# Patient Record
Sex: FEMALE | Race: WHITE | NOT HISPANIC OR LATINO | Employment: FULL TIME | URBAN - METROPOLITAN AREA
[De-identification: names, ages, dates, MRNs, and addresses within clinical notes are randomized per-mention and may not be internally consistent; named-entity substitution may affect disease eponyms.]

---

## 2017-01-20 ENCOUNTER — PATIENT MESSAGE (OUTPATIENT)
Dept: GYNECOLOGIC ONCOLOGY | Facility: CLINIC | Age: 62
End: 2017-01-20

## 2017-01-20 ENCOUNTER — LAB VISIT (OUTPATIENT)
Dept: LAB | Facility: HOSPITAL | Age: 62
End: 2017-01-20
Attending: OBSTETRICS & GYNECOLOGY
Payer: COMMERCIAL

## 2017-01-20 ENCOUNTER — HOSPITAL ENCOUNTER (OUTPATIENT)
Dept: RADIOLOGY | Facility: HOSPITAL | Age: 62
Discharge: HOME | End: 2017-01-20
Attending: OBSTETRICS & GYNECOLOGY
Payer: COMMERCIAL

## 2017-01-20 DIAGNOSIS — C56.9 OVARIAN CANCER, UNSPECIFIED LATERALITY: ICD-10-CM

## 2017-01-20 DIAGNOSIS — Z29.89 ENCOUNTER FOR HYDRATION PRIOR TO CT SCAN: ICD-10-CM

## 2017-01-20 LAB
ANION GAP SERPL CALC-SCNC: 7 MMOL/L
BUN SERPL-MCNC: 11 MG/DL
CALCIUM SERPL-MCNC: 8.7 MG/DL
CANCER AG125 SERPL-ACNC: 55 U/ML
CHLORIDE SERPL-SCNC: 107 MMOL/L
CO2 SERPL-SCNC: 27 MMOL/L
CREAT SERPL-MCNC: 0.7 MG/DL
ERYTHROCYTE [DISTWIDTH] IN BLOOD BY AUTOMATED COUNT: 19 %
EST. GFR  (AFRICAN AMERICAN): >60 ML/MIN/1.73 M^2
EST. GFR  (NON AFRICAN AMERICAN): >60 ML/MIN/1.73 M^2
GLUCOSE SERPL-MCNC: 93 MG/DL
HCT VFR BLD AUTO: 37.6 %
HGB BLD-MCNC: 12.5 G/DL
MCH RBC QN AUTO: 31.2 PG
MCHC RBC AUTO-ENTMCNC: 33.2 %
MCV RBC AUTO: 94 FL
NEUTROPHILS # BLD AUTO: 3.4 K/UL
PLATELET # BLD AUTO: 124 K/UL
PMV BLD AUTO: 9.5 FL
POTASSIUM SERPL-SCNC: 5.3 MMOL/L
RBC # BLD AUTO: 4.01 M/UL
SODIUM SERPL-SCNC: 141 MMOL/L
WBC # BLD AUTO: 4.76 K/UL

## 2017-01-20 PROCEDURE — 85027 COMPLETE CBC AUTOMATED: CPT

## 2017-01-20 PROCEDURE — 25500020 PHARM REV CODE 255: Performed by: OBSTETRICS & GYNECOLOGY

## 2017-01-20 PROCEDURE — 86304 IMMUNOASSAY TUMOR CA 125: CPT

## 2017-01-20 PROCEDURE — 74177 CT ABD & PELVIS W/CONTRAST: CPT | Mod: TC

## 2017-01-20 PROCEDURE — 36415 COLL VENOUS BLD VENIPUNCTURE: CPT

## 2017-01-20 PROCEDURE — 80048 BASIC METABOLIC PNL TOTAL CA: CPT

## 2017-01-20 PROCEDURE — 74177 CT ABD & PELVIS W/CONTRAST: CPT | Mod: 26,,, | Performed by: RADIOLOGY

## 2017-01-20 RX ADMIN — IOHEXOL 15 ML: 350 INJECTION, SOLUTION INTRAVENOUS at 11:01

## 2017-01-20 RX ADMIN — IOHEXOL 100 ML: 350 INJECTION, SOLUTION INTRAVENOUS at 12:01

## 2017-01-22 ENCOUNTER — PATIENT MESSAGE (OUTPATIENT)
Dept: GYNECOLOGIC ONCOLOGY | Facility: CLINIC | Age: 62
End: 2017-01-22

## 2017-01-23 ENCOUNTER — OFFICE VISIT (OUTPATIENT)
Dept: GYNECOLOGIC ONCOLOGY | Facility: CLINIC | Age: 62
End: 2017-01-23
Payer: COMMERCIAL

## 2017-01-23 ENCOUNTER — INFUSION (OUTPATIENT)
Dept: INFUSION THERAPY | Facility: HOSPITAL | Age: 62
End: 2017-01-23
Attending: OBSTETRICS & GYNECOLOGY
Payer: COMMERCIAL

## 2017-01-23 VITALS
HEART RATE: 82 BPM | SYSTOLIC BLOOD PRESSURE: 119 MMHG | WEIGHT: 187 LBS | BODY MASS INDEX: 29.29 KG/M2 | DIASTOLIC BLOOD PRESSURE: 58 MMHG

## 2017-01-23 VITALS
TEMPERATURE: 99 F | HEART RATE: 73 BPM | RESPIRATION RATE: 20 BRPM | DIASTOLIC BLOOD PRESSURE: 69 MMHG | SYSTOLIC BLOOD PRESSURE: 115 MMHG

## 2017-01-23 DIAGNOSIS — Z98.890 S/P EXPLORATORY LAPAROTOMY: ICD-10-CM

## 2017-01-23 DIAGNOSIS — C56.9 OVARIAN CANCER, UNSPECIFIED LATERALITY: Primary | ICD-10-CM

## 2017-01-23 DIAGNOSIS — Z51.11 ENCOUNTER FOR ANTINEOPLASTIC CHEMOTHERAPY: ICD-10-CM

## 2017-01-23 PROCEDURE — 25000003 PHARM REV CODE 250: Performed by: OBSTETRICS & GYNECOLOGY

## 2017-01-23 PROCEDURE — 96367 TX/PROPH/DG ADDL SEQ IV INF: CPT

## 2017-01-23 PROCEDURE — 96417 CHEMO IV INFUS EACH ADDL SEQ: CPT

## 2017-01-23 PROCEDURE — 99999 PR PBB SHADOW E&M-EST. PATIENT-LVL II: CPT | Mod: PBBFAC,,, | Performed by: OBSTETRICS & GYNECOLOGY

## 2017-01-23 PROCEDURE — 63600175 PHARM REV CODE 636 W HCPCS: Performed by: OBSTETRICS & GYNECOLOGY

## 2017-01-23 PROCEDURE — 99214 OFFICE O/P EST MOD 30 MIN: CPT | Mod: 24,S$GLB,, | Performed by: OBSTETRICS & GYNECOLOGY

## 2017-01-23 PROCEDURE — 96375 TX/PRO/DX INJ NEW DRUG ADDON: CPT

## 2017-01-23 PROCEDURE — 96377 APPLICATON ON-BODY INJECTOR: CPT

## 2017-01-23 PROCEDURE — 96413 CHEMO IV INFUSION 1 HR: CPT

## 2017-01-23 RX ORDER — SODIUM CHLORIDE 0.9 % (FLUSH) 0.9 %
10 SYRINGE (ML) INJECTION
Status: CANCELLED | OUTPATIENT
Start: 2017-01-23

## 2017-01-23 RX ORDER — HEPARIN 100 UNIT/ML
500 SYRINGE INTRAVENOUS
Status: CANCELLED | OUTPATIENT
Start: 2017-01-23

## 2017-01-23 RX ORDER — FAMOTIDINE 10 MG/ML
20 INJECTION INTRAVENOUS
Status: CANCELLED | OUTPATIENT
Start: 2017-01-23

## 2017-01-23 RX ORDER — SODIUM CHLORIDE 0.9 % (FLUSH) 0.9 %
10 SYRINGE (ML) INJECTION
Status: DISCONTINUED | OUTPATIENT
Start: 2017-01-23 | End: 2017-01-23 | Stop reason: HOSPADM

## 2017-01-23 RX ORDER — HEPARIN 100 UNIT/ML
500 SYRINGE INTRAVENOUS
Status: DISCONTINUED | OUTPATIENT
Start: 2017-01-23 | End: 2017-01-23 | Stop reason: HOSPADM

## 2017-01-23 RX ORDER — FAMOTIDINE 10 MG/ML
20 INJECTION INTRAVENOUS
Status: COMPLETED | OUTPATIENT
Start: 2017-01-23 | End: 2017-01-23

## 2017-01-23 RX ADMIN — SODIUM CHLORIDE: 0.9 INJECTION, SOLUTION INTRAVENOUS at 03:01

## 2017-01-23 RX ADMIN — CARBOPLATIN 750 MG: 10 INJECTION, SOLUTION INTRAVENOUS at 04:01

## 2017-01-23 RX ADMIN — FAMOTIDINE 20 MG: 10 INJECTION INTRAVENOUS at 03:01

## 2017-01-23 RX ADMIN — DIPHENHYDRAMINE HYDROCHLORIDE 50 MG: 50 INJECTION, SOLUTION INTRAMUSCULAR; INTRAVENOUS at 03:01

## 2017-01-23 RX ADMIN — PALONOSETRON HYDROCHLORIDE 0.25 MG: 0.25 INJECTION INTRAVENOUS at 03:01

## 2017-01-23 RX ADMIN — DOCETAXEL ANHYDROUS 147.5 MG: 10 INJECTION, SOLUTION INTRAVENOUS at 03:01

## 2017-01-23 RX ADMIN — PEGFILGRASTIM 6 MG: KIT SUBCUTANEOUS at 06:01

## 2017-01-23 NOTE — PROGRESS NOTES
Subjective:      Patient ID: Mickie Don is a 61 y.o. female.    Chief Complaint: Ovarian Cancer (review CT scan)  Treatment History  Xlap/Omentectomy on 11/1/16 - mass was unresectable  Stage IIIC ovarian cancer  Initiated Taxotere/Carbo on 11/18/16    HPI  Here today for consideration of C3 of treatment.  Labs reviewed and ok for treatment.  CA-125 down to 55.  Labs reviewed and ok for treatment.  Anxiety seems to be better.  No new symptoms.  CT A/P reveals no further peritoneal lesions.  Mass in pelvis slightly bigger but has undergone cystic degeneration.  Review of Systems   Constitutional: Negative for activity change, appetite change, chills, fatigue and fever.   HENT: Negative for hearing loss, mouth sores, nosebleeds, sore throat and tinnitus.    Eyes: Negative for visual disturbance.   Respiratory: Negative for cough, chest tightness, shortness of breath and wheezing.    Cardiovascular: Negative for chest pain and leg swelling.   Gastrointestinal: Negative for abdominal distention, abdominal pain, blood in stool, constipation, diarrhea, nausea and vomiting.   Genitourinary: Negative for dysuria, flank pain, frequency, hematuria, pelvic pain, vaginal bleeding, vaginal discharge and vaginal pain.   Musculoskeletal: Negative for arthralgias and back pain.   Skin: Negative for rash.   Neurological: Negative for dizziness, seizures, syncope, weakness and numbness.   Hematological: Does not bruise/bleed easily.   Psychiatric/Behavioral: Negative for confusion and sleep disturbance. The patient is not nervous/anxious.         Objective:   Physical Exam:   Constitutional: She is oriented to person, place, and time. She appears well-developed and well-nourished. No distress.    HENT:   Head: Normocephalic and atraumatic.    Eyes: No scleral icterus.    Neck: Normal range of motion. Neck supple.    Cardiovascular: Normal rate and intact distal pulses.  Exam reveals no cyanosis and no edema.     Pulmonary/Chest:  Effort normal. No respiratory distress. She exhibits no tenderness.        Abdominal: Soft. Normal appearance. She exhibits mass (palpable to above the umbilicus). She exhibits no fluid wave and no ascites. There is no tenderness. There is no rigidity, no rebound and no guarding. No hernia.     Genitourinary: Rectum normal. Pelvic exam was performed with patient supine. There is no rash, tenderness, lesion or injury on the right labia. There is no rash, tenderness, lesion or injury on the left labia. Uterus is deviated. Uterus is not enlarged, not fixed, not hosting fibroids and not experiencing uterine prolapse. Cervix is normal. Right adnexum displays no mass, no tenderness and no fullness. Left adnexum displays mass (mas feels slightly more mobile than previously), tenderness and fullness. No erythema or bleeding in the vagina. Cervix exhibits no motion tenderness, no discharge and no friability.           Musculoskeletal: Normal range of motion and moves all extremeties. She exhibits no edema.      Lymphadenopathy:     She has no cervical adenopathy.        Right: No inguinal adenopathy present.        Left: No inguinal adenopathy present.    Neurological: She is alert and oriented to person, place, and time.    Skin: Skin is warm. No rash noted. No cyanosis or erythema.    Psychiatric: She has a normal mood and affect. Thought content normal.       Assessment:     1. Ovarian cancer, unspecified laterality    2. S/P exploratory laparotomy/omentectomy    3. Encounter for antineoplastic chemotherapy        Plan:       CA-125 decreasing.  Minimal symptoms.  Mass more mobile on exam today, but still somewhat fixed.  Recommended we proceed with an additional 3 cycles given her response.

## 2017-01-23 NOTE — MR AVS SNAPSHOT
Patient Information     Patient Name Sex Mickie Martinez Female 1955      Visit Information        Provider Department Dept [facility]    2017 2:30 PM [facility], [facility] [phone] [address]      Patient Instructions     None      Your Current Medications Are     alprazolam (XANAX) 0.5 MG tablet    dexamethasone (DECADRON) 4 MG Tab    oxycodone-acetaminophen (PERCOCET) 5-325 mg per tablet      Facility-Administered Medications     alteplase injection 2 mg    carboplatin (PARAPLATIN) 750 mg in sodium chloride 0.9% 250 mL chemo infusion    diphenhydramine IVPB 50 mg    docetaxel (TAXOTERE) 75 mg/m2 = 147.5 mg in sodium chloride 0.9% 250 mL chemo infusion    famotidine (PF) 20 mg/2 mL injection 20 mg    heparin, porcine (PF) 100 unit/mL injection flush 500 Units    palonosetron 0.25mg/dexamethasone 20mg IVPB    pegfilgrastim (NEULASTA (ON BODY INJECTOR)) injection 6 mg    sodium chloride 0.9% 500 mL infusion    sodium chloride 0.9% flush 10 mL      Appointments for Next Year     None         Default Flowsheet Data (last 24 hours)      Amb Complex Vitals Wilfrido        17 1652 17 1607 17 1502 17 1341       Measurements    Weight    84.8 kg (187 lb)     /69   Taxotere infusion completed (!)  117/59   Taxotere infusing for 15 mins 116/60 (!)  119/58     Temp   99.1 °F (37.3 °C)      Pulse 73 60 72 82     Resp   20      Pain Assessment    Pain Score   Zero Zero             Allergies     No Known Allergies      Medications You Received from 2017 1826 to 2017 1826        Date/Time Order Dose Route Action     2017 1657 carboplatin (PARAPLATIN) 750 mg in sodium chloride 0.9% 250 mL chemo infusion 750 mg Intravenous New Bag     2017 1510 diphenhydramine IVPB 50 mg 50 mg Intravenous New Bag     2017 1552 docetaxel (TAXOTERE) 75 mg/m2 = 147.5 mg in sodium chloride 0.9% 250 mL chemo infusion 147.5 mg Intravenous New Bag      01/23/2017 1510 famotidine (PF) 20 mg/2 mL injection 20 mg 20 mg Intravenous Given     01/23/2017 1528 palonosetron 0.25mg/dexamethasone 20mg IVPB 0.25 mg Intravenous New Bag     01/23/2017 1825 pegfilgrastim (NEULASTA (ON BODY INJECTOR)) injection 6 mg 6 mg Subcutaneous Given     01/23/2017 1510 sodium chloride 0.9% 500 mL infusion   Intravenous New Bag      Current Discharge Medication List     Cannot display discharge medications since this is not an admission.

## 2017-01-24 NOTE — PLAN OF CARE
Problem: Patient Care Overview (Adult)  Goal: Plan of Care Review  Outcome: Ongoing (interventions implemented as appropriate)  Patient tolerated Taxotere and Carbo treatment with vital signs stable. OBI applied to left arm. Patient instructed to call MD with any problems . AVS given and Patient discharged home.

## 2017-02-03 ENCOUNTER — TELEPHONE (OUTPATIENT)
Dept: GYNECOLOGIC ONCOLOGY | Facility: CLINIC | Age: 62
End: 2017-02-03

## 2017-02-03 NOTE — TELEPHONE ENCOUNTER
----- Message from Roxana Taarngo sent at 2/3/2017  1:23 PM CST -----  Mickie Don needs to speak with medical assistant/pt can be reached at [phone]

## 2017-02-03 NOTE — TELEPHONE ENCOUNTER
02/03/17 rec'd pc from pt with c/o soreness and swelling around IV site. Advised pt apply cool compress and tylenol for pain. If sx worsen or change over the weekend go to the Er. Pt verbalized understanding. NICK/MA

## 2017-02-05 ENCOUNTER — HOSPITAL ENCOUNTER (EMERGENCY)
Facility: HOSPITAL | Age: 62
Discharge: HOME | End: 2017-02-05
Attending: EMERGENCY MEDICINE
Payer: COMMERCIAL

## 2017-02-05 VITALS
TEMPERATURE: 99 F | DIASTOLIC BLOOD PRESSURE: 56 MMHG | BODY MASS INDEX: 28.25 KG/M2 | OXYGEN SATURATION: 97 % | HEART RATE: 76 BPM | RESPIRATION RATE: 16 BRPM | HEIGHT: 67 IN | SYSTOLIC BLOOD PRESSURE: 115 MMHG | WEIGHT: 180 LBS

## 2017-02-05 DIAGNOSIS — M79.601 RIGHT ARM PAIN: Primary | ICD-10-CM

## 2017-02-05 DIAGNOSIS — M79.89 SWELLING OF ARM: ICD-10-CM

## 2017-02-05 DIAGNOSIS — I82.611 THROMBOSIS OF RIGHT CEPHALIC VEIN: ICD-10-CM

## 2017-02-05 LAB
ALBUMIN SERPL BCP-MCNC: 3.4 G/DL
ALP SERPL-CCNC: 87 U/L
ALT SERPL W/O P-5'-P-CCNC: 28 U/L
ANION GAP SERPL CALC-SCNC: 7 MMOL/L
AST SERPL-CCNC: 25 U/L
BASOPHILS # BLD AUTO: 0.02 K/UL
BASOPHILS NFR BLD: 0.2 %
BILIRUB SERPL-MCNC: 0.5 MG/DL
BUN SERPL-MCNC: 10 MG/DL
CALCIUM SERPL-MCNC: 8.6 MG/DL
CHLORIDE SERPL-SCNC: 106 MMOL/L
CO2 SERPL-SCNC: 25 MMOL/L
CREAT SERPL-MCNC: 0.7 MG/DL
DIFFERENTIAL METHOD: ABNORMAL
EOSINOPHIL # BLD AUTO: 0 K/UL
EOSINOPHIL NFR BLD: 0.1 %
ERYTHROCYTE [DISTWIDTH] IN BLOOD BY AUTOMATED COUNT: 19.2 %
EST. GFR  (AFRICAN AMERICAN): >60 ML/MIN/1.73 M^2
EST. GFR  (NON AFRICAN AMERICAN): >60 ML/MIN/1.73 M^2
GLUCOSE SERPL-MCNC: 93 MG/DL
HCT VFR BLD AUTO: 39.4 %
HGB BLD-MCNC: 13.1 G/DL
LYMPHOCYTES # BLD AUTO: 1.1 K/UL
LYMPHOCYTES NFR BLD: 11.9 %
MCH RBC QN AUTO: 31.8 PG
MCHC RBC AUTO-ENTMCNC: 33.2 %
MCV RBC AUTO: 96 FL
MONOCYTES # BLD AUTO: 0.4 K/UL
MONOCYTES NFR BLD: 5 %
NEUTROPHILS # BLD AUTO: 7.2 K/UL
NEUTROPHILS NFR BLD: 82.2 %
PLATELET # BLD AUTO: 112 K/UL
PMV BLD AUTO: 9.8 FL
POTASSIUM SERPL-SCNC: 4.4 MMOL/L
PROT SERPL-MCNC: 6.4 G/DL
RBC # BLD AUTO: 4.12 M/UL
SODIUM SERPL-SCNC: 138 MMOL/L
WBC # BLD AUTO: 8.79 K/UL

## 2017-02-05 PROCEDURE — 99284 EMERGENCY DEPT VISIT MOD MDM: CPT | Mod: ,,, | Performed by: NURSE PRACTITIONER

## 2017-02-05 PROCEDURE — 87040 BLOOD CULTURE FOR BACTERIA: CPT

## 2017-02-05 PROCEDURE — 99284 EMERGENCY DEPT VISIT MOD MDM: CPT

## 2017-02-05 PROCEDURE — 80053 COMPREHEN METABOLIC PANEL: CPT

## 2017-02-05 PROCEDURE — 85025 COMPLETE CBC W/AUTO DIFF WBC: CPT

## 2017-02-05 RX ORDER — CEPHALEXIN 500 MG/1
500 CAPSULE ORAL EVERY 6 HOURS
Qty: 40 CAPSULE | Refills: 0 | Status: SHIPPED | OUTPATIENT
Start: 2017-02-05 | End: 2017-02-15

## 2017-02-05 NOTE — DISCHARGE INSTRUCTIONS
Cellulitis  Cellulitis is an infection of the deep layers of skin. A break in the skin, such as a cut or scratch, can let bacteria under the skin. If the bacteria get to deep layers of the skin, it can be serious. If not treated, cellulitis can get into the bloodstream and lymph nodes. The infection can then spread throughout the body. This causes serious illness.  Cellulitis causes the affected skin to become red, swollen, warm, and sore. The reddened areas have a visible border. An open sore may leak fluid (pus). You may have a fever, chills, and pain.  Cellulitis is treated with antibiotics taken for 7 to 10 days. An open sore may be cleaned and covered with cool wet gauze. Symptoms should get better 1 to 2 days after treatment is started. Make sure to take all the antibiotics for the full number of days until they are gone. Keep taking the medicine even if your symptoms go away.  Home care  Follow these tips:  · Limit the use of the part of your body with cellulitis.   · If the infection is on your leg, keep your leg raised while sitting. This will help to reduce swelling.  · Take all of the antibiotic medicine exactly as directed until it is gone. Do not miss any doses, especially during the first 7 days. Dont stop taking the medicine when your symptoms get better.  · Keep the affected area clean and dry.  · Wash your hands with soap and warm water before and after touching your skin. Anyone else who touches your skin should also wash his or her hands. Don't share towels.  Follow-up care  Follow up with your healthcare provider, or as advised. If your infection does not go away on the first antibiotic, your healthcare provider will prescribe a different one.  When to seek medical advice  Call your healthcare provider right away if any of these occur:  · Red areas that spread  · Swelling or pain that gets worse  · Fluid leaking from the skin (pus)  · Fever higher of 100.4º F (38.0º C) or higher after 2 days  on antibiotics  Date Last Reviewed: 9/1/2016  © 2727-2399 [employer]. [address] [place] [address], [place] [address]. All rights reserved. This information is not intended as a substitute for professional medical care. Always follow your healthcare professional's instructions.        Superficial Thrombophlebitis    The superficial veins are the veins near the surface of the skin. Superficial thrombophlebitis is a problem that occurs when one or more of these veins become inflamed (red, irritated, and swollen). This is most often due to a blood clot.  Causes  The problem may occur after injury to a vein. It may also occur after having an intravenous (IV) line placed. Other factors that can make the problem more likely include:  · Varicose veins  · Venous insufficiency  · Bleeding disorders  · Prolonged periods of rest and not moving around  · IV drug abuse  Symptoms  Symptoms may appear in the affected area. They can include:  · Pain  · Tenderness  · Redness  · Warmth  · Swelling  · Hardening of the vein  In most cases, superficial thrombophlebitis resolves on its own with no problems. Treatment is focused on relieving symptoms.  Sometimes, there is a risk that the deep veins in the body may also be involved. This can lead to more serious problems. In such cases, further testing and treatments may be needed. Your healthcare provider can tell you more about this.  Home Care  To help relieve pain and swelling, you may be advised to:  · Apply heat or cold to the affected area. Do this for up to 10 minutes as often as directed.  ¨ Heat: Use a warm compress, such as a heating pad.  ¨ Cold: Use a cold compress, such as a cold pack or bag of ice wrapped in a thin towel.  · Take nonsteroidal anti-inflammatory drugs (NSAIDS), such as ibuprofen. In some cases, other pain medicines may be prescribed.  · Keep the affected limb (arm or leg) raised above heart level as directed.  · Wear elastic compression stockings or  bandages as directed.  · Avoid prolonged sitting or standing. Get up and walk often.  To help treat a blood clot, a blood thinner (anticoagulant) may be prescribed. If this is needed, be sure to take the medicine exactly as directed.  Follow-up care  Follow up with your healthcare provider as advised. If imaging tests are done, they will be reviewed by a doctor. Youll be told the results and any new findings that may affect your treatment.  When to seek medical advice  Call your healthcare provider right away if any of these occur:  · Fever of 100.4°F (38ºC) or higher, or as directed by your provider  · Increasing pain, swelling, or tenderness in the affected area  · Spreading warmth or redness in the affected area  Call 911  Call 911 right away if any of these occur:  · Trouble breathing  · Chest pain or discomfort that worsens with deep breathing or coughing  · Coughing (may cough up blood)  · Fast or irregular heartbeat  · Sweating  · Anxiety  · Lightheadedness, dizziness, or fainting  · Extreme confusion  · Extreme drowsiness or trouble waking up  · New pain in the chest, arm, shoulder, neck, or upper back  Date Last Reviewed: 9/21/2015  © 7186-0909 [employer]. [address] [place] [address], [place] [address]. All rights reserved. This information is not intended as a substitute for professional medical care. Always follow your healthcare professional's instructions.

## 2017-02-05 NOTE — ED NOTES
Patient identifiers verified and correct for Mickie Don.   LOC: The patient is awake, alert and aware of environment with an appropriate affect, the patient is oriented x 3 and speaking appropriately.   APPEARANCE: Patient appears comfortable and in no acute distress, patient is clean and well groomed.  SKIN: The skin is warm and dry, color consistent with ethnicity, patient has normal skin turgor and moist mucus membranes, skin intact, swelling and redness to right AC.   MUSCULOSKELETAL: Patient moving all extremities spontaneously, no swelling noted.  RESPIRATORY: Airway is open and patent, respirations are spontaneous, patient has a normal effort and rate, no accessory muscle use noted, pt placed on continuous pulse ox with O2 sats noted at 97% on room air.  CARDIAC: Pt placed on cardiac monitor. Patient has a normal rate and regular rhythm, no edema noted, capillary refill < 3 seconds.   GASTRO: Soft and non tender to palpation, no distention noted, normoactive bowel sounds present in all four quadrants. Pt states bowel movements have been regular.  : Pt denies any pain or frequency with urination.  NEURO: Pt opens eyes spontaneously, behavior appropriate to situation, follows commands, facial expression symmetrical, bilateral hand grasp equal and even, purposeful motor response noted, normal sensation in all extremities when touched with a finger.

## 2017-02-05 NOTE — ED PROVIDER NOTES
Encounter Date: 2/5/2017    SCRIBE #1 NOTE: I, Teodoro Faith, am scribing for, and in the presence of,  Dr. Mckeon. I have scribed the following portions of the note - the APC attestation.       History     Chief Complaint   Patient presents with    Arm Pain     c/o, pain in rt arm x 4 days . Hx of IV dye and chemo Has red area in rt ac.Afebrile at home     Review of patient's allergies indicates:  No Known Allergies  HPI Comments: This is a 61 y.o. Female with a past medical history significant for ovarian cancer who is currently on chemotherapy who presents to emergency department today complaining of redness, swelling and pain to her right arm.  She reports that on 1/20/17 she had IV contrast placed in the right antecubital space and then on 1/23/17 she had chemotherapy also placed in the right antecubital space.  She states that a couple days after her chemotherapy she began to develop the redness, swelling and pain.  She notes that the area of swelling and redness has gradually gotten larger over the past few days.  She denies fever or chills.  She denies limited range of motion although she states it is painful to move the elbow.  She denies weakness or numbness.  Denies any fever or chills.  She denies wound, drainage or odor.  Denies chest pain or shortness of breath.  No other problems or concerns voiced at this time.      The history is provided by the patient.     Past Medical History   Diagnosis Date    Open angle with borderline findings and low glaucoma risk in both eyes 6/20/2013     Past Medical History Pertinent Negatives   Diagnosis Date Noted    Amblyopia 6/20/2013    Cataract 6/20/2013    Coronary artery disease 6/29/2014    Diabetes mellitus 6/20/2013    Diabetes mellitus 6/29/2014    Diabetic retinopathy 6/20/2013    Hypertension 6/29/2014    Macular degeneration 6/20/2013    Retinal detachment 6/20/2013    Strabismus 6/20/2013    Uveitis 6/20/2013     Past Surgical History    Procedure Laterality Date    Cholecystectomy  1992    Left leg surgery  2005     achilles tendon    Right leg surgery  2006     broken, including ankle    Bunionectomy Left      Family History   Problem Relation Age of Onset    Cancer Father      ? liver ca    Diabetes Father     Cataracts Mother     Breast cancer Maternal Aunt     Cancer Son      sarcoma    Colon cancer Neg Hx     Ovarian cancer Neg Hx     Amblyopia Neg Hx     Blindness Neg Hx     Glaucoma Neg Hx     Hypertension Neg Hx     Macular degeneration Neg Hx     Retinal detachment Neg Hx     Strabismus Neg Hx     Stroke Neg Hx     Thyroid disease Neg Hx      Social History   Substance Use Topics    Smoking status: Never Smoker    Smokeless tobacco: Never Used    Alcohol use 1.8 oz/week     1 Glasses of wine, 2 Shots of liquor per week      Comment: Socially     Review of Systems   Constitutional: Negative for chills and fever.   HENT: Negative for congestion and sinus pressure.    Eyes: Negative for visual disturbance.   Respiratory: Negative for cough, chest tightness and shortness of breath.    Cardiovascular: Negative for chest pain.   Gastrointestinal: Negative for abdominal pain, diarrhea, nausea and vomiting.   Genitourinary: Negative for flank pain. Negative for dysuria.  Musculoskeletal: Positive for arm pain.  Positive for arm swelling.    Skin: Positive for erythema.  Negative for wound.    Neurological: Negative for dizziness. Negative for weakness and numbness.   Psychiatric/Behavioral: Negative for confusion.       Physical Exam   Initial Vitals   BP Pulse Resp Temp SpO2   02/05/17 0911 02/05/17 0911 02/05/17 0911 02/05/17 0911 02/05/17 0911   131/58 98 18 99.3 °F (37.4 °C) 96 %     Physical Exam   Nursing note and vitals reviewed.  Constitutional: Patient appears well-developed and well-nourished. Not diaphoretic. No distress.   HENT:   Head: Normocephalic and atraumatic.   Eyes: Conjunctivae are normal. No scleral  icterus.   Neck: Normal range of motion. Neck supple.   Cardiovascular: Normal rate, regular rhythm and normal heart sounds.   Pulmonary/Chest: Breath sounds normal. No respiratory distress.  Abdominal: Soft. There is no tenderness.   Musculoskeletal: There is localized swelling, tenderness, erythema and warmth noted to the right antecubital space.  No fluctuance is noted.  There is no wound, drainage or odor.  Patient has normal but guarded range of motion of the right elbow secondary to pain.  Normal radial pulse.  Normal capillary refill time.    Neurological: Alert and oriented to person, place, and time. Normal strength. No sensory deficit.   Skin: See musculoskeletal exam.    Psychiatric: Normal mood and affect. Thought content normal.     ED Course   Procedures  Labs Reviewed   CBC W/ AUTO DIFFERENTIAL - Abnormal; Notable for the following:        Result Value    MCH 31.8 (*)     RDW 19.2 (*)     Platelets 112 (*)     Gran% 82.2 (*)     Lymph% 11.9 (*)     All other components within normal limits   COMPREHENSIVE METABOLIC PANEL - Abnormal; Notable for the following:     Calcium 8.6 (*)     Albumin 3.4 (*)     Anion Gap 7 (*)     All other components within normal limits   CULTURE, BLOOD   CULTURE, BLOOD             Medical Decision Making:   History:   Old Medical Records: I decided to obtain old medical records.  Initial Assessment:   61 y.o. Female presenting with swelling, redness and pain to the right antecubital space for about one week following CT contrast infusion and chemotherapy infusion to the site. She has a low grade fever of 99. She is not toxic and in no distress. Denies CP or SOB. She has normal ROM of the right elbow and is neurovascularly intact. Labs are unremarkable. Venous US indicates a superficial thrombus of the basilic vein, no treatment is indicated. Will empirically treat with Keflex in the event that this is of infectious etiology given her immunocompromised status. She is to f/u  with her oncologist this week. Return precautions discussed. I discussed this case with Dr. Mckeon, my collaborating physician.  Clinical Tests:   Lab Tests: Ordered and Reviewed  Radiological Study: Ordered and Reviewed            Scribe Attestation:   Scribe #1: I performed the above scribed service and the documentation accurately describes the services I performed. I attest to the accuracy of the note.    Attending Attestation:     Physician Attestation Statement for NP/PA:   I have conducted a face to face encounter with this patient in addition to the NP/PA, due to Medical Complexity    Other NP/PA Attestation Additions:    History of Present Illness: 60 yo woman on chemotherapy for ovarian CA c/o RUE pain, redness, swelling in her R antecubital fossa. She had an IV in that area for a CT scan on Jan 20, 2017 and for chemotherapy 3 days later. A few days after that she developed a painful knot in that area. It has continued to enlarge and now has more induration, redness and pain. She is concerned about a blood clot.    Physical Exam: There is erythema and induration surrounding a tender nodule at the R ac extending approximately 6-7 cm superiorly and laterally. No pain with passive or active ROM. No fluctuance.   Medical Decision Making: My initial differential diagnoses include but are not limited to:  DVT, cellulitis, or thrombophlebitis. Checking labs, RUE US. Pt given a warm compress and refused pain meds.        Physician Attestation for Scribe:  Physician Attestation Statement for Scribe #1: I, Dr. Mckeon, reviewed documentation, as scribed by Teodoro Faith in my presence, and it is both accurate and complete.         Attending ED Notes:   12:15 PM    US is consistent with thrombosis of superficial vein. No evidence of DVT. Pt's wbc is 8 with L shift. She has a low grade fever of 99. Will place pt on keflex and recommend that she contact oncology tomorrow to arrange a follow up in the next few days.  Will give return instructions.           ED Course     Clinical Impression:   The primary encounter diagnosis was Right arm pain. Diagnoses of Swelling of arm and Thrombosis of right cephalic vein were also pertinent to this visit.          Bernie Hodges NP  02/06/17 [id number]

## 2017-02-05 NOTE — ED TRIAGE NOTES
Pt is on chemotherapy with most recent treatment on Feb 23rd. Pt has swelling and redness to right AC which happens to be the same area of last chemo treatment and CT contrast.

## 2017-02-05 NOTE — ED AVS SNAPSHOT
[facility]  [address] [facility] [address]               Mickie Don   2017  9:39 AM   ED    Description:  Female : 1955   Department:  [facility]           Your Care was Coordinated By:     Provider Role From To    Cecily Castle MD Attending Provider 17 --    Bernie Hodges NP Nurse Practitioner 17 --      Reason for Visit     Arm Pain           Diagnoses this Visit        Comments    Right arm pain    -  Primary     Swelling of arm         Thrombosis of right cephalic vein           ED Disposition     None           To Do List           Follow-up Information     Follow up with Benny Tran MD. Schedule an appointment as soon as possible for a visit in 1 day.    Specialties:  Obstetrics, Gynecology, Gynecologic Oncology    Why:  For re-evaluation and further treatment.    Contact information:    [address] [facility] [address]  [phone]         These Medications        Disp Refills Start End    cephALEXin (KEFLEX) 500 MG capsule 40 capsule 0 2017 2/15/2017    Take 1 capsule (500 mg total) by mouth every 6 (six) hours. - Oral    Pharmacy: [facility] [address] [place] [address] #: [phone]         [facility] On Call     [facility] Nurse Care Line -  Assistance  Registered nurses in the [facility] provide clinical advisement, health education, appointment booking, and other advisory services.  Call for this free service at [phone].             Medications           Message regarding Medications     Verify the changes and/or additions to your medication regime listed below are the same as discussed with your clinician today.  If any of these changes or additions are incorrect, please notify your healthcare provider.        START taking these NEW medications        Refills    cephALEXin (KEFLEX) 500 MG capsule 0    Sig: Take 1  "capsule (500 mg total) by mouth every 6 (six) hours.    Class: Print    Route: Oral           Verify that the below list of medications is an accurate representation of the medications you are currently taking.  If none reported, the list may be blank. If incorrect, please contact your healthcare provider. Carry this list with you in case of emergency.           Current Medications     alprazolam (XANAX) 0.5 MG tablet Take 1 tablet (0.5 mg total) by mouth 3 (three) times daily as needed for Anxiety.    cephALEXin (KEFLEX) 500 MG capsule Take 1 capsule (500 mg total) by mouth every 6 (six) hours.    dexamethasone (DECADRON) 4 MG Tab Take 2 tablets BID day before and after chemotherapy.    oxycodone-acetaminophen (PERCOCET) 5-325 mg per tablet Take 1 tablet by mouth every 4 (four) hours as needed (pain 1-5).           Clinical Reference Information           Your Vitals Were     BP Pulse Temp Resp Height Weight    115/56 (BP Location: Left arm, Patient Position: Lying, BP Method: Automatic) 76 99.3 °F (37.4 °C) (Oral) 16 5' 7" (1.702 m) 81.6 kg (180 lb)    SpO2 BMI             97% 28.19 kg/m2         Allergies as of 2/5/2017     No Known Allergies      Immunizations Administered on Date of Encounter - 2/5/2017     None      ED Micro, Lab, POCT     Start Ordered       Status Ordering Provider    02/05/17 0959 02/05/17 1000  CBC auto differential  STAT      Final result     02/05/17 0959 02/05/17 1000  Comprehensive metabolic panel  STAT      Final result     02/05/17 0959 02/05/17 1000  Blood Culture #2 **CANNOT BE ORDERED STAT**  Once      In process     02/05/17 0959 02/05/17 1000  Blood Culture #1 **CANNOT BE ORDERED STAT**  Once      In process       ED Imaging Orders     Start Ordered       Status Ordering Provider    02/05/17 0959 02/05/17 1000  US Upper Extremity Veins Right  1 time imaging     Comments:  Please look for both DVT and  Abscess. Thanks.    Final result         Discharge Instructions     "     Cellulitis  Cellulitis is an infection of the deep layers of skin. A break in the skin, such as a cut or scratch, can let bacteria under the skin. If the bacteria get to deep layers of the skin, it can be serious. If not treated, cellulitis can get into the bloodstream and lymph nodes. The infection can then spread throughout the body. This causes serious illness.  Cellulitis causes the affected skin to become red, swollen, warm, and sore. The reddened areas have a visible border. An open sore may leak fluid (pus). You may have a fever, chills, and pain.  Cellulitis is treated with antibiotics taken for 7 to 10 days. An open sore may be cleaned and covered with cool wet gauze. Symptoms should get better 1 to 2 days after treatment is started. Make sure to take all the antibiotics for the full number of days until they are gone. Keep taking the medicine even if your symptoms go away.  Home care  Follow these tips:  · Limit the use of the part of your body with cellulitis.   · If the infection is on your leg, keep your leg raised while sitting. This will help to reduce swelling.  · Take all of the antibiotic medicine exactly as directed until it is gone. Do not miss any doses, especially during the first 7 days. Dont stop taking the medicine when your symptoms get better.  · Keep the affected area clean and dry.  · Wash your hands with soap and warm water before and after touching your skin. Anyone else who touches your skin should also wash his or her hands. Don't share towels.  Follow-up care  Follow up with your healthcare provider, or as advised. If your infection does not go away on the first antibiotic, your healthcare provider will prescribe a different one.  When to seek medical advice  Call your healthcare provider right away if any of these occur:  · Red areas that spread  · Swelling or pain that gets worse  · Fluid leaking from the skin (pus)  · Fever higher of 100.4º F (38.0º C) or higher after 2 days  on antibiotics  Date Last Reviewed: 9/1/2016  © 7766-6608 [employer]. [address] [place] [address], [place] [address]. All rights reserved. This information is not intended as a substitute for professional medical care. Always follow your healthcare professional's instructions.        Superficial Thrombophlebitis    The superficial veins are the veins near the surface of the skin. Superficial thrombophlebitis is a problem that occurs when one or more of these veins become inflamed (red, irritated, and swollen). This is most often due to a blood clot.  Causes  The problem may occur after injury to a vein. It may also occur after having an intravenous (IV) line placed. Other factors that can make the problem more likely include:  · Varicose veins  · Venous insufficiency  · Bleeding disorders  · Prolonged periods of rest and not moving around  · IV drug abuse  Symptoms  Symptoms may appear in the affected area. They can include:  · Pain  · Tenderness  · Redness  · Warmth  · Swelling  · Hardening of the vein  In most cases, superficial thrombophlebitis resolves on its own with no problems. Treatment is focused on relieving symptoms.  Sometimes, there is a risk that the deep veins in the body may also be involved. This can lead to more serious problems. In such cases, further testing and treatments may be needed. Your healthcare provider can tell you more about this.  Home Care  To help relieve pain and swelling, you may be advised to:  · Apply heat or cold to the affected area. Do this for up to 10 minutes as often as directed.  ¨ Heat: Use a warm compress, such as a heating pad.  ¨ Cold: Use a cold compress, such as a cold pack or bag of ice wrapped in a thin towel.  · Take nonsteroidal anti-inflammatory drugs (NSAIDS), such as ibuprofen. In some cases, other pain medicines may be prescribed.  · Keep the affected limb (arm or leg) raised above heart level as directed.  · Wear elastic compression stockings or  bandages as directed.  · Avoid prolonged sitting or standing. Get up and walk often.  To help treat a blood clot, a blood thinner (anticoagulant) may be prescribed. If this is needed, be sure to take the medicine exactly as directed.  Follow-up care  Follow up with your healthcare provider as advised. If imaging tests are done, they will be reviewed by a doctor. Youll be told the results and any new findings that may affect your treatment.  When to seek medical advice  Call your healthcare provider right away if any of these occur:  · Fever of 100.4°F (38ºC) or higher, or as directed by your provider  · Increasing pain, swelling, or tenderness in the affected area  · Spreading warmth or redness in the affected area  Call 911  Call 911 right away if any of these occur:  · Trouble breathing  · Chest pain or discomfort that worsens with deep breathing or coughing  · Coughing (may cough up blood)  · Fast or irregular heartbeat  · Sweating  · Anxiety  · Lightheadedness, dizziness, or fainting  · Extreme confusion  · Extreme drowsiness or trouble waking up  · New pain in the chest, arm, shoulder, neck, or upper back  Date Last Reviewed: 9/21/2015 © 2000-2016 [employer]. [address] [place] [address]. All rights reserved. This information is not intended as a substitute for professional medical care. Always follow your healthcare professional's instructions.          Your Scheduled Appointments     Feb 14, 2017 10:10 AM CST   Non-Fasting Lab with LAB, HEMONC CANCER BLDG   [facility] ([facility])    [address]  [place] [address]   [phone]            Feb 15, 2017  8:30 AM CST   Chemotherapy with MD Hammad Barney [facility] - GYN Oncology ([facility] )    [address]  [place] [address]   [phone]            Feb 15, 2017  9:00 AM CST   Infusion 180 MIn with [facility], CHEMO   [facility] ([facility])     [address]  [facility] [address]   [phone]               [facility] complies with applicable Federal civil rights laws and does not discriminate on the basis of race, color, national origin, age, disability, or sex.        Language Assistance Services     ATTENTION: Language assistance services are available, free of charge. Please call [phone].      ATENCIÓN: Si habla español, tiene a gilbert disposición servicios gratuitos de asistencia lingüística. Llame al [phone].     CHÚ Ý: N?u b?n nói Ti?ng Vi?t, có các d?ch v? h? tr? ngôn ng? mi?n phí dành cho b?n. G?i s? [phone].

## 2017-02-08 ENCOUNTER — TELEPHONE (OUTPATIENT)
Dept: GYNECOLOGIC ONCOLOGY | Facility: CLINIC | Age: 62
End: 2017-02-08

## 2017-02-08 NOTE — TELEPHONE ENCOUNTER
02/08/17 rec'd pc from pt with c/o arm is still hurting with a large knot from IV. Pt was seen in the ER on Sunday and started on keflex and pain meds. Ultrasound showed no evidence of a DVT. Pt advised to continue with the warm compresses and pain meds prn. Knot will take some time to resolve. Pt verbalized understanding. NICK/MA

## 2017-02-10 LAB
BACTERIA BLD CULT: NORMAL
BACTERIA BLD CULT: NORMAL

## 2017-02-14 ENCOUNTER — LAB VISIT (OUTPATIENT)
Dept: LAB | Facility: HOSPITAL | Age: 62
End: 2017-02-14
Attending: INTERNAL MEDICINE
Payer: COMMERCIAL

## 2017-02-14 DIAGNOSIS — C56.9 OVARIAN CANCER, UNSPECIFIED LATERALITY: ICD-10-CM

## 2017-02-14 LAB
ANION GAP SERPL CALC-SCNC: 8 MMOL/L
BUN SERPL-MCNC: 10 MG/DL
CALCIUM SERPL-MCNC: 8.7 MG/DL
CANCER AG125 SERPL-ACNC: 46 U/ML
CHLORIDE SERPL-SCNC: 108 MMOL/L
CO2 SERPL-SCNC: 25 MMOL/L
CREAT SERPL-MCNC: 0.7 MG/DL
ERYTHROCYTE [DISTWIDTH] IN BLOOD BY AUTOMATED COUNT: 18.9 %
EST. GFR  (AFRICAN AMERICAN): >60 ML/MIN/1.73 M^2
EST. GFR  (NON AFRICAN AMERICAN): >60 ML/MIN/1.73 M^2
GLUCOSE SERPL-MCNC: 87 MG/DL
HCT VFR BLD AUTO: 34.9 %
HGB BLD-MCNC: 11.7 G/DL
MCH RBC QN AUTO: 32.6 PG
MCHC RBC AUTO-ENTMCNC: 33.5 %
MCV RBC AUTO: 97 FL
NEUTROPHILS # BLD AUTO: 2.3 K/UL
PLATELET # BLD AUTO: 83 K/UL
PMV BLD AUTO: 10.2 FL
POTASSIUM SERPL-SCNC: 4.4 MMOL/L
RBC # BLD AUTO: 3.59 M/UL
SODIUM SERPL-SCNC: 141 MMOL/L
WBC # BLD AUTO: 3.44 K/UL

## 2017-02-14 PROCEDURE — 86304 IMMUNOASSAY TUMOR CA 125: CPT

## 2017-02-14 PROCEDURE — 80048 BASIC METABOLIC PNL TOTAL CA: CPT

## 2017-02-14 PROCEDURE — 85027 COMPLETE CBC AUTOMATED: CPT

## 2017-02-14 PROCEDURE — 36415 COLL VENOUS BLD VENIPUNCTURE: CPT

## 2017-02-14 RX ORDER — FAMOTIDINE 10 MG/ML
20 INJECTION INTRAVENOUS
Status: CANCELLED | OUTPATIENT
Start: 2017-02-14

## 2017-02-14 RX ORDER — SODIUM CHLORIDE 0.9 % (FLUSH) 0.9 %
10 SYRINGE (ML) INJECTION
Status: CANCELLED | OUTPATIENT
Start: 2017-02-14

## 2017-02-14 RX ORDER — HEPARIN 100 UNIT/ML
500 SYRINGE INTRAVENOUS
Status: CANCELLED | OUTPATIENT
Start: 2017-02-14

## 2017-02-15 ENCOUNTER — OFFICE VISIT (OUTPATIENT)
Dept: GYNECOLOGIC ONCOLOGY | Facility: CLINIC | Age: 62
End: 2017-02-15
Payer: COMMERCIAL

## 2017-02-15 ENCOUNTER — INFUSION (OUTPATIENT)
Dept: INFUSION THERAPY | Facility: HOSPITAL | Age: 62
End: 2017-02-15
Attending: OBSTETRICS & GYNECOLOGY
Payer: COMMERCIAL

## 2017-02-15 VITALS
WEIGHT: 196 LBS | DIASTOLIC BLOOD PRESSURE: 67 MMHG | SYSTOLIC BLOOD PRESSURE: 118 MMHG | HEART RATE: 86 BPM | BODY MASS INDEX: 30.7 KG/M2

## 2017-02-15 VITALS
DIASTOLIC BLOOD PRESSURE: 66 MMHG | SYSTOLIC BLOOD PRESSURE: 120 MMHG | TEMPERATURE: 98 F | HEART RATE: 57 BPM | RESPIRATION RATE: 18 BRPM

## 2017-02-15 DIAGNOSIS — C56.9 OVARIAN CANCER, UNSPECIFIED LATERALITY: Primary | ICD-10-CM

## 2017-02-15 DIAGNOSIS — Z51.11 ENCOUNTER FOR ANTINEOPLASTIC CHEMOTHERAPY: ICD-10-CM

## 2017-02-15 PROCEDURE — 63600175 PHARM REV CODE 636 W HCPCS: Performed by: OBSTETRICS & GYNECOLOGY

## 2017-02-15 PROCEDURE — 96413 CHEMO IV INFUSION 1 HR: CPT

## 2017-02-15 PROCEDURE — 96367 TX/PROPH/DG ADDL SEQ IV INF: CPT

## 2017-02-15 PROCEDURE — 96361 HYDRATE IV INFUSION ADD-ON: CPT

## 2017-02-15 PROCEDURE — 99999 PR PBB SHADOW E&M-EST. PATIENT-LVL III: CPT | Mod: PBBFAC,,, | Performed by: OBSTETRICS & GYNECOLOGY

## 2017-02-15 PROCEDURE — 99214 OFFICE O/P EST MOD 30 MIN: CPT | Mod: S$GLB,,, | Performed by: OBSTETRICS & GYNECOLOGY

## 2017-02-15 PROCEDURE — 96417 CHEMO IV INFUS EACH ADDL SEQ: CPT

## 2017-02-15 PROCEDURE — 96377 APPLICATON ON-BODY INJECTOR: CPT

## 2017-02-15 PROCEDURE — 96375 TX/PRO/DX INJ NEW DRUG ADDON: CPT

## 2017-02-15 PROCEDURE — 25000003 PHARM REV CODE 250: Performed by: OBSTETRICS & GYNECOLOGY

## 2017-02-15 RX ORDER — HEPARIN 100 UNIT/ML
500 SYRINGE INTRAVENOUS
Status: DISCONTINUED | OUTPATIENT
Start: 2017-02-15 | End: 2017-02-15 | Stop reason: HOSPADM

## 2017-02-15 RX ORDER — SULFAMETHOXAZOLE AND TRIMETHOPRIM 800; 160 MG/1; MG/1
1 TABLET ORAL 2 TIMES DAILY
Qty: 14 TABLET | Refills: 0 | Status: SHIPPED | OUTPATIENT
Start: 2017-02-15 | End: 2017-02-22

## 2017-02-15 RX ORDER — SODIUM CHLORIDE 0.9 % (FLUSH) 0.9 %
10 SYRINGE (ML) INJECTION
Status: DISCONTINUED | OUTPATIENT
Start: 2017-02-15 | End: 2017-02-15 | Stop reason: HOSPADM

## 2017-02-15 RX ORDER — FAMOTIDINE 10 MG/ML
20 INJECTION INTRAVENOUS
Status: COMPLETED | OUTPATIENT
Start: 2017-02-15 | End: 2017-02-15

## 2017-02-15 RX ADMIN — Medication 50 MG: at 09:02

## 2017-02-15 RX ADMIN — DOCETAXEL ANHYDROUS 147.5 MG: 10 INJECTION, SOLUTION INTRAVENOUS at 10:02

## 2017-02-15 RX ADMIN — SODIUM CHLORIDE: 0.9 INJECTION, SOLUTION INTRAVENOUS at 09:02

## 2017-02-15 RX ADMIN — FAMOTIDINE 20 MG: 10 INJECTION INTRAVENOUS at 10:02

## 2017-02-15 RX ADMIN — CARBOPLATIN 750 MG: 10 INJECTION, SOLUTION INTRAVENOUS at 12:02

## 2017-02-15 RX ADMIN — DEXAMETHASONE SODIUM PHOSPHATE 0.25 MG: 4 INJECTION, SOLUTION INTRAMUSCULAR; INTRAVENOUS at 10:02

## 2017-02-15 RX ADMIN — PEGFILGRASTIM 6 MG: KIT SUBCUTANEOUS at 01:02

## 2017-02-15 NOTE — PROGRESS NOTES
Subjective:      Patient ID: Mickie Don is a 61 y.o. female.    Chief Complaint: Ovarian Cancer (cycle# 5)  Treatment History  Xlap/Omentectomy on 11/1/16 - mass was unresectable  Stage IIIC ovarian cancer  Initiated Taxotere/Carbo on 11/18/16    HPI  Here today for consideration of C5 of treatment.  Was seen in ED for thrombophlebitis after last treatment.  Denies N/V, F/C, neuropathy.  Labs reviewed and ok for treatment.  CA-125 decreased to 46.  Review of Systems   Constitutional: Negative for activity change, appetite change, chills, fatigue and fever.   HENT: Negative for hearing loss, mouth sores, nosebleeds, sore throat and tinnitus.    Eyes: Negative for visual disturbance.   Respiratory: Negative for cough, chest tightness, shortness of breath and wheezing.    Cardiovascular: Negative for chest pain and leg swelling.   Gastrointestinal: Negative for abdominal distention, abdominal pain, blood in stool, constipation, diarrhea, nausea and vomiting.   Genitourinary: Negative for dysuria, flank pain, frequency, hematuria, pelvic pain, vaginal bleeding, vaginal discharge and vaginal pain.   Musculoskeletal: Negative for arthralgias and back pain.   Skin: Negative for rash.   Neurological: Negative for dizziness, seizures, syncope, weakness and numbness.   Hematological: Does not bruise/bleed easily.   Psychiatric/Behavioral: Negative for confusion and sleep disturbance. The patient is not nervous/anxious.         Objective:   Physical Exam:   Constitutional: She is oriented to person, place, and time. She appears well-developed and well-nourished. No distress.    HENT:   Head: Normocephalic and atraumatic.    Eyes: No scleral icterus.    Neck: Normal range of motion. Neck supple.    Cardiovascular: Normal rate and intact distal pulses.  Exam reveals no cyanosis and no edema.     Pulmonary/Chest: Effort normal. No respiratory distress. She exhibits no tenderness.        Abdominal: Soft. Normal appearance. She  exhibits mass (palpable to above the umbilicus). She exhibits no fluid wave and no ascites. There is no tenderness. There is no rigidity, no rebound and no guarding. No hernia.     Genitourinary: Rectum normal. Pelvic exam was performed with patient supine. There is no rash, tenderness, lesion or injury on the right labia. There is no rash, tenderness, lesion or injury on the left labia. Uterus is deviated. Uterus is not enlarged, not fixed, not hosting fibroids and not experiencing uterine prolapse. Cervix is normal. Right adnexum displays no mass, no tenderness and no fullness. Left adnexum displays mass (mas feels slightly more mobile than previously), tenderness and fullness. No erythema or bleeding in the vagina. Cervix exhibits no motion tenderness, no discharge and no friability.           Musculoskeletal: Normal range of motion and moves all extremeties. She exhibits tenderness (Rue antecubital fossa with induration related to thrombophlebitis). She exhibits no edema.      Lymphadenopathy:     She has no cervical adenopathy.        Right: No inguinal adenopathy present.        Left: No inguinal adenopathy present.    Neurological: She is alert and oriented to person, place, and time.    Skin: Skin is warm. No rash noted. No cyanosis or erythema.    Psychiatric: She has a normal mood and affect. Thought content normal.       Assessment:     1. Ovarian cancer, unspecified laterality    2. Encounter for antineoplastic chemotherapy        Plan:       Ok to treat with C5.  Add Bactrim and instructed to start ASA.  RTC as scheduled

## 2017-02-15 NOTE — PLAN OF CARE
Problem: Patient Care Overview (Adult)  Goal: Plan of Care Review  Outcome: Ongoing (interventions implemented as appropriate)  Patient tolerated Taxotere and Carbo treatment with vital signs stable. Neulasta OBI applied to left upper arm. Patient instructed to call MD with any problems . AVS given and Patient discharged home.

## 2017-02-15 NOTE — MR AVS SNAPSHOT
Patient Information     Patient Name Sex Mickie Martinez Female 1955      Visit Information        [facility]    2/15/2017 9:00 AM [facility], CHEMO [facility] Chemotherapy Infusion [phone] Sy Leonard      Patient Instructions     None      Your Current Medications Are     alprazolam (XANAX) 0.5 MG tablet    cephALEXin (KEFLEX) 500 MG capsule    dexamethasone (DECADRON) 4 MG Tab    oxycodone-acetaminophen (PERCOCET) 5-325 mg per tablet      Facility-Administered Medications     alteplase injection 2 mg    carboplatin (PARAPLATIN) 750 mg in sodium chloride 0.9% 250 mL chemo infusion    diphenhydramine IVPB 50 mg    docetaxel (TAXOTERE) 75 mg/m2 = 147.5 mg in sodium chloride 0.9% 250 mL chemo infusion    famotidine (PF) 20 mg/2 mL injection 20 mg    heparin, porcine (PF) 100 unit/mL injection flush 500 Units    palonosetron 0.25mg/dexamethasone 20mg IVPB    pegfilgrastim (NEULASTA (ON BODY INJECTOR)) injection 6 mg    sodium chloride 0.9% 500 mL infusion    sodium chloride 0.9% flush 10 mL      Appointments for Next Year     3/7/2017  8:50 AM NON FASTING LAB (10 min.) [facility], [facility] CANCER BLDG    Arrive at check-in approximately 15 minutes before your scheduled appointment time. Bring all outside medical records and imaging, along with a list of your current medications and insurance card.    [facility] 3rd Floor    3/8/2017  9:15 AM CHEMOTHERAPHY (15 min.) [facility] - GYN Oncology Jonn Guerra MD    Arrive at check-in approximately 15 minutes before your scheduled appointment time. Bring all outside medical records and imaging, along with a list of your current medications and insurance card.    [facility] 2nd Floor    3/8/2017 10:00 AM INFUSION 180 MIN (180 min.) [facility], CHEMO    Arrive at check-in approximately 15 minutes before your scheduled appointment time. Bring all outside medical records and  imaging, along with a list of your current medications and insurance card.    [facility], 5th Floor         Default Flowsheet Data (last 24 hours)      Amb Complex Vitals Wilfrido        02/15/17 1150 02/15/17 1108 02/15/17 0930 02/15/17 0857       Measurements    Weight    88.9 kg (196 lb)     /66   Taxotere infusion completed 118/63   Taxotere infusing 15 mins 130/68 118/67     Temp   98.3 °F (36.8 °C)      Pulse (!)  57 65 83 86     Resp   18      Pain Assessment    Pain Score   Zero Zero             Allergies     No Known Allergies      Medications You Received from 02/14/2017 1354 to 02/15/2017 1354        Date/Time Order Dose Route Action     02/15/2017 1200 carboplatin (PARAPLATIN) 750 mg in sodium chloride 0.9% 250 mL chemo infusion 750 mg Intravenous New Bag     02/15/2017 0956 diphenhydramine IVPB 50 mg 50 mg Intravenous New Bag     02/15/2017 1049 docetaxel (TAXOTERE) 75 mg/m2 = 147.5 mg in sodium chloride 0.9% 250 mL chemo infusion 147.5 mg Intravenous New Bag     02/15/2017 1018 famotidine (PF) 20 mg/2 mL injection 20 mg 20 mg Intravenous Given     02/15/2017 1018 palonosetron 0.25mg/dexamethasone 20mg IVPB 0.25 mg Intravenous New Bag     02/15/2017 0956 sodium chloride 0.9% 500 mL infusion   Intravenous New Bag      Current Discharge Medication List     Cannot display discharge medications since this is not an admission.

## 2017-03-07 ENCOUNTER — LAB VISIT (OUTPATIENT)
Dept: LAB | Facility: HOSPITAL | Age: 62
End: 2017-03-07
Attending: OBSTETRICS & GYNECOLOGY
Payer: COMMERCIAL

## 2017-03-07 DIAGNOSIS — C56.9 OVARIAN CANCER, UNSPECIFIED LATERALITY: ICD-10-CM

## 2017-03-07 LAB
ANION GAP SERPL CALC-SCNC: 7 MMOL/L
BUN SERPL-MCNC: 18 MG/DL
CALCIUM SERPL-MCNC: 8.4 MG/DL
CANCER AG125 SERPL-ACNC: 23 U/ML
CHLORIDE SERPL-SCNC: 108 MMOL/L
CO2 SERPL-SCNC: 24 MMOL/L
CREAT SERPL-MCNC: 0.7 MG/DL
ERYTHROCYTE [DISTWIDTH] IN BLOOD BY AUTOMATED COUNT: 17 %
EST. GFR  (AFRICAN AMERICAN): >60 ML/MIN/1.73 M^2
EST. GFR  (NON AFRICAN AMERICAN): >60 ML/MIN/1.73 M^2
GLUCOSE SERPL-MCNC: 88 MG/DL
HCT VFR BLD AUTO: 35.5 %
HGB BLD-MCNC: 11.4 G/DL
MCH RBC QN AUTO: 32.9 PG
MCHC RBC AUTO-ENTMCNC: 32.1 %
MCV RBC AUTO: 103 FL
NEUTROPHILS # BLD AUTO: 1.7 K/UL
PLATELET # BLD AUTO: 103 K/UL
PMV BLD AUTO: 10.2 FL
POTASSIUM SERPL-SCNC: 5.1 MMOL/L
RBC # BLD AUTO: 3.46 M/UL
SODIUM SERPL-SCNC: 139 MMOL/L
WBC # BLD AUTO: 3.16 K/UL

## 2017-03-07 PROCEDURE — 85027 COMPLETE CBC AUTOMATED: CPT

## 2017-03-07 PROCEDURE — 86304 IMMUNOASSAY TUMOR CA 125: CPT

## 2017-03-07 PROCEDURE — 36415 COLL VENOUS BLD VENIPUNCTURE: CPT

## 2017-03-07 PROCEDURE — 80048 BASIC METABOLIC PNL TOTAL CA: CPT

## 2017-03-07 RX ORDER — HEPARIN 100 UNIT/ML
500 SYRINGE INTRAVENOUS
Status: CANCELLED | OUTPATIENT
Start: 2017-03-07

## 2017-03-07 RX ORDER — SODIUM CHLORIDE 0.9 % (FLUSH) 0.9 %
10 SYRINGE (ML) INJECTION
Status: CANCELLED | OUTPATIENT
Start: 2017-03-07

## 2017-03-07 RX ORDER — FAMOTIDINE 10 MG/ML
20 INJECTION INTRAVENOUS
Status: CANCELLED | OUTPATIENT
Start: 2017-03-07

## 2017-03-08 ENCOUNTER — INFUSION (OUTPATIENT)
Dept: INFUSION THERAPY | Facility: HOSPITAL | Age: 62
End: 2017-03-08
Attending: OBSTETRICS & GYNECOLOGY
Payer: COMMERCIAL

## 2017-03-08 ENCOUNTER — OFFICE VISIT (OUTPATIENT)
Dept: GYNECOLOGIC ONCOLOGY | Facility: CLINIC | Age: 62
End: 2017-03-08
Payer: COMMERCIAL

## 2017-03-08 VITALS
SYSTOLIC BLOOD PRESSURE: 110 MMHG | TEMPERATURE: 98 F | RESPIRATION RATE: 18 BRPM | DIASTOLIC BLOOD PRESSURE: 59 MMHG | HEART RATE: 71 BPM

## 2017-03-08 VITALS
SYSTOLIC BLOOD PRESSURE: 127 MMHG | DIASTOLIC BLOOD PRESSURE: 60 MMHG | BODY MASS INDEX: 31.01 KG/M2 | HEART RATE: 86 BPM | WEIGHT: 198 LBS

## 2017-03-08 DIAGNOSIS — C56.9 OVARIAN CANCER, UNSPECIFIED LATERALITY: Primary | ICD-10-CM

## 2017-03-08 DIAGNOSIS — Z51.11 ENCOUNTER FOR ANTINEOPLASTIC CHEMOTHERAPY: ICD-10-CM

## 2017-03-08 PROCEDURE — 99999 PR PBB SHADOW E&M-EST. PATIENT-LVL III: CPT | Mod: PBBFAC,,, | Performed by: OBSTETRICS & GYNECOLOGY

## 2017-03-08 PROCEDURE — 25000003 PHARM REV CODE 250: Performed by: OBSTETRICS & GYNECOLOGY

## 2017-03-08 PROCEDURE — 99214 OFFICE O/P EST MOD 30 MIN: CPT | Mod: S$GLB,,, | Performed by: OBSTETRICS & GYNECOLOGY

## 2017-03-08 PROCEDURE — 96367 TX/PROPH/DG ADDL SEQ IV INF: CPT

## 2017-03-08 PROCEDURE — 96361 HYDRATE IV INFUSION ADD-ON: CPT

## 2017-03-08 PROCEDURE — 96375 TX/PRO/DX INJ NEW DRUG ADDON: CPT

## 2017-03-08 PROCEDURE — 96413 CHEMO IV INFUSION 1 HR: CPT

## 2017-03-08 PROCEDURE — 63600175 PHARM REV CODE 636 W HCPCS: Performed by: OBSTETRICS & GYNECOLOGY

## 2017-03-08 PROCEDURE — 96417 CHEMO IV INFUS EACH ADDL SEQ: CPT

## 2017-03-08 PROCEDURE — 96377 APPLICATON ON-BODY INJECTOR: CPT

## 2017-03-08 RX ORDER — HEPARIN 100 UNIT/ML
500 SYRINGE INTRAVENOUS
Status: DISCONTINUED | OUTPATIENT
Start: 2017-03-08 | End: 2017-03-08 | Stop reason: HOSPADM

## 2017-03-08 RX ORDER — ALPRAZOLAM 0.5 MG/1
0.5 TABLET ORAL 3 TIMES DAILY PRN
Qty: 30 TABLET | Refills: 1 | Status: SHIPPED | OUTPATIENT
Start: 2017-03-08 | End: 2017-05-08

## 2017-03-08 RX ORDER — FAMOTIDINE 10 MG/ML
20 INJECTION INTRAVENOUS
Status: COMPLETED | OUTPATIENT
Start: 2017-03-08 | End: 2017-03-08

## 2017-03-08 RX ORDER — OXYCODONE AND ACETAMINOPHEN 5; 325 MG/1; MG/1
1 TABLET ORAL EVERY 4 HOURS PRN
Qty: 30 TABLET | Refills: 0 | Status: ON HOLD | OUTPATIENT
Start: 2017-03-08 | End: 2017-04-13

## 2017-03-08 RX ORDER — SODIUM CHLORIDE 0.9 % (FLUSH) 0.9 %
10 SYRINGE (ML) INJECTION
Status: DISCONTINUED | OUTPATIENT
Start: 2017-03-08 | End: 2017-03-08 | Stop reason: HOSPADM

## 2017-03-08 RX ADMIN — DOCETAXEL ANHYDROUS 147.5 MG: 10 INJECTION, SOLUTION INTRAVENOUS at 11:03

## 2017-03-08 RX ADMIN — SODIUM CHLORIDE: 0.9 INJECTION, SOLUTION INTRAVENOUS at 01:03

## 2017-03-08 RX ADMIN — DEXAMETHASONE SODIUM PHOSPHATE 0.25 MG: 4 INJECTION, SOLUTION INTRAMUSCULAR; INTRAVENOUS at 10:03

## 2017-03-08 RX ADMIN — PEGFILGRASTIM 6 MG: KIT SUBCUTANEOUS at 02:03

## 2017-03-08 RX ADMIN — FAMOTIDINE 20 MG: 10 INJECTION INTRAVENOUS at 10:03

## 2017-03-08 RX ADMIN — CARBOPLATIN 750 MG: 10 INJECTION, SOLUTION INTRAVENOUS at 12:03

## 2017-03-08 RX ADMIN — DIPHENHYDRAMINE HYDROCHLORIDE 50 MG: 50 INJECTION, SOLUTION INTRAMUSCULAR; INTRAVENOUS at 11:03

## 2017-03-08 NOTE — PLAN OF CARE
Problem: Patient Care Overview (Adult)  Goal: Plan of Care Review  Outcome: Ongoing (interventions implemented as appropriate)  Pt arrived, v/s stable, iv started without difficulty, Pt voices, no nausea/ vomiting, neuropathy, diarrhea or shortness of breath. Premeds given as ordered, Taxotere/Carbo infused without any adverse reactions, pt education on looking out for fevers 100.4 or greater or any signs of infection.pt tolerated treatment/procedure well, no concerns or complaints at this time. Instructed to call MD if problems occur.

## 2017-03-08 NOTE — MR AVS SNAPSHOT
[facility] - GYN Oncology  [address]  [place] [address]  Phone: [phone]                  Mickie Don   3/8/2017 9:15 AM   Office Visit    Description:  Female : 1955   Provider:  Jonn Guerra MD   Department:  [facility] - GYN Oncology           Reason for Visit     Ovarian Cancer           Diagnoses this Visit        Comments    Ovarian cancer, unspecified laterality    -  Primary     Encounter for antineoplastic chemotherapy                To Do List           Future Appointments        Provider Department Dept Phone    3/8/2017 10:00 AM [facility], CHEMO [facility] [phone]      Your Future Surgeries/Procedures     2017   Surgery with Benny Tran MD   [facility] ([facility])    [address] [facility] [address] [phone]              Goals (5 Years of Data)     None      Follow-Up and Disposition     Return in about 3 weeks (around 3/29/2017).       These Medications        Disp Refills Start End    alprazolam (XANAX) 0.5 MG tablet 30 tablet 1 3/8/2017 3/8/2018    Take 1 tablet (0.5 mg total) by mouth 3 (three) times daily as needed for Anxiety. - Oral    Pharmacy: [facility] [address] [place] [address] #: [phone]       oxycodone-acetaminophen (PERCOCET) 5-325 mg per tablet 30 tablet 0 3/8/2017     Take 1 tablet by mouth every 4 (four) hours as needed (pain 1-5). - Oral    Pharmacy: [facility] [address] [place] [address] Ph #: [phone]         [facility] On Call     [facility] On Call Nurse Care Line -  Assistance  Registered nurses in the [facility] provide clinical advisement, health education, appointment booking, and other advisory services.  Call for this free service at [phone].             Medications           Message regarding Medications     Verify the changes and/or additions to your medication regime  listed below are the same as discussed with your clinician today.  If any of these changes or additions are incorrect, please notify your healthcare provider.             Verify that the below list of medications is an accurate representation of the medications you are currently taking.  If none reported, the list may be blank. If incorrect, please contact your healthcare provider. Carry this list with you in case of emergency.           Current Medications     alprazolam (XANAX) 0.5 MG tablet Take 1 tablet (0.5 mg total) by mouth 3 (three) times daily as needed for Anxiety.    dexamethasone (DECADRON) 4 MG Tab Take 2 tablets BID day before and after chemotherapy.    oxycodone-acetaminophen (PERCOCET) 5-325 mg per tablet Take 1 tablet by mouth every 4 (four) hours as needed (pain 1-5).           Clinical Reference Information           Your Vitals Were     BP Pulse Weight BMI       127/60 86 89.8 kg (198 lb) 31.01 kg/m2       Blood Pressure          Most Recent Value    BP  127/60      Allergies as of 3/8/2017     No Known Allergies      Immunizations Administered on Date of Encounter - 3/8/2017     None      Orders Placed During Today's Visit     Future Labs/Procedures Expected by Expires    Basic metabolic panel  3/8/2017 3/8/2018      3/8/2017 3/8/2018    CBC auto differential  3/8/2017 3/8/2018    CT Abdomen Pelvis With Contrast  3/8/2017 3/8/2018      Language Assistance Services     ATTENTION: Language assistance services are available, free of charge. Please call [phone].      ATENCIÓN: Si habla español, tiene a gilbert disposición servicios gratuitos de asistencia lingüística. Llame al [phone].     CHÚ Ý: N?u b?n nói Ti?ng Vi?t, có các d?ch v? h? tr? ngôn ng? mi?n phí dành cho b?n. G?i s? [phone].         [facility] - GYN On[facility] complies with applicable Federal civil rights laws and does not discriminate on the basis of race, color, national origin, age, disability, or sex.

## 2017-03-08 NOTE — PROGRESS NOTES
Subjective:       Patient ID: Mickie Dno is a 61 y.o. female.    Chief Complaint: Ovarian Cancer (cycle# 6)    HPI   Treatment History  Xlap/Omentectomy on 11/1/16 - mass was unresectable  Stage IIIC ovarian cancer  Initiated Taxotere/Carbo on 11/18/16  Here today for consideration of C6 of treatment.  Was seen in ED for thrombophlebitis after last treatment.  Denies N/V, F/C, neuropathy.  Labs reviewed and ok for treatment.  CA-125 decreased to 23.  Only complains of area on right UE where she has superficial thrombophlebitis.  Was seen in ED for thrombophlebitis after C4 treatment.          Review of Systems   Constitutional: Positive for fatigue. Negative for chills and fever.   Respiratory: Positive for shortness of breath (with walking stairs). Negative for cough and wheezing.    Cardiovascular: Negative for chest pain, palpitations and leg swelling.   Gastrointestinal: Negative for abdominal pain, constipation, diarrhea, nausea and vomiting.   Genitourinary: Negative for difficulty urinating, dysuria, frequency, genital sores, hematuria, urgency, vaginal bleeding, vaginal discharge and vaginal pain.   Neurological: Negative for weakness and numbness.   Hematological: Negative for adenopathy. Does not bruise/bleed easily.   Psychiatric/Behavioral: The patient is not nervous/anxious.        Objective:     /60  Pulse 86  Wt 89.8 kg (198 lb)  BMI 31.01 kg/m2    Physical Exam   Constitutional: She is oriented to person, place, and time. She appears well-developed and well-nourished.   HENT:   Head: Normocephalic and atraumatic.   Eyes: No scleral icterus.   Neck: Neck supple. No tracheal deviation present. No thyroid mass and no thyromegaly present.   Cardiovascular: Normal rate and regular rhythm.    Pulmonary/Chest: Effort normal and breath sounds normal. She has no wheezes.   Abdominal: Soft. She exhibits mass (20 cm mass mobile. mostly on right side. ). She exhibits no distension. There is no  hepatosplenomegaly. There is no tenderness. There is no rebound and no guarding.   Genitourinary:   Genitourinary Comments: Bimanual exam:  Vulva: no lesions. Normal appearance  Urethra: Normal size and location. No lesions  Bladder: No masses or tenderness.  Vagina: normal mucosa. No lesion  Cervix: deviated to left   Uterus: unable to palpate   Adnexa: right sided mass extending 4 cm above umbilicus.   Rectovaginal: No posterior cul de sac thickening or nodularity.  Rectal: no masses. Nontender. Normal tone.      Musculoskeletal: She exhibits no edema or tenderness.   (Rue antecubital fossa with induration related to thrombophlebitis).    Lymphadenopathy:     She has no cervical adenopathy.     She has no axillary adenopathy.        Right: No inguinal and no supraclavicular adenopathy present.        Left: No inguinal and no supraclavicular adenopathy present.   Neurological: She is alert and oriented to person, place, and time.   Skin: Skin is warm and dry.   Psychiatric: She has a normal mood and affect. Her behavior is normal. Judgment and thought content normal.       Assessment:       1. Ovarian cancer, unspecified laterality    2. Encounter for antineoplastic chemotherapy        Plan:   Ovarian cancer, unspecified laterality    Proceed with cycle 6.   RTC in 3 weeks with labs and CT scan  Tumor debulking on 4/11/2017 with Dr. Tran per patient.     -     Basic metabolic panel; Future; Expected date: 3/8/17  -     ; Future; Expected date: 3/8/17  -     CBC auto differential; Future; Expected date: 3/8/17  -     CT Abdomen Pelvis With Contrast; Future; Expected date: 3/8/17  -     alprazolam (XANAX) 0.5 MG tablet; Take 1 tablet (0.5 mg total) by mouth 3 (three) times daily as needed for Anxiety.  Dispense: 30 tablet; Refill: 1  -     oxycodone-acetaminophen (PERCOCET) 5-325 mg per tablet; Take 1 tablet by mouth every 4 (four) hours as needed (pain 1-5).  Dispense: 30 tablet; Refill: 0    Encounter for  antineoplastic chemotherapy

## 2017-04-03 ENCOUNTER — TELEPHONE (OUTPATIENT)
Dept: GYNECOLOGIC ONCOLOGY | Facility: CLINIC | Age: 62
End: 2017-04-03

## 2017-04-03 NOTE — TELEPHONE ENCOUNTER
----- Message from Roaxna Tarango sent at 4/3/2017  9:44 AM CDT -----  Mickie Don said she needs to speak with medical assistant regarding labs/pt can be reached at  [phone]         [facility]# [id number]

## 2017-04-04 ENCOUNTER — LAB VISIT (OUTPATIENT)
Dept: LAB | Facility: OTHER | Age: 62
End: 2017-04-04
Attending: OBSTETRICS & GYNECOLOGY
Payer: COMMERCIAL

## 2017-04-04 ENCOUNTER — HOSPITAL ENCOUNTER (OUTPATIENT)
Dept: RADIOLOGY | Facility: OTHER | Age: 62
Discharge: HOME | End: 2017-04-04
Attending: OBSTETRICS & GYNECOLOGY
Payer: COMMERCIAL

## 2017-04-04 DIAGNOSIS — C56.9 OVARIAN CANCER, UNSPECIFIED LATERALITY: ICD-10-CM

## 2017-04-04 LAB
ANION GAP SERPL CALC-SCNC: 7 MMOL/L
BUN SERPL-MCNC: 12 MG/DL
CALCIUM SERPL-MCNC: 8.7 MG/DL
CHLORIDE SERPL-SCNC: 106 MMOL/L
CO2 SERPL-SCNC: 31 MMOL/L
CREAT SERPL-MCNC: 0.7 MG/DL
EST. GFR  (AFRICAN AMERICAN): >60 ML/MIN/1.73 M^2
EST. GFR  (NON AFRICAN AMERICAN): >60 ML/MIN/1.73 M^2
GLUCOSE SERPL-MCNC: 92 MG/DL
POTASSIUM SERPL-SCNC: 4.6 MMOL/L
SODIUM SERPL-SCNC: 144 MMOL/L

## 2017-04-04 PROCEDURE — 36415 COLL VENOUS BLD VENIPUNCTURE: CPT

## 2017-04-04 PROCEDURE — 74177 CT ABD & PELVIS W/CONTRAST: CPT | Mod: TC

## 2017-04-04 PROCEDURE — 80048 BASIC METABOLIC PNL TOTAL CA: CPT

## 2017-04-04 PROCEDURE — 25500020 PHARM REV CODE 255: Performed by: OBSTETRICS & GYNECOLOGY

## 2017-04-04 PROCEDURE — 74177 CT ABD & PELVIS W/CONTRAST: CPT | Mod: 26,,, | Performed by: RADIOLOGY

## 2017-04-04 RX ADMIN — IOHEXOL 100 ML: 350 INJECTION, SOLUTION INTRAVENOUS at 02:04

## 2017-04-04 RX ADMIN — IOHEXOL 25 ML: 350 INJECTION, SOLUTION INTRAVENOUS at 02:04

## 2017-04-05 ENCOUNTER — OFFICE VISIT (OUTPATIENT)
Dept: GYNECOLOGIC ONCOLOGY | Facility: CLINIC | Age: 62
End: 2017-04-05
Payer: COMMERCIAL

## 2017-04-05 ENCOUNTER — TELEPHONE (OUTPATIENT)
Dept: GYNECOLOGIC ONCOLOGY | Facility: CLINIC | Age: 62
End: 2017-04-05

## 2017-04-05 ENCOUNTER — ANESTHESIA EVENT (OUTPATIENT)
Dept: SURGERY | Facility: HOSPITAL | Age: 62
DRG: 737 | End: 2017-04-05
Payer: COMMERCIAL

## 2017-04-05 ENCOUNTER — LAB VISIT (OUTPATIENT)
Dept: LAB | Facility: HOSPITAL | Age: 62
End: 2017-04-05
Attending: OBSTETRICS & GYNECOLOGY
Payer: COMMERCIAL

## 2017-04-05 VITALS
DIASTOLIC BLOOD PRESSURE: 64 MMHG | BODY MASS INDEX: 31.32 KG/M2 | HEART RATE: 80 BPM | WEIGHT: 200 LBS | SYSTOLIC BLOOD PRESSURE: 115 MMHG

## 2017-04-05 DIAGNOSIS — C56.9 OVARIAN CANCER, UNSPECIFIED LATERALITY: ICD-10-CM

## 2017-04-05 DIAGNOSIS — C56.9 OVARIAN CANCER, UNSPECIFIED LATERALITY: Primary | ICD-10-CM

## 2017-04-05 DIAGNOSIS — Z51.11 ENCOUNTER FOR ANTINEOPLASTIC CHEMOTHERAPY: ICD-10-CM

## 2017-04-05 LAB
ABO + RH BLD: NORMAL
BASOPHILS # BLD AUTO: 0.02 K/UL
BASOPHILS NFR BLD: 0.5 %
BLD GP AB SCN CELLS X3 SERPL QL: NORMAL
DIFFERENTIAL METHOD: ABNORMAL
EOSINOPHIL # BLD AUTO: 0 K/UL
EOSINOPHIL NFR BLD: 0.8 %
ERYTHROCYTE [DISTWIDTH] IN BLOOD BY AUTOMATED COUNT: 14.7 %
ERYTHROCYTE [DISTWIDTH] IN BLOOD BY AUTOMATED COUNT: 14.7 %
HCT VFR BLD AUTO: 38 %
HCT VFR BLD AUTO: 38.5 %
HGB BLD-MCNC: 12.9 G/DL
HGB BLD-MCNC: 12.9 G/DL
LYMPHOCYTES # BLD AUTO: 0.8 K/UL
LYMPHOCYTES NFR BLD: 22.6 %
MCH RBC QN AUTO: 34 PG
MCH RBC QN AUTO: 34.3 PG
MCHC RBC AUTO-ENTMCNC: 33.5 %
MCHC RBC AUTO-ENTMCNC: 33.9 %
MCV RBC AUTO: 101 FL
MCV RBC AUTO: 102 FL
MONOCYTES # BLD AUTO: 0.4 K/UL
MONOCYTES NFR BLD: 10.4 %
NEUTROPHILS # BLD AUTO: 2.4 K/UL
NEUTROPHILS # BLD AUTO: 2.5 K/UL
NEUTROPHILS NFR BLD: 65.7 %
PLATELET # BLD AUTO: 139 K/UL
PLATELET # BLD AUTO: 148 K/UL
PMV BLD AUTO: 9.6 FL
PMV BLD AUTO: 9.6 FL
RBC # BLD AUTO: 3.76 M/UL
RBC # BLD AUTO: 3.79 M/UL
WBC # BLD AUTO: 3.61 K/UL
WBC # BLD AUTO: 3.67 K/UL

## 2017-04-05 PROCEDURE — 99999 PR PBB SHADOW E&M-EST. PATIENT-LVL II: CPT | Mod: PBBFAC,,, | Performed by: OBSTETRICS & GYNECOLOGY

## 2017-04-05 PROCEDURE — 36415 COLL VENOUS BLD VENIPUNCTURE: CPT

## 2017-04-05 PROCEDURE — 86900 BLOOD TYPING SEROLOGIC ABO: CPT

## 2017-04-05 PROCEDURE — 86850 RBC ANTIBODY SCREEN: CPT

## 2017-04-05 PROCEDURE — 85025 COMPLETE CBC W/AUTO DIFF WBC: CPT

## 2017-04-05 PROCEDURE — 85027 COMPLETE CBC AUTOMATED: CPT

## 2017-04-05 PROCEDURE — 99214 OFFICE O/P EST MOD 30 MIN: CPT | Mod: S$GLB,,, | Performed by: OBSTETRICS & GYNECOLOGY

## 2017-04-05 NOTE — ANESTHESIA PREPROCEDURE EVALUATION
Pre Admission Screening  Kristen Pereira RN      []Hide copied text  Anesthesia Assessment: Preoperative EQUATION     Planned Procedure: Procedure(s) (LRB):  [id number] (BSO) (Bilateral)  EXPLORATORY-LAPAROTOMY (N/A)  Requested Anesthesia Type:General  Surgeon: Benny Tran MD  Service: OB/GYN  Known or anticipated Date of Surgery:4/11/2017     Surgeon notes: reviewed     Electronic QUestionnaire Assessment completed via nurse interview with patient.                    Mickie Don [[id number]] - 61 y.o. Female         Providers Outside of [facility]       No data to display        Surgical Risk Level       Surgical Risk Level:   3              caRDScore (Clinical Anesthesia Rapid Decision Score)         Very Low  Total Score: 6       6 Sum of Clinical Scores        caRDScores (Grouped)       caRDScore - Ane:   1                       caRDScore - CVD:   0                       caRDScore - Pul:   2                       caRDScore - Met:   0                       caRDScore - Phy:   3              caRDScore Items             Pre-admit from 4/11/2017 in [facility]      Anesthesia        Has chronic anxiety   Yes      CVD        Activity similar to best ability for maximal activity or exercise   METS 4      Pulmonary        Some SOB with moderate activity or exertion   Yes      Metabolic        Physiologic        Obesity Status   Mild Obesity (BMI 30-34.9)      Currently or usually anemic   Yes [last H/H 35.5 & 11.4]      Within the last 3 months had Chemotherapy   Yes [3/8/17]        Flags       Red Flag Score:   0                       Yellow Flag Score:   3              Red Flags             Pre-admit from 4/11/2017 in [facility]      Obesity Status   Mild Obesity (BMI 30-34.9)        Yellow Flags             Pre-admit from 4/11/2017 in [facility]      Obesity Status   Mild Obesity (BMI 30-34.9)      Currently or usually anemic   Yes  [last H/H 35.5 & 11.4]      Some SOB with moderate activity or exertion   Yes        PONV Risk Score (assumes periop narcotic use = +1, Max=4)       PONV Risk Score:   3              PONV Risk Factors  Total Score: 2       1 Female      1 Non-Smoker at present        Sleep Apnea  Total Score: 0         OLIVIA STOP-Bang Risk Factors (Max=8)  Total Score: 1       1 Age >50        OLIVIA Risk Level - 1 (Low), 2 (Moderate), 3 (High)       OLIVIA Risk Level:   1              RCRI (Revised Cardiac Risk Indices of ACC/AHA guidelines, Max=6)  Total Score: 0         CAD Risk Factors  Total Score: 1       1 Female. Age >55        CHADS Score if applicable (history of atrial fib/flutter, Max=6)  Total Score: 0         Maximal Exercise Capacity             Pre-admit from 4/11/2017 in [facility]      Maximal Exercise Capacity   METS 4        Summary of Dependence  Total Score: 1       1 Is totally independent of others for activities of daily living        Phone Fraility Score (Max = 17)  Total Score: 1       1 Has had 1 hospitalization in last year        Pain Factors       No data to display        Risk Triggers (Evidence-Based Risk Triggers)         Pulmonary Risk Triggers  Total Score: 3       1 Age > or = 60      1 Obesity Status: Mild Obesity (BMI 30-34.9)      1 Some SOB with moderate activity or exertion        Renal Risk Triggers  Total Score: 0         Delirium Risk Triggers  Total Score: 0         Urologic Risk Triggers  Total Score: 0         Logistics         Pre-op Clinic Logistics  Total Score: 2       1 Has had 1 hospitalization in last year      1 Has had anesthesia, either as adult or as a child        DOSC Logistics  Total Score: 1       1 Within the last 3 months had Chemotherapy        Discharge Logistics  Total Score: 1       1 Some SOB with moderate activity or exertion        Discharge Planning             Pre-admit from 4/11/2017 in [facility]      Discharge Planning         Will assist patient 24/7, if needed   family      Who will transport you to therapy, if need   family        Anes <For office use only>       Total Score: 8          Surgical Risk Level Assessment                       Triage considerations:      The patient has no apparent active cardiac condition (No unstable coronary Syndrome such as severe unstable angina or recent [<1 month] myocardial infarction, decompensated CHF, severe valvular disease or significant arrhythmia)     Previous anesthesia records:GETA, MAC and No problems-denies PONV although some pain meds have made her nauseated-was given Zofran with last surgery.   POSS HARD STICK-recent chemo and thrombophlebitis     11/01/16; Placement Time: 1048; Method of Intubation: Direct laryngoscopy; Inserted by: CRNA; Airway Device: Endotracheal Tube; Mask Ventilation: Easy; Intubated: Postinduction; Blade: Velásquez #2; Airway Device Size: 7.0; Style: Cuffed; Cuff Inflation: Minimal occlusive pressure; Inflation Amount: 6; Placement Verified By: Auscultation, Capnometry; Grade: Grade I; Complicating Factors: Anterior larynx; Intubation Findings: Positive EtCO2, Bilateral breath sounds, Atraumatic/Condition of teeth unchanged;  Depth of Insertion: 21; Securment: Lips; Complications: None; Breath Sounds: Equal Bilateral; Insertion Attempts: 1; Removal Date: 11/01/16;   Airway/Jaw/Neck:  Airway Findings: Mouth Opening: Normal Tongue: Normal General Airway Assessment: Adult Mallampati: II Improves to II with phonation. TM Distance: Normal, at least 6 cm Jaw/Neck Findings: Neck ROM: Normal ROM   Dental:  Dental Findings: In tact      Last PCP note: 3-6 months ago , within [facility]   Subspecialty notes: GYN/ONC     Other important co-morbidities: ovarian cancer, mild anemia, BMI 31      Tests already available:  Available tests, within 1 month . 4/4 BMP. 3/7 CBC. 2/5 CMP.      Instructions given. (See in Nurse's note)     Optimization:  Anesthesia Preop Clinic Assessment  Indicated-not required due to recent anesthesia note          Plan:   Testing: T&S  Patient has previously scheduled Medical Appointment:4/5 Tran appt     Navigation: Tests Scheduled. 4/10  Results will be tracked by Preop Clinic.  4/10 lab results reviewed          Electronically signed by Kristen Pereira RN at 4/5/2017  8:57 AM        Pre-admit on 4/11/2017              Detailed Report                                                                                                                         04/05/2017  Mickie Don is a 61 y.o., female.    [facility] Anesthesia Evaluation    I have reviewed the Patient Summary Reports.     I have reviewed the Medications.     Review of Systems  Anesthesia Hx:  No problems with previous Anesthesia Denies Hx of Anesthetic complications  History of prior surgery of interest to airway management or planning: Denies Family Hx of Anesthesia complications.   Denies Personal Hx of Anesthesia complications.   Social:  Social Alcohol Use, Non-Smoker    Hematology/Oncology:  Hematology Normal      Current/Recent Cancer.   EENT/Dental:   Open angle with borderline findings and low glaucoma risk in both eyes   Cardiovascular:  Cardiovascular Normal  Denies Hypertension.  Denies Dysrhythmias.   Denies Angina.    Pulmonary:  Pulmonary Normal  Denies Asthma.  Denies Shortness of breath.    Renal/:  Renal/ Normal     Hepatic/GI:  Hepatic/GI Normal  Denies GERD.    Musculoskeletal:  Musculoskeletal Normal Denies Arthritis.      Neurological:  Neurology Normal  Denies Headaches.    Endocrine:  Endocrine Normal Denies Diabetes. Denies Hypothyroidism.  Denies Hyperthyroidism.    Dermatological:  Skin Normal    Psych:  Psychiatric Normal           Physical Exam  General:  Well nourished    Airway/Jaw/Neck:  Airway Findings: Mouth Opening: Normal Tongue: Normal  General Airway Assessment: Adult  Mallampati: II  TM Distance: Normal, at least 6 cm  Jaw/Neck Findings:  Neck ROM: Normal ROM      Eyes/Ears/Nose:  EYES/EARS/NOSE FINDINGS: Normal   Dental:  Dental Findings: lower retainer    Chest/Lungs:  Chest/Lungs Findings: Clear to auscultation     Heart/Vascular:  Heart Findings: Rate: Normal  Rhythm: Regular Rhythm     Abdomen:  Abdomen Findings: Normal    Musculoskeletal:  Musculoskeletal Findings: Normal   Skin:  Skin Findings: Normal    Mental Status:  Mental Status Findings:  Cooperative, Alert and Oriented         Anesthesia Plan  Type of Anesthesia, risks & benefits discussed:  Anesthesia Type:  general  Patient's Preference: General  Intra-op Monitoring Plan:   Intra-op Monitoring Plan Comments:   Post Op Pain Control Plan:   Post Op Pain Control Plan Comments:   Induction:   IV  Beta Blocker:  Patient is not currently on a Beta-Blocker (No further documentation required).       Informed Consent: Patient understands risks and agrees with Anesthesia plan.  Questions answered. Anesthesia consent signed with patient.  ASA Score: 2     Day of Surgery Review of History & Physical:    H&P update referred to the surgeon.         Ready For Surgery From Anesthesia Perspective.

## 2017-04-05 NOTE — PROGRESS NOTES
Subjective:      Patient ID: Mickie Don is a 61 y.o. female.    Chief Complaint: Ovarian Cancer (f/u)  Treatment History  Xlap/Omentectomy on 11/1/16 - mass was unresectable  Stage IIIC ovarian cancer  Initiated Taxotere/Carbo on 11/18/16, now s/p 6 cycles completed 3/8/17    HPI  Here today for visit prior to surgery on Tuesday.  CA-125 prior to last cycle was down to 23.  Has recovered from therapy.  CT yesterday showed that her mass has undergone cystic degeneration.  No new disease noted.  Review of Systems   Constitutional: Negative for activity change, appetite change, chills, fatigue and fever.   HENT: Negative for hearing loss, mouth sores, nosebleeds, sore throat and tinnitus.    Eyes: Negative for visual disturbance.   Respiratory: Negative for cough, chest tightness, shortness of breath and wheezing.    Cardiovascular: Negative for chest pain and leg swelling.   Gastrointestinal: Negative for abdominal distention, abdominal pain, blood in stool, constipation, diarrhea, nausea and vomiting.   Genitourinary: Negative for dysuria, flank pain, frequency, hematuria, pelvic pain, vaginal bleeding, vaginal discharge and vaginal pain.   Musculoskeletal: Negative for arthralgias and back pain.   Skin: Negative for rash.   Neurological: Negative for dizziness, seizures, syncope, weakness and numbness.   Hematological: Does not bruise/bleed easily.   Psychiatric/Behavioral: Negative for confusion and sleep disturbance. The patient is not nervous/anxious.         Objective:   Physical Exam:   Constitutional: She is oriented to person, place, and time. She appears well-developed and well-nourished. No distress.    HENT:   Head: Normocephalic and atraumatic.    Eyes: No scleral icterus.    Neck: Normal range of motion. Neck supple.    Cardiovascular: Normal rate and intact distal pulses.  Exam reveals no cyanosis and no edema.     Pulmonary/Chest: Effort normal. No respiratory distress. She exhibits no tenderness.         Abdominal: Soft. Normal appearance. She exhibits mass (palpable to above the umbilicus, but mobile). She exhibits no fluid wave and no ascites. There is no tenderness. There is no rigidity, no rebound and no guarding. No hernia.     Genitourinary: Rectum normal. Pelvic exam was performed with patient supine. There is no rash, tenderness, lesion or injury on the right labia. There is no rash, tenderness, lesion or injury on the left labia. Uterus is deviated. Uterus is not enlarged, not fixed, not hosting fibroids and not experiencing uterine prolapse. Cervix is normal. Right adnexum displays no mass, no tenderness and no fullness. Left adnexum displays mass (mass has retracted from the sidewall), tenderness and fullness. No erythema or bleeding in the vagina. Cervix exhibits no motion tenderness, no discharge and no friability.           Musculoskeletal: Normal range of motion and moves all extremeties. She exhibits no edema or tenderness.      Lymphadenopathy:     She has no cervical adenopathy.        Right: No inguinal adenopathy present.        Left: No inguinal adenopathy present.    Neurological: She is alert and oriented to person, place, and time.    Skin: Skin is warm. No rash noted. No cyanosis or erythema.    Psychiatric: She has a normal mood and affect. Thought content normal.       Assessment:     1. Ovarian cancer, unspecified laterality    2. Encounter for antineoplastic chemotherapy        Plan:       Plan is to proceed with Xlap/DORIAN/BSO/Radical mass resection on Tuesday.  We previously discussed the risks of surgery, but they were reviewed today.  The risks, benefits, and indications of the procedure were discussed with the patient and her family members if present.  These included bleeding, infection, damage to surrounding tissues, urologic or intestinal resection and reconstruction, colostomy, and the possibility of major complications including death.  She voiced understanding, all  questions were answered and consents were signed.

## 2017-04-05 NOTE — PRE ADMISSION SCREENING
Anesthesia Assessment: Preoperative EQUATION    Planned Procedure: Procedure(s) (LRB):  [id number] (BSO) (Bilateral)  EXPLORATORY-LAPAROTOMY (N/A)  Requested Anesthesia Type:General  Surgeon: Benny Tran MD  Service: OB/GYN  Known or anticipated Date of Surgery:4/11/2017    Surgeon notes: reviewed    Electronic QUestionnaire Assessment completed via nurse interview with patient.        DonMickie [[id number]] - 61 y.o. Female        Providers Outside of [facility]      No data to display       Surgical Risk Level      Surgical Risk Level:  3           caRDScore (Clinical Anesthesia Rapid Decision Score)        Very Low  Total Score: 6      6 Sum of Clinical Scores       caRDScores (Grouped)      caRDScore - Ane:  1                caRDScore - CVD:  0                caRDScore - Pul:  2                caRDScore - Met:  0                caRDScore - Phy:  3           caRDScore Items           Pre-admit from 4/11/2017 in [facility]     Anesthesia      Has chronic anxiety  Yes     CVD      Activity similar to best ability for maximal activity or exercise  METS 4     Pulmonary      Some SOB with moderate activity or exertion  Yes     Metabolic      Physiologic      Obesity Status  Mild Obesity (BMI 30-34.9)     Currently or usually anemic  Yes [last H/H 35.5 & 11.4]     Within the last 3 months had Chemotherapy  Yes [3/8/17]       Flags      Red Flag Score:  0                Yellow Flag Score:  3           Red Flags           Pre-admit from 4/11/2017 in [facility]     Obesity Status  Mild Obesity (BMI 30-34.9)       Yellow Flags           Pre-admit from 4/11/2017 in [facility]     Obesity Status  Mild Obesity (BMI 30-34.9)     Currently or usually anemic  Yes [last H/H 35.5 & 11.4]     Some SOB with moderate activity or exertion  Yes       PONV Risk Score (assumes periop narcotic use = +1, Max=4)      PONV Risk Score:  3           PONV Risk  Factors  Total Score: 2      1 Female     1 Non-Smoker at present       Sleep Apnea  Total Score: 0        OLIVIA STOP-Bang Risk Factors (Max=8)  Total Score: 1      1 Age >50       OLIVIA Risk Level - 1 (Low), 2 (Moderate), 3 (High)      OLIVIA Risk Level:  1           RCRI (Revised Cardiac Risk Indices of ACC/AHA guidelines, Max=6)  Total Score: 0        CAD Risk Factors  Total Score: 1      1 Female. Age >55       CHADS Score if applicable (history of atrial fib/flutter, Max=6)  Total Score: 0        Maximal Exercise Capacity           Pre-admit from 4/11/2017 in [facility]     Maximal Exercise Capacity  METS 4       Summary of Dependence  Total Score: 1      1 Is totally independent of others for activities of daily living       Phone Fraility Score (Max = 17)  Total Score: 1      1 Has had 1 hospitalization in last year       Pain Factors      No data to display       Risk Triggers (Evidence-Based Risk Triggers)        Pulmonary Risk Triggers  Total Score: 3      1 Age > or = 60     1 Obesity Status: Mild Obesity (BMI 30-34.9)     1 Some SOB with moderate activity or exertion       Renal Risk Triggers  Total Score: 0        Delirium Risk Triggers  Total Score: 0        Urologic Risk Triggers  Total Score: 0        Logistics        Pre-op Clinic Logistics  Total Score: 2      1 Has had 1 hospitalization in last year     1 Has had anesthesia, either as adult or as a child       DOSC Logistics  Total Score: 1      1 Within the last 3 months had Chemotherapy       Discharge Logistics  Total Score: 1      1 Some SOB with moderate activity or exertion       Discharge Planning           Pre-admit from 4/11/2017 in [facility]     Discharge Planning      Will assist patient 24/7, if needed  family     Who will transport you to therapy, if need  family       Anes <For office use only>      Total Score: 8        Surgical Risk Level Assessment                 Triage considerations:     The  patient has no apparent active cardiac condition (No unstable coronary Syndrome such as severe unstable angina or recent [<1 month] myocardial infarction, decompensated CHF, severe valvular   disease or significant arrhythmia)    Previous anesthesia records:GETA, MAC and No problems-denies PONV although some pain meds have made her nauseated-was given Zofran with last surgery    11/01/16; Placement Time: 1048; Method of Intubation: Direct laryngoscopy; Inserted by: CRNA; Airway Device: Endotracheal Tube; Mask Ventilation: Easy; Intubated: Postinduction; Blade: Velásquez #2; Airway Device Size: 7.0; Style: Cuffed; Cuff Inflation: Minimal occlusive pressure; Inflation Amount: 6; Placement Verified By: Auscultation, Capnometry; Grade: Grade I; Complicating Factors: Anterior larynx; Intubation Findings: Positive EtCO2, Bilateral breath sounds, Atraumatic/Condition of teeth unchanged;  Depth of Insertion: 21; Securment: Lips; Complications: None; Breath Sounds: Equal Bilateral; Insertion Attempts: 1; Removal Date: 11/01/16;   Airway/Jaw/Neck:  Airway Findings: Mouth Opening: Normal Tongue: Normal General Airway Assessment: Adult Mallampati: II Improves to II with phonation. TM Distance: Normal, at least 6 cm Jaw/Neck Findings: Neck ROM: Normal ROM     Dental:  Dental Findings: In tact     Last PCP note: 3-6 months ago , within [facility]   Subspecialty notes: GYN/ONC    Other important co-morbidities: ovarian cancer, mild obesity     Tests already available:  Available tests,  within 1 month . 4/4 BMP. 3/7 CBC. 2/5 CMP.            Instructions given. (See in Nurse's note)    Optimization:  Anesthesia Preop Clinic Assessment  Indicated-not required due to recent anesthesia note          Plan:    Testing:  T&S     Patient  has previously scheduled Medical Appointment:4/5 Tran appt    Navigation: Tests Scheduled. 4/10                        Results will be tracked by Preop Clinic.

## 2017-04-11 ENCOUNTER — SURGERY (OUTPATIENT)
Age: 62
End: 2017-04-11

## 2017-04-11 ENCOUNTER — ANESTHESIA (OUTPATIENT)
Dept: SURGERY | Facility: HOSPITAL | Age: 62
DRG: 737 | End: 2017-04-11
Payer: COMMERCIAL

## 2017-04-11 ENCOUNTER — HOSPITAL ENCOUNTER (INPATIENT)
Facility: HOSPITAL | Age: 62
LOS: 2 days | Discharge: HOME | DRG: 737 | End: 2017-04-13
Attending: OBSTETRICS & GYNECOLOGY | Admitting: OBSTETRICS & GYNECOLOGY
Payer: COMMERCIAL

## 2017-04-11 DIAGNOSIS — C56.9 OVARIAN CANCER, UNSPECIFIED LATERALITY: Primary | ICD-10-CM

## 2017-04-11 DIAGNOSIS — C56.9 OVARIAN CANCER: ICD-10-CM

## 2017-04-11 PROBLEM — Z90.710 S/P TAH-BSO (TOTAL ABDOMINAL HYSTERECTOMY AND BILATERAL SALPINGO-OOPHORECTOMY): Status: ACTIVE | Noted: 2017-04-11

## 2017-04-11 PROBLEM — Z90.79 S/P TAH-BSO (TOTAL ABDOMINAL HYSTERECTOMY AND BILATERAL SALPINGO-OOPHORECTOMY): Status: ACTIVE | Noted: 2017-04-11

## 2017-04-11 PROBLEM — Z90.722 S/P TAH-BSO (TOTAL ABDOMINAL HYSTERECTOMY AND BILATERAL SALPINGO-OOPHORECTOMY): Status: ACTIVE | Noted: 2017-04-11

## 2017-04-11 PROCEDURE — 88307 TISSUE EXAM BY PATHOLOGIST: CPT | Performed by: PATHOLOGY

## 2017-04-11 PROCEDURE — 94799 UNLISTED PULMONARY SVC/PX: CPT

## 2017-04-11 PROCEDURE — 37000008 HC ANESTHESIA 1ST 15 MINUTES: Performed by: OBSTETRICS & GYNECOLOGY

## 2017-04-11 PROCEDURE — 71000039 HC RECOVERY, EACH ADD'L HOUR: Performed by: OBSTETRICS & GYNECOLOGY

## 2017-04-11 PROCEDURE — P9045 ALBUMIN (HUMAN), 5%, 250 ML: HCPCS | Performed by: NURSE ANESTHETIST, CERTIFIED REGISTERED

## 2017-04-11 PROCEDURE — 0UT90ZZ RESECTION OF UTERUS, OPEN APPROACH: ICD-10-PCS | Performed by: OBSTETRICS & GYNECOLOGY

## 2017-04-11 PROCEDURE — 37000009 HC ANESTHESIA EA ADD 15 MINS: Performed by: OBSTETRICS & GYNECOLOGY

## 2017-04-11 PROCEDURE — 0UT20ZZ RESECTION OF BILATERAL OVARIES, OPEN APPROACH: ICD-10-PCS | Performed by: OBSTETRICS & GYNECOLOGY

## 2017-04-11 PROCEDURE — 63600175 PHARM REV CODE 636 W HCPCS

## 2017-04-11 PROCEDURE — 25000003 PHARM REV CODE 250: Performed by: ANESTHESIOLOGY

## 2017-04-11 PROCEDURE — 49205 PR EXCISION/DESTRUCTION OPEN ABDOMINAL TUMORS >10.0 CM: CPT | Mod: ,,, | Performed by: OBSTETRICS & GYNECOLOGY

## 2017-04-11 PROCEDURE — 27201423 OPTIME MED/SURG SUP & DEVICES STERILE SUPPLY: Performed by: OBSTETRICS & GYNECOLOGY

## 2017-04-11 PROCEDURE — 25000003 PHARM REV CODE 250: Performed by: NURSE ANESTHETIST, CERTIFIED REGISTERED

## 2017-04-11 PROCEDURE — D9220A PRA ANESTHESIA: Mod: ANES,,, | Performed by: ANESTHESIOLOGY

## 2017-04-11 PROCEDURE — 88305 TISSUE EXAM BY PATHOLOGIST: CPT | Performed by: PATHOLOGY

## 2017-04-11 PROCEDURE — D9220A PRA ANESTHESIA: Mod: CRNA,,, | Performed by: NURSE ANESTHETIST, CERTIFIED REGISTERED

## 2017-04-11 PROCEDURE — 36000708 HC OR TIME LEV III 1ST 15 MIN: Performed by: OBSTETRICS & GYNECOLOGY

## 2017-04-11 PROCEDURE — 27000221 HC OXYGEN, UP TO 24 HOURS

## 2017-04-11 PROCEDURE — 20600001 HC STEP DOWN PRIVATE ROOM

## 2017-04-11 PROCEDURE — 88305 TISSUE EXAM BY PATHOLOGIST: CPT | Mod: 26,,, | Performed by: PATHOLOGY

## 2017-04-11 PROCEDURE — 94760 N-INVAS EAR/PLS OXIMETRY 1: CPT

## 2017-04-11 PROCEDURE — 94770 HC EXHALED C02 TEST: CPT

## 2017-04-11 PROCEDURE — 25000003 PHARM REV CODE 250: Performed by: OBSTETRICS & GYNECOLOGY

## 2017-04-11 PROCEDURE — 25000003 PHARM REV CODE 250

## 2017-04-11 PROCEDURE — 71000033 HC RECOVERY, INTIAL HOUR: Performed by: OBSTETRICS & GYNECOLOGY

## 2017-04-11 PROCEDURE — 88307 TISSUE EXAM BY PATHOLOGIST: CPT | Mod: 26,,, | Performed by: PATHOLOGY

## 2017-04-11 PROCEDURE — 58180 PARTIAL HYSTERECTOMY: CPT | Mod: 51,,, | Performed by: OBSTETRICS & GYNECOLOGY

## 2017-04-11 PROCEDURE — 63600175 PHARM REV CODE 636 W HCPCS: Performed by: NURSE ANESTHETIST, CERTIFIED REGISTERED

## 2017-04-11 PROCEDURE — 36000709 HC OR TIME LEV III EA ADD 15 MIN: Performed by: OBSTETRICS & GYNECOLOGY

## 2017-04-11 PROCEDURE — 0UT70ZZ RESECTION OF BILATERAL FALLOPIAN TUBES, OPEN APPROACH: ICD-10-PCS | Performed by: OBSTETRICS & GYNECOLOGY

## 2017-04-11 PROCEDURE — 63600175 PHARM REV CODE 636 W HCPCS: Performed by: ANESTHESIOLOGY

## 2017-04-11 PROCEDURE — 99900035 HC TECH TIME PER 15 MIN (STAT)

## 2017-04-11 RX ORDER — FENTANYL CITRATE 50 UG/ML
INJECTION, SOLUTION INTRAMUSCULAR; INTRAVENOUS
Status: DISCONTINUED | OUTPATIENT
Start: 2017-04-11 | End: 2017-04-11

## 2017-04-11 RX ORDER — NEOSTIGMINE METHYLSULFATE 1 MG/ML
INJECTION, SOLUTION INTRAVENOUS
Status: DISCONTINUED | OUTPATIENT
Start: 2017-04-11 | End: 2017-04-11

## 2017-04-11 RX ORDER — DEXAMETHASONE SODIUM PHOSPHATE 4 MG/ML
INJECTION, SOLUTION INTRA-ARTICULAR; INTRALESIONAL; INTRAMUSCULAR; INTRAVENOUS; SOFT TISSUE
Status: DISCONTINUED | OUTPATIENT
Start: 2017-04-11 | End: 2017-04-11

## 2017-04-11 RX ORDER — SIMETHICONE 80 MG
1 TABLET,CHEWABLE ORAL 3 TIMES DAILY PRN
Status: DISCONTINUED | OUTPATIENT
Start: 2017-04-11 | End: 2017-04-13 | Stop reason: HOSPADM

## 2017-04-11 RX ORDER — ALBUMIN HUMAN 50 G/1000ML
SOLUTION INTRAVENOUS CONTINUOUS PRN
Status: DISCONTINUED | OUTPATIENT
Start: 2017-04-11 | End: 2017-04-11

## 2017-04-11 RX ORDER — DEXTROSE MONOHYDRATE, SODIUM CHLORIDE, AND POTASSIUM CHLORIDE 50; 1.49; 4.5 G/1000ML; G/1000ML; G/1000ML
INJECTION, SOLUTION INTRAVENOUS CONTINUOUS
Status: DISCONTINUED | OUTPATIENT
Start: 2017-04-11 | End: 2017-04-12

## 2017-04-11 RX ORDER — HYDROMORPHONE HYDROCHLORIDE 1 MG/ML
0.2 INJECTION, SOLUTION INTRAMUSCULAR; INTRAVENOUS; SUBCUTANEOUS EVERY 5 MIN PRN
Status: DISCONTINUED | OUTPATIENT
Start: 2017-04-11 | End: 2017-04-11 | Stop reason: HOSPADM

## 2017-04-11 RX ORDER — MIDAZOLAM HYDROCHLORIDE 1 MG/ML
INJECTION, SOLUTION INTRAMUSCULAR; INTRAVENOUS
Status: DISCONTINUED | OUTPATIENT
Start: 2017-04-11 | End: 2017-04-11

## 2017-04-11 RX ORDER — PROPOFOL 10 MG/ML
VIAL (ML) INTRAVENOUS
Status: DISCONTINUED | OUTPATIENT
Start: 2017-04-11 | End: 2017-04-11

## 2017-04-11 RX ORDER — HYDROMORPHONE HCL IN 0.9% NACL 6 MG/30 ML
PATIENT CONTROLLED ANALGESIA SYRINGE INTRAVENOUS CONTINUOUS
Status: DISCONTINUED | OUTPATIENT
Start: 2017-04-11 | End: 2017-04-12

## 2017-04-11 RX ORDER — ONDANSETRON 2 MG/ML
4 INJECTION INTRAMUSCULAR; INTRAVENOUS EVERY 8 HOURS PRN
Status: DISCONTINUED | OUTPATIENT
Start: 2017-04-11 | End: 2017-04-13 | Stop reason: HOSPADM

## 2017-04-11 RX ORDER — NALOXONE HCL 0.4 MG/ML
0.02 VIAL (ML) INJECTION
Status: DISCONTINUED | OUTPATIENT
Start: 2017-04-11 | End: 2017-04-12

## 2017-04-11 RX ORDER — ROCURONIUM BROMIDE 10 MG/ML
INJECTION, SOLUTION INTRAVENOUS
Status: DISCONTINUED | OUTPATIENT
Start: 2017-04-11 | End: 2017-04-11

## 2017-04-11 RX ORDER — HYDROMORPHONE HCL IN 0.9% NACL 6 MG/30 ML
PATIENT CONTROLLED ANALGESIA SYRINGE INTRAVENOUS
Status: COMPLETED
Start: 2017-04-11 | End: 2017-04-11

## 2017-04-11 RX ORDER — GLYCOPYRROLATE 0.2 MG/ML
INJECTION INTRAMUSCULAR; INTRAVENOUS
Status: DISCONTINUED | OUTPATIENT
Start: 2017-04-11 | End: 2017-04-11

## 2017-04-11 RX ORDER — ACETAMINOPHEN 10 MG/ML
INJECTION, SOLUTION INTRAVENOUS
Status: DISCONTINUED | OUTPATIENT
Start: 2017-04-11 | End: 2017-04-11

## 2017-04-11 RX ORDER — LIDOCAINE HCL/PF 100 MG/5ML
SYRINGE (ML) INTRAVENOUS
Status: DISCONTINUED | OUTPATIENT
Start: 2017-04-11 | End: 2017-04-11

## 2017-04-11 RX ORDER — SODIUM CHLORIDE 0.9 % (FLUSH) 0.9 %
3 SYRINGE (ML) INJECTION
Status: DISCONTINUED | OUTPATIENT
Start: 2017-04-11 | End: 2017-04-11 | Stop reason: HOSPADM

## 2017-04-11 RX ORDER — ONDANSETRON 2 MG/ML
INJECTION INTRAMUSCULAR; INTRAVENOUS
Status: DISCONTINUED | OUTPATIENT
Start: 2017-04-11 | End: 2017-04-11

## 2017-04-11 RX ORDER — ALPRAZOLAM 0.5 MG/1
0.5 TABLET ORAL 3 TIMES DAILY PRN
Status: DISCONTINUED | OUTPATIENT
Start: 2017-04-11 | End: 2017-04-13 | Stop reason: HOSPADM

## 2017-04-11 RX ORDER — SODIUM CHLORIDE 9 MG/ML
INJECTION, SOLUTION INTRAVENOUS CONTINUOUS
Status: DISCONTINUED | OUTPATIENT
Start: 2017-04-11 | End: 2017-04-11

## 2017-04-11 RX ORDER — LIDOCAINE HYDROCHLORIDE 10 MG/ML
1 INJECTION, SOLUTION EPIDURAL; INFILTRATION; INTRACAUDAL; PERINEURAL ONCE
Status: COMPLETED | OUTPATIENT
Start: 2017-04-11 | End: 2017-04-11

## 2017-04-11 RX ORDER — DIPHENHYDRAMINE HYDROCHLORIDE 50 MG/ML
25 INJECTION INTRAMUSCULAR; INTRAVENOUS EVERY 4 HOURS PRN
Status: DISCONTINUED | OUTPATIENT
Start: 2017-04-11 | End: 2017-04-12

## 2017-04-11 RX ORDER — ENOXAPARIN SODIUM 100 MG/ML
40 INJECTION SUBCUTANEOUS
Status: DISCONTINUED | OUTPATIENT
Start: 2017-04-12 | End: 2017-04-13 | Stop reason: HOSPADM

## 2017-04-11 RX ORDER — CEFAZOLIN SODIUM 2 G/50ML
2 SOLUTION INTRAVENOUS
Status: COMPLETED | OUTPATIENT
Start: 2017-04-11 | End: 2017-04-11

## 2017-04-11 RX ADMIN — DEXTROSE MONOHYDRATE, SODIUM CHLORIDE, AND POTASSIUM CHLORIDE: 50; 4.5; 1.49 INJECTION, SOLUTION INTRAVENOUS at 09:04

## 2017-04-11 RX ADMIN — ALBUMIN (HUMAN): 12.5 SOLUTION INTRAVENOUS at 08:04

## 2017-04-11 RX ADMIN — CEFAZOLIN SODIUM 2 G: 2 SOLUTION INTRAVENOUS at 07:04

## 2017-04-11 RX ADMIN — LIDOCAINE HYDROCHLORIDE 5 MG: 10 INJECTION, SOLUTION EPIDURAL; INFILTRATION; INTRACAUDAL; PERINEURAL at 06:04

## 2017-04-11 RX ADMIN — NEOSTIGMINE METHYLSULFATE 3 MG: 1 INJECTION INTRAVENOUS at 08:04

## 2017-04-11 RX ADMIN — HYDROMORPHONE HYDROCHLORIDE 0.2 MG: 1 INJECTION, SOLUTION INTRAMUSCULAR; INTRAVENOUS; SUBCUTANEOUS at 09:04

## 2017-04-11 RX ADMIN — SODIUM CHLORIDE, SODIUM GLUCONATE, SODIUM ACETATE, POTASSIUM CHLORIDE, MAGNESIUM CHLORIDE, SODIUM PHOSPHATE, DIBASIC, AND POTASSIUM PHOSPHATE: .53; .5; .37; .037; .03; .012; .00082 INJECTION, SOLUTION INTRAVENOUS at 07:04

## 2017-04-11 RX ADMIN — DEXAMETHASONE SODIUM PHOSPHATE 8 MG: 4 INJECTION, SOLUTION INTRAMUSCULAR; INTRAVENOUS at 07:04

## 2017-04-11 RX ADMIN — MIDAZOLAM HYDROCHLORIDE 2 MG: 1 INJECTION, SOLUTION INTRAMUSCULAR; INTRAVENOUS at 06:04

## 2017-04-11 RX ADMIN — ACETAMINOPHEN 1000 MG: 10 INJECTION, SOLUTION INTRAVENOUS at 07:04

## 2017-04-11 RX ADMIN — PROMETHAZINE HYDROCHLORIDE 6.25 MG: 25 INJECTION, SOLUTION INTRAMUSCULAR; INTRAVENOUS at 02:04

## 2017-04-11 RX ADMIN — ROCURONIUM BROMIDE 40 MG: 10 INJECTION, SOLUTION INTRAVENOUS at 07:04

## 2017-04-11 RX ADMIN — Medication: at 09:04

## 2017-04-11 RX ADMIN — ONDANSETRON 4 MG: 2 INJECTION INTRAMUSCULAR; INTRAVENOUS at 07:04

## 2017-04-11 RX ADMIN — PROPOFOL 150 MG: 10 INJECTION, EMULSION INTRAVENOUS at 07:04

## 2017-04-11 RX ADMIN — HYDROMORPHONE HYDROCHLORIDE 0.2 MG: 1 INJECTION, SOLUTION INTRAMUSCULAR; INTRAVENOUS; SUBCUTANEOUS at 10:04

## 2017-04-11 RX ADMIN — SODIUM CHLORIDE 1000 ML: 0.9 INJECTION, SOLUTION INTRAVENOUS at 06:04

## 2017-04-11 RX ADMIN — FENTANYL CITRATE 50 MCG: 50 INJECTION, SOLUTION INTRAMUSCULAR; INTRAVENOUS at 07:04

## 2017-04-11 RX ADMIN — GLYCOPYRROLATE 0.4 MG: 0.2 INJECTION, SOLUTION INTRAMUSCULAR; INTRAVENOUS at 08:04

## 2017-04-11 RX ADMIN — SODIUM CHLORIDE, SODIUM GLUCONATE, SODIUM ACETATE, POTASSIUM CHLORIDE, MAGNESIUM CHLORIDE, SODIUM PHOSPHATE, DIBASIC, AND POTASSIUM PHOSPHATE: .53; .5; .37; .037; .03; .012; .00082 INJECTION, SOLUTION INTRAVENOUS at 08:04

## 2017-04-11 RX ADMIN — FENTANYL CITRATE 50 MCG: 50 INJECTION, SOLUTION INTRAMUSCULAR; INTRAVENOUS at 08:04

## 2017-04-11 RX ADMIN — LIDOCAINE HYDROCHLORIDE 75 MG: 20 INJECTION, SOLUTION INTRAVENOUS at 07:04

## 2017-04-11 NOTE — BRIEF OP NOTE
[facility]  Brief Operative Note    SUMMARY     Surgery Date: 4/11/2017     Surgeon(s) and Role:     * Benny Tran MD - Primary     * Bradford Norris MD - Resident - Assisting     * JACOB Obando-LILO - Assisting     * SHUBHAM Crook MD - Resident - Assisting    Pre-op Diagnosis:  Ovarian cancer, unspecified laterality [C56.9]    Post-op Diagnosis:  Post-Op Diagnosis Codes:     * Ovarian cancer, unspecified laterality [C56.9]    Procedure(s) (LRB):  NGXBIXGH-LHHVWXFOKONL-DUGOPLUKE (BSO) (Bilateral)  EXPLORATORY-LAPAROTOMY (N/A)  HYSTERECTOMY-ABDOMINAL-TOTAL (DORIAN) (N/A)  RESECTION-MASS (N/A)    Anesthesia: General    Description of Procedure:   - exploratory laparotomy  - total abdominal supracervical hysterectomy with bilateral salpingo-oophorectomy    Description of the findings of the procedure:   - enlarged bilateral ovarian masses with extension of largest to above the umbilicus  - smooth peritoneal surfaces, no disease of the mesentery or bowel or other intraabdominal organs appreciated  - no gross residual disease upon completion of case    Estimated Blood Loss: 500 mL         Specimens:   Specimen (12h ago through future)    Start     Ordered    04/11/17 0817  Specimen to Pathology - Surgery  Once     Comments:  1. Left tube and ovary- sent to patho for research  2. Right tube and Ovary- sent to patho for research  3. Uterus and Cervix- sent to patho for research    04/11/17 0817        Bradford Norris MD  PGY 4 - Ob/Gyn

## 2017-04-11 NOTE — NURSING TRANSFER
Nursing Transfer Note      4/11/2017     Transfer To: 649A    Transfer via stretcher    Transfer with None    Transported by PCT    Medicines sent: IVF infusing with Dilaudid PCA pump in place    Chart send with patient: Yes    Notified: daughter    Patient reassessed at: 4/11/17 9930    Upon arrival to floor: patient oriented to room, call bell in reach and bed in lowest position

## 2017-04-11 NOTE — TRANSFER OF CARE
"Anesthesia Transfer of Care Note    Patient: Mickie Don    Procedure(s) Performed: Procedure(s) (LRB):  FHEHSRBZ-ZBWNWSDVKBFO-QDIDEBMUV (BSO) (Bilateral)  EXPLORATORY-LAPAROTOMY (N/A)  HYSTERECTOMY-ABDOMINAL-TOTAL (DORIAN) (N/A)  RESECTION-MASS (N/A)    Patient location: PACU    Anesthesia Type: general    Transport from OR: Transported from OR on 6-10 L/min O2 by face mask with adequate spontaneous ventilation    Post pain: adequate analgesia    Post assessment: no apparent anesthetic complications    Post vital signs: stable    Level of consciousness: sedated    Nausea/Vomiting: no nausea/vomiting    Complications: none          Last vitals:   Visit Vitals    BP (!) 113/59    Pulse 75    Temp 37.2 °C (99 °F) (Axillary)    Resp 18    Ht 5' 7" (1.702 m)    Wt 90.3 kg (199 lb)    SpO2 100%    Breastfeeding No    BMI 31.17 kg/m2     "

## 2017-04-11 NOTE — ASSESSMENT & PLAN NOTE
- Continue to ADAT  - Continue IVF until tolerating regular diet  - Pain control with Ibuprofen and Percocet PRN, dilaudid for BTP  - Zofran/Phenergan PRN nausea  - Will plan to d/c elias and remove bandage on POD#1  - CBC in AM

## 2017-04-11 NOTE — INTERVAL H&P NOTE
The patient has been examined and the H&P has been reviewed:    I concur with the findings and no changes have occurred since H&P was written.    Anesthesia/Surgery risks, benefits and alternative options discussed and understood by patient/family.          Active Hospital Problems    Diagnosis  POA    Ovarian cancer [C56.9]  Yes      Resolved Hospital Problems    Diagnosis Date Resolved POA   No resolved problems to display.

## 2017-04-11 NOTE — ANESTHESIA POSTPROCEDURE EVALUATION
"Anesthesia Post Evaluation    Patient: Mickie Don    Procedure(s) Performed: Procedure(s) (LRB):  GJVKQDEI-ENZTFHDIAVSU-LCODGVOWQ (BSO) (Bilateral)  EXPLORATORY-LAPAROTOMY (N/A)  HYSTERECTOMY-ABDOMINAL-TOTAL (DORIAN) (N/A)  RESECTION-MASS (N/A)    Final Anesthesia Type: general  Patient location during evaluation: PACU  Patient participation: Yes- Able to Participate  Level of consciousness: awake and alert and oriented  Post-procedure vital signs: reviewed and stable  Pain management: adequate  Airway patency: patent  PONV status at discharge: No PONV  Anesthetic complications: no      Cardiovascular status: blood pressure returned to baseline and hemodynamically stable  Respiratory status: unassisted, room air and spontaneous ventilation  Hydration status: euvolemic  Follow-up not needed.        Visit Vitals    BP (!) 107/56    Pulse (!) 54    Temp 36.2 °C (97.2 °F) (Axillary)    Resp 15    Ht 5' 7" (1.702 m)    Wt 90.3 kg (199 lb)    SpO2 96%    Breastfeeding No    BMI 31.17 kg/m2       Pain/Maddie Score: Pain Assessment Performed: Yes (4/11/2017 11:00 AM)  Presence of Pain: other (see comments) (states pain is tolerable) (4/11/2017 11:00 AM)  Pain Rating Prior to Med Admin: 7 (4/11/2017 10:15 AM)  Maddie Score: 10 (4/11/2017 11:00 AM)      "

## 2017-04-11 NOTE — ANESTHESIA RELEASE NOTE
"Anesthesia Release from PACU Note    Patient: Mickie Don    Procedure(s) Performed: Procedure(s) (LRB):  FJVBBGBP-DIDWUNTJRJLC-INWAJBHEY (BSO) (Bilateral)  EXPLORATORY-LAPAROTOMY (N/A)  HYSTERECTOMY-ABDOMINAL-TOTAL (DORIAN) (N/A)  RESECTION-MASS (N/A)    Anesthesia type: general    Post pain: Adequate analgesia    Post assessment: no apparent anesthetic complications    Last Vitals:   Visit Vitals    BP (!) 107/56    Pulse (!) 54    Temp 36.2 °C (97.2 °F) (Axillary)    Resp 15    Ht 5' 7" (1.702 m)    Wt 90.3 kg (199 lb)    SpO2 96%    Breastfeeding No    BMI 31.17 kg/m2       Post vital signs: stable    Level of consciousness: awake, alert  and oriented    Nausea/Vomiting: no nausea/no vomiting    Complications: none    Airway Patency: patent    Respiratory: unassisted, spontaneous ventilation, room air    Cardiovascular: stable and blood pressure at baseline    Hydration: euvolemic  "

## 2017-04-11 NOTE — IP AVS SNAPSHOT
[facility]  [address]  [facility] [address]  Phone: [phone]           Patient Discharge Instructions   Our goal is to set you up for success. This packet includes information on your condition, medications, and your home care.  It will help you care for yourself to prevent having to return to the hospital.     Please ask your nurse if you have any questions.      There are many details to remember when preparing to leave the hospital. Here is what you will need to do:    1. Take your medicine. If you are prescribed medications, review your Medication List on the following pages. You may have new medications to  at the pharmacy and others that you'll need to stop taking. Review the instructions for how and when to take your medications. Talk with your doctor or nurses if you are unsure of what to do.     2. Go to your follow-up appointments. Specific follow-up information is listed in the following pages. Your may be contacted by a nurse or clinical provider about future appointments. Be sure we have all of the phone numbers to reach you. Please contact your provider's office if you are unable to make an appointment.     3. Watch for warning signs. Your doctor or nurse will give you detailed warning signs to watch for and when to call for assistance. These instructions may also include educational information about your condition. If you experience any of warning signs to your health, call your doctor.           [facility]  Unless otherwise directed by your provider, please   contact [facility], our nurse care line   that is available for 24/7 assistance.     [phone] (toll-free)     Registered nurses in the [facility]   provide: appointment scheduling, clinical advisement, health education, and other advisory services.                  ** Verify the list of medication(s) below is accurate and up to date. Carry this with you in case of  emergency. If your medications have changed, please notify your healthcare provider.             Medication List      START taking these medications        Additional Info                      enoxaparin 40 mg/0.4 mL Syrg   Commonly known as:  LOVENOX   Quantity:  19 Syringe   Refills:  0   Dose:  40 mg    Last time this was given:  40 mg on 4/12/2017  8:54 AM   Instructions:  Inject 0.4 mLs (40 mg total) into the skin every 24 hours as needed.     Begin Date    AM    Noon    PM    Bedtime       ibuprofen 600 MG tablet   Commonly known as:  ADVIL,MOTRIN   Quantity:  45 tablet   Refills:  0   Dose:  600 mg    Last time this was given:  600 mg on 4/13/2017  5:18 AM   Instructions:  Take 1 tablet (600 mg total) by mouth every 8 (eight) hours as needed for Pain.     Begin Date    AM    Noon    PM    Bedtime         CHANGE how you take these medications        Additional Info                      oxycodone-acetaminophen 5-325 mg per tablet   Commonly known as:  PERCOCET   Quantity:  30 tablet   Refills:  0   Dose:  1 tablet   What changed:    - when to take this  - reasons to take this    Instructions:  Take 1 tablet by mouth every 6 (six) hours as needed.     Begin Date    AM    Noon    PM    Bedtime         CONTINUE taking these medications        Additional Info                      alprazolam 0.5 MG tablet   Commonly known as:  XANAX   Quantity:  30 tablet   Refills:  1   Dose:  0.5 mg    Instructions:  Take 1 tablet (0.5 mg total) by mouth 3 (three) times daily as needed for Anxiety.     Begin Date    AM    Noon    PM    Bedtime       dexamethasone 4 MG Tab   Commonly known as:  DECADRON   Quantity:  8 tablet   Refills:  5    Instructions:  Take 2 tablets BID day before and after chemotherapy.     Begin Date    AM    Noon    PM    Bedtime            Where to Get Your Medications      These medications were sent to [facility] - [place], [place] - [address] [place] [address]  [address] [place] [address],  [facility] [address]     Phone:  [phone]     enoxaparin 40 mg/0.4 mL Syrg         You can get these medications from any pharmacy     Bring a paper prescription for each of these medications     ibuprofen 600 MG tablet    oxycodone-acetaminophen 5-325 mg per tablet                  Please bring to all follow up appointments:    1. A copy of your discharge instructions.  2. All medicines you are currently taking in their original bottles.  3. Identification and insurance card.    Please arrive 15 minutes ahead of scheduled appointment time.    Please call 24 hours in advance if you must reschedule your appointment and/or time.        Follow-up Information     Follow up with Benny Tran MD In 8 days.    Specialties:  Obstetrics, Gynecology, Gynecologic Oncology    Why:  staple removal    Contact information:    [address]  [facility] [address]  [phone]          Discharge Instructions     Future Orders    Activity as tolerated     Call MD for:  difficulty breathing or increased cough     Call MD for:  increased confusion or weakness     Call MD for:  persistent dizziness, light-headedness, or visual disturbances     Call MD for:  persistent nausea and vomiting or diarrhea     Call MD for:  redness, tenderness, or signs of infection (pain, swelling, redness, odor or green/yellow discharge around incision site)     Call MD for:  severe persistent headache     Call MD for:  severe uncontrolled pain     Call MD for:  temperature >100.4     Call MD for:  worsening rash     Diet general     Questions:    Total calories:      Fat restriction, if any:      Protein restriction, if any:      Na restriction, if any:      Fluid restriction:      Additional restrictions:      No dressing needed         Primary Diagnosis     Your primary diagnosis was:  Ovarian Cancer      Admission Information     Date & Time Provider Department [facility]    4/11/2017  5:03 AM Benny Tran MD [facility] [phone]  "     Care Providers     Provider Role Specialty Primary office phone    Benny Tran MD Attending Provider Obstetrics [phone]    Benny Tran MD Surgeon  Obstetrics [phone]      Your Vitals Were     BP Pulse Temp Resp Height Weight    114/57 (BP Location: Left arm, Patient Position: Lying, BP Method: Automatic) 76 98.8 °F (37.1 °C) (Oral) 18 5' 7" (1.702 m) 90.3 kg (199 lb)    SpO2 BMI             98% 31.17 kg/m2         Recent Lab Values     No lab values to display.      Pending Labs     Order Current Status    Type & Screen Collected (04/11/17 0523)    Specimen to Pathology - Surgery In process      Allergies as of 4/13/2017     No Known Allergies      Advance Directives     An advance directive is a document which, in the event you are no longer able to make decisions for yourself, tells your healthcare team what kind of treatment you do or do not want to receive, or who you would like to make those decisions for you.  If you do not currently have an advance directive, [facility] encourages you to create one.  For more information call:  [phone]), [phone] ([phone]),  or log on to [email or web address].        Language Assistance Services     ATTENTION: Language assistance services are available, free of charge. Please call [phone].      ATENCIÓN: Si habla español, tiene a gilbert disposición servicios gratuitos de asistencia lingüística. Llame al [phone].     CHÚ Ý: N?u b?n nói Ti?ng Vi?t, có các d?ch v? h? tr? ngôn ng? mi?n phí dành cho b?n. G?i s? [phone].         [facility] complies with applicable Federal civil rights laws and does not discriminate on the basis of race, color, national origin, age, disability, or sex.        "

## 2017-04-11 NOTE — PLAN OF CARE
Problem: Patient Care Overview  Goal: Plan of Care Review  Outcome: Ongoing (interventions implemented as appropriate)  Plan of care reviewed with pt; pt verbalizes understanding. Pt remains free from falls/injuries. TEDs/SCDs in place. Surgical dressing clean, dry and intact. Abdomen soft to palpation. Brown catheter intact draining clear, yellow urine. Pt tolerating sips of water; no nausea noted. Pt states pain is well controlled with PCA. See epic flowsheet for full assessment and vital signs.

## 2017-04-11 NOTE — PROGRESS NOTES
Updated pt's daughter on plan of care via telephone. All questions answered. Daughter verbalizes understanding.

## 2017-04-11 NOTE — SUBJECTIVE & OBJECTIVE
Interval History:  Patient is doing well this afternoon. She denies nausea, vomiting, fever or chills.  Patient reports abdominal pain that is well relieved by oral pain medications. Patient has indwelling catheter with clear urine, has not ambulated. She has not passed flatus, and has not had BM.      Scheduled Meds:   [START ON 4/12/2017] enoxaparin  40 mg Subcutaneous Q24H     Continuous Infusions:   dextrose 5 % and 0.45 % NaCl with KCl 20 mEq 125 mL/hr at 04/11/17 0912    hydromorphone in 0.9 % NaCl 6 mg/30 ml       PRN Meds:alprazolam, diphenhydrAMINE, HYDROmorphone, naloxone, ondansetron, promethazine (PHENERGAN) IVPB, promethazine (PHENERGAN) IVPB, simethicone, sodium chloride 0.9%    Review of patient's allergies indicates:  No Known Allergies    Objective:     Vital Signs (Most Recent):  Temp: 96.8 °F (36 °C) (04/11/17 1130)  Pulse: (!) 55 (04/11/17 1230)  Resp: 20 (04/11/17 1230)  BP: (!) 112/58 (04/11/17 1200)  SpO2: 95 % (04/11/17 1230) Vital Signs (24h Range):  Temp:  [96.8 °F (36 °C)-99 °F (37.2 °C)] 96.8 °F (36 °C)  Pulse:  [53-78] 55  Resp:  [14-25] 20  SpO2:  [93 %-100 %] 95 %  BP: ()/(45-64) 112/58     Weight: 90.3 kg (199 lb)  Body mass index is 31.17 kg/(m^2).    Intake/Output - Last 3 Shifts       04/09 0700 - 04/10 0659 04/10 0700 - 04/11 0659 04/11 0700 - 04/12 0659    I.V. (mL/kg)   2500 (27.7)    Total Intake(mL/kg)   2500 (27.7)    Urine (mL/kg/hr)   160 (0.3)    Blood   500 (0.9)    Total Output     660    Net     +1840                    Physical Exam:   Constitutional: She is oriented to person, place, and time. She appears well-developed.       Cardiovascular: Normal rate, regular rhythm, normal heart sounds and intact distal pulses.  Exam reveals no gallop, no friction rub and no clubbing.    No murmur heard.   Pulmonary/Chest: Effort normal and breath sounds normal. No respiratory distress. She has no wheezes.        Abdominal: Soft. She exhibits abdominal incision. She  exhibits no distension. There is no tenderness.   Bowel sounds hypoactive  Verticle incision with island dressing place, dry.                 Neurological: She is alert and oriented to person, place, and time.    Skin: Nails show no clubbing.    Psychiatric: She has a normal mood and affect.       Lines/Drains/Airways     Drain                 Urethral Catheter 04/11/17 0723 Straight-tip;Non-latex 16 Fr. less than 1 day          Epidural Line                 Perineural Analgesia/Anesthesia Assessment (using dermatomes) Epidural 09/07/16 0800 216 days          Peripheral Intravenous Line                 Peripheral IV - Single Lumen 02/15/17 0945 Left Hand 55 days         Peripheral IV - Single Lumen 04/11/17 0550 Left Hand less than 1 day         Peripheral IV - Single Lumen 04/11/17 0718 Left Forearm less than 1 day

## 2017-04-11 NOTE — PROGRESS NOTES
[facility]  Gynecologic Oncology  Progress Note      Patient Name: Mickie Don  MRN: [id number]  Admission Date: 4/11/2017  Hospital Length of Stay: 0 days  Attending Provider: Benny Tran MD  Primary Care Provider: Elle Ma MD  Principal Problem: Ovarian cancer    Follow-up For: Procedure(s) (LRB):  EWIOCCOK-TVBZTPNRRJWD-FHVRHTCWH (BSO) (Bilateral)  EXPLORATORY-LAPAROTOMY (N/A)  HYSTERECTOMY-ABDOMINAL-TOTAL (DORIAN) (N/A)  RESECTION-MASS (N/A)  Post-Operative Day: Day of Surgery  Subjective:      History of Present Illness:  62 y/o with Stage IIIC ovarian cancer s/p 6 cycles of taxoteral/carboplatin admitted for ex-lap/BSO. Patient is now POD#0 s/p ex-lap/DORIAN/BSO.    Interval History:  Patient is doing well this afternoon. She denies nausea, vomiting, fever or chills.  Patient reports abdominal pain that is well relieved by PCAs. Tolerating small sips of water. Patient has indwelling catheter with clear urine, has not ambulated. She has not passed flatus, and has not had BM.      Scheduled Meds:   [START ON 4/12/2017] enoxaparin  40 mg Subcutaneous Q24H     Continuous Infusions:   dextrose 5 % and 0.45 % NaCl with KCl 20 mEq 125 mL/hr at 04/11/17 0912    hydromorphone in 0.9 % NaCl 6 mg/30 ml       PRN Meds:alprazolam, diphenhydrAMINE, HYDROmorphone, naloxone, ondansetron, promethazine (PHENERGAN) IVPB, promethazine (PHENERGAN) IVPB, simethicone, sodium chloride 0.9%    Review of patient's allergies indicates:  No Known Allergies    Objective:     Vital Signs (Most Recent):  Temp: 96.8 °F (36 °C) (04/11/17 1130)  Pulse: (!) 55 (04/11/17 1230)  Resp: 20 (04/11/17 1230)  BP: (!) 112/58 (04/11/17 1200)  SpO2: 95 % (04/11/17 1230) Vital Signs (24h Range):  Temp:  [96.8 °F (36 °C)-99 °F (37.2 °C)] 96.8 °F (36 °C)  Pulse:  [53-78] 55  Resp:  [14-25] 20  SpO2:  [93 %-100 %] 95 %  BP: ()/(45-64) 112/58     Weight: 90.3 kg (199 lb)  Body mass index is 31.17 kg/(m^2).    Intake/Output -  Last 3 Shifts       04/09 0700 - 04/10 0659 04/10 0700 - 04/11 0659 04/11 0700 - 04/12 0659    I.V. (mL/kg)   2500 (27.7)    Total Intake(mL/kg)   2500 (27.7)    Urine (mL/kg/hr)   160 (0.3)    Blood   500 (0.9)    Total Output     660    Net     +1840                    Physical Exam:   Constitutional: She is oriented to person, place, and time. She appears well-developed.       Cardiovascular: Normal rate, regular rhythm, normal heart sounds and intact distal pulses.  Exam reveals no gallop, no friction rub and no clubbing.    No murmur heard.   Pulmonary/Chest: Effort normal and breath sounds normal. No respiratory distress. She has no wheezes.        Abdominal: Soft. She exhibits abdominal incision. She exhibits no distension. There is no tenderness.   Bowel sounds hypoactive  Verticle incision with island dressing place, dry.                 Neurological: She is alert and oriented to person, place, and time.    Skin: Nails show no clubbing.    Psychiatric: She has a normal mood and affect.       Lines/Drains/Airways     Drain                 Urethral Catheter 04/11/17 0723 Straight-tip;Non-latex 16 Fr. less than 1 day          Epidural Line                 Perineural Analgesia/Anesthesia Assessment (using dermatomes) Epidural 09/07/16 0800 216 days          Peripheral Intravenous Line                 Peripheral IV - Single Lumen 02/15/17 0945 Left Hand 55 days         Peripheral IV - Single Lumen 04/11/17 0550 Left Hand less than 1 day         Peripheral IV - Single Lumen 04/11/17 0718 Left Forearm less than 1 day                  Assessment/Plan:     S/P DORIAN-BSO (total abdominal hysterectomy and bilateral salpingo-oophorectomy)  - Continue to ADAT  - Continue IVF until tolerating regular diet  - Pain control with PCA  - Zofran/Phenergan PRN nausea  - Will plan to d/c elias and remove bandage on POD#1  - CBC in AM    DVT PPX: Lovenox starting tomorrow for 21 days    VTE Risk Mitigation         Ordered      enoxaparin injection 40 mg  Every 24 hours (non-standard times)     Route:  Subcutaneous        04/11/17 0907     Medium Risk of VTE  Once      04/11/17 0907     Place ELEAZAR hose  Until discontinued      04/11/17 0907     Place sequential compression device  Until discontinued      04/11/17 0907            ASHLEY Crook MD  Gynecologic Oncology  [facility]

## 2017-04-12 PROBLEM — Z90.79 S/P TAH-BSO (TOTAL ABDOMINAL HYSTERECTOMY AND BILATERAL SALPINGO-OOPHORECTOMY): Status: RESOLVED | Noted: 2017-04-11 | Resolved: 2017-04-12

## 2017-04-12 PROBLEM — Z90.722 S/P TAH-BSO (TOTAL ABDOMINAL HYSTERECTOMY AND BILATERAL SALPINGO-OOPHORECTOMY): Status: RESOLVED | Noted: 2017-04-11 | Resolved: 2017-04-12

## 2017-04-12 PROBLEM — Z90.710 S/P TAH-BSO (TOTAL ABDOMINAL HYSTERECTOMY AND BILATERAL SALPINGO-OOPHORECTOMY): Status: RESOLVED | Noted: 2017-04-11 | Resolved: 2017-04-12

## 2017-04-12 PROBLEM — F41.9 ANXIETY: Status: ACTIVE | Noted: 2017-04-12

## 2017-04-12 LAB
BASOPHILS # BLD AUTO: 0.01 K/UL
BASOPHILS NFR BLD: 0.1 %
DIFFERENTIAL METHOD: ABNORMAL
EOSINOPHIL # BLD AUTO: 0 K/UL
EOSINOPHIL NFR BLD: 0 %
ERYTHROCYTE [DISTWIDTH] IN BLOOD BY AUTOMATED COUNT: 14.1 %
HCT VFR BLD AUTO: 33.2 %
HGB BLD-MCNC: 10.9 G/DL
LYMPHOCYTES # BLD AUTO: 1.3 K/UL
LYMPHOCYTES NFR BLD: 15.9 %
MCH RBC QN AUTO: 33.9 PG
MCHC RBC AUTO-ENTMCNC: 32.8 %
MCV RBC AUTO: 103 FL
MONOCYTES # BLD AUTO: 0.6 K/UL
MONOCYTES NFR BLD: 7.6 %
NEUTROPHILS # BLD AUTO: 6.1 K/UL
NEUTROPHILS NFR BLD: 76.3 %
PLATELET # BLD AUTO: 154 K/UL
PMV BLD AUTO: 10.2 FL
RBC # BLD AUTO: 3.22 M/UL
WBC # BLD AUTO: 7.94 K/UL

## 2017-04-12 PROCEDURE — 99900035 HC TECH TIME PER 15 MIN (STAT)

## 2017-04-12 PROCEDURE — 63600175 PHARM REV CODE 636 W HCPCS

## 2017-04-12 PROCEDURE — 20600001 HC STEP DOWN PRIVATE ROOM

## 2017-04-12 PROCEDURE — 36415 COLL VENOUS BLD VENIPUNCTURE: CPT

## 2017-04-12 PROCEDURE — 25000003 PHARM REV CODE 250

## 2017-04-12 PROCEDURE — 85025 COMPLETE CBC W/AUTO DIFF WBC: CPT

## 2017-04-12 RX ORDER — HYDROCODONE BITARTRATE AND ACETAMINOPHEN 5; 325 MG/1; MG/1
1 TABLET ORAL EVERY 4 HOURS PRN
Status: DISCONTINUED | OUTPATIENT
Start: 2017-04-12 | End: 2017-04-13 | Stop reason: HOSPADM

## 2017-04-12 RX ORDER — HYDROMORPHONE HYDROCHLORIDE 1 MG/ML
0.5 INJECTION, SOLUTION INTRAMUSCULAR; INTRAVENOUS; SUBCUTANEOUS
Status: DISCONTINUED | OUTPATIENT
Start: 2017-04-12 | End: 2017-04-13 | Stop reason: HOSPADM

## 2017-04-12 RX ORDER — HYDROCODONE BITARTRATE AND ACETAMINOPHEN 10; 325 MG/1; MG/1
1 TABLET ORAL EVERY 4 HOURS PRN
Status: DISCONTINUED | OUTPATIENT
Start: 2017-04-12 | End: 2017-04-13 | Stop reason: HOSPADM

## 2017-04-12 RX ORDER — ACETAMINOPHEN 500 MG
500 TABLET ORAL EVERY 4 HOURS PRN
Status: DISPENSED | OUTPATIENT
Start: 2017-04-12 | End: 2017-04-13

## 2017-04-12 RX ORDER — IBUPROFEN 600 MG/1
600 TABLET ORAL EVERY 6 HOURS
Status: DISCONTINUED | OUTPATIENT
Start: 2017-04-12 | End: 2017-04-13 | Stop reason: HOSPADM

## 2017-04-12 RX ADMIN — ACETAMINOPHEN 500 MG: 500 TABLET ORAL at 10:04

## 2017-04-12 RX ADMIN — DEXTROSE MONOHYDRATE, SODIUM CHLORIDE, AND POTASSIUM CHLORIDE: 50; 4.5; 1.49 INJECTION, SOLUTION INTRAVENOUS at 01:04

## 2017-04-12 RX ADMIN — ENOXAPARIN SODIUM 40 MG: 100 INJECTION SUBCUTANEOUS at 08:04

## 2017-04-12 RX ADMIN — IBUPROFEN 600 MG: 600 TABLET, FILM COATED ORAL at 08:04

## 2017-04-12 RX ADMIN — IBUPROFEN 600 MG: 600 TABLET, FILM COATED ORAL at 12:04

## 2017-04-12 RX ADMIN — IBUPROFEN 600 MG: 600 TABLET, FILM COATED ORAL at 06:04

## 2017-04-12 NOTE — PLAN OF CARE
Problem: Patient Care Overview  Goal: Plan of Care Review  Outcome: Ongoing (interventions implemented as appropriate)  Plan of care reviewed with patient. Verbal understanding received. No questions or concerns. Patient vital signs stable with no signs of distress. Pt had abdominal hysterotomy D/T ovarian cancer. ML with telfa, clean dry and intact. No drains. PCA pain pump discontinued today. Patient's pain well controlled with scheduled ibuprofen and tylenol. Brown discontinued today, patient urinating adequately. Patient has abdominal binder to be used when up from bed. Patient's son at bed. RN will continue to monitor

## 2017-04-12 NOTE — PROGRESS NOTES
[facility]  Gynecologic Oncology  Progress Note      Patient Name: Mickie Don  MRN: [id number]  Admission Date: 4/11/2017  Hospital Length of Stay: 1 days  Attending Provider: Benny Tran MD  Primary Care Provider: Elle Ma MD  Principal Problem: Ovarian cancer    Follow-up For: Procedure(s) (LRB):  [id number] (BSO) (Bilateral)  EXPLORATORY-LAPAROTOMY (N/A)  HYSTERECTOMY-ABDOMINAL-TOTAL (DORIAN) (N/A)  RESECTION-MASS (N/A)  Post-Operative Day: 1 Day Post-Op  Subjective:      History of Present Illness:  62 y/o with Stage IIIC ovarian cancer s/p 6 cycles of taxoteral/carboplatin admitted for ex-lap/hysterectomy/BSO 4/12/17.    Interval History: Pt denies issues overnight. Denies current abdominal pain - using PCA minimally. Tolerating clears and requests regular diet. No N/V. Has ambulated without difficulty. Not yet passing flatus. Brown just removed and has not yet voided since that time.      Scheduled Meds:   enoxaparin  40 mg Subcutaneous Q24H     Continuous Infusions:   dextrose 5 % and 0.45 % NaCl with KCl 20 mEq 125 mL/hr at 04/12/17 0134    hydromorphone in 0.9 % NaCl 6 mg/30 ml       PRN Meds:alprazolam, diphenhydrAMINE, naloxone, ondansetron, promethazine (PHENERGAN) IVPB, simethicone    Review of patient's allergies indicates:  No Known Allergies    Objective:     Vital Signs (Most Recent):  Temp: 98.9 °F (37.2 °C) (04/12/17 0055)  Pulse: 71 (04/12/17 0055)  Resp: 16 (04/12/17 0055)  BP: 118/60 (04/12/17 0055)  SpO2: 98 % (04/12/17 0055) Vital Signs (24h Range):  Temp:  [96.8 °F (36 °C)-99 °F (37.2 °C)] 98.9 °F (37.2 °C)  Pulse:  [53-78] 71  Resp:  [11-25] 16  SpO2:  [93 %-100 %] 98 %  BP: ()/(45-66) 118/60     Weight: 90.3 kg (199 lb)  Body mass index is 31.17 kg/(m^2).    Intake/Output - Last 3 Shifts       04/10 0700 - 04/11 0659 04/11 0700 - 04/12 0659    I.V. (mL/kg)  4464.6 (49.5)    Total Intake(mL/kg)  4464.6 (49.5)    Urine (mL/kg/hr)   4460 (2.1)    Emesis/NG output  0 (0)    Other  0 (0)    Stool  0 (0)    Blood  500 (0.2)    Total Output   4960    Net   -495.4          Stool Occurrence  0 x             Physical Exam:   Constitutional: She is oriented to person, place, and time. She appears well-developed and well-nourished. No distress.    HENT:   Head: Normocephalic and atraumatic.      Cardiovascular: Normal rate, regular rhythm, normal heart sounds and intact distal pulses.  Exam reveals no gallop, no friction rub, no clubbing and no cyanosis.    No murmur heard. Pulse Score: 2+   Pulmonary/Chest: Effort normal and breath sounds normal. No respiratory distress. She has no wheezes.        Abdominal: Soft. Bowel sounds are normal. She exhibits abdominal incision. She exhibits no distension. There is no tenderness. There is no rebound and no guarding.   Vertical incision with island dressing place, dry.             Musculoskeletal: She exhibits no edema.       Neurological: She is alert and oriented to person, place, and time.    Skin: No cyanosis. Nails show no clubbing.    Psychiatric: She has a normal mood and affect. Her behavior is normal.       Lines/Drains/Airways     Epidural Line                 Perineural Analgesia/Anesthesia Assessment (using dermatomes) Epidural 09/07/16 0800 216 days          Peripheral Intravenous Line                 Peripheral IV - Single Lumen 02/15/17 0945 Left Hand 55 days         Peripheral IV - Single Lumen 04/11/17 0550 Left Hand 1 day         Peripheral IV - Single Lumen 04/11/17 0718 Left Forearm less than 1 day                Laboratory:  CBC: No results for input(s): WBC, HGB, HCT, PLT in the last 48 hours.    AM CBC pending      Assessment/Plan:     * Ovarian cancer - IIIC  - s/p 6 cycles neoadjuvant chemo with interval debulking 4/11/17  - transition to oral analgesics today  - advance to regular diet; D/C IVFs  - encourage ambulation, IS use  - not yet ready for discharge at this time    Anxiety  -  continue Xanax PRN    VTE Risk Mitigation         Ordered     enoxaparin injection 40 mg  Every 24 hours (non-standard times)     Route:  Subcutaneous        04/11/17 0907     Medium Risk of VTE  Once      04/11/17 0907     Place ELEAZAR hose  Until discontinued      04/11/17 0907     Place sequential compression device  Until discontinued      04/11/17 0907          Was elias catheter removed? Yes    Bradford Norris MD  Gynecologic Oncology  [facility]

## 2017-04-12 NOTE — ASSESSMENT & PLAN NOTE
- s/p 6 cycles neoadjuvant chemo with interval debulking 4/11/17  - continue oral analgesic with IV prn breakthrough pain  - continue regular diet  - encourage ambulation, IS use  - not yet ready for discharge at this time; anticipate D/C tomorrow  - continue prophylactic Lovenox for 21 days total

## 2017-04-12 NOTE — SUBJECTIVE & OBJECTIVE
Interval History: Pt denies issues overnight. Denies current abdominal pain - using PCA minimally. Tolerating clears and requests regular diet. No N/V. Has ambulated without difficulty. Not yet passing flatus. Brown just removed and has not yet voided since that time.      Scheduled Meds:   enoxaparin  40 mg Subcutaneous Q24H     Continuous Infusions:   dextrose 5 % and 0.45 % NaCl with KCl 20 mEq 125 mL/hr at 04/12/17 0134    hydromorphone in 0.9 % NaCl 6 mg/30 ml       PRN Meds:alprazolam, diphenhydrAMINE, naloxone, ondansetron, promethazine (PHENERGAN) IVPB, simethicone    Review of patient's allergies indicates:  No Known Allergies    Objective:     Vital Signs (Most Recent):  Temp: 98.9 °F (37.2 °C) (04/12/17 0055)  Pulse: 71 (04/12/17 0055)  Resp: 16 (04/12/17 0055)  BP: 118/60 (04/12/17 0055)  SpO2: 98 % (04/12/17 0055) Vital Signs (24h Range):  Temp:  [96.8 °F (36 °C)-99 °F (37.2 °C)] 98.9 °F (37.2 °C)  Pulse:  [53-78] 71  Resp:  [11-25] 16  SpO2:  [93 %-100 %] 98 %  BP: ()/(45-66) 118/60     Weight: 90.3 kg (199 lb)  Body mass index is 31.17 kg/(m^2).    Intake/Output - Last 3 Shifts       04/10 0700 - 04/11 0659 04/11 0700 - 04/12 0659    I.V. (mL/kg)  4464.6 (49.5)    Total Intake(mL/kg)  4464.6 (49.5)    Urine (mL/kg/hr)  4460 (2.1)    Emesis/NG output  0 (0)    Other  0 (0)    Stool  0 (0)    Blood  500 (0.2)    Total Output   4960    Net   -495.4          Stool Occurrence  0 x             Physical Exam:   Constitutional: She is oriented to person, place, and time. She appears well-developed and well-nourished. No distress.    HENT:   Head: Normocephalic and atraumatic.      Cardiovascular: Normal rate, regular rhythm, normal heart sounds and intact distal pulses.  Exam reveals no gallop, no friction rub, no clubbing and no cyanosis.    No murmur heard. Pulse Score: 2+   Pulmonary/Chest: Effort normal and breath sounds normal. No respiratory distress. She has no wheezes.        Abdominal: Soft.  Bowel sounds are normal. She exhibits abdominal incision. She exhibits no distension. There is no tenderness. There is no rebound and no guarding.   Vertical incision with island dressing place, dry.             Musculoskeletal: She exhibits no edema.       Neurological: She is alert and oriented to person, place, and time.    Skin: No cyanosis. Nails show no clubbing.    Psychiatric: She has a normal mood and affect. Her behavior is normal.       Lines/Drains/Airways     Epidural Line                 Perineural Analgesia/Anesthesia Assessment (using dermatomes) Epidural 09/07/16 0800 216 days          Peripheral Intravenous Line                 Peripheral IV - Single Lumen 02/15/17 0945 Left Hand 55 days         Peripheral IV - Single Lumen 04/11/17 0550 Left Hand 1 day         Peripheral IV - Single Lumen 04/11/17 0718 Left Forearm less than 1 day                Laboratory:  CBC: No results for input(s): WBC, HGB, HCT, PLT in the last 48 hours.    AM CBC pending

## 2017-04-12 NOTE — ASSESSMENT & PLAN NOTE
- s/p 6 cycles neoadjuvant chemo with interval debulking 4/11/17  - transition to oral analgesics today  - advance to regular diet; D/C IVFs  - encourage ambulation, IS use  - not yet ready for discharge at this time

## 2017-04-12 NOTE — PROGRESS NOTES
[facility]  Gynecologic Oncology  Progress Note      Patient Name: Mickie Don  MRN: [id number]  Admission Date: 4/11/2017  Hospital Length of Stay: 1 days  Attending Provider: Benny Tran MD  Primary Care Provider: Elle Ma MD  Principal Problem: Ovarian cancer    Follow-up For: Procedure(s) (LRB):  [id number] (BSO) (Bilateral)  EXPLORATORY-LAPAROTOMY (N/A)  HYSTERECTOMY-ABDOMINAL-TOTAL (DORIAN) (N/A)  RESECTION-MASS (N/A)  Post-Operative Day: 1 Day Post-Op  Subjective:      History of Present Illness:  62 y/o with Stage IIIC ovarian cancer s/p 6 cycles of taxoteral/carboplatin admitted for ex-lap/hysterectomy/BSO additional bulking 4/12/17. She is otherwise healthy with PMHx significant only for anxiety controlled with anxiolytic medications.    Interval History: Doing very well this afternoon. Pain well controlled. Tolerating regular diet. Ambulating without difficulty. Has not yet passed flatus. Voiding without difficulty.    Scheduled Meds:   enoxaparin  40 mg Subcutaneous Q24H    ibuprofen  600 mg Oral Q6H     Continuous Infusions:   PRN Meds:acetaminophen, alprazolam, hydrocodone-acetaminophen 10-325mg, hydrocodone-acetaminophen 5-325mg, HYDROmorphone, ondansetron, promethazine (PHENERGAN) IVPB, simethicone    Review of patient's allergies indicates:  No Known Allergies    Objective:     Vital Signs (Most Recent):  Temp: 100 °F (37.8 °C) (04/12/17 1150)  Pulse: 69 (04/12/17 1601)  Resp: 16 (04/12/17 1601)  BP: 121/64 (04/12/17 1601)  SpO2: 98 % (04/12/17 1601) Vital Signs (24h Range):  Temp:  [98.8 °F (37.1 °C)-100 °F (37.8 °C)] 100 °F (37.8 °C)  Pulse:  [58-79] 69  Resp:  [11-18] 16  SpO2:  [96 %-98 %] 98 %  BP: (118-138)/(60-65) 121/64     Weight: 90.3 kg (199 lb)  Body mass index is 31.17 kg/(m^2).    Intake/Output - Last 3 Shifts       04/10 0700 - 04/11 0659 04/11 0700 - 04/12 0659 04/12 0700 - 04/13 0659    P.O.   240    I.V. (mL/kg)  4464.6 (49.5)      Total Intake(mL/kg)  4464.6 (49.5) 240 (2.7)    Urine (mL/kg/hr)  4460 (2.1)     Emesis/NG output  0 (0)     Other  0 (0)     Stool  0 (0)     Blood  500 (0.2)     Total Output   4960      Net   -495.4 +240           Stool Occurrence  0 x 0 x             Physical Exam:   Constitutional: She is oriented to person, place, and time. She appears well-developed and well-nourished. No distress.    HENT:   Head: Normocephalic and atraumatic.      Cardiovascular: Normal rate, regular rhythm, normal heart sounds and intact distal pulses.  Exam reveals no gallop, no friction rub, no clubbing and no cyanosis.    No murmur heard. Pulse Score: 2+   Pulmonary/Chest: Effort normal and breath sounds normal. No respiratory distress. She has no wheezes.        Abdominal: Soft. Bowel sounds are normal. She exhibits abdominal incision. She exhibits no distension. There is no tenderness. There is no rebound and no guarding.   Vertical incision with island dressing place, dry.             Musculoskeletal: She exhibits no edema.       Neurological: She is alert and oriented to person, place, and time.    Skin: No cyanosis. Nails show no clubbing.    Psychiatric: She has a normal mood and affect. Her behavior is normal.       Lines/Drains/Airways     Epidural Line                 Perineural Analgesia/Anesthesia Assessment (using dermatomes) Epidural 09/07/16 0800 217 days          Peripheral Intravenous Line                 Peripheral IV - Single Lumen 02/15/17 0945 Left Hand 56 days         Peripheral IV - Single Lumen 04/11/17 0550 Left Hand 1 day         Peripheral IV - Single Lumen 04/11/17 0718 Left Forearm 1 day                Laboratory:  CBC:   Recent Labs  Lab 04/12/17  0554   WBC 7.94   HGB 10.9*   HCT 33.2*        Assessment/Plan:     * Ovarian cancer - IIIC  - s/p 6 cycles neoadjuvant chemo with interval debulking 4/11/17  - continue oral analgesic with IV prn breakthrough pain  - continue regular diet  - encourage  ambulation, IS use  - not yet ready for discharge at this time; anticipate D/C tomorrow  - continue prophylactic Lovenox for 21 days total    VTE Risk Mitigation         Ordered     enoxaparin injection 40 mg  Every 24 hours (non-standard times)     Route:  Subcutaneous        04/11/17 0907     Medium Risk of VTE  Once      04/11/17 0907     Place ELEAZAR hose  Until discontinued      04/11/17 0907     Place sequential compression device  Until discontinued      04/11/17 0907          Was elias catheter removed? Yes    Bradford Norris MD  Gynecologic Oncology  [facility]

## 2017-04-12 NOTE — OP NOTE
DATE OF PROCEDURE:  04/11/2017.    SURGEON:  Benny Tran M.D.    RESIDENTS:  Inder Pablo and Bradford Norris M.D. (RES).    PREOPERATIVE DIAGNOSES:  1.  History of stage IIIC ovarian carcinoma, status post prior exploratory   laparotomy, omentectomy and suboptimal resection.  2.  Status post six cycles of chemotherapy.  3.  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    POSTOPERATIVE DIAGNOSES:  1.  History of stage IIIC ovarian carcinoma, status post prior exploratory   laparotomy, omentectomy and suboptimal resection.  2.  Status post six cycles of chemotherapy.  3.  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    PROCEDURES PERFORMED:  Exploratory laparotomy, supracervical hysterectomy,   bilateral salpingo-oophorectomy, radical mass resection greater than 10 cm and   debulking.  This was down to optimal residual disease.    COMPLICATIONS:  None.    ESTIMATED BLOOD LOSS:  500 mL.    ANESTHESIA:  General.    INTRAOPERATIVE FINDINGS:  Included 20 cm cystic ovarian mass on the left as well   as a separate 12 cm right ovarian mass densely adherent to the right pelvic   sidewall.  At the conclusion of the procedure, the patient has had miliary   disease of the pelvis.  The patient had no obvious upper abdominal disease and   no residual omentum.    PROCEDURE IN DETAIL:  After informed consent was obtained, the patient was taken   to the Operating Suite.  General anesthesia was administered, and once this was   felt to be adequate, she was placed in dorsal lithotomy position.  The abdomen   and pelvis were prepped and draped in the usual sterile fashion.  A Brown   catheter was placed to gravity drainage.  A vertical midline incision was   performed through the prior scar.  This was carried up and around the umbilicus.    This was taken down the fascia with monopolar cautery and the fascia was    incised and extended superiorly and inferiorly.  The peritoneum was entered   sharply and this defect was extended.  There was a small amount of residual   ascites, which was evacuated.  The large pelvic mass was immediately encountered   and the abdomen was explored.  The above-stated findings were noted.  The   Bookwalter self-retaining retractor was placed and the bowel was packed out of   the operative field.  In order to facilitate visualization, the cystic portion   of the mass was drained of approximately 2 liters of fluid.  This allowed   identification of the uterus and the left uterine cornua was grasped with a   Kocher clamp.  At the areas of previous disease anteriorly, the bladder was   densely adhered to the fundus of the uterus.  The left round ligament was   identified, transected and the anterior and posterior leaflets of the broad   ligament were opened.  The bladder was then carefully mobilized off of the   fundus of the uterus on this side.  The mass was densely adherent to the   posterior uterus as well as the left pelvic sidewall.  The retroperitoneum was   explored and the pararectal space was developed.  The ureter was identified and   the mass was elevated with the 's hand.  The infundibulopelvic ligament   was then isolated, clamped, cut and doubly ligated.  The residual uteroovarian   ligament was then also isolated and sacrificed.  The mass was handed off the   operative field.  Attention was then turned to the right side where the right   pelvic mass was noted to be densely adherent to the right pelvic sidewall and   the ureter.  The right round ligament was identified, transected and the   anterior and posterior leaflets of the broad ligament were opened.  The dense   adhesions of the bladder to the anterior uterus as well as to the mass were   taken down with monopolar cautery and the pararectal space was carefully   developed.  The mass was in close proximity to the iliac  vessels and further   dissection cephalad was performed so as to define the vasculature as well as the   ureter.  Once the ureter was seen crossing the external iliac artery, the   infundibulopelvic ligament was isolated, clamped, cut and doubly ligated.    Adhesions of the peritoneum and the mass were then taken down with monopolar   cautery.  The 's hand was then used to elevate the mass off of the   posterior uterus and out of the pelvis and this was isolated at the uteroovarian   ligament and the mass was removed.  At this point in time, the bladder was   further mobilized inferiorly.  The uterine vessels were skeletonized of their   peritoneal attachments and curved clamps were placed at the level of the   internal cervical os.  These pedicles were cut and ligated with #1 chromic   suture.  Serial straight clamps were then used to come down the cardinal   ligaments bilaterally, with all pedicles secured with #1 chromic suture.  I   elected to perform supracervical hysterectomy given the miliary disease in the   cul-de-sac.  The uterus was amputated and the cervix was closed with interrupted   figure-of-eight sutures.  Hemostasis was ensured with monopolar cautery.  The   pelvis was irrigated.  Fibrillar was applied and hemostasis was noted.  The   bowel was then unpacked from the upper abdomen and the upper abdomen again   inspected.  Good hemostasis was noted.  At this point in time, the Bookwalter   self-retaining retractor was removed.  Once all counts were correct x2, the   fascia was grasped with Kocher clamps.  The fascia was closed in running   nonlocking fashion using #1 PDS suture.  Subcutaneous tissue was made hemostatic   with electrocautery and the skin was closed with staples.  Pressure dressing   was applied and the patient was awoken and taken to Recovery Room in stable   condition.  Of note, I was present for and performed all key aspects of the   procedure.      ROBIN  dd: 04/12/2017  07:50:35 (SRAVAN)  td: 04/12/2017 10:22:26 (SRAVAN)  Doc ID   #[id number]  Job ID #[id number]    CC:

## 2017-04-12 NOTE — PLAN OF CARE
Rounds with gyn onc resident, patient POD 1 and progressing. Plan to d/c home when post op milestones met.

## 2017-04-12 NOTE — SUBJECTIVE & OBJECTIVE
Interval History: Doing very well this afternoon. Pain well controlled. Tolerating regular diet. Ambulating without difficulty. Has not yet passed flatus. Voiding without difficulty.    Scheduled Meds:   enoxaparin  40 mg Subcutaneous Q24H    ibuprofen  600 mg Oral Q6H     Continuous Infusions:   PRN Meds:acetaminophen, alprazolam, hydrocodone-acetaminophen 10-325mg, hydrocodone-acetaminophen 5-325mg, HYDROmorphone, ondansetron, promethazine (PHENERGAN) IVPB, simethicone    Review of patient's allergies indicates:  No Known Allergies    Objective:     Vital Signs (Most Recent):  Temp: 100 °F (37.8 °C) (04/12/17 1150)  Pulse: 69 (04/12/17 1601)  Resp: 16 (04/12/17 1601)  BP: 121/64 (04/12/17 1601)  SpO2: 98 % (04/12/17 1601) Vital Signs (24h Range):  Temp:  [98.8 °F (37.1 °C)-100 °F (37.8 °C)] 100 °F (37.8 °C)  Pulse:  [58-79] 69  Resp:  [11-18] 16  SpO2:  [96 %-98 %] 98 %  BP: (118-138)/(60-65) 121/64     Weight: 90.3 kg (199 lb)  Body mass index is 31.17 kg/(m^2).    Intake/Output - Last 3 Shifts       04/10 0700 - 04/11 0659 04/11 0700 - 04/12 0659 04/12 0700 - 04/13 0659    P.O.   240    I.V. (mL/kg)  4464.6 (49.5)     Total Intake(mL/kg)  4464.6 (49.5) 240 (2.7)    Urine (mL/kg/hr)  4460 (2.1)     Emesis/NG output  0 (0)     Other  0 (0)     Stool  0 (0)     Blood  500 (0.2)     Total Output   4960      Net   -495.4 +240           Stool Occurrence  0 x 0 x             Physical Exam:   Constitutional: She is oriented to person, place, and time. She appears well-developed and well-nourished. No distress.    HENT:   Head: Normocephalic and atraumatic.      Cardiovascular: Normal rate, regular rhythm, normal heart sounds and intact distal pulses.  Exam reveals no gallop, no friction rub, no clubbing and no cyanosis.    No murmur heard. Pulse Score: 2+   Pulmonary/Chest: Effort normal and breath sounds normal. No respiratory distress. She has no wheezes.        Abdominal: Soft. Bowel sounds are normal. She exhibits  abdominal incision. She exhibits no distension. There is no tenderness. There is no rebound and no guarding.   Vertical incision with island dressing place, dry.             Musculoskeletal: She exhibits no edema.       Neurological: She is alert and oriented to person, place, and time.    Skin: No cyanosis. Nails show no clubbing.    Psychiatric: She has a normal mood and affect. Her behavior is normal.       Lines/Drains/Airways     Epidural Line                 Perineural Analgesia/Anesthesia Assessment (using dermatomes) Epidural 09/07/16 0800 217 days          Peripheral Intravenous Line                 Peripheral IV - Single Lumen 02/15/17 0945 Left Hand 56 days         Peripheral IV - Single Lumen 04/11/17 0550 Left Hand 1 day         Peripheral IV - Single Lumen 04/11/17 0718 Left Forearm 1 day                Laboratory:  CBC:   Recent Labs  Lab 04/12/17  0554   WBC 7.94   HGB 10.9*   HCT 33.2*

## 2017-04-13 VITALS
WEIGHT: 199 LBS | SYSTOLIC BLOOD PRESSURE: 119 MMHG | OXYGEN SATURATION: 98 % | RESPIRATION RATE: 17 BRPM | TEMPERATURE: 98 F | HEART RATE: 74 BPM | HEIGHT: 67 IN | BODY MASS INDEX: 31.23 KG/M2 | DIASTOLIC BLOOD PRESSURE: 62 MMHG

## 2017-04-13 PROCEDURE — 25000003 PHARM REV CODE 250

## 2017-04-13 PROCEDURE — 63600175 PHARM REV CODE 636 W HCPCS

## 2017-04-13 RX ORDER — OXYCODONE AND ACETAMINOPHEN 5; 325 MG/1; MG/1
1 TABLET ORAL EVERY 6 HOURS PRN
Qty: 30 TABLET | Refills: 0 | Status: SHIPPED | OUTPATIENT
Start: 2017-04-13 | End: 2017-05-08

## 2017-04-13 RX ORDER — ENOXAPARIN SODIUM 100 MG/ML
40 INJECTION SUBCUTANEOUS
Qty: 19 SYRINGE | Refills: 0 | Status: SHIPPED | OUTPATIENT
Start: 2017-04-13 | End: 2017-05-08

## 2017-04-13 RX ORDER — IBUPROFEN 600 MG/1
600 TABLET ORAL EVERY 8 HOURS PRN
Qty: 45 TABLET | Refills: 0 | Status: SHIPPED | OUTPATIENT
Start: 2017-04-13 | End: 2017-05-08

## 2017-04-13 RX ADMIN — IBUPROFEN 600 MG: 600 TABLET, FILM COATED ORAL at 12:04

## 2017-04-13 RX ADMIN — ENOXAPARIN SODIUM 40 MG: 100 INJECTION SUBCUTANEOUS at 11:04

## 2017-04-13 RX ADMIN — IBUPROFEN 600 MG: 600 TABLET, FILM COATED ORAL at 05:04

## 2017-04-13 RX ADMIN — IBUPROFEN 600 MG: 600 TABLET, FILM COATED ORAL at 11:04

## 2017-04-13 NOTE — PLAN OF CARE
Problem: Patient Care Overview  Goal: Plan of Care Review  Outcome: Ongoing (interventions implemented as appropriate)  POC reviewed with pt. Who verbalized understanding. AAOx 4. Remains free of falls and injuries. VSS. Tolerating regular diet, denies nausea. Denies pain. Independent activity. ML- telfa. Port. No acute events. No distress noted. WCTM.

## 2017-04-13 NOTE — PLAN OF CARE
Problem: Patient Care Overview  Goal: Plan of Care Review  Outcome: Outcome(s) achieved Date Met:  04/13/17  Discharge instructions reviewed with pt and pt's children, all verbalized understanding.  PIVs removed.  Pt given prescriptions.  Pt walking out with children.

## 2017-04-13 NOTE — PLAN OF CARE
Patient has met post op milestones for d/c.  Plan to d/c home today.  [facility] needs.  Message sent to gyn onc  to call patient with an appointment to have staples removed.  No other needs anticipated.        04/13/17 0813   Final Note   Assessment Type Discharge Planning Assessment   Discharge Disposition Home   What phone number can be called within the next 1-3 days to see how you are doing after discharge? ([phone])   Hospital Follow Up  Appt(s) scheduled? (message sent to )   Offered [facility] -- Bedside Delivery? n/a   Discharge/Hospital Encounter Summary to (non-[facility]) PCP n/a   Referral to Outpatient Case Management complete? n/a   Referral to / orders for Home Health Complete? n/a   30 day supply of medicines given at discharge, if documented non-compliance / non-adherence? n/a   Any social issues identified prior to discharge? No

## 2017-04-13 NOTE — PROGRESS NOTES
[facility]  Gynecologic Oncology  Progress Note      Patient Name: Mickie Don  MRN: [id number]  Admission Date: 4/11/2017  Hospital Length of Stay: 2 days  Attending Provider: Benny Tran MD  Primary Care Provider: Elle Ma MD  Principal Problem: Ovarian cancer    Follow-up For: Procedure(s) (LRB):  [id number] (BSO) (Bilateral)  EXPLORATORY-LAPAROTOMY (N/A)  HYSTERECTOMY-ABDOMINAL-TOTAL (DORIAN) (N/A)  RESECTION-MASS (N/A)  Post-Operative Day: 2 Days Post-Op  Subjective:      History of Present Illness:  62 y/o with Stage IIIC ovarian cancer s/p 6 cycles of taxoteral/carboplatin admitted for ex-lap/hysterectomy/BSO additional bulking 4/12/17. She is otherwise healthy with PMHx significant only for anxiety controlled with anxiolytic medications.    Interval History: POD#2. No acute issues reported overnight and notes rested comfortably. Minimal pain, well controlled with PO analgesics. Denies N/V and tolerating regular diet. Ambulating and voiding without difficulty. Started passing flatus last night, no BM yet.      Scheduled Meds:   enoxaparin  40 mg Subcutaneous Q24H    ibuprofen  600 mg Oral Q6H     Continuous Infusions:   PRN Meds:acetaminophen, alprazolam, hydrocodone-acetaminophen 10-325mg, hydrocodone-acetaminophen 5-325mg, HYDROmorphone, ondansetron, promethazine (PHENERGAN) IVPB, simethicone    Review of patient's allergies indicates:  No Known Allergies    Objective:     Vital Signs (Most Recent):  Temp: 98.8 °F (37.1 °C) (04/13/17 0400)  Pulse: 76 (04/13/17 0400)  Resp: 18 (04/13/17 0400)  BP: (!) 114/57 (04/13/17 0400)  SpO2: 98 % (04/13/17 0400) Vital Signs (24h Range):  Temp:  [98.8 °F (37.1 °C)-100.1 °F (37.8 °C)] 98.8 °F (37.1 °C)  Pulse:  [69-81] 76  Resp:  [16-18] 18  SpO2:  [94 %-98 %] 98 %  BP: (114-132)/(56-65) 114/57     Weight: 90.3 kg (199 lb)  Body mass index is 31.17 kg/(m^2).    Intake/Output - Last 3 Shifts       04/11 0700 - 04/12 0659  04/12 0700 - 04/13 0659    P.O.  720    I.V. (mL/kg) 4464.6 (49.5)     Total Intake(mL/kg) 4464.6 (49.5) 720 (8)    Urine (mL/kg/hr) 4460 (2.1) 300 (0.1)    Emesis/NG output 0 (0)     Other 0 (0)     Stool 0 (0) 0 (0)    Blood 500 (0.2)     Total Output 4960 300    Net -495.4 +420          Urine Occurrence  2 x    Stool Occurrence 0 x 0 x    Emesis Occurrence  0 x             Physical Exam:   Constitutional: She is oriented to person, place, and time. She appears well-developed and well-nourished. No distress.    HENT:   Head: Normocephalic and atraumatic.      Cardiovascular: Normal rate, regular rhythm, normal heart sounds and intact distal pulses.  Exam reveals no gallop, no friction rub, no clubbing and no cyanosis.    No murmur heard. Pulse Score: 2+   Pulmonary/Chest: Effort normal and breath sounds normal. No respiratory distress. She has no wheezes.        Abdominal: Soft. Bowel sounds are normal. She exhibits abdominal incision. She exhibits no distension. There is no tenderness. There is no rebound and no guarding.   Island dressing removed. Incision c/d/i with staples present.             Musculoskeletal: She exhibits no edema.       Neurological: She is alert and oriented to person, place, and time.    Skin: No cyanosis. Nails show no clubbing.    Psychiatric: She has a normal mood and affect. Her behavior is normal.       Lines/Drains/Airways     Epidural Line                 Perineural Analgesia/Anesthesia Assessment (using dermatomes) Epidural 09/07/16 0800 217 days          Peripheral Intravenous Line                 Peripheral IV - Single Lumen 02/15/17 0945 Left Hand 56 days         Peripheral IV - Single Lumen 04/11/17 0550 Left Hand 1 day         Peripheral IV - Single Lumen 04/11/17 0718 Left Forearm 1 day                Laboratory:  CBC:   Recent Labs  Lab 04/12/17  0554   WBC 7.94   HGB 10.9*   HCT 33.2*            Assessment/Plan:     * Ovarian cancer - IIIC  - s/p 6 cycles neoadjuvant  chemo with interval debulking 4/11/17  - continue oral analgesic with IV prn breakthrough pain  - continue regular diet  - encourage ambulation, IS use  - d/c home today as pt is meeting discharge criteria  - continue prophylactic Lovenox for 21 days total    Anxiety  - continue Xanax PRN    VTE Risk Mitigation         Ordered     enoxaparin injection 40 mg  Every 24 hours (non-standard times)     Route:  Subcutaneous        04/11/17 0907     Medium Risk of VTE  Once      04/11/17 0907     Place ELEAZAR hose  Until discontinued      04/11/17 0907     Place sequential compression device  Until discontinued      04/11/17 0907        I have reviewed and concur with the resident's history, physical, assessment, and plan.  I have personally interviewed and examined the patient at bedside.  See below addendum for my evaluation and additional findings.    Was elias catheter removed? Yes    Bradford Norris MD - PGY4  Gynecologic Oncology  [facility]

## 2017-04-13 NOTE — DISCHARGE SUMMARY
[facility]  Gynecologic Oncology  Discharge Summary    Patient Name: Mickie Don  MRN: [id number]  Admission Date: 4/11/2017  Hospital Length of Stay: 2 days  Discharge Date and Time:  04/13/2017 6:22 AM  Attending Physician: Benny Tran MD   Discharging Provider: Bradford Norris MD  Primary Care Provider: Elle Ma MD    HPI:   62 y/o with Stage IIIC ovarian cancer s/p 6 cycles of taxoteral/carboplatin admitted for ex-lap/hysterectomy/BSO additional bulking 4/12/17. She is otherwise healthy with PMHx significant only for anxiety controlled with anxiolytic medications.    Hospital Course:  Taken to OR as planned 4/12/17 where she underwent ex-lap, supracervical hysterectomy/BSO. See operative note for additional details. She was moved from the PACU in stable condition on POD#0 to the inpatient room. Brown removed, diet advanced on POD#1. By POD#2 she was tolerating a regular diet, pain was controlled with PO analgesics alone, she was ambulating and voiding without difficulty, and she was passing flatus. Decision made to discharge home.     Procedure(s) (LRB):  [id number] (BSO) (Bilateral)  EXPLORATORY-LAPAROTOMY (N/A)  HYSTERECTOMY-ABDOMINAL-TOTAL (DORIAN) (N/A)  RESECTION-MASS (N/A)         Significant Diagnostic Studies: Labs:   CBC   Recent Labs  Lab 04/12/17  0554   WBC 7.94   HGB 10.9*   HCT 33.2*          Pending Diagnostic Studies:     None        Final Active Diagnoses:    Diagnosis Date Noted POA    PRINCIPAL PROBLEM:  Ovarian cancer - IIIC [C56.9] 10/25/2016 Yes    Anxiety [F41.9] 04/12/2017 Yes      Problems Resolved During this Admission:    Diagnosis Date Noted Date Resolved POA    S/P DORIAN-BSO (total abdominal hysterectomy and bilateral salpingo-oophorectomy) [Z90.710, Z90.722, Z90.79] 04/11/2017 04/12/2017 Not Applicable    S/P exploratory laparotomy/omentectomy [Z98.890] 11/01/2016 04/12/2017 Not Applicable        Does this patient meet criteria  for extended DVT prophylaxis? Yes, patient was prescribed Lovenox (prophylactic dose) for a total of 21 days.    Discharged Condition: good    Disposition: Home or Self Care    Follow Up:  Follow-up Information     Follow up with Benny Tran MD In 8 days.    Specialties:  Obstetrics, Gynecology, Gynecologic Oncology    Why:  staple removal    Contact information:    [address]  [place] [address]  [phone]          Patient Instructions:     Diet general     Activity as tolerated     Call MD for:  increased confusion or weakness     Call MD for:  persistent dizziness, light-headedness, or visual disturbances     Call MD for:  worsening rash     Call MD for:  severe persistent headache     Call MD for:  difficulty breathing or increased cough     Call MD for:  redness, tenderness, or signs of infection (pain, swelling, redness, odor or green/yellow discharge around incision site)     Call MD for:  severe uncontrolled pain     Call MD for:  persistent nausea and vomiting or diarrhea     Call MD for:  temperature >100.4     No dressing needed       Medications:  Reconciled Home Medications:   Current Discharge Medication List      START taking these medications    Details   enoxaparin (LOVENOX) 40 mg/0.4 mL Syrg Inject 0.4 mLs (40 mg total) into the skin every 24 hours as needed.  Qty: 19 Syringe, Refills: 0      ibuprofen (ADVIL,MOTRIN) 600 MG tablet Take 1 tablet (600 mg total) by mouth every 8 (eight) hours as needed for Pain.  Qty: 45 tablet, Refills: 0         CONTINUE these medications which have CHANGED    Details   oxycodone-acetaminophen (PERCOCET) 5-325 mg per tablet Take 1 tablet by mouth every 6 (six) hours as needed.  Qty: 30 tablet, Refills: 0    Associated Diagnoses: Ovarian cancer, unspecified laterality         CONTINUE these medications which have NOT CHANGED    Details   dexamethasone (DECADRON) 4 MG Tab Take 2 tablets BID day before and after chemotherapy.  Qty: 8 tablet, Refills: 5     Associated Diagnoses: Ovarian cancer, unspecified laterality      alprazolam (XANAX) 0.5 MG tablet Take 1 tablet (0.5 mg total) by mouth 3 (three) times daily as needed for Anxiety.  Qty: 30 tablet, Refills: 1    Associated Diagnoses: Ovarian cancer, unspecified laterality             Bradford Norris MD  Gynecologic Oncology  [facility]

## 2017-04-13 NOTE — ASSESSMENT & PLAN NOTE
- s/p 6 cycles neoadjuvant chemo with interval debulking 4/11/17  - continue oral analgesic with IV prn breakthrough pain  - continue regular diet  - encourage ambulation, IS use  - d/c home today as pt is meeting discharge criteria  - continue prophylactic Lovenox for 21 days total

## 2017-04-13 NOTE — SUBJECTIVE & OBJECTIVE
Interval History: POD#2. No acute issues reported overnight and notes rested comfortably. Minimal pain, well controlled with PO analgesics. Denies N/V and tolerating regular diet. Ambulating and voiding without difficulty. Started passing flatus last night, no BM yet.      Scheduled Meds:   enoxaparin  40 mg Subcutaneous Q24H    ibuprofen  600 mg Oral Q6H     Continuous Infusions:   PRN Meds:acetaminophen, alprazolam, hydrocodone-acetaminophen 10-325mg, hydrocodone-acetaminophen 5-325mg, HYDROmorphone, ondansetron, promethazine (PHENERGAN) IVPB, simethicone    Review of patient's allergies indicates:  No Known Allergies    Objective:     Vital Signs (Most Recent):  Temp: 98.8 °F (37.1 °C) (04/13/17 0400)  Pulse: 76 (04/13/17 0400)  Resp: 18 (04/13/17 0400)  BP: (!) 114/57 (04/13/17 0400)  SpO2: 98 % (04/13/17 0400) Vital Signs (24h Range):  Temp:  [98.8 °F (37.1 °C)-100.1 °F (37.8 °C)] 98.8 °F (37.1 °C)  Pulse:  [69-81] 76  Resp:  [16-18] 18  SpO2:  [94 %-98 %] 98 %  BP: (114-132)/(56-65) 114/57     Weight: 90.3 kg (199 lb)  Body mass index is 31.17 kg/(m^2).    Intake/Output - Last 3 Shifts       04/11 0700 - 04/12 0659 04/12 0700 - 04/13 0659    P.O.  720    I.V. (mL/kg) 4464.6 (49.5)     Total Intake(mL/kg) 4464.6 (49.5) 720 (8)    Urine (mL/kg/hr) 4460 (2.1) 300 (0.1)    Emesis/NG output 0 (0)     Other 0 (0)     Stool 0 (0) 0 (0)    Blood 500 (0.2)     Total Output 4960 300    Net -495.4 +420          Urine Occurrence  2 x    Stool Occurrence 0 x 0 x    Emesis Occurrence  0 x             Physical Exam:   Constitutional: She is oriented to person, place, and time. She appears well-developed and well-nourished. No distress.    HENT:   Head: Normocephalic and atraumatic.      Cardiovascular: Normal rate, regular rhythm, normal heart sounds and intact distal pulses.  Exam reveals no gallop, no friction rub, no clubbing and no cyanosis.    No murmur heard. Pulse Score: 2+   Pulmonary/Chest: Effort normal and  breath sounds normal. No respiratory distress. She has no wheezes.        Abdominal: Soft. Bowel sounds are normal. She exhibits abdominal incision. She exhibits no distension. There is no tenderness. There is no rebound and no guarding.   Island dressing removed. Incision c/d/i with staples present.             Musculoskeletal: She exhibits no edema.       Neurological: She is alert and oriented to person, place, and time.    Skin: No cyanosis. Nails show no clubbing.    Psychiatric: She has a normal mood and affect. Her behavior is normal.       Lines/Drains/Airways     Epidural Line                 Perineural Analgesia/Anesthesia Assessment (using dermatomes) Epidural 09/07/16 0800 217 days          Peripheral Intravenous Line                 Peripheral IV - Single Lumen 02/15/17 0945 Left Hand 56 days         Peripheral IV - Single Lumen 04/11/17 0550 Left Hand 1 day         Peripheral IV - Single Lumen 04/11/17 0718 Left Forearm 1 day                Laboratory:  CBC:   Recent Labs  Lab 04/12/17  0554   WBC 7.94   HGB 10.9*   HCT 33.2*

## 2017-04-17 ENCOUNTER — TELEPHONE (OUTPATIENT)
Dept: GYNECOLOGIC ONCOLOGY | Facility: CLINIC | Age: 62
End: 2017-04-17

## 2017-04-17 NOTE — TELEPHONE ENCOUNTER
----- Message from Audrey Jimenez RN sent at 4/13/2017  8:12 AM CDT -----  Contact: Audrey SHEA [phone]  Good Morning,    Ms Don will d/c today.  She will need a f/u in 8 days to have staples removed.  Can you please call her with the appointment?    Thanks

## 2017-04-21 ENCOUNTER — CLINICAL SUPPORT (OUTPATIENT)
Dept: GYNECOLOGIC ONCOLOGY | Facility: CLINIC | Age: 62
End: 2017-04-21
Attending: OBSTETRICS & GYNECOLOGY
Payer: COMMERCIAL

## 2017-04-21 DIAGNOSIS — Z98.890 POST-OPERATIVE STATE: Primary | ICD-10-CM

## 2017-04-21 NOTE — PROGRESS NOTES
04/21/17 pt in today to have staples removed. Pt tolerated procedure well. No redness or drainage noted. Pt advised to call office with any problems. NICK/MA

## 2017-04-26 ENCOUNTER — PATIENT MESSAGE (OUTPATIENT)
Dept: GYNECOLOGIC ONCOLOGY | Facility: CLINIC | Age: 62
End: 2017-04-26

## 2017-05-01 ENCOUNTER — TELEPHONE (OUTPATIENT)
Dept: GYNECOLOGIC ONCOLOGY | Facility: CLINIC | Age: 62
End: 2017-05-01

## 2017-05-02 ENCOUNTER — TELEPHONE (OUTPATIENT)
Dept: GYNECOLOGIC ONCOLOGY | Facility: CLINIC | Age: 62
End: 2017-05-02

## 2017-05-02 NOTE — TELEPHONE ENCOUNTER
05/02/17 message was sent to Dr. Tran on 4/26/17. Dr. Tran called pt back and left her a message regarding path report. I will speak with Dr. Tran to see if more chemo is needed. Pt verbalized understanding.  Pt will check messages. TA/MA

## 2017-05-02 NOTE — TELEPHONE ENCOUNTER
----- Message from Kristi Liu sent at 5/2/2017 10:35 AM CDT -----  Contact: Self   Pt needs results and also when she's supposed to start chemo. Please call pt back [phone]. Has been reaching out for the past week.

## 2017-05-08 ENCOUNTER — LAB VISIT (OUTPATIENT)
Dept: LAB | Facility: OTHER | Age: 62
End: 2017-05-08
Attending: OBSTETRICS & GYNECOLOGY
Payer: COMMERCIAL

## 2017-05-08 ENCOUNTER — TELEPHONE (OUTPATIENT)
Dept: GYNECOLOGIC ONCOLOGY | Facility: CLINIC | Age: 62
End: 2017-05-08

## 2017-05-08 ENCOUNTER — OFFICE VISIT (OUTPATIENT)
Dept: GYNECOLOGIC ONCOLOGY | Facility: CLINIC | Age: 62
End: 2017-05-08
Attending: OBSTETRICS & GYNECOLOGY
Payer: COMMERCIAL

## 2017-05-08 VITALS
DIASTOLIC BLOOD PRESSURE: 73 MMHG | HEART RATE: 80 BPM | SYSTOLIC BLOOD PRESSURE: 118 MMHG | WEIGHT: 194 LBS | BODY MASS INDEX: 30.38 KG/M2

## 2017-05-08 DIAGNOSIS — C56.9 OVARIAN CANCER, UNSPECIFIED LATERALITY: ICD-10-CM

## 2017-05-08 DIAGNOSIS — C56.9 OVARIAN CANCER, UNSPECIFIED LATERALITY: Primary | ICD-10-CM

## 2017-05-08 DIAGNOSIS — Z51.11 ENCOUNTER FOR ANTINEOPLASTIC CHEMOTHERAPY: ICD-10-CM

## 2017-05-08 LAB
ANION GAP SERPL CALC-SCNC: 3 MMOL/L
BUN SERPL-MCNC: 11 MG/DL
CALCIUM SERPL-MCNC: 9.3 MG/DL
CANCER AG125 SERPL-ACNC: 15 U/ML
CHLORIDE SERPL-SCNC: 106 MMOL/L
CO2 SERPL-SCNC: 33 MMOL/L
CREAT SERPL-MCNC: 0.7 MG/DL
ERYTHROCYTE [DISTWIDTH] IN BLOOD BY AUTOMATED COUNT: 12.7 %
EST. GFR  (AFRICAN AMERICAN): >60 ML/MIN/1.73 M^2
EST. GFR  (NON AFRICAN AMERICAN): >60 ML/MIN/1.73 M^2
GLUCOSE SERPL-MCNC: 98 MG/DL
HCT VFR BLD AUTO: 42.1 %
HGB BLD-MCNC: 13.8 G/DL
MCH RBC QN AUTO: 31.9 PG
MCHC RBC AUTO-ENTMCNC: 32.8 %
MCV RBC AUTO: 98 FL
NEUTROPHILS # BLD AUTO: 2.2 K/UL
PLATELET # BLD AUTO: 158 K/UL
PMV BLD AUTO: 9.2 FL
POTASSIUM SERPL-SCNC: 4.6 MMOL/L
RBC # BLD AUTO: 4.32 M/UL
SODIUM SERPL-SCNC: 142 MMOL/L
WBC # BLD AUTO: 3.3 K/UL

## 2017-05-08 PROCEDURE — 80048 BASIC METABOLIC PNL TOTAL CA: CPT

## 2017-05-08 PROCEDURE — 86304 IMMUNOASSAY TUMOR CA 125: CPT

## 2017-05-08 PROCEDURE — 85027 COMPLETE CBC AUTOMATED: CPT

## 2017-05-08 PROCEDURE — 36415 COLL VENOUS BLD VENIPUNCTURE: CPT

## 2017-05-08 PROCEDURE — 99214 OFFICE O/P EST MOD 30 MIN: CPT | Mod: 24,S$GLB,, | Performed by: OBSTETRICS & GYNECOLOGY

## 2017-05-08 PROCEDURE — 99999 PR PBB SHADOW E&M-EST. PATIENT-LVL II: CPT | Mod: PBBFAC,,, | Performed by: OBSTETRICS & GYNECOLOGY

## 2017-05-08 NOTE — TELEPHONE ENCOUNTER
----- Message from Marium Silver sent at 5/8/2017  1:55 PM CDT -----  Ramona with [facility] would like the nurse to give her a call back about the date of patient's surgery. She can be reached [phone] ext: [phone].

## 2017-05-08 NOTE — TELEPHONE ENCOUNTER
----- Message from Benny Tran MD sent at 5/8/2017  2:59 PM CDT -----  Please elt her know her CA-125 decreased with surgery

## 2017-05-08 NOTE — PROGRESS NOTES
Subjective:      Patient ID: Mickie Don is a 61 y.o. female.    Chief Complaint: Post-op Evaluation  Treatment History  Xlap/Omentectomy on 11/1/16 - mass was unresectable  Stage IIIC ovarian cancer  Initiated Taxotere/Carbo on 11/18/16, now s/p 6 cycles completed 3/8/17  Xlap/ISIS/BSO/Radical mass resection > 10 cm, optimal debulking on 4/11/17    HPI  Here today for post-op check and treatment planning.  She is s/p the above stated procedure and overall has done well.  Final path was a high-grade serous cancer.  Reports normal return of bladder and bowel function.  Multiple questions related to surgery were answered today.  Review of Systems   Constitutional: Negative for activity change, appetite change, chills, fatigue and fever.   HENT: Negative for hearing loss, mouth sores, nosebleeds, sore throat and tinnitus.    Eyes: Negative for visual disturbance.   Respiratory: Negative for cough, chest tightness, shortness of breath and wheezing.    Cardiovascular: Negative for chest pain and leg swelling.   Gastrointestinal: Negative for abdominal distention, abdominal pain, blood in stool, constipation, diarrhea, nausea and vomiting.   Genitourinary: Negative for dysuria, flank pain, frequency, hematuria, pelvic pain, vaginal bleeding, vaginal discharge and vaginal pain.   Musculoskeletal: Negative for arthralgias and back pain.   Skin: Negative for rash.   Neurological: Negative for dizziness, seizures, syncope, weakness and numbness.   Hematological: Does not bruise/bleed easily.   Psychiatric/Behavioral: Negative for confusion and sleep disturbance. The patient is not nervous/anxious.         Objective:   Physical Exam:   Constitutional: She is oriented to person, place, and time. She appears well-developed and well-nourished. No distress.    HENT:   Head: Normocephalic and atraumatic.    Eyes: No scleral icterus.    Neck: Normal range of motion. Neck supple.    Cardiovascular: Normal rate and intact distal  pulses.  Exam reveals no cyanosis and no edema.     Pulmonary/Chest: Effort normal. No respiratory distress. She exhibits no tenderness.        Abdominal: Soft. Normal appearance. She exhibits no distension (midline incision well-healed), no fluid wave, no ascites and no mass. There is no tenderness. There is no rigidity, no rebound and no guarding. No hernia.     Genitourinary: Rectum normal and vagina normal. Pelvic exam was performed with patient supine. There is no rash, tenderness, lesion or injury on the right labia. There is no rash, tenderness, lesion or injury on the left labia. Uterus is absent. Cervix is normal. There is an absent adnexa. Right adnexum displays no mass, no tenderness and no fullness. Left adnexum displays no mass, no tenderness and no fullness. No erythema, bleeding or unspecified prolapse of vaginal walls in the vagina. No vaginal discharge found. Vaginal cuff normal.Labial bartholins normal.Cervix exhibits no motion tenderness, no discharge and no friability.           Musculoskeletal: Normal range of motion and moves all extremeties. She exhibits no edema or tenderness.      Lymphadenopathy:     She has no cervical adenopathy.        Right: No inguinal adenopathy present.        Left: No inguinal adenopathy present.    Neurological: She is alert and oriented to person, place, and time.    Skin: Skin is warm. No rash noted. No cyanosis or erythema.    Psychiatric: She has a normal mood and affect. Thought content normal.       Assessment:     1. Ovarian cancer, unspecified laterality    2. Encounter for antineoplastic chemotherapy        Plan:       Doing well post-op.  Counseled her and her children on her need for continued treatment for 3-4 more cycles.  She has healed well enough to proceed with chemo.  Will draw labs today, additional cycles placed in Epic.  RTC as scheduled.

## 2017-05-09 ENCOUNTER — INFUSION (OUTPATIENT)
Dept: INFUSION THERAPY | Facility: HOSPITAL | Age: 62
End: 2017-05-09
Attending: OBSTETRICS & GYNECOLOGY
Payer: COMMERCIAL

## 2017-05-09 VITALS
TEMPERATURE: 98 F | SYSTOLIC BLOOD PRESSURE: 107 MMHG | RESPIRATION RATE: 16 BRPM | DIASTOLIC BLOOD PRESSURE: 55 MMHG | HEART RATE: 60 BPM

## 2017-05-09 DIAGNOSIS — C56.9 OVARIAN CANCER, UNSPECIFIED LATERALITY: Primary | ICD-10-CM

## 2017-05-09 PROCEDURE — 25000003 PHARM REV CODE 250: Performed by: OBSTETRICS & GYNECOLOGY

## 2017-05-09 PROCEDURE — 96367 TX/PROPH/DG ADDL SEQ IV INF: CPT

## 2017-05-09 PROCEDURE — 96413 CHEMO IV INFUSION 1 HR: CPT

## 2017-05-09 PROCEDURE — 96361 HYDRATE IV INFUSION ADD-ON: CPT

## 2017-05-09 PROCEDURE — 96417 CHEMO IV INFUS EACH ADDL SEQ: CPT

## 2017-05-09 PROCEDURE — 63600175 PHARM REV CODE 636 W HCPCS: Performed by: OBSTETRICS & GYNECOLOGY

## 2017-05-09 PROCEDURE — 96375 TX/PRO/DX INJ NEW DRUG ADDON: CPT

## 2017-05-09 PROCEDURE — 96377 APPLICATON ON-BODY INJECTOR: CPT

## 2017-05-09 RX ORDER — FAMOTIDINE 10 MG/ML
20 INJECTION INTRAVENOUS
Status: COMPLETED | OUTPATIENT
Start: 2017-05-09 | End: 2017-05-09

## 2017-05-09 RX ORDER — SODIUM CHLORIDE 0.9 % (FLUSH) 0.9 %
10 SYRINGE (ML) INJECTION
Status: CANCELLED | OUTPATIENT
Start: 2017-05-09

## 2017-05-09 RX ORDER — FAMOTIDINE 10 MG/ML
20 INJECTION INTRAVENOUS
Status: CANCELLED | OUTPATIENT
Start: 2017-05-09

## 2017-05-09 RX ORDER — DEXAMETHASONE 4 MG/1
TABLET ORAL
Qty: 8 TABLET | Refills: 0 | Status: SHIPPED | OUTPATIENT
Start: 2017-05-09 | End: 2017-07-03 | Stop reason: SDUPTHER

## 2017-05-09 RX ORDER — HEPARIN 100 UNIT/ML
500 SYRINGE INTRAVENOUS
Status: CANCELLED | OUTPATIENT
Start: 2017-05-09

## 2017-05-09 RX ADMIN — CARBOPLATIN 750 MG: 10 INJECTION, SOLUTION INTRAVENOUS at 02:05

## 2017-05-09 RX ADMIN — DEXAMETHASONE SODIUM PHOSPHATE 0.25 MG: 4 INJECTION, SOLUTION INTRAMUSCULAR; INTRAVENOUS at 12:05

## 2017-05-09 RX ADMIN — SODIUM CHLORIDE: 0.9 INJECTION, SOLUTION INTRAVENOUS at 03:05

## 2017-05-09 RX ADMIN — DIPHENHYDRAMINE HYDROCHLORIDE 50 MG: 50 INJECTION, SOLUTION INTRAMUSCULAR; INTRAVENOUS at 12:05

## 2017-05-09 RX ADMIN — PEGFILGRASTIM 6 MG: KIT SUBCUTANEOUS at 03:05

## 2017-05-09 RX ADMIN — FAMOTIDINE 20 MG: 10 INJECTION INTRAVENOUS at 12:05

## 2017-05-09 RX ADMIN — DOCETAXEL ANHYDROUS 145 MG: 10 INJECTION, SOLUTION INTRAVENOUS at 12:05

## 2017-05-09 NOTE — PLAN OF CARE
Problem: Patient Care Overview (Adult)  Goal: Plan of Care Review  Outcome: Ongoing (interventions implemented as appropriate)  Patient tolerated infusions well.  No reaction suspected.  VSS. Placed OBI to back of left arm; green light blinking upon discharge.  No questions or concerns.  AVS given to patient.  Patient ambulated off unit accompanied by her son.

## 2017-05-09 NOTE — PLAN OF CARE
Problem: Chemotherapy Effects (Adult)  Goal: Signs and Symptoms of Listed Potential Problems Will be Absent, Minimized or Managed (Chemotherapy Effects)  Signs and symptoms of listed potential problems will be absent, minimized or managed by discharge/transition of care (reference Chemotherapy Effects (Adult) CPG).   Outcome: Ongoing (interventions implemented as appropriate)  Patient here for carbo/taxotere.  Assessment complete and labs reviewed.  VSS.  No needs expressed at this time.  Will continue to monitor.

## 2017-05-09 NOTE — MR AVS SNAPSHOT
Patient Information     Patient Name Sex     Mickie Don Female 1955      Visit Information        [facility]    2017 11:30 AM [facility], [facility] [phone] Sy [facility]      Patient Instructions     None      Facility-Administered Medications     carboplatin (PARAPLATIN) 750 mg in sodium chloride 0.9% 250 mL chemo infusion    diphenhydramine IVPB 50 mg    docetaxel (TAXOTERE) 75 mg/m2 = 145 mg in sodium chloride 0.9% 250 mL chemo infusion    famotidine (PF) 20 mg/2 mL injection 20 mg    palonosetron 0.25mg/dexamethasone 20mg IVPB    pegfilgrastim (NEULASTA (ON BODY INJECTOR)) injection 6 mg    sodium chloride 0.9% 500 mL infusion      Appointments for Next Year     None         Default Flowsheet Data (last 24 hours)      Amb Complex Vitals Wilfrido        17 1430 17 1314 17 1203          Measurements    BP (!)  107/55 (!)  109/55 (!)  112/55      Temp   97.9 °F (36.6 °C)      Pulse 60 62 83      Resp 16 16 16      Pain Assessment    Pain Score   Zero              Allergies     No Known Allergies      Medications You Received from 2017 1548 to 2017 1548        Date/Time Order Dose Route Action     2017 1409 carboplatin (PARAPLATIN) 750 mg in sodium chloride 0.9% 250 mL chemo infusion 750 mg Intravenous New Bag     2017 1225 diphenhydramine IVPB 50 mg 50 mg Intravenous New Bag     2017 1255 docetaxel (TAXOTERE) 75 mg/m2 = 145 mg in sodium chloride 0.9% 250 mL chemo infusion 145 mg Intravenous New Bag     2017 1255 famotidine (PF) 20 mg/2 mL injection 20 mg 20 mg Intravenous Given     2017 1200 palonosetron 0.25mg/dexamethasone 20mg IVPB 0.25 mg Intravenous New Bag     2017 1532 pegfilgrastim (NEULASTA (ON BODY INJECTOR)) injection 6 mg 6 mg Subcutaneous Given     2017 1513 sodium chloride 0.9% 500 mL infusion   Intravenous New Bag      Current Discharge Medication List     Cannot display  discharge medications since this is not an admission.

## 2017-05-09 NOTE — TELEPHONE ENCOUNTER
----- Message from Riddhi Silver MA sent at 5/9/2017 12:22 PM CDT -----  Contact: self      ----- Message -----     From: Kim Whitehead     Sent: 5/9/2017  12:08 PM       To: [facility] Staff    Pt is calling to discuss a return to work letter with nurse.  Contact number [phone]

## 2017-05-10 ENCOUNTER — PATIENT MESSAGE (OUTPATIENT)
Dept: GYNECOLOGIC ONCOLOGY | Facility: CLINIC | Age: 62
End: 2017-05-10

## 2017-05-15 ENCOUNTER — TELEPHONE (OUTPATIENT)
Dept: GYNECOLOGIC ONCOLOGY | Facility: CLINIC | Age: 62
End: 2017-05-15

## 2017-05-15 NOTE — TELEPHONE ENCOUNTER
----- Message from Roxana Tarango sent at 5/15/2017  1:15 PM CDT -----  Mickie Don can be reached at [phone]       1) needs return to work & would like with no restrictions     2) can she have TB test?     Clinic# [id number]

## 2017-05-16 ENCOUNTER — TELEPHONE (OUTPATIENT)
Dept: GYNECOLOGIC ONCOLOGY | Facility: CLINIC | Age: 62
End: 2017-05-16

## 2017-05-16 NOTE — TELEPHONE ENCOUNTER
----- Message from Tod Rangel sent at 5/16/2017  2:39 PM CDT -----  Contact: PT  Patient would like a call regarding her chest palpitations. State she has been experiencing them for a few day.     Also states she is currently not having one at the moment. Mrs. Don will call patient regarding this matter, per Angella.    Call: [phone]     Thanks

## 2017-05-26 ENCOUNTER — LAB VISIT (OUTPATIENT)
Dept: LAB | Facility: HOSPITAL | Age: 62
End: 2017-05-26
Attending: OBSTETRICS & GYNECOLOGY
Payer: COMMERCIAL

## 2017-05-26 DIAGNOSIS — C56.9 OVARIAN CANCER, UNSPECIFIED LATERALITY: ICD-10-CM

## 2017-05-26 LAB
ANION GAP SERPL CALC-SCNC: 8 MMOL/L
BUN SERPL-MCNC: 13 MG/DL
CALCIUM SERPL-MCNC: 9 MG/DL
CANCER AG125 SERPL-ACNC: 12 U/ML
CHLORIDE SERPL-SCNC: 106 MMOL/L
CO2 SERPL-SCNC: 26 MMOL/L
CREAT SERPL-MCNC: 0.7 MG/DL
ERYTHROCYTE [DISTWIDTH] IN BLOOD BY AUTOMATED COUNT: 13.7 %
EST. GFR  (AFRICAN AMERICAN): >60 ML/MIN/1.73 M^2
EST. GFR  (NON AFRICAN AMERICAN): >60 ML/MIN/1.73 M^2
GLUCOSE SERPL-MCNC: 86 MG/DL
HCT VFR BLD AUTO: 36.9 %
HGB BLD-MCNC: 12.1 G/DL
MCH RBC QN AUTO: 31.3 PG
MCHC RBC AUTO-ENTMCNC: 32.8 %
MCV RBC AUTO: 95 FL
NEUTROPHILS # BLD AUTO: 4.3 K/UL
PLATELET # BLD AUTO: 120 K/UL
PMV BLD AUTO: 9.9 FL
POTASSIUM SERPL-SCNC: 3.9 MMOL/L
RBC # BLD AUTO: 3.87 M/UL
SODIUM SERPL-SCNC: 140 MMOL/L
WBC # BLD AUTO: 6.07 K/UL

## 2017-05-26 PROCEDURE — 86304 IMMUNOASSAY TUMOR CA 125: CPT

## 2017-05-26 PROCEDURE — 80048 BASIC METABOLIC PNL TOTAL CA: CPT

## 2017-05-26 PROCEDURE — 85027 COMPLETE CBC AUTOMATED: CPT

## 2017-05-26 PROCEDURE — 36415 COLL VENOUS BLD VENIPUNCTURE: CPT

## 2017-05-26 RX ORDER — FAMOTIDINE 10 MG/ML
20 INJECTION INTRAVENOUS
Status: CANCELLED | OUTPATIENT
Start: 2017-05-26

## 2017-05-26 RX ORDER — SODIUM CHLORIDE 0.9 % (FLUSH) 0.9 %
10 SYRINGE (ML) INJECTION
Status: CANCELLED | OUTPATIENT
Start: 2017-05-26

## 2017-05-26 RX ORDER — HEPARIN 100 UNIT/ML
500 SYRINGE INTRAVENOUS
Status: CANCELLED | OUTPATIENT
Start: 2017-05-26

## 2017-05-29 ENCOUNTER — OFFICE VISIT (OUTPATIENT)
Dept: GYNECOLOGIC ONCOLOGY | Facility: CLINIC | Age: 62
End: 2017-05-29
Payer: COMMERCIAL

## 2017-05-29 ENCOUNTER — TELEPHONE (OUTPATIENT)
Dept: GYNECOLOGIC ONCOLOGY | Facility: CLINIC | Age: 62
End: 2017-05-29

## 2017-05-29 VITALS
WEIGHT: 190 LBS | HEART RATE: 75 BPM | SYSTOLIC BLOOD PRESSURE: 116 MMHG | BODY MASS INDEX: 29.76 KG/M2 | DIASTOLIC BLOOD PRESSURE: 55 MMHG

## 2017-05-29 DIAGNOSIS — Z51.11 ENCOUNTER FOR ANTINEOPLASTIC CHEMOTHERAPY: ICD-10-CM

## 2017-05-29 DIAGNOSIS — C56.9 OVARIAN CANCER, UNSPECIFIED LATERALITY: Primary | ICD-10-CM

## 2017-05-29 PROCEDURE — 99999 PR PBB SHADOW E&M-EST. PATIENT-LVL II: CPT | Mod: PBBFAC,,, | Performed by: OBSTETRICS & GYNECOLOGY

## 2017-05-29 PROCEDURE — 99214 OFFICE O/P EST MOD 30 MIN: CPT | Mod: 24,S$GLB,, | Performed by: OBSTETRICS & GYNECOLOGY

## 2017-05-29 RX ORDER — FAMOTIDINE 10 MG/ML
20 INJECTION INTRAVENOUS
Status: CANCELLED | OUTPATIENT
Start: 2017-05-31

## 2017-05-29 RX ORDER — HEPARIN 100 UNIT/ML
500 SYRINGE INTRAVENOUS
Status: CANCELLED | OUTPATIENT
Start: 2017-05-31

## 2017-05-29 RX ORDER — SODIUM CHLORIDE 0.9 % (FLUSH) 0.9 %
10 SYRINGE (ML) INJECTION
Status: CANCELLED | OUTPATIENT
Start: 2017-05-31

## 2017-05-29 NOTE — PROGRESS NOTES
Subjective:      Patient ID: Mickie Don is a 61 y.o. female.    Chief Complaint: Follow-up (cycle#2)  Treatment History  Xlap/Omentectomy on 11/1/16 - mass was unresectable  Stage IIIC ovarian cancer  Initiated Taxotere/Carbo on 11/18/16, now s/p 6 cycles completed 3/8/17  Xlap/ISIS/BSO/Radical mass resection > 10 cm, optimal debulking on 4/11/17    HPI  Here today for C2 of treatment following surgery.  Labs reviewed and appropriate for treatment.  CA-125 is down to 12.  Overall she is tolerating treatment.  Denies F/C, N/V, VB, neuropathy.  Review of Systems   Constitutional: Negative for activity change, appetite change, chills, fatigue and fever.   HENT: Negative for hearing loss, mouth sores, nosebleeds, sore throat and tinnitus.    Eyes: Negative for visual disturbance.   Respiratory: Negative for cough, chest tightness, shortness of breath and wheezing.    Cardiovascular: Negative for chest pain and leg swelling.   Gastrointestinal: Negative for abdominal distention, abdominal pain, blood in stool, constipation, diarrhea, nausea and vomiting.   Genitourinary: Negative for dysuria, flank pain, frequency, hematuria, pelvic pain, vaginal bleeding, vaginal discharge and vaginal pain.   Musculoskeletal: Negative for arthralgias and back pain.   Skin: Negative for rash.   Neurological: Negative for dizziness, seizures, syncope, weakness and numbness.   Hematological: Does not bruise/bleed easily.   Psychiatric/Behavioral: Negative for confusion and sleep disturbance. The patient is not nervous/anxious.         Objective:   Physical Exam:   Constitutional: She is oriented to person, place, and time. She appears well-developed and well-nourished. No distress.    HENT:   Head: Normocephalic and atraumatic.    Eyes: No scleral icterus.    Neck: Normal range of motion. Neck supple.    Cardiovascular: Normal rate and intact distal pulses.  Exam reveals no cyanosis and no edema.     Pulmonary/Chest: Effort normal. No  respiratory distress. She exhibits no tenderness.        Abdominal: Soft. Normal appearance. She exhibits no distension (midline incision well-healed), no fluid wave, no ascites and no mass. There is no tenderness. There is no rigidity, no rebound and no guarding. No hernia.             Musculoskeletal: Normal range of motion and moves all extremeties. She exhibits no edema or tenderness.      Lymphadenopathy:     She has no cervical adenopathy.    Neurological: She is alert and oriented to person, place, and time.    Skin: Skin is warm. No rash noted. No cyanosis or erythema.    Psychiatric: She has a normal mood and affect. Thought content normal.       Assessment:     1. Ovarian cancer, unspecified laterality    2. Encounter for antineoplastic chemotherapy        Plan:       Labs appropriate for chemo.  Proceed with treatment.  Orders signed.  RTC as scheduled

## 2017-05-29 NOTE — TELEPHONE ENCOUNTER
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

## 2017-05-31 ENCOUNTER — INFUSION (OUTPATIENT)
Dept: INFUSION THERAPY | Facility: HOSPITAL | Age: 62
End: 2017-05-31
Attending: OBSTETRICS & GYNECOLOGY
Payer: COMMERCIAL

## 2017-05-31 VITALS
HEIGHT: 67 IN | RESPIRATION RATE: 16 BRPM | TEMPERATURE: 98 F | HEART RATE: 65 BPM | SYSTOLIC BLOOD PRESSURE: 132 MMHG | WEIGHT: 190 LBS | DIASTOLIC BLOOD PRESSURE: 61 MMHG | BODY MASS INDEX: 29.82 KG/M2

## 2017-05-31 DIAGNOSIS — C56.9 OVARIAN CANCER, UNSPECIFIED LATERALITY: Primary | ICD-10-CM

## 2017-05-31 PROCEDURE — 96375 TX/PRO/DX INJ NEW DRUG ADDON: CPT

## 2017-05-31 PROCEDURE — 96417 CHEMO IV INFUS EACH ADDL SEQ: CPT

## 2017-05-31 PROCEDURE — 25000003 PHARM REV CODE 250: Performed by: OBSTETRICS & GYNECOLOGY

## 2017-05-31 PROCEDURE — 96413 CHEMO IV INFUSION 1 HR: CPT

## 2017-05-31 PROCEDURE — 96367 TX/PROPH/DG ADDL SEQ IV INF: CPT

## 2017-05-31 PROCEDURE — 63600175 PHARM REV CODE 636 W HCPCS: Performed by: OBSTETRICS & GYNECOLOGY

## 2017-05-31 PROCEDURE — 96377 APPLICATON ON-BODY INJECTOR: CPT

## 2017-05-31 PROCEDURE — 96361 HYDRATE IV INFUSION ADD-ON: CPT

## 2017-05-31 RX ORDER — SODIUM CHLORIDE 0.9 % (FLUSH) 0.9 %
10 SYRINGE (ML) INJECTION
Status: DISCONTINUED | OUTPATIENT
Start: 2017-05-31 | End: 2017-05-31 | Stop reason: HOSPADM

## 2017-05-31 RX ORDER — HEPARIN 100 UNIT/ML
500 SYRINGE INTRAVENOUS
Status: DISCONTINUED | OUTPATIENT
Start: 2017-05-31 | End: 2017-05-31 | Stop reason: HOSPADM

## 2017-05-31 RX ORDER — FAMOTIDINE 10 MG/ML
20 INJECTION INTRAVENOUS
Status: COMPLETED | OUTPATIENT
Start: 2017-05-31 | End: 2017-05-31

## 2017-05-31 RX ADMIN — PALONOSETRON HYDROCHLORIDE: 0.25 INJECTION INTRAVENOUS at 10:05

## 2017-05-31 RX ADMIN — SODIUM CHLORIDE: 0.9 INJECTION, SOLUTION INTRAVENOUS at 01:05

## 2017-05-31 RX ADMIN — DOCETAXEL 145 MG: 10 INJECTION, SOLUTION INTRAVENOUS at 11:05

## 2017-05-31 RX ADMIN — CARBOPLATIN 750 MG: 10 INJECTION, SOLUTION INTRAVENOUS at 12:05

## 2017-05-31 RX ADMIN — FAMOTIDINE 20 MG: 10 INJECTION INTRAVENOUS at 10:05

## 2017-05-31 RX ADMIN — DIPHENHYDRAMINE HYDROCHLORIDE 50 MG: 50 INJECTION, SOLUTION INTRAMUSCULAR; INTRAVENOUS at 10:05

## 2017-05-31 RX ADMIN — PEGFILGRASTIM 6 MG: KIT SUBCUTANEOUS at 03:05

## 2017-05-31 NOTE — PLAN OF CARE
Problem: Patient Care Overview (Adult)  Goal: Discharge Needs Assessment  Outcome: Ongoing (interventions implemented as appropriate)  1501-Patient tolerated treatment well. Discharged without complaints or S/S of adverse event. AVS placed in mail.  Instructed to call provider for any questions or concerns.

## 2017-05-31 NOTE — PLAN OF CARE
Problem: Patient Care Overview (Adult)  Goal: Individualization & Mutuality  PIV  Left arm only     Outcome: Ongoing (interventions implemented as appropriate)  Labs , hx, and medications reviewed. Assessment completed. Discussed plan of care with patient. Patient in agreement. Chair reclined and warm blanket and snack offered.

## 2017-06-19 ENCOUNTER — LAB VISIT (OUTPATIENT)
Dept: LAB | Facility: HOSPITAL | Age: 62
End: 2017-06-19
Attending: OBSTETRICS & GYNECOLOGY
Payer: COMMERCIAL

## 2017-06-19 ENCOUNTER — OFFICE VISIT (OUTPATIENT)
Dept: GYNECOLOGIC ONCOLOGY | Facility: CLINIC | Age: 62
End: 2017-06-19
Payer: COMMERCIAL

## 2017-06-19 VITALS
HEIGHT: 67 IN | BODY MASS INDEX: 31.31 KG/M2 | WEIGHT: 199.5 LBS | DIASTOLIC BLOOD PRESSURE: 67 MMHG | SYSTOLIC BLOOD PRESSURE: 146 MMHG | HEART RATE: 77 BPM

## 2017-06-19 DIAGNOSIS — C56.9 OVARIAN CANCER, UNSPECIFIED LATERALITY: ICD-10-CM

## 2017-06-19 DIAGNOSIS — Z51.11 ENCOUNTER FOR ANTINEOPLASTIC CHEMOTHERAPY: ICD-10-CM

## 2017-06-19 DIAGNOSIS — C56.9 OVARIAN CANCER, UNSPECIFIED LATERALITY: Primary | ICD-10-CM

## 2017-06-19 LAB
ANION GAP SERPL CALC-SCNC: 7 MMOL/L
BUN SERPL-MCNC: 12 MG/DL
CALCIUM SERPL-MCNC: 8.8 MG/DL
CANCER AG125 SERPL-ACNC: 11 U/ML
CHLORIDE SERPL-SCNC: 107 MMOL/L
CO2 SERPL-SCNC: 26 MMOL/L
CREAT SERPL-MCNC: 0.7 MG/DL
ERYTHROCYTE [DISTWIDTH] IN BLOOD BY AUTOMATED COUNT: 15.5 %
EST. GFR  (AFRICAN AMERICAN): >60 ML/MIN/1.73 M^2
EST. GFR  (NON AFRICAN AMERICAN): >60 ML/MIN/1.73 M^2
GLUCOSE SERPL-MCNC: 99 MG/DL
HCT VFR BLD AUTO: 40.1 %
HGB BLD-MCNC: 12.9 G/DL
MCH RBC QN AUTO: 30.7 PG
MCHC RBC AUTO-ENTMCNC: 32.2 %
MCV RBC AUTO: 96 FL
NEUTROPHILS # BLD AUTO: 2.2 K/UL
PLATELET # BLD AUTO: 79 K/UL
PMV BLD AUTO: 10 FL
POTASSIUM SERPL-SCNC: 5 MMOL/L
RBC # BLD AUTO: 4.2 M/UL
SODIUM SERPL-SCNC: 140 MMOL/L
WBC # BLD AUTO: 3.68 K/UL

## 2017-06-19 PROCEDURE — 99214 OFFICE O/P EST MOD 30 MIN: CPT | Mod: S$GLB,,, | Performed by: OBSTETRICS & GYNECOLOGY

## 2017-06-19 PROCEDURE — 85027 COMPLETE CBC AUTOMATED: CPT

## 2017-06-19 PROCEDURE — 99999 PR PBB SHADOW E&M-EST. PATIENT-LVL III: CPT | Mod: PBBFAC,,, | Performed by: OBSTETRICS & GYNECOLOGY

## 2017-06-19 PROCEDURE — 36415 COLL VENOUS BLD VENIPUNCTURE: CPT

## 2017-06-19 PROCEDURE — 80048 BASIC METABOLIC PNL TOTAL CA: CPT

## 2017-06-19 PROCEDURE — 86304 IMMUNOASSAY TUMOR CA 125: CPT

## 2017-06-19 NOTE — PROGRESS NOTES
Subjective:      Patient ID: Mickie Don is a 61 y.o. female.    Chief Complaint: Chemotherapy (C3/Carbo/Taxotere)  Treatment History  Xlap/Omentectomy on 11/1/16 - mass was unresectable  Stage IIIC ovarian cancer  Initiated Taxotere/Carbo on 11/18/16, now s/p 6 cycles completed 3/8/17  Xlap/ISIS/BSO/Radical mass resection > 10 cm, optimal debulking on 4/11/17    HPI  Here today for C3 of treatment following surgery.  Labs reviewed and appropriate for treatment, except for platelet count of 79.  CA-125 is down to 11.  Overall she is tolerating treatment.  Denies F/C, N/V, VB, neuropathy.  Review of Systems   Constitutional: Negative for activity change, appetite change, chills, fatigue and fever.   HENT: Negative for hearing loss, mouth sores, nosebleeds, sore throat and tinnitus.    Eyes: Negative for visual disturbance.   Respiratory: Negative for cough, chest tightness, shortness of breath and wheezing.    Cardiovascular: Negative for chest pain and leg swelling.   Gastrointestinal: Negative for abdominal distention, abdominal pain, blood in stool, constipation, diarrhea, nausea and vomiting.   Genitourinary: Negative for dysuria, flank pain, frequency, hematuria, pelvic pain, vaginal bleeding, vaginal discharge and vaginal pain.   Musculoskeletal: Negative for arthralgias and back pain.   Skin: Negative for rash.   Neurological: Negative for dizziness, seizures, syncope, weakness and numbness.   Hematological: Does not bruise/bleed easily.   Psychiatric/Behavioral: Negative for confusion and sleep disturbance. The patient is not nervous/anxious.         Objective:   Physical Exam:   Constitutional: She is oriented to person, place, and time. She appears well-developed and well-nourished. No distress.    HENT:   Head: Normocephalic and atraumatic.    Eyes: No scleral icterus.    Neck: Normal range of motion. Neck supple.    Cardiovascular: Normal rate and intact distal pulses.  Exam reveals no cyanosis and no  edema.     Pulmonary/Chest: Effort normal. No respiratory distress. She exhibits no tenderness.        Abdominal: Soft. Normal appearance. She exhibits no distension (midline incision well-healed), no fluid wave, no ascites and no mass. There is no tenderness. There is no rigidity, no rebound and no guarding. No hernia.             Musculoskeletal: Normal range of motion and moves all extremeties. She exhibits no edema or tenderness.      Lymphadenopathy:     She has no cervical adenopathy.    Neurological: She is alert and oriented to person, place, and time.    Skin: Skin is warm. No rash noted. No cyanosis or erythema.    Psychiatric: She has a normal mood and affect. Thought content normal.       Assessment:     1. Ovarian cancer, unspecified laterality    2. Encounter for antineoplastic chemotherapy        Plan:       Will repeat labs Wednesday morning to ensure plt count stable or increasing.  If not, will delay 1 week.

## 2017-06-21 ENCOUNTER — LAB VISIT (OUTPATIENT)
Dept: LAB | Facility: HOSPITAL | Age: 62
End: 2017-06-21
Attending: OBSTETRICS & GYNECOLOGY
Payer: COMMERCIAL

## 2017-06-21 ENCOUNTER — INFUSION (OUTPATIENT)
Dept: INFUSION THERAPY | Facility: HOSPITAL | Age: 62
End: 2017-06-21
Attending: OBSTETRICS & GYNECOLOGY
Payer: COMMERCIAL

## 2017-06-21 VITALS
TEMPERATURE: 98 F | SYSTOLIC BLOOD PRESSURE: 120 MMHG | RESPIRATION RATE: 18 BRPM | HEART RATE: 60 BPM | DIASTOLIC BLOOD PRESSURE: 66 MMHG

## 2017-06-21 DIAGNOSIS — C56.9 OVARIAN CANCER, UNSPECIFIED LATERALITY: ICD-10-CM

## 2017-06-21 DIAGNOSIS — C56.9 OVARIAN CANCER, UNSPECIFIED LATERALITY: Primary | ICD-10-CM

## 2017-06-21 LAB
ERYTHROCYTE [DISTWIDTH] IN BLOOD BY AUTOMATED COUNT: 15.8 %
HCT VFR BLD AUTO: 42.7 %
HGB BLD-MCNC: 13.7 G/DL
MCH RBC QN AUTO: 30.4 PG
MCHC RBC AUTO-ENTMCNC: 32.1 %
MCV RBC AUTO: 95 FL
NEUTROPHILS # BLD AUTO: 5.9 K/UL
PLATELET # BLD AUTO: 93 K/UL
PMV BLD AUTO: 10.8 FL
RBC # BLD AUTO: 4.51 M/UL
WBC # BLD AUTO: 6.75 K/UL

## 2017-06-21 PROCEDURE — 96367 TX/PROPH/DG ADDL SEQ IV INF: CPT

## 2017-06-21 PROCEDURE — 96415 CHEMO IV INFUSION ADDL HR: CPT

## 2017-06-21 PROCEDURE — 96377 APPLICATON ON-BODY INJECTOR: CPT

## 2017-06-21 PROCEDURE — 63600175 PHARM REV CODE 636 W HCPCS: Performed by: OBSTETRICS & GYNECOLOGY

## 2017-06-21 PROCEDURE — 25000003 PHARM REV CODE 250: Performed by: OBSTETRICS & GYNECOLOGY

## 2017-06-21 PROCEDURE — 96375 TX/PRO/DX INJ NEW DRUG ADDON: CPT

## 2017-06-21 PROCEDURE — 96413 CHEMO IV INFUSION 1 HR: CPT

## 2017-06-21 PROCEDURE — 36415 COLL VENOUS BLD VENIPUNCTURE: CPT

## 2017-06-21 PROCEDURE — 85027 COMPLETE CBC AUTOMATED: CPT

## 2017-06-21 RX ORDER — SODIUM CHLORIDE 0.9 % (FLUSH) 0.9 %
10 SYRINGE (ML) INJECTION
Status: DISCONTINUED | OUTPATIENT
Start: 2017-06-21 | End: 2017-06-21 | Stop reason: HOSPADM

## 2017-06-21 RX ORDER — FAMOTIDINE 10 MG/ML
20 INJECTION INTRAVENOUS
Status: COMPLETED | OUTPATIENT
Start: 2017-06-21 | End: 2017-06-21

## 2017-06-21 RX ORDER — SODIUM CHLORIDE 0.9 % (FLUSH) 0.9 %
10 SYRINGE (ML) INJECTION
Status: CANCELLED | OUTPATIENT
Start: 2017-06-21

## 2017-06-21 RX ORDER — HEPARIN 100 UNIT/ML
500 SYRINGE INTRAVENOUS
Status: CANCELLED | OUTPATIENT
Start: 2017-06-21

## 2017-06-21 RX ORDER — FAMOTIDINE 10 MG/ML
20 INJECTION INTRAVENOUS
Status: CANCELLED | OUTPATIENT
Start: 2017-06-21

## 2017-06-21 RX ADMIN — FAMOTIDINE 20 MG: 10 INJECTION INTRAVENOUS at 11:06

## 2017-06-21 RX ADMIN — DOCETAXEL 145 MG: 10 INJECTION, SOLUTION INTRAVENOUS at 12:06

## 2017-06-21 RX ADMIN — DEXAMETHASONE SODIUM PHOSPHATE: 4 INJECTION, SOLUTION INTRAMUSCULAR; INTRAVENOUS at 11:06

## 2017-06-21 RX ADMIN — CARBOPLATIN 750 MG: 10 INJECTION, SOLUTION INTRAVENOUS at 01:06

## 2017-06-21 RX ADMIN — PEGFILGRASTIM 6 MG: KIT SUBCUTANEOUS at 03:06

## 2017-06-21 RX ADMIN — DIPHENHYDRAMINE HYDROCHLORIDE 50 MG: 50 INJECTION, SOLUTION INTRAMUSCULAR; INTRAVENOUS at 11:06

## 2017-06-21 NOTE — PLAN OF CARE
Problem: Patient Care Overview  Goal: Plan of Care Review  Outcome: Ongoing (interventions implemented as appropriate)  Pt tolerated tx well, avs given pt needs to call for appt. No distress noted upon d/c to home with son

## 2017-06-21 NOTE — PLAN OF CARE
Problem: Chemotherapy Effects (Adult)  Goal: Signs and Symptoms of Listed Potential Problems Will be Absent, Minimized or Managed (Chemotherapy Effects)  Signs and symptoms of listed potential problems will be absent, minimized or managed by discharge/transition of care (reference Chemotherapy Effects (Adult) CPG).   Outcome: Ongoing (interventions implemented as appropriate)  Pt here for taxotere, carbo infusion, pt without complaints or concerns at this time

## 2017-06-30 ENCOUNTER — TELEPHONE (OUTPATIENT)
Dept: DERMATOLOGY | Facility: CLINIC | Age: 62
End: 2017-06-30

## 2017-06-30 ENCOUNTER — TELEPHONE (OUTPATIENT)
Dept: GYNECOLOGIC ONCOLOGY | Facility: CLINIC | Age: 62
End: 2017-06-30

## 2017-06-30 NOTE — TELEPHONE ENCOUNTER
----- Message from Yoko Guthrie sent at 6/30/2017  9:28 AM CDT -----  Contact: pt   Sneha -pt- pt is calling to speak with the nurse pt is a new pt to derm Pt is an oncology pt pt has a spot on the back of her leg that she is concerned about and would like to be seen next week or the following week Can you please call pt at [phone] jc

## 2017-06-30 NOTE — TELEPHONE ENCOUNTER
----- Message from Roxana Tarango sent at 6/30/2017  9:45 AM CDT -----  Mickie Don is calling to find out about next lab & chemo/pt can be reached at [phone]       [facility]# [id number]

## 2017-06-30 NOTE — TELEPHONE ENCOUNTER
[facility] her an appointment with Dr. Leung on Monday, July 10th at 9:30am, she was very pleased with this.

## 2017-07-03 DIAGNOSIS — C56.9 OVARIAN CANCER, UNSPECIFIED LATERALITY: ICD-10-CM

## 2017-07-03 RX ORDER — DEXAMETHASONE 4 MG/1
TABLET ORAL
Qty: 8 TABLET | Refills: 0 | Status: SHIPPED | OUTPATIENT
Start: 2017-07-03 | End: 2017-07-12

## 2017-07-10 ENCOUNTER — TELEPHONE (OUTPATIENT)
Dept: GYNECOLOGIC ONCOLOGY | Facility: CLINIC | Age: 62
End: 2017-07-10

## 2017-07-10 ENCOUNTER — LAB VISIT (OUTPATIENT)
Dept: LAB | Facility: HOSPITAL | Age: 62
End: 2017-07-10
Attending: OBSTETRICS & GYNECOLOGY
Payer: COMMERCIAL

## 2017-07-10 DIAGNOSIS — C56.9 OVARIAN CANCER, UNSPECIFIED LATERALITY: ICD-10-CM

## 2017-07-10 LAB
ANION GAP SERPL CALC-SCNC: 7 MMOL/L
BUN SERPL-MCNC: 9 MG/DL
CALCIUM SERPL-MCNC: 8.8 MG/DL
CANCER AG125 SERPL-ACNC: 9 U/ML
CHLORIDE SERPL-SCNC: 106 MMOL/L
CO2 SERPL-SCNC: 27 MMOL/L
CREAT SERPL-MCNC: 0.7 MG/DL
ERYTHROCYTE [DISTWIDTH] IN BLOOD BY AUTOMATED COUNT: 17.1 %
EST. GFR  (AFRICAN AMERICAN): >60 ML/MIN/1.73 M^2
EST. GFR  (NON AFRICAN AMERICAN): >60 ML/MIN/1.73 M^2
GLUCOSE SERPL-MCNC: 90 MG/DL
HCT VFR BLD AUTO: 36.6 %
HGB BLD-MCNC: 12.3 G/DL
MCH RBC QN AUTO: 31.5 PG
MCHC RBC AUTO-ENTMCNC: 33.6 %
MCV RBC AUTO: 94 FL
NEUTROPHILS # BLD AUTO: 1.5 K/UL
PLATELET # BLD AUTO: 64 K/UL
PMV BLD AUTO: 10.2 FL
POTASSIUM SERPL-SCNC: 4.3 MMOL/L
RBC # BLD AUTO: 3.9 M/UL
SODIUM SERPL-SCNC: 140 MMOL/L
WBC # BLD AUTO: 2.74 K/UL

## 2017-07-10 PROCEDURE — 80048 BASIC METABOLIC PNL TOTAL CA: CPT

## 2017-07-10 PROCEDURE — 85027 COMPLETE CBC AUTOMATED: CPT

## 2017-07-10 PROCEDURE — 36415 COLL VENOUS BLD VENIPUNCTURE: CPT

## 2017-07-10 PROCEDURE — 86304 IMMUNOASSAY TUMOR CA 125: CPT

## 2017-07-10 NOTE — TELEPHONE ENCOUNTER
07/10/17 advised pt per Dr. Tran, counts are too low for chemo. Will delay one week and repeat labs. If labs are ok will proceed with chemo on 7/19/17. Pt verbalized understanding and given precautions. Pt denies any SOB, dizziness or bleeding. TA/MA

## 2017-07-10 NOTE — TELEPHONE ENCOUNTER
----- Message from Benny Tran MD sent at 7/10/2017  3:35 PM CDT -----  We will need to delay her.  Plt are in 60's

## 2017-07-12 ENCOUNTER — OFFICE VISIT (OUTPATIENT)
Dept: DERMATOLOGY | Facility: CLINIC | Age: 62
End: 2017-07-12
Payer: COMMERCIAL

## 2017-07-12 DIAGNOSIS — D48.5 NEOPLASM OF UNCERTAIN BEHAVIOR OF SKIN: Primary | ICD-10-CM

## 2017-07-12 PROCEDURE — 99202 OFFICE O/P NEW SF 15 MIN: CPT | Mod: S$GLB,,, | Performed by: DERMATOLOGY

## 2017-07-12 PROCEDURE — 99999 PR PBB SHADOW E&M-EST. PATIENT-LVL II: CPT | Mod: PBBFAC,,, | Performed by: DERMATOLOGY

## 2017-07-12 NOTE — PROGRESS NOTES
Subjective:       Patient ID:  Mickie Don is a 61 y.o. female who presents for   Chief Complaint   Patient presents with    Spot     L lower posterior leg, x 6 months, change in size, no tx     Spot  - Initial  Affected locations: left lower leg  Duration: 6 months  Signs and Symptoms: change in size.  Relieving factors/Treatments tried: nothing    Of note, pt is currently receiving chemotherapy for ovarian cancer.     Past Medical History:   Diagnosis Date    Open angle with borderline findings and low glaucoma risk in both eyes 6/20/2013     Review of Systems   Constitutional: Positive for malaise. Negative for fever, chills and fatigue.   Skin: Positive for activity-related sunscreen use. Negative for daily sunscreen use and recent sunburn.   Hematologic/Lymphatic: Bruises/bleeds easily.        Objective:    Physical Exam   Constitutional: She appears well-developed and well-nourished. No distress.   Neurological: She is alert and oriented to person, place, and time. She is not disoriented.   Psychiatric: She has a normal mood and affect.   Skin:   Areas Examined (abnormalities noted in diagram):   Head / Face Inspection Performed  Neck Inspection Performed  Chest / Axilla Inspection Performed  Back Inspection Performed  RUE Inspected  LUE Inspection Performed              Diagram Legend     Erythematous scaling macule/papule c/w actinic keratosis       Vascular papule c/w angioma      Pigmented verrucoid papule/plaque c/w seborrheic keratosis      Yellow umbilicated papule c/w sebaceous hyperplasia      Irregularly shaped tan macule c/w lentigo     1-2 mm smooth white papules consistent with Milia      Movable subcutaneous cyst with punctum c/w epidermal inclusion cyst      Subcutaneous movable cyst c/w pilar cyst      Firm pink to brown papule c/w dermatofibroma      Pedunculated fleshy papule(s) c/w skin tag(s)      Evenly pigmented macule c/w junctional nevus     Mildly variegated pigmented, slightly  irregular-bordered macule c/w mildly atypical nevus      Flesh colored to evenly pigmented papule c/w intradermal nevus       Pink pearly papule/plaque c/w basal cell carcinoma      Erythematous hyperkeratotic cursted plaque c/w SCC      Surgical scar with no sign of skin cancer recurrence      Open and closed comedones      Inflammatory papules and pustules      Verrucoid papule consistent consistent with wart     Erythematous eczematous patches and plaques     Dystrophic onycholytic nail with subungual debris c/w onychomycosis     Umbilicated papule    Erythematous-base heme-crusted tan verrucoid plaque consistent with inflamed seborrheic keratosis     Erythematous Silvery Scaling Plaque c/w Psoriasis     See annotation          Assessment / Plan:        Neoplasm of uncertain behavior of skin:     - DDX: dermatofibroma vs thrombosed varicose vein vs cutaneous mets given h/o ovarian cancer.   - On review of labs, plt were found to be low 64 on 7/10.   - Labs are scheduled to be repeated on Monday, will check plt given the possible vascular nature of the lesion, and plan a punch biopsy on 7/17/17 at 350 pm.          Return for 7/17/17 at 350 pm for punch biopsy.

## 2017-07-17 ENCOUNTER — OFFICE VISIT (OUTPATIENT)
Dept: DERMATOLOGY | Facility: CLINIC | Age: 62
End: 2017-07-17
Payer: COMMERCIAL

## 2017-07-17 ENCOUNTER — LAB VISIT (OUTPATIENT)
Dept: LAB | Facility: HOSPITAL | Age: 62
End: 2017-07-17
Attending: OBSTETRICS & GYNECOLOGY
Payer: COMMERCIAL

## 2017-07-17 ENCOUNTER — OFFICE VISIT (OUTPATIENT)
Dept: GYNECOLOGIC ONCOLOGY | Facility: CLINIC | Age: 62
End: 2017-07-17
Payer: COMMERCIAL

## 2017-07-17 VITALS
DIASTOLIC BLOOD PRESSURE: 59 MMHG | WEIGHT: 202 LBS | SYSTOLIC BLOOD PRESSURE: 120 MMHG | HEART RATE: 69 BPM | BODY MASS INDEX: 31.64 KG/M2

## 2017-07-17 DIAGNOSIS — C56.9 OVARIAN CANCER, UNSPECIFIED LATERALITY: ICD-10-CM

## 2017-07-17 DIAGNOSIS — D48.5 NEOPLASM OF UNCERTAIN BEHAVIOR OF SKIN: Primary | ICD-10-CM

## 2017-07-17 DIAGNOSIS — Z51.11 ENCOUNTER FOR ANTINEOPLASTIC CHEMOTHERAPY: ICD-10-CM

## 2017-07-17 DIAGNOSIS — C56.9 OVARIAN CANCER, UNSPECIFIED LATERALITY: Primary | ICD-10-CM

## 2017-07-17 LAB
ERYTHROCYTE [DISTWIDTH] IN BLOOD BY AUTOMATED COUNT: 17.7 %
HCT VFR BLD AUTO: 38.3 %
HGB BLD-MCNC: 12.9 G/DL
MCH RBC QN AUTO: 31.8 PG
MCHC RBC AUTO-ENTMCNC: 33.7 %
MCV RBC AUTO: 94 FL
NEUTROPHILS # BLD AUTO: 1.4 K/UL
PLATELET # BLD AUTO: 116 K/UL
PMV BLD AUTO: 9.5 FL
RBC # BLD AUTO: 4.06 M/UL
WBC # BLD AUTO: 2.63 K/UL

## 2017-07-17 PROCEDURE — 85027 COMPLETE CBC AUTOMATED: CPT

## 2017-07-17 PROCEDURE — 36415 COLL VENOUS BLD VENIPUNCTURE: CPT

## 2017-07-17 PROCEDURE — 88305 TISSUE EXAM BY PATHOLOGIST: CPT | Performed by: PATHOLOGY

## 2017-07-17 PROCEDURE — 99499 UNLISTED E&M SERVICE: CPT | Mod: S$GLB,,, | Performed by: DERMATOLOGY

## 2017-07-17 PROCEDURE — 99999 PR PBB SHADOW E&M-EST. PATIENT-LVL III: CPT | Mod: PBBFAC,,, | Performed by: OBSTETRICS & GYNECOLOGY

## 2017-07-17 PROCEDURE — 99214 OFFICE O/P EST MOD 30 MIN: CPT | Mod: S$GLB,,, | Performed by: OBSTETRICS & GYNECOLOGY

## 2017-07-17 PROCEDURE — 99999 PR PBB SHADOW E&M-EST. PATIENT-LVL I: CPT | Mod: PBBFAC,,, | Performed by: DERMATOLOGY

## 2017-07-17 PROCEDURE — 11100 PR BIOPSY OF SKIN LESION: CPT | Mod: S$GLB,,, | Performed by: DERMATOLOGY

## 2017-07-17 NOTE — PROGRESS NOTES
Subjective:      Patient ID: Mickie Don is a 61 y.o. female.    Chief Complaint: Ovarian Cancer  Treatment History  Xlap/Omentectomy on 11/1/16 - mass was unresectable  Stage IIIC ovarian cancer  Initiated Taxotere/Carbo on 11/18/16, now s/p 6 cycles completed 3/8/17  Xlap/ISIS/BSO/Radical mass resection > 10 cm, optimal debulking on 4/11/17    HPI  Here today for C4 of treatment following surgery.  Labs reviewed and appropriate for treatment, with platelet count up to 116, ANC 1400..  CA-125 is down to 9.  Overall she is tolerating treatment.  Denies F/C, N/V, VB, neuropathy.  Review of Systems   Constitutional: Negative for activity change, appetite change, chills, fatigue and fever.   HENT: Negative for hearing loss, mouth sores, nosebleeds, sore throat and tinnitus.    Eyes: Negative for visual disturbance.   Respiratory: Negative for cough, chest tightness, shortness of breath and wheezing.    Cardiovascular: Negative for chest pain and leg swelling.   Gastrointestinal: Negative for abdominal distention, abdominal pain, blood in stool, constipation, diarrhea, nausea and vomiting.   Genitourinary: Negative for dysuria, flank pain, frequency, hematuria, pelvic pain, vaginal bleeding, vaginal discharge and vaginal pain.   Musculoskeletal: Negative for arthralgias and back pain.   Skin: Negative for rash.   Neurological: Negative for dizziness, seizures, syncope, weakness and numbness.   Hematological: Does not bruise/bleed easily.   Psychiatric/Behavioral: Negative for confusion and sleep disturbance. The patient is not nervous/anxious.         Objective:   Physical Exam:   Constitutional: She is oriented to person, place, and time. She appears well-developed and well-nourished. No distress.    HENT:   Head: Normocephalic and atraumatic.    Eyes: No scleral icterus.    Neck: Normal range of motion. Neck supple.    Cardiovascular: Normal rate and intact distal pulses.  Exam reveals no cyanosis and no edema.      Pulmonary/Chest: Effort normal. No respiratory distress. She exhibits no tenderness.        Abdominal: Soft. Normal appearance. She exhibits no distension (midline incision well-healed), no fluid wave, no ascites and no mass. There is no tenderness. There is no rigidity, no rebound and no guarding. No hernia.             Musculoskeletal: Normal range of motion and moves all extremeties. She exhibits no edema or tenderness.      Lymphadenopathy:     She has no cervical adenopathy.    Neurological: She is alert and oriented to person, place, and time.    Skin: Skin is warm. No rash noted. No cyanosis or erythema.    Psychiatric: She has a normal mood and affect. Thought content normal.       Assessment:     1. Ovarian cancer, unspecified laterality    2. Encounter for antineoplastic chemotherapy        Plan:       Will not treat with 4th cycle given normalization and suzanna of CA-125.  Bone marrow is fatigued regardless.  Will place orders for PET and have her return for results and exam.  Voiced understanding and questions answered.

## 2017-07-17 NOTE — PROGRESS NOTES
Subjective:       Patient ID:  Mickie Don is a 61 y.o. female who presents for   Chief Complaint   Patient presents with    Biopsy     HPI  Pt presents for biopsy of spot on L calf  Plt increased to 64-->116 (checked today)    Past Medical History:   Diagnosis Date    Open angle with borderline findings and low glaucoma risk in both eyes 6/20/2013     Review of Systems   Constitutional: Positive for malaise. Negative for fever, chills and fatigue.   Skin: Positive for activity-related sunscreen use. Negative for daily sunscreen use and recent sunburn.   Hematologic/Lymphatic: Bruises/bleeds easily.        Objective:    Physical Exam   Skin:   Areas Examined (abnormalities noted in diagram):   LLE Inspection Performed                  Diagram Legend     Erythematous scaling macule/papule c/w actinic keratosis       Vascular papule c/w angioma      Pigmented verrucoid papule/plaque c/w seborrheic keratosis      Yellow umbilicated papule c/w sebaceous hyperplasia      Irregularly shaped tan macule c/w lentigo     1-2 mm smooth white papules consistent with Milia      Movable subcutaneous cyst with punctum c/w epidermal inclusion cyst      Subcutaneous movable cyst c/w pilar cyst      Firm pink to brown papule c/w dermatofibroma      Pedunculated fleshy papule(s) c/w skin tag(s)      Evenly pigmented macule c/w junctional nevus     Mildly variegated pigmented, slightly irregular-bordered macule c/w mildly atypical nevus      Flesh colored to evenly pigmented papule c/w intradermal nevus       Pink pearly papule/plaque c/w basal cell carcinoma      Erythematous hyperkeratotic cursted plaque c/w SCC      Surgical scar with no sign of skin cancer recurrence      Open and closed comedones      Inflammatory papules and pustules      Verrucoid papule consistent consistent with wart     Erythematous eczematous patches and plaques     Dystrophic onycholytic nail with subungual debris c/w onychomycosis     Umbilicated  papule    Erythematous-base heme-crusted tan verrucoid plaque consistent with inflamed seborrheic keratosis     Erythematous Silvery Scaling Plaque c/w Psoriasis     See annotation      Assessment / Plan:      Pathology Orders:      Normal Orders This Visit    Tissue Specimen To Pathology, Dermatology     Questions:    Directional Terms:  Other(comment)    Clinical information:  r/o hemosiderotic DF vs metastasis (h/o ovarian cancer) vs melanoma vs other    Specific Site:  L calf        Neoplasm of uncertain behavior of skin  -     Tissue Specimen To Pathology, Dermatology  Punch biopsy procedure note:  Punch biopsy performed after verbal consent obtained. Area marked and prepped with alcohol. Approximately 1cc of 1% lidocaine with epinephrine injected. 6 mm disposable punch used to remove lesion. Hemostasis obtained and biopsy site closed with 2 Prolene sutures. Wound care instructions reviewed with patient and handout given.           Return in about 2 weeks (around 7/31/2017).

## 2017-07-20 ENCOUNTER — HOSPITAL ENCOUNTER (OUTPATIENT)
Dept: RADIOLOGY | Facility: HOSPITAL | Age: 62
Discharge: HOME | End: 2017-07-20
Attending: OBSTETRICS & GYNECOLOGY
Payer: COMMERCIAL

## 2017-07-20 DIAGNOSIS — C56.9 OVARIAN CANCER, UNSPECIFIED LATERALITY: ICD-10-CM

## 2017-07-20 PROCEDURE — 78815 PET IMAGE W/CT SKULL-THIGH: CPT | Mod: 26,PI,, | Performed by: NUCLEAR MEDICINE

## 2017-07-20 PROCEDURE — A9552 F18 FDG: HCPCS

## 2017-07-22 ENCOUNTER — OFFICE VISIT (OUTPATIENT)
Dept: INTERNAL MEDICINE | Facility: CLINIC | Age: 62
End: 2017-07-22
Payer: COMMERCIAL

## 2017-07-22 VITALS
HEIGHT: 67 IN | SYSTOLIC BLOOD PRESSURE: 116 MMHG | HEART RATE: 75 BPM | WEIGHT: 205.25 LBS | BODY MASS INDEX: 32.21 KG/M2 | OXYGEN SATURATION: 98 % | TEMPERATURE: 99 F | DIASTOLIC BLOOD PRESSURE: 72 MMHG

## 2017-07-22 DIAGNOSIS — L03.116 LEFT LEG CELLULITIS: Primary | ICD-10-CM

## 2017-07-22 PROCEDURE — 99999 PR PBB SHADOW E&M-EST. PATIENT-LVL III: CPT | Mod: PBBFAC,,, | Performed by: INTERNAL MEDICINE

## 2017-07-22 PROCEDURE — 99213 OFFICE O/P EST LOW 20 MIN: CPT | Mod: S$GLB,,, | Performed by: INTERNAL MEDICINE

## 2017-07-22 RX ORDER — DOXYCYCLINE HYCLATE 100 MG
100 TABLET ORAL EVERY 12 HOURS
Qty: 20 TABLET | Refills: 0 | Status: SHIPPED | OUTPATIENT
Start: 2017-07-22 | End: 2017-08-01

## 2017-07-22 NOTE — PROGRESS NOTES
Subjective:       Patient ID: Mickie Don is a 61 y.o. female.    Chief Complaint: Leg Problem (left leg. Biposy done Monday 7/17/17)    HPI     Patient is a 61 year old female here today for left leg wound.  She had a skin biopsy performed by dermatology on 07/17/2017 and since then  She has noticed increase in pain, swelling, redness around the site of biospy. No fever/chills. Also reports more purulent drainage from the area. Has applied triple antibiotic ointment but no improvement, worse at night when laying in bed in terms of discomfort.    Review of Systems   Constitutional: Negative for chills and fever.   Neurological: Negative for weakness.       Objective:      Physical Exam   Constitutional: She is oriented to person, place, and time. She appears well-developed and well-nourished. No distress.   HENT:   Head: Normocephalic and atraumatic.   Neurological: She is alert and oriented to person, place, and time.   Skin: Skin is warm and dry. She is not diaphoretic.   Nursing note and vitals reviewed.      Left leg: about a 1 cm biopsy with sutures in place.  Surrounding erythema to about 4 cm diameter with swelling and TTP , no warmth noted    Assessment:       1. Left leg cellulitis        Plan:       Outlined area of erythema today  Likely acute cellulitis has developed  Recommend to start course of antibiotics  Doxycycline 100 mg BID x 10 days provided, GI side effects reviewed  If any fever/increase pain or wound extends out of border today, please go to the ED immediately  RTC PRN

## 2017-07-24 ENCOUNTER — RESEARCH ENCOUNTER (OUTPATIENT)
Dept: RESEARCH | Facility: HOSPITAL | Age: 62
End: 2017-07-24

## 2017-07-24 ENCOUNTER — OFFICE VISIT (OUTPATIENT)
Dept: GYNECOLOGIC ONCOLOGY | Facility: CLINIC | Age: 62
End: 2017-07-24
Payer: COMMERCIAL

## 2017-07-24 VITALS
SYSTOLIC BLOOD PRESSURE: 131 MMHG | WEIGHT: 205.25 LBS | HEIGHT: 67 IN | DIASTOLIC BLOOD PRESSURE: 60 MMHG | BODY MASS INDEX: 32.21 KG/M2 | HEART RATE: 72 BPM

## 2017-07-24 DIAGNOSIS — C56.9 OVARIAN CANCER, UNSPECIFIED LATERALITY: Primary | ICD-10-CM

## 2017-07-24 DIAGNOSIS — Z00.6 EXAMINATION OF PARTICIPANT IN CLINICAL TRIAL: ICD-10-CM

## 2017-07-24 PROCEDURE — 99999 PR PBB SHADOW E&M-EST. PATIENT-LVL III: CPT | Mod: PBBFAC,,, | Performed by: OBSTETRICS & GYNECOLOGY

## 2017-07-24 PROCEDURE — 99214 OFFICE O/P EST MOD 30 MIN: CPT | Mod: S$GLB,,, | Performed by: OBSTETRICS & GYNECOLOGY

## 2017-07-24 NOTE — PROGRESS NOTES
"Jul. 24, 2017     Protocol: VR--C/"PRIMA"  Investigator: JOSE ALFREDO Weston MD  Pt Initials: ТАТЬЯНАCLARICE GuevaraB.  Subject #: [id number]  IRB#: [id number]     A Phase 3, Randomized, Double-Blind, Placebo-Controlled, Multicenter Study of Niraparib Maintenance Treatment in Patients with Advanced Ovarian Cancer Following  ,Response on Front-Line Platinum-Based Chemotherapy     Informed Consent:  Patient identified as a potential candidate for PRIMA/OF--C trial by CRC and confirmed by Dr. Tran, who initially explained the trial to the patient. Patient stated she would like to participate. Today, met with patient in clinic to go over the study design, schedule, and informed consent document.  Informed patient I will call with upcoming appts. for genetic testing, CT scan, EKG, and lab visit for central lab HRD testing.      Please click on the encounter link below to view attachments under media for consent details and process and BRCA/HRD requisition forms.     "

## 2017-07-24 NOTE — PROGRESS NOTES
Subjective:      Patient ID: Mickie Don is a 61 y.o. female.    Chief Complaint: Discuss PET Scan  Treatment History  Xlap/Omentectomy on 11/1/16 - mass was unresectable  Stage IIIC ovarian cancer  Initiated Taxotere/Carbo on 11/18/16, now s/p 6 cycles completed 3/8/17  Xlap/ISIS/BSO/Radical mass resection > 10 cm, optimal debulking on 4/11/17    HPI  Here today for post treatment scans and planning.  Denies F/C, N/V, VB, neuropathy.  Last CA-125 was 9.  PET was negative for disease.   Review of Systems   Constitutional: Negative for activity change, appetite change, chills, fatigue and fever.   HENT: Negative for hearing loss, mouth sores, nosebleeds, sore throat and tinnitus.    Eyes: Negative for visual disturbance.   Respiratory: Negative for cough, chest tightness, shortness of breath and wheezing.    Cardiovascular: Negative for chest pain and leg swelling.   Gastrointestinal: Negative for abdominal distention, abdominal pain, blood in stool, constipation, diarrhea, nausea and vomiting.   Genitourinary: Negative for dysuria, flank pain, frequency, hematuria, pelvic pain, vaginal bleeding, vaginal discharge and vaginal pain.   Musculoskeletal: Negative for arthralgias and back pain.   Skin: Negative for rash.   Neurological: Negative for dizziness, seizures, syncope, weakness and numbness.   Hematological: Does not bruise/bleed easily.   Psychiatric/Behavioral: Negative for confusion and sleep disturbance. The patient is not nervous/anxious.         Objective:   Physical Exam:   Constitutional: She is oriented to person, place, and time. She appears well-developed and well-nourished. No distress.    HENT:   Head: Normocephalic and atraumatic.    Eyes: No scleral icterus.    Neck: Normal range of motion. Neck supple.    Cardiovascular: Normal rate and intact distal pulses.  Exam reveals no cyanosis and no edema.     Pulmonary/Chest: Effort normal. No respiratory distress. She exhibits no tenderness.         Abdominal: Soft. Normal appearance. She exhibits no distension (midline incision well-healed), no fluid wave, no ascites and no mass. There is no tenderness. There is no rigidity, no rebound and no guarding. No hernia.     Genitourinary: Rectum normal and vagina normal. Pelvic exam was performed with patient supine. There is no rash, tenderness or lesion on the right labia. There is no rash, tenderness or lesion on the left labia. Uterus is absent. There is an absent adnexa. Right adnexum displays no mass, no tenderness and no fullness. Left adnexum displays no mass, no tenderness and no fullness. No bleeding or unspecified prolapse of vaginal walls in the vagina. No vaginal discharge found. Vaginal cuff normal.Labial bartholins normal.Cervix exhibits absence.           Musculoskeletal: Normal range of motion and moves all extremeties. She exhibits no edema or tenderness.      Lymphadenopathy:     She has no cervical adenopathy.        Right: No inguinal adenopathy present.        Left: No inguinal adenopathy present.    Neurological: She is alert and oriented to person, place, and time.    Skin: Skin is warm. No rash noted. No cyanosis or erythema.    Psychiatric: She has a normal mood and affect. Thought content normal.       Assessment:     1. Ovarian cancer, unspecified laterality        Plan:       CAPRICE after treatment.  Will evaluate for PRIMA.  Plan f/u visits per protocol if enrolls. If not, I will see her back in 2 months with CA-125.

## 2017-07-25 ENCOUNTER — LAB VISIT (OUTPATIENT)
Dept: LAB | Facility: HOSPITAL | Age: 62
End: 2017-07-25
Attending: OBSTETRICS & GYNECOLOGY
Payer: COMMERCIAL

## 2017-07-25 ENCOUNTER — RESEARCH ENCOUNTER (OUTPATIENT)
Dept: RESEARCH | Facility: HOSPITAL | Age: 62
End: 2017-07-25

## 2017-07-25 DIAGNOSIS — C56.9 OVARIAN CANCER, UNSPECIFIED LATERALITY: ICD-10-CM

## 2017-07-25 DIAGNOSIS — Z00.6 EXAMINATION OF PARTICIPANT IN CLINICAL TRIAL: ICD-10-CM

## 2017-07-25 LAB
DRUG STUDY SPECIMEN TYPE: NORMAL
DRUG STUDY TEST NAME: NORMAL
DRUG STUDY TEST RESULT: NORMAL

## 2017-07-25 PROCEDURE — 99000 SPECIMEN HANDLING OFFICE-LAB: CPT

## 2017-07-25 PROCEDURE — 36415 COLL VENOUS BLD VENIPUNCTURE: CPT

## 2017-07-31 NOTE — PROGRESS NOTES
"Jul. 25, 2017     Protocol: OX--C/"PRIMA"  Investigator: JOSE ALFREDO Weston MD  Pt Initials: CLARICE BERMUDEZB.  Subject #: [id number]  IRB#: [id number]     A Phase 3, Randomized, Double-Blind, Placebo-Controlled, Multicenter Study of Niraparib Maintenance Treatment in Patients with Advanced Ovarian Cancer Following,Response on Front-Line Platinum-Based Chemotherapy      Central Lab Testing:  Patient came to clinic for central laboratory testing for the above mentioned protocol.  Patient samples collected for FISH/MDS and cytogenetic testing.  Please see Central lab reports attached to this encounter for results.   Surgical pathology sample collected for HRD testing by [employer] per protocol.  Sample sent for testing on 7/31/17.  [facility] requisition attached to this encounter.  "

## 2017-08-01 DIAGNOSIS — Z00.6 EXAMINATION OF PARTICIPANT IN CLINICAL TRIAL: ICD-10-CM

## 2017-08-01 DIAGNOSIS — C56.9 OVARIAN CANCER, UNSPECIFIED LATERALITY: Primary | ICD-10-CM

## 2017-08-15 DIAGNOSIS — C56.9 MALIGNANT NEOPLASM OF OVARY, UNSPECIFIED LATERALITY: Primary | ICD-10-CM

## 2017-08-15 DIAGNOSIS — Z00.6 EXAMINATION OF PARTICIPANT IN CLINICAL TRIAL: ICD-10-CM

## 2017-08-15 DIAGNOSIS — C56.9 OVARIAN CANCER, UNSPECIFIED LATERALITY: ICD-10-CM

## 2017-08-16 ENCOUNTER — RESEARCH ENCOUNTER (OUTPATIENT)
Dept: RESEARCH | Facility: HOSPITAL | Age: 62
End: 2017-08-16

## 2017-08-16 ENCOUNTER — HOSPITAL ENCOUNTER (OUTPATIENT)
Dept: RADIOLOGY | Facility: HOSPITAL | Age: 62
Discharge: HOME | End: 2017-08-16
Attending: OBSTETRICS & GYNECOLOGY
Payer: COMMERCIAL

## 2017-08-16 ENCOUNTER — HOSPITAL ENCOUNTER (OUTPATIENT)
Dept: CARDIOLOGY | Facility: CLINIC | Age: 62
Discharge: HOME | End: 2017-08-16
Payer: COMMERCIAL

## 2017-08-16 VITALS
TEMPERATURE: 98 F | HEART RATE: 70 BPM | DIASTOLIC BLOOD PRESSURE: 59 MMHG | SYSTOLIC BLOOD PRESSURE: 130 MMHG | RESPIRATION RATE: 18 BRPM | WEIGHT: 205 LBS | BODY MASS INDEX: 32.18 KG/M2 | HEIGHT: 67 IN

## 2017-08-16 DIAGNOSIS — C56.9 OVARIAN CANCER, UNSPECIFIED LATERALITY: ICD-10-CM

## 2017-08-16 DIAGNOSIS — Z00.6 EXAMINATION OF PARTICIPANT IN CLINICAL TRIAL: ICD-10-CM

## 2017-08-16 PROCEDURE — 71260 CT THORAX DX C+: CPT | Mod: 26,,, | Performed by: RADIOLOGY

## 2017-08-16 PROCEDURE — 93000 ELECTROCARDIOGRAM COMPLETE: CPT | Mod: S$GLB,,, | Performed by: INTERNAL MEDICINE

## 2017-08-16 PROCEDURE — 74177 CT ABD & PELVIS W/CONTRAST: CPT | Mod: TC

## 2017-08-16 PROCEDURE — 74177 CT ABD & PELVIS W/CONTRAST: CPT | Mod: 26,,, | Performed by: RADIOLOGY

## 2017-08-16 PROCEDURE — 25500020 PHARM REV CODE 255: Performed by: OBSTETRICS & GYNECOLOGY

## 2017-08-16 PROCEDURE — 71260 CT THORAX DX C+: CPT | Mod: TC

## 2017-08-16 RX ADMIN — IOHEXOL 15 ML: 350 INJECTION, SOLUTION INTRAVENOUS at 01:08

## 2017-08-16 RX ADMIN — IOHEXOL 15 ML: 350 INJECTION, SOLUTION INTRAVENOUS at 12:08

## 2017-08-16 RX ADMIN — IOHEXOL 100 ML: 350 INJECTION, SOLUTION INTRAVENOUS at 02:08

## 2017-08-17 NOTE — PROGRESS NOTES
"Aug. 16, 2017     Protocol: XP--C/"PRIMA"  Investigator: JOSE ALFREDO Weston MD  Pt Initials: LILO BERMUDEZPaolaASHLEY.  Subject #: [id number]  IRB#: [id number]     A Phase 3, Randomized, Double-Blind, Placebo-Controlled, Multicenter Study of Niraparib Maintenance Treatment in Patients with Advanced Ovarian Cancer Following  ,Response on Front-Line Platinum-Based Chemotherapy      Patient completed all study-required screening assessment - screening labs, MD visit, EKG, and CT chest/abdomen/pelvis.  Patient presented to the clinic today (8/16/17) to complete her screening visit for the above mentioned protocol. Patient alert and oriented x 3. Mood and affect are appropriate to the situation. Denies any nausea or vomiting, constipation or diarrhea, shortness of breath, or fever.  Patient reports feeling well.  EKG performed on 8/16/2017.  Vitals and CT chest/abdomen/pelvis with contrast performed on 8/16/17.  Reviewed and confirmed which concomitant medications the patient is currently taking. See concomitant therapy worksheet. Medical history also reviewed and confirmed with the patient. See baseline medical history worksheet. Patient examined by Dr. Tran on 7/24/17 (see note on this date).  No clinically significant findings noted on exam.    All of the patient's questions have been addressed by Dr. Tran and myself to her satisfaction. Patient expressed her continued willingness to participate in the above mentioned clinical trial.  Patient is scheduled to start C1 on 8/21/17.  Patient verbalized understanding. Instructed the patient to notify myself and/or Dr. Tran with any questions and/or concerns. Patient verbalized understanding.      "

## 2017-08-18 ENCOUNTER — RESEARCH ENCOUNTER (OUTPATIENT)
Dept: RESEARCH | Facility: HOSPITAL | Age: 62
End: 2017-08-18

## 2017-08-21 ENCOUNTER — RESEARCH ENCOUNTER (OUTPATIENT)
Dept: RESEARCH | Facility: HOSPITAL | Age: 62
End: 2017-08-21

## 2017-08-21 ENCOUNTER — LAB VISIT (OUTPATIENT)
Dept: LAB | Facility: HOSPITAL | Age: 62
End: 2017-08-21
Attending: OBSTETRICS & GYNECOLOGY
Payer: COMMERCIAL

## 2017-08-21 ENCOUNTER — OFFICE VISIT (OUTPATIENT)
Dept: GYNECOLOGIC ONCOLOGY | Facility: CLINIC | Age: 62
End: 2017-08-21
Payer: COMMERCIAL

## 2017-08-21 VITALS
WEIGHT: 205 LBS | RESPIRATION RATE: 16 BRPM | DIASTOLIC BLOOD PRESSURE: 64 MMHG | SYSTOLIC BLOOD PRESSURE: 119 MMHG | BODY MASS INDEX: 32.11 KG/M2 | HEART RATE: 88 BPM

## 2017-08-21 DIAGNOSIS — C56.9 OVARIAN CANCER, UNSPECIFIED LATERALITY: Primary | ICD-10-CM

## 2017-08-21 DIAGNOSIS — Z00.6 EXAMINATION OF PARTICIPANT IN CLINICAL TRIAL: ICD-10-CM

## 2017-08-21 DIAGNOSIS — Z51.11 ENCOUNTER FOR ANTINEOPLASTIC CHEMOTHERAPY: ICD-10-CM

## 2017-08-21 DIAGNOSIS — C56.9 OVARIAN CANCER, UNSPECIFIED LATERALITY: ICD-10-CM

## 2017-08-21 LAB
DRUG STUDY SPECIMEN TYPE: NORMAL
DRUG STUDY TEST NAME: NORMAL
DRUG STUDY TEST RESULT: NORMAL

## 2017-08-21 PROCEDURE — 3008F BODY MASS INDEX DOCD: CPT | Mod: S$GLB,,, | Performed by: OBSTETRICS & GYNECOLOGY

## 2017-08-21 PROCEDURE — 99999 PR PBB SHADOW E&M-EST. PATIENT-LVL III: CPT | Mod: PBBFAC,,, | Performed by: OBSTETRICS & GYNECOLOGY

## 2017-08-21 PROCEDURE — 99214 OFFICE O/P EST MOD 30 MIN: CPT | Mod: S$GLB,,, | Performed by: OBSTETRICS & GYNECOLOGY

## 2017-08-21 PROCEDURE — 36415 COLL VENOUS BLD VENIPUNCTURE: CPT

## 2017-08-21 PROCEDURE — 99000 SPECIMEN HANDLING OFFICE-LAB: CPT

## 2017-08-21 NOTE — PROGRESS NOTES
Subjective:      Patient ID: Mickie Don is a 62 y.o. female.    Chief Complaint: Ovarian Cancer  Treatment History  Xlap/Omentectomy on 11/1/16 - mass was unresectable  Stage IIIC ovarian cancer  Initiated Taxotere/Carbo on 11/18/16, now s/p 6 cycles completed 3/8/17  Xlap/ISIS/BSO/Radical mass resection > 10 cm, optimal debulking on 4/11/17  Initiated PRIMA on 8/21/17    HPI  Here today for evaluation prior to first dose of PRIMA med.  Denies F/C, N/V, VB, neuropathy.  Last CA-125 was 25 on 8/16/17.  PET was negative for disease, CT for protocol revealed surgical changes.  No new findings compared to PET.  Review of Systems   Constitutional: Negative for activity change, appetite change, chills, fatigue and fever.   HENT: Negative for hearing loss, mouth sores, nosebleeds, sore throat and tinnitus.    Eyes: Negative for visual disturbance.   Respiratory: Negative for cough, chest tightness, shortness of breath and wheezing.    Cardiovascular: Negative for chest pain and leg swelling.   Gastrointestinal: Negative for abdominal distention, abdominal pain, blood in stool, constipation, diarrhea, nausea and vomiting.   Genitourinary: Negative for dysuria, flank pain, frequency, hematuria, pelvic pain, vaginal bleeding, vaginal discharge and vaginal pain.   Musculoskeletal: Negative for arthralgias and back pain.   Skin: Negative for rash.   Neurological: Negative for dizziness, seizures, syncope, weakness and numbness.   Hematological: Does not bruise/bleed easily.   Psychiatric/Behavioral: Negative for confusion and sleep disturbance. The patient is not nervous/anxious.         Objective:   Physical Exam:   Constitutional: She is oriented to person, place, and time. She appears well-developed and well-nourished. No distress.    HENT:   Head: Normocephalic and atraumatic.    Eyes: No scleral icterus.    Neck: Normal range of motion. Neck supple.    Cardiovascular: Normal rate and intact distal pulses.  Exam reveals  no cyanosis and no edema.     Pulmonary/Chest: Effort normal. No respiratory distress. She exhibits no tenderness.        Abdominal: Soft. Normal appearance. She exhibits no distension (midline incision well-healed), no fluid wave, no ascites and no mass. There is no tenderness. There is no rigidity, no rebound and no guarding. No hernia.     Genitourinary: Rectum normal and vagina normal. Pelvic exam was performed with patient supine. There is no rash, tenderness or lesion on the right labia. There is no rash, tenderness or lesion on the left labia. Uterus is absent. There is an absent adnexa. Right adnexum displays no mass, no tenderness and no fullness. Left adnexum displays no mass, no tenderness and no fullness. No bleeding or unspecified prolapse of vaginal walls in the vagina. No vaginal discharge found. Vaginal cuff normal.Labial bartholins normal.Cervix exhibits absence.           Musculoskeletal: Normal range of motion and moves all extremeties. She exhibits no edema or tenderness.      Lymphadenopathy:     She has no cervical adenopathy.        Right: No inguinal adenopathy present.        Left: No inguinal adenopathy present.    Neurological: She is alert and oriented to person, place, and time.    Skin: Skin is warm. No rash noted. No cyanosis or erythema.    Psychiatric: She has a normal mood and affect. Thought content normal.       Assessment:     1. Ovarian cancer, unspecified laterality    2. Encounter for antineoplastic chemotherapy        Plan:       Overall doing well.  Exam today unremarkable.  Will start trial.  Labs and visits per protocol.

## 2017-08-21 NOTE — PROGRESS NOTES
"Aug. 18, 2017     Protocol: FD--C/"PRIMA"  Investigator: JOSE ALFREDO Weston MD  Pt Initials: LILO BERMUDEZPaolaASHLEY.  Subject #: [id number]  IRB#: [id number]     A Phase 3, Randomized, Double-Blind, Placebo-Controlled, Multicenter Study of Niraparib Maintenance Treatment in Patients with Advanced Ovarian Cancer Following Response on Front-Line Platinum-Based Chemotherapy      Eligibility Note  Patient deemed eligible for the above mentioned protocol by CRC, CRN, and physician based on the following inclusion criteria:   Patient is of legal age (62-year old female), who has completed the Informed Consent process on 7/24/2017. See consent note of same date.  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  Patient was treated with 6 cycles neoadjuvant taxol/carboplatin chemotherapy, interval debulking (4/11/17), and 3 cycles taxol/carboplatin.  Last chemotherapy completed on 6/21/17.  Per MD, patient had complete response to treatment.  Patient has no tumors ? 2 cm and  is in normal range.  Patient randomized to study treatment ? 12 week completing chemotherapy.  HRD testing completed prior to randomization - see [employer] email dated 8/14/17.  Patient is post-hysterectomy and is not of child-bearing potential.    Patent has ECOG performance status of 0 (see MD email 8/16/17).  Patient has adequate organ function per local labs dated 8/16/17 - ANC 2.5, platelets 165k, Hb 14.3, creatinine 0.7, total bilirubin 0.8, AST/ALT 16/18.  Patient has QTc = 418, within normal range and < 470 (see EKG dated 8/16/17).  Per [facility] process, patient states willingness to complete PRO during study, is able to take oral medications, and to provide EOT blood samples for cytogenetic analysis.    Patient deemed eligible for the above mentioned protocol by CRC, CRN, and physician based on the following exclusion criteria:   Tumor pathology does not " contain excluded histologies (path reports dated 4/18/17).  Patient did not have complete cytoreduction after primary debulking.  Patient had milliary disease (see Op Note 4/11/17).  Patient is not pregnant or breastfeeding (NOCBP), has no know hypersensitivity to niraparib, not enrolled on other clinical trial, has been previously treatment with a PARP inhibitor (see Concomitant Mediation sheet), not received bevacizumab (see Concomitant Mediation sheet), not had prior investigational therapy, does not have condition that would interfere with study participation, has not been diagnosed with any other cancer, nor have uncontrolled brain/leptomeningeal diseases.  Patient does not have any known ?Grade 3 cytopenias or known history of AML or MDS (see lab reports dated 8/16/17 and MDS FISH results dated 7/29/17), has not undergone major surgery or draining of ascities within 4 weeks of enrollment, is not planning to donate blood, and has not had palliative radiation.  Patient is not considered a poor medical risk, is not immunocompromised, dos not have hepatic disease.    All of the patient's questions have been addressed by Dr. Tran and myself to her satisfaction. Patient expressed her continued willingness to participate in the above mentioned clinical trial.  Patient is scheduled to start C1 on 8/21/17.  Patient verbalized understanding. Instructed the patient to notify myself and/or Dr. Tran with any questions and/or concerns. Patient verbalized understanding.

## 2017-08-22 VITALS — TEMPERATURE: 98 F

## 2017-08-22 NOTE — PROGRESS NOTES
"Aug. 21, 2017     Protocol: XZ--C/"PRIMA"  Investigator: JOSE ALFREDO Weston MD  Pt Initials: ARGENTINA BERMUDEZ.  Subject #: [id number]  IRB#: [id number]     A Phase 3, Randomized, Double-Blind, Placebo-Controlled, Multicenter Study of Niraparib Maintenance Treatment in Patients with Advanced Ovarian Cancer Following Response on Front-Line Platinum-Based Chemotherapy      C1D1  Patient presented to clinic with her son to begin treatment on the above mentioned clinical trial.  Patient was randomized on 8/22/17 to either niraparib or placebo, 300 mg, QD.  Study is blinded and treatment assignment is not known.  Patient alert and oriented, mood and affect appropriate to the situation.  Patient reports she feels well and is ready to get started.  Patient denies any nausea, vomiting, dizziness, constipation, diarrhea, shortness of breath, fever, or pain.  Patient is not taking any OTC or prescription medications.      Vitals, directed physical exam, ECOG assessment assessed 8/22/17.  Temperature taken by CRC in clinic -  98.1°F.  All lab and scan results reviewed with patient by Dr. Tran.  All labs collected within 7 d of C1D1 and within range to start treatment.  Physical exam unremarkable per physician.  Patient completed PROs prior to C1D1 dose.    Pre-dose and post-dose C1D1 labs were collected at 14:07 and 16:03, respectively.  Niraparib was taken in clinic at 14:15.   ctDNA and PK samples collected pre-dose and PK samples collected within 2 h ± 15 min post-dose.    Drug dispensing:  Subject was dispensed one bottle of niraparib/placebo, 100 mg capsules, 93 capsules per bottle.  Bottle number assigned - #[id number].  Patient provided dosing instructions and drug side effect information.      All of the patient's questions have been addressed by Dr. Tran and myself to her satisfaction. Patient expressed her continued willingness to participate in the above mentioned clinical trial.  Patient is scheduled to have CBC per protocol on " 8/28/17.  Patient verbalized understanding.  Instructed the patient to notify myself and/or Dr. Tran with any questions and/or concerns. Patient verbalized understanding.    Baseline Medical History  1) Anxiety, Grade 1.  2) Eye disorders, other - Open angle with borderline findings and low glaucoma risk in both eyes, Grade 1.    3) Skin infection-left leg, Grade 2.  Patient took doxycycline.  Resolved by C1D1.  4) Dermatofibroma.  Benign.  Diagnosed by biopsy on 7/17/17.  No further intervention needed.

## 2017-08-28 ENCOUNTER — LAB VISIT (OUTPATIENT)
Dept: LAB | Facility: HOSPITAL | Age: 62
End: 2017-08-28
Attending: OBSTETRICS & GYNECOLOGY
Payer: COMMERCIAL

## 2017-08-28 DIAGNOSIS — C56.9 OVARIAN CANCER, UNSPECIFIED LATERALITY: ICD-10-CM

## 2017-08-28 DIAGNOSIS — Z00.6 EXAMINATION OF PARTICIPANT IN CLINICAL TRIAL: ICD-10-CM

## 2017-08-28 LAB
BASOPHILS # BLD AUTO: 0.04 K/UL
BASOPHILS NFR BLD: 0.9 %
DIFFERENTIAL METHOD: ABNORMAL
EOSINOPHIL # BLD AUTO: 0.1 K/UL
EOSINOPHIL NFR BLD: 1.5 %
ERYTHROCYTE [DISTWIDTH] IN BLOOD BY AUTOMATED COUNT: 13.8 %
HCT VFR BLD AUTO: 43 %
HGB BLD-MCNC: 14.7 G/DL
LYMPHOCYTES # BLD AUTO: 1.2 K/UL
LYMPHOCYTES NFR BLD: 25.3 %
MCH RBC QN AUTO: 32.6 PG
MCHC RBC AUTO-ENTMCNC: 34.2 G/DL
MCV RBC AUTO: 95 FL
MONOCYTES # BLD AUTO: 0.3 K/UL
MONOCYTES NFR BLD: 6.4 %
NEUTROPHILS # BLD AUTO: 3 K/UL
NEUTROPHILS NFR BLD: 65.7 %
PLATELET # BLD AUTO: 168 K/UL
PMV BLD AUTO: 9.3 FL
RBC # BLD AUTO: 4.51 M/UL
WBC # BLD AUTO: 4.54 K/UL

## 2017-08-28 PROCEDURE — 36415 COLL VENOUS BLD VENIPUNCTURE: CPT

## 2017-08-28 PROCEDURE — 85025 COMPLETE CBC W/AUTO DIFF WBC: CPT

## 2017-09-01 ENCOUNTER — RESEARCH ENCOUNTER (OUTPATIENT)
Dept: RESEARCH | Facility: HOSPITAL | Age: 62
End: 2017-09-01

## 2017-09-01 ENCOUNTER — OFFICE VISIT (OUTPATIENT)
Dept: GYNECOLOGIC ONCOLOGY | Facility: CLINIC | Age: 62
End: 2017-09-01
Payer: COMMERCIAL

## 2017-09-01 ENCOUNTER — LAB VISIT (OUTPATIENT)
Dept: LAB | Facility: HOSPITAL | Age: 62
End: 2017-09-01
Attending: OBSTETRICS & GYNECOLOGY
Payer: COMMERCIAL

## 2017-09-01 VITALS
TEMPERATURE: 99 F | HEIGHT: 67 IN | HEART RATE: 85 BPM | BODY MASS INDEX: 31.94 KG/M2 | DIASTOLIC BLOOD PRESSURE: 80 MMHG | RESPIRATION RATE: 16 BRPM | WEIGHT: 203.5 LBS | SYSTOLIC BLOOD PRESSURE: 140 MMHG

## 2017-09-01 DIAGNOSIS — C56.9 OVARIAN CANCER, UNSPECIFIED LATERALITY: Primary | ICD-10-CM

## 2017-09-01 DIAGNOSIS — C56.9 OVARIAN CANCER, UNSPECIFIED LATERALITY: ICD-10-CM

## 2017-09-01 DIAGNOSIS — Z00.6 EXAMINATION OF PARTICIPANT IN CLINICAL TRIAL: ICD-10-CM

## 2017-09-01 DIAGNOSIS — Z51.11 ENCOUNTER FOR ANTINEOPLASTIC CHEMOTHERAPY: ICD-10-CM

## 2017-09-01 LAB
ALBUMIN SERPL BCP-MCNC: 3.9 G/DL
ALP SERPL-CCNC: 68 U/L
ALT SERPL W/O P-5'-P-CCNC: 20 U/L
ANION GAP SERPL CALC-SCNC: 8 MMOL/L
AST SERPL-CCNC: 19 U/L
BASOPHILS # BLD AUTO: 0.02 K/UL
BASOPHILS NFR BLD: 0.4 %
BILIRUB SERPL-MCNC: 0.4 MG/DL
BUN SERPL-MCNC: 17 MG/DL
CALCIUM SERPL-MCNC: 9.7 MG/DL
CHLORIDE SERPL-SCNC: 103 MMOL/L
CO2 SERPL-SCNC: 27 MMOL/L
CREAT SERPL-MCNC: 0.9 MG/DL
DIFFERENTIAL METHOD: ABNORMAL
EOSINOPHIL # BLD AUTO: 0.1 K/UL
EOSINOPHIL NFR BLD: 1 %
ERYTHROCYTE [DISTWIDTH] IN BLOOD BY AUTOMATED COUNT: 13.5 %
EST. GFR  (AFRICAN AMERICAN): >60 ML/MIN/1.73 M^2
EST. GFR  (NON AFRICAN AMERICAN): >60 ML/MIN/1.73 M^2
GLUCOSE SERPL-MCNC: 105 MG/DL
HCT VFR BLD AUTO: 41.5 %
HGB BLD-MCNC: 14.4 G/DL
LYMPHOCYTES # BLD AUTO: 1.4 K/UL
LYMPHOCYTES NFR BLD: 28.2 %
MAGNESIUM SERPL-MCNC: 2 MG/DL
MCH RBC QN AUTO: 33.3 PG
MCHC RBC AUTO-ENTMCNC: 34.7 G/DL
MCV RBC AUTO: 96 FL
MONOCYTES # BLD AUTO: 0.3 K/UL
MONOCYTES NFR BLD: 7.1 %
NEUTROPHILS # BLD AUTO: 3.1 K/UL
NEUTROPHILS NFR BLD: 63.3 %
PLATELET # BLD AUTO: 151 K/UL
PMV BLD AUTO: 9.8 FL
POTASSIUM SERPL-SCNC: 4.1 MMOL/L
PROT SERPL-MCNC: 7.4 G/DL
RBC # BLD AUTO: 4.33 M/UL
SODIUM SERPL-SCNC: 138 MMOL/L
WBC # BLD AUTO: 4.82 K/UL

## 2017-09-01 PROCEDURE — 99999 PR PBB SHADOW E&M-EST. PATIENT-LVL III: CPT | Mod: PBBFAC,,, | Performed by: OBSTETRICS & GYNECOLOGY

## 2017-09-01 PROCEDURE — 99214 OFFICE O/P EST MOD 30 MIN: CPT | Mod: S$PBB,,, | Performed by: OBSTETRICS & GYNECOLOGY

## 2017-09-01 PROCEDURE — 3008F BODY MASS INDEX DOCD: CPT | Mod: ,,, | Performed by: OBSTETRICS & GYNECOLOGY

## 2017-09-01 PROCEDURE — 83735 ASSAY OF MAGNESIUM: CPT

## 2017-09-01 PROCEDURE — 36415 COLL VENOUS BLD VENIPUNCTURE: CPT

## 2017-09-01 PROCEDURE — 80053 COMPREHEN METABOLIC PANEL: CPT

## 2017-09-01 PROCEDURE — 85025 COMPLETE CBC W/AUTO DIFF WBC: CPT

## 2017-09-01 NOTE — PROGRESS NOTES
Subjective:      Patient ID: Mickie Don is a 62 y.o. female.    Chief Complaint: Follow-up  Treatment History  Xlap/Omentectomy on 11/1/16 - mass was unresectable  Stage IIIC ovarian cancer  Initiated Taxotere/Carbo on 11/18/16, now s/p 6 cycles completed 3/8/17  Xlap/ISIS/BSO/Radical mass resection > 10 cm, optimal debulking on 4/11/17  Initiated PRIMA on 8/21/17    HPI  Here today for evaluation for protocol.  Denies F/C, N/V, VB, neuropathy.  Labs reviewed and acceptable for continued treatment.  Overall without complaints.  PS 0  Review of Systems   Constitutional: Negative for activity change, appetite change, chills, fatigue and fever.   HENT: Negative for hearing loss, mouth sores, nosebleeds, sore throat and tinnitus.    Eyes: Negative for visual disturbance.   Respiratory: Negative for cough, chest tightness, shortness of breath and wheezing.    Cardiovascular: Negative for chest pain and leg swelling.   Gastrointestinal: Negative for abdominal distention, abdominal pain, blood in stool, constipation, diarrhea, nausea and vomiting.   Genitourinary: Negative for dysuria, flank pain, frequency, hematuria, pelvic pain, vaginal bleeding, vaginal discharge and vaginal pain.   Musculoskeletal: Negative for arthralgias and back pain.   Skin: Negative for rash.   Neurological: Negative for dizziness, seizures, syncope, weakness and numbness.   Hematological: Does not bruise/bleed easily.   Psychiatric/Behavioral: Negative for confusion and sleep disturbance. The patient is not nervous/anxious.         Objective:   Physical Exam:   Constitutional: She is oriented to person, place, and time. She appears well-developed and well-nourished. No distress.    HENT:   Head: Normocephalic and atraumatic.    Eyes: No scleral icterus.    Neck: Normal range of motion. Neck supple.    Cardiovascular: Normal rate and intact distal pulses.  Exam reveals no cyanosis and no edema.     Pulmonary/Chest: Effort normal. No  respiratory distress. She exhibits no tenderness.        Abdominal: Soft. Normal appearance. She exhibits no distension (midline incision well-healed), no fluid wave, no ascites and no mass. There is no tenderness. There is no rigidity, no rebound and no guarding. No hernia.             Musculoskeletal: Normal range of motion and moves all extremeties. She exhibits no edema or tenderness.      Lymphadenopathy:     She has no cervical adenopathy.    Neurological: She is alert and oriented to person, place, and time.    Skin: Skin is warm. No rash noted. No cyanosis or erythema.    Psychiatric: She has a normal mood and affect. Thought content normal.       Assessment:     1. Ovarian cancer, unspecified laterality    2. Encounter for antineoplastic chemotherapy        Plan:       Overall doing well.  Exam today unremarkable.  Continue treatment.

## 2017-09-01 NOTE — PROGRESS NOTES
"Sep. 1, 2017     Protocol: ZG--C/"PRIMA"  Investigator: JOSE ALFREDO Weston MD  Pt Initials: ARGENTINA BERMUDEZ.  Subject #: [id number]  IRB#: [id number]     A Phase 3, Randomized, Double-Blind, Placebo-Controlled, Multicenter Study of Niraparib Maintenance Treatment in Patients with Advanced Ovarian Cancer Following Response on Front-Line Platinum-Based Chemotherapy      C1D15  Patient presented to clinic for C1D15 visit on the above mentioned clinical trial.  Patient was randomized on 8/22/17 to either niraparib or placebo, 300 mg, QD.  Study is blinded and treatment assignment is not known.    Patient alert and oriented, mood and affect appropriate to the situation.  Patient reports she feels well.  Patient denies any nausea, vomiting, dizziness, constipation, diarrhea, shortness of breath, fever, or pain.  Patient reports productive cough, new since starting trial.  Patient reports bladder pain primarily after urination.  Patient states pain is not similar to UTI.  Patient stated she will call if feels UA is needed.  Patient is not taking any OTC or prescription medications.      Vitals, directed physical exam, ECOG assessment assessed 9/1/17.  All labs/vitals reviewed with patient by Dr. Tran and within range to continue treatment.  Physical exam unremarkable per physician.      All of the patient's questions have been addressed by Dr. Tran and myself to her satisfaction. Patient expressed her continued willingness to participate in the above mentioned clinical trial.  Patient is scheduled to have CBC per protocol on 9/11/17 and C2D1 visit on 9/17/17.  Patient verbalized understanding.  Instructed the patient to notify myself and/or Dr. Tran with any questions and/or concerns. Patient verbalized understanding.      Baseline Medical History  1. Anxiety, Grade 1.  2. 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    3. Skin infection-left leg, Grade 2.  Patient took " doxycycline.  Resolved by C1D1.  4. Dermatofibroma.  Benign.  Diagnosed by biopsy on 7/17/17.  No further intervention needed.      Adverse Events  1) Cough-productive, Grade 1.  Will continue to monitor.  2) Bladder pain, Grade 1.  Will continue to monitor.

## 2017-09-11 ENCOUNTER — LAB VISIT (OUTPATIENT)
Dept: LAB | Facility: HOSPITAL | Age: 62
End: 2017-09-11
Attending: OBSTETRICS & GYNECOLOGY
Payer: COMMERCIAL

## 2017-09-11 DIAGNOSIS — C56.9 OVARIAN CANCER, UNSPECIFIED LATERALITY: ICD-10-CM

## 2017-09-11 DIAGNOSIS — Z00.6 EXAMINATION OF PARTICIPANT IN CLINICAL TRIAL: ICD-10-CM

## 2017-09-11 LAB
BASOPHILS # BLD AUTO: 0.02 K/UL
BASOPHILS NFR BLD: 0.5 %
DIFFERENTIAL METHOD: ABNORMAL
EOSINOPHIL # BLD AUTO: 0 K/UL
EOSINOPHIL NFR BLD: 1.1 %
ERYTHROCYTE [DISTWIDTH] IN BLOOD BY AUTOMATED COUNT: 13.2 %
HCT VFR BLD AUTO: 42 %
HGB BLD-MCNC: 14.5 G/DL
LYMPHOCYTES # BLD AUTO: 1 K/UL
LYMPHOCYTES NFR BLD: 25.9 %
MCH RBC QN AUTO: 33.1 PG
MCHC RBC AUTO-ENTMCNC: 34.5 G/DL
MCV RBC AUTO: 96 FL
MONOCYTES # BLD AUTO: 0.2 K/UL
MONOCYTES NFR BLD: 5.9 %
NEUTROPHILS # BLD AUTO: 2.5 K/UL
NEUTROPHILS NFR BLD: 66.6 %
PLATELET # BLD AUTO: 103 K/UL
PMV BLD AUTO: 10 FL
RBC # BLD AUTO: 4.38 M/UL
WBC # BLD AUTO: 3.75 K/UL

## 2017-09-11 PROCEDURE — 85025 COMPLETE CBC W/AUTO DIFF WBC: CPT

## 2017-09-11 PROCEDURE — 36415 COLL VENOUS BLD VENIPUNCTURE: CPT

## 2017-09-18 ENCOUNTER — OFFICE VISIT (OUTPATIENT)
Dept: GYNECOLOGIC ONCOLOGY | Facility: CLINIC | Age: 62
End: 2017-09-18
Payer: COMMERCIAL

## 2017-09-18 ENCOUNTER — RESEARCH ENCOUNTER (OUTPATIENT)
Dept: RESEARCH | Facility: HOSPITAL | Age: 62
End: 2017-09-18

## 2017-09-18 ENCOUNTER — LAB VISIT (OUTPATIENT)
Dept: LAB | Facility: HOSPITAL | Age: 62
End: 2017-09-18
Attending: OBSTETRICS & GYNECOLOGY
Payer: COMMERCIAL

## 2017-09-18 VITALS
WEIGHT: 200.38 LBS | RESPIRATION RATE: 17 BRPM | HEART RATE: 88 BPM | HEIGHT: 67 IN | BODY MASS INDEX: 31.45 KG/M2 | TEMPERATURE: 97 F | DIASTOLIC BLOOD PRESSURE: 67 MMHG | SYSTOLIC BLOOD PRESSURE: 140 MMHG

## 2017-09-18 DIAGNOSIS — C56.9 OVARIAN CANCER, UNSPECIFIED LATERALITY: ICD-10-CM

## 2017-09-18 DIAGNOSIS — Z00.6 EXAMINATION OF PARTICIPANT IN CLINICAL TRIAL: ICD-10-CM

## 2017-09-18 DIAGNOSIS — Z51.11 ENCOUNTER FOR ANTINEOPLASTIC CHEMOTHERAPY: ICD-10-CM

## 2017-09-18 DIAGNOSIS — C56.9 OVARIAN CANCER, UNSPECIFIED LATERALITY: Primary | ICD-10-CM

## 2017-09-18 LAB
ALBUMIN SERPL BCP-MCNC: 3.7 G/DL
ALP SERPL-CCNC: 72 U/L
ALT SERPL W/O P-5'-P-CCNC: 24 U/L
ANION GAP SERPL CALC-SCNC: 8 MMOL/L
AST SERPL-CCNC: 23 U/L
BASOPHILS # BLD AUTO: 0.02 K/UL
BASOPHILS NFR BLD: 0.6 %
BILIRUB SERPL-MCNC: 0.5 MG/DL
BUN SERPL-MCNC: 14 MG/DL
CALCIUM SERPL-MCNC: 9.3 MG/DL
CANCER AG125 SERPL-ACNC: 46 U/ML
CHLORIDE SERPL-SCNC: 104 MMOL/L
CO2 SERPL-SCNC: 28 MMOL/L
CREAT SERPL-MCNC: 0.8 MG/DL
DIFFERENTIAL METHOD: ABNORMAL
EOSINOPHIL # BLD AUTO: 0.1 K/UL
EOSINOPHIL NFR BLD: 1.6 %
ERYTHROCYTE [DISTWIDTH] IN BLOOD BY AUTOMATED COUNT: 13.4 %
EST. GFR  (AFRICAN AMERICAN): >60 ML/MIN/1.73 M^2
EST. GFR  (NON AFRICAN AMERICAN): >60 ML/MIN/1.73 M^2
GLUCOSE SERPL-MCNC: 97 MG/DL
HCT VFR BLD AUTO: 39.5 %
HGB BLD-MCNC: 13.5 G/DL
LYMPHOCYTES # BLD AUTO: 1.1 K/UL
LYMPHOCYTES NFR BLD: 33.2 %
MAGNESIUM SERPL-MCNC: 2.1 MG/DL
MCH RBC QN AUTO: 32.8 PG
MCHC RBC AUTO-ENTMCNC: 34.2 G/DL
MCV RBC AUTO: 96 FL
MONOCYTES # BLD AUTO: 0.3 K/UL
MONOCYTES NFR BLD: 7.8 %
NEUTROPHILS # BLD AUTO: 1.8 K/UL
NEUTROPHILS NFR BLD: 56.5 %
PLATELET # BLD AUTO: 103 K/UL
PMV BLD AUTO: 9.7 FL
POTASSIUM SERPL-SCNC: 4.5 MMOL/L
PROT SERPL-MCNC: 6.8 G/DL
RBC # BLD AUTO: 4.11 M/UL
SODIUM SERPL-SCNC: 140 MMOL/L
WBC # BLD AUTO: 3.19 K/UL

## 2017-09-18 PROCEDURE — 83735 ASSAY OF MAGNESIUM: CPT

## 2017-09-18 PROCEDURE — 85025 COMPLETE CBC W/AUTO DIFF WBC: CPT

## 2017-09-18 PROCEDURE — 99214 OFFICE O/P EST MOD 30 MIN: CPT | Mod: S$GLB,,, | Performed by: OBSTETRICS & GYNECOLOGY

## 2017-09-18 PROCEDURE — 99999 PR PBB SHADOW E&M-EST. PATIENT-LVL III: CPT | Mod: PBBFAC,,, | Performed by: OBSTETRICS & GYNECOLOGY

## 2017-09-18 PROCEDURE — 80053 COMPREHEN METABOLIC PANEL: CPT

## 2017-09-18 PROCEDURE — 86304 IMMUNOASSAY TUMOR CA 125: CPT

## 2017-09-18 PROCEDURE — 3008F BODY MASS INDEX DOCD: CPT | Mod: S$GLB,,, | Performed by: OBSTETRICS & GYNECOLOGY

## 2017-09-18 PROCEDURE — 36415 COLL VENOUS BLD VENIPUNCTURE: CPT

## 2017-09-18 NOTE — PROGRESS NOTES
Subjective:      Patient ID: Mickie Don is a 62 y.o. female.    Chief Complaint: Chemotherapy (C2/D1/Prima Trial ) and Ovarian Cancer  Treatment History  Xlap/Omentectomy on 11/1/16 - mass was unresectable  Stage IIIC ovarian cancer  Initiated Taxotere/Carbo on 11/18/16, now s/p 6 cycles completed 3/8/17  Xlap/ISIS/BSO/Radical mass resection > 10 cm, optimal debulking on 4/11/17  Initiated PRIMA on 8/21/17    HPI  Here today for evaluation for protocol, C2D1.  Denies F/C, N/V, VB, neuropathy.  Labs reviewed and acceptable for continued treatment, CA-125 has increased to 46 from 25, plt count borderline at 103 but ok for treatment.  Overall without complaints.  PS 0  Review of Systems   Constitutional: Negative for activity change, appetite change, chills, fatigue and fever.   HENT: Negative for hearing loss, mouth sores, nosebleeds, sore throat and tinnitus.    Eyes: Negative for visual disturbance.   Respiratory: Negative for cough, chest tightness, shortness of breath and wheezing.    Cardiovascular: Negative for chest pain and leg swelling.   Gastrointestinal: Negative for abdominal distention, abdominal pain, blood in stool, constipation, diarrhea, nausea and vomiting.   Genitourinary: Negative for dysuria, flank pain, frequency, hematuria, pelvic pain, vaginal bleeding, vaginal discharge and vaginal pain.   Musculoskeletal: Negative for arthralgias and back pain.   Skin: Negative for rash.   Neurological: Negative for dizziness, seizures, syncope, weakness and numbness.   Hematological: Does not bruise/bleed easily.   Psychiatric/Behavioral: Negative for confusion and sleep disturbance. The patient is not nervous/anxious.         Objective:   Physical Exam:   Constitutional: She is oriented to person, place, and time. She appears well-developed and well-nourished. No distress.    HENT:   Head: Normocephalic and atraumatic.    Eyes: No scleral icterus.    Neck: Normal range of motion. Neck supple.     Cardiovascular: Normal rate and intact distal pulses.  Exam reveals no cyanosis and no edema.     Pulmonary/Chest: Effort normal. No respiratory distress. She exhibits no tenderness.        Abdominal: Soft. Normal appearance. She exhibits no distension (midline incision well-healed), no fluid wave, no ascites and no mass. There is no tenderness. There is no rigidity, no rebound and no guarding. No hernia.             Musculoskeletal: Normal range of motion and moves all extremeties. She exhibits no edema or tenderness.      Lymphadenopathy:     She has no cervical adenopathy.    Neurological: She is alert and oriented to person, place, and time.    Skin: Skin is warm. No rash noted. No cyanosis or erythema.    Psychiatric: She has a normal mood and affect. Thought content normal.       Assessment:     1. Ovarian cancer, unspecified laterality    2. Encounter for antineoplastic chemotherapy        Plan:       Overall doing well.  Exam today unremarkable.  Continue treatment.  Will monitor CA-125.  Scan if exceeds 2x upper limit of normal.

## 2017-09-18 NOTE — PROGRESS NOTES
Sep. 18, 2017     Protocol: EV--C  /PRIMA  Investigator: JOSE ALFREDO Weston MD  Pt Initials: AIDAN LILOPaolaASHLEYPaola  Subject #: [id number]  IRB#: [id number].C     A Phase 3, Randomized, Double-Blind, Placebo-Controlled, Multicenter Study of Niraparib Maintenance Treatment in Patients with Advanced Ovarian Cancer Following Response on Front-Line Platinum-Based Chemotherapy      C2D1  Patient presented to clinic for C2D1 visit on the above mentioned clinical trial.  Patient was randomized on 8/22/17 to niraparib/placebo, 300 mg, QD.  Study is blinded and treatment assignment is not known.    Patient alert and oriented, mood and affect appropriate to the situation.  Patient reports she feels well.  Patient denies any nausea, vomiting, dizziness, constipation, diarrhea, shortness of breath, fever, or pain.  Productive cough resolved.  Bladder pain stable Patient stated she will call if feels UA is needed.  Patient is not taking any OTC or prescription medications.  Patients platelet count = 103 today.  Will repeat CBC in 2 weeks.    Vitals, directed physical exam, ECOG assessment assessed 9/18/17.  All labs/vitals reviewed with patient by Dr. Tran and within range to continue treatment.  ECOG = 0 per physician.  Physical exam unremarkable per physician.      Patient mis-understood PK schedule and refused pre-dose PK blood draw and C2D1 PKs were missed.    Patient issued new study drug - bottle assignment is [id number].  Confirmed bottle number matched assignment before giving to patient.  Patient returned C1 bottle with 6 pills returned.  C1D1 - C1D28 = 28days x 3 = 84 capsules taken + 3 taken for C2D1 (9/18/17) = 87 capsules taken.  93 capsules dispensed on 8/21/17 - 87 = 6 capsules expected returned.  Pill count is correct.      Patients  = 49 today (25 at Screening).  Per protocol and physician, will get CT if  ? 2X ULN.  All of the patient's questions have been addressed by Dr. Tran and me to her satisfaction. Patient  expressed her continued willingness to participate in the above mentioned clinical trial.  Patient is scheduled to have CBC per protocol on 9/11/17 and C2D1 visit on 9/17/17.  Patient verbalized understanding.  Instructed the patient to notify myself and/or Dr. Tran with any questions and/or concerns. Patient verbalized understanding.  Baseline Medical History  1. Anxiety, Grade 1.  2. 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    3. Skin infection-left leg, Grade 2.  Patient took doxycycline.  Resolved by C1D1.  4. Dermatofibroma.  Benign.  Diagnosed by biopsy on 7/17/17.  No further intervention needed.    Adverse Events  1) Cough-productive, resolved from Grade 1.  Will continue to monitor.  2) Bladder pain, remains Grade 1.  Will continue to monitor.  3) Thrombocytopenia/decreased platelet count, Grade 1.  Dose delay/adjustment when below 100k.  Will recheck in 2 weeks.  4) Leukopenia/decreased WBC. Grade 1.  Will recheck in 2 weeks.

## 2017-10-02 ENCOUNTER — LAB VISIT (OUTPATIENT)
Dept: LAB | Facility: HOSPITAL | Age: 62
End: 2017-10-02
Attending: OBSTETRICS & GYNECOLOGY
Payer: COMMERCIAL

## 2017-10-02 DIAGNOSIS — Z00.6 EXAMINATION OF PARTICIPANT IN CLINICAL TRIAL: ICD-10-CM

## 2017-10-02 DIAGNOSIS — C56.9 OVARIAN CANCER, UNSPECIFIED LATERALITY: ICD-10-CM

## 2017-10-02 LAB
BASOPHILS # BLD AUTO: 0.03 K/UL
BASOPHILS NFR BLD: 0.6 %
DIFFERENTIAL METHOD: ABNORMAL
EOSINOPHIL # BLD AUTO: 0 K/UL
EOSINOPHIL NFR BLD: 0.6 %
ERYTHROCYTE [DISTWIDTH] IN BLOOD BY AUTOMATED COUNT: 13.8 %
HCT VFR BLD AUTO: 42.7 %
HGB BLD-MCNC: 14.6 G/DL
LYMPHOCYTES # BLD AUTO: 1.3 K/UL
LYMPHOCYTES NFR BLD: 27.4 %
MCH RBC QN AUTO: 33.2 PG
MCHC RBC AUTO-ENTMCNC: 34.2 G/DL
MCV RBC AUTO: 97 FL
MONOCYTES # BLD AUTO: 0.4 K/UL
MONOCYTES NFR BLD: 8.3 %
NEUTROPHILS # BLD AUTO: 3 K/UL
NEUTROPHILS NFR BLD: 63.1 %
PLATELET # BLD AUTO: 121 K/UL
PMV BLD AUTO: 9.1 FL
RBC # BLD AUTO: 4.4 M/UL
WBC # BLD AUTO: 4.71 K/UL

## 2017-10-02 PROCEDURE — 85025 COMPLETE CBC W/AUTO DIFF WBC: CPT

## 2017-10-02 PROCEDURE — 36415 COLL VENOUS BLD VENIPUNCTURE: CPT

## 2017-10-16 ENCOUNTER — LAB VISIT (OUTPATIENT)
Dept: LAB | Facility: HOSPITAL | Age: 62
End: 2017-10-16
Attending: OBSTETRICS & GYNECOLOGY
Payer: COMMERCIAL

## 2017-10-16 ENCOUNTER — OFFICE VISIT (OUTPATIENT)
Dept: GYNECOLOGIC ONCOLOGY | Facility: CLINIC | Age: 62
End: 2017-10-16
Payer: COMMERCIAL

## 2017-10-16 ENCOUNTER — RESEARCH ENCOUNTER (OUTPATIENT)
Dept: RESEARCH | Facility: HOSPITAL | Age: 62
End: 2017-10-16

## 2017-10-16 VITALS
SYSTOLIC BLOOD PRESSURE: 137 MMHG | HEIGHT: 67 IN | HEART RATE: 87 BPM | TEMPERATURE: 97 F | RESPIRATION RATE: 17 BRPM | WEIGHT: 199.31 LBS | BODY MASS INDEX: 31.28 KG/M2 | DIASTOLIC BLOOD PRESSURE: 61 MMHG

## 2017-10-16 DIAGNOSIS — C56.9 OVARIAN CANCER, UNSPECIFIED LATERALITY: ICD-10-CM

## 2017-10-16 DIAGNOSIS — Z00.6 EXAMINATION OF PARTICIPANT IN CLINICAL TRIAL: ICD-10-CM

## 2017-10-16 DIAGNOSIS — C56.9 MALIGNANT NEOPLASM OF OVARY, UNSPECIFIED LATERALITY: ICD-10-CM

## 2017-10-16 DIAGNOSIS — Z51.11 ENCOUNTER FOR ANTINEOPLASTIC CHEMOTHERAPY: Primary | ICD-10-CM

## 2017-10-16 LAB
ALBUMIN SERPL BCP-MCNC: 4.1 G/DL
ALP SERPL-CCNC: 77 U/L
ALT SERPL W/O P-5'-P-CCNC: 23 U/L
ANION GAP SERPL CALC-SCNC: 8 MMOL/L
AST SERPL-CCNC: 23 U/L
BILIRUB SERPL-MCNC: 0.5 MG/DL
BILIRUB UR QL STRIP: NEGATIVE
BUN SERPL-MCNC: 16 MG/DL
CALCIUM SERPL-MCNC: 9.5 MG/DL
CANCER AG125 SERPL-ACNC: 56 U/ML
CHLORIDE SERPL-SCNC: 103 MMOL/L
CLARITY UR REFRACT.AUTO: CLEAR
CO2 SERPL-SCNC: 28 MMOL/L
COLOR UR AUTO: YELLOW
CREAT SERPL-MCNC: 0.8 MG/DL
ERYTHROCYTE [DISTWIDTH] IN BLOOD BY AUTOMATED COUNT: 14.2 %
EST. GFR  (AFRICAN AMERICAN): >60 ML/MIN/1.73 M^2
EST. GFR  (NON AFRICAN AMERICAN): >60 ML/MIN/1.73 M^2
GLUCOSE SERPL-MCNC: 103 MG/DL
GLUCOSE UR QL STRIP: NEGATIVE
HCT VFR BLD AUTO: 42.1 %
HGB BLD-MCNC: 14.6 G/DL
HGB UR QL STRIP: ABNORMAL
KETONES UR QL STRIP: NEGATIVE
LEUKOCYTE ESTERASE UR QL STRIP: NEGATIVE
MAGNESIUM SERPL-MCNC: 2.3 MG/DL
MCH RBC QN AUTO: 33.5 PG
MCHC RBC AUTO-ENTMCNC: 34.7 G/DL
MCV RBC AUTO: 97 FL
MICROSCOPIC COMMENT: NORMAL
NEUTROPHILS # BLD AUTO: 2.1 K/UL
NITRITE UR QL STRIP: NEGATIVE
PH UR STRIP: 5 [PH] (ref 5–8)
PLATELET # BLD AUTO: 105 K/UL
PMV BLD AUTO: 9.3 FL
POTASSIUM SERPL-SCNC: 3.9 MMOL/L
PROT SERPL-MCNC: 7.3 G/DL
PROT UR QL STRIP: NEGATIVE
RBC # BLD AUTO: 4.36 M/UL
RBC #/AREA URNS AUTO: 3 /HPF (ref 0–4)
SODIUM SERPL-SCNC: 139 MMOL/L
SP GR UR STRIP: 1.02 (ref 1–1.03)
SQUAMOUS #/AREA URNS AUTO: 0 /HPF
URN SPEC COLLECT METH UR: ABNORMAL
UROBILINOGEN UR STRIP-ACNC: NEGATIVE EU/DL
WBC # BLD AUTO: 3.62 K/UL

## 2017-10-16 PROCEDURE — 99999 PR PBB SHADOW E&M-EST. PATIENT-LVL III: CPT | Mod: PBBFAC,,, | Performed by: OBSTETRICS & GYNECOLOGY

## 2017-10-16 PROCEDURE — 81001 URINALYSIS AUTO W/SCOPE: CPT

## 2017-10-16 PROCEDURE — 83735 ASSAY OF MAGNESIUM: CPT

## 2017-10-16 PROCEDURE — 99214 OFFICE O/P EST MOD 30 MIN: CPT | Mod: S$GLB,,, | Performed by: OBSTETRICS & GYNECOLOGY

## 2017-10-16 PROCEDURE — 86304 IMMUNOASSAY TUMOR CA 125: CPT

## 2017-10-16 PROCEDURE — 85027 COMPLETE CBC AUTOMATED: CPT

## 2017-10-16 PROCEDURE — 80053 COMPREHEN METABOLIC PANEL: CPT

## 2017-10-16 PROCEDURE — 36415 COLL VENOUS BLD VENIPUNCTURE: CPT

## 2017-10-16 NOTE — PROGRESS NOTES
Subjective:      Patient ID: Mickie Don is a 62 y.o. female.    Chief Complaint: No chief complaint on file.  Treatment History  Xlap/Omentectomy on 11/1/16 - mass was unresectable  Stage IIIC ovarian cancer  Initiated Taxotere/Carbo on 11/18/16, now s/p 6 cycles completed 3/8/17  Xlap/ISIS/BSO/Radical mass resection > 10 cm, optimal debulking on 4/11/17  Initiated PRIMA on 8/21/17    HPI  Here today for evaluation for protocol, C3D1.  Denies F/C, N/V, VB, neuropathy.  Labs reviewed and acceptable for continued treatment, CA-125 has increased again from 46 to 56, plt count borderline at 105 but ok for treatment.  Remainder of labs are appropriate for treatment.  Overall without complaints.  PS 0  Review of Systems   Constitutional: Negative for activity change, appetite change, chills, fatigue and fever.   HENT: Negative for hearing loss, mouth sores, nosebleeds, sore throat and tinnitus.    Eyes: Negative for visual disturbance.   Respiratory: Negative for cough, chest tightness, shortness of breath and wheezing.    Cardiovascular: Negative for chest pain and leg swelling.   Gastrointestinal: Negative for abdominal distention, abdominal pain, blood in stool, constipation, diarrhea, nausea and vomiting.   Genitourinary: Negative for dysuria, flank pain, frequency, hematuria, pelvic pain, vaginal bleeding, vaginal discharge and vaginal pain.   Musculoskeletal: Negative for arthralgias and back pain.   Skin: Negative for rash.   Neurological: Negative for dizziness, seizures, syncope, weakness and numbness.   Hematological: Does not bruise/bleed easily.   Psychiatric/Behavioral: Negative for confusion and sleep disturbance. The patient is not nervous/anxious.         Objective:   Physical Exam:   Constitutional: She is oriented to person, place, and time. She appears well-developed and well-nourished. No distress.    HENT:   Head: Normocephalic and atraumatic.    Eyes: No scleral icterus.    Neck: Normal range of  motion. Neck supple.    Cardiovascular: Normal rate and intact distal pulses.  Exam reveals no cyanosis and no edema.     Pulmonary/Chest: Effort normal. No respiratory distress. She exhibits no tenderness.        Abdominal: Soft. Normal appearance. She exhibits no distension (midline incision well-healed), no fluid wave, no ascites and no mass. There is no tenderness. There is no rigidity, no rebound and no guarding. No hernia.             Musculoskeletal: Normal range of motion and moves all extremeties. She exhibits no edema or tenderness.      Lymphadenopathy:     She has no cervical adenopathy.    Neurological: She is alert and oriented to person, place, and time.    Skin: Skin is warm. No rash noted. No cyanosis or erythema.    Psychiatric: She has a normal mood and affect. Thought content normal.       Assessment:     1. Encounter for antineoplastic chemotherapy    2. Examination of participant in clinical trial    3. Malignant neoplasm of ovary, unspecified laterality    4. Ovarian cancer, unspecified laterality        Plan:       Overall doing well. Rising CA-125 now for 3 draws.  Will therefore move scans up from scheduled 12 week surveillance.  Continue meds pending scans.

## 2017-10-19 ENCOUNTER — HOSPITAL ENCOUNTER (OUTPATIENT)
Dept: RADIOLOGY | Facility: OTHER | Age: 62
Discharge: HOME | End: 2017-10-19
Attending: OBSTETRICS & GYNECOLOGY
Payer: COMMERCIAL

## 2017-10-19 DIAGNOSIS — Z00.6 EXAMINATION OF PARTICIPANT IN CLINICAL TRIAL: ICD-10-CM

## 2017-10-19 DIAGNOSIS — C56.9 OVARIAN CANCER, UNSPECIFIED LATERALITY: ICD-10-CM

## 2017-10-19 PROCEDURE — 25500020 PHARM REV CODE 255: Performed by: OBSTETRICS & GYNECOLOGY

## 2017-10-19 PROCEDURE — 74177 CT ABD & PELVIS W/CONTRAST: CPT | Mod: TC

## 2017-10-19 PROCEDURE — 71260 CT THORAX DX C+: CPT | Mod: 26,,, | Performed by: RADIOLOGY

## 2017-10-19 PROCEDURE — 74177 CT ABD & PELVIS W/CONTRAST: CPT | Mod: 26,,, | Performed by: RADIOLOGY

## 2017-10-19 PROCEDURE — 71260 CT THORAX DX C+: CPT | Mod: TC

## 2017-10-19 RX ADMIN — IOHEXOL 100 ML: 350 INJECTION, SOLUTION INTRAVENOUS at 01:10

## 2017-10-19 RX ADMIN — IOHEXOL 25 ML: 350 INJECTION, SOLUTION INTRAVENOUS at 01:10

## 2017-10-19 NOTE — PROGRESS NOTES
Oct. 16, 2017     Protocol: WF--C  /PRIMA  Investigator: JOSE ALFREDO Weston MD  Pt Initials: AIDANNAV  Subject #: [id number]  IRB#: [id number].C     A Phase 3, Randomized, Double-Blind, Placebo-Controlled, Multicenter Study of Niraparib Maintenance Treatment in Patients with Advanced Ovarian Cancer Following Response on Front-Line Platinum-Based Chemotherapy      C3D1  Patient presented to clinic for C3D1 visit on the above mentioned clinical trial.  Patient was randomized on 8/22/17 to niraparib/placebo, 300 mg, QD.  Study is blinded and treatment assignment is not known.    Patient alert and oriented, mood and affect appropriate to the situation.  Patient reports she feels well.  Patient denies any nausea, vomiting, dizziness, diarrhea, shortness of breath, fever.  Productive cough resolved.  Patient reports intermittent constipation.  Not taking any OTC med for condition.  Patient also experiencing pain in left calf, no swelling, discoloration, erythema - states feels hot and comes and goes.   No action taken at this time.  Bladder pain resolved.  Patients platelet count = 105 today.  Will repeat CBC in 2 weeks.    Vitals, directed physical exam, ECOG assessment assessed 10/16/17.  QoLs completed by patient prior to the visit.  All labs/vitals reviewed with patient by Dr. Tran and within range to continue treatment.  ECOG = 0 per physician.  Physical exam unremarkable per physician.      Patient issued new study drug - bottle assignment is [id number].  Confirmed bottle number matched assignment before giving to patient.  Patient returned C2 bottle with 18 pills returned.  9/19/17 - 10/15/17 = 27d x 3 = 81 capsules taken.  93 capsules dispensed on 9/18/17 - 81 = 12 capsules expected returned.  Patient does not recall missing any doses.  Reminded patient dose is 3 pills per day.      Patients  = 56 today (25 at Screening).  Per protocol and physician, will get CT if  ? 2X ULN however Dr Tran feels CT is  warrented at this time as  has risen over last 3 assessments.  All of the patient's questions have been addressed by Dr. Tran and me to her satisfaction. Patient expressed her continued willingness to participate in the above mentioned clinical trial.  Patient is scheduled to have CT on 10/19/17.  Patient verbalized understanding.  Instructed the patient to notify myself and/or Dr. Tran with any questions and/or concerns. Patient verbalized understanding.    Baseline Medical History  1. Anxiety, Grade 1.  2. 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    3. Skin infection-left leg, Grade 2.  Patient took doxycycline.  Resolved by C1D1.  4. Dermatofibroma.  Benign.  Diagnosed by biopsy on 7/17/17.  No further intervention needed.      Adverse Events  1) Cough-productive, resolved from Grade 1.  Will continue to monitor.  2) Bladder pain, remains Grade 1.  Will continue to monitor.  3) Pain - left calf, Grade 1.  Will continue to monitor.  4) Constipation - intermittent, Grade 1.  Will continue to monitor.

## 2017-10-25 ENCOUNTER — TELEPHONE (OUTPATIENT)
Dept: GYNECOLOGIC ONCOLOGY | Facility: CLINIC | Age: 62
End: 2017-10-25

## 2017-10-25 NOTE — TELEPHONE ENCOUNTER
Spoke with Ms. Arriazanes regarding her recent CT scan.  We ordered this sooner than protocol would have her scheduled due to a rising CA-125.  CT revealed 4 mm progression of a LUQ peritoneal implant.  In the face of her CA-125 and CT, will have her come off of protocol.  Spoke with her about Doxil/Avastin doublet versus evaluation for FORWARD1 folate receptor trial.  She would like to pursue eligibility.  All questions answered.

## 2017-10-27 ENCOUNTER — TELEPHONE (OUTPATIENT)
Dept: OBSTETRICS AND GYNECOLOGY | Facility: CLINIC | Age: 62
End: 2017-10-27

## 2017-10-27 DIAGNOSIS — C56.9 MALIGNANT NEOPLASM OF OVARY, UNSPECIFIED LATERALITY: Primary | ICD-10-CM

## 2017-10-27 DIAGNOSIS — Z00.6 EXAMINATION OF PARTICIPANT IN CLINICAL TRIAL: ICD-10-CM

## 2017-10-27 NOTE — TELEPHONE ENCOUNTER
Notified pt that paperwork has been received and will be reviewed by Dr. Maldonado next week. Pt voiced understanding.

## 2017-10-27 NOTE — TELEPHONE ENCOUNTER
----- Message from Zoe Betts LPN sent at 10/25/2017  4:53 PM CDT -----  Jes DAWSON Staff  Caller: Britany Parrish with [facility] (Today, 10:50 AM)         X  1st Request   _  2nd Request   _  3rd Request         Who: Britany Parrish with [facility]     Why: Requesting a call back in regards to medical records request on behalf of pt that was mailed to Christy Buckley on 09/25/17 & 10/17/17.  Britany says that she have not gotten a response to back and would like for someone to contact her office.  Britany says that she will also fax over the request to fax number [phone] which was provided to her.  Please contact Britany to further discuss and advise.     What Number to Call Back: [phone]     When to Expect a call back: (Within 24 hours)     Please return the call at earliest convenience. Thanks!

## 2017-10-30 ENCOUNTER — RESEARCH ENCOUNTER (OUTPATIENT)
Dept: RESEARCH | Facility: HOSPITAL | Age: 62
End: 2017-10-30

## 2017-10-30 ENCOUNTER — LAB VISIT (OUTPATIENT)
Dept: LAB | Facility: OTHER | Age: 62
End: 2017-10-30
Attending: OBSTETRICS & GYNECOLOGY
Payer: COMMERCIAL

## 2017-10-30 ENCOUNTER — OFFICE VISIT (OUTPATIENT)
Dept: GYNECOLOGIC ONCOLOGY | Facility: CLINIC | Age: 62
End: 2017-10-30
Payer: COMMERCIAL

## 2017-10-30 VITALS
BODY MASS INDEX: 30.56 KG/M2 | HEART RATE: 94 BPM | WEIGHT: 194.69 LBS | HEIGHT: 67 IN | SYSTOLIC BLOOD PRESSURE: 134 MMHG | DIASTOLIC BLOOD PRESSURE: 81 MMHG

## 2017-10-30 DIAGNOSIS — Z51.11 ENCOUNTER FOR ANTINEOPLASTIC CHEMOTHERAPY: ICD-10-CM

## 2017-10-30 DIAGNOSIS — C56.9 OVARIAN CANCER, UNSPECIFIED LATERALITY: ICD-10-CM

## 2017-10-30 DIAGNOSIS — Z00.6 EXAMINATION OF PARTICIPANT IN CLINICAL TRIAL: ICD-10-CM

## 2017-10-30 DIAGNOSIS — C56.9 MALIGNANT NEOPLASM OF OVARY, UNSPECIFIED LATERALITY: Primary | ICD-10-CM

## 2017-10-30 DIAGNOSIS — C56.9 MALIGNANT NEOPLASM OF OVARY, UNSPECIFIED LATERALITY: ICD-10-CM

## 2017-10-30 LAB
ALBUMIN SERPL BCP-MCNC: 3.8 G/DL
ALP SERPL-CCNC: 78 U/L
ALT SERPL W/O P-5'-P-CCNC: 23 U/L
ANION GAP SERPL CALC-SCNC: 9 MMOL/L
AST SERPL-CCNC: 25 U/L
BILIRUB SERPL-MCNC: 0.8 MG/DL
BUN SERPL-MCNC: 7 MG/DL
CALCIUM SERPL-MCNC: 9.3 MG/DL
CANCER AG125 SERPL-ACNC: 64 U/ML
CHLORIDE SERPL-SCNC: 106 MMOL/L
CO2 SERPL-SCNC: 23 MMOL/L
CREAT SERPL-MCNC: 0.8 MG/DL
ERYTHROCYTE [DISTWIDTH] IN BLOOD BY AUTOMATED COUNT: 15 %
EST. GFR  (AFRICAN AMERICAN): >60 ML/MIN/1.73 M^2
EST. GFR  (NON AFRICAN AMERICAN): >60 ML/MIN/1.73 M^2
GLUCOSE SERPL-MCNC: 110 MG/DL
HCT VFR BLD AUTO: 42.5 %
HGB BLD-MCNC: 14.6 G/DL
MAGNESIUM SERPL-MCNC: 2 MG/DL
MCH RBC QN AUTO: 33.4 PG
MCHC RBC AUTO-ENTMCNC: 34.4 G/DL
MCV RBC AUTO: 97 FL
NEUTROPHILS # BLD AUTO: 1.7 K/UL
PLATELET # BLD AUTO: 99 K/UL
PMV BLD AUTO: 9.9 FL
POTASSIUM SERPL-SCNC: 4.4 MMOL/L
PROT SERPL-MCNC: 7.1 G/DL
RBC # BLD AUTO: 4.37 M/UL
SODIUM SERPL-SCNC: 138 MMOL/L
WBC # BLD AUTO: 2.79 K/UL

## 2017-10-30 PROCEDURE — 36415 COLL VENOUS BLD VENIPUNCTURE: CPT

## 2017-10-30 PROCEDURE — 80053 COMPREHEN METABOLIC PANEL: CPT

## 2017-10-30 PROCEDURE — 86304 IMMUNOASSAY TUMOR CA 125: CPT

## 2017-10-30 PROCEDURE — 85027 COMPLETE CBC AUTOMATED: CPT

## 2017-10-30 PROCEDURE — 83735 ASSAY OF MAGNESIUM: CPT

## 2017-10-30 PROCEDURE — 99999 PR PBB SHADOW E&M-EST. PATIENT-LVL II: CPT | Mod: PBBFAC,,, | Performed by: OBSTETRICS & GYNECOLOGY

## 2017-10-30 PROCEDURE — 99214 OFFICE O/P EST MOD 30 MIN: CPT | Mod: S$GLB,,, | Performed by: OBSTETRICS & GYNECOLOGY

## 2017-10-30 NOTE — PROGRESS NOTES
Subjective:      Patient ID: Mickie Don is a 62 y.o. female.    Chief Complaint: Follow-up  Treatment History  Xlap/Omentectomy on 11/1/16 - mass was unresectable  Stage IIIC ovarian cancer  Initiated Taxotere/Carbo on 11/18/16, now s/p 6 cycles completed 3/8/17  Xlap/ISIS/BSO/Radical mass resection > 10 cm, optimal debulking on 4/11/17  Initiated PRIMA on 8/21/17    HPI  Here today for evaluation for protocol exit exam.  Denies F/C, N/V, VB, neuropathy.  I spoke with her by phone last week to let her know she had progression on her scan, albeit minimal.  We discussed multiple options including evaluation for FORWARD1 and alternative traditional treatment with Doxil/Avastin. Overall without complaints.  PS 0  Review of Systems   Constitutional: Negative for activity change, appetite change, chills, fatigue and fever.   HENT: Negative for hearing loss, mouth sores, nosebleeds, sore throat and tinnitus.    Eyes: Negative for visual disturbance.   Respiratory: Negative for cough, chest tightness, shortness of breath and wheezing.    Cardiovascular: Negative for chest pain and leg swelling.   Gastrointestinal: Negative for abdominal distention, abdominal pain, blood in stool, constipation, diarrhea, nausea and vomiting.   Genitourinary: Negative for dysuria, flank pain, frequency, hematuria, pelvic pain, vaginal bleeding, vaginal discharge and vaginal pain.   Musculoskeletal: Negative for arthralgias and back pain.   Skin: Negative for rash.   Neurological: Negative for dizziness, seizures, syncope, weakness and numbness.   Hematological: Does not bruise/bleed easily.   Psychiatric/Behavioral: Negative for confusion and sleep disturbance. The patient is not nervous/anxious.         Objective:   Physical Exam:   Constitutional: She is oriented to person, place, and time. She appears well-developed and well-nourished. No distress.    HENT:   Head: Normocephalic and atraumatic.    Eyes: No scleral icterus.    Neck:  Normal range of motion. Neck supple.    Cardiovascular: Normal rate and intact distal pulses.  Exam reveals no cyanosis and no edema.     Pulmonary/Chest: Effort normal. No respiratory distress. She exhibits no tenderness.        Abdominal: Soft. Normal appearance. She exhibits no distension (midline incision well-healed), no fluid wave, no ascites and no mass. There is no tenderness. There is no rigidity, no rebound and no guarding. No hernia.             Musculoskeletal: Normal range of motion and moves all extremeties. She exhibits no edema or tenderness.      Lymphadenopathy:     She has no cervical adenopathy.    Neurological: She is alert and oriented to person, place, and time.    Skin: Skin is warm. No rash noted. No cyanosis or erythema.    Psychiatric: She has a normal mood and affect. Thought content normal.       Assessment:     1. Malignant neoplasm of ovary, unspecified laterality    2. Encounter for antineoplastic chemotherapy        Plan:       Overall doing well. Recurrence based on increasing CA-125 and increase in size of LUQ nodularity.  Answered questions related to timing of enrollment and how long receptor testing would take.  We again discussed Doxil monotherapy versus doublet with Avastin.  All questions were answered to her apparent satisfaction.  Will call with tissue testing results.

## 2017-10-31 VITALS
DIASTOLIC BLOOD PRESSURE: 80 MMHG | RESPIRATION RATE: 16 BRPM | SYSTOLIC BLOOD PRESSURE: 129 MMHG | HEART RATE: 90 BPM | TEMPERATURE: 98 F

## 2017-10-31 NOTE — PROGRESS NOTES
Oct. 30, 2017     Protocol: PM--C  /PRIMA  Investigator: JOSE ALFREDO Weston MD  Pt Initials: ТАТЬЯНАNAV Guevara  Subject #: [id number]  IRB#: 2017.011.C     A Phase 3, Randomized, Double-Blind, Placebo-Controlled, Multicenter Study of Niraparib Maintenance Treatment in Patients with Advanced Ovarian Cancer Following Response on Front-Line Platinum-Based Chemotherapy      EOT  Patient had  had risen from 9, 25, 46, 56, to 64 (7/10/17 - 10/30/17).    now > 2X ULN.  Dr. Tran had CT scan done prior protocol-specified date due to rising .  Local CT report states Peritoneal Space: Multiple irregular soft tissue density nodules inferior to the spleen are minimally increased in size, with the dominant lesion near the anterior inferior margin of the spleen measuring 1.3 cm in maximum transverse diameter, previously 0.9 cm.  Remainder of the nodules are subcentimeter in size.  Patient now has measurable disease per RECIST1.1 per local report.  Scans submitted to central imaging vendor, which issued a report for No Progression.  Per Dr Tran, results of CT scan and  indicate progression and maintenance therapy should be discontinued.    Patient presented to clinic for EOT visit on the above mentioned clinical trial.  Patient was randomized on 8/22/17 to niraparib/placebo, 300 mg, QD.  Study is blinded and treatment assignment is not known.    Patient alert and oriented, mood and affect appropriate to the situation.  Patient reports she feels well.  Patient denies any nausea, vomiting, dizziness, diarrhea, shortness of breath, fever.  Productive cough resolved.  Patient reports intermittent constipation.  Not taking any OTC med for condition.  Patient also experiencing pain in left calf, no swelling, discoloration, erythema - states feels hot and comes and goes.   No action taken at this time.  Bladder pain resolved.  Patients platelet count = 98 today.    Vitals, directed physical exam, ECOG assessment  assessed 10/30/17.  All labs/vitals reviewed with patient by Dr. Tran.  ECOG = 0 per physician.  Physical exam unremarkable per physician.  EOT labs will be drawn later in week.      Patient returned bottle # [id number].  54 pills remaining in bottle.  Patient states her last dose was 10/29/17.    All of the patient's questions have been addressed by Dr. Tran and me to her satisfaction. Patient expressed her continued willingness to participate in the above mentioned clinical trial.  Patient is scheduled to have CT on 10/19/17.  Patient verbalized understanding.  Instructed the patient to notify myself and/or Dr. Tran with any questions and/or concerns. Patient verbalized understanding.    Baseline Medical History  1. Anxiety, Grade 1.  2. 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    3. Skin infection-left leg, Grade 2.  Patient took doxycycline.  Resolved by C1D1.  4. Dermatofibroma.  Benign.  Diagnosed by biopsy on 7/17/17.  No further intervention needed.    Adverse Events  1) Cough-productive, resolved from Grade 1.  Will continue to monitor.  2) Bladder pain, resolved from Grade 1.  Will continue to monitor.  3) Pain - left calf, resolved from Grade 1.  Will continue to monitor.  4) Constipation - intermittent, remains Grade 1.  Will continue to monitor.  5) Thrombocytopenia, Grade 1.  Will continue to monitor.

## 2017-11-03 ENCOUNTER — LAB VISIT (OUTPATIENT)
Dept: LAB | Facility: HOSPITAL | Age: 62
End: 2017-11-03
Attending: OBSTETRICS & GYNECOLOGY
Payer: COMMERCIAL

## 2017-11-03 DIAGNOSIS — C56.9 OVARIAN CANCER, UNSPECIFIED LATERALITY: ICD-10-CM

## 2017-11-03 DIAGNOSIS — Z00.6 EXAMINATION OF PARTICIPANT IN CLINICAL TRIAL: ICD-10-CM

## 2017-11-03 LAB
DRUG STUDY SPECIMEN TYPE: NORMAL
DRUG STUDY TEST NAME: NORMAL
DRUG STUDY TEST RESULT: NORMAL

## 2017-11-03 PROCEDURE — 99000 SPECIMEN HANDLING OFFICE-LAB: CPT

## 2017-11-03 PROCEDURE — 36415 COLL VENOUS BLD VENIPUNCTURE: CPT

## 2017-11-08 ENCOUNTER — LAB VISIT (OUTPATIENT)
Dept: LAB | Facility: HOSPITAL | Age: 62
End: 2017-11-08
Attending: OBSTETRICS & GYNECOLOGY
Payer: COMMERCIAL

## 2017-11-08 DIAGNOSIS — C56.9 OVARIAN CANCER, UNSPECIFIED LATERALITY: ICD-10-CM

## 2017-11-08 LAB — CANCER AG125 SERPL-ACNC: 60 U/ML

## 2017-11-08 PROCEDURE — 36415 COLL VENOUS BLD VENIPUNCTURE: CPT

## 2017-11-08 PROCEDURE — 86304 IMMUNOASSAY TUMOR CA 125: CPT

## 2017-11-09 DIAGNOSIS — Z00.6 EXAMINATION OF PARTICIPANT IN CLINICAL TRIAL: ICD-10-CM

## 2017-11-09 DIAGNOSIS — C56.9 MALIGNANT NEOPLASM OF OVARY, UNSPECIFIED LATERALITY: Primary | ICD-10-CM

## 2017-11-10 DIAGNOSIS — C56.9 MALIGNANT NEOPLASM OF OVARY, UNSPECIFIED LATERALITY: Primary | ICD-10-CM

## 2017-11-10 DIAGNOSIS — Z00.6 EXAMINATION OF PARTICIPANT IN CLINICAL TRIAL: ICD-10-CM

## 2017-11-13 ENCOUNTER — RESEARCH ENCOUNTER (OUTPATIENT)
Dept: RESEARCH | Facility: HOSPITAL | Age: 62
End: 2017-11-13

## 2017-11-13 ENCOUNTER — PATIENT MESSAGE (OUTPATIENT)
Dept: GYNECOLOGIC ONCOLOGY | Facility: CLINIC | Age: 62
End: 2017-11-13

## 2017-11-13 ENCOUNTER — TELEPHONE (OUTPATIENT)
Dept: GYNECOLOGIC ONCOLOGY | Facility: CLINIC | Age: 62
End: 2017-11-13

## 2017-11-13 ENCOUNTER — HOSPITAL ENCOUNTER (OUTPATIENT)
Dept: PULMONOLOGY | Facility: CLINIC | Age: 62
Discharge: HOME | End: 2017-11-13
Payer: COMMERCIAL

## 2017-11-13 ENCOUNTER — HOSPITAL ENCOUNTER (OUTPATIENT)
Dept: CARDIOLOGY | Facility: CLINIC | Age: 62
Discharge: HOME | End: 2017-11-13
Attending: OBSTETRICS & GYNECOLOGY
Payer: COMMERCIAL

## 2017-11-13 DIAGNOSIS — C56.9 MALIGNANT NEOPLASM OF OVARY, UNSPECIFIED LATERALITY: ICD-10-CM

## 2017-11-13 DIAGNOSIS — Z00.6 EXAMINATION OF PARTICIPANT IN CLINICAL TRIAL: ICD-10-CM

## 2017-11-13 DIAGNOSIS — N30.90 CYSTITIS: Primary | ICD-10-CM

## 2017-11-13 LAB
DIASTOLIC DYSFUNCTION: NO
ESTIMATED PA SYSTOLIC PRESSURE: 24.34
MITRAL VALVE MOBILITY: NORMAL
MITRAL VALVE REGURGITATION: NORMAL
PRE FEV1 FVC: 78
PRE FEV1: 2.65
PRE FVC: 3.38
PREDICTED FEV1 FVC: 78
PREDICTED FEV1: 2.59
PREDICTED FVC: 3.3
RETIRED EF AND QEF - SEE NOTES: 60 (ref 55–65)
TRICUSPID VALVE REGURGITATION: NORMAL

## 2017-11-13 PROCEDURE — 93306 TTE W/DOPPLER COMPLETE: CPT | Mod: PBBFAC | Performed by: INTERNAL MEDICINE

## 2017-11-13 PROCEDURE — 94727 GAS DIL/WSHOT DETER LNG VOL: CPT | Mod: S$GLB,,, | Performed by: INTERNAL MEDICINE

## 2017-11-13 PROCEDURE — 94729 DIFFUSING CAPACITY: CPT | Mod: S$GLB,,, | Performed by: INTERNAL MEDICINE

## 2017-11-13 PROCEDURE — 94010 BREATHING CAPACITY TEST: CPT | Mod: S$GLB,,, | Performed by: INTERNAL MEDICINE

## 2017-11-13 RX ORDER — SULFAMETHOXAZOLE AND TRIMETHOPRIM 800; 160 MG/1; MG/1
1 TABLET ORAL 2 TIMES DAILY
Qty: 10 TABLET | Refills: 0 | Status: SHIPPED | OUTPATIENT
Start: 2017-11-13 | End: 2017-11-20

## 2017-11-13 NOTE — PROGRESS NOTES
Nov. 13, 2017     Protocol: FORWARD1/IMGN-0403  Investigator: BETH Weston  Treating Physician: GUERITA Tran Pt Initials: JOSE OCONNOR  Study ID: [id number]  IRB#: [id number]     FORWARD 1: A Randomized, Open Label Phase 3 Study to Evaluate the Safety and Efficacy of Mirvetuximab soravtansine (CLAD328) Versus Investigators Choice of Chemotherapy in Women with Folate Receptor á?positive Advanced Epithelial Ovarian Cancer, Primary Peritoneal Cancer or Fallopian Tube Cancer      Met with patient on 11/13/17 to go over informed consent document for MDWG501/FORWARD1 clinical trial.     Informed patient I will call with upcoming appointment for screening assessments.      Please click on the encounter link below to view attachments under media for consent details and process.

## 2017-11-13 NOTE — TELEPHONE ENCOUNTER
----- Message from Roxana Tarango sent at 11/13/2017  1:50 PM CST -----  Ms Mickie Don called to say she has UTI & needs antibiotic to be sent to [facility] # [id number]    [facility]# [id number]

## 2017-11-17 ENCOUNTER — LAB VISIT (OUTPATIENT)
Dept: LAB | Facility: HOSPITAL | Age: 62
End: 2017-11-17
Attending: OBSTETRICS & GYNECOLOGY
Payer: COMMERCIAL

## 2017-11-17 ENCOUNTER — OFFICE VISIT (OUTPATIENT)
Dept: OPHTHALMOLOGY | Facility: CLINIC | Age: 62
End: 2017-11-17
Payer: COMMERCIAL

## 2017-11-17 ENCOUNTER — HOSPITAL ENCOUNTER (OUTPATIENT)
Dept: CARDIOLOGY | Facility: CLINIC | Age: 62
Discharge: HOME | End: 2017-11-17
Payer: COMMERCIAL

## 2017-11-17 DIAGNOSIS — H40.013 OPEN ANGLE WITH BORDERLINE FINDINGS AND LOW GLAUCOMA RISK IN BOTH EYES: ICD-10-CM

## 2017-11-17 DIAGNOSIS — C56.1 MALIGNANT NEOPLASM OF RIGHT OVARY: Primary | ICD-10-CM

## 2017-11-17 DIAGNOSIS — Z00.6 EXAMINATION OF PARTICIPANT IN CLINICAL TRIAL: ICD-10-CM

## 2017-11-17 DIAGNOSIS — C56.9 OVARIAN CANCER, UNSPECIFIED LATERALITY: ICD-10-CM

## 2017-11-17 LAB
DRUG STUDY SPECIMEN TYPE: NORMAL
DRUG STUDY TEST NAME: NORMAL
DRUG STUDY TEST RESULT: NORMAL

## 2017-11-17 PROCEDURE — 99499 UNLISTED E&M SERVICE: CPT | Mod: S$GLB,,, | Performed by: OPHTHALMOLOGY

## 2017-11-17 PROCEDURE — 99000 SPECIMEN HANDLING OFFICE-LAB: CPT

## 2017-11-17 PROCEDURE — 93005 ELECTROCARDIOGRAM TRACING: CPT | Mod: PBBFAC | Performed by: INTERNAL MEDICINE

## 2017-11-17 PROCEDURE — 36415 COLL VENOUS BLD VENIPUNCTURE: CPT

## 2017-11-17 PROCEDURE — 93010 ELECTROCARDIOGRAM REPORT: CPT | Mod: S$PBB,,, | Performed by: INTERNAL MEDICINE

## 2017-11-17 PROCEDURE — 99999 PR PBB SHADOW E&M-EST. PATIENT-LVL II: CPT | Mod: PBBFAC,,, | Performed by: OPHTHALMOLOGY

## 2017-11-17 NOTE — PATIENT INSTRUCTIONS
Return per research protocol.  Visit following research visits to do additional testing related to glaucoma evaluation.

## 2017-11-17 NOTE — PROGRESS NOTES
HPI     Mrs. Corado is here today for a research visit. She is doing well today   no problems. She is not using any eye medications.     I have personally interviewed the patient, reviewed the history and   examined the patient and agree with the technician's exam.    Last edited by Aaron Mcgrath MD on 11/17/2017 11:12 AM. (History)            Assessment /Plan     For exam results, see Encounter Report.    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 Follow up after treatment for special testing related to glaucoma risk.

## 2017-11-21 ENCOUNTER — HOSPITAL ENCOUNTER (OUTPATIENT)
Dept: RADIOLOGY | Facility: OTHER | Age: 62
Discharge: HOME | End: 2017-11-21
Attending: OBSTETRICS & GYNECOLOGY
Payer: COMMERCIAL

## 2017-11-21 DIAGNOSIS — Z00.6 EXAMINATION OF PARTICIPANT IN CLINICAL TRIAL: ICD-10-CM

## 2017-11-21 DIAGNOSIS — C56.9 MALIGNANT NEOPLASM OF OVARY, UNSPECIFIED LATERALITY: ICD-10-CM

## 2017-11-21 PROCEDURE — 71260 CT THORAX DX C+: CPT | Mod: 26,,, | Performed by: RADIOLOGY

## 2017-11-21 PROCEDURE — 25500020 PHARM REV CODE 255: Performed by: OBSTETRICS & GYNECOLOGY

## 2017-11-21 PROCEDURE — 74177 CT ABD & PELVIS W/CONTRAST: CPT | Mod: TC

## 2017-11-21 PROCEDURE — 71260 CT THORAX DX C+: CPT | Mod: TC

## 2017-11-21 RX ADMIN — IOHEXOL 25 ML: 350 INJECTION, SOLUTION INTRAVENOUS at 01:11

## 2017-11-21 RX ADMIN — IOHEXOL 100 ML: 350 INJECTION, SOLUTION INTRAVENOUS at 01:11

## 2017-11-27 ENCOUNTER — OFFICE VISIT (OUTPATIENT)
Dept: GYNECOLOGIC ONCOLOGY | Facility: CLINIC | Age: 62
End: 2017-11-27
Payer: COMMERCIAL

## 2017-11-27 VITALS
HEART RATE: 81 BPM | DIASTOLIC BLOOD PRESSURE: 60 MMHG | TEMPERATURE: 98 F | BODY MASS INDEX: 31.42 KG/M2 | RESPIRATION RATE: 14 BRPM | SYSTOLIC BLOOD PRESSURE: 130 MMHG | WEIGHT: 200.63 LBS

## 2017-11-27 DIAGNOSIS — Z00.6 EXAMINATION OF PARTICIPANT IN CLINICAL TRIAL: ICD-10-CM

## 2017-11-27 DIAGNOSIS — C56.9 MALIGNANT NEOPLASM OF OVARY, UNSPECIFIED LATERALITY: Primary | ICD-10-CM

## 2017-11-27 DIAGNOSIS — Z51.11 ENCOUNTER FOR ANTINEOPLASTIC CHEMOTHERAPY: ICD-10-CM

## 2017-11-27 PROCEDURE — 99999 PR PBB SHADOW E&M-EST. PATIENT-LVL III: CPT | Mod: PBBFAC,,, | Performed by: OBSTETRICS & GYNECOLOGY

## 2017-11-27 PROCEDURE — 99214 OFFICE O/P EST MOD 30 MIN: CPT | Mod: S$GLB,,, | Performed by: OBSTETRICS & GYNECOLOGY

## 2017-11-27 NOTE — PROGRESS NOTES
Subjective:      Patient ID: Mickie Don is a 62 y.o. female.    Chief Complaint: Ovarian Cancer  Treatment History  Xlap/Omentectomy on 11/1/16 - mass was unresectable  Stage IIIC ovarian cancer  Initiated Taxotere/Carbo on 11/18/16, now s/p 6 cycles completed 3/8/17  Xlap/ISIS/BSO/Radical mass resection > 10 cm, optimal debulking on 4/11/17  Initiated PRIMA on 8/21/17  BRCA negative    HPI  Here today for evaluation for pre randomization visit for FORWARD1.  The patient is doing well.  All scans and results reviewed.  PS is 0.  Denies ocular symptoms.  Review of Systems   Constitutional: Negative for activity change, appetite change, chills, fatigue and fever.   HENT: Negative for hearing loss, mouth sores, nosebleeds, sore throat and tinnitus.    Eyes: Negative for visual disturbance.   Respiratory: Negative for cough, chest tightness, shortness of breath and wheezing.    Cardiovascular: Negative for chest pain and leg swelling.   Gastrointestinal: Negative for abdominal distention, abdominal pain, blood in stool, constipation, diarrhea, nausea and vomiting.   Genitourinary: Negative for dysuria, flank pain, frequency, hematuria, pelvic pain, vaginal bleeding, vaginal discharge and vaginal pain.   Musculoskeletal: Negative for arthralgias and back pain.   Skin: Negative for rash.   Neurological: Negative for dizziness, seizures, syncope, weakness and numbness.   Hematological: Does not bruise/bleed easily.   Psychiatric/Behavioral: Negative for confusion and sleep disturbance. The patient is not nervous/anxious.         Objective:   Physical Exam:   Constitutional: She is oriented to person, place, and time. She appears well-developed and well-nourished. No distress.    HENT:   Head: Normocephalic and atraumatic.    Eyes: No scleral icterus.    Neck: Normal range of motion. Neck supple.    Cardiovascular: Normal rate and intact distal pulses.  Exam reveals no cyanosis and no edema.     Pulmonary/Chest: Effort  normal. No respiratory distress. She exhibits no tenderness.        Abdominal: Soft. Normal appearance. She exhibits no distension (midline incision well-healed), no fluid wave, no ascites and no mass. There is no tenderness. There is no rigidity, no rebound and no guarding. No hernia.             Musculoskeletal: Normal range of motion and moves all extremeties. She exhibits no edema or tenderness.      Lymphadenopathy:     She has no cervical adenopathy.    Neurological: She is alert and oriented to person, place, and time.    Skin: Skin is warm. No rash noted. No cyanosis or erythema.    Psychiatric: She has a normal mood and affect. Thought content normal.       Assessment:     1. Malignant neoplasm of ovary, unspecified laterality    2. Encounter for antineoplastic chemotherapy        Plan:       Overall doing well and meets criteria for trial. Good PS.  Will send for randomization and if gets selected for chemo plan Doxil/Avastin

## 2017-11-28 ENCOUNTER — LAB VISIT (OUTPATIENT)
Dept: LAB | Facility: HOSPITAL | Age: 62
End: 2017-11-28
Attending: INTERNAL MEDICINE
Payer: COMMERCIAL

## 2017-11-28 DIAGNOSIS — C56.9 MALIGNANT NEOPLASM OF OVARY, UNSPECIFIED LATERALITY: ICD-10-CM

## 2017-11-28 DIAGNOSIS — Z00.6 EXAMINATION OF PARTICIPANT IN CLINICAL TRIAL: ICD-10-CM

## 2017-11-28 LAB
APTT BLDCRRT: 28.1 SEC
INR PPP: 0.9
PROTHROMBIN TIME: 10 SEC

## 2017-11-28 PROCEDURE — 36415 COLL VENOUS BLD VENIPUNCTURE: CPT

## 2017-11-28 PROCEDURE — 85730 THROMBOPLASTIN TIME PARTIAL: CPT

## 2017-11-28 PROCEDURE — 85610 PROTHROMBIN TIME: CPT

## 2017-11-29 ENCOUNTER — RESEARCH ENCOUNTER (OUTPATIENT)
Dept: RESEARCH | Facility: HOSPITAL | Age: 62
End: 2017-11-29

## 2017-11-29 ENCOUNTER — OFFICE VISIT (OUTPATIENT)
Dept: INTERNAL MEDICINE | Facility: CLINIC | Age: 62
End: 2017-11-29
Payer: COMMERCIAL

## 2017-11-29 ENCOUNTER — INFUSION (OUTPATIENT)
Dept: INFUSION THERAPY | Facility: HOSPITAL | Age: 62
End: 2017-11-29
Attending: OBSTETRICS & GYNECOLOGY
Payer: COMMERCIAL

## 2017-11-29 ENCOUNTER — LAB VISIT (OUTPATIENT)
Dept: LAB | Facility: HOSPITAL | Age: 62
End: 2017-11-29
Attending: OBSTETRICS & GYNECOLOGY
Payer: COMMERCIAL

## 2017-11-29 VITALS
HEART RATE: 78 BPM | SYSTOLIC BLOOD PRESSURE: 120 MMHG | HEIGHT: 67 IN | WEIGHT: 200.38 LBS | BODY MASS INDEX: 31.45 KG/M2 | DIASTOLIC BLOOD PRESSURE: 68 MMHG

## 2017-11-29 VITALS
RESPIRATION RATE: 18 BRPM | BODY MASS INDEX: 31.45 KG/M2 | HEIGHT: 67 IN | DIASTOLIC BLOOD PRESSURE: 65 MMHG | HEART RATE: 69 BPM | SYSTOLIC BLOOD PRESSURE: 134 MMHG | OXYGEN SATURATION: 97 % | TEMPERATURE: 98 F | WEIGHT: 200.38 LBS

## 2017-11-29 DIAGNOSIS — C56.9 OVARIAN CANCER, UNSPECIFIED LATERALITY: Primary | ICD-10-CM

## 2017-11-29 DIAGNOSIS — C56.9 MALIGNANT NEOPLASM OF OVARY, UNSPECIFIED LATERALITY: Primary | ICD-10-CM

## 2017-11-29 DIAGNOSIS — C56.9 MALIGNANT NEOPLASM OF OVARY, UNSPECIFIED LATERALITY: ICD-10-CM

## 2017-11-29 DIAGNOSIS — Z00.6 EXAMINATION OF PARTICIPANT IN CLINICAL TRIAL: ICD-10-CM

## 2017-11-29 DIAGNOSIS — C56.9 OVARIAN CANCER, UNSPECIFIED LATERALITY: ICD-10-CM

## 2017-11-29 DIAGNOSIS — H04.123 DRY EYES: ICD-10-CM

## 2017-11-29 DIAGNOSIS — Z00.00 ENCOUNTER FOR PREVENTIVE HEALTH EXAMINATION: Primary | ICD-10-CM

## 2017-11-29 LAB
ALBUMIN SERPL BCP-MCNC: 3.5 G/DL
ALP SERPL-CCNC: 66 U/L
ALT SERPL W/O P-5'-P-CCNC: 18 U/L
ANION GAP SERPL CALC-SCNC: 6 MMOL/L
APTT BLDCRRT: 25 SEC
AST SERPL-CCNC: 17 U/L
BASOPHILS # BLD AUTO: 0.03 K/UL
BASOPHILS NFR BLD: 0.8 %
BILIRUB SERPL-MCNC: 0.8 MG/DL
BILIRUB UR QL STRIP: NEGATIVE
BUN SERPL-MCNC: 14 MG/DL
CALCIUM SERPL-MCNC: 9.3 MG/DL
CHLORIDE SERPL-SCNC: 103 MMOL/L
CLARITY UR REFRACT.AUTO: CLEAR
CO2 SERPL-SCNC: 31 MMOL/L
COLOR UR AUTO: YELLOW
CREAT SERPL-MCNC: 0.7 MG/DL
DIFFERENTIAL METHOD: ABNORMAL
DRUG STUDY SPECIMEN TYPE: NORMAL
DRUG STUDY TEST NAME: NORMAL
DRUG STUDY TEST RESULT: NORMAL
EOSINOPHIL # BLD AUTO: 0 K/UL
EOSINOPHIL NFR BLD: 0.5 %
ERYTHROCYTE [DISTWIDTH] IN BLOOD BY AUTOMATED COUNT: 14.8 %
EST. GFR  (AFRICAN AMERICAN): >60 ML/MIN/1.73 M^2
EST. GFR  (NON AFRICAN AMERICAN): >60 ML/MIN/1.73 M^2
GLUCOSE SERPL-MCNC: 105 MG/DL
GLUCOSE UR QL STRIP: NEGATIVE
HCT VFR BLD AUTO: 37 %
HGB BLD-MCNC: 12.5 G/DL
HGB UR QL STRIP: NEGATIVE
IMM GRANULOCYTES # BLD AUTO: 0.01 K/UL
IMM GRANULOCYTES NFR BLD AUTO: 0.3 %
INR PPP: 0.9
KETONES UR QL STRIP: NEGATIVE
LEUKOCYTE ESTERASE UR QL STRIP: NEGATIVE
LYMPHOCYTES # BLD AUTO: 0.7 K/UL
LYMPHOCYTES NFR BLD: 19.8 %
MCH RBC QN AUTO: 33.4 PG
MCHC RBC AUTO-ENTMCNC: 33.8 G/DL
MCV RBC AUTO: 99 FL
MONOCYTES # BLD AUTO: 0.3 K/UL
MONOCYTES NFR BLD: 7.7 %
NEUTROPHILS # BLD AUTO: 2.6 K/UL
NEUTROPHILS NFR BLD: 70.9 %
NITRITE UR QL STRIP: NEGATIVE
NRBC BLD-RTO: 0 /100 WBC
PH UR STRIP: 7 [PH] (ref 5–8)
PLATELET # BLD AUTO: 146 K/UL
PMV BLD AUTO: 9.8 FL
POTASSIUM SERPL-SCNC: 4.8 MMOL/L
PROT SERPL-MCNC: 6.7 G/DL
PROT UR QL STRIP: NEGATIVE
PROTHROMBIN TIME: 10 SEC
RBC # BLD AUTO: 3.74 M/UL
SODIUM SERPL-SCNC: 140 MMOL/L
SP GR UR STRIP: 1.01 (ref 1–1.03)
URN SPEC COLLECT METH UR: NORMAL
UROBILINOGEN UR STRIP-ACNC: 2 EU/DL
WBC # BLD AUTO: 3.64 K/UL

## 2017-11-29 PROCEDURE — 99999 PR PBB SHADOW E&M-EST. PATIENT-LVL III: CPT | Mod: PBBFAC,,, | Performed by: PHYSICIAN ASSISTANT

## 2017-11-29 PROCEDURE — 63600175 PHARM REV CODE 636 W HCPCS: Performed by: OBSTETRICS & GYNECOLOGY

## 2017-11-29 PROCEDURE — 96413 CHEMO IV INFUSION 1 HR: CPT

## 2017-11-29 PROCEDURE — 96367 TX/PROPH/DG ADDL SEQ IV INF: CPT

## 2017-11-29 PROCEDURE — 36415 COLL VENOUS BLD VENIPUNCTURE: CPT

## 2017-11-29 PROCEDURE — 85730 THROMBOPLASTIN TIME PARTIAL: CPT

## 2017-11-29 PROCEDURE — 25000003 PHARM REV CODE 250: Performed by: OBSTETRICS & GYNECOLOGY

## 2017-11-29 PROCEDURE — 81003 URINALYSIS AUTO W/O SCOPE: CPT

## 2017-11-29 PROCEDURE — 96375 TX/PRO/DX INJ NEW DRUG ADDON: CPT

## 2017-11-29 PROCEDURE — 99396 PREV VISIT EST AGE 40-64: CPT | Mod: S$GLB,,, | Performed by: PHYSICIAN ASSISTANT

## 2017-11-29 PROCEDURE — 96415 CHEMO IV INFUSION ADDL HR: CPT

## 2017-11-29 PROCEDURE — 85025 COMPLETE CBC W/AUTO DIFF WBC: CPT

## 2017-11-29 PROCEDURE — 80053 COMPREHEN METABOLIC PANEL: CPT

## 2017-11-29 PROCEDURE — 99000 SPECIMEN HANDLING OFFICE-LAB: CPT

## 2017-11-29 PROCEDURE — 85610 PROTHROMBIN TIME: CPT

## 2017-11-29 RX ORDER — ACETAMINOPHEN 325 MG/1
650 TABLET ORAL
Status: CANCELLED | OUTPATIENT
Start: 2017-11-29

## 2017-11-29 RX ORDER — HEPARIN 100 UNIT/ML
500 SYRINGE INTRAVENOUS
Status: DISCONTINUED | OUTPATIENT
Start: 2017-11-29 | End: 2017-11-29 | Stop reason: HOSPADM

## 2017-11-29 RX ORDER — ACETAMINOPHEN 325 MG/1
650 TABLET ORAL
Status: COMPLETED | OUTPATIENT
Start: 2017-11-29 | End: 2017-11-29

## 2017-11-29 RX ORDER — SODIUM CHLORIDE 0.9 % (FLUSH) 0.9 %
10 SYRINGE (ML) INJECTION
Status: DISCONTINUED | OUTPATIENT
Start: 2017-11-29 | End: 2017-11-29 | Stop reason: HOSPADM

## 2017-11-29 RX ORDER — SODIUM CHLORIDE 0.9 % (FLUSH) 0.9 %
10 SYRINGE (ML) INJECTION
Status: CANCELLED | OUTPATIENT
Start: 2017-11-29

## 2017-11-29 RX ORDER — HEPARIN 100 UNIT/ML
500 SYRINGE INTRAVENOUS
Status: CANCELLED | OUTPATIENT
Start: 2017-11-29

## 2017-11-29 RX ADMIN — DEXTROSE MONOHYDRATE: 5 INJECTION, SOLUTION INTRAVENOUS at 01:11

## 2017-11-29 RX ADMIN — DEXAMETHASONE SODIUM PHOSPHATE 10 MG: 4 INJECTION, SOLUTION INTRAMUSCULAR; INTRAVENOUS at 11:11

## 2017-11-29 RX ADMIN — DIPHENHYDRAMINE HYDROCHLORIDE 50 MG: 50 INJECTION, SOLUTION INTRAMUSCULAR; INTRAVENOUS at 11:11

## 2017-11-29 RX ADMIN — ACETAMINOPHEN 650 MG: 325 TABLET ORAL at 11:11

## 2017-11-29 NOTE — PLAN OF CARE
Problem: Chemotherapy Effects (Adult)  Goal: Signs and Symptoms of Listed Potential Problems Will be Absent, Minimized or Managed (Chemotherapy Effects)  Signs and symptoms of listed potential problems will be absent, minimized or managed by discharge/transition of care (reference Chemotherapy Effects (Adult) CPG).  Patient arrives to chemotherapy infusion appointment for research infusion trial ambulatory no distress vitals obtained stable, PIV started to right hand 24G flushed well blood rtn noted. Annette trial nurse notified present, ok to initiate infusion. Medication released, pt comfortable no distress noted.

## 2017-11-29 NOTE — PLAN OF CARE
Problem: Chemotherapy Effects (Adult)  Goal: Signs and Symptoms of Listed Potential Problems Will be Absent, Minimized or Managed (Chemotherapy Effects)  Signs and symptoms of listed potential problems will be absent, minimized or managed by discharge/transition of care (reference Chemotherapy Effects (Adult) CPG).   Outcome: Ongoing (interventions implemented as appropriate)  10 min vitals pre INV study infusion vitals stable pt resting NAD noted Annette research nurse present.

## 2017-11-29 NOTE — PROGRESS NOTES
Subjective:       Patient ID: Mickie Don is a 62 y.o. female.        Chief Complaint: Annual Exam    Mickie Don is an established patient of Elle Ma MD here today for annual exam.      She needs form completed for [employer].      She is undergoing treatment for ovarian cancer with Dr. Tran.  Enrolled in a clinical trial.  Has chem scheduled later today.      She is s/p flu shot and due for mammogram.             Review of Systems   Constitutional: Negative for activity change, chills, diaphoresis, fatigue, fever and unexpected weight change.   HENT: Negative for congestion, hearing loss, rhinorrhea, sore throat and trouble swallowing.    Eyes: Negative for discharge and visual disturbance.   Respiratory: Negative for cough, chest tightness, shortness of breath and wheezing.    Cardiovascular: Negative for chest pain, palpitations and leg swelling.   Gastrointestinal: Negative for abdominal pain, blood in stool, constipation, diarrhea, nausea and vomiting.   Endocrine: Negative for polydipsia and polyuria.   Genitourinary: Negative for difficulty urinating, dysuria, frequency, hematuria, menstrual problem and urgency.   Musculoskeletal: Negative for arthralgias, back pain, joint swelling and neck pain.   Skin: Negative for rash.   Neurological: Negative for dizziness, syncope, weakness and headaches.   Psychiatric/Behavioral: Negative for confusion, dysphoric mood and sleep disturbance. The patient is not nervous/anxious.        Objective:      Physical Exam   Constitutional: She appears well-developed and well-nourished. No distress.   HENT:   Head: Normocephalic and atraumatic.   Right Ear: Tympanic membrane and external ear normal.   Left Ear: Tympanic membrane and external ear normal.   Nose: Nose normal.   Mouth/Throat: Oropharynx is clear and moist.   Eyes: Conjunctivae are normal. Pupils are equal, round, and reactive to light.   Cardiovascular: Normal rate, regular rhythm and  "normal heart sounds.  Exam reveals no gallop.    No murmur heard.  Pulmonary/Chest: Effort normal and breath sounds normal. No respiratory distress.   Abdominal: Soft. Normal appearance. There is no tenderness. There is no rebound, no guarding and no CVA tenderness.   Musculoskeletal: She exhibits no edema.   Neurological: She is alert.   Skin: Skin is warm and dry. She is not diaphoretic.   Psychiatric: She has a normal mood and affect.   Nursing note and vitals reviewed.      Assessment:       1. Encounter for preventive health examination    2. Examination of participant in clinical trial        Plan:       Mickie was seen today for annual exam.    Diagnoses and all orders for this visit:    Encounter for preventive health examination  -     Mammo Digital Screening Bilat with CAD; Future    S/p flu shot.  Continue regular f/u with Dr. Tran.  She defers any other preventative care at this time.      Pt has been given instructions populated from [employer] database and has verbalized understanding of the after visit summary and information contained wherein.    Follow up with a primary care provider. May go to ER for acute shortness of breath, lightheadedness, fever, or any other emergent complaints or changes in condition.    "This note will be shared with the patient"    Future Appointments  Date Time Provider Department Center   12/6/2017 11:30 AM Kiana Weston MD [facility]   12/13/2017 9:15 AM Benny Tran MD [facility] G[facility] ONC [facility]   12/27/2017 8:45 AM Benny Tran MD [facility] G[facility] ON[facility]               "

## 2017-11-30 NOTE — PLAN OF CARE
Problem: Patient Care Overview  Goal: Discharge Needs Assessment  Outcome: Ongoing (interventions implemented as appropriate)  Treatment complete monitored post treatment with vitals obtained and stable. Clinical trial nurse notified. No s/s of reaction noted. PIV d/c cath tip intact. Peripheral Lab specimen obtained and handed to Annette Cui with research. Pt. Stayed for 1.5 hours of observation. Per research nurse, this is not part of the protocol and does not have to be repeated. Per research nurse, pt tolerated titration of medication today and next infusion will not require titration. Pt. Verbalized understanding. D/c home NAD noted. Pt instructed to contact MD office with any questions or concerns research nurse card name and number available to pt as per pt verbalized.

## 2017-12-01 ENCOUNTER — RESEARCH ENCOUNTER (OUTPATIENT)
Dept: RESEARCH | Facility: HOSPITAL | Age: 62
End: 2017-12-01

## 2017-12-01 NOTE — PROGRESS NOTES
Nov 29, 2017      Protocol: FORWARD1/IMGN-0403  Investigator: BETH Weston  Treating Physician: GUERITA Tran Pt Initials: NAV VAZ  Study ID: [id number]  IRB#: [id number]     FORWARD 1: A Randomized, Open Label Phase 3 Study to Evaluate the Safety and Efficacy of Mirvetuximab soravtansine (QUVU277) Versus Investigators Choice of Chemotherapy in Women with Folate Receptor á?positive Advanced Epithelial Ovarian Cancer, Primary Peritoneal Cancer or Fallopian Tube Cancer     Cycle 1, Day 1  Study allocation was made on 11/27/17.  Patient randomized to Arm A:[id number], 6 mg/kg2(from [facility]), q 3 weeks.    Patient presented to clinic to begin treatment on the above mentioned clinical trial. Patient alert and oriented, mood and affect appropriate to the situation. Patient reports she feels well with no issues and is ready to get started.    Physical exam, ECOG assessment performed on 11/27/17 (- 4d window for C1D1 per protocol), ECOG = 0 per MD.  Central labs drawn per screening protocol on 11/29/17 and local labs were performed on 11/29/17.  All local were within parameters to begin treatment and were reviewed by the physician prior to treatment.  Physical exam (done at screening) unremarkable per physician, no ocular symptoms noted..  Reviewed baseline conditions and current medications with patient.  See Concomitant Medication and Baseline Medical History sheets.  Central safety labs collected, processed, and shipped to  per protocol.   QoL questionnaires prior to dosing on 11/29/17.     Pre-dose and post-dose vitals performed on 11/29/17 @ 12:05 and 14:26.  Infusion run at 1 mg/min for 30 min - well tolerated, 3 mg/min for 30 min - well-tolerated, and 5 mg/min for remainder of study drug.  Infusion from 12:05 - 14:16.  Pre-dose PK collected at 8:45 and post-dose PK collected at 14:21 pm.  No infusion reactions noted, not skin irritation at site of infusion.  Patient remained on Infusion floor until 4:15 and then in 1st floor clinic  room until 6:10.  Confirmed with patient at 6:10 that she felt well and had no signs/symptoms of reaction.    All of the patient's questions were answered by Dr. Tran and me to her satisfaction. Patient expressed her willingness to continue to participate in the above mentioned clinical trial. Instructed patient to notify Dr. Tran and/or me with any questions, concerns, or changes in health status. Patient verbalized understanding.     Baseline Medical History  1. 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    2. Anxiety, Grade 1.  3. Dermatofibroma.  Benign.  Diagnosed by biopsy on 7/17/17.  No further intervention needed.

## 2017-12-01 NOTE — PROGRESS NOTES
2017     Protocol: FORWARD1/IMGN-0403  Investigator: BETH Weston  Treating Physician: GUERITA Tran Pt Initials: LILO VAZ B.  Study ID: [id number]  IRB#: [id number]     FORWARD 1: A Randomized, Open Label Phase 3 Study to Evaluate the Safety and Efficacy of Mirvetuximab soravtansine (RWBP825) Versus Investigators Choice of Chemotherapy in Women with Folate Receptor á?positive Advanced Epithelial Ovarian Cancer, Primary Peritoneal Cancer or Fallopian Tube Cancer     Patient deemed eligible for the above mentioned protocol by CRC, CRN, and physician based on the following criteria:   Patient 62-yr old ( 53) female with ECOG performance status of 0 (MD note 17).  Patient has pathologically documented advanced Ovarian cancer (see path report 17).   Patient has platinum-resistant ovarian cancer, as defined as progression within 6 months from completion of a minimum of four cycles of platinum-containing therapy.  Patients last platinum-containing therapy was 17 and date of recurrence is 10/19/17 (see CT scan report of same date) = 4 mo. disease-free interval    Patients has measurable disease per by RECIST v1.1.  CT scan on 17 documents 1.6 cm peritoneal nodule.  Patients has received one prior systemic lines of anti-cancer therapy (taxol/carboplatin 16 - 17) and niraparib maintenance on PRIMA clinical trial.    [place] IHC testing demonstrates patients tumor is FRá positivity (see [facility] report).   Patient prior therapy was niraparib and date of last dose > 4 week from C1D1.  Patient has not had focal radiation.    Patient has no therapy-related toxicities.  No major surgeries performed within four weeks prior to Day 1.  Patients has adequate hematologic, liver and kidney function as defined by: ANC 3480, platelets 391954, Hb 14.2, creatinine 0.75, AST/ALT , bilirubin 0.4, albumin 4.3.    Patients agreed to adhere to the study visit schedule and other protocol requirements (ICF  on 11/13/17).  Patient is not of childbearing potential (WCBP).  BSO-DORIAN performed 7/17/17.  Patient does not have clear cell, mucinous histology, mixed histology with mucinous  component, sarcoma, sarcomatous component, or low grade ovarian cancer (see path report 7/17/17), primary platinum-refractory disease, uncontrolled bleeding disorders or inadequate coagulation parameters, > Grade 1 peripheral neuropathy, active or chronic corneal disorders such as Sjogrens syndrome, Fuchs corneal dystrophy (requiring treatment), history of corneal transplantation, active herpetic keratitis, active ocular conditions requiring on-going treatment/monitoring, serious concurrent illness or clinically-relevant active infection, clinically-significant cardiac disease, clinically significant peripheral vascular disease, ? Grade 3 cardiac toxicity following prior chemotherapy, history of neurological condition, history of multiple sclerosis or other demyelinating disease, history of hemorrhagic or ischemic stroke within the last 6 months, history of cirrhotic liver disease, previous clinical diagnosis of non-infectious interstitial lung disease, including noninfectious, untreated CNS disease or symptomatic CNS metastasis, or prior hypersensitivity to monoclonal antibodies.    Patient does not require use of folate -containing supplements (e.g. folate deficiency), is not pregnant or lactating.  Patients has no known hypersensitivity to any of the standard of care (SOC) chemotherapy agents included in the study, has no prior treatment with [id number] or prior treatment on the study.  Patient has no clinically active malignancy within 3 years of enrollment.      Patient verbalized understanding of what study participation involves and voluntarily entered into this commitment; she states she understands that she may withdraw consent at any time without compromise to her care.     Patient was allocated to blinded study treatment on  11/27/2017.  Both Dr. Tran and the patient made aware of allocation.  Patient verbalized understanding. Patient scheduled to start treatment 11/29/2017.

## 2017-12-06 ENCOUNTER — LAB VISIT (OUTPATIENT)
Dept: LAB | Facility: OTHER | Age: 62
End: 2017-12-06
Attending: OBSTETRICS & GYNECOLOGY
Payer: COMMERCIAL

## 2017-12-06 ENCOUNTER — RESEARCH ENCOUNTER (OUTPATIENT)
Dept: RESEARCH | Facility: HOSPITAL | Age: 62
End: 2017-12-06

## 2017-12-06 ENCOUNTER — OFFICE VISIT (OUTPATIENT)
Dept: GYNECOLOGIC ONCOLOGY | Facility: CLINIC | Age: 62
End: 2017-12-06
Payer: COMMERCIAL

## 2017-12-06 VITALS
SYSTOLIC BLOOD PRESSURE: 131 MMHG | HEART RATE: 77 BPM | TEMPERATURE: 98 F | WEIGHT: 189.63 LBS | DIASTOLIC BLOOD PRESSURE: 64 MMHG | RESPIRATION RATE: 16 BRPM | BODY MASS INDEX: 29.7 KG/M2

## 2017-12-06 DIAGNOSIS — Z00.6 EXAMINATION OF PARTICIPANT IN CLINICAL TRIAL: ICD-10-CM

## 2017-12-06 DIAGNOSIS — C56.9 OVARIAN CANCER, UNSPECIFIED LATERALITY: ICD-10-CM

## 2017-12-06 DIAGNOSIS — Z00.6 EXAMINATION OF PARTICIPANT IN CLINICAL TRIAL: Primary | ICD-10-CM

## 2017-12-06 DIAGNOSIS — C56.9 MALIGNANT NEOPLASM OF OVARY, UNSPECIFIED LATERALITY: ICD-10-CM

## 2017-12-06 LAB
ALBUMIN SERPL BCP-MCNC: 3.3 G/DL
ALP SERPL-CCNC: 85 U/L
ALT SERPL W/O P-5'-P-CCNC: 34 U/L
ANION GAP SERPL CALC-SCNC: 9 MMOL/L
AST SERPL-CCNC: 38 U/L
BASOPHILS # BLD AUTO: 0.02 K/UL
BASOPHILS NFR BLD: 0.6 %
BILIRUB SERPL-MCNC: 0.8 MG/DL
BUN SERPL-MCNC: 8 MG/DL
CALCIUM SERPL-MCNC: 9.5 MG/DL
CHLORIDE SERPL-SCNC: 104 MMOL/L
CO2 SERPL-SCNC: 30 MMOL/L
CREAT SERPL-MCNC: 0.7 MG/DL
DIFFERENTIAL METHOD: ABNORMAL
EOSINOPHIL # BLD AUTO: 0.1 K/UL
EOSINOPHIL NFR BLD: 1.5 %
ERYTHROCYTE [DISTWIDTH] IN BLOOD BY AUTOMATED COUNT: 14.1 %
EST. GFR  (AFRICAN AMERICAN): >60 ML/MIN/1.73 M^2
EST. GFR  (NON AFRICAN AMERICAN): >60 ML/MIN/1.73 M^2
GLUCOSE SERPL-MCNC: 104 MG/DL
HCT VFR BLD AUTO: 40.8 %
HGB BLD-MCNC: 14.3 G/DL
LYMPHOCYTES # BLD AUTO: 0.9 K/UL
LYMPHOCYTES NFR BLD: 26.6 %
MCH RBC QN AUTO: 33.3 PG
MCHC RBC AUTO-ENTMCNC: 35 G/DL
MCV RBC AUTO: 95 FL
MONOCYTES # BLD AUTO: 0.3 K/UL
MONOCYTES NFR BLD: 8.8 %
NEUTROPHILS # BLD AUTO: 2.1 K/UL
NEUTROPHILS NFR BLD: 62.2 %
PLATELET # BLD AUTO: 66 K/UL
PMV BLD AUTO: 9.3 FL
POTASSIUM SERPL-SCNC: 4.7 MMOL/L
PROT SERPL-MCNC: 7 G/DL
RBC # BLD AUTO: 4.29 M/UL
SODIUM SERPL-SCNC: 143 MMOL/L
WBC # BLD AUTO: 3.42 K/UL

## 2017-12-06 PROCEDURE — 36415 COLL VENOUS BLD VENIPUNCTURE: CPT

## 2017-12-06 PROCEDURE — 99999 PR PBB SHADOW E&M-EST. PATIENT-LVL III: CPT | Mod: PBBFAC,,, | Performed by: OBSTETRICS & GYNECOLOGY

## 2017-12-06 PROCEDURE — 80053 COMPREHEN METABOLIC PANEL: CPT

## 2017-12-06 PROCEDURE — 85025 COMPLETE CBC W/AUTO DIFF WBC: CPT

## 2017-12-06 PROCEDURE — 99213 OFFICE O/P EST LOW 20 MIN: CPT | Mod: S$GLB,,, | Performed by: OBSTETRICS & GYNECOLOGY

## 2017-12-06 RX ORDER — SODIUM CHLORIDE 0.9 % (FLUSH) 0.9 %
10 SYRINGE (ML) INJECTION
Status: CANCELLED | OUTPATIENT
Start: 2017-12-20

## 2017-12-06 RX ORDER — ACETAMINOPHEN 325 MG/1
650 TABLET ORAL
Status: CANCELLED | OUTPATIENT
Start: 2017-12-20

## 2017-12-06 RX ORDER — HEPARIN 100 UNIT/ML
500 SYRINGE INTRAVENOUS
Status: CANCELLED | OUTPATIENT
Start: 2017-12-20

## 2017-12-06 NOTE — PROGRESS NOTES
Subjective:      Patient ID: Mickie Don is a 62 y.o. female.    Chief Complaint: Chemotherapy      HPI  Treatment History  Xlap/Omentectomy on 11/1/16 - mass was unresectable  Stage IIIC ovarian cancer  Initiated Taxotere/Carbo on 11/18/16, now s/p 6 cycles completed 3/8/17  Xlap/ISIS/BSO/Radical mass resection > 10 cm, optimal debulking on 4/11/17  Initiated PRIMA on 8/21/17  BRCA negative     HPI  Here today for FORWARD1 visit.  C1D8 PS is 0. Labs reviewed.     Review of Systems   Constitutional: Negative for appetite change, chills, fatigue and fever.   HENT: Negative for mouth sores.    Respiratory: Negative for cough and shortness of breath.    Cardiovascular: Negative for leg swelling.   Gastrointestinal: Negative for abdominal pain, blood in stool, constipation and diarrhea.   Endocrine: Negative for cold intolerance.   Genitourinary: Negative for dysuria and vaginal bleeding.   Musculoskeletal: Negative for myalgias.   Skin: Negative for rash.   Allergic/Immunologic: Negative.    Neurological: Negative for weakness and numbness.   Hematological: Negative for adenopathy. Does not bruise/bleed easily.   Psychiatric/Behavioral: Negative for confusion.       Objective:   Physical Exam:   Constitutional: She is oriented to person, place, and time. She appears well-developed and well-nourished.    HENT:   Head: Normocephalic and atraumatic.    Eyes: EOM are normal. Pupils are equal, round, and reactive to light.    Neck: Normal range of motion. Neck supple. No thyromegaly present.    Cardiovascular: Normal rate, regular rhythm and intact distal pulses.     Pulmonary/Chest: Effort normal and breath sounds normal. No respiratory distress. She has no wheezes.        Abdominal: Soft. Bowel sounds are normal. She exhibits no distension, no ascites and no mass. There is no tenderness.             Musculoskeletal: Normal range of motion and moves all extremeties.      Lymphadenopathy:     She has no cervical  adenopathy.        Right: No supraclavicular adenopathy present.        Left: No supraclavicular adenopathy present.    Neurological: She is alert and oriented to person, place, and time.    Skin: Skin is warm and dry. No rash noted.    Psychiatric: She has a normal mood and affect.       Assessment:     1. Examination of participant in clinical trial        Plan:   No orders of the defined types were placed in this encounter.    C1D8  Continue study per protocol.

## 2017-12-07 NOTE — PROGRESS NOTES
Dec 6, 2017      Protocol: FORWARD1/IMGN-0403  Investigator: BETH Weston  Treating Physician: GUERITA Tran Pt Initials: NAV VAZ  Study ID: [id number]  IRB#: [id number]     FORWARD 1: A Randomized, Open Label Phase 3 Study to Evaluate the Safety and Efficacy of Mirvetuximab soravtansine ([id number]) Versus Investigators Choice of Chemotherapy in Women with Folate Receptor á?positive Advanced Epithelial Ovarian Cancer, Primary Peritoneal Cancer or Fallopian Tube Cancer     Cycle 1, Day 8  Study allocation was made on 11/27/17.  Patient randomized to Arm A:[id number], 6 mg/kg2(from [facility]), q 3 weeks.    Patient presented to clinic for C1D8 on the above mentioned clinical trial. Patient alert and oriented, mood and affect appropriate to the situation. Patient denies any fatigue, vomiting, dizziness, or fever.  Patient reports headache on D2-D4, nausea/diarrhea and sweating on D5-7.  No vison changes or cough reported.    Physical exam, ECOG assessment performed on 12/6/17.  ECOG = 1 per MD.  Local labs were performed on 12/6/17 and reviewed by the physician with patient.  Physical exam unremarkable per physician, no ocular symptoms noted.  Reviewed baseline conditions and current medications with patient.  See Concomitant Medication and Baseline Medical History sheets.  Central safety labs collected, processed, and shipped to  per protocol.     All of the patient's questions were answered by Dr. Weston and me to her satisfaction. Patient expressed her willingness to continue to participate in the above mentioned clinical trial. Instructed patient to notify Dr. Tran and/or me with any questions, concerns, or changes in health status. Patient verbalized understanding.     Baseline Medical History  1. 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    2. Anxiety, Grade 1.  3. Dermatofibroma.  Benign.  Diagnosed by biopsy on 7/17/17.  No further intervention needed.    Adverse Events  - For the most up-to-date AE log, please refer to the paper AE log in the subjects research file.  1. Nausea, Grade 1. Per patient no medication taken for AE.  Will continue to monitor.  2. Diarrhea, Grade 1.  Per patient no medication taken for AE.  Will continue to monitor.  3. Diaphoresis  4. Headache,Grade 1.  Per patient no medication taken for AE.  Will continue to monitor.

## 2017-12-13 ENCOUNTER — RESEARCH ENCOUNTER (OUTPATIENT)
Dept: RESEARCH | Facility: HOSPITAL | Age: 62
End: 2017-12-13

## 2017-12-13 ENCOUNTER — OFFICE VISIT (OUTPATIENT)
Dept: GYNECOLOGIC ONCOLOGY | Facility: CLINIC | Age: 62
End: 2017-12-13
Payer: COMMERCIAL

## 2017-12-13 ENCOUNTER — LAB VISIT (OUTPATIENT)
Dept: LAB | Facility: HOSPITAL | Age: 62
End: 2017-12-13
Attending: OBSTETRICS & GYNECOLOGY
Payer: COMMERCIAL

## 2017-12-13 VITALS
BODY MASS INDEX: 29.6 KG/M2 | WEIGHT: 189 LBS | RESPIRATION RATE: 16 BRPM | HEART RATE: 71 BPM | TEMPERATURE: 98 F | DIASTOLIC BLOOD PRESSURE: 68 MMHG | SYSTOLIC BLOOD PRESSURE: 114 MMHG

## 2017-12-13 DIAGNOSIS — Z00.6 EXAMINATION OF PARTICIPANT IN CLINICAL TRIAL: ICD-10-CM

## 2017-12-13 DIAGNOSIS — C56.9 MALIGNANT NEOPLASM OF OVARY, UNSPECIFIED LATERALITY: Primary | ICD-10-CM

## 2017-12-13 DIAGNOSIS — Z51.11 ENCOUNTER FOR ANTINEOPLASTIC CHEMOTHERAPY: ICD-10-CM

## 2017-12-13 DIAGNOSIS — C56.9 OVARIAN CANCER, UNSPECIFIED LATERALITY: ICD-10-CM

## 2017-12-13 LAB
DRUG STUDY SPECIMEN TYPE: NORMAL
DRUG STUDY TEST NAME: NORMAL
DRUG STUDY TEST RESULT: NORMAL

## 2017-12-13 PROCEDURE — 99000 SPECIMEN HANDLING OFFICE-LAB: CPT

## 2017-12-13 PROCEDURE — 36415 COLL VENOUS BLD VENIPUNCTURE: CPT

## 2017-12-13 PROCEDURE — 99999 PR PBB SHADOW E&M-EST. PATIENT-LVL III: CPT | Mod: PBBFAC,,, | Performed by: OBSTETRICS & GYNECOLOGY

## 2017-12-13 PROCEDURE — 99214 OFFICE O/P EST MOD 30 MIN: CPT | Mod: S$GLB,,, | Performed by: OBSTETRICS & GYNECOLOGY

## 2017-12-13 NOTE — PROGRESS NOTES
Subjective:      Patient ID: Mickie Don is a 62 y.o. female.    Chief Complaint: Ovarian Cancer      HPI  Treatment History  Xlap/Omentectomy on 11/1/16 - mass was unresectable  Stage IIIC ovarian cancer  Initiated Taxotere/Carbo on 11/18/16, now s/p 6 cycles completed 3/8/17  Xlap/ISIS/BSO/Radical mass resection > 10 cm, optimal debulking on 4/11/17  Initiated PRIMA on 8/21/17  BRCA negative     HPI  Here today for FORWARD1 visit.  C1D15 PS is 0. Labs pending.  Denies N/V, F/C, vision changes or HA.  Appetite normal.  Bladder and bowel function normal.    Review of Systems   Constitutional: Negative for appetite change, chills, fatigue and fever.   HENT: Negative for mouth sores.    Respiratory: Negative for cough and shortness of breath.    Cardiovascular: Negative for leg swelling.   Gastrointestinal: Negative for abdominal pain, blood in stool, constipation and diarrhea.   Endocrine: Negative for cold intolerance.   Genitourinary: Negative for dysuria and vaginal bleeding.   Musculoskeletal: Negative for myalgias.   Skin: Negative for rash.   Allergic/Immunologic: Negative.    Neurological: Negative for weakness and numbness.   Hematological: Negative for adenopathy. Does not bruise/bleed easily.   Psychiatric/Behavioral: Negative for confusion.       Objective:   Physical Exam:   Constitutional: She is oriented to person, place, and time. She appears well-developed and well-nourished.    HENT:   Head: Normocephalic and atraumatic.    Eyes: EOM are normal. Pupils are equal, round, and reactive to light.    Neck: Normal range of motion. Neck supple. No thyromegaly present.    Cardiovascular: Normal rate, regular rhythm and intact distal pulses.     Pulmonary/Chest: Effort normal and breath sounds normal. No respiratory distress. She has no wheezes.        Abdominal: Soft. Bowel sounds are normal. She exhibits no distension, no ascites and no mass. There is no tenderness.             Musculoskeletal: Normal  range of motion and moves all extremeties.      Lymphadenopathy:     She has no cervical adenopathy.        Right: No supraclavicular adenopathy present.        Left: No supraclavicular adenopathy present.    Neurological: She is alert and oriented to person, place, and time.    Skin: Skin is warm and dry. No rash noted.    Psychiatric: She has a normal mood and affect.       Assessment:     1. Malignant neoplasm of ovary, unspecified laterality    2. Encounter for antineoplastic chemotherapy        Plan:     C1D8  Continue study per protocol.

## 2017-12-20 ENCOUNTER — RESEARCH ENCOUNTER (OUTPATIENT)
Dept: RESEARCH | Facility: HOSPITAL | Age: 62
End: 2017-12-20

## 2017-12-20 ENCOUNTER — INFUSION (OUTPATIENT)
Dept: INFUSION THERAPY | Facility: HOSPITAL | Age: 62
End: 2017-12-20
Attending: OBSTETRICS & GYNECOLOGY
Payer: COMMERCIAL

## 2017-12-20 ENCOUNTER — OFFICE VISIT (OUTPATIENT)
Dept: GYNECOLOGIC ONCOLOGY | Facility: CLINIC | Age: 62
End: 2017-12-20
Payer: COMMERCIAL

## 2017-12-20 ENCOUNTER — LAB VISIT (OUTPATIENT)
Dept: LAB | Facility: HOSPITAL | Age: 62
End: 2017-12-20
Attending: OBSTETRICS & GYNECOLOGY
Payer: COMMERCIAL

## 2017-12-20 VITALS
HEIGHT: 67 IN | SYSTOLIC BLOOD PRESSURE: 114 MMHG | TEMPERATURE: 99 F | WEIGHT: 195.31 LBS | HEART RATE: 73 BPM | RESPIRATION RATE: 16 BRPM | BODY MASS INDEX: 30.65 KG/M2 | DIASTOLIC BLOOD PRESSURE: 55 MMHG

## 2017-12-20 VITALS
HEART RATE: 70 BPM | BODY MASS INDEX: 30.65 KG/M2 | SYSTOLIC BLOOD PRESSURE: 129 MMHG | DIASTOLIC BLOOD PRESSURE: 60 MMHG | WEIGHT: 195.31 LBS | HEIGHT: 67 IN

## 2017-12-20 DIAGNOSIS — Z00.6 EXAMINATION OF PARTICIPANT IN CLINICAL TRIAL: Primary | ICD-10-CM

## 2017-12-20 DIAGNOSIS — C56.9 MALIGNANT NEOPLASM OF OVARY, UNSPECIFIED LATERALITY: ICD-10-CM

## 2017-12-20 DIAGNOSIS — C56.9 OVARIAN CANCER, UNSPECIFIED LATERALITY: ICD-10-CM

## 2017-12-20 DIAGNOSIS — Z51.11 ENCOUNTER FOR ANTINEOPLASTIC CHEMOTHERAPY: ICD-10-CM

## 2017-12-20 DIAGNOSIS — C56.9 OVARIAN CANCER, UNSPECIFIED LATERALITY: Primary | ICD-10-CM

## 2017-12-20 DIAGNOSIS — Z00.6 EXAMINATION OF PARTICIPANT IN CLINICAL TRIAL: ICD-10-CM

## 2017-12-20 LAB
ALBUMIN SERPL BCP-MCNC: 3.7 G/DL
ALP SERPL-CCNC: 62 U/L
ALT SERPL W/O P-5'-P-CCNC: 34 U/L
ANION GAP SERPL CALC-SCNC: 8 MMOL/L
AST SERPL-CCNC: 28 U/L
BASOPHILS # BLD AUTO: 0.03 K/UL
BASOPHILS NFR BLD: 0.7 %
BILIRUB SERPL-MCNC: 0.5 MG/DL
BUN SERPL-MCNC: 14 MG/DL
CALCIUM SERPL-MCNC: 9.4 MG/DL
CHLORIDE SERPL-SCNC: 107 MMOL/L
CO2 SERPL-SCNC: 27 MMOL/L
CREAT SERPL-MCNC: 0.7 MG/DL
DIFFERENTIAL METHOD: ABNORMAL
DRUG STUDY SPECIMEN TYPE: NORMAL
DRUG STUDY TEST NAME: NORMAL
DRUG STUDY TEST RESULT: NORMAL
EOSINOPHIL # BLD AUTO: 0.1 K/UL
EOSINOPHIL NFR BLD: 1.5 %
ERYTHROCYTE [DISTWIDTH] IN BLOOD BY AUTOMATED COUNT: 13.6 %
EST. GFR  (AFRICAN AMERICAN): >60 ML/MIN/1.73 M^2
EST. GFR  (NON AFRICAN AMERICAN): >60 ML/MIN/1.73 M^2
GLUCOSE SERPL-MCNC: 95 MG/DL
HCT VFR BLD AUTO: 38.2 %
HGB BLD-MCNC: 12.6 G/DL
IMM GRANULOCYTES # BLD AUTO: 0.01 K/UL
IMM GRANULOCYTES NFR BLD AUTO: 0.2 %
LYMPHOCYTES # BLD AUTO: 1 K/UL
LYMPHOCYTES NFR BLD: 25.2 %
MCH RBC QN AUTO: 32.3 PG
MCHC RBC AUTO-ENTMCNC: 33 G/DL
MCV RBC AUTO: 98 FL
MONOCYTES # BLD AUTO: 0.3 K/UL
MONOCYTES NFR BLD: 7.4 %
NEUTROPHILS # BLD AUTO: 2.6 K/UL
NEUTROPHILS NFR BLD: 65 %
NRBC BLD-RTO: 0 /100 WBC
PLATELET # BLD AUTO: 170 K/UL
PMV BLD AUTO: 10.2 FL
POTASSIUM SERPL-SCNC: 4.7 MMOL/L
PROT SERPL-MCNC: 7 G/DL
RBC # BLD AUTO: 3.9 M/UL
SODIUM SERPL-SCNC: 142 MMOL/L
WBC # BLD AUTO: 4.04 K/UL

## 2017-12-20 PROCEDURE — 96367 TX/PROPH/DG ADDL SEQ IV INF: CPT

## 2017-12-20 PROCEDURE — 63600175 PHARM REV CODE 636 W HCPCS: Performed by: OBSTETRICS & GYNECOLOGY

## 2017-12-20 PROCEDURE — 25000003 PHARM REV CODE 250: Performed by: OBSTETRICS & GYNECOLOGY

## 2017-12-20 PROCEDURE — 96413 CHEMO IV INFUSION 1 HR: CPT

## 2017-12-20 PROCEDURE — 99213 OFFICE O/P EST LOW 20 MIN: CPT | Mod: S$GLB,,, | Performed by: OBSTETRICS & GYNECOLOGY

## 2017-12-20 PROCEDURE — 80053 COMPREHEN METABOLIC PANEL: CPT

## 2017-12-20 PROCEDURE — 36415 COLL VENOUS BLD VENIPUNCTURE: CPT

## 2017-12-20 PROCEDURE — 85025 COMPLETE CBC W/AUTO DIFF WBC: CPT

## 2017-12-20 PROCEDURE — 99000 SPECIMEN HANDLING OFFICE-LAB: CPT

## 2017-12-20 PROCEDURE — 99999 PR PBB SHADOW E&M-EST. PATIENT-LVL III: CPT | Mod: PBBFAC,,, | Performed by: OBSTETRICS & GYNECOLOGY

## 2017-12-20 RX ORDER — ACETAMINOPHEN 325 MG/1
650 TABLET ORAL
Status: COMPLETED | OUTPATIENT
Start: 2017-12-20 | End: 2017-12-20

## 2017-12-20 RX ORDER — SODIUM CHLORIDE 0.9 % (FLUSH) 0.9 %
10 SYRINGE (ML) INJECTION
Status: DISCONTINUED | OUTPATIENT
Start: 2017-12-20 | End: 2017-12-20 | Stop reason: HOSPADM

## 2017-12-20 RX ADMIN — DIPHENHYDRAMINE HYDROCHLORIDE 50 MG: 50 INJECTION, SOLUTION INTRAMUSCULAR; INTRAVENOUS at 03:12

## 2017-12-20 RX ADMIN — DEXAMETHASONE SODIUM PHOSPHATE 10 MG: 4 INJECTION, SOLUTION INTRAMUSCULAR; INTRAVENOUS at 03:12

## 2017-12-20 RX ADMIN — ACETAMINOPHEN 650 MG: 325 TABLET ORAL at 03:12

## 2017-12-20 RX ADMIN — DEXTROSE MONOHYDRATE: 5 INJECTION, SOLUTION INTRAVENOUS at 03:12

## 2017-12-21 NOTE — PROGRESS NOTES
Dec 20, 2017      Protocol: FORWARD1/IMGN-0403  Investigator: BETH Weston  Treating Physician: GUERITA Tran Pt Initials: NAV VAZ  Study ID: [id number]  IRB#: [id number]     FORWARD 1: A Randomized, Open Label Phase 3 Study to Evaluate the Safety and Efficacy of Mirvetuximab soravtansine ([id number]) Versus Investigators Choice of Chemotherapy in Women with Folate Receptor á?positive Advanced Epithelial Ovarian Cancer, Primary Peritoneal Cancer or Fallopian Tube Cancer     Cycle 2, Day 1  Study allocation was made on 11/27/17.  Patient randomized to Arm A:[id number], 6 mg/kg2(from [facility]), q 3 weeks.    Patient presented to clinic to begin treatment on the above mentioned clinical trial. Patient alert and oriented, mood and affect appropriate to the situation. Patient reports she feels well with no issues and is ready to get started.    Physical exam, ECOG assessment performed on 12/20/17, ECOG = 0 per MD.  Central labs drawn, processed, and shipped to  per protocol.  Local labs also drawn on 12/20/17.  All local were within parameters to begin treatment and were reviewed by the physician prior to treatment.  Physical exam (done at screening) unremarkable per physician, no ocular symptoms noted.  Reviewed C1 adverse events and current medications with patient.  See Adverse Events and Concomitant Medication sheets.  QoL questionnaires prior to dosing on 12/20/17.     Pre-dose and post-dose vitals performed on 12/20/17 @ 16:19 and 17:57.  Infusion rate at 5 mg/min as this was maximal tolerated rate for C1 infusion.  Infusion from 16:26 - 17:49.  Pre-dose PK collected at 12:40 and post-dose PK collected at 17:50 pm.  No infusion reactions noted, no skin irritation at site of infusion.      All of the patient's questions were answered by Dr. Tran and me to her satisfaction. Patient expressed her willingness to continue to participate in the above mentioned clinical trial. Instructed patient to notify Dr. Tran and/or me with any  questions, concerns, or changes in health status. Patient verbalized understanding.     Baseline Medical History  1. 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    2. Anxiety, Grade 1.  3. Dermatofibroma.  Benign.  Diagnosed by biopsy on 7/17/17.  No further intervention needed.    Adverse Events - For the most up-to-date AE log, please refer to the paper AE log in the subjects research file.  1. Nausea, resolved from Grade 1. Per patient no medication taken for AE.  Will continue to monitor.  2. Diarrhea, resolved from Grade 1.  Per patient no medication taken for AE.  Will continue to monitor.  3. Diaphoresis, resolved from Grade 1.  4. Headache, resolved from Grade 1.  Per patient no medication taken for AE.  Will continue to monitor.

## 2017-12-21 NOTE — PLAN OF CARE
Problem: Patient Care Overview  Goal: Plan of Care Review  Outcome: Ongoing (interventions implemented as appropriate)  Patient completed 2nd infusion of study drug today with nad noted upon discharge. Tolerated rate of 150 cc/hr throughout infusion. PK and VS completed per protocol. Verbalized understanding to call MD for any questions/concerns. PIV removed, catheter tip intact. Discharged home, ambulated independently.

## 2017-12-27 ENCOUNTER — LAB VISIT (OUTPATIENT)
Dept: LAB | Facility: HOSPITAL | Age: 62
End: 2017-12-27
Attending: OBSTETRICS & GYNECOLOGY
Payer: COMMERCIAL

## 2017-12-27 ENCOUNTER — OFFICE VISIT (OUTPATIENT)
Dept: GYNECOLOGIC ONCOLOGY | Facility: CLINIC | Age: 62
End: 2017-12-27
Payer: COMMERCIAL

## 2017-12-27 ENCOUNTER — RESEARCH ENCOUNTER (OUTPATIENT)
Dept: RESEARCH | Facility: HOSPITAL | Age: 62
End: 2017-12-27

## 2017-12-27 VITALS
TEMPERATURE: 98 F | WEIGHT: 194 LBS | SYSTOLIC BLOOD PRESSURE: 125 MMHG | DIASTOLIC BLOOD PRESSURE: 74 MMHG | BODY MASS INDEX: 30.38 KG/M2 | RESPIRATION RATE: 16 BRPM | HEART RATE: 80 BPM

## 2017-12-27 DIAGNOSIS — Z00.6 EXAMINATION OF PARTICIPANT IN CLINICAL TRIAL: ICD-10-CM

## 2017-12-27 DIAGNOSIS — Z51.11 ENCOUNTER FOR ANTINEOPLASTIC CHEMOTHERAPY: ICD-10-CM

## 2017-12-27 DIAGNOSIS — C56.9 MALIGNANT NEOPLASM OF OVARY, UNSPECIFIED LATERALITY: ICD-10-CM

## 2017-12-27 DIAGNOSIS — C56.9 MALIGNANT NEOPLASM OF OVARY, UNSPECIFIED LATERALITY: Primary | ICD-10-CM

## 2017-12-27 LAB
ALBUMIN SERPL BCP-MCNC: 3.5 G/DL
ALP SERPL-CCNC: 90 U/L
ALT SERPL W/O P-5'-P-CCNC: 28 U/L
ANION GAP SERPL CALC-SCNC: 8 MMOL/L
AST SERPL-CCNC: 31 U/L
BASOPHILS # BLD AUTO: 0.03 K/UL
BASOPHILS NFR BLD: 0.8 %
BILIRUB SERPL-MCNC: 0.6 MG/DL
BUN SERPL-MCNC: 11 MG/DL
CALCIUM SERPL-MCNC: 9.5 MG/DL
CHLORIDE SERPL-SCNC: 106 MMOL/L
CO2 SERPL-SCNC: 30 MMOL/L
CREAT SERPL-MCNC: 0.8 MG/DL
DIFFERENTIAL METHOD: ABNORMAL
DRUG STUDY SPECIMEN TYPE: NORMAL
DRUG STUDY TEST NAME: NORMAL
DRUG STUDY TEST RESULT: NORMAL
EOSINOPHIL # BLD AUTO: 0.1 K/UL
EOSINOPHIL NFR BLD: 1.5 %
ERYTHROCYTE [DISTWIDTH] IN BLOOD BY AUTOMATED COUNT: 13.3 %
EST. GFR  (AFRICAN AMERICAN): >60 ML/MIN/1.73 M^2
EST. GFR  (NON AFRICAN AMERICAN): >60 ML/MIN/1.73 M^2
GLUCOSE SERPL-MCNC: 99 MG/DL
HCT VFR BLD AUTO: 41.9 %
HGB BLD-MCNC: 14.2 G/DL
IMM GRANULOCYTES # BLD AUTO: 0.02 K/UL
IMM GRANULOCYTES NFR BLD AUTO: 0.5 %
LYMPHOCYTES # BLD AUTO: 1 K/UL
LYMPHOCYTES NFR BLD: 26.2 %
MCH RBC QN AUTO: 33 PG
MCHC RBC AUTO-ENTMCNC: 33.9 G/DL
MCV RBC AUTO: 97 FL
MONOCYTES # BLD AUTO: 0.4 K/UL
MONOCYTES NFR BLD: 9.8 %
NEUTROPHILS # BLD AUTO: 2.4 K/UL
NEUTROPHILS NFR BLD: 61.2 %
NRBC BLD-RTO: 0 /100 WBC
PLATELET # BLD AUTO: 56 K/UL
PMV BLD AUTO: 11 FL
POTASSIUM SERPL-SCNC: 4.2 MMOL/L
PROT SERPL-MCNC: 7.2 G/DL
RBC # BLD AUTO: 4.3 M/UL
SODIUM SERPL-SCNC: 144 MMOL/L
WBC # BLD AUTO: 3.97 K/UL

## 2017-12-27 PROCEDURE — 99000 SPECIMEN HANDLING OFFICE-LAB: CPT

## 2017-12-27 PROCEDURE — 80053 COMPREHEN METABOLIC PANEL: CPT

## 2017-12-27 PROCEDURE — 36415 COLL VENOUS BLD VENIPUNCTURE: CPT

## 2017-12-27 PROCEDURE — 99214 OFFICE O/P EST MOD 30 MIN: CPT | Mod: S$GLB,,, | Performed by: OBSTETRICS & GYNECOLOGY

## 2017-12-27 PROCEDURE — 85025 COMPLETE CBC W/AUTO DIFF WBC: CPT

## 2017-12-27 PROCEDURE — 99999 PR PBB SHADOW E&M-EST. PATIENT-LVL III: CPT | Mod: PBBFAC,,, | Performed by: OBSTETRICS & GYNECOLOGY

## 2017-12-27 NOTE — PROGRESS NOTES
Subjective:      Patient ID: Mickie Don is a 62 y.o. female.    Chief Complaint: Chemotherapy (cycle # 2 day 8 )      HPI  Treatment History  Xlap/Omentectomy on 11/1/16 - mass was unresectable  Stage IIIC ovarian cancer  Initiated Taxotere/Carbo on 11/18/16, now s/p 6 cycles completed 3/8/17  Xlap/ISIS/BSO/Radical mass resection > 10 cm, optimal debulking on 4/11/17  Initiated PRIMA on 8/21/17  BRCA negative     HPI  Here today for FORWARD1 visit.  C2D8 PS is 0. Denies N/V, F/C, vision changes or HA.  Appetite normal.  Bladder and bowel function normal.    Review of Systems   Constitutional: Negative for appetite change, chills, fatigue and fever.   HENT: Negative for mouth sores.    Eyes: Negative for visual disturbance.   Respiratory: Negative for cough and shortness of breath.    Cardiovascular: Negative for leg swelling.   Gastrointestinal: Negative for abdominal pain, blood in stool, constipation, diarrhea, nausea and vomiting.   Endocrine: Negative for cold intolerance.   Genitourinary: Negative for dysuria, pelvic pain and vaginal bleeding.   Musculoskeletal: Negative for myalgias.   Skin: Negative for rash.   Allergic/Immunologic: Negative.    Neurological: Negative for weakness and numbness.   Hematological: Negative for adenopathy. Does not bruise/bleed easily.   Psychiatric/Behavioral: Negative for confusion. The patient is not nervous/anxious.        Objective:   Physical Exam:   Constitutional: She is oriented to person, place, and time. She appears well-developed and well-nourished. No distress.    HENT:   Head: Normocephalic.    Eyes: Pupils are equal, round, and reactive to light.    Neck: Normal range of motion.    Cardiovascular: Exam reveals no cyanosis and no edema.     Pulmonary/Chest: Effort normal. No respiratory distress.        Abdominal: Soft. She exhibits no distension and no ascites.     Genitourinary:   Genitourinary Comments: Pelvic exam deferred           Musculoskeletal: Normal  range of motion and moves all extremeties.      Lymphadenopathy:        Right: No supraclavicular adenopathy present.        Left: No supraclavicular adenopathy present.    Neurological: She is alert and oriented to person, place, and time.    Skin: Skin is warm and dry. No rash noted. No cyanosis.    Psychiatric: She has a normal mood and affect.       Assessment:     1. Malignant neoplasm of ovary, unspecified laterality    2. Encounter for antineoplastic chemotherapy    3. Examination of participant in clinical trial        Plan:     C2D8  Continue study per protocol.       Co-sign: Kiana Weston MD

## 2017-12-27 NOTE — PROGRESS NOTES
Dec 27, 2017      Protocol: FORWARD1/IMGN-0403  Investigator: BETH Weston  Treating Physician: GUERITA Tran Pt Initials: NAV VAZ  Study ID: [id number]  IRB#: 2016.439.A     FORWARD 1: A Randomized, Open Label Phase 3 Study to Evaluate the Safety and Efficacy of Mirvetuximab soravtansine (IPPB740) Versus Investigators Choice of Chemotherapy in Women with Folate Receptor á?positive Advanced Epithelial Ovarian Cancer, Primary Peritoneal Cancer or Fallopian Tube Cancer     Cycle 2, Day 8  Study allocation was made on 11/27/17.  Patient randomized to Arm A:[id number], 6 mg/kg2(from [facility]), q 3 weeks.    Patient presented to clinic for C2D8 on the above mentioned clinical trial. Patient alert and oriented, mood and affect appropriate to the situation. Patient denies vison changes, cough, headache, fatigue, vomiting, dizziness, or fever.  Patient reports mild nausea and intermittent diarrhea on Days 3-5 post-infusion.    Physical exam, ECOG assessment performed on 12/27/17.  ECOG = 0 per MD.  Local labs were performed on 12/27/17 and reviewed by the physician.  Platelet count low but no action needed at this point. Physical exam unremarkable per physician, no ocular symptoms noted.  Reviewed baseline conditions and current medications with patient.  See Concomitant Medication and Baseline Medical History sheets.  Central safety labs collected, processed, and shipped to  per protocol.     All of the patient's questions were answered by Dr. Weston and me to her satisfaction. Patient expressed her willingness to continue to participate in the above mentioned clinical trial. Instructed patient to notify Dr. Tran and/or me with any questions, concerns, or changes in health status. Patient verbalized understanding.     Baseline Medical History  1. 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    2. Anxiety, Grade 1.  3. Dermatofibroma.  Benign.  Diagnosed by biopsy on 7/17/17.  No  further intervention needed.    Adverse Events - For the most up-to-date AE log, please refer to the paper AE log in the subjects research file.  1. Nausea, Grade 1. Per patient no medication taken for AE.  Will continue to monitor.  2. Diarrhea, Grade 1.  Per patient no medication taken for AE.  Will continue to monitor.  3. Platelet count decreased/thrombocytopenia, Grade 2.  Will continue to monitor.  4. Diaphoresis, resolved from Grade 1.  5. Headache, resolved from Grade 1.  Per patient no medication taken for AE.  Will continue to monitor.

## 2018-01-03 ENCOUNTER — OFFICE VISIT (OUTPATIENT)
Dept: GYNECOLOGIC ONCOLOGY | Facility: CLINIC | Age: 63
End: 2018-01-03
Payer: COMMERCIAL

## 2018-01-03 ENCOUNTER — RESEARCH ENCOUNTER (OUTPATIENT)
Dept: RESEARCH | Facility: HOSPITAL | Age: 63
End: 2018-01-03

## 2018-01-03 ENCOUNTER — LAB VISIT (OUTPATIENT)
Dept: LAB | Facility: HOSPITAL | Age: 63
End: 2018-01-03
Attending: OBSTETRICS & GYNECOLOGY
Payer: COMMERCIAL

## 2018-01-03 VITALS
DIASTOLIC BLOOD PRESSURE: 61 MMHG | WEIGHT: 196.88 LBS | RESPIRATION RATE: 18 BRPM | HEART RATE: 86 BPM | HEIGHT: 67 IN | SYSTOLIC BLOOD PRESSURE: 116 MMHG | BODY MASS INDEX: 30.9 KG/M2 | TEMPERATURE: 96 F

## 2018-01-03 DIAGNOSIS — Z00.6 EXAMINATION OF PARTICIPANT IN CLINICAL TRIAL: Primary | ICD-10-CM

## 2018-01-03 DIAGNOSIS — Z51.11 ENCOUNTER FOR ANTINEOPLASTIC CHEMOTHERAPY: ICD-10-CM

## 2018-01-03 DIAGNOSIS — Z00.6 EXAMINATION OF PARTICIPANT IN CLINICAL TRIAL: ICD-10-CM

## 2018-01-03 DIAGNOSIS — C56.9 MALIGNANT NEOPLASM OF OVARY, UNSPECIFIED LATERALITY: ICD-10-CM

## 2018-01-03 LAB
ALBUMIN SERPL BCP-MCNC: 3.3 G/DL
ALP SERPL-CCNC: 74 U/L
ALT SERPL W/O P-5'-P-CCNC: 82 U/L
ANION GAP SERPL CALC-SCNC: 9 MMOL/L
AST SERPL-CCNC: 61 U/L
BASOPHILS # BLD AUTO: 0.03 K/UL
BASOPHILS NFR BLD: 0.8 %
BILIRUB SERPL-MCNC: 0.6 MG/DL
BUN SERPL-MCNC: 14 MG/DL
CALCIUM SERPL-MCNC: 9 MG/DL
CHLORIDE SERPL-SCNC: 105 MMOL/L
CO2 SERPL-SCNC: 28 MMOL/L
CREAT SERPL-MCNC: 0.8 MG/DL
DIFFERENTIAL METHOD: ABNORMAL
DRUG STUDY SPECIMEN TYPE: NORMAL
DRUG STUDY TEST NAME: NORMAL
DRUG STUDY TEST RESULT: NORMAL
EOSINOPHIL # BLD AUTO: 0.1 K/UL
EOSINOPHIL NFR BLD: 1.3 %
ERYTHROCYTE [DISTWIDTH] IN BLOOD BY AUTOMATED COUNT: 13.2 %
EST. GFR  (AFRICAN AMERICAN): >60 ML/MIN/1.73 M^2
EST. GFR  (NON AFRICAN AMERICAN): >60 ML/MIN/1.73 M^2
GLUCOSE SERPL-MCNC: 119 MG/DL
HCT VFR BLD AUTO: 40.1 %
HGB BLD-MCNC: 13.8 G/DL
IMM GRANULOCYTES # BLD AUTO: 0.02 K/UL
IMM GRANULOCYTES NFR BLD AUTO: 0.5 %
LYMPHOCYTES # BLD AUTO: 1 K/UL
LYMPHOCYTES NFR BLD: 24.5 %
MCH RBC QN AUTO: 32.7 PG
MCHC RBC AUTO-ENTMCNC: 34.4 G/DL
MCV RBC AUTO: 95 FL
MONOCYTES # BLD AUTO: 0.3 K/UL
MONOCYTES NFR BLD: 8.3 %
NEUTROPHILS # BLD AUTO: 2.6 K/UL
NEUTROPHILS NFR BLD: 64.6 %
NRBC BLD-RTO: 0 /100 WBC
PLATELET # BLD AUTO: 124 K/UL
PMV BLD AUTO: 10.1 FL
POTASSIUM SERPL-SCNC: 4.3 MMOL/L
PROT SERPL-MCNC: 6.5 G/DL
RBC # BLD AUTO: 4.22 M/UL
SODIUM SERPL-SCNC: 142 MMOL/L
WBC # BLD AUTO: 4 K/UL

## 2018-01-03 PROCEDURE — 99000 SPECIMEN HANDLING OFFICE-LAB: CPT

## 2018-01-03 PROCEDURE — 85025 COMPLETE CBC W/AUTO DIFF WBC: CPT

## 2018-01-03 PROCEDURE — 80053 COMPREHEN METABOLIC PANEL: CPT

## 2018-01-03 PROCEDURE — 99999 PR PBB SHADOW E&M-EST. PATIENT-LVL III: CPT | Mod: PBBFAC,,, | Performed by: OBSTETRICS & GYNECOLOGY

## 2018-01-03 PROCEDURE — 99213 OFFICE O/P EST LOW 20 MIN: CPT | Mod: S$GLB,,, | Performed by: OBSTETRICS & GYNECOLOGY

## 2018-01-03 NOTE — PROGRESS NOTES
Subjective:      Patient ID: Mickie Don is a 62 y.o. female.    Chief Complaint: Follow-up      HPI  Treatment History  Xlap/Omentectomy on 11/1/16 - mass was unresectable  Stage IIIC ovarian cancer  Initiated Taxotere/Carbo on 11/18/16, now s/p 6 cycles completed 3/8/17  Xlap/ISIS/BSO/Radical mass resection > 10 cm, optimal debulking on 4/11/17  Initiated PRIMA on 8/21/17  BRCA negative     HPI  Here today for FORWARD1 visit.  C2D15 PS is 0. Denies N/V, F/C, vision changes or HA.  Appetite normal.  Bladder and bowel function normal. Endorses mild neuropathy in fingers bilaterally.      Review of Systems   Constitutional: Negative for appetite change, chills, fatigue and fever.   HENT: Negative for mouth sores.    Respiratory: Negative for cough and shortness of breath.    Cardiovascular: Negative for leg swelling.   Gastrointestinal: Negative for abdominal pain, blood in stool, constipation and diarrhea.   Endocrine: Negative for cold intolerance.   Genitourinary: Negative for dysuria and vaginal bleeding.   Musculoskeletal: Negative for myalgias.   Skin: Negative for rash.   Allergic/Immunologic: Negative.    Neurological: Positive for numbness. Negative for weakness.   Hematological: Negative for adenopathy. Does not bruise/bleed easily.   Psychiatric/Behavioral: Negative for confusion.       Objective:   Physical Exam:   Constitutional: She is oriented to person, place, and time. She appears well-developed and well-nourished.    HENT:   Head: Normocephalic and atraumatic.    Eyes: EOM are normal. Pupils are equal, round, and reactive to light.    Neck: Normal range of motion. Neck supple. No thyromegaly present.    Cardiovascular: Normal rate, regular rhythm and intact distal pulses.     Pulmonary/Chest: Effort normal and breath sounds normal. No respiratory distress. She has no wheezes.        Abdominal: Soft. Bowel sounds are normal. She exhibits no distension, no ascites and no mass. There is no  tenderness.             Musculoskeletal: Normal range of motion and moves all extremeties.      Lymphadenopathy:     She has no cervical adenopathy.        Right: No supraclavicular adenopathy present.        Left: No supraclavicular adenopathy present.    Neurological: She is alert and oriented to person, place, and time.    Skin: Skin is warm and dry. No rash noted.    Psychiatric: She has a normal mood and affect.       Assessment:     1. Examination of participant in clinical trial    2. Encounter for antineoplastic chemotherapy    3. Malignant neoplasm of ovary, unspecified laterality        Plan:   No orders of the defined types were placed in this encounter.    C2D15  Okay to continue per protocol  Imaging   RTC per protocol.

## 2018-01-04 NOTE — PROGRESS NOTES
Subjective:      Patient ID: Mickie Don is a 62 y.o. female.    Chief Complaint: Chemotherapy (C2/D1/Trial )      HPI  Treatment History  Xlap/Omentectomy on 11/1/16 - mass was unresectable  Stage IIIC ovarian cancer  Initiated Taxotere/Carbo on 11/18/16, now s/p 6 cycles completed 3/8/17  Xlap/ISIS/BSO/Radical mass resection > 10 cm, optimal debulking on 4/11/17  Initiated PRIMA on 8/21/17  BRCA negative     HPI  Here today for FORWARD1 visit.  C2D1 PS is 0. Labs pending.  Denies N/V, F/C, vision changes or HA.  Appetite normal.  Bladder and bowel function normal.     Review of Systems   Constitutional: Negative for appetite change, chills, fatigue and fever.   HENT: Negative for mouth sores.    Respiratory: Negative for cough and shortness of breath.    Cardiovascular: Negative for leg swelling.   Gastrointestinal: Negative for abdominal pain, blood in stool, constipation and diarrhea.   Endocrine: Negative for cold intolerance.   Genitourinary: Negative for dysuria and vaginal bleeding.   Musculoskeletal: Negative for myalgias.   Skin: Negative for rash.   Allergic/Immunologic: Negative.    Neurological: Negative for weakness and numbness.   Hematological: Negative for adenopathy. Does not bruise/bleed easily.   Psychiatric/Behavioral: Negative for confusion.       Objective:   Physical Exam:   Constitutional: She is oriented to person, place, and time. She appears well-developed and well-nourished.    HENT:   Head: Normocephalic and atraumatic.    Eyes: EOM are normal. Pupils are equal, round, and reactive to light.    Neck: Normal range of motion. Neck supple. No thyromegaly present.    Cardiovascular: Normal rate, regular rhythm and intact distal pulses.     Pulmonary/Chest: Effort normal and breath sounds normal. No respiratory distress. She has no wheezes.        Abdominal: Soft. Bowel sounds are normal. She exhibits no distension, no ascites and no mass. There is no tenderness.              Musculoskeletal: Normal range of motion and moves all extremeties.      Lymphadenopathy:     She has no cervical adenopathy.        Right: No supraclavicular adenopathy present.        Left: No supraclavicular adenopathy present.    Neurological: She is alert and oriented to person, place, and time.    Skin: Skin is warm and dry. No rash noted.    Psychiatric: She has a normal mood and affect.       Assessment:     1. Examination of participant in clinical trial    2. Encounter for antineoplastic chemotherapy    3. Malignant neoplasm of ovary, unspecified laterality        Plan:   No orders of the defined types were placed in this encounter.    Okay to treat C2 per protocol. RTC 1 week.

## 2018-01-04 NOTE — PROGRESS NOTES
Dk 3, 2018      Protocol: FORWARD1/IMGN-0403  Investigator: BETH Weston  Treating Physician: GUERITA Tran Pt Initials: NAV VAZ  Study ID: [id number]  IRB#: [id number].A     FORWARD 1: A Randomized, Open Label Phase 3 Study to Evaluate the Safety and Efficacy of Mirvetuximab soravtansine (UTJJ754) Versus Investigators Choice of Chemotherapy in Women with Folate Receptor á?positive Advanced Epithelial Ovarian Cancer, Primary Peritoneal Cancer or Fallopian Tube Cancer     Cycle 2, Day 15  Study allocation was made on 11/27/17.  Patient randomized to Arm A:[id number], 6 mg/kg2 (from [facility]), q 3 weeks.      Patient presented to clinic for C2D8 on the above mentioned clinical trial. Patient alert and oriented, mood and affect appropriate to the situation. Patient denies vison changes, cough, headache, fatigue, vomiting, dizziness, or fever.  Patient reports mild nausea and intermittent diarrhea on Days 3-5 post-infusion.  Patient reports peripheral neuropathy in tip of fingers, both hands.  No change in neuropathy in feet from Baseline.    Physical exam, ECOG assessment performed on 1/3/18.  ECOG = 0 per MD.  Local labs were performed on 1/3/18 and reviewed by the physician with patient.  Physical exam unremarkable per physician, no ocular symptoms noted.  Reviewed baseline conditions and current medications with patient.  See Concomitant Medication and Baseline Medical History sheets.  Central safety labs collected, processed, and shipped to  per protocol.     All of the patient's questions were answered by Dr. Weston and me to her satisfaction. Patient expressed her willingness to continue to participate in the above mentioned clinical trial. Instructed patient to notify Dr. Tran and/or me with any questions, concerns, or changes in health status. Patient verbalized understanding.     Baseline Medical History  1. 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    2. Anxiety,  Grade 1.  3. Peripheral neuropathy, Grade 1.  4. Dermatofibroma.  Benign.  Diagnosed by biopsy on 7/17/17.  No further intervention needed.    Adverse Events - For the most up-to-date AE log, please refer to the paper AE log in the subjects research file.  1. Platelet count decreased/thrombocytopenia, improved to Grade 1.  Will continue to monitor.  2. Peripheral neuropathy, Grade 1.  Mild issues in bilateral hands.  Will continue to monitor.  3. Hypoalbuminemia, Grade 1.  Will continue to monitor.  4. AST increased, Grade 1.  Will continue to monitor.  5. ALT increased, Grade 1.  Will continue to monitor.

## 2018-01-08 ENCOUNTER — HOSPITAL ENCOUNTER (OUTPATIENT)
Dept: RADIOLOGY | Facility: OTHER | Age: 63
Discharge: HOME | End: 2018-01-08
Attending: OBSTETRICS & GYNECOLOGY
Payer: COMMERCIAL

## 2018-01-08 DIAGNOSIS — Z00.6 EXAMINATION OF PARTICIPANT IN CLINICAL TRIAL: ICD-10-CM

## 2018-01-08 DIAGNOSIS — C56.9 MALIGNANT NEOPLASM OF OVARY, UNSPECIFIED LATERALITY: ICD-10-CM

## 2018-01-08 PROCEDURE — 71260 CT THORAX DX C+: CPT | Mod: TC

## 2018-01-08 PROCEDURE — 25500020 PHARM REV CODE 255: Performed by: OBSTETRICS & GYNECOLOGY

## 2018-01-08 PROCEDURE — 71260 CT THORAX DX C+: CPT | Mod: 26,,, | Performed by: RADIOLOGY

## 2018-01-08 PROCEDURE — 74177 CT ABD & PELVIS W/CONTRAST: CPT | Mod: TC

## 2018-01-08 PROCEDURE — 74177 CT ABD & PELVIS W/CONTRAST: CPT | Mod: 26,,, | Performed by: RADIOLOGY

## 2018-01-08 RX ADMIN — IOHEXOL 100 ML: 350 INJECTION, SOLUTION INTRAVENOUS at 11:01

## 2018-01-08 RX ADMIN — IOHEXOL 25 ML: 350 INJECTION, SOLUTION INTRAVENOUS at 08:01

## 2018-01-10 ENCOUNTER — RESEARCH ENCOUNTER (OUTPATIENT)
Dept: RESEARCH | Facility: HOSPITAL | Age: 63
End: 2018-01-10

## 2018-01-10 ENCOUNTER — LAB VISIT (OUTPATIENT)
Dept: LAB | Facility: HOSPITAL | Age: 63
End: 2018-01-10
Payer: COMMERCIAL

## 2018-01-10 ENCOUNTER — OFFICE VISIT (OUTPATIENT)
Dept: GYNECOLOGIC ONCOLOGY | Facility: CLINIC | Age: 63
End: 2018-01-10
Payer: COMMERCIAL

## 2018-01-10 ENCOUNTER — INFUSION (OUTPATIENT)
Dept: INFUSION THERAPY | Facility: HOSPITAL | Age: 63
End: 2018-01-10
Attending: OBSTETRICS & GYNECOLOGY
Payer: COMMERCIAL

## 2018-01-10 VITALS
DIASTOLIC BLOOD PRESSURE: 62 MMHG | WEIGHT: 199.5 LBS | BODY MASS INDEX: 31.31 KG/M2 | HEIGHT: 67 IN | SYSTOLIC BLOOD PRESSURE: 131 MMHG | TEMPERATURE: 97 F | HEART RATE: 90 BPM | RESPIRATION RATE: 18 BRPM

## 2018-01-10 VITALS
DIASTOLIC BLOOD PRESSURE: 57 MMHG | OXYGEN SATURATION: 97 % | RESPIRATION RATE: 18 BRPM | TEMPERATURE: 98 F | SYSTOLIC BLOOD PRESSURE: 121 MMHG | HEART RATE: 78 BPM

## 2018-01-10 DIAGNOSIS — C56.9 OVARIAN CANCER, UNSPECIFIED LATERALITY: Primary | ICD-10-CM

## 2018-01-10 DIAGNOSIS — C56.9 MALIGNANT NEOPLASM OF OVARY, UNSPECIFIED LATERALITY: ICD-10-CM

## 2018-01-10 DIAGNOSIS — T45.1X5A NEUROPATHY DUE TO CHEMOTHERAPEUTIC DRUG: ICD-10-CM

## 2018-01-10 DIAGNOSIS — Z51.11 CHEMOTHERAPY MANAGEMENT, ENCOUNTER FOR: ICD-10-CM

## 2018-01-10 DIAGNOSIS — Z00.6 EXAMINATION OF PARTICIPANT IN CLINICAL TRIAL: ICD-10-CM

## 2018-01-10 DIAGNOSIS — G62.0 NEUROPATHY DUE TO CHEMOTHERAPEUTIC DRUG: ICD-10-CM

## 2018-01-10 LAB
ALBUMIN SERPL BCP-MCNC: 3.6 G/DL
ALP SERPL-CCNC: 70 U/L
ALT SERPL W/O P-5'-P-CCNC: 49 U/L
ANION GAP SERPL CALC-SCNC: 8 MMOL/L
AST SERPL-CCNC: 38 U/L
BASOPHILS # BLD AUTO: 0.04 K/UL
BASOPHILS NFR BLD: 1 %
BILIRUB SERPL-MCNC: 0.5 MG/DL
BUN SERPL-MCNC: 13 MG/DL
CALCIUM SERPL-MCNC: 9.1 MG/DL
CHLORIDE SERPL-SCNC: 104 MMOL/L
CO2 SERPL-SCNC: 27 MMOL/L
CREAT SERPL-MCNC: 0.7 MG/DL
DIFFERENTIAL METHOD: ABNORMAL
DRUG STUDY SPECIMEN TYPE: NORMAL
DRUG STUDY TEST NAME: NORMAL
DRUG STUDY TEST RESULT: NORMAL
EOSINOPHIL # BLD AUTO: 0 K/UL
EOSINOPHIL NFR BLD: 0.7 %
ERYTHROCYTE [DISTWIDTH] IN BLOOD BY AUTOMATED COUNT: 13 %
EST. GFR  (AFRICAN AMERICAN): >60 ML/MIN/1.73 M^2
EST. GFR  (NON AFRICAN AMERICAN): >60 ML/MIN/1.73 M^2
GLUCOSE SERPL-MCNC: 101 MG/DL
HCT VFR BLD AUTO: 39.4 %
HGB BLD-MCNC: 13.1 G/DL
IMM GRANULOCYTES # BLD AUTO: 0.02 K/UL
IMM GRANULOCYTES NFR BLD AUTO: 0.5 %
LYMPHOCYTES # BLD AUTO: 1 K/UL
LYMPHOCYTES NFR BLD: 23.7 %
MCH RBC QN AUTO: 32 PG
MCHC RBC AUTO-ENTMCNC: 33.2 G/DL
MCV RBC AUTO: 96 FL
MONOCYTES # BLD AUTO: 0.3 K/UL
MONOCYTES NFR BLD: 7.3 %
NEUTROPHILS # BLD AUTO: 2.7 K/UL
NEUTROPHILS NFR BLD: 66.8 %
NRBC BLD-RTO: 0 /100 WBC
PLATELET # BLD AUTO: 162 K/UL
PMV BLD AUTO: 9.9 FL
POTASSIUM SERPL-SCNC: 4.2 MMOL/L
PROT SERPL-MCNC: 6.8 G/DL
RBC # BLD AUTO: 4.09 M/UL
SODIUM SERPL-SCNC: 139 MMOL/L
WBC # BLD AUTO: 4.1 K/UL

## 2018-01-10 PROCEDURE — 96413 CHEMO IV INFUSION 1 HR: CPT

## 2018-01-10 PROCEDURE — 96415 CHEMO IV INFUSION ADDL HR: CPT

## 2018-01-10 PROCEDURE — 99000 SPECIMEN HANDLING OFFICE-LAB: CPT

## 2018-01-10 PROCEDURE — 85025 COMPLETE CBC W/AUTO DIFF WBC: CPT

## 2018-01-10 PROCEDURE — 96367 TX/PROPH/DG ADDL SEQ IV INF: CPT

## 2018-01-10 PROCEDURE — 80053 COMPREHEN METABOLIC PANEL: CPT

## 2018-01-10 PROCEDURE — 36415 COLL VENOUS BLD VENIPUNCTURE: CPT

## 2018-01-10 PROCEDURE — 99999 PR PBB SHADOW E&M-EST. PATIENT-LVL III: CPT | Mod: PBBFAC,,, | Performed by: OBSTETRICS & GYNECOLOGY

## 2018-01-10 PROCEDURE — 25000003 PHARM REV CODE 250: Performed by: OBSTETRICS & GYNECOLOGY

## 2018-01-10 PROCEDURE — 99214 OFFICE O/P EST MOD 30 MIN: CPT | Mod: S$GLB,,, | Performed by: OBSTETRICS & GYNECOLOGY

## 2018-01-10 PROCEDURE — 63600175 PHARM REV CODE 636 W HCPCS: Performed by: OBSTETRICS & GYNECOLOGY

## 2018-01-10 RX ORDER — GABAPENTIN 100 MG/1
100 CAPSULE ORAL 3 TIMES DAILY
Qty: 90 CAPSULE | Refills: 2 | Status: SHIPPED | OUTPATIENT
Start: 2018-01-10 | End: 2018-01-31 | Stop reason: SDUPTHER

## 2018-01-10 RX ORDER — SODIUM CHLORIDE 0.9 % (FLUSH) 0.9 %
10 SYRINGE (ML) INJECTION
Status: CANCELLED | OUTPATIENT
Start: 2018-01-10

## 2018-01-10 RX ORDER — ACETAMINOPHEN 325 MG/1
650 TABLET ORAL
Status: COMPLETED | OUTPATIENT
Start: 2018-01-10 | End: 2018-01-10

## 2018-01-10 RX ORDER — SODIUM CHLORIDE 0.9 % (FLUSH) 0.9 %
10 SYRINGE (ML) INJECTION
Status: DISCONTINUED | OUTPATIENT
Start: 2018-01-10 | End: 2018-01-10 | Stop reason: HOSPADM

## 2018-01-10 RX ORDER — ACETAMINOPHEN 325 MG/1
650 TABLET ORAL
Status: CANCELLED | OUTPATIENT
Start: 2018-01-10

## 2018-01-10 RX ORDER — HEPARIN 100 UNIT/ML
500 SYRINGE INTRAVENOUS
Status: CANCELLED | OUTPATIENT
Start: 2018-01-10

## 2018-01-10 RX ORDER — HEPARIN 100 UNIT/ML
500 SYRINGE INTRAVENOUS
Status: DISCONTINUED | OUTPATIENT
Start: 2018-01-10 | End: 2018-01-10 | Stop reason: HOSPADM

## 2018-01-10 RX ADMIN — ACETAMINOPHEN 650 MG: 325 TABLET ORAL at 12:01

## 2018-01-10 RX ADMIN — DEXAMETHASONE SODIUM PHOSPHATE 10 MG: 4 INJECTION, SOLUTION INTRAMUSCULAR; INTRAVENOUS at 12:01

## 2018-01-10 RX ADMIN — DEXTROSE MONOHYDRATE: 5 INJECTION, SOLUTION INTRAVENOUS at 11:01

## 2018-01-10 RX ADMIN — DIPHENHYDRAMINE HYDROCHLORIDE 50 MG: 50 INJECTION, SOLUTION INTRAMUSCULAR; INTRAVENOUS at 12:01

## 2018-01-10 NOTE — PLAN OF CARE
Problem: Chemotherapy Effects (Adult)  Goal: Signs and Symptoms of Listed Potential Problems Will be Absent, Minimized or Managed (Chemotherapy Effects)  Signs and symptoms of listed potential problems will be absent, minimized or managed by discharge/transition of care (reference Chemotherapy Effects (Adult) CPG).   Outcome: Ongoing (interventions implemented as appropriate)  Pt. Here today for infusion. VSS. PIV started in R. Hand. Reports neuropathy in her finger tips bilaterally. This is new since last infusion. MD aware. No questions at this time.

## 2018-01-10 NOTE — PROGRESS NOTES
"Subjective:       Patient ID: Mickie Don is a 62 y.o. female.    Chief Complaint: Follow-up    HPI     Treatment History  Xlap/Omentectomy on 11/1/16 - mass was unresectable  Stage IIIC ovarian cancer  Initiated Taxotere/Carbo on 11/18/16, now s/p 6 cycles completed 3/8/17  Xlap/ISIS/BSO/Radical mass resection > 10 cm, optimal debulking on 4/11/17  Initiated PRIMA on 8/21/17  BRCA negative     HPI  Here today for FORWARD1 visit.  C3D1 PS is 0. Labs pending.  Denies N/V, F/C, vision changes or HA.  Appetite normal.  Bladder and bowel function normal.     initially at time of recurrence was 96. After 2 cycles it is 36.   CT scan of chest, abdomen and pelvis is stable disease.       Review of Systems   Constitutional: Negative for chills, fatigue and fever.   Eyes: Negative for visual disturbance.   Respiratory: Negative for cough, shortness of breath and wheezing.    Cardiovascular: Negative for chest pain, palpitations and leg swelling.   Gastrointestinal: Positive for constipation. Negative for abdominal pain, diarrhea, nausea and vomiting.   Genitourinary: Negative for difficulty urinating, dysuria, frequency, genital sores, hematuria, urgency, vaginal bleeding, vaginal discharge and vaginal pain.   Musculoskeletal: Negative for gait problem.   Neurological: Positive for numbness. Negative for weakness. Headaches: finger tips and feet. grade 1.    Hematological: Negative for adenopathy. Does not bruise/bleed easily.   Psychiatric/Behavioral: The patient is not nervous/anxious.        Objective:   /62   Pulse 90   Temp 97.1 °F (36.2 °C) (Oral)   Resp 18   Ht 5' 7" (1.702 m)   Wt 90.5 kg (199 lb 8.3 oz)   BMI 31.25 kg/m²      Physical Exam   Constitutional: She is oriented to person, place, and time. She appears well-developed and well-nourished.   HENT:   Head: Normocephalic and atraumatic.   Eyes: No scleral icterus.   Neck: No tracheal deviation present. No thyromegaly present. "   Cardiovascular: Normal rate and regular rhythm.    Pulmonary/Chest: Effort normal and breath sounds normal.   Abdominal: Soft. She exhibits no distension and no mass. There is no tenderness. There is no rebound and no guarding. No hernia.   Genitourinary:   Genitourinary Comments: Not performed.    Musculoskeletal: She exhibits no edema or tenderness.   Lymphadenopathy:     She has no cervical adenopathy.   Neurological: She is alert and oriented to person, place, and time.   Skin: Skin is warm and dry.   Psychiatric: She has a normal mood and affect. Her behavior is normal. Judgment and thought content normal.       Assessment:       1. Malignant neoplasm of ovary, unspecified laterality    2. Chemotherapy management, encounter for    3. Neuropathy due to chemotherapeutic drug        Plan:   Malignant neoplasm of ovary, unspecified laterality  Stable disease.   Proceed with cycle 3.     Chemotherapy management, encounter for    Neuropathy due to chemotherapeutic drug  -     gabapentin (NEURONTIN) 100 MG capsule; Take 1 capsule (100 mg total) by mouth 3 (three) times daily.  Dispense: 90 capsule; Refill: 2

## 2018-01-10 NOTE — PLAN OF CARE
Problem: Patient Care Overview  Goal: Plan of Care Review  Outcome: Ongoing (interventions implemented as appropriate)  Pt. Tolerated infusion. VSS. PIV Flushed, 10 cc wasted, lab obtained, flushed again and removed. Given AVS. No questions at this time.

## 2018-01-11 NOTE — PROGRESS NOTES
Jan 11, 2018      Protocol: FORWARD1/IMGN-0403  Investigator: BETH Weston  Treating Physician: GUERITA Tran Pt Initials: NAV VZA  Study ID: [id number]  IRB#: [id number].A     FORWARD 1: A Randomized, Open Label Phase 3 Study to Evaluate the Safety and Efficacy of Mirvetuximab soravtansine (OEWR029) Versus Investigators Choice of Chemotherapy in Women with Folate Receptor á?positive Advanced Epithelial Ovarian Cancer, Primary Peritoneal Cancer or Fallopian Tube Cancer     Cycle 3, Day 1  Study allocation was made on 11/27/17.  Patient randomized to Arm A:[id number], 6 mg/kg2(from [facility]), q 3 weeks.      Patient presented to clinic for C3D1 on the above mentioned clinical trial.  Patient alert and oriented, mood and affect appropriate to the situation. Patient denies vison changes, cough, headache, fatigue, vomiting, dizziness, or fever.  Patient reports peripheral neuropathy in tip of fingers, both hands.  Patient would like to try Neurontin for symptoms, will start 100 mg, TID.  No change in neuropathy in feet from Baseline.    Physical exam, ECOG assessment performed on 1/10/17, ECOG = 0 per MD.  Central labs drawn, processed, and shipped to  per protocol.  Local labs also drawn on 1/10/18.  All local were within parameters to begin treatment and were reviewed by the physician prior to treatment.  Physical exam unremarkable per physician, no ocular symptoms noted.  Reviewed C2 adverse events and current medications with patient.  See Adverse Events and Concomitant Medication sheets.  QoL questionnaires prior to dosing on 1/10/18.     Pre-dose and post-dose vitals performed on 12/20/17 @ 13:28 and 15:14.  Infusion rate at 5 mg/min as this was maximal tolerated rate for C1 infusion.  Infusion from 13:25 - 15:06.  Pre-dose PK collected at 8:55 and post-dose PK collected at 15:08 pm.  No infusion reactions noted, not skin irritation at site of infusion.      All of the patient's questions were answered by Dr. Guerra and me to her  satisfaction. Patient expressed her willingness to continue to participate in the above mentioned clinical trial. Instructed patient to notify oncologist and/or me with any questions, concerns, or changes in health status. Patient verbalized understanding.     Baseline Medical History  1. 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    2. Anxiety, Grade 1.  3. Dermatofibroma.  Benign.  Diagnosed by biopsy on 7/17/17.  No further intervention needed.  4. Decreased platelet count, Grade 1.  Resolved 11/17/17.  5. Constipation, Grade 1.  6. Myalgia, Grade 1.  7. Peripheral neuropathy- feet, Grade 1.      Adverse Events - For the most up-to-date AE log, please refer to the paper AE log in the subjects research file.  1. Peripheral neuropathy - fingers, Grade 1.  Patient started Neurontin 100 mg, TID.  Will continue to monitor.

## 2018-01-17 ENCOUNTER — OFFICE VISIT (OUTPATIENT)
Dept: GYNECOLOGIC ONCOLOGY | Facility: CLINIC | Age: 63
End: 2018-01-17
Payer: COMMERCIAL

## 2018-01-17 ENCOUNTER — RESEARCH ENCOUNTER (OUTPATIENT)
Dept: RESEARCH | Facility: HOSPITAL | Age: 63
End: 2018-01-17

## 2018-01-17 ENCOUNTER — LAB VISIT (OUTPATIENT)
Dept: LAB | Facility: HOSPITAL | Age: 63
End: 2018-01-17
Attending: OBSTETRICS & GYNECOLOGY
Payer: COMMERCIAL

## 2018-01-17 VITALS
WEIGHT: 196.19 LBS | BODY MASS INDEX: 30.73 KG/M2 | DIASTOLIC BLOOD PRESSURE: 60 MMHG | HEART RATE: 90 BPM | SYSTOLIC BLOOD PRESSURE: 119 MMHG

## 2018-01-17 DIAGNOSIS — Z00.6 EXAMINATION OF PARTICIPANT IN CLINICAL TRIAL: ICD-10-CM

## 2018-01-17 DIAGNOSIS — Z51.11 ENCOUNTER FOR ANTINEOPLASTIC CHEMOTHERAPY: ICD-10-CM

## 2018-01-17 DIAGNOSIS — C56.9 MALIGNANT NEOPLASM OF OVARY, UNSPECIFIED LATERALITY: Primary | ICD-10-CM

## 2018-01-17 DIAGNOSIS — C56.9 MALIGNANT NEOPLASM OF OVARY, UNSPECIFIED LATERALITY: ICD-10-CM

## 2018-01-17 LAB
ALBUMIN SERPL BCP-MCNC: 3.2 G/DL
ALP SERPL-CCNC: 102 U/L
ALT SERPL W/O P-5'-P-CCNC: 31 U/L
ANION GAP SERPL CALC-SCNC: 8 MMOL/L
AST SERPL-CCNC: 31 U/L
BASOPHILS # BLD AUTO: 0.05 K/UL
BASOPHILS NFR BLD: 0.9 %
BILIRUB SERPL-MCNC: 0.6 MG/DL
BUN SERPL-MCNC: 6 MG/DL
CALCIUM SERPL-MCNC: 9.6 MG/DL
CHLORIDE SERPL-SCNC: 103 MMOL/L
CO2 SERPL-SCNC: 29 MMOL/L
CREAT SERPL-MCNC: 0.7 MG/DL
DIFFERENTIAL METHOD: ABNORMAL
EOSINOPHIL # BLD AUTO: 0.1 K/UL
EOSINOPHIL NFR BLD: 1.2 %
ERYTHROCYTE [DISTWIDTH] IN BLOOD BY AUTOMATED COUNT: 12.8 %
EST. GFR  (AFRICAN AMERICAN): >60 ML/MIN/1.73 M^2
EST. GFR  (NON AFRICAN AMERICAN): >60 ML/MIN/1.73 M^2
GLUCOSE SERPL-MCNC: 95 MG/DL
HCT VFR BLD AUTO: 44.4 %
HGB BLD-MCNC: 15.1 G/DL
IMM GRANULOCYTES # BLD AUTO: 0.02 K/UL
IMM GRANULOCYTES NFR BLD AUTO: 0.3 %
LYMPHOCYTES # BLD AUTO: 1.6 K/UL
LYMPHOCYTES NFR BLD: 27.3 %
MCH RBC QN AUTO: 31.5 PG
MCHC RBC AUTO-ENTMCNC: 34 G/DL
MCV RBC AUTO: 93 FL
MONOCYTES # BLD AUTO: 0.5 K/UL
MONOCYTES NFR BLD: 8.8 %
NEUTROPHILS # BLD AUTO: 3.6 K/UL
NEUTROPHILS NFR BLD: 61.5 %
NRBC BLD-RTO: 0 /100 WBC
PLATELET # BLD AUTO: 54 K/UL
PMV BLD AUTO: 11.2 FL
POTASSIUM SERPL-SCNC: 4.6 MMOL/L
PROT SERPL-MCNC: 7.1 G/DL
RBC # BLD AUTO: 4.79 M/UL
SODIUM SERPL-SCNC: 140 MMOL/L
WBC # BLD AUTO: 5.78 K/UL

## 2018-01-17 PROCEDURE — 99214 OFFICE O/P EST MOD 30 MIN: CPT | Mod: S$GLB,,, | Performed by: OBSTETRICS & GYNECOLOGY

## 2018-01-17 PROCEDURE — 36415 COLL VENOUS BLD VENIPUNCTURE: CPT

## 2018-01-17 PROCEDURE — 85025 COMPLETE CBC W/AUTO DIFF WBC: CPT

## 2018-01-17 PROCEDURE — 80053 COMPREHEN METABOLIC PANEL: CPT

## 2018-01-17 PROCEDURE — 99999 PR PBB SHADOW E&M-EST. PATIENT-LVL III: CPT | Mod: PBBFAC,,, | Performed by: OBSTETRICS & GYNECOLOGY

## 2018-01-17 NOTE — PROGRESS NOTES
Jan 17, 2018     Protocol: FORWARD1/IMGN-0403  Investigator: BETH Weston  Treating Physician: GUERITA Tran Pt Initials: NAV VAZ  Study ID: [id number]  IRB#: [id number]     FORWARD 1: A Randomized, Open Label Phase 3 Study to Evaluate the Safety and Efficacy of Mirvetuximab soravtansine (BCIO416) Versus Investigators Choice of Chemotherapy in Women with Folate Receptor ??positive Advanced Epithelial Ovarian Cancer, Primary Peritoneal Cancer or Fallopian Tube Cancer      Cycle 3, Day 8  Study allocation was made on 11/27/17.  Patient randomized to Arm A:[id number], 6 mg/kg2(from [facility]), q 3 weeks.    Patient presented to clinic for C2D8 on the above mentioned clinical trial. Patient alert and oriented, mood and affect appropriate to the situation. Patient denies vison changes, cough, headache, fatigue, vomiting, dizziness, or fever.  Patient reports mild nausea and intermittent diarrhea on Days 3-5 post-infusion.     Physical exam, ECOG assessment performed on 1/17/18.  ECOG = 0 per MD.  Local labs were performed on 1/17/18 and reviewed by the physician.  Platelet count low but no action needed at this point. Physical exam unremarkable per physician, no ocular symptoms noted.  Patient confirmed she is taking steroid and lubricating eye drops as prescribed.  Reviewed baseline conditions and current medications with patient.  See Concomitant Medication and Baseline Medical History sheets.  Central safety labs collected, processed, and shipped to  per protocol.      All of the patient's questions were answered by Dr. Weston and me to her satisfaction. Patient expressed her willingness to continue to participate in the above mentioned clinical trial. Instructed patient to notify Dr. Tran and/or me with any questions, concerns, or changes in health status. Patient verbalized understanding.      Baseline Medical History  1. 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     2. Anxiety, Grade 1.  3. Dermatofibroma.  Benign.  Diagnosed by biopsy on 7/17/17.  No further intervention needed.  4. Decreased platelet count, Grade 1.  Resolved 11/17/17.  5. Constipation, Grade 1.  6. Myalgia, Grade 1.  7. Peripheral neuropathy- feet, Grade 1.      Adverse Events - For the most up-to-date AE log, please refer to the paper AE log in the subjects research file.  1. Platelet count decreased/thrombocytopenia, Grade 2.  Will continue to monitor.  2. Peripheral neuropathy - fingers, Grade 1.  Patient taking Neurontin 100 mg, TID, increasing to 200 mg, TID.  Will continue to monitor.  3. Hypoalbuminemia, Grade 1.  Will continue to monitor.

## 2018-01-17 NOTE — PROGRESS NOTES
Subjective:      Patient ID: Mickie Don is a 62 y.o. female.    Chief Complaint: Chemotherapy      HPI  Treatment History  Xlap/Omentectomy on 11/1/16 - mass was unresectable  Stage IIIC ovarian cancer  Initiated Taxotere/Carbo on 11/18/16, now s/p 6 cycles completed 3/8/17  Xlap/ISIS/BSO/Radical mass resection > 10 cm, optimal debulking on 4/11/17  Initiated PRIMA on 8/21/17  BRCA negative     HPI  Here today for FORWARD1 visit.  C3D8 PS is 0. Labs reviewed. Denies N/V, F/C, vision changes or HA.  Appetite normal.  Bladder and bowel function normal. Some neuropathy, started gabapentin.       initially at time of recurrence was 96. After 2 cycles it is 36>24 now.   CT scan of chest, abdomen and pelvis after C2 is stable disease.   Review of Systems   Constitutional: Negative for appetite change, chills, fatigue and fever.   HENT: Negative for mouth sores.    Respiratory: Negative for cough and shortness of breath.    Cardiovascular: Negative for leg swelling.   Gastrointestinal: Negative for abdominal pain, blood in stool, constipation and diarrhea.   Endocrine: Negative for cold intolerance.   Genitourinary: Negative for dysuria and vaginal bleeding.   Musculoskeletal: Negative for myalgias.   Skin: Negative for rash.   Allergic/Immunologic: Negative.    Neurological: Negative for weakness and numbness.   Hematological: Negative for adenopathy. Does not bruise/bleed easily.   Psychiatric/Behavioral: Negative for confusion.       Objective:   Physical Exam:   Constitutional: She is oriented to person, place, and time. She appears well-developed and well-nourished.    HENT:   Head: Normocephalic and atraumatic.    Eyes: EOM are normal. Pupils are equal, round, and reactive to light.    Neck: Normal range of motion. Neck supple. No thyromegaly present.    Cardiovascular: Normal rate, regular rhythm and intact distal pulses.     Pulmonary/Chest: Effort normal and breath sounds normal. No respiratory distress.  She has no wheezes.        Abdominal: Soft. Bowel sounds are normal. She exhibits no distension, no ascites and no mass. There is no tenderness.             Musculoskeletal: Normal range of motion and moves all extremeties.      Lymphadenopathy:     She has no cervical adenopathy.        Right: No supraclavicular adenopathy present.        Left: No supraclavicular adenopathy present.    Neurological: She is alert and oriented to person, place, and time.    Skin: Skin is warm and dry. No rash noted.    Psychiatric: She has a normal mood and affect.       Assessment:     1. Malignant neoplasm of ovary, unspecified laterality    2. Examination of participant in clinical trial    3. Encounter for antineoplastic chemotherapy        Plan:   No orders of the defined types were placed in this encounter.    C3D8 doing well. Continue per protocol. RTC 1 week.

## 2018-01-24 ENCOUNTER — RESEARCH ENCOUNTER (OUTPATIENT)
Dept: RESEARCH | Facility: HOSPITAL | Age: 63
End: 2018-01-24

## 2018-01-24 ENCOUNTER — OFFICE VISIT (OUTPATIENT)
Dept: GYNECOLOGIC ONCOLOGY | Facility: CLINIC | Age: 63
End: 2018-01-24
Payer: COMMERCIAL

## 2018-01-24 ENCOUNTER — LAB VISIT (OUTPATIENT)
Dept: LAB | Facility: HOSPITAL | Age: 63
End: 2018-01-24
Attending: OBSTETRICS & GYNECOLOGY
Payer: COMMERCIAL

## 2018-01-24 VITALS
DIASTOLIC BLOOD PRESSURE: 58 MMHG | HEART RATE: 95 BPM | WEIGHT: 197.31 LBS | SYSTOLIC BLOOD PRESSURE: 124 MMHG | TEMPERATURE: 97 F | BODY MASS INDEX: 30.9 KG/M2

## 2018-01-24 DIAGNOSIS — C56.9 MALIGNANT NEOPLASM OF OVARY, UNSPECIFIED LATERALITY: ICD-10-CM

## 2018-01-24 DIAGNOSIS — Z00.6 EXAMINATION OF PARTICIPANT IN CLINICAL TRIAL: ICD-10-CM

## 2018-01-24 DIAGNOSIS — C56.9 MALIGNANT NEOPLASM OF OVARY, UNSPECIFIED LATERALITY: Primary | ICD-10-CM

## 2018-01-24 DIAGNOSIS — Z51.11 ENCOUNTER FOR ANTINEOPLASTIC CHEMOTHERAPY: ICD-10-CM

## 2018-01-24 LAB
ALBUMIN SERPL BCP-MCNC: 3.7 G/DL
ALP SERPL-CCNC: 80 U/L
ALT SERPL W/O P-5'-P-CCNC: 87 U/L
ANION GAP SERPL CALC-SCNC: 10 MMOL/L
AST SERPL-CCNC: 72 U/L
BASOPHILS # BLD AUTO: 0.04 K/UL
BASOPHILS NFR BLD: 0.8 %
BILIRUB SERPL-MCNC: 0.5 MG/DL
BUN SERPL-MCNC: 10 MG/DL
CALCIUM SERPL-MCNC: 9.9 MG/DL
CHLORIDE SERPL-SCNC: 105 MMOL/L
CO2 SERPL-SCNC: 27 MMOL/L
CREAT SERPL-MCNC: 0.7 MG/DL
DIFFERENTIAL METHOD: ABNORMAL
DRUG STUDY SPECIMEN TYPE: NORMAL
DRUG STUDY TEST NAME: NORMAL
DRUG STUDY TEST RESULT: NORMAL
EOSINOPHIL # BLD AUTO: 0 K/UL
EOSINOPHIL NFR BLD: 0.8 %
ERYTHROCYTE [DISTWIDTH] IN BLOOD BY AUTOMATED COUNT: 13.1 %
EST. GFR  (AFRICAN AMERICAN): >60 ML/MIN/1.73 M^2
EST. GFR  (NON AFRICAN AMERICAN): >60 ML/MIN/1.73 M^2
GLUCOSE SERPL-MCNC: 91 MG/DL
HCT VFR BLD AUTO: 44.3 %
HGB BLD-MCNC: 14.3 G/DL
IMM GRANULOCYTES # BLD AUTO: 0.01 K/UL
IMM GRANULOCYTES NFR BLD AUTO: 0.2 %
LYMPHOCYTES # BLD AUTO: 1.3 K/UL
LYMPHOCYTES NFR BLD: 25.6 %
MCH RBC QN AUTO: 31 PG
MCHC RBC AUTO-ENTMCNC: 32.3 G/DL
MCV RBC AUTO: 96 FL
MONOCYTES # BLD AUTO: 0.4 K/UL
MONOCYTES NFR BLD: 6.8 %
NEUTROPHILS # BLD AUTO: 3.4 K/UL
NEUTROPHILS NFR BLD: 65.8 %
NRBC BLD-RTO: 0 /100 WBC
PLATELET # BLD AUTO: 129 K/UL
PMV BLD AUTO: 10.5 FL
POTASSIUM SERPL-SCNC: 6 MMOL/L
PROT SERPL-MCNC: 7.4 G/DL
RBC # BLD AUTO: 4.61 M/UL
SODIUM SERPL-SCNC: 142 MMOL/L
WBC # BLD AUTO: 5.12 K/UL

## 2018-01-24 PROCEDURE — 36415 COLL VENOUS BLD VENIPUNCTURE: CPT

## 2018-01-24 PROCEDURE — 99000 SPECIMEN HANDLING OFFICE-LAB: CPT

## 2018-01-24 PROCEDURE — 99999 PR PBB SHADOW E&M-EST. PATIENT-LVL III: CPT | Mod: PBBFAC,,, | Performed by: OBSTETRICS & GYNECOLOGY

## 2018-01-24 PROCEDURE — 99214 OFFICE O/P EST MOD 30 MIN: CPT | Mod: S$GLB,,, | Performed by: OBSTETRICS & GYNECOLOGY

## 2018-01-24 PROCEDURE — 80053 COMPREHEN METABOLIC PANEL: CPT

## 2018-01-24 PROCEDURE — 85025 COMPLETE CBC W/AUTO DIFF WBC: CPT

## 2018-01-24 NOTE — PROGRESS NOTES
Subjective:      Patient ID: Mickie Don is a 62 y.o. female.    Chief Complaint: Ovarian Cancer      HPI  Treatment History  Xlap/Omentectomy on 11/1/16 - mass was unresectable  Stage IIIC ovarian cancer  Initiated Taxotere/Carbo on 11/18/16, now s/p 6 cycles completed 3/8/17  Xlap/ISIS/BSO/Radical mass resection > 10 cm, optimal debulking on 4/11/17  Initiated PRIMA on 8/21/17  BRCA negative     HPI  Here today for FORWARD1 visit.  C3D15 PS is 0. Labs currently pending. Denies N/V, F/C, vision changes or HA.  Appetite normal.  Bladder and bowel function normal. Some neuropathy, started gabapentin.       initially at time of recurrence was 96. After 2 cycles it is 36>24 now.   CT scan of chest, abdomen and pelvis after C2 is stable disease.   Review of Systems   Constitutional: Negative for appetite change, chills, fatigue and fever.   HENT: Negative for mouth sores.    Respiratory: Negative for cough and shortness of breath.    Cardiovascular: Negative for leg swelling.   Gastrointestinal: Negative for abdominal pain, blood in stool, constipation and diarrhea.   Endocrine: Negative for cold intolerance.   Genitourinary: Negative for dysuria and vaginal bleeding.   Musculoskeletal: Negative for myalgias.   Skin: Negative for rash.   Allergic/Immunologic: Negative.    Neurological: Negative for weakness and numbness.   Hematological: Negative for adenopathy. Does not bruise/bleed easily.   Psychiatric/Behavioral: Negative for confusion.       Objective:   Physical Exam:   Constitutional: She is oriented to person, place, and time. She appears well-developed and well-nourished.    HENT:   Head: Normocephalic and atraumatic.    Eyes: EOM are normal. Pupils are equal, round, and reactive to light.    Neck: Normal range of motion. Neck supple. No thyromegaly present.    Cardiovascular: Normal rate, regular rhythm and intact distal pulses.     Pulmonary/Chest: Effort normal and breath sounds normal. No  respiratory distress. She has no wheezes.        Abdominal: Soft. Bowel sounds are normal. She exhibits no distension, no ascites and no mass. There is no tenderness.             Musculoskeletal: Normal range of motion and moves all extremeties.      Lymphadenopathy:     She has no cervical adenopathy.        Right: No supraclavicular adenopathy present.        Left: No supraclavicular adenopathy present.    Neurological: She is alert and oriented to person, place, and time.    Skin: Skin is warm and dry. No rash noted.    Psychiatric: She has a normal mood and affect.       Assessment:     1. Malignant neoplasm of ovary, unspecified laterality    2. Encounter for antineoplastic chemotherapy        Plan:   No orders of the defined types were placed in this encounter.    C3D15 doing well. Continue per protocol. RTC as scheduled.

## 2018-01-31 ENCOUNTER — OFFICE VISIT (OUTPATIENT)
Dept: GYNECOLOGIC ONCOLOGY | Facility: CLINIC | Age: 63
End: 2018-01-31
Payer: COMMERCIAL

## 2018-01-31 ENCOUNTER — LAB VISIT (OUTPATIENT)
Dept: LAB | Facility: HOSPITAL | Age: 63
End: 2018-01-31
Attending: INTERNAL MEDICINE
Payer: COMMERCIAL

## 2018-01-31 ENCOUNTER — INFUSION (OUTPATIENT)
Dept: INFUSION THERAPY | Facility: HOSPITAL | Age: 63
End: 2018-01-31
Attending: OBSTETRICS & GYNECOLOGY
Payer: COMMERCIAL

## 2018-01-31 ENCOUNTER — RESEARCH ENCOUNTER (OUTPATIENT)
Dept: RESEARCH | Facility: HOSPITAL | Age: 63
End: 2018-01-31

## 2018-01-31 VITALS
DIASTOLIC BLOOD PRESSURE: 58 MMHG | WEIGHT: 201.75 LBS | HEART RATE: 81 BPM | TEMPERATURE: 99 F | RESPIRATION RATE: 16 BRPM | SYSTOLIC BLOOD PRESSURE: 122 MMHG | BODY MASS INDEX: 31.59 KG/M2

## 2018-01-31 VITALS
HEIGHT: 67 IN | BODY MASS INDEX: 31.59 KG/M2 | SYSTOLIC BLOOD PRESSURE: 120 MMHG | DIASTOLIC BLOOD PRESSURE: 55 MMHG | RESPIRATION RATE: 16 BRPM | TEMPERATURE: 98 F | HEART RATE: 77 BPM

## 2018-01-31 DIAGNOSIS — T45.1X5A NEUROPATHY DUE TO CHEMOTHERAPEUTIC DRUG: ICD-10-CM

## 2018-01-31 DIAGNOSIS — Z00.6 EXAMINATION OF PARTICIPANT IN CLINICAL TRIAL: Primary | ICD-10-CM

## 2018-01-31 DIAGNOSIS — C56.9 OVARIAN CANCER, UNSPECIFIED LATERALITY: Primary | ICD-10-CM

## 2018-01-31 DIAGNOSIS — C56.9 MALIGNANT NEOPLASM OF OVARY, UNSPECIFIED LATERALITY: ICD-10-CM

## 2018-01-31 DIAGNOSIS — Z00.6 EXAMINATION OF PARTICIPANT IN CLINICAL TRIAL: ICD-10-CM

## 2018-01-31 DIAGNOSIS — Z51.11 ENCOUNTER FOR ANTINEOPLASTIC CHEMOTHERAPY: ICD-10-CM

## 2018-01-31 DIAGNOSIS — G62.0 NEUROPATHY DUE TO CHEMOTHERAPEUTIC DRUG: ICD-10-CM

## 2018-01-31 LAB
ALBUMIN SERPL BCP-MCNC: 3.5 G/DL
ALP SERPL-CCNC: 75 U/L
ALT SERPL W/O P-5'-P-CCNC: 46 U/L
ANION GAP SERPL CALC-SCNC: 10 MMOL/L
AST SERPL-CCNC: 35 U/L
BASOPHILS # BLD AUTO: 0.03 K/UL
BASOPHILS NFR BLD: 0.6 %
BILIRUB SERPL-MCNC: 0.4 MG/DL
BUN SERPL-MCNC: 13 MG/DL
CALCIUM SERPL-MCNC: 9 MG/DL
CHLORIDE SERPL-SCNC: 102 MMOL/L
CO2 SERPL-SCNC: 28 MMOL/L
CREAT SERPL-MCNC: 0.8 MG/DL
DIFFERENTIAL METHOD: ABNORMAL
DRUG STUDY SPECIMEN TYPE: NORMAL
DRUG STUDY TEST NAME: NORMAL
DRUG STUDY TEST RESULT: NORMAL
EOSINOPHIL # BLD AUTO: 0.1 K/UL
EOSINOPHIL NFR BLD: 1 %
ERYTHROCYTE [DISTWIDTH] IN BLOOD BY AUTOMATED COUNT: 13.3 %
EST. GFR  (AFRICAN AMERICAN): >60 ML/MIN/1.73 M^2
EST. GFR  (NON AFRICAN AMERICAN): >60 ML/MIN/1.73 M^2
GLUCOSE SERPL-MCNC: 76 MG/DL
HCT VFR BLD AUTO: 41.4 %
HGB BLD-MCNC: 13.7 G/DL
IMM GRANULOCYTES # BLD AUTO: 0.01 K/UL
IMM GRANULOCYTES NFR BLD AUTO: 0.2 %
LYMPHOCYTES # BLD AUTO: 1 K/UL
LYMPHOCYTES NFR BLD: 18.9 %
MCH RBC QN AUTO: 31.7 PG
MCHC RBC AUTO-ENTMCNC: 33.1 G/DL
MCV RBC AUTO: 96 FL
MONOCYTES # BLD AUTO: 0.3 K/UL
MONOCYTES NFR BLD: 6.8 %
NEUTROPHILS # BLD AUTO: 3.6 K/UL
NEUTROPHILS NFR BLD: 72.5 %
NRBC BLD-RTO: 0 /100 WBC
PLATELET # BLD AUTO: 156 K/UL
PMV BLD AUTO: 9.8 FL
POTASSIUM SERPL-SCNC: 4.2 MMOL/L
PROT SERPL-MCNC: 7.1 G/DL
RBC # BLD AUTO: 4.32 M/UL
SODIUM SERPL-SCNC: 140 MMOL/L
WBC # BLD AUTO: 5.02 K/UL

## 2018-01-31 PROCEDURE — 99999 PR PBB SHADOW E&M-EST. PATIENT-LVL III: CPT | Mod: PBBFAC,,, | Performed by: OBSTETRICS & GYNECOLOGY

## 2018-01-31 PROCEDURE — 63600175 PHARM REV CODE 636 W HCPCS: Performed by: OBSTETRICS & GYNECOLOGY

## 2018-01-31 PROCEDURE — 99213 OFFICE O/P EST LOW 20 MIN: CPT | Mod: S$GLB,,, | Performed by: OBSTETRICS & GYNECOLOGY

## 2018-01-31 PROCEDURE — 25000003 PHARM REV CODE 250: Performed by: OBSTETRICS & GYNECOLOGY

## 2018-01-31 PROCEDURE — A4216 STERILE WATER/SALINE, 10 ML: HCPCS | Performed by: OBSTETRICS & GYNECOLOGY

## 2018-01-31 PROCEDURE — 96367 TX/PROPH/DG ADDL SEQ IV INF: CPT

## 2018-01-31 PROCEDURE — 99000 SPECIMEN HANDLING OFFICE-LAB: CPT

## 2018-01-31 PROCEDURE — 3008F BODY MASS INDEX DOCD: CPT | Mod: CPTII,S$GLB,, | Performed by: OBSTETRICS & GYNECOLOGY

## 2018-01-31 PROCEDURE — 85025 COMPLETE CBC W/AUTO DIFF WBC: CPT

## 2018-01-31 PROCEDURE — 96413 CHEMO IV INFUSION 1 HR: CPT

## 2018-01-31 PROCEDURE — 80053 COMPREHEN METABOLIC PANEL: CPT

## 2018-01-31 PROCEDURE — 36415 COLL VENOUS BLD VENIPUNCTURE: CPT

## 2018-01-31 RX ORDER — ACETAMINOPHEN 325 MG/1
650 TABLET ORAL
Status: CANCELLED | OUTPATIENT
Start: 2018-01-31

## 2018-01-31 RX ORDER — SODIUM CHLORIDE 0.9 % (FLUSH) 0.9 %
10 SYRINGE (ML) INJECTION
Status: DISCONTINUED | OUTPATIENT
Start: 2018-01-31 | End: 2018-01-31 | Stop reason: HOSPADM

## 2018-01-31 RX ORDER — GABAPENTIN 100 MG/1
100 CAPSULE ORAL 3 TIMES DAILY
Qty: 30 CAPSULE | Refills: 0 | Status: SHIPPED | OUTPATIENT
Start: 2018-01-31 | End: 2018-02-07

## 2018-01-31 RX ORDER — HEPARIN 100 UNIT/ML
500 SYRINGE INTRAVENOUS
Status: DISCONTINUED | OUTPATIENT
Start: 2018-01-31 | End: 2018-01-31 | Stop reason: HOSPADM

## 2018-01-31 RX ORDER — ACETAMINOPHEN 325 MG/1
650 TABLET ORAL
Status: COMPLETED | OUTPATIENT
Start: 2018-01-31 | End: 2018-01-31

## 2018-01-31 RX ORDER — SODIUM CHLORIDE 0.9 % (FLUSH) 0.9 %
10 SYRINGE (ML) INJECTION
Status: CANCELLED | OUTPATIENT
Start: 2018-01-31

## 2018-01-31 RX ORDER — HEPARIN 100 UNIT/ML
500 SYRINGE INTRAVENOUS
Status: CANCELLED | OUTPATIENT
Start: 2018-01-31

## 2018-01-31 RX ADMIN — SODIUM CHLORIDE, PRESERVATIVE FREE 10 ML: 5 INJECTION INTRAVENOUS at 05:01

## 2018-01-31 RX ADMIN — DEXTROSE: 5 SOLUTION INTRAVENOUS at 02:01

## 2018-01-31 RX ADMIN — DEXAMETHASONE SODIUM PHOSPHATE 10 MG: 4 INJECTION, SOLUTION INTRAMUSCULAR; INTRAVENOUS at 02:01

## 2018-01-31 RX ADMIN — ACETAMINOPHEN 650 MG: 325 TABLET ORAL at 02:01

## 2018-01-31 RX ADMIN — DIPHENHYDRAMINE HYDROCHLORIDE 50 MG: 50 INJECTION, SOLUTION INTRAMUSCULAR; INTRAVENOUS at 03:01

## 2018-01-31 NOTE — PLAN OF CARE
Problem: Patient Care Overview  Goal: Plan of Care Review  Outcome: Ongoing (interventions implemented as appropriate)  Tolerated treatment well.  Advised to call MD for any problems or concerns.  AVS given.  RTC iin 3 weeks.  NAD noted upon discharge.

## 2018-01-31 NOTE — PLAN OF CARE
Problem: Chemotherapy Effects (Adult)  Goal: Signs and Symptoms of Listed Potential Problems Will be Absent, Minimized or Managed (Chemotherapy Effects)  Signs and symptoms of listed potential problems will be absent, minimized or managed by discharge/transition of care (reference Chemotherapy Effects (Adult) CPG).   Outcome: Ongoing (interventions implemented as appropriate)  Here for cycle 4 of Forward OCI trial.  No new complaints voiced.

## 2018-02-01 NOTE — PROGRESS NOTES
Subjective:      Patient ID: Mickie Don is a 62 y.o. female.    Chief Complaint: Chemotherapy      HPI  Treatment History  Xlap/Omentectomy on 11/1/16 - mass was unresectable  Stage IIIC ovarian cancer  Initiated Taxotere/Carbo on 11/18/16, now s/p 6 cycles completed 3/8/17  Xlap/ISIS/BSO/Radical mass resection > 10 cm, optimal debulking on 4/11/17  Initiated PRIMA on 8/21/17  BRCA negative     HPI  Here today for FORWARD1 visit.  C4D1 PS is 0. Labs reviewed and appropriate for treatment. Denies N/V, F/C, vision changes or HA.  Appetite normal.  Bladder and bowel function normal. Some neuropathy, started gabapentin but feels it's not helping and wants to taper off.       initially at time of recurrence was 96. After 2 cycles it is 36>24 now.   CT scan of chest, abdomen and pelvis after C2 is stable disease.   Review of Systems   Constitutional: Negative for appetite change, chills, fatigue and fever.   HENT: Negative for mouth sores.    Respiratory: Negative for cough and shortness of breath.    Cardiovascular: Negative for leg swelling.   Gastrointestinal: Negative for abdominal pain, blood in stool, constipation and diarrhea.   Endocrine: Negative for cold intolerance.   Genitourinary: Negative for dysuria and vaginal bleeding.   Musculoskeletal: Negative for myalgias.   Skin: Negative for rash.   Allergic/Immunologic: Negative.    Neurological: Negative for weakness and numbness.   Hematological: Negative for adenopathy. Does not bruise/bleed easily.   Psychiatric/Behavioral: Negative for confusion.       Objective:   Physical Exam:   Constitutional: She is oriented to person, place, and time. She appears well-developed and well-nourished.    HENT:   Head: Normocephalic and atraumatic.    Eyes: EOM are normal. Pupils are equal, round, and reactive to light.    Neck: Normal range of motion. Neck supple. No thyromegaly present.    Cardiovascular: Normal rate, regular rhythm and intact distal pulses.      Pulmonary/Chest: Effort normal and breath sounds normal. No respiratory distress. She has no wheezes.        Abdominal: Soft. Bowel sounds are normal. She exhibits no distension, no ascites and no mass. There is no tenderness.             Musculoskeletal: Normal range of motion and moves all extremeties.      Lymphadenopathy:     She has no cervical adenopathy.        Right: No supraclavicular adenopathy present.        Left: No supraclavicular adenopathy present.    Neurological: She is alert and oriented to person, place, and time.    Skin: Skin is warm and dry. No rash noted.    Psychiatric: She has a normal mood and affect.       Assessment:     1. Examination of participant in clinical trial    2. Neuropathy due to chemotherapeutic drug    3. Encounter for antineoplastic chemotherapy    4. Malignant neoplasm of ovary, unspecified laterality        Plan:   No orders of the defined types were placed in this encounter.    Okay to treat C4. RTC 1 week per protocol.

## 2018-02-07 ENCOUNTER — OFFICE VISIT (OUTPATIENT)
Dept: GYNECOLOGIC ONCOLOGY | Facility: CLINIC | Age: 63
End: 2018-02-07
Payer: COMMERCIAL

## 2018-02-07 ENCOUNTER — RESEARCH ENCOUNTER (OUTPATIENT)
Dept: RESEARCH | Facility: HOSPITAL | Age: 63
End: 2018-02-07

## 2018-02-07 ENCOUNTER — LAB VISIT (OUTPATIENT)
Dept: LAB | Facility: HOSPITAL | Age: 63
End: 2018-02-07
Attending: OBSTETRICS & GYNECOLOGY
Payer: COMMERCIAL

## 2018-02-07 VITALS
HEART RATE: 81 BPM | WEIGHT: 194.25 LBS | SYSTOLIC BLOOD PRESSURE: 130 MMHG | BODY MASS INDEX: 30.42 KG/M2 | DIASTOLIC BLOOD PRESSURE: 61 MMHG | TEMPERATURE: 99 F

## 2018-02-07 DIAGNOSIS — C56.9 MALIGNANT NEOPLASM OF OVARY, UNSPECIFIED LATERALITY: ICD-10-CM

## 2018-02-07 DIAGNOSIS — C56.9 OVARIAN CANCER, UNSPECIFIED LATERALITY: ICD-10-CM

## 2018-02-07 DIAGNOSIS — Z51.11 ENCOUNTER FOR ANTINEOPLASTIC CHEMOTHERAPY: Primary | ICD-10-CM

## 2018-02-07 DIAGNOSIS — Z00.6 EXAMINATION OF PARTICIPANT IN CLINICAL TRIAL: ICD-10-CM

## 2018-02-07 LAB
ALBUMIN SERPL BCP-MCNC: 3.4 G/DL
ALP SERPL-CCNC: 98 U/L
ALT SERPL W/O P-5'-P-CCNC: 32 U/L
ANION GAP SERPL CALC-SCNC: 11 MMOL/L
AST SERPL-CCNC: 35 U/L
BASOPHILS # BLD AUTO: 0.04 K/UL
BASOPHILS NFR BLD: 0.7 %
BILIRUB SERPL-MCNC: 0.6 MG/DL
BUN SERPL-MCNC: 8 MG/DL
CALCIUM SERPL-MCNC: 9.6 MG/DL
CHLORIDE SERPL-SCNC: 104 MMOL/L
CO2 SERPL-SCNC: 25 MMOL/L
CREAT SERPL-MCNC: 0.7 MG/DL
DIFFERENTIAL METHOD: ABNORMAL
DRUG STUDY SPECIMEN TYPE: NORMAL
DRUG STUDY TEST NAME: NORMAL
DRUG STUDY TEST RESULT: NORMAL
EOSINOPHIL # BLD AUTO: 0.1 K/UL
EOSINOPHIL NFR BLD: 1.1 %
ERYTHROCYTE [DISTWIDTH] IN BLOOD BY AUTOMATED COUNT: 13.2 %
EST. GFR  (AFRICAN AMERICAN): >60 ML/MIN/1.73 M^2
EST. GFR  (NON AFRICAN AMERICAN): >60 ML/MIN/1.73 M^2
GLUCOSE SERPL-MCNC: 104 MG/DL
HCT VFR BLD AUTO: 45 %
HGB BLD-MCNC: 15.4 G/DL
IMM GRANULOCYTES # BLD AUTO: 0.02 K/UL
IMM GRANULOCYTES NFR BLD AUTO: 0.4 %
LYMPHOCYTES # BLD AUTO: 1.3 K/UL
LYMPHOCYTES NFR BLD: 24.4 %
MCH RBC QN AUTO: 31.4 PG
MCHC RBC AUTO-ENTMCNC: 34.2 G/DL
MCV RBC AUTO: 92 FL
MONOCYTES # BLD AUTO: 0.4 K/UL
MONOCYTES NFR BLD: 8 %
NEUTROPHILS # BLD AUTO: 3.5 K/UL
NEUTROPHILS NFR BLD: 65.4 %
NRBC BLD-RTO: 0 /100 WBC
PLATELET # BLD AUTO: 55 K/UL
PMV BLD AUTO: 10.6 FL
POTASSIUM SERPL-SCNC: 4.7 MMOL/L
PROT SERPL-MCNC: 7.3 G/DL
RBC # BLD AUTO: 4.9 M/UL
SODIUM SERPL-SCNC: 140 MMOL/L
WBC # BLD AUTO: 5.36 K/UL

## 2018-02-07 PROCEDURE — 80053 COMPREHEN METABOLIC PANEL: CPT

## 2018-02-07 PROCEDURE — 99000 SPECIMEN HANDLING OFFICE-LAB: CPT

## 2018-02-07 PROCEDURE — 36415 COLL VENOUS BLD VENIPUNCTURE: CPT

## 2018-02-07 PROCEDURE — 3008F BODY MASS INDEX DOCD: CPT | Mod: CPTII,S$GLB,, | Performed by: OBSTETRICS & GYNECOLOGY

## 2018-02-07 PROCEDURE — 85025 COMPLETE CBC W/AUTO DIFF WBC: CPT

## 2018-02-07 PROCEDURE — 99999 PR PBB SHADOW E&M-EST. PATIENT-LVL III: CPT | Mod: PBBFAC,,, | Performed by: OBSTETRICS & GYNECOLOGY

## 2018-02-07 PROCEDURE — 99214 OFFICE O/P EST MOD 30 MIN: CPT | Mod: S$GLB,,, | Performed by: OBSTETRICS & GYNECOLOGY

## 2018-02-07 NOTE — PROGRESS NOTES
Subjective:      Patient ID: Mickie Don is a 62 y.o. female.    Chief Complaint: Chemotherapy (cycle# 4/day 8 trial)      HPI  Treatment History  Xlap/Omentectomy on 11/1/16 - mass was unresectable  Stage IIIC ovarian cancer  Initiated Taxotere/Carbo on 11/18/16, now s/p 6 cycles completed 3/8/17  Xlap/ISIS/BSO/Radical mass resection > 10 cm, optimal debulking on 4/11/17  Initiated PRIMA on 8/21/17  BRCA negative     HPI  Here today for FORWARD1 visit.  C4D8 PS is 0. Labs reviewed and appropriate for treatment. Denies N/V, F/C, vision changes or HA.  Appetite normal.  Bladder and bowel function normal. Some neuropathy but stable, has tapered off of Gabapentin.      initially at time of recurrence was 96. After 2 cycles it is 36>24>16 today.  CT scan of chest, abdomen and pelvis after C2 is stable disease.     Review of Systems   Constitutional: Positive for fatigue (off of work this week). Negative for appetite change, chills, fever and unexpected weight change.   HENT: Negative for mouth sores.    Respiratory: Negative for cough and shortness of breath.    Cardiovascular: Negative for leg swelling.   Gastrointestinal: Negative for abdominal pain, blood in stool, constipation, diarrhea, nausea and vomiting.   Endocrine: Negative for cold intolerance.   Genitourinary: Negative for decreased urine volume, dysuria, frequency, hematuria, pelvic pain, vaginal bleeding, vaginal discharge and vaginal pain.   Musculoskeletal: Negative for back pain and myalgias.   Skin: Negative for rash.   Allergic/Immunologic: Negative.    Neurological: Negative for weakness, numbness and headaches.   Hematological: Negative for adenopathy. Does not bruise/bleed easily.   Psychiatric/Behavioral: Negative for confusion. The patient is not nervous/anxious.        Objective:   Physical Exam:   Constitutional: She is oriented to person, place, and time. She appears well-developed and well-nourished. No distress.    HENT:   Head:  Normocephalic.     Neck: Normal range of motion.    Cardiovascular: Exam reveals no cyanosis and no edema.     Pulmonary/Chest: Effort normal. No respiratory distress.        Abdominal: Soft. She exhibits no distension and no ascites. There is no tenderness.     Genitourinary:   Genitourinary Comments: Pelvic exam deferred           Musculoskeletal: Moves all extremeties.      Lymphadenopathy:        Right: No supraclavicular adenopathy present.        Left: No supraclavicular adenopathy present.    Neurological: She is alert and oriented to person, place, and time.    Skin: Skin is warm and dry. No rash noted. No cyanosis.    Psychiatric: She has a normal mood and affect.       Assessment:     1. Encounter for antineoplastic chemotherapy    2. Ovarian cancer, unspecified laterality        Plan:     Okay to treat C4 D8. RTC per protocol.

## 2018-02-14 NOTE — PROGRESS NOTES
Jan 24, 2018     Protocol: FORWARD1/IMGN-0403  Investigator: BETH Weston  Treating Physician: GUERITA Tran Pt Initials: NAV VAZ  Study ID: [id number]  IRB#: [id number]     FORWARD 1: A Randomized, Open Label Phase 3 Study to Evaluate the Safety and Efficacy of Mirvetuximab soravtansine ([id number]) Versus Investigators Choice of Chemotherapy in Women with Folate Receptor á?positive Advanced Epithelial Ovarian Cancer, Primary Peritoneal Cancer or Fallopian Tube Cancer      Cycle 3, Day 15  Study allocation was made on 11/27/17.  Patient randomized to Arm A:[id number], 6 mg/kg2(from [facility]), q 3 weeks.    Patient presented to clinic for C2D8 on the above mentioned clinical trial. Patient alert and oriented, mood and affect appropriate to the situation. Patient denies vison changes, cough, headache, fatigue, vomiting, dizziness, or fever.  Patient reports mild nausea and intermittent diarrhea on Days 3-5 post-infusion.     Physical exam, ECOG assessment performed on 1/24/18.  ECOG = 0 per MD.  Local labs were performed on 1/24/18 and reviewed by the physician.  Platelet count improved to 129 today.  Physical exam unremarkable per physician, no ocular symptoms noted.  Patient confirmed she is taking steroid and lubricating eye drops as prescribed.  Reviewed baseline conditions and current medications with patient.  See Concomitant Medication and Baseline Medical History sheets.  Central safety labs collected, processed, and shipped to  per protocol.      All of the patient's questions were answered by Dr. Tran and me to her satisfaction. Patient expressed her willingness to continue to participate in the above mentioned clinical trial. Instructed patient to notify Dr. Tran and/or me with any questions, concerns, or changes in health status. Patient verbalized understanding.      Baseline Medical History  1. 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    2. Anxiety, Grade  1.  3. Dermatofibroma.  Benign.  Diagnosed by biopsy on 7/17/17.  No further intervention needed.  4. Decreased platelet count, Grade 1.  Resolved 11/17/17.  5. Constipation, Grade 1.  6. Myalgia, Grade 1.  7. Peripheral neuropathy- feet, Grade 1.      Adverse Events - For the most up-to-date AE log, please refer to the paper AE log in the subjects research file.  1. Platelet count decreased/thrombocytopenia, improved to Grade 1.  Will continue to monitor.  2. Peripheral neuropathy - fingers, remains Grade 1.  Patient stepping up taking Neurontin, now at 200 mg, TID, increasing to 300 mg, TID.  Will continue to monitor.  3. Hypoalbuminemia, remains Grade 1.  Will continue to monitor.

## 2018-02-15 NOTE — PROGRESS NOTES
Jan 31, 2018     Protocol: FORWARD1/IMGN-0403  Investigator: BETH Weston  Treating Physician: GUERITA Tran Pt Initials: NAV VAZ  Study ID: [id number]  IRB#: [id number]     FORWARD 1: A Randomized, Open Label Phase 3 Study to Evaluate the Safety and Efficacy of Mirvetuximab soravtansine ([id number]) Versus Investigators Choice of Chemotherapy in Women with Folate Receptor á?positive Advanced Epithelial Ovarian Cancer, Primary Peritoneal Cancer or Fallopian Tube Cancer      Cycle 4, Day 1  Study allocation was made on 11/27/17.  Patient randomized to Arm A:[id number], 6 mg/kg2(from [place] [facility]), q 3 weeks.      Patient presented to clinic for C4D1 on the above mentioned clinical trial. Patient alert and oriented, mood and affect appropriate to the situation. Patient denies vison changes, cough, headache, fatigue, vomiting, dizziness, or fever.  Patient reports increased neuropathy and is taking gabapentin.  Patient states neuropathy has not interfered with ADLs therefore Grade 1.     Physical exam, ECOG assessment performed on 1/31/18.  ECOG = 0 per MD.  Local labs were performed on 1/31/18 and reviewed by the physician.  Platelet count improved to 16 today.  Physical exam unremarkable per physician, no ocular symptoms noted.    Reviewed baseline conditions and current medications with patient.  See Concomitant Medication and Baseline Medical History sheets.  Central safety labs collected, processed, and shipped to  per protocol.     Per MD, patient approved to proceed with Cycle 4 at assigned dose.      All of the patient's questions were answered by Dr. Tran and me to her satisfaction. Patient expressed her willingness to continue to participate in the above mentioned clinical trial. Instructed patient to notify Dr. Tran and/or me with any questions, concerns, or changes in health status. Patient verbalized understanding.      Baseline Medical History  1. Eye disorders, other - Open angle with borderline findings and low glaucoma  risk in both eyes, Grade 1.    2. Anxiety, Grade 1.  3. Dermatofibroma.  Benign.  Diagnosed by biopsy on 7/17/17.  No further intervention needed.  4. Decreased platelet count, Grade 1.  Resolved 11/17/17.  5. Constipation, Grade 1.  6. Myalgia, Grade 1.  7. Peripheral neuropathy- feet, Grade 1.      Adverse Events - For the most up-to-date AE log, please refer to the paper AE log in the subjects research file.  1. Platelet count decreased/thrombocytopenia, resolved from Grade 1.  Will continue to monitor.  2. Peripheral neuropathy - fingers, remains Grade 1.  Patient stepping up taking Neurontin, now at 200 mg, TID, increasing to 300 mg, TID.  Will continue to monitor.  3. Hypoalbuminemia, remains Grade 1.  Will continue to monitor.

## 2018-02-15 NOTE — PROGRESS NOTES
Jan 24, 2018     Protocol: FORWARD1/IMGN-0403  Investigator: BETH Weston  Treating Physician: GUERITA Tran Pt Initials: NAV VAZ  Study ID: [id number]  IRB#: [id number]     FORWARD 1: A Randomized, Open Label Phase 3 Study to Evaluate the Safety and Efficacy of Mirvetuximab soravtansine ([id number]) Versus Investigators Choice of Chemotherapy in Women with Folate Receptor ??positive Advanced Epithelial Ovarian Cancer, Primary Peritoneal Cancer or Fallopian Tube Cancer      Cycle 4, Day 8  Study allocation was made on 11/27/17.  Patient randomized to Arm A:[id number], 6 mg/kg2(from [facility]), q 3 weeks.      Patient presented to clinic for C4D8 on the above mentioned clinical trial. Patient alert and oriented, mood and affect appropriate to the situation. Patient denies vison changes, cough, headache, fatigue, vomiting, dizziness, or fever.  Patient reports mild nausea and intermittent diarrhea on Days 3-5 post-infusion.     Physical exam, ECOG assessment performed on 2/7/18.  ECOG = 0 per MD.  Local labs were performed on 2/7/18 and reviewed by the physician.  Platelet count 55 today.  Physical exam unremarkable per physician, no ocular symptoms noted.  Patient confirmed she is taking steroid and lubricating eye drops as prescribed.  Reviewed baseline conditions and current medications with patient.  See Concomitant Medication and Baseline Medical History sheets.  Central safety labs collected, processed, and shipped to  per protocol.      All of the patient's questions were answered by Dr. Tran and me to her satisfaction. Patient expressed her willingness to continue to participate in the above mentioned clinical trial. Instructed patient to notify Dr. Tran and/or me with any questions, concerns, or changes in health status. Patient verbalized understanding.      Baseline Medical History  1. 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    2. Anxiety, Grade  1.  3. Dermatofibroma.  Benign.  Diagnosed by biopsy on 7/17/17.  No further intervention needed.  4. Decreased platelet count, Grade 1.  Resolved 11/17/17.  5. Constipation, Grade 1.  6. Myalgia, Grade 1.  7. Peripheral neuropathy- feet, Grade 1.      Adverse Events - For the most up-to-date AE log, please refer to the paper AE log in the subjects research file.  1. Platelet count decreased/thrombocytopenia, Grade 1.  Will continue to monitor.  2. Peripheral neuropathy - fingers, remains Grade 1.  Patient plans to discontinue Neurontin as she feels it is not helping.  Does not want to increase to 300 mg, TID.  Will continue to monitor.  3. Hypoalbuminemia, remains Grade 1.  Will continue to monitor.

## 2018-02-19 ENCOUNTER — HOSPITAL ENCOUNTER (OUTPATIENT)
Dept: RADIOLOGY | Facility: OTHER | Age: 63
Discharge: HOME | End: 2018-02-19
Attending: OBSTETRICS & GYNECOLOGY
Payer: COMMERCIAL

## 2018-02-19 DIAGNOSIS — Z00.6 EXAMINATION OF PARTICIPANT IN CLINICAL TRIAL: ICD-10-CM

## 2018-02-19 DIAGNOSIS — C56.9 MALIGNANT NEOPLASM OF OVARY, UNSPECIFIED LATERALITY: ICD-10-CM

## 2018-02-19 PROCEDURE — 71260 CT THORAX DX C+: CPT | Mod: 26,,, | Performed by: RADIOLOGY

## 2018-02-19 PROCEDURE — 25500020 PHARM REV CODE 255: Performed by: OBSTETRICS & GYNECOLOGY

## 2018-02-19 PROCEDURE — 74177 CT ABD & PELVIS W/CONTRAST: CPT | Mod: 26,,, | Performed by: RADIOLOGY

## 2018-02-19 PROCEDURE — 74177 CT ABD & PELVIS W/CONTRAST: CPT | Mod: TC

## 2018-02-19 RX ADMIN — IOHEXOL 100 ML: 350 INJECTION, SOLUTION INTRAVENOUS at 10:02

## 2018-02-19 RX ADMIN — IOHEXOL 25 ML: 350 INJECTION, SOLUTION INTRAVENOUS at 10:02

## 2018-02-21 ENCOUNTER — LAB VISIT (OUTPATIENT)
Dept: LAB | Facility: HOSPITAL | Age: 63
End: 2018-02-21
Payer: COMMERCIAL

## 2018-02-21 ENCOUNTER — INFUSION (OUTPATIENT)
Dept: INFUSION THERAPY | Facility: HOSPITAL | Age: 63
End: 2018-02-21
Attending: OBSTETRICS & GYNECOLOGY
Payer: COMMERCIAL

## 2018-02-21 ENCOUNTER — OFFICE VISIT (OUTPATIENT)
Dept: GYNECOLOGIC ONCOLOGY | Facility: CLINIC | Age: 63
End: 2018-02-21
Payer: COMMERCIAL

## 2018-02-21 ENCOUNTER — RESEARCH ENCOUNTER (OUTPATIENT)
Dept: RESEARCH | Facility: HOSPITAL | Age: 63
End: 2018-02-21

## 2018-02-21 VITALS
HEART RATE: 71 BPM | SYSTOLIC BLOOD PRESSURE: 125 MMHG | WEIGHT: 189.63 LBS | DIASTOLIC BLOOD PRESSURE: 66 MMHG | HEIGHT: 67 IN | TEMPERATURE: 97 F | RESPIRATION RATE: 18 BRPM | BODY MASS INDEX: 29.76 KG/M2

## 2018-02-21 VITALS
TEMPERATURE: 98 F | RESPIRATION RATE: 16 BRPM | WEIGHT: 189.63 LBS | HEART RATE: 61 BPM | DIASTOLIC BLOOD PRESSURE: 68 MMHG | SYSTOLIC BLOOD PRESSURE: 124 MMHG | BODY MASS INDEX: 29.69 KG/M2

## 2018-02-21 DIAGNOSIS — Z00.6 EXAMINATION OF PARTICIPANT IN CLINICAL TRIAL: ICD-10-CM

## 2018-02-21 DIAGNOSIS — C56.9 MALIGNANT NEOPLASM OF OVARY, UNSPECIFIED LATERALITY: ICD-10-CM

## 2018-02-21 DIAGNOSIS — C56.9 OVARIAN CANCER, UNSPECIFIED LATERALITY: ICD-10-CM

## 2018-02-21 DIAGNOSIS — Q15.9 EYE ABNORMALITY: Primary | ICD-10-CM

## 2018-02-21 DIAGNOSIS — C56.9 OVARIAN CANCER, UNSPECIFIED LATERALITY: Primary | ICD-10-CM

## 2018-02-21 LAB
ALBUMIN SERPL BCP-MCNC: 3.5 G/DL
ALP SERPL-CCNC: 63 U/L
ALT SERPL W/O P-5'-P-CCNC: 55 U/L
ANION GAP SERPL CALC-SCNC: 9 MMOL/L
AST SERPL-CCNC: 55 U/L
BASOPHILS # BLD AUTO: 0.02 K/UL
BASOPHILS NFR BLD: 0.7 %
BILIRUB SERPL-MCNC: 0.7 MG/DL
BUN SERPL-MCNC: 7 MG/DL
CALCIUM SERPL-MCNC: 9 MG/DL
CHLORIDE SERPL-SCNC: 107 MMOL/L
CO2 SERPL-SCNC: 26 MMOL/L
CREAT SERPL-MCNC: 0.7 MG/DL
DIFFERENTIAL METHOD: ABNORMAL
DRUG STUDY SPECIMEN TYPE: NORMAL
DRUG STUDY TEST NAME: NORMAL
DRUG STUDY TEST RESULT: NORMAL
EOSINOPHIL # BLD AUTO: 0 K/UL
EOSINOPHIL NFR BLD: 0.4 %
ERYTHROCYTE [DISTWIDTH] IN BLOOD BY AUTOMATED COUNT: 13.5 %
EST. GFR  (AFRICAN AMERICAN): >60 ML/MIN/1.73 M^2
EST. GFR  (NON AFRICAN AMERICAN): >60 ML/MIN/1.73 M^2
GLUCOSE SERPL-MCNC: 93 MG/DL
HCT VFR BLD AUTO: 38.8 %
HGB BLD-MCNC: 13.3 G/DL
IMM GRANULOCYTES # BLD AUTO: 0.01 K/UL
IMM GRANULOCYTES NFR BLD AUTO: 0.4 %
LYMPHOCYTES # BLD AUTO: 0.7 K/UL
LYMPHOCYTES NFR BLD: 26.5 %
MCH RBC QN AUTO: 30.5 PG
MCHC RBC AUTO-ENTMCNC: 34.3 G/DL
MCV RBC AUTO: 89 FL
MONOCYTES # BLD AUTO: 0.3 K/UL
MONOCYTES NFR BLD: 10.1 %
NEUTROPHILS # BLD AUTO: 1.7 K/UL
NEUTROPHILS NFR BLD: 61.9 %
NRBC BLD-RTO: 0 /100 WBC
PLATELET # BLD AUTO: 107 K/UL
PMV BLD AUTO: 9.1 FL
POTASSIUM SERPL-SCNC: 3.6 MMOL/L
PROT SERPL-MCNC: 6.6 G/DL
RBC # BLD AUTO: 4.36 M/UL
SODIUM SERPL-SCNC: 142 MMOL/L
WBC # BLD AUTO: 2.68 K/UL

## 2018-02-21 PROCEDURE — 63600175 PHARM REV CODE 636 W HCPCS: Performed by: OBSTETRICS & GYNECOLOGY

## 2018-02-21 PROCEDURE — 99000 SPECIMEN HANDLING OFFICE-LAB: CPT

## 2018-02-21 PROCEDURE — 99999 PR PBB SHADOW E&M-EST. PATIENT-LVL III: CPT | Mod: PBBFAC,,, | Performed by: OBSTETRICS & GYNECOLOGY

## 2018-02-21 PROCEDURE — 99213 OFFICE O/P EST LOW 20 MIN: CPT | Mod: S$GLB,,, | Performed by: OBSTETRICS & GYNECOLOGY

## 2018-02-21 PROCEDURE — 80053 COMPREHEN METABOLIC PANEL: CPT

## 2018-02-21 PROCEDURE — 3008F BODY MASS INDEX DOCD: CPT | Mod: CPTII,S$GLB,, | Performed by: OBSTETRICS & GYNECOLOGY

## 2018-02-21 PROCEDURE — 96365 THER/PROPH/DIAG IV INF INIT: CPT

## 2018-02-21 PROCEDURE — 85025 COMPLETE CBC W/AUTO DIFF WBC: CPT

## 2018-02-21 PROCEDURE — 25000003 PHARM REV CODE 250: Performed by: OBSTETRICS & GYNECOLOGY

## 2018-02-21 PROCEDURE — 96367 TX/PROPH/DG ADDL SEQ IV INF: CPT

## 2018-02-21 PROCEDURE — 36415 COLL VENOUS BLD VENIPUNCTURE: CPT

## 2018-02-21 RX ORDER — HEPARIN 100 UNIT/ML
500 SYRINGE INTRAVENOUS
Status: CANCELLED | OUTPATIENT
Start: 2018-02-21

## 2018-02-21 RX ORDER — ACETAMINOPHEN 325 MG/1
650 TABLET ORAL
Status: COMPLETED | OUTPATIENT
Start: 2018-02-21 | End: 2018-02-21

## 2018-02-21 RX ORDER — ACETAMINOPHEN 325 MG/1
650 TABLET ORAL
Status: CANCELLED | OUTPATIENT
Start: 2018-02-21

## 2018-02-21 RX ORDER — SODIUM CHLORIDE 0.9 % (FLUSH) 0.9 %
10 SYRINGE (ML) INJECTION
Status: CANCELLED | OUTPATIENT
Start: 2018-02-21

## 2018-02-21 RX ADMIN — DIPHENHYDRAMINE HYDROCHLORIDE 50 MG: 50 INJECTION, SOLUTION INTRAMUSCULAR; INTRAVENOUS at 02:02

## 2018-02-21 RX ADMIN — ACETAMINOPHEN 650 MG: 325 TABLET, FILM COATED ORAL at 02:02

## 2018-02-21 RX ADMIN — DEXTROSE: 50 INJECTION, SOLUTION INTRAVENOUS at 02:02

## 2018-02-21 RX ADMIN — DEXAMETHASONE SODIUM PHOSPHATE: 4 INJECTION, SOLUTION INTRAMUSCULAR; INTRAVENOUS at 02:02

## 2018-02-21 NOTE — PLAN OF CARE
Problem: Chemotherapy Effects (Adult)  Goal: Signs and Symptoms of Listed Potential Problems Will be Absent, Minimized or Managed (Chemotherapy Effects)  Signs and symptoms of listed potential problems will be absent, minimized or managed by discharge/transition of care (reference Chemotherapy Effects (Adult) CPG).   Outcome: Ongoing (interventions implemented as appropriate)  Patient here for study drug.  Assessment complete and labs reviewed.  VSS.  Chair reclined and blanket offered.  No needs expressed at this time.  Will continue to monitor.

## 2018-02-22 NOTE — PROGRESS NOTES
Feb. 21, 2018     Protocol: FORWARD1/IMGN-0403  Investigator: BETH Weston  Treating Physician: GUERITA Tran Pt Initials: NAV VAZ  Study ID: [id number]  IRB#: [id number]     FORWARD 1: A Randomized, Open Label Phase 3 Study to Evaluate the Safety and Efficacy of Mirvetuximab soravtansine (ATVH032) Versus Investigators Choice of Chemotherapy in Women with Folate Receptor á?positive Advanced Epithelial Ovarian Cancer, Primary Peritoneal Cancer or Fallopian Tube Cancer      Cycle 5, Day 1  Study allocation was made on 11/27/17.  Patient randomized to Arm A:[id number], 6 mg/kg2(from [facility]), q 3 weeks.       Patient presented to clinic for C5D1 on the above mentioned clinical trial. Patient alert and oriented, mood and affect appropriate to the situation. Patient denies vison changes, cough, headache, fatigue, vomiting, dizziness, or fever.  Patient reports continued neuropathy but is tolerating.  She knows we can reduce dose of study drug if neuropathy worsens or interferes with ADLs.  At this point, patient states neuropathy has not interfered with ADLs therefore Grade 1.  Lab reports show decreased platelets, WBC, and ANC; however all are within range for treatment today.     Physical exam, ECOG assessment performed on 2/21/18.  ECOG = 0 per MD.  Local labs were performed on 2/21/18 and reviewed by the physician.  Platelet count improved to 16 today.  Physical exam unremarkable per physician, no ocular symptoms noted.    Reviewed baseline conditions and current medications with patient.  See Concomitant Medication and Baseline Medical History sheets.  Central safety labs collected, processed, and shipped to  per protocol.      Per MD, patient approved to proceed with Cycle 5 at assigned dose.      All of the patient's questions were answered by Dr. Tran and me to her satisfaction. Patient expressed her willingness to continue to participate in the above mentioned clinical trial. Instructed patient to notify Dr. Tran and/or me  with any questions, concerns, or changes in health status. Patient verbalized understanding.      Baseline Medical History  1. 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    2. Anxiety, Grade 1.  3. Dermatofibroma.  Benign.  Diagnosed by biopsy on 7/17/17.  No further intervention needed.  4. Decreased platelet count, Grade 1.  Resolved 11/17/17.  5. Constipation, Grade 1.  6. Myalgia, Grade 1.  7. Peripheral neuropathy- feet, Grade 1.      Adverse Events - For the most up-to-date AE log, please refer to the paper AE log in the subjects research file.  1. Platelet count decreased/thrombocytopenia, Grade 1.  Platelets are 107k today.  Ok to proceed with treatment.  Will continue to monitor.  2. Afebrile neutropenia, Grade 1.  Ok to proceed with treatment.  Will continue to monitor.  3. Peripheral neuropathy - fingers, remains Grade 1.  Patient discontinued Neurontin, tolerating neuropathy okay. Will continue to monitor.  4. Hypoalbuminemia, resolved from Grade 1.  Will continue to monitor.

## 2018-02-23 NOTE — PROGRESS NOTES
Subjective:      Patient ID: Mickie Don is a 62 y.o. female.    Chief Complaint: Chemotherapy (C5/D1)      HPI  Treatment History  Xlap/Omentectomy on 11/1/16 - mass was unresectable  Stage IIIC ovarian cancer  Initiated Taxotere/Carbo on 11/18/16, now s/p 6 cycles completed 3/8/17  Xlap/ISIS/BSO/Radical mass resection > 10 cm, optimal debulking on 4/11/17  Initiated PRIMA on 8/21/17  BRCA negative     HPI  Here today for FORWARD1 visit.  C5D1 PS is 0. Labs reviewed and appropriate for treatment. Denies N/V, F/C, vision changes or HA.  Appetite normal.  Bladder and bowel function normal. Some neuropathy but stable, has tapered off of Gabapentin.      initially at time of recurrence was 96. After 2 cycles it is 36>24>16>12.  CT scan of chest, abdomen and pelvis after C2 is stable disease.   CT CAP after C4 shows 37% decrease in target lesions, [place].      Review of Systems   Constitutional: Negative for appetite change, chills, fatigue and fever.   HENT: Negative for mouth sores.    Respiratory: Negative for cough and shortness of breath.    Cardiovascular: Negative for leg swelling.   Gastrointestinal: Negative for abdominal pain, blood in stool, constipation and diarrhea.   Endocrine: Negative for cold intolerance.   Genitourinary: Negative for dysuria and vaginal bleeding.   Musculoskeletal: Negative for myalgias.   Skin: Negative for rash.   Allergic/Immunologic: Negative.    Neurological: Negative for weakness and numbness.   Hematological: Negative for adenopathy. Does not bruise/bleed easily.   Psychiatric/Behavioral: Negative for confusion.       Objective:   Physical Exam:   Constitutional: She is oriented to person, place, and time. She appears well-developed and well-nourished.    HENT:   Head: Normocephalic and atraumatic.    Eyes: EOM are normal. Pupils are equal, round, and reactive to light.    Neck: Normal range of motion. Neck supple. No thyromegaly present.    Cardiovascular: Normal rate,  regular rhythm and intact distal pulses.     Pulmonary/Chest: Effort normal and breath sounds normal. No respiratory distress. She has no wheezes.        Abdominal: Soft. Bowel sounds are normal. She exhibits no distension, no ascites and no mass. There is no tenderness.     Genitourinary: Vagina normal.           Musculoskeletal: Normal range of motion and moves all extremeties.      Lymphadenopathy:     She has no cervical adenopathy.        Right: No supraclavicular adenopathy present.        Left: No supraclavicular adenopathy present.    Neurological: She is alert and oriented to person, place, and time.    Skin: Skin is warm and dry. No rash noted.    Psychiatric: She has a normal mood and affect.       Assessment:     1. Eye abnormality    2. Examination of participant in clinical trial    3. Ovarian cancer, unspecified laterality        Plan:   No orders of the defined types were placed in this encounter.    Okay to treat C5 per protocol.

## 2018-03-02 ENCOUNTER — RESEARCH ENCOUNTER (OUTPATIENT)
Dept: RESEARCH | Facility: HOSPITAL | Age: 63
End: 2018-03-02

## 2018-03-02 ENCOUNTER — LAB VISIT (OUTPATIENT)
Dept: LAB | Facility: HOSPITAL | Age: 63
End: 2018-03-02
Attending: OBSTETRICS & GYNECOLOGY
Payer: COMMERCIAL

## 2018-03-02 DIAGNOSIS — C56.9 MALIGNANT NEOPLASM OF OVARY, UNSPECIFIED LATERALITY: ICD-10-CM

## 2018-03-02 DIAGNOSIS — Z00.6 EXAMINATION OF PARTICIPANT IN CLINICAL TRIAL: ICD-10-CM

## 2018-03-02 LAB
ALBUMIN SERPL BCP-MCNC: 3.9 G/DL
ALP SERPL-CCNC: 80 U/L
ALT SERPL W/O P-5'-P-CCNC: 32 U/L
ANION GAP SERPL CALC-SCNC: 9 MMOL/L
AST SERPL-CCNC: 37 U/L
BASOPHILS # BLD AUTO: 0.04 K/UL
BASOPHILS NFR BLD: 1 %
BILIRUB SERPL-MCNC: 0.6 MG/DL
BUN SERPL-MCNC: 13 MG/DL
CALCIUM SERPL-MCNC: 9.3 MG/DL
CHLORIDE SERPL-SCNC: 104 MMOL/L
CO2 SERPL-SCNC: 26 MMOL/L
CREAT SERPL-MCNC: 0.8 MG/DL
DIFFERENTIAL METHOD: ABNORMAL
DRUG STUDY SPECIMEN TYPE: NORMAL
EOSINOPHIL # BLD AUTO: 0 K/UL
EOSINOPHIL NFR BLD: 1 %
ERYTHROCYTE [DISTWIDTH] IN BLOOD BY AUTOMATED COUNT: 14.3 %
EST. GFR  (AFRICAN AMERICAN): >60 ML/MIN/1.73 M^2
EST. GFR  (NON AFRICAN AMERICAN): >60 ML/MIN/1.73 M^2
GLUCOSE SERPL-MCNC: 98 MG/DL
HCT VFR BLD AUTO: 43.1 %
HGB BLD-MCNC: 14.5 G/DL
IMM GRANULOCYTES # BLD AUTO: 0.01 K/UL
IMM GRANULOCYTES NFR BLD AUTO: 0.3 %
LYMPHOCYTES # BLD AUTO: 1 K/UL
LYMPHOCYTES NFR BLD: 25.4 %
MCH RBC QN AUTO: 30.3 PG
MCHC RBC AUTO-ENTMCNC: 33.6 G/DL
MCV RBC AUTO: 90 FL
MONOCYTES # BLD AUTO: 0.4 K/UL
MONOCYTES NFR BLD: 9.6 %
NEUTROPHILS # BLD AUTO: 2.5 K/UL
NEUTROPHILS NFR BLD: 62.7 %
NRBC BLD-RTO: 0 /100 WBC
PLATELET # BLD AUTO: 60 K/UL
PMV BLD AUTO: 12.9 FL
POTASSIUM SERPL-SCNC: 4.1 MMOL/L
PROT SERPL-MCNC: 7.4 G/DL
RBC # BLD AUTO: 4.79 M/UL
SODIUM SERPL-SCNC: 139 MMOL/L
WBC # BLD AUTO: 3.97 K/UL

## 2018-03-02 PROCEDURE — 85025 COMPLETE CBC W/AUTO DIFF WBC: CPT

## 2018-03-02 PROCEDURE — 36415 COLL VENOUS BLD VENIPUNCTURE: CPT

## 2018-03-02 PROCEDURE — 80053 COMPREHEN METABOLIC PANEL: CPT

## 2018-03-14 ENCOUNTER — RESEARCH ENCOUNTER (OUTPATIENT)
Dept: RESEARCH | Facility: HOSPITAL | Age: 63
End: 2018-03-14

## 2018-03-14 ENCOUNTER — OFFICE VISIT (OUTPATIENT)
Dept: GYNECOLOGIC ONCOLOGY | Facility: CLINIC | Age: 63
End: 2018-03-14
Payer: COMMERCIAL

## 2018-03-14 ENCOUNTER — LAB VISIT (OUTPATIENT)
Dept: LAB | Facility: HOSPITAL | Age: 63
End: 2018-03-14
Attending: OBSTETRICS & GYNECOLOGY
Payer: COMMERCIAL

## 2018-03-14 ENCOUNTER — INFUSION (OUTPATIENT)
Dept: INFUSION THERAPY | Facility: HOSPITAL | Age: 63
End: 2018-03-14
Attending: OBSTETRICS & GYNECOLOGY
Payer: COMMERCIAL

## 2018-03-14 VITALS
HEIGHT: 67 IN | HEART RATE: 63 BPM | SYSTOLIC BLOOD PRESSURE: 109 MMHG | TEMPERATURE: 98 F | BODY MASS INDEX: 29.98 KG/M2 | WEIGHT: 191 LBS | RESPIRATION RATE: 16 BRPM | DIASTOLIC BLOOD PRESSURE: 61 MMHG

## 2018-03-14 VITALS
DIASTOLIC BLOOD PRESSURE: 58 MMHG | WEIGHT: 191 LBS | HEART RATE: 83 BPM | BODY MASS INDEX: 29.91 KG/M2 | SYSTOLIC BLOOD PRESSURE: 130 MMHG | RESPIRATION RATE: 16 BRPM | TEMPERATURE: 98 F

## 2018-03-14 DIAGNOSIS — C56.9 OVARIAN CANCER, UNSPECIFIED LATERALITY: Primary | ICD-10-CM

## 2018-03-14 DIAGNOSIS — C56.9 MALIGNANT NEOPLASM OF OVARY, UNSPECIFIED LATERALITY: ICD-10-CM

## 2018-03-14 DIAGNOSIS — Z51.11 ENCOUNTER FOR ANTINEOPLASTIC CHEMOTHERAPY: ICD-10-CM

## 2018-03-14 DIAGNOSIS — Z00.6 EXAMINATION OF PARTICIPANT IN CLINICAL TRIAL: ICD-10-CM

## 2018-03-14 DIAGNOSIS — C56.9 MALIGNANT NEOPLASM OF OVARY, UNSPECIFIED LATERALITY: Primary | ICD-10-CM

## 2018-03-14 LAB
ALBUMIN SERPL BCP-MCNC: 3.9 G/DL
ALP SERPL-CCNC: 75 U/L
ALT SERPL W/O P-5'-P-CCNC: 42 U/L
ANION GAP SERPL CALC-SCNC: 12 MMOL/L
AST SERPL-CCNC: 38 U/L
BASOPHILS # BLD AUTO: 0.04 K/UL
BASOPHILS NFR BLD: 1 %
BILIRUB SERPL-MCNC: 0.4 MG/DL
BUN SERPL-MCNC: 13 MG/DL
CALCIUM SERPL-MCNC: 9.9 MG/DL
CHLORIDE SERPL-SCNC: 102 MMOL/L
CO2 SERPL-SCNC: 28 MMOL/L
CREAT SERPL-MCNC: 0.8 MG/DL
DIFFERENTIAL METHOD: ABNORMAL
DRUG STUDY SPECIMEN TYPE: NORMAL
DRUG STUDY TEST NAME: NORMAL
DRUG STUDY TEST RESULT: NORMAL
EOSINOPHIL # BLD AUTO: 0 K/UL
EOSINOPHIL NFR BLD: 0.8 %
ERYTHROCYTE [DISTWIDTH] IN BLOOD BY AUTOMATED COUNT: 14.5 %
EST. GFR  (AFRICAN AMERICAN): >60 ML/MIN/1.73 M^2
EST. GFR  (NON AFRICAN AMERICAN): >60 ML/MIN/1.73 M^2
GLUCOSE SERPL-MCNC: 96 MG/DL
HCT VFR BLD AUTO: 42.9 %
HGB BLD-MCNC: 14.2 G/DL
IMM GRANULOCYTES # BLD AUTO: 0.01 K/UL
IMM GRANULOCYTES NFR BLD AUTO: 0.3 %
LYMPHOCYTES # BLD AUTO: 1 K/UL
LYMPHOCYTES NFR BLD: 27.2 %
MCH RBC QN AUTO: 30.2 PG
MCHC RBC AUTO-ENTMCNC: 33.1 G/DL
MCV RBC AUTO: 91 FL
MONOCYTES # BLD AUTO: 0.3 K/UL
MONOCYTES NFR BLD: 8.6 %
NEUTROPHILS # BLD AUTO: 2.4 K/UL
NEUTROPHILS NFR BLD: 62.1 %
NRBC BLD-RTO: 0 /100 WBC
PLATELET # BLD AUTO: 140 K/UL
PMV BLD AUTO: 10.2 FL
POTASSIUM SERPL-SCNC: 4 MMOL/L
PROT SERPL-MCNC: 7.4 G/DL
RBC # BLD AUTO: 4.7 M/UL
SODIUM SERPL-SCNC: 142 MMOL/L
WBC # BLD AUTO: 3.83 K/UL

## 2018-03-14 PROCEDURE — 99999 PR PBB SHADOW E&M-EST. PATIENT-LVL III: CPT | Mod: PBBFAC,,, | Performed by: OBSTETRICS & GYNECOLOGY

## 2018-03-14 PROCEDURE — 25000003 PHARM REV CODE 250: Performed by: OBSTETRICS & GYNECOLOGY

## 2018-03-14 PROCEDURE — 96367 TX/PROPH/DG ADDL SEQ IV INF: CPT

## 2018-03-14 PROCEDURE — 80053 COMPREHEN METABOLIC PANEL: CPT

## 2018-03-14 PROCEDURE — 96413 CHEMO IV INFUSION 1 HR: CPT

## 2018-03-14 PROCEDURE — 96415 CHEMO IV INFUSION ADDL HR: CPT

## 2018-03-14 PROCEDURE — 99214 OFFICE O/P EST MOD 30 MIN: CPT | Mod: S$GLB,,, | Performed by: OBSTETRICS & GYNECOLOGY

## 2018-03-14 PROCEDURE — 99000 SPECIMEN HANDLING OFFICE-LAB: CPT

## 2018-03-14 PROCEDURE — 36415 COLL VENOUS BLD VENIPUNCTURE: CPT

## 2018-03-14 PROCEDURE — 63600175 PHARM REV CODE 636 W HCPCS: Performed by: OBSTETRICS & GYNECOLOGY

## 2018-03-14 PROCEDURE — 85025 COMPLETE CBC W/AUTO DIFF WBC: CPT

## 2018-03-14 RX ORDER — SODIUM CHLORIDE 0.9 % (FLUSH) 0.9 %
10 SYRINGE (ML) INJECTION
Status: DISCONTINUED | OUTPATIENT
Start: 2018-03-14 | End: 2018-03-14 | Stop reason: HOSPADM

## 2018-03-14 RX ORDER — SODIUM CHLORIDE 0.9 % (FLUSH) 0.9 %
10 SYRINGE (ML) INJECTION
Status: CANCELLED | OUTPATIENT
Start: 2018-03-14

## 2018-03-14 RX ORDER — ACETAMINOPHEN 325 MG/1
650 TABLET ORAL
Status: CANCELLED | OUTPATIENT
Start: 2018-03-14

## 2018-03-14 RX ORDER — ACETAMINOPHEN 325 MG/1
650 TABLET ORAL
Status: COMPLETED | OUTPATIENT
Start: 2018-03-14 | End: 2018-03-14

## 2018-03-14 RX ORDER — HEPARIN 100 UNIT/ML
500 SYRINGE INTRAVENOUS
Status: CANCELLED | OUTPATIENT
Start: 2018-03-14

## 2018-03-14 RX ADMIN — DIPHENHYDRAMINE HYDROCHLORIDE 50 MG: 50 INJECTION, SOLUTION INTRAMUSCULAR; INTRAVENOUS at 12:03

## 2018-03-14 RX ADMIN — DEXAMETHASONE SODIUM PHOSPHATE 10 MG: 4 INJECTION, SOLUTION INTRAMUSCULAR; INTRAVENOUS at 12:03

## 2018-03-14 RX ADMIN — DEXTROSE: 50 INJECTION, SOLUTION INTRAVENOUS at 12:03

## 2018-03-14 RX ADMIN — ACETAMINOPHEN 650 MG: 325 TABLET, FILM COATED ORAL at 12:03

## 2018-03-14 NOTE — PROGRESS NOTES
Subjective:      Patient ID: Mickie Don is a 62 y.o. female.    Chief Complaint: No chief complaint on file.      HPI  Treatment History  Xlap/Omentectomy on 11/1/16 - mass was unresectable  Stage IIIC ovarian cancer  Initiated Taxotere/Carbo on 11/18/16, now s/p 6 cycles completed 3/8/17  Xlap/ISIS/BSO/Radical mass resection > 10 cm, optimal debulking on 4/11/17  Initiated PRIMA on 8/21/17  BRCA negative     HPI  Here today for FORWARD1 visit, C6D1.   PS is 0. Labs reviewed and appropriate for treatment. Denies N/V, F/C, vision changes or HA.  Appetite normal.  Bladder and bowel function normal.       initially at time of recurrence was 96. After 2 cycles it is 36>24>16 today.  CT scan on 2/19 revealed a response to therapy.     Review of Systems   Constitutional: Positive for fatigue (off of work this week). Negative for appetite change, chills, fever and unexpected weight change.   HENT: Negative for mouth sores.    Respiratory: Negative for cough and shortness of breath.    Cardiovascular: Negative for leg swelling.   Gastrointestinal: Negative for abdominal pain, blood in stool, constipation, diarrhea, nausea and vomiting.   Endocrine: Negative for cold intolerance.   Genitourinary: Negative for decreased urine volume, dysuria, frequency, hematuria, pelvic pain, vaginal bleeding, vaginal discharge and vaginal pain.   Musculoskeletal: Negative for back pain and myalgias.   Skin: Negative for rash.   Allergic/Immunologic: Negative.    Neurological: Negative for weakness, numbness and headaches.   Hematological: Negative for adenopathy. Does not bruise/bleed easily.   Psychiatric/Behavioral: Negative for confusion. The patient is not nervous/anxious.        Objective:   Physical Exam:   Constitutional: She is oriented to person, place, and time. She appears well-developed and well-nourished. No distress.    HENT:   Head: Normocephalic.     Neck: Normal range of motion.    Cardiovascular: Exam reveals no  cyanosis and no edema.     Pulmonary/Chest: Effort normal. No respiratory distress.        Abdominal: Soft. She exhibits no distension and no ascites. There is no tenderness.     Genitourinary:   Genitourinary Comments: Pelvic exam deferred           Musculoskeletal: Moves all extremeties.      Lymphadenopathy:        Right: No supraclavicular adenopathy present.        Left: No supraclavicular adenopathy present.    Neurological: She is alert and oriented to person, place, and time.    Skin: Skin is warm and dry. No rash noted. No cyanosis.    Psychiatric: She has a normal mood and affect.       Assessment:     1. Malignant neoplasm of ovary, unspecified laterality    2. Encounter for antineoplastic chemotherapy    3. Examination of participant in clinical trial        Plan:     Okay to treat with C6D1.  RTC per protocol.  Scan per protocol.

## 2018-03-14 NOTE — PLAN OF CARE
Problem: Patient Care Overview  Goal: Plan of Care Review  Outcome: Ongoing (interventions implemented as appropriate)  Patient tolerated oci study drug with nad noted upon discharge. Met with Annette research RN prior to infusion. PIV removed, catheter tip intact. Verbalized understanding to call MD for any questions/concerns. AVS given. Discharged home, ambulated independently.

## 2018-03-15 NOTE — PROGRESS NOTES
Mar. 14, 2018     Protocol: FORWARD1/IMGN-0403  Investigator: BETH Weston  Treating Physician: GUERITA Tran Pt Initials: NAV VAZ  Study ID: [id number]  IRB#: [id number]     FORWARD 1: A Randomized, Open Label Phase 3 Study to Evaluate the Safety and Efficacy of Mirvetuximab soravtansine (HMQA997) Versus Investigators Choice of Chemotherapy in Women with Folate Receptor á?positive Advanced Epithelial Ovarian Cancer, Primary Peritoneal Cancer or Fallopian Tube Cancer      Cycle 6, Day 1  Study allocation was made on 11/27/17.  Patient randomized to Arm A:[id number], 6 mg/kg2(from [facility]), q 3 weeks.       Patient presented to clinic for C6D1 on the above mentioned clinical trial. Patient alert and oriented, mood and affect appropriate to the situation. Patient denies vison changes, cough, headache, fatigue, vomiting, dizziness, or fever.  Patient reports continued neuropathy in bilateral fingers - tolerable but present.  She knows we can reduce dose of study drug if neuropathy worsens or interferes with ADLs.  At this point, patient states neuropathy has not interfered with ADLs.  Patient reports significant fatigue on D4-D6 after Cycle 5.  Lab reports show decreased platelet count but within range for treatment today.     Physical exam, ECOG assessment performed on 3/14/18.  ECOG = 0 per MD.  Local labs were performed on 3/14/18 and reviewed by the physician - all within in limits for treatment today..   Physical exam unremarkable per physician, no ocular symptoms noted.  Reviewed baseline conditions and current medications with patient.  See Concomitant Medication and Baseline Medical History sheets.  Central safety labs collected, processed, and shipped to  per protocol.  (CA-125 = 12, see Outside Lab results from 3/14/18 - report in Media tab.)      Per MD, patient approved to proceed with Cycle 6 at assigned dose.    Infusion delivered per protocol/treatment plan.  Infusion delivered at 5 mg/min per protocol as patient  tolerated 5 mg/min on C1 infusion.  No infusion-related issues noted during infusion or during post-infusion observation.          All of the patient's questions were answered by Dr. Tran and me to her satisfaction. Patient expressed her willingness to continue to participate in the above mentioned clinical trial. Instructed patient to notify Dr. Tran and/or me with any questions, concerns, or changes in health status. Patient verbalized understanding.      Baseline Medical History  1. 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    2. Anxiety, Grade 1.  3. Dermatofibroma.  Benign.  Diagnosed by biopsy on 7/17/17.  No further intervention needed.  4. Decreased platelet count, Grade 1.  Resolved 11/17/17.  5. Constipation, Grade 1.  6. Myalgia, Grade 1.  7. Peripheral neuropathy- feet, Grade 1.      Adverse Events - For the most up-to-date AE log, please refer to the paper AE log in the subjects research file.  1. Platelet count decreased/thrombocytopenia, Grade 1.  Platelets are 140k today.  Ok to proceed with treatment.  Will continue to monitor.  2. Peripheral neuropathy - fingers, remains Grade 1.  Patient discontinued Neurontin, tolerating neuropathy okay. Will continue to monitor.  3. Fatigue, resolved from Grade 2.  D4-D6.  4. Fatigue, ongoing @ Grade 1.  Will continue to monitor.

## 2018-03-15 NOTE — PROGRESS NOTES
Mar. 2, 2018     Protocol: FORWARD1/IMGN-0403  Investigator: BETH Weston  Treating Physician: GUERITA Tran Pt Initials: NAV VAZ  Study ID: [id number]  IRB#: [id number]     FORWARD 1: A Randomized, Open Label Phase 3 Study to Evaluate the Safety and Efficacy of Mirvetuximab soravtansine (BAJQ173) Versus Investigators Choice of Chemotherapy in Women with Folate Receptor á?positive Advanced Epithelial Ovarian Cancer, Primary Peritoneal Cancer or Fallopian Tube Cancer      Cycle 5, Day 8  Study allocation was made on 11/27/17.  Patient randomized to Arm A:[id number], 6 mg/kg2(from [facility]), q 3 weeks.       Patient presented to clinic for C5D8 on the above mentioned clinical trial. Patient alert and oriented, mood and affect appropriate to the situation. Patient denies any vison changes.  Patient denies cough, headache, vomiting, dizziness, or fever.  Patient reports continued neuropathy, which is stable.        Per protocol no physical exam/ECOG assessment performed for C5D8.  Local labs performed on 3/2/18.  Labs reviewed by the physician.  Platelet count is 60 today.   Reviewed baseline conditions and current medications with patient.  See Concomitant Medication and Baseline Medical History sheets.  Central safety labs collected, processed, and shipped to  per protocol.      All of the patient's questions were answered by me to her satisfaction. Patient expressed her willingness to continue to participate in the above mentioned clinical trial. Instructed patient to notify Dr. Tran and/or me with any questions, concerns, or changes in health status. Patient verbalized understanding.      Baseline Medical History  1. 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    2. Anxiety, Grade 1.  3. Dermatofibroma.  Benign.  Diagnosed by biopsy on 7/17/17.  No further intervention needed.  4. Decreased platelet count, Grade 1.  Resolved 11/17/17.  5. Constipation, Grade  1.  6. Myalgia, Grade 1.  7. Peripheral neuropathy- feet, Grade 1.      Adverse Events - For the most up-to-date AE log, please refer to the paper AE log in the subjects research file.  1. Platelet count decreased/thrombocytopenia, Grade 2.  Platelets are 60k today, no action taken.  Will continue to monitor.  2. Peripheral neuropathy - fingers, remains Grade 1.  Patient discontinued Neurontin, tolerating neuropathy okay. Will continue to monitor.

## 2018-03-21 ENCOUNTER — RESEARCH ENCOUNTER (OUTPATIENT)
Dept: RESEARCH | Facility: HOSPITAL | Age: 63
End: 2018-03-21

## 2018-03-29 NOTE — PROGRESS NOTES
Mar. 21, 2018     Protocol: FORWARD1/IMGN-0403  Investigator: BETH Weston  Treating Physician: GUERITA Tran Pt Initials: NAV VAZ  Study ID: [id number]  IRB#: [id number]     FORWARD 1: A Randomized, Open Label Phase 3 Study to Evaluate the Safety and Efficacy of Mirvetuximab soravtansine (RSNJ307) Versus Investigators Choice of Chemotherapy in Women with Folate Receptor á?positive Advanced Epithelial Ovarian Cancer, Primary Peritoneal Cancer or Fallopian Tube Cancer      Cycle 6, Day 8  Study allocation was made on 11/27/17.  Patient randomized to Arm A:[id number], 6 mg/kg2(from [facility]), q 3 weeks.       Patient presented to clinic for C6D8 on the above mentioned clinical trial. Patient alert and oriented, mood and affect appropriate to the situation. Patient denies any vison changes.  Patient denies cough, headache, vomiting, dizziness, or fever.  Patient reports continued neuropathy, which is stable.        Per protocol no physical exam/ECOG assessment performed for C6D8.  Local labs performed on 3/21/18.  Labs reviewed by the physician.  Platelet count is 51 today.   Reviewed baseline conditions and current medications with patient.  See Concomitant Medication and Baseline Medical History sheets.  Central safety labs collected, processed, and shipped to  per protocol.      All of the patient's questions were answered by me to her satisfaction. Patient expressed her willingness to continue to participate in the above mentioned clinical trial. Instructed patient to notify Dr. Tran and/or me with any questions, concerns, or changes in health status. Patient verbalized understanding.      Baseline Medical History  1. 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    2. Anxiety, Grade 1.  3. Dermatofibroma.  Benign.  Diagnosed by biopsy on 7/17/17.  No further intervention needed.  4. Decreased platelet count, Grade 1.  Resolved 11/17/17.  5. Constipation, Grade  1.  6. Myalgia, Grade 1.  7. Peripheral neuropathy- feet, Grade 1.      Adverse Events - For the most up-to-date AE log, please refer to the paper AE log in the subjects research file.  1. Platelet count decreased/thrombocytopenia, Grade 2.  Platelets are 51k today, no action taken.  Will continue to monitor.  2. Peripheral neuropathy - fingers, remains Grade 1.  Patient discontinued Neurontin, tolerating neuropathy okay. Will continue to monitor.  3. Fatigue, remains Grade 1.  Will continue to monitor.

## 2018-04-02 ENCOUNTER — HOSPITAL ENCOUNTER (OUTPATIENT)
Dept: RADIOLOGY | Facility: OTHER | Age: 63
Discharge: HOME | End: 2018-04-02
Attending: OBSTETRICS & GYNECOLOGY
Payer: COMMERCIAL

## 2018-04-02 ENCOUNTER — PATIENT MESSAGE (OUTPATIENT)
Dept: INTERNAL MEDICINE | Facility: CLINIC | Age: 63
End: 2018-04-02

## 2018-04-02 ENCOUNTER — OFFICE VISIT (OUTPATIENT)
Dept: INTERNAL MEDICINE | Facility: CLINIC | Age: 63
End: 2018-04-02
Payer: COMMERCIAL

## 2018-04-02 VITALS
TEMPERATURE: 99 F | HEART RATE: 62 BPM | HEIGHT: 67 IN | SYSTOLIC BLOOD PRESSURE: 114 MMHG | DIASTOLIC BLOOD PRESSURE: 72 MMHG | WEIGHT: 189.63 LBS | BODY MASS INDEX: 29.76 KG/M2

## 2018-04-02 DIAGNOSIS — J06.9 VIRAL URI: ICD-10-CM

## 2018-04-02 DIAGNOSIS — C56.9 MALIGNANT NEOPLASM OF OVARY, UNSPECIFIED LATERALITY: ICD-10-CM

## 2018-04-02 DIAGNOSIS — J32.9 BACTERIAL SINUSITIS: Primary | ICD-10-CM

## 2018-04-02 DIAGNOSIS — Z00.6 EXAMINATION OF PARTICIPANT IN CLINICAL TRIAL: ICD-10-CM

## 2018-04-02 DIAGNOSIS — B96.89 BACTERIAL SINUSITIS: Primary | ICD-10-CM

## 2018-04-02 LAB
FLUAV AG SPEC QL IA: NEGATIVE
FLUBV AG SPEC QL IA: NEGATIVE
SPECIMEN SOURCE: NORMAL

## 2018-04-02 PROCEDURE — 25500020 PHARM REV CODE 255: Performed by: OBSTETRICS & GYNECOLOGY

## 2018-04-02 PROCEDURE — 74177 CT ABD & PELVIS W/CONTRAST: CPT | Mod: 26,,, | Performed by: RADIOLOGY

## 2018-04-02 PROCEDURE — 71260 CT THORAX DX C+: CPT | Mod: 26,,, | Performed by: RADIOLOGY

## 2018-04-02 PROCEDURE — 74177 CT ABD & PELVIS W/CONTRAST: CPT | Mod: TC

## 2018-04-02 PROCEDURE — 99999 PR PBB SHADOW E&M-EST. PATIENT-LVL III: CPT | Mod: PBBFAC,,, | Performed by: INTERNAL MEDICINE

## 2018-04-02 PROCEDURE — 87400 INFLUENZA A/B EACH AG IA: CPT | Mod: 59

## 2018-04-02 PROCEDURE — 99214 OFFICE O/P EST MOD 30 MIN: CPT | Mod: S$GLB,,, | Performed by: INTERNAL MEDICINE

## 2018-04-02 RX ORDER — AMOXICILLIN AND CLAVULANATE POTASSIUM 875; 125 MG/1; MG/1
1 TABLET, FILM COATED ORAL 2 TIMES DAILY
Qty: 14 TABLET | Refills: 0 | Status: SHIPPED | OUTPATIENT
Start: 2018-04-02 | End: 2018-04-09

## 2018-04-02 RX ORDER — OSELTAMIVIR PHOSPHATE 75 MG/1
75 CAPSULE ORAL 2 TIMES DAILY WITH MEALS
Qty: 10 CAPSULE | Refills: 0 | Status: SHIPPED | OUTPATIENT
Start: 2018-04-02 | End: 2018-04-03

## 2018-04-02 RX ADMIN — IOHEXOL 25 ML: 350 INJECTION, SOLUTION INTRAVENOUS at 11:04

## 2018-04-02 RX ADMIN — IOHEXOL 100 ML: 350 INJECTION, SOLUTION INTRAVENOUS at 12:04

## 2018-04-02 NOTE — PROGRESS NOTES
Subjective:       Patient ID: Mickie Don is a 62 y.o. female.    Chief Complaint: No chief complaint on file.    HPI   Patient seen by Dr. Tran on this date.     Treatment History  Xlap/Omentectomy on 11/1/16 - mass was unresectable  Stage IIIC ovarian cancer  Initiated Taxotere/Carbo on 11/18/16, now s/p 6 cycles completed 3/8/17  Xlap/ISIS/BSO/Radical mass resection > 10 cm, optimal debulking on 4/11/17  Initiated PRIMA on 8/21/17  BRCA negative     HPI  Here today for FORWARD1 visit.  C7D1 PS is 0. Labs reviewed and appropriate for treatment. Denies N/V, F/C, vision changes or HA.  Appetite normal.  Bladder and bowel function normal. Some neuropathy but stable, has tapered off of Gabapentin.      initially at time of recurrence was 96. After 2 cycles it is 36>24>16>12.  CT scan of chest, abdomen and pelvis after C2 is stable disease.   CT CAP after C4 shows 37% decrease in target lesions, [place].      Review of Systems    Objective:   There were no vitals taken for this visit.     Physical Exam    Assessment:       No diagnosis found.    Plan:   There are no diagnoses linked to this encounter.

## 2018-04-02 NOTE — PROGRESS NOTES
"Subjective:       Patient ID: Mickie oDn is a 62 y.o. female.    Chief Complaint: Fever and Fatigue    HPI    PMH of ovarian cancer, on chemo q3 wks, due for dose tomorrow. Worsening cough & congestion and runny nose for 6 days. Green mucus from nose and cough. Temp of 100.6 associated with headache and pressure. Ears clogged, with throat pain. decreased energy, decreased appetite. Son's wedding on Saturday, some soreness in leg. No nausea or vomiting. Has diarrhea and constipation at baseline secondary to chemo. Works in lab.    Review of Systems    As per HPI    Objective:      Physical Exam   Constitutional: No distress.    woman whose Body mass index is 29.69 kg/m².    HENT:   Right Ear: Tympanic membrane normal. No tenderness.   Left Ear: Tympanic membrane normal. No tenderness.   Nose: Right sinus exhibits frontal sinus tenderness. Right sinus exhibits no maxillary sinus tenderness. Left sinus exhibits frontal sinus tenderness. Left sinus exhibits no maxillary sinus tenderness.   Mouth/Throat: Oropharynx is clear and moist. No oropharyngeal exudate.   Neck: Normal range of motion. No thyromegaly present.   Cardiovascular: Normal rate, regular rhythm and normal heart sounds.    Pulmonary/Chest: Effort normal and breath sounds normal. No stridor. She has no wheezes. She has no rales.   Lymphadenopathy:     She has no cervical adenopathy.   Skin: She is not diaphoretic.   Nursing note and vitals reviewed.      Vitals:    04/02/18 1457   BP: 114/72   BP Location: Right arm   Patient Position: Sitting   BP Method: Large (Manual)   Pulse: 62   Temp: 99.4 °F (37.4 °C)   TempSrc: Oral   Weight: 86 kg (189 lb 9.5 oz)   Height: 5' 7" (1.702 m)     Body mass index is 29.69 kg/m².    RESULTS: Reviewed labs from last 1 months    Assessment:       1. Bacterial sinusitis    2. Viral URI        Plan:   Mickie was seen today for fever and fatigue.    Diagnoses and all orders for this visit:    Bacterial sinusitis: "  New problem, getting worse over last few days. Given worsening of symptoms and ongoing active chemo, will start empiric antibiotics treatment.   -     amoxicillin-clavulanate 875-125mg (AUGMENTIN) 875-125 mg per tablet; Take 1 tablet by mouth 2 (two) times daily.    Viral URI:  New problem, pt has symptoms that may be explained by viral etiology. rule out flu, give printout for Tamiflu, if positive swab start prescription for Tamiflu.  -     oseltamivir (TAMIFLU) 75 MG capsule; Take 1 capsule (75 mg total) by mouth 2 (two) times daily with meals.  -     Influenza antigen Nasopharyngeal Swab    Keep regular follow up appointments with Elle Ma MD.   Tim Obando MD  Internal Medicine    Portions of this note were completed using [employer] dictation software. Please excuse typographical or syntax errors.

## 2018-04-03 ENCOUNTER — RESEARCH ENCOUNTER (OUTPATIENT)
Dept: RESEARCH | Facility: HOSPITAL | Age: 63
End: 2018-04-03

## 2018-04-03 ENCOUNTER — OFFICE VISIT (OUTPATIENT)
Dept: GYNECOLOGIC ONCOLOGY | Facility: CLINIC | Age: 63
End: 2018-04-03
Payer: COMMERCIAL

## 2018-04-03 ENCOUNTER — LAB VISIT (OUTPATIENT)
Dept: LAB | Facility: HOSPITAL | Age: 63
End: 2018-04-03
Payer: COMMERCIAL

## 2018-04-03 VITALS
BODY MASS INDEX: 29.38 KG/M2 | SYSTOLIC BLOOD PRESSURE: 119 MMHG | WEIGHT: 187.63 LBS | TEMPERATURE: 100 F | HEART RATE: 98 BPM | DIASTOLIC BLOOD PRESSURE: 59 MMHG

## 2018-04-03 VITALS
HEART RATE: 98 BPM | WEIGHT: 187.63 LBS | DIASTOLIC BLOOD PRESSURE: 59 MMHG | BODY MASS INDEX: 29.45 KG/M2 | SYSTOLIC BLOOD PRESSURE: 119 MMHG | HEIGHT: 67 IN

## 2018-04-03 DIAGNOSIS — C56.9 MALIGNANT NEOPLASM OF OVARY, UNSPECIFIED LATERALITY: Primary | ICD-10-CM

## 2018-04-03 DIAGNOSIS — C56.9 OVARIAN CANCER, UNSPECIFIED LATERALITY: ICD-10-CM

## 2018-04-03 DIAGNOSIS — Z00.6 EXAMINATION OF PARTICIPANT IN CLINICAL TRIAL: ICD-10-CM

## 2018-04-03 DIAGNOSIS — C56.9 MALIGNANT NEOPLASM OF OVARY, UNSPECIFIED LATERALITY: ICD-10-CM

## 2018-04-03 LAB
ALBUMIN SERPL BCP-MCNC: 3.4 G/DL
ALP SERPL-CCNC: 116 U/L
ALT SERPL W/O P-5'-P-CCNC: 40 U/L
ANION GAP SERPL CALC-SCNC: 12 MMOL/L
AST SERPL-CCNC: 36 U/L
BASOPHILS # BLD AUTO: 0.04 K/UL
BASOPHILS NFR BLD: 0.5 %
BILIRUB SERPL-MCNC: 0.9 MG/DL
BUN SERPL-MCNC: 8 MG/DL
CALCIUM SERPL-MCNC: 9.6 MG/DL
CHLORIDE SERPL-SCNC: 103 MMOL/L
CO2 SERPL-SCNC: 23 MMOL/L
CREAT SERPL-MCNC: 0.8 MG/DL
DIFFERENTIAL METHOD: ABNORMAL
DRUG STUDY SPECIMEN TYPE: NORMAL
DRUG STUDY TEST NAME: NORMAL
DRUG STUDY TEST RESULT: NORMAL
EOSINOPHIL # BLD AUTO: 0 K/UL
EOSINOPHIL NFR BLD: 0.4 %
ERYTHROCYTE [DISTWIDTH] IN BLOOD BY AUTOMATED COUNT: 15.4 %
EST. GFR  (AFRICAN AMERICAN): >60 ML/MIN/1.73 M^2
EST. GFR  (NON AFRICAN AMERICAN): >60 ML/MIN/1.73 M^2
GLUCOSE SERPL-MCNC: 169 MG/DL
HCT VFR BLD AUTO: 42.7 %
HGB BLD-MCNC: 14.2 G/DL
IMM GRANULOCYTES # BLD AUTO: 0.02 K/UL
IMM GRANULOCYTES NFR BLD AUTO: 0.3 %
LYMPHOCYTES # BLD AUTO: 1.1 K/UL
LYMPHOCYTES NFR BLD: 14.5 %
MCH RBC QN AUTO: 30.5 PG
MCHC RBC AUTO-ENTMCNC: 33.3 G/DL
MCV RBC AUTO: 92 FL
MONOCYTES # BLD AUTO: 0.6 K/UL
MONOCYTES NFR BLD: 7.5 %
NEUTROPHILS # BLD AUTO: 5.7 K/UL
NEUTROPHILS NFR BLD: 76.8 %
NRBC BLD-RTO: 0 /100 WBC
PLATELET # BLD AUTO: 165 K/UL
PMV BLD AUTO: 10.2 FL
POTASSIUM SERPL-SCNC: 4.2 MMOL/L
PROT SERPL-MCNC: 7.6 G/DL
RBC # BLD AUTO: 4.65 M/UL
SODIUM SERPL-SCNC: 138 MMOL/L
WBC # BLD AUTO: 7.47 K/UL

## 2018-04-03 PROCEDURE — 99214 OFFICE O/P EST MOD 30 MIN: CPT | Mod: S$GLB,,, | Performed by: OBSTETRICS & GYNECOLOGY

## 2018-04-03 PROCEDURE — 99499 UNLISTED E&M SERVICE: CPT | Mod: S$GLB,,, | Performed by: OBSTETRICS & GYNECOLOGY

## 2018-04-03 PROCEDURE — 85025 COMPLETE CBC W/AUTO DIFF WBC: CPT

## 2018-04-03 PROCEDURE — 99999 PR PBB SHADOW E&M-EST. PATIENT-LVL III: CPT | Mod: PBBFAC,,, | Performed by: OBSTETRICS & GYNECOLOGY

## 2018-04-03 PROCEDURE — 80053 COMPREHEN METABOLIC PANEL: CPT

## 2018-04-03 PROCEDURE — 36415 COLL VENOUS BLD VENIPUNCTURE: CPT

## 2018-04-03 PROCEDURE — 99000 SPECIMEN HANDLING OFFICE-LAB: CPT

## 2018-04-03 NOTE — PROGRESS NOTES
"Apr. 3, 2018     Protocol: FORWARD1/IMGN-0403  Principle Investigator: BETH Weston  Treating Physician: GUERITA Tran Pt Initials: NAV VAZ  Study ID: [id number]  IRB#: [id number]     FORWARD 1: A Randomized, Open Label Phase 3 Study to Evaluate the Safety and Efficacy of Mirvetuximab soravtansine (WPRC077) Versus Investigators Choice of Chemotherapy in Women with Folate Receptor á?positive Advanced Epithelial Ovarian Cancer, Primary Peritoneal Cancer or Fallopian Tube Cancer      Cycle 7, Day 1  Study allocation was made on 11/27/17.  Patient randomized to Arm A:[id number], 6 mg/kg2(from [facility]), q 3 weeks.       Patient had scans on 4/2/18.  Per RECIST, patient has had "Partial Response" to therapy.    Patient presented to clinic for C7D1 on the above mentioned clinical trial. Patient alert and oriented, mood and affect appropriate to the situation. Patient denies vison changes, vomiting, or dizziness.  She reports congestion, cough, headache, fatigue, and fever.  She went to primary care MD on 4/2/18 for symptoms.  Flu tests were negative.  Patient prescribed 7 days of Augmentin starting evening of 4//2/18.  Due to URI/flu symptoms, C7D1 dose will be delayed until 4/5/18.  Patient reports continued neuropathy in bilateral fingers - tolerable but present.  Labs show platelet count WNL today.     Physical exam, ECOG assessment performed on 4/3/18.  ECOG = 0 per MD.  Local labs were performed on 4/3/18 and reviewed by the physician -within in limits for treatment afte delay.   Physical exam unremarkable per physician, no ocular symptoms noted.  Reviewed baseline conditions and current medications with patient.  See Concomitant Medication and Baseline Medical History sheets.  Central safety labs collected, processed, and shipped to  per protocol.      Per MD, patient approved to proceed with Cycle 7 at assigned dose on 4/5/18.      All of the patient's questions were answered by Dr. Tran and me to her satisfaction. Patient " expressed her willingness to continue to participate in the above mentioned clinical trial. Instructed patient to notify Dr. Tran and/or me with any questions, concerns, or changes in health status. Patient verbalized understanding.      Baseline Medical History  1. 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    2. Anxiety, Grade 1.  3. Dermatofibroma.  Benign.  Diagnosed by biopsy on 7/17/17.  No further intervention needed.  4. Decreased platelet count, Grade 1.  Resolved 11/17/17.  5. Constipation, Grade 1.  6. Myalgia, Grade 1.  7. Peripheral neuropathy- feet, Grade 1.      Adverse Events - For the most up-to-date AE log, please refer to the paper AE log in the subjects research file.  1. Platelet count decreased/thrombocytopenia, resolved from Grade 2.  Will continue to monitor.  2. Peripheral neuropathy - fingers, remains Grade 1.  Patient discontinued Neurontin, tolerating neuropathy okay. Will continue to monitor.  3. Fatigue, remains Grade 1.  Will continue to monitor.  4. Nasal congestion, Grade 2.  URI.  Will continue to monitor.  5. Productive cough, Grade 1.  URI. Will continue to monitor.  6. Headache, Grade 1.  Will continue to monitor.  7. Fever, Grade 1.  URI.  Will continue to monitor.

## 2018-04-05 ENCOUNTER — INFUSION (OUTPATIENT)
Dept: INFUSION THERAPY | Facility: HOSPITAL | Age: 63
End: 2018-04-05
Attending: OBSTETRICS & GYNECOLOGY
Payer: COMMERCIAL

## 2018-04-05 VITALS
HEART RATE: 79 BPM | OXYGEN SATURATION: 97 % | SYSTOLIC BLOOD PRESSURE: 131 MMHG | HEIGHT: 67 IN | BODY MASS INDEX: 29.45 KG/M2 | RESPIRATION RATE: 18 BRPM | DIASTOLIC BLOOD PRESSURE: 61 MMHG | WEIGHT: 187.63 LBS | TEMPERATURE: 98 F

## 2018-04-05 DIAGNOSIS — C56.9 OVARIAN CANCER, UNSPECIFIED LATERALITY: Primary | ICD-10-CM

## 2018-04-05 PROCEDURE — 96367 TX/PROPH/DG ADDL SEQ IV INF: CPT

## 2018-04-05 PROCEDURE — 63600175 PHARM REV CODE 636 W HCPCS: Performed by: OBSTETRICS & GYNECOLOGY

## 2018-04-05 PROCEDURE — 96413 CHEMO IV INFUSION 1 HR: CPT

## 2018-04-05 PROCEDURE — 25000003 PHARM REV CODE 250: Performed by: OBSTETRICS & GYNECOLOGY

## 2018-04-05 RX ORDER — HEPARIN 100 UNIT/ML
500 SYRINGE INTRAVENOUS
Status: CANCELLED | OUTPATIENT
Start: 2018-04-05

## 2018-04-05 RX ORDER — ACETAMINOPHEN 325 MG/1
650 TABLET ORAL
Status: CANCELLED | OUTPATIENT
Start: 2018-04-05

## 2018-04-05 RX ORDER — ACETAMINOPHEN 325 MG/1
650 TABLET ORAL
Status: COMPLETED | OUTPATIENT
Start: 2018-04-05 | End: 2018-04-05

## 2018-04-05 RX ORDER — SODIUM CHLORIDE 0.9 % (FLUSH) 0.9 %
10 SYRINGE (ML) INJECTION
Status: CANCELLED | OUTPATIENT
Start: 2018-04-05

## 2018-04-05 RX ADMIN — DEXTROSE: 5 SOLUTION INTRAVENOUS at 11:04

## 2018-04-05 RX ADMIN — DIPHENHYDRAMINE HYDROCHLORIDE 50 MG: 50 INJECTION, SOLUTION INTRAMUSCULAR; INTRAVENOUS at 11:04

## 2018-04-05 RX ADMIN — DEXAMETHASONE SODIUM PHOSPHATE: 4 INJECTION, SOLUTION INTRAMUSCULAR; INTRAVENOUS at 11:04

## 2018-04-05 RX ADMIN — ACETAMINOPHEN 650 MG: 325 TABLET, FILM COATED ORAL at 11:04

## 2018-04-05 NOTE — PROGRESS NOTES
Subjective:      Patient ID: Mickie Don is a 62 y.o. female.    Chief Complaint: Chemotherapy      HPI  Treatment History  Xlap/Omentectomy on 11/1/16 - mass was unresectable  Stage IIIC ovarian cancer  Initiated Taxotere/Carbo on 11/18/16, now s/p 6 cycles completed 3/8/17  Xlap/ISIS/BSO/Radical mass resection > 10 cm, optimal debulking on 4/11/17  Initiated PRIMA on 8/21/17  BRCA negative     HPI  Here today for FORWARD1 visit, C7D1.   PS is 0. Labs reviewed and appropriate for treatment, but she was given antibiotics over the weekend for a sinus infection.  Feeling better except for sinus HA.  Had one documented fever above 101, but afebrile since.  Appetite normal.  Bladder and bowel function normal. No vision changes per patient.  Would like to delay today's treatment as she does not feel well.       Review of Systems   Constitutional: Positive for fatigue (off of work this week). Negative for appetite change, chills, fever and unexpected weight change.   HENT: Negative for mouth sores.    Respiratory: Negative for cough and shortness of breath.    Cardiovascular: Negative for leg swelling.   Gastrointestinal: Negative for abdominal pain, blood in stool, constipation, diarrhea, nausea and vomiting.   Endocrine: Negative for cold intolerance.   Genitourinary: Negative for decreased urine volume, dysuria, frequency, hematuria, pelvic pain, vaginal bleeding, vaginal discharge and vaginal pain.   Musculoskeletal: Negative for back pain and myalgias.   Skin: Negative for rash.   Allergic/Immunologic: Negative.    Neurological: Negative for weakness, numbness and headaches.   Hematological: Negative for adenopathy. Does not bruise/bleed easily.   Psychiatric/Behavioral: Negative for confusion. The patient is not nervous/anxious.        Objective:   Physical Exam:   Constitutional: She is oriented to person, place, and time. She appears well-developed and well-nourished. No distress.    HENT:   Head: Normocephalic.      Neck: Normal range of motion.    Cardiovascular: Exam reveals no cyanosis and no edema.     Pulmonary/Chest: Effort normal. No respiratory distress.        Abdominal: Soft. She exhibits no distension and no ascites. There is no tenderness.     Genitourinary:   Genitourinary Comments: Pelvic exam deferred           Musculoskeletal: Moves all extremeties.      Lymphadenopathy:        Right: No supraclavicular adenopathy present.        Left: No supraclavicular adenopathy present.    Neurological: She is alert and oriented to person, place, and time.    Skin: Skin is warm and dry. No rash noted. No cyanosis.    Psychiatric: She has a normal mood and affect.       Assessment:     1. Malignant neoplasm of ovary, unspecified laterality    2. Ovarian cancer, unspecified laterality        Plan:     Okay to treat with C7D1 on Thursday if she is feeling better.  This will give her an additional 48 hours of antibiotic treatment.  I am also comfortable with this plan due to the fact that she has had only thrombocytopenia and no granulocytopenia with treatment.  She was ok with this plan.  Will reassess her on Thursday.  If clinically improved, will proceed with treatment. RTC per protocol.

## 2018-04-05 NOTE — PLAN OF CARE
Problem: Patient Care Overview  Goal: Individualization & Mutuality  PIV  2 blankets   Outcome: Ongoing (interventions implemented as appropriate)  1050-Labs , hx, and medications reviewed, patient feeling better today, per MD will proceed with chemo. Assessment completed. Discussed plan of care with patient. Patient in agreement. Chair reclined and warm blanket and snack offered.

## 2018-04-05 NOTE — PLAN OF CARE
Problem: Patient Care Overview  Goal: Plan of Care Review  1401-Patient tolerated treatment well. Discharged without complaints or S/S of adverse event. AVS given.  Instructed to call provider for any questions or concerns.

## 2018-04-10 ENCOUNTER — OFFICE VISIT (OUTPATIENT)
Dept: OPHTHALMOLOGY | Facility: CLINIC | Age: 63
End: 2018-04-10
Payer: COMMERCIAL

## 2018-04-10 ENCOUNTER — LAB VISIT (OUTPATIENT)
Dept: LAB | Facility: HOSPITAL | Age: 63
End: 2018-04-10
Attending: INTERNAL MEDICINE
Payer: COMMERCIAL

## 2018-04-10 ENCOUNTER — RESEARCH ENCOUNTER (OUTPATIENT)
Dept: RESEARCH | Facility: HOSPITAL | Age: 63
End: 2018-04-10

## 2018-04-10 DIAGNOSIS — C56.9 MALIGNANT NEOPLASM OF OVARY, UNSPECIFIED LATERALITY: ICD-10-CM

## 2018-04-10 DIAGNOSIS — Z00.6 EXAMINATION OF PARTICIPANT IN CLINICAL TRIAL: ICD-10-CM

## 2018-04-10 DIAGNOSIS — Z00.6 PATIENT IN CANCER RELATED RESEARCH STUDY: ICD-10-CM

## 2018-04-10 DIAGNOSIS — C56.9 MALIGNANT NEOPLASM OF OVARY, UNSPECIFIED LATERALITY: Primary | ICD-10-CM

## 2018-04-10 LAB
ALBUMIN SERPL BCP-MCNC: 3.4 G/DL
ALP SERPL-CCNC: 155 U/L
ALT SERPL W/O P-5'-P-CCNC: 59 U/L
ANION GAP SERPL CALC-SCNC: 9 MMOL/L
ANISOCYTOSIS BLD QL SMEAR: SLIGHT
AST SERPL-CCNC: 62 U/L
BASOPHILS # BLD AUTO: 0.05 K/UL
BASOPHILS NFR BLD: 1 %
BILIRUB SERPL-MCNC: 0.9 MG/DL
BUN SERPL-MCNC: 11 MG/DL
CALCIUM SERPL-MCNC: 10.1 MG/DL
CHLORIDE SERPL-SCNC: 103 MMOL/L
CO2 SERPL-SCNC: 28 MMOL/L
CREAT SERPL-MCNC: 0.7 MG/DL
DIFFERENTIAL METHOD: ABNORMAL
EOSINOPHIL # BLD AUTO: 0 K/UL
EOSINOPHIL NFR BLD: 0.8 %
ERYTHROCYTE [DISTWIDTH] IN BLOOD BY AUTOMATED COUNT: 14.4 %
EST. GFR  (AFRICAN AMERICAN): >60 ML/MIN/1.73 M^2
EST. GFR  (NON AFRICAN AMERICAN): >60 ML/MIN/1.73 M^2
GLUCOSE SERPL-MCNC: 105 MG/DL
HCT VFR BLD AUTO: 46.7 %
HGB BLD-MCNC: 15.5 G/DL
IMM GRANULOCYTES # BLD AUTO: 0.05 K/UL
IMM GRANULOCYTES NFR BLD AUTO: 1 %
LYMPHOCYTES # BLD AUTO: 1.1 K/UL
LYMPHOCYTES NFR BLD: 20.9 %
MCH RBC QN AUTO: 29.6 PG
MCHC RBC AUTO-ENTMCNC: 33.2 G/DL
MCV RBC AUTO: 89 FL
MONOCYTES # BLD AUTO: 0.5 K/UL
MONOCYTES NFR BLD: 9.3 %
NEUTROPHILS # BLD AUTO: 3.4 K/UL
NEUTROPHILS NFR BLD: 67 %
NRBC BLD-RTO: 0 /100 WBC
PLATELET # BLD AUTO: 46 K/UL
PLATELET BLD QL SMEAR: ABNORMAL
PMV BLD AUTO: 10.6 FL
POTASSIUM SERPL-SCNC: 5.3 MMOL/L
PROT SERPL-MCNC: 7.4 G/DL
RBC # BLD AUTO: 5.23 M/UL
SODIUM SERPL-SCNC: 140 MMOL/L
WBC # BLD AUTO: 5.06 K/UL

## 2018-04-10 PROCEDURE — 99999 PR PBB SHADOW E&M-EST. PATIENT-LVL III: CPT | Mod: PBBFAC,,, | Performed by: OPHTHALMOLOGY

## 2018-04-10 PROCEDURE — 36415 COLL VENOUS BLD VENIPUNCTURE: CPT

## 2018-04-10 PROCEDURE — 85025 COMPLETE CBC W/AUTO DIFF WBC: CPT

## 2018-04-10 PROCEDURE — 80053 COMPREHEN METABOLIC PANEL: CPT

## 2018-04-10 PROCEDURE — 99499 UNLISTED E&M SERVICE: CPT | Mod: S$GLB,,, | Performed by: OPHTHALMOLOGY

## 2018-04-10 NOTE — PROGRESS NOTES
HPI     DLS:11/17/2017 Ramila  Patient here for 2nd visit for research protocol.  Patient states OU vision seem blurry at times.  No eye pain.    I have personally interviewed the patient, reviewed the history and   examined the patient and agree with the technician's exam.  Eye Drops:Pred Forte q4h OU(FOUR DAYS)tappering.                    Refresh prn OU      Last edited by Aaron Mcgrath MD on 4/10/2018 11:29 AM. (History)            Assessment /Plan     For exam results, see Encounter Report.    Malignant neoplasm of ovary, unspecified laterality    Patient in cancer related research study      No changes on eye exam. Return per protocol.

## 2018-04-25 ENCOUNTER — RESEARCH ENCOUNTER (OUTPATIENT)
Dept: RESEARCH | Facility: HOSPITAL | Age: 63
End: 2018-04-25

## 2018-04-25 ENCOUNTER — LAB VISIT (OUTPATIENT)
Dept: LAB | Facility: HOSPITAL | Age: 63
End: 2018-04-25
Payer: COMMERCIAL

## 2018-04-25 ENCOUNTER — INFUSION (OUTPATIENT)
Dept: INFUSION THERAPY | Facility: HOSPITAL | Age: 63
End: 2018-04-25
Attending: INTERNAL MEDICINE
Payer: COMMERCIAL

## 2018-04-25 ENCOUNTER — OFFICE VISIT (OUTPATIENT)
Dept: GYNECOLOGIC ONCOLOGY | Facility: CLINIC | Age: 63
End: 2018-04-25
Payer: COMMERCIAL

## 2018-04-25 VITALS
DIASTOLIC BLOOD PRESSURE: 59 MMHG | BODY MASS INDEX: 29.98 KG/M2 | HEART RATE: 63 BPM | WEIGHT: 191 LBS | SYSTOLIC BLOOD PRESSURE: 127 MMHG | OXYGEN SATURATION: 100 % | TEMPERATURE: 98 F | HEIGHT: 67 IN | RESPIRATION RATE: 18 BRPM

## 2018-04-25 VITALS
HEART RATE: 70 BPM | SYSTOLIC BLOOD PRESSURE: 123 MMHG | DIASTOLIC BLOOD PRESSURE: 60 MMHG | RESPIRATION RATE: 14 BRPM | TEMPERATURE: 99 F | WEIGHT: 191 LBS | BODY MASS INDEX: 29.91 KG/M2

## 2018-04-25 DIAGNOSIS — Z51.11 ENCOUNTER FOR ANTINEOPLASTIC CHEMOTHERAPY: ICD-10-CM

## 2018-04-25 DIAGNOSIS — C56.9 MALIGNANT NEOPLASM OF OVARY, UNSPECIFIED LATERALITY: Primary | ICD-10-CM

## 2018-04-25 DIAGNOSIS — Z00.6 EXAMINATION OF PARTICIPANT IN CLINICAL TRIAL: ICD-10-CM

## 2018-04-25 DIAGNOSIS — C56.9 MALIGNANT NEOPLASM OF OVARY, UNSPECIFIED LATERALITY: ICD-10-CM

## 2018-04-25 DIAGNOSIS — Z00.6 PATIENT IN CANCER RELATED RESEARCH STUDY: ICD-10-CM

## 2018-04-25 DIAGNOSIS — C56.9 OVARIAN CANCER, UNSPECIFIED LATERALITY: Primary | ICD-10-CM

## 2018-04-25 LAB
ALBUMIN SERPL BCP-MCNC: 3.6 G/DL
ALP SERPL-CCNC: 83 U/L
ALT SERPL W/O P-5'-P-CCNC: 47 U/L
ANION GAP SERPL CALC-SCNC: 9 MMOL/L
AST SERPL-CCNC: 53 U/L
BASOPHILS # BLD AUTO: 0.04 K/UL
BASOPHILS NFR BLD: 1.1 %
BILIRUB SERPL-MCNC: 0.5 MG/DL
BUN SERPL-MCNC: 12 MG/DL
CALCIUM SERPL-MCNC: 9.6 MG/DL
CHLORIDE SERPL-SCNC: 101 MMOL/L
CO2 SERPL-SCNC: 27 MMOL/L
CREAT SERPL-MCNC: 0.7 MG/DL
DIFFERENTIAL METHOD: ABNORMAL
DRUG STUDY SPECIMEN TYPE: NORMAL
DRUG STUDY TEST NAME: NORMAL
DRUG STUDY TEST RESULT: NORMAL
EOSINOPHIL # BLD AUTO: 0.1 K/UL
EOSINOPHIL NFR BLD: 1.7 %
ERYTHROCYTE [DISTWIDTH] IN BLOOD BY AUTOMATED COUNT: 15.7 %
EST. GFR  (AFRICAN AMERICAN): >60 ML/MIN/1.73 M^2
EST. GFR  (NON AFRICAN AMERICAN): >60 ML/MIN/1.73 M^2
GLUCOSE SERPL-MCNC: 98 MG/DL
HCT VFR BLD AUTO: 41.5 %
HGB BLD-MCNC: 13.5 G/DL
IMM GRANULOCYTES # BLD AUTO: 0.01 K/UL
IMM GRANULOCYTES NFR BLD AUTO: 0.3 %
LYMPHOCYTES # BLD AUTO: 1 K/UL
LYMPHOCYTES NFR BLD: 28.2 %
MCH RBC QN AUTO: 30.4 PG
MCHC RBC AUTO-ENTMCNC: 32.5 G/DL
MCV RBC AUTO: 94 FL
MONOCYTES # BLD AUTO: 0.3 K/UL
MONOCYTES NFR BLD: 7 %
NEUTROPHILS # BLD AUTO: 2.2 K/UL
NEUTROPHILS NFR BLD: 61.7 %
NRBC BLD-RTO: 0 /100 WBC
PLATELET # BLD AUTO: 120 K/UL
PMV BLD AUTO: 11.1 FL
POTASSIUM SERPL-SCNC: 4.1 MMOL/L
PROT SERPL-MCNC: 7.4 G/DL
RBC # BLD AUTO: 4.44 M/UL
SODIUM SERPL-SCNC: 137 MMOL/L
WBC # BLD AUTO: 3.55 K/UL

## 2018-04-25 PROCEDURE — 96367 TX/PROPH/DG ADDL SEQ IV INF: CPT

## 2018-04-25 PROCEDURE — 96413 CHEMO IV INFUSION 1 HR: CPT

## 2018-04-25 PROCEDURE — 99999 PR PBB SHADOW E&M-EST. PATIENT-LVL III: CPT | Mod: PBBFAC,,, | Performed by: OBSTETRICS & GYNECOLOGY

## 2018-04-25 PROCEDURE — A4216 STERILE WATER/SALINE, 10 ML: HCPCS | Performed by: OBSTETRICS & GYNECOLOGY

## 2018-04-25 PROCEDURE — 25000003 PHARM REV CODE 250: Performed by: OBSTETRICS & GYNECOLOGY

## 2018-04-25 PROCEDURE — 63600175 PHARM REV CODE 636 W HCPCS: Performed by: OBSTETRICS & GYNECOLOGY

## 2018-04-25 PROCEDURE — 96415 CHEMO IV INFUSION ADDL HR: CPT

## 2018-04-25 PROCEDURE — 99214 OFFICE O/P EST MOD 30 MIN: CPT | Mod: S$GLB,,, | Performed by: OBSTETRICS & GYNECOLOGY

## 2018-04-25 PROCEDURE — 99000 SPECIMEN HANDLING OFFICE-LAB: CPT

## 2018-04-25 PROCEDURE — 80053 COMPREHEN METABOLIC PANEL: CPT

## 2018-04-25 PROCEDURE — 85025 COMPLETE CBC W/AUTO DIFF WBC: CPT

## 2018-04-25 PROCEDURE — 36415 COLL VENOUS BLD VENIPUNCTURE: CPT

## 2018-04-25 RX ORDER — SODIUM CHLORIDE 0.9 % (FLUSH) 0.9 %
10 SYRINGE (ML) INJECTION
Status: CANCELLED | OUTPATIENT
Start: 2018-04-25

## 2018-04-25 RX ORDER — ACETAMINOPHEN 325 MG/1
650 TABLET ORAL
Status: COMPLETED | OUTPATIENT
Start: 2018-04-25 | End: 2018-04-25

## 2018-04-25 RX ORDER — HEPARIN 100 UNIT/ML
500 SYRINGE INTRAVENOUS
Status: DISCONTINUED | OUTPATIENT
Start: 2018-04-25 | End: 2018-04-25 | Stop reason: HOSPADM

## 2018-04-25 RX ORDER — SODIUM CHLORIDE 0.9 % (FLUSH) 0.9 %
10 SYRINGE (ML) INJECTION
Status: DISCONTINUED | OUTPATIENT
Start: 2018-04-25 | End: 2018-04-25 | Stop reason: HOSPADM

## 2018-04-25 RX ORDER — HEPARIN 100 UNIT/ML
500 SYRINGE INTRAVENOUS
Status: CANCELLED | OUTPATIENT
Start: 2018-04-25

## 2018-04-25 RX ORDER — ACETAMINOPHEN 325 MG/1
650 TABLET ORAL
Status: CANCELLED | OUTPATIENT
Start: 2018-04-25

## 2018-04-25 RX ADMIN — SODIUM CHLORIDE, PRESERVATIVE FREE 10 ML: 5 INJECTION INTRAVENOUS at 12:04

## 2018-04-25 RX ADMIN — DEXTROSE: 50 INJECTION, SOLUTION INTRAVENOUS at 09:04

## 2018-04-25 RX ADMIN — DIPHENHYDRAMINE HYDROCHLORIDE 50 MG: 50 INJECTION, SOLUTION INTRAMUSCULAR; INTRAVENOUS at 09:04

## 2018-04-25 RX ADMIN — ACETAMINOPHEN 650 MG: 325 TABLET, FILM COATED ORAL at 09:04

## 2018-04-25 RX ADMIN — DEXAMETHASONE SODIUM PHOSPHATE: 4 INJECTION, SOLUTION INTRAMUSCULAR; INTRAVENOUS at 09:04

## 2018-04-25 NOTE — PROGRESS NOTES
Subjective:      Patient ID: Mickie Don is a 62 y.o. female.    Chief Complaint: Chemotherapy      HPI  Treatment History  Xlap/Omentectomy on 11/1/16 - mass was unresectable  Stage IIIC ovarian cancer  Initiated Taxotere/Carbo on 11/18/16, now s/p 6 cycles completed 3/8/17  Xlap/ISIS/BSO/Radical mass resection > 10 cm, optimal debulking on 4/11/17  Initiated PRIMA on 8/21/17  BRCA negative     HPI  Here today for FORWARD1 visit, C8D1.   PS is 0.  Was seen by Opthalmology due to blurred vision. No vision changes and exam stable, was prescribed eye drops.  Labs pending today.  Feels better than last visit as her sinus infection has been treated.  Has reported darker urine recently and questionable vaginal discharge.       Review of Systems   Constitutional: Positive for fatigue (off of work this week). Negative for appetite change, chills, fever and unexpected weight change.   HENT: Negative for mouth sores.    Respiratory: Negative for cough and shortness of breath.    Cardiovascular: Negative for leg swelling.   Gastrointestinal: Negative for abdominal pain, blood in stool, constipation, diarrhea, nausea and vomiting.   Endocrine: Negative for cold intolerance.   Genitourinary: Negative for decreased urine volume, dysuria, frequency, hematuria, pelvic pain, vaginal bleeding, vaginal discharge and vaginal pain.   Musculoskeletal: Negative for back pain and myalgias.   Skin: Negative for rash.   Allergic/Immunologic: Negative.    Neurological: Negative for weakness, numbness and headaches.   Hematological: Negative for adenopathy. Does not bruise/bleed easily.   Psychiatric/Behavioral: Negative for confusion. The patient is not nervous/anxious.        Objective:   Physical Exam:   Constitutional: She is oriented to person, place, and time. She appears well-developed and well-nourished. No distress.    HENT:   Head: Normocephalic and atraumatic.    Eyes: No scleral icterus.    Neck: Normal range of motion. Neck  supple.    Cardiovascular: Normal rate and intact distal pulses.  Exam reveals no cyanosis and no edema.     Pulmonary/Chest: Effort normal. No respiratory distress. She exhibits no tenderness.        Abdominal: Soft. She exhibits no distension, no fluid wave, no ascites and no mass. There is no tenderness. There is no rebound and no guarding. No hernia.     Genitourinary: Rectum normal and vagina normal. Pelvic exam was performed with patient supine. There is no rash, tenderness or lesion on the right labia. There is no rash, tenderness or lesion on the left labia. Uterus is absent. There is an absent adnexa. Right adnexum displays no mass, no tenderness and no fullness. Left adnexum displays no mass, no tenderness and no fullness. No bleeding (no cuff lesions) or unspecified prolapse of vaginal walls in the vagina. No vaginal discharge found. Vaginal cuff normal.Labial bartholins normal.Cervix exhibits absence.   Genitourinary Comments: Pelvic exam deferred           Musculoskeletal: Moves all extremeties.      Lymphadenopathy:     She has no cervical adenopathy.        Right: No inguinal and no supraclavicular adenopathy present.        Left: No inguinal and no supraclavicular adenopathy present.    Neurological: She is alert and oriented to person, place, and time.    Skin: Skin is warm and dry. No rash noted. No cyanosis.    Psychiatric: She has a normal mood and affect.       Assessment:     1. Malignant neoplasm of ovary, unspecified laterality    2. Encounter for antineoplastic chemotherapy    3. Patient in cancer related research study    4. Examination of participant in clinical trial        Plan:     Assuming labs are ok, will treat with C8D1.  Urine dip today was negative and no adrián blood noted.  RTC as scheduled.

## 2018-04-25 NOTE — PLAN OF CARE
Problem: Chemotherapy Effects (Adult)  Goal: Signs and Symptoms of Listed Potential Problems Will be Absent, Minimized or Managed (Chemotherapy Effects)  Signs and symptoms of listed potential problems will be absent, minimized or managed by discharge/transition of care (reference Chemotherapy Effects (Adult) CPG).   Outcome: Ongoing (interventions implemented as appropriate)  Pt here for D1C7 INV [id number]. VSS. No complaints voiced. Consent/labs/meds/allergies reviewed. PIV to right hand with blood return noted. All questions answered. Will continue to monitor.

## 2018-04-26 NOTE — PROGRESS NOTES
Apr. 26, 2018     Protocol: FORWARD1/IMGN-0403  Principle Investigator: BETH Weston  Treating Physician: GUERITA Tran Pt Initials: NAV VAZ  Study ID: [id number]  IRB#: [id number]     FORWARD 1: A Randomized, Open Label Phase 3 Study to Evaluate the Safety and Efficacy of Mirvetuximab soravtansine (EVKG066) Versus Investigators Choice of Chemotherapy in Women with Folate Receptor á?positive Advanced Epithelial Ovarian Cancer, Primary Peritoneal Cancer or Fallopian Tube Cancer      Cycle 8, Day 1  Study allocation was made on 11/27/17.  Patient randomized to Arm A:[id number], 6 mg/kg2(from [facility]), q 3 weeks.       Patient had scans on 4/2/18.  Per RECIST, patient has had partial response to therapy.     Patient presented to clinic for C8D1 on the above mentioned clinical trial. Patient alert and oriented, mood and affect appropriate to the situation. Patient denies vison changes, cough, headache, fever, nausea, vomiting, diarrhea, or dizziness.  Symptoms of URI from C7 have resolved.  Patient completed 7 days of Augmentin as prescribed.  Patient reports continued neuropathy in bilateral fingers - tolerable but present.  Labs show platelet count @120 today, acceptable for treatment and MD is okay to proceed.  Patient reports fatigue.     Physical exam, ECOG assessment performed on 4/26/18.  ECOG = 0 per MD.  Local labs were performed on 4/23/18 and reviewed by the physician.  Physical exam unremarkable per physician, no ocular symptoms noted.  Reviewed baseline conditions and current medications with patient.  See Concomitant Medication and Baseline Medical History sheets.  Central safety labs collected, processed, and shipped to  per protocol. QoL questionnaires completed prior to Infusion.     All of the patient's questions were answered by Dr. Tran and me to her satisfaction. Patient expressed her willingness to continue to participate in the above mentioned clinical trial. Instructed patient to notify Dr. Tran and/or me  with any questions, concerns, or changes in health status. Patient verbalized understanding.      Baseline Medical History  1. 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    2. Anxiety, Grade 1.  3. Dermatofibroma.  Benign.  Diagnosed by biopsy on 7/17/17.  No further intervention needed.  4. Decreased platelet count, Grade 1.  Resolved 11/17/17.  5. Constipation, Grade 1.  6. Myalgia, Grade 1.  7. Peripheral neuropathy- feet, Grade 1.      Adverse Events - For the most up-to-date AE log, please refer to the paper AE log in the subjects research file.  1. Platelet count decreased/thrombocytopenia,  Grade 1.  Will continue to monitor.  2. Peripheral neuropathy - fingers, remains Grade 1.  Patient discontinued Neurontin, tolerating neuropathy okay. Will continue to monitor.  3. Fatigue, remains Grade 1.  Will continue to monitor.  4. Nasal congestion, resolved from Grade 2.  URI.  Will continue to monitor.  5. Productive cough, resolved from Grade 1.  URI. Will continue to monitor.  6. Headache, resolved from Grade 1.  Will continue to monitor.  7. Fever, resolved from Grade 1.  URI.  Will continue to monitor.

## 2018-05-02 ENCOUNTER — RESEARCH ENCOUNTER (OUTPATIENT)
Dept: RESEARCH | Facility: HOSPITAL | Age: 63
End: 2018-05-02

## 2018-05-02 ENCOUNTER — LAB VISIT (OUTPATIENT)
Dept: LAB | Facility: HOSPITAL | Age: 63
End: 2018-05-02
Attending: INTERNAL MEDICINE
Payer: COMMERCIAL

## 2018-05-02 DIAGNOSIS — C56.9 MALIGNANT NEOPLASM OF OVARY, UNSPECIFIED LATERALITY: ICD-10-CM

## 2018-05-02 DIAGNOSIS — Z00.6 EXAMINATION OF PARTICIPANT IN CLINICAL TRIAL: ICD-10-CM

## 2018-05-02 LAB
ALBUMIN SERPL BCP-MCNC: 3.5 G/DL
ALP SERPL-CCNC: 107 U/L
ALT SERPL W/O P-5'-P-CCNC: 54 U/L
ANION GAP SERPL CALC-SCNC: 11 MMOL/L
AST SERPL-CCNC: 59 U/L
BASOPHILS # BLD AUTO: 0.04 K/UL
BASOPHILS NFR BLD: 1.1 %
BILIRUB SERPL-MCNC: 1 MG/DL
BUN SERPL-MCNC: 8 MG/DL
CALCIUM SERPL-MCNC: 9.9 MG/DL
CHLORIDE SERPL-SCNC: 105 MMOL/L
CO2 SERPL-SCNC: 26 MMOL/L
CREAT SERPL-MCNC: 0.7 MG/DL
DIFFERENTIAL METHOD: ABNORMAL
DRUG STUDY SPECIMEN TYPE: NORMAL
DRUG STUDY TEST NAME: NORMAL
DRUG STUDY TEST RESULT: NORMAL
EOSINOPHIL # BLD AUTO: 0.1 K/UL
EOSINOPHIL NFR BLD: 1.4 %
ERYTHROCYTE [DISTWIDTH] IN BLOOD BY AUTOMATED COUNT: 16.1 %
EST. GFR  (AFRICAN AMERICAN): >60 ML/MIN/1.73 M^2
EST. GFR  (NON AFRICAN AMERICAN): >60 ML/MIN/1.73 M^2
GLUCOSE SERPL-MCNC: 97 MG/DL
HCT VFR BLD AUTO: 43.1 %
HGB BLD-MCNC: 14 G/DL
IMM GRANULOCYTES # BLD AUTO: 0.02 K/UL
IMM GRANULOCYTES NFR BLD AUTO: 0.6 %
LYMPHOCYTES # BLD AUTO: 0.8 K/UL
LYMPHOCYTES NFR BLD: 23.1 %
MCH RBC QN AUTO: 30.1 PG
MCHC RBC AUTO-ENTMCNC: 32.5 G/DL
MCV RBC AUTO: 93 FL
MONOCYTES # BLD AUTO: 0.3 K/UL
MONOCYTES NFR BLD: 8.3 %
NEUTROPHILS # BLD AUTO: 2.4 K/UL
NEUTROPHILS NFR BLD: 65.5 %
NRBC BLD-RTO: 0 /100 WBC
PLATELET # BLD AUTO: 48 K/UL
PMV BLD AUTO: 12.7 FL
POTASSIUM SERPL-SCNC: 3.8 MMOL/L
PROT SERPL-MCNC: 7 G/DL
RBC # BLD AUTO: 4.65 M/UL
SODIUM SERPL-SCNC: 142 MMOL/L
WBC # BLD AUTO: 3.6 K/UL

## 2018-05-02 PROCEDURE — 36415 COLL VENOUS BLD VENIPUNCTURE: CPT

## 2018-05-02 PROCEDURE — 99000 SPECIMEN HANDLING OFFICE-LAB: CPT

## 2018-05-02 PROCEDURE — 85025 COMPLETE CBC W/AUTO DIFF WBC: CPT

## 2018-05-02 PROCEDURE — 80053 COMPREHEN METABOLIC PANEL: CPT

## 2018-05-02 NOTE — PROGRESS NOTES
May 2, 2018     Protocol: FORWARD1/IMGN-0403  Principle Investigator: BETH Weston  Treating Physician: GUERITA Tran Pt Initials: NAV VAZ  Study ID: [id number]  IRB#: [id number]     FORWARD 1: A Randomized, Open Label Phase 3 Study to Evaluate the Safety and Efficacy of Mirvetuximab soravtansine (LWQJ573) Versus Investigators Choice of Chemotherapy in Women with Folate Receptor á?positive Advanced Epithelial Ovarian Cancer, Primary Peritoneal Cancer or Fallopian Tube Cancer      Cycle 8, Day 8  Study allocation was made on 11/27/17.  Patient randomized to Arm A:[id number], 6 mg/kg2(from [facility]), q 3 weeks.       Patient had scans on 4/2/18.  Per RECIST, patient has partial response to therapy.     Patient presented to clinic for C8D8 on the above mentioned clinical trial.  No MD visit on this day, per protocol.  Patient denies vison changes, confirms she has been compliant with steroid eye drops.  Minor fatigue (stable) and or other symptoms reported.       Central and local labs were performed on 5/2/18 and reviewed by the physician.  Reviewed baseline conditions and current medications with patient.  See Concomitant Medication and Baseline Medical History sheets.  Central safety labs collected, processed, and shipped to  per protocol.      All of the patient's questions were answered by Dr. Tran and me to her satisfaction. Patient expressed her willingness to continue to participate in the above mentioned clinical trial. Instructed patient to notify Dr. Tran and/or me with any questions, concerns, or changes in health status. Patient verbalized understanding.      Baseline Medical History  1. 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    2. Anxiety, Grade 1.  3. Dermatofibroma.  Benign.  Diagnosed by biopsy on 7/17/17.  No further intervention needed.  4. Decreased platelet count, Grade 1.  Resolved 11/17/17.  5. Constipation, Grade 1.  6. Myalgia, Grade  1.  7. Peripheral neuropathy- feet, Grade 1.      Adverse Events - For the most up-to-date AE log, please refer to the paper AE log in the subjects research file.  1. Platelet count decreased/thrombocytopenia, Grade 2.  Will continue to monitor.  2. Peripheral neuropathy - fingers, remains Grade 1.  Patient discontinued Neurontin, tolerating neuropathy okay. Will continue to monitor.  3. Fatigue, remains Grade 1.  Will continue to monitor.

## 2018-05-07 NOTE — PROGRESS NOTES
April  10, 2018     Protocol: FORWARD1/IMGN-0403  Investigator: BETH Weston  Treating Physician: GUERITA Tran Pt Initials: NAV VAZ  Study ID: [id number]  IRB#: [id number]     FORWARD 1: A Randomized, Open Label Phase 3 Study to Evaluate the Safety and Efficacy of Mirvetuximab soravtansine (HEUP915) Versus Investigators Choice of Chemotherapy in Women with Folate Receptor á?positive Advanced Epithelial Ovarian Cancer, Primary Peritoneal Cancer or Fallopian Tube Cancer      Cycle 7, Day 8  Study allocation was made on 11/27/17.  Patient randomized to Arm A:[id number], 6 mg/kg2(from [facility]), q 3 weeks.       Patient presented to clinic for C7D8 on the above mentioned clinical trial. Patient alert and oriented, mood and affect appropriate to the situation. Patient reports minor eye blurriness - will get appt with Opth to assess.  Patient denies cough, headache, vomiting, dizziness, or fever.  Patient reports continued neuropathy, which is stable.        Per protocol no physical exam/ECOG assessment performed for C7D8.  Local labs performed on 4/10/18.  Labs reviewed by the physician.  Platelet count is 46 today.   Reviewed baseline conditions and current medications with patient.  See Concomitant Medication and Baseline Medical History sheets.  Central safety labs collected, processed, and shipped to  per protocol.      All of the patient's questions were answered by me to her satisfaction. Patient expressed her willingness to continue to participate in the above mentioned clinical trial. Instructed patient to notify Dr. Tran and/or me with any questions, concerns, or changes in health status. Patient verbalized understanding.      Baseline Medical History  1. 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    2. Anxiety, Grade 1.  3. Dermatofibroma.  Benign.  Diagnosed by biopsy on 7/17/17.  No further intervention needed.  4. Decreased platelet count, Grade 1.  Resolved  11/17/17.  5. Constipation, Grade 1.  6. Myalgia, Grade 1.  7. Peripheral neuropathy- feet, Grade 1.      Adverse Events - For the most up-to-date AE log, please refer to the paper AE log in the subjects research file.  1. Platelet count decreased/thrombocytopenia, Grade 2.  Platelets are 46k today, no action taken.  Will continue to monitor.  2. Peripheral neuropathy - fingers, remains Grade 1.  Patient discontinued Neurontin, tolerating neuropathy okay. Will continue to monitor.  3. Fatigue, remains Grade 1.  Will continue to monitor.  4. Blurred vision, Grade 1.  Will get assesses with Dr. Mcgrath.

## 2018-05-15 ENCOUNTER — HOSPITAL ENCOUNTER (OUTPATIENT)
Dept: RADIOLOGY | Facility: OTHER | Age: 63
Discharge: HOME | End: 2018-05-15
Attending: OBSTETRICS & GYNECOLOGY
Payer: COMMERCIAL

## 2018-05-15 DIAGNOSIS — Z00.6 EXAMINATION OF PARTICIPANT IN CLINICAL TRIAL: ICD-10-CM

## 2018-05-15 DIAGNOSIS — C56.9 MALIGNANT NEOPLASM OF OVARY, UNSPECIFIED LATERALITY: ICD-10-CM

## 2018-05-15 PROCEDURE — 25500020 PHARM REV CODE 255: Performed by: OBSTETRICS & GYNECOLOGY

## 2018-05-15 PROCEDURE — 74177 CT ABD & PELVIS W/CONTRAST: CPT | Mod: TC

## 2018-05-15 PROCEDURE — 71260 CT THORAX DX C+: CPT | Mod: 26,,, | Performed by: RADIOLOGY

## 2018-05-15 PROCEDURE — 74177 CT ABD & PELVIS W/CONTRAST: CPT | Mod: 26,,, | Performed by: RADIOLOGY

## 2018-05-15 RX ADMIN — IOHEXOL 100 ML: 350 INJECTION, SOLUTION INTRAVENOUS at 01:05

## 2018-05-15 RX ADMIN — IOHEXOL 30 ML: 350 INJECTION, SOLUTION INTRAVENOUS at 01:05

## 2018-05-16 ENCOUNTER — HOSPITAL ENCOUNTER (OUTPATIENT)
Dept: RADIOLOGY | Facility: HOSPITAL | Age: 63
Discharge: HOME | End: 2018-05-16
Attending: PHYSICIAN ASSISTANT
Payer: COMMERCIAL

## 2018-05-16 ENCOUNTER — RESEARCH ENCOUNTER (OUTPATIENT)
Dept: RESEARCH | Facility: HOSPITAL | Age: 63
End: 2018-05-16

## 2018-05-16 ENCOUNTER — OFFICE VISIT (OUTPATIENT)
Dept: GYNECOLOGIC ONCOLOGY | Facility: CLINIC | Age: 63
End: 2018-05-16
Payer: COMMERCIAL

## 2018-05-16 VITALS
SYSTOLIC BLOOD PRESSURE: 122 MMHG | DIASTOLIC BLOOD PRESSURE: 59 MMHG | WEIGHT: 187 LBS | RESPIRATION RATE: 16 BRPM | HEART RATE: 72 BPM | BODY MASS INDEX: 29.29 KG/M2

## 2018-05-16 DIAGNOSIS — Z51.11 ENCOUNTER FOR ANTINEOPLASTIC CHEMOTHERAPY: ICD-10-CM

## 2018-05-16 DIAGNOSIS — Z00.00 ENCOUNTER FOR PREVENTIVE HEALTH EXAMINATION: ICD-10-CM

## 2018-05-16 DIAGNOSIS — Z00.6 EXAMINATION OF PARTICIPANT IN CLINICAL TRIAL: Primary | ICD-10-CM

## 2018-05-16 DIAGNOSIS — C56.9 OVARIAN CANCER, UNSPECIFIED LATERALITY: ICD-10-CM

## 2018-05-16 DIAGNOSIS — Z12.31 VISIT FOR SCREENING MAMMOGRAM: ICD-10-CM

## 2018-05-16 PROCEDURE — 99999 PR PBB SHADOW E&M-EST. PATIENT-LVL III: CPT | Mod: PBBFAC,,, | Performed by: OBSTETRICS & GYNECOLOGY

## 2018-05-16 PROCEDURE — 3008F BODY MASS INDEX DOCD: CPT | Mod: CPTII,S$GLB,, | Performed by: OBSTETRICS & GYNECOLOGY

## 2018-05-16 PROCEDURE — 77063 BREAST TOMOSYNTHESIS BI: CPT | Mod: 26,,, | Performed by: RADIOLOGY

## 2018-05-16 PROCEDURE — 77067 SCR MAMMO BI INCL CAD: CPT | Mod: TC

## 2018-05-16 PROCEDURE — 99214 OFFICE O/P EST MOD 30 MIN: CPT | Mod: S$GLB,,, | Performed by: OBSTETRICS & GYNECOLOGY

## 2018-05-16 PROCEDURE — 77067 SCR MAMMO BI INCL CAD: CPT | Mod: 26,,, | Performed by: RADIOLOGY

## 2018-05-17 NOTE — PROGRESS NOTES
"May 16, 2018     Protocol: FORWARD1/IMGN-0403  Principle Investigator: BETH Weston  Treating Physician: GUERITA Tran Pt Initials: NAV VAZ  Study ID: [id number]  IRB#: [id number]     FORWARD 1: A Randomized, Open Label Phase 3 Study to Evaluate the Safety and Efficacy of Mirvetuximab soravtansine ([id number]) Versus Investigators Choice of Chemotherapy in Women with Folate Receptor á?positive Advanced Epithelial Ovarian Cancer, Primary Peritoneal Cancer or Fallopian Tube Cancer      Cycle 9, Day 1  Study allocation was made on 11/27/17.  Patient randomized to Arm A:[id number], 6 mg/kg2(from [facility]), q 3 weeks.       Patient had scans on 5/15/18.  Per RECIST, patient has continued "Partial Response" to therapy.     Patient presented to clinic for C8D1 on the above mentioned clinical trial. Patient alert and oriented, mood and affect appropriate to the situation. Patient reports blurred vision, primarily left eye.  Appt. made for ophthalmology evaluation.  She denies cough, headache, fatigue, fever, nausea, diarrhea, vomiting, shortness of breath, or dizziness.  Patient reports continued neuropathy in bilateral fingers - mostly stable.  Labs show platelet count = 93 today.  Per protocol, patient should hold study treatment until recovery ? 100.  Will reschedule C9D1 appts for next week.     Physical exam, ECOG assessment performed on 5/16/18.  ECOG = 0 per MD.  Local labs were performed on 5/16/18 and reviewed by the physician.   Physical exam unremarkable per physician, ocular symptoms noted.  Reviewed baseline conditions and current medications with patient.  See Concomitant Medication and Baseline Medical History sheets.  Central safety labs collected, processed, and shipped to  per protocol.      All of the patient's questions were answered by Dr. Weston and me to her satisfaction. Patient expressed her willingness to continue to participate in the above mentioned clinical trial. Instructed patient to notify Dr. Tran and/or me " with any questions, concerns, or changes in health status. Patient verbalized understanding.      Baseline Medical History  1. 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    2. Anxiety, Grade 1.  3. Dermatofibroma.  Benign.  Diagnosed by biopsy on 7/17/17.  No further intervention needed.  4. Decreased platelet count, Grade 1.  Resolved 11/17/17.  5. Constipation, Grade 1.  6. Myalgia, Grade 1.  7. Peripheral neuropathy- feet, Grade 1.      Adverse Events - For the most up-to-date AE log, please refer to the paper AE log in the subjects research file.  1. Platelet count decreased/thrombocytopenia, Grade 1.  Will continue to monitor.  2. Peripheral neuropathy - fingers, remains Grade 1.  Patient discontinued Neurontin, tolerating neuropathy okay. Will continue to monitor.  3. Fatigue, resolved from remains Grade 1.  Will continue to monitor.

## 2018-05-18 ENCOUNTER — OFFICE VISIT (OUTPATIENT)
Dept: OPHTHALMOLOGY | Facility: CLINIC | Age: 63
End: 2018-05-18
Payer: COMMERCIAL

## 2018-05-18 DIAGNOSIS — Z00.6 PATIENT IN CANCER RELATED RESEARCH STUDY: ICD-10-CM

## 2018-05-18 DIAGNOSIS — C56.9 OVARIAN CANCER, UNSPECIFIED LATERALITY: Primary | ICD-10-CM

## 2018-05-18 PROCEDURE — 99499 UNLISTED E&M SERVICE: CPT | Mod: S$GLB,,, | Performed by: OPHTHALMOLOGY

## 2018-05-18 PROCEDURE — 99999 PR PBB SHADOW E&M-EST. PATIENT-LVL II: CPT | Mod: PBBFAC,,, | Performed by: OPHTHALMOLOGY

## 2018-05-18 NOTE — PROGRESS NOTES
HPI     DLS:04/10/2018 Ramila  Patient here for 3rd visit for research protocol.  Patient states experiencing some blurry/hazy OS reading and dist x 2   weeks.  Some double vision(top and bottom). Mainly in the sunlight and night time.  No eye pain.    Eye Drops:Pred Forte (haven't used since 05/02/2018)only used after chemo.    I have personally interviewed the patient, reviewed the history and   examined the patient and agree with the technician's &/or resident's exam,   assessment and plan.    Last edited by Aaron Mcgrath MD on 5/18/2018  9:54 AM. (History)            Assessment /Plan     For exam results, see Encounter Report.    Ovarian cancer, unspecified laterality    Patient in cancer related research study      Ms. 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 The remainder of her exam was normal. Artificial tears as desired. She will return per protocol.

## 2018-05-20 NOTE — PROGRESS NOTES
Subjective:      Patient ID: Mickie Don is a 62 y.o. female.    Chief Complaint: No chief complaint on file.      HPI  Treatment History  Xlap/Omentectomy on 11/1/16 - mass was unresectable  Stage IIIC ovarian cancer  Initiated Taxotere/Carbo on 11/18/16, now s/p 6 cycles completed 3/8/17  Xlap/ISIS/BSO/Radical mass resection > 10 cm, optimal debulking on 4/11/17  Initiated PRIMA on 8/21/17  BRCA negative     Here today for FORWARD1 visit, C9D1. CT 5/15/18 with SD. PS is 0. Some mild blurred vision. Will see ophthalmology. Labs reviewed and appropriate for treatment. Neuropathy stable.     Review of Systems   Constitutional: Negative for appetite change, chills, fatigue and fever.   HENT: Negative for mouth sores.    Respiratory: Negative for cough and shortness of breath.    Cardiovascular: Negative for leg swelling.   Gastrointestinal: Negative for abdominal pain, blood in stool, constipation and diarrhea.   Endocrine: Negative for cold intolerance.   Genitourinary: Negative for dysuria and vaginal bleeding.   Musculoskeletal: Negative for myalgias.   Skin: Negative for rash.   Allergic/Immunologic: Negative.    Neurological: Negative for weakness and numbness.   Hematological: Negative for adenopathy. Does not bruise/bleed easily.   Psychiatric/Behavioral: Negative for confusion.       Objective:   Physical Exam:   Constitutional: She is oriented to person, place, and time. She appears well-developed and well-nourished.    HENT:   Head: Normocephalic and atraumatic.    Eyes: EOM are normal. Pupils are equal, round, and reactive to light.    Neck: Normal range of motion. Neck supple. No thyromegaly present.    Cardiovascular: Normal rate, regular rhythm and intact distal pulses.     Pulmonary/Chest: Effort normal and breath sounds normal. No respiratory distress. She has no wheezes.        Abdominal: Soft. Bowel sounds are normal. She exhibits no distension, no ascites and no mass. There is no tenderness.              Musculoskeletal: Normal range of motion and moves all extremeties.      Lymphadenopathy:     She has no cervical adenopathy.        Right: No supraclavicular adenopathy present.        Left: No supraclavicular adenopathy present.    Neurological: She is alert and oriented to person, place, and time.    Skin: Skin is warm and dry. No rash noted.    Psychiatric: She has a normal mood and affect.       Assessment:     1. Examination of participant in clinical trial    2. Ovarian cancer, unspecified laterality    3. Encounter for antineoplastic chemotherapy        Plan:   No orders of the defined types were placed in this encounter.    Okay to treat C9. Ophthalmology visit. RTC per protocol.

## 2018-05-24 ENCOUNTER — RESEARCH ENCOUNTER (OUTPATIENT)
Dept: RESEARCH | Facility: HOSPITAL | Age: 63
End: 2018-05-24

## 2018-05-24 ENCOUNTER — LAB VISIT (OUTPATIENT)
Dept: LAB | Facility: HOSPITAL | Age: 63
End: 2018-05-24
Payer: COMMERCIAL

## 2018-05-24 ENCOUNTER — INFUSION (OUTPATIENT)
Dept: INFUSION THERAPY | Facility: HOSPITAL | Age: 63
End: 2018-05-24
Attending: OBSTETRICS & GYNECOLOGY
Payer: COMMERCIAL

## 2018-05-24 ENCOUNTER — OFFICE VISIT (OUTPATIENT)
Dept: GYNECOLOGIC ONCOLOGY | Facility: CLINIC | Age: 63
End: 2018-05-24
Payer: COMMERCIAL

## 2018-05-24 VITALS
RESPIRATION RATE: 16 BRPM | HEIGHT: 67 IN | BODY MASS INDEX: 30.07 KG/M2 | DIASTOLIC BLOOD PRESSURE: 60 MMHG | SYSTOLIC BLOOD PRESSURE: 124 MMHG | HEART RATE: 68 BPM | WEIGHT: 191.56 LBS | TEMPERATURE: 96 F

## 2018-05-24 VITALS
TEMPERATURE: 99 F | WEIGHT: 191 LBS | BODY MASS INDEX: 29.98 KG/M2 | HEIGHT: 67 IN | DIASTOLIC BLOOD PRESSURE: 66 MMHG | HEART RATE: 83 BPM | RESPIRATION RATE: 18 BRPM | SYSTOLIC BLOOD PRESSURE: 129 MMHG

## 2018-05-24 DIAGNOSIS — Z00.6 EXAMINATION OF PARTICIPANT IN CLINICAL TRIAL: ICD-10-CM

## 2018-05-24 DIAGNOSIS — C56.9 OVARIAN CANCER, UNSPECIFIED LATERALITY: Primary | ICD-10-CM

## 2018-05-24 DIAGNOSIS — C56.9 MALIGNANT NEOPLASM OF OVARY, UNSPECIFIED LATERALITY: ICD-10-CM

## 2018-05-24 DIAGNOSIS — Z51.11 CHEMOTHERAPY MANAGEMENT, ENCOUNTER FOR: ICD-10-CM

## 2018-05-24 LAB
ALBUMIN SERPL BCP-MCNC: 3.7 G/DL
ALP SERPL-CCNC: 80 U/L
ALT SERPL W/O P-5'-P-CCNC: 38 U/L
ANION GAP SERPL CALC-SCNC: 10 MMOL/L
AST SERPL-CCNC: 42 U/L
BASOPHILS # BLD AUTO: 0.03 K/UL
BASOPHILS NFR BLD: 0.9 %
BILIRUB SERPL-MCNC: 0.9 MG/DL
BUN SERPL-MCNC: 11 MG/DL
CALCIUM SERPL-MCNC: 9.5 MG/DL
CHLORIDE SERPL-SCNC: 102 MMOL/L
CO2 SERPL-SCNC: 29 MMOL/L
CREAT SERPL-MCNC: 0.7 MG/DL
DIFFERENTIAL METHOD: ABNORMAL
DRUG STUDY SPECIMEN TYPE: NORMAL
DRUG STUDY TEST NAME: NORMAL
DRUG STUDY TEST RESULT: NORMAL
EOSINOPHIL # BLD AUTO: 0.1 K/UL
EOSINOPHIL NFR BLD: 1.5 %
ERYTHROCYTE [DISTWIDTH] IN BLOOD BY AUTOMATED COUNT: 15.6 %
EST. GFR  (AFRICAN AMERICAN): >60 ML/MIN/1.73 M^2
EST. GFR  (NON AFRICAN AMERICAN): >60 ML/MIN/1.73 M^2
GLUCOSE SERPL-MCNC: 100 MG/DL
HCT VFR BLD AUTO: 42.8 %
HGB BLD-MCNC: 13.9 G/DL
IMM GRANULOCYTES # BLD AUTO: 0.01 K/UL
IMM GRANULOCYTES NFR BLD AUTO: 0.3 %
LYMPHOCYTES # BLD AUTO: 0.9 K/UL
LYMPHOCYTES NFR BLD: 26.1 %
MCH RBC QN AUTO: 30.5 PG
MCHC RBC AUTO-ENTMCNC: 32.5 G/DL
MCV RBC AUTO: 94 FL
MONOCYTES # BLD AUTO: 0.3 K/UL
MONOCYTES NFR BLD: 9.5 %
NEUTROPHILS # BLD AUTO: 2 K/UL
NEUTROPHILS NFR BLD: 61.7 %
NRBC BLD-RTO: 0 /100 WBC
PLATELET # BLD AUTO: 110 K/UL
PMV BLD AUTO: 9.6 FL
POTASSIUM SERPL-SCNC: 3.7 MMOL/L
PROT SERPL-MCNC: 7 G/DL
RBC # BLD AUTO: 4.55 M/UL
SODIUM SERPL-SCNC: 141 MMOL/L
WBC # BLD AUTO: 3.26 K/UL

## 2018-05-24 PROCEDURE — 25000003 PHARM REV CODE 250: Performed by: OBSTETRICS & GYNECOLOGY

## 2018-05-24 PROCEDURE — 85025 COMPLETE CBC W/AUTO DIFF WBC: CPT

## 2018-05-24 PROCEDURE — 96415 CHEMO IV INFUSION ADDL HR: CPT

## 2018-05-24 PROCEDURE — 99999 PR PBB SHADOW E&M-EST. PATIENT-LVL III: CPT | Mod: PBBFAC,,, | Performed by: OBSTETRICS & GYNECOLOGY

## 2018-05-24 PROCEDURE — 96413 CHEMO IV INFUSION 1 HR: CPT

## 2018-05-24 PROCEDURE — 99214 OFFICE O/P EST MOD 30 MIN: CPT | Mod: S$GLB,,, | Performed by: OBSTETRICS & GYNECOLOGY

## 2018-05-24 PROCEDURE — 96367 TX/PROPH/DG ADDL SEQ IV INF: CPT

## 2018-05-24 PROCEDURE — 99000 SPECIMEN HANDLING OFFICE-LAB: CPT

## 2018-05-24 PROCEDURE — 3008F BODY MASS INDEX DOCD: CPT | Mod: CPTII,S$GLB,, | Performed by: OBSTETRICS & GYNECOLOGY

## 2018-05-24 PROCEDURE — 36415 COLL VENOUS BLD VENIPUNCTURE: CPT

## 2018-05-24 PROCEDURE — 63600175 PHARM REV CODE 636 W HCPCS: Performed by: OBSTETRICS & GYNECOLOGY

## 2018-05-24 PROCEDURE — 80053 COMPREHEN METABOLIC PANEL: CPT

## 2018-05-24 RX ORDER — HEPARIN 100 UNIT/ML
500 SYRINGE INTRAVENOUS
Status: CANCELLED | OUTPATIENT
Start: 2018-05-24

## 2018-05-24 RX ORDER — SODIUM CHLORIDE 0.9 % (FLUSH) 0.9 %
10 SYRINGE (ML) INJECTION
Status: DISCONTINUED | OUTPATIENT
Start: 2018-05-24 | End: 2018-05-24 | Stop reason: HOSPADM

## 2018-05-24 RX ORDER — ACETAMINOPHEN 325 MG/1
650 TABLET ORAL
Status: COMPLETED | OUTPATIENT
Start: 2018-05-24 | End: 2018-05-24

## 2018-05-24 RX ORDER — ACETAMINOPHEN 325 MG/1
650 TABLET ORAL
Status: CANCELLED | OUTPATIENT
Start: 2018-05-24

## 2018-05-24 RX ORDER — SODIUM CHLORIDE 0.9 % (FLUSH) 0.9 %
10 SYRINGE (ML) INJECTION
Status: CANCELLED | OUTPATIENT
Start: 2018-05-24

## 2018-05-24 RX ORDER — HEPARIN 100 UNIT/ML
500 SYRINGE INTRAVENOUS
Status: DISCONTINUED | OUTPATIENT
Start: 2018-05-24 | End: 2018-05-24 | Stop reason: HOSPADM

## 2018-05-24 RX ADMIN — ACETAMINOPHEN 650 MG: 325 TABLET, FILM COATED ORAL at 12:05

## 2018-05-24 RX ADMIN — DIPHENHYDRAMINE HYDROCHLORIDE 50 MG: 50 INJECTION, SOLUTION INTRAMUSCULAR; INTRAVENOUS at 12:05

## 2018-05-24 RX ADMIN — DEXAMETHASONE SODIUM PHOSPHATE 10 MG: 4 INJECTION, SOLUTION INTRA-ARTICULAR; INTRALESIONAL; INTRAMUSCULAR; INTRAVENOUS; SOFT TISSUE at 12:05

## 2018-05-24 NOTE — PROGRESS NOTES
"Subjective:       Patient ID: Mickie Don is a 62 y.o. female.    Chief Complaint: Chemotherapy    HPI   Patient comes in today for cycle 9 of FORWARD1 trial. She is on study drug.   Delayed 1 week due to thrombocytopenia.     She is a patient of Dr. Tran and is on FORWARD1.     ECOG PS of 1.     Treatment History  Xlap/Omentectomy on 11/1/16 - mass was unresectable  Stage IIIC ovarian cancer  Initiated Taxotere/Carbo on 11/18/16, now s/p 6 cycles completed 3/8/17  Xlap/ISIS/BSO/Radical mass resection > 10 cm, optimal debulking on 4/11/17  Initiated PRIMA on 8/21/17  BRCA negative           Review of Systems   Constitutional: Negative for chills, fatigue and fever.   Respiratory: Negative for cough, shortness of breath and wheezing.    Cardiovascular: Negative for chest pain, palpitations and leg swelling.   Gastrointestinal: Negative for abdominal pain, constipation, diarrhea, nausea and vomiting.        Chronic constipation     Genitourinary: Negative for difficulty urinating, dysuria, frequency, genital sores, hematuria, urgency, vaginal bleeding, vaginal discharge and vaginal pain. Enuresis: fingerws and toes. grade 1.    Neurological: Positive for numbness. Negative for weakness.   Hematological: Negative for adenopathy. Does not bruise/bleed easily.   Psychiatric/Behavioral: The patient is nervous/anxious.         Depression. Comes and goes.          Objective:   /60   Pulse 68   Temp 96.4 °F (35.8 °C)   Resp 16   Ht 5' 7" (1.702 m)   Wt 86.9 kg (191 lb 9.3 oz)   BMI 30.01 kg/m²      Physical Exam   Constitutional: She is oriented to person, place, and time. She appears well-developed and well-nourished.   HENT:   Head: Normocephalic and atraumatic.   Eyes: No scleral icterus.   Neck: No tracheal deviation present. No thyromegaly present.   Cardiovascular: Normal rate and regular rhythm.    Pulmonary/Chest: Effort normal and breath sounds normal.   Abdominal: Soft. She exhibits no distension and " no mass. There is no tenderness. There is no rebound and no guarding. No hernia.   Genitourinary:   Genitourinary Comments: Not performed.    Musculoskeletal: She exhibits no edema or tenderness.   Lymphadenopathy:     She has no cervical adenopathy.   Neurological: She is alert and oriented to person, place, and time.   Skin: Skin is warm and dry.   Psychiatric: She has a normal mood and affect. Her behavior is normal. Judgment and thought content normal.       Assessment:       1. Malignant neoplasm of ovary, unspecified laterality        Plan:   Malignant neoplasm of ovary, unspecified laterality  Proceed with cycle 9.   RTC in 3 weeks.    -     prednisoLONE acetate ophthalmic suspension; Place 1 drop into both eyes 6 (six) times daily. Administer 6 times a day on days 1-4 then 4 times a day on days 5-8. Investigational Medication. Patient Study #:[id number].  Dispense: 1 each; Refill: 6  -     INV carboxymethylcellulose-glycerin drops; Apply 1 drop to eye daily as needed for Dry Eyes. Use daily and as needed for dry itchy eyes. Administer at least 15 minutes after steroids drops. Investigational Medication. Patient Study #:[id number]  Dispense: 1 each; Refill: 6

## 2018-05-24 NOTE — PROGRESS NOTES
"May 16, 2018     Protocol: FORWARD1/IMGN-0403  Principle Investigator: BETH Weston  Treating Physician: GUERITA Tran Pt Initials: NAV VAZ  Study ID: [id number]  IRB#: [id number]     FORWARD 1: A Randomized, Open Label Phase 3 Study to Evaluate the Safety and Efficacy of Mirvetuximab soravtansine (LRWA620) Versus Investigators Choice of Chemotherapy in Women with Folate Receptor ??positive Advanced Epithelial Ovarian Cancer, Primary Peritoneal Cancer or Fallopian Tube Cancer      Cycle 9, Day 1  Study allocation was made on 11/27/17.  Patient randomized to Arm A:[id number], 6 mg/kg2(from [facility]), q 3 weeks.       Patient had scans on 5/15/18.  Per RECIST, patient has continued "Partial Response" to therapy.     Patient presented to clinic for C9D1 on the above mentioned clinical trial, delyaed from 5/16/18 d/t platelet = 93k. Patient alert and oriented, mood and affect appropriate to the situation. Patient reports blurred vision, primarily left eye - improved from 5/16/18. She had ophthalmology evaluation - grade 1 blurred vision and therefore okay to continue treatment per protocol and medical monitor.  She denies cough, headache, fatigue, fever, nausea, diarrhea, vomiting, shortness of breath, or dizziness.  Patient reports continued neuropathy in bilateral fingers - mostly stable.  Labs show platelet count = 110 today - okay for treatment     Physical exam, ECOG assessment performed on 5/24/18.  ECOG = 0 per MD.  Local labs were performed on 5/24/18 and reviewed by the physician.   Physical exam unremarkable per physician, ocular symptoms noted.  Reviewed baseline conditions and current medications with patient.  See Concomitant Medication and Baseline Medical History sheets.  Central safety labs collected, processed, and shipped to  per protocol.      All of the patient's questions were answered by Dr. Guerra and me to her satisfaction. Patient expressed her willingness to continue to participate in the above mentioned " clinical trial. Instructed patient to notify Dr. Tran and/or me with any questions, concerns, or changes in health status. Patient verbalized understanding.      Baseline Medical History  1. 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    2. Anxiety, Grade 1.  3. Dermatofibroma.  Benign.  Diagnosed by biopsy on 7/17/17.  No further intervention needed.  4. Decreased platelet count, Grade 1.  Resolved 11/17/17.  5. Constipation, Grade 1.  6. Myalgia, Grade 1.  7. Peripheral neuropathy- feet, Grade 1.      Adverse Events - For the most up-to-date AE log, please refer to the paper AE log in the subjects research file.  1. Platelet count decreased/thrombocytopenia, remains Grade 1.  Will continue to monitor.  2. Peripheral neuropathy - fingers, remains Grade 1.  Patient discontinued Neurontin, tolerating neuropathy okay. Will continue to monitor.  3. Fatigue, resolved from remains Grade 1.  Will continue to monitor.

## 2018-05-24 NOTE — PLAN OF CARE
Problem: Chemotherapy Effects (Adult)  Goal: Signs and Symptoms of Listed Potential Problems Will be Absent, Minimized or Managed (Chemotherapy Effects)  Signs and symptoms of listed potential problems will be absent, minimized or managed by discharge/transition of care (reference Chemotherapy Effects (Adult) CPG).   Outcome: Ongoing (interventions implemented as appropriate)  Pt here for inv drug infusion , labs, hx, meds, allergies reviewed, pt with no complaints at this time, reclined in chair, continue to  monitor

## 2018-05-24 NOTE — PLAN OF CARE
Problem: Patient Care Overview  Goal: Plan of Care Review  Outcome: Ongoing (interventions implemented as appropriate)  Pt tolerated INV drug infusion without issue, avs given, pt to rtc 6/6/18, no distress noted upon d/c to home

## 2018-05-30 ENCOUNTER — LAB VISIT (OUTPATIENT)
Dept: LAB | Facility: HOSPITAL | Age: 63
End: 2018-05-30
Attending: OBSTETRICS & GYNECOLOGY
Payer: COMMERCIAL

## 2018-05-30 ENCOUNTER — RESEARCH ENCOUNTER (OUTPATIENT)
Dept: RESEARCH | Facility: HOSPITAL | Age: 63
End: 2018-05-30

## 2018-05-30 DIAGNOSIS — Z00.6 EXAMINATION OF PARTICIPANT IN CLINICAL TRIAL: ICD-10-CM

## 2018-05-30 DIAGNOSIS — C56.9 MALIGNANT NEOPLASM OF OVARY, UNSPECIFIED LATERALITY: ICD-10-CM

## 2018-05-30 LAB
DRUG STUDY SPECIMEN TYPE: NORMAL
DRUG STUDY TEST NAME: NORMAL
DRUG STUDY TEST RESULT: NORMAL

## 2018-05-30 PROCEDURE — 36415 COLL VENOUS BLD VENIPUNCTURE: CPT

## 2018-05-30 PROCEDURE — 99000 SPECIMEN HANDLING OFFICE-LAB: CPT

## 2018-06-05 ENCOUNTER — OFFICE VISIT (OUTPATIENT)
Dept: OPHTHALMOLOGY | Facility: CLINIC | Age: 63
End: 2018-06-05
Payer: COMMERCIAL

## 2018-06-05 DIAGNOSIS — C56.9 MALIGNANT NEOPLASM OF OVARY, UNSPECIFIED LATERALITY: ICD-10-CM

## 2018-06-05 DIAGNOSIS — Z00.6 PATIENT IN CANCER RELATED RESEARCH STUDY: Primary | ICD-10-CM

## 2018-06-05 PROCEDURE — 99499 UNLISTED E&M SERVICE: CPT | Mod: S$GLB,,, | Performed by: OPHTHALMOLOGY

## 2018-06-05 PROCEDURE — 99999 PR PBB SHADOW E&M-EST. PATIENT-LVL III: CPT | Mod: PBBFAC,,, | Performed by: OPHTHALMOLOGY

## 2018-06-05 NOTE — PROGRESS NOTES
HPI     Research    Additional comments: Ovarian Cancer           Comments   DLS:05/18/2018 Ramila  Patient here for 4th visit for research protocol Ovarian Cancer.  Pt states OU vision has improved since last visit.  Little eye pain this morning only from dry eyes.    Eye Drops:Pred Forte qid OU(last week)only ud after treatment         Last edited by Estrella Jones MA on 6/5/2018  9:04 AM. (History)            Assessment /Plan     For exam results, see Encounter Report.    Patient in cancer related research study    Malignant neoplasm of ovary, unspecified laterality      Normal exam. Return per protocol.

## 2018-06-13 ENCOUNTER — LAB VISIT (OUTPATIENT)
Dept: LAB | Facility: HOSPITAL | Age: 63
End: 2018-06-13
Attending: OBSTETRICS & GYNECOLOGY
Payer: COMMERCIAL

## 2018-06-13 ENCOUNTER — OFFICE VISIT (OUTPATIENT)
Dept: GYNECOLOGIC ONCOLOGY | Facility: CLINIC | Age: 63
End: 2018-06-13
Payer: COMMERCIAL

## 2018-06-13 ENCOUNTER — RESEARCH ENCOUNTER (OUTPATIENT)
Dept: HEMATOLOGY/ONCOLOGY | Facility: CLINIC | Age: 63
End: 2018-06-13

## 2018-06-13 VITALS
BODY MASS INDEX: 30.56 KG/M2 | SYSTOLIC BLOOD PRESSURE: 124 MMHG | WEIGHT: 194.69 LBS | TEMPERATURE: 99 F | HEIGHT: 67 IN | DIASTOLIC BLOOD PRESSURE: 58 MMHG | HEART RATE: 71 BPM | RESPIRATION RATE: 18 BRPM

## 2018-06-13 DIAGNOSIS — Z00.6 EXAMINATION OF PARTICIPANT IN CLINICAL TRIAL: ICD-10-CM

## 2018-06-13 DIAGNOSIS — D69.59 THROMBOCYTOPENIA DUE TO DRUGS: ICD-10-CM

## 2018-06-13 DIAGNOSIS — G62.0 NEUROPATHY DUE TO CHEMOTHERAPEUTIC DRUG: ICD-10-CM

## 2018-06-13 DIAGNOSIS — T45.1X5A NEUROPATHY DUE TO CHEMOTHERAPEUTIC DRUG: ICD-10-CM

## 2018-06-13 DIAGNOSIS — C56.9 MALIGNANT NEOPLASM OF OVARY, UNSPECIFIED LATERALITY: ICD-10-CM

## 2018-06-13 DIAGNOSIS — T50.905A THROMBOCYTOPENIA DUE TO DRUGS: ICD-10-CM

## 2018-06-13 DIAGNOSIS — C56.9 MALIGNANT NEOPLASM OF OVARY, UNSPECIFIED LATERALITY: Primary | ICD-10-CM

## 2018-06-13 LAB
ALBUMIN SERPL BCP-MCNC: 3.8 G/DL
ALP SERPL-CCNC: 86 U/L
ALT SERPL W/O P-5'-P-CCNC: 46 U/L
ANION GAP SERPL CALC-SCNC: 13 MMOL/L
AST SERPL-CCNC: 49 U/L
BASOPHILS # BLD AUTO: 0.03 K/UL
BASOPHILS NFR BLD: 0.9 %
BILIRUB SERPL-MCNC: 0.7 MG/DL
BUN SERPL-MCNC: 10 MG/DL
CALCIUM SERPL-MCNC: 9.4 MG/DL
CHLORIDE SERPL-SCNC: 106 MMOL/L
CO2 SERPL-SCNC: 27 MMOL/L
CREAT SERPL-MCNC: 0.7 MG/DL
DIFFERENTIAL METHOD: ABNORMAL
DRUG STUDY SPECIMEN TYPE: NORMAL
DRUG STUDY TEST NAME: NORMAL
DRUG STUDY TEST RESULT: NORMAL
EOSINOPHIL # BLD AUTO: 0.1 K/UL
EOSINOPHIL NFR BLD: 2.4 %
ERYTHROCYTE [DISTWIDTH] IN BLOOD BY AUTOMATED COUNT: 15.6 %
EST. GFR  (AFRICAN AMERICAN): >60 ML/MIN/1.73 M^2
EST. GFR  (NON AFRICAN AMERICAN): >60 ML/MIN/1.73 M^2
GLUCOSE SERPL-MCNC: 86 MG/DL
HCT VFR BLD AUTO: 42.6 %
HGB BLD-MCNC: 13.7 G/DL
IMM GRANULOCYTES # BLD AUTO: 0.01 K/UL
IMM GRANULOCYTES NFR BLD AUTO: 0.3 %
LYMPHOCYTES # BLD AUTO: 1 K/UL
LYMPHOCYTES NFR BLD: 28.2 %
MCH RBC QN AUTO: 30.5 PG
MCHC RBC AUTO-ENTMCNC: 32.2 G/DL
MCV RBC AUTO: 95 FL
MONOCYTES # BLD AUTO: 0.4 K/UL
MONOCYTES NFR BLD: 11.9 %
NEUTROPHILS # BLD AUTO: 1.9 K/UL
NEUTROPHILS NFR BLD: 56.3 %
NRBC BLD-RTO: 0 /100 WBC
PLATELET # BLD AUTO: 77 K/UL
PMV BLD AUTO: 10.3 FL
POTASSIUM SERPL-SCNC: 3.5 MMOL/L
PROT SERPL-MCNC: 7.1 G/DL
RBC # BLD AUTO: 4.49 M/UL
SODIUM SERPL-SCNC: 146 MMOL/L
WBC # BLD AUTO: 3.37 K/UL

## 2018-06-13 PROCEDURE — 36415 COLL VENOUS BLD VENIPUNCTURE: CPT

## 2018-06-13 PROCEDURE — 3008F BODY MASS INDEX DOCD: CPT | Mod: CPTII,S$GLB,, | Performed by: OBSTETRICS & GYNECOLOGY

## 2018-06-13 PROCEDURE — 85025 COMPLETE CBC W/AUTO DIFF WBC: CPT

## 2018-06-13 PROCEDURE — 99214 OFFICE O/P EST MOD 30 MIN: CPT | Mod: S$GLB,,, | Performed by: OBSTETRICS & GYNECOLOGY

## 2018-06-13 PROCEDURE — 99999 PR PBB SHADOW E&M-EST. PATIENT-LVL III: CPT | Mod: PBBFAC,,, | Performed by: OBSTETRICS & GYNECOLOGY

## 2018-06-13 PROCEDURE — 80053 COMPREHEN METABOLIC PANEL: CPT

## 2018-06-13 PROCEDURE — 99000 SPECIMEN HANDLING OFFICE-LAB: CPT

## 2018-06-13 NOTE — PROGRESS NOTES
Subjective:      Patient ID: Mickie Don is a 62 y.o. female.    Chief Complaint: Mailgnant neoplasm of ovary, unspecified laterality (C10/D1 forward 1 trial )      HPI  Treatment History  Xlap/Omentectomy on 11/1/16 - mass was unresectable  Stage IIIC ovarian cancer  Initiated Taxotere/Carbo on 11/18/16, now s/p 6 cycles completed 3/8/17  Xlap/ISIS/BSO/Radical mass resection > 10 cm, optimal debulking on 4/11/17  Initiated PRIMA on 8/21/17  BRCA negative     HPI  Here today for FORWARD1 visit, C10D1.   PS is 0.  Continues to see optho and reports improved vision with eye drops.  Otherwise without complaints today.  Denies F/C, N/V, VB.  CBC reviewed and noted to be thrombocytopenic with plt 77.  ANC 1900.  Will therefore need to be delayed.       Review of Systems   Constitutional: Positive for fatigue (off of work this week). Negative for appetite change, chills, fever and unexpected weight change.   HENT: Negative for mouth sores.    Respiratory: Negative for cough and shortness of breath.    Cardiovascular: Negative for leg swelling.   Gastrointestinal: Negative for abdominal pain, blood in stool, constipation, diarrhea, nausea and vomiting.   Endocrine: Negative for cold intolerance.   Genitourinary: Negative for decreased urine volume, dysuria, frequency, hematuria, pelvic pain, vaginal bleeding, vaginal discharge and vaginal pain.   Musculoskeletal: Negative for back pain and myalgias.   Skin: Negative for rash.   Allergic/Immunologic: Negative.    Neurological: Negative for weakness, numbness and headaches.   Hematological: Negative for adenopathy. Does not bruise/bleed easily.   Psychiatric/Behavioral: Negative for confusion. The patient is not nervous/anxious.        Objective:   Physical Exam:   Constitutional: She is oriented to person, place, and time. She appears well-developed and well-nourished. No distress.    HENT:   Head: Normocephalic and atraumatic.    Eyes: No scleral icterus.    Neck: Normal  range of motion. Neck supple.    Cardiovascular: Normal rate and intact distal pulses.  Exam reveals no cyanosis and no edema.     Pulmonary/Chest: Effort normal. No respiratory distress. She exhibits no tenderness.        Abdominal: Soft. She exhibits no distension, no fluid wave, no ascites and no mass. There is no tenderness. There is no rebound and no guarding. No hernia.     Genitourinary: Rectum normal and vagina normal. Pelvic exam was performed with patient supine. There is no rash, tenderness or lesion on the right labia. There is no rash, tenderness or lesion on the left labia. Uterus is absent. There is an absent adnexa. Right adnexum displays no mass, no tenderness and no fullness. Left adnexum displays no mass, no tenderness and no fullness. No bleeding (no cuff lesions) or unspecified prolapse of vaginal walls in the vagina. No vaginal discharge found. Vaginal cuff normal.Labial bartholins normal.Cervix exhibits absence.   Genitourinary Comments: Pelvic exam deferred           Musculoskeletal: Moves all extremeties.      Lymphadenopathy:     She has no cervical adenopathy.        Right: No inguinal and no supraclavicular adenopathy present.        Left: No inguinal and no supraclavicular adenopathy present.    Neurological: She is alert and oriented to person, place, and time.    Skin: Skin is warm and dry. No rash noted. No cyanosis.    Psychiatric: She has a normal mood and affect.       Assessment:     1. Malignant neoplasm of ovary, unspecified laterality    2. Examination of participant in clinical trial    3. Neuropathy due to chemotherapeutic drug    4. Thrombocytopenia due to drugs        Plan:     Will delay 1 week due to plt count.  Repeat labs in 1 week and will try to treat then.

## 2018-06-13 NOTE — PROGRESS NOTES
"June 13, 2018     Protocol: FORWARD1/IMGN-0403  Principle Investigator: BETH Weston  Treating Physician: GUERITA Tran Pt Initials: NAV VAZ  Study ID: [id number]  IRB#: [id number].A     FORWARD 1: A Randomized, Open Label Phase 3 Study to Evaluate the Safety and Efficacy of Mirvetuximab soravtansine (LNPP993) Versus Investigators Choice of Chemotherapy in Women with Folate Receptor ??positive Advanced Epithelial Ovarian Cancer, Primary Peritoneal Cancer or Fallopian Tube Cancer      Cycle 10, Day 1 delayed 1 week  Study allocation was made on 11/27/17.  Patient randomized to Arm A:[id number], 6 mg/kg2(from [facility]), q 3 weeks.       Patient had scans on 5/15/18.  Per RECIST, patient has continued "Partial Response" to therapy.     Patient presented to clinic for C10D1 on the above mentioned clinical trial. Last treatment was 5/24/18 delayed 1 week from 5/16/18 d/t platelet = 93k. Tomorrow, patient due for C10D1 treatment, but will delay 1 week d/t today's platelet = 77. Patient alert and oriented, mood and affect appropriate to the situation. Patient reports blurred vision, primarily left eye - continues to improve from 5/16/18. She had ophthalmology evaluation - grade 1 blurred vision and therefore okay to continue treatment per protocol and medical monitor.  She denies cough, headache, fatigue, fever, nausea, diarrhea, vomiting, shortness of breath, or dizziness.  Patient reports continued neuropathy in bilateral fingers - mostly stable.       Physical exam, ECOG assessment performed on 6/13/18.  ECOG = 0 per MD.  Local labs were performed on 6/13/18 and reviewed by the physician.   Physical exam unremarkable per physician, ocular symptoms noted.  Reviewed baseline conditions and current medications with patient.  See Concomitant Medication and Baseline Medical History sheets.  Central safety labs and urine collected, processed, and shipped to  per protocol. QOL completed.     All of the patient's questions were answered by Dr." Chelsea and me to her satisfaction. Patient expressed her willingness to continue to participate in the above mentioned clinical trial. Instructed patient to notify Dr. Tran and/or me with any questions, concerns, or changes in health status. Patient verbalized understanding.      Baseline Medical History  1. 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    2. Anxiety, Grade 1.  3. Dermatofibroma.  Benign.  Diagnosed by biopsy on 7/17/17.  No further intervention needed.  4. Decreased platelet count, Grade 1.  Resolved 11/17/17.  5. Constipation, Grade 1.  6. Myalgia, Grade 1.  7. Peripheral neuropathy- feet, Grade 1.      Adverse Events - For the most up-to-date AE log, please refer to the paper AE log in the subjects research file.  1. Platelet count decreased/thrombocytopenia, remains Grade 1.  Will continue to monitor.  2. Peripheral neuropathy - fingers, remains Grade 1.  Patient discontinued Neurontin, tolerating neuropathy okay. Will continue to monitor.  3. Fatigue, remains Grade 1.  Will continue to monitor.      Next appointments made and patient to return for labs on 6/20/18 and for C10D1 chemo on 6/21/18. Patient aware and in agreement with the plan. Patient discharged home in good condition.

## 2018-06-22 ENCOUNTER — LAB VISIT (OUTPATIENT)
Dept: LAB | Facility: HOSPITAL | Age: 63
End: 2018-06-22
Attending: OBSTETRICS & GYNECOLOGY
Payer: COMMERCIAL

## 2018-06-22 ENCOUNTER — RESEARCH ENCOUNTER (OUTPATIENT)
Dept: RESEARCH | Facility: HOSPITAL | Age: 63
End: 2018-06-22

## 2018-06-22 ENCOUNTER — INFUSION (OUTPATIENT)
Dept: INFUSION THERAPY | Facility: HOSPITAL | Age: 63
End: 2018-06-22
Attending: OBSTETRICS & GYNECOLOGY
Payer: COMMERCIAL

## 2018-06-22 ENCOUNTER — OFFICE VISIT (OUTPATIENT)
Dept: GYNECOLOGIC ONCOLOGY | Facility: CLINIC | Age: 63
End: 2018-06-22
Payer: COMMERCIAL

## 2018-06-22 VITALS
BODY MASS INDEX: 30 KG/M2 | HEIGHT: 67 IN | HEART RATE: 73 BPM | SYSTOLIC BLOOD PRESSURE: 120 MMHG | WEIGHT: 191.13 LBS | DIASTOLIC BLOOD PRESSURE: 59 MMHG

## 2018-06-22 VITALS
SYSTOLIC BLOOD PRESSURE: 117 MMHG | HEART RATE: 68 BPM | RESPIRATION RATE: 16 BRPM | HEIGHT: 67 IN | TEMPERATURE: 98 F | DIASTOLIC BLOOD PRESSURE: 61 MMHG | WEIGHT: 191.13 LBS | BODY MASS INDEX: 30 KG/M2

## 2018-06-22 DIAGNOSIS — D49.59 OVARIAN NEOPLASM: ICD-10-CM

## 2018-06-22 DIAGNOSIS — C56.9 OVARIAN CANCER, UNSPECIFIED LATERALITY: Primary | ICD-10-CM

## 2018-06-22 DIAGNOSIS — Z00.6 EXAMINATION OF PARTICIPANT IN CLINICAL TRIAL: ICD-10-CM

## 2018-06-22 DIAGNOSIS — C56.9 MALIGNANT NEOPLASM OF OVARY, UNSPECIFIED LATERALITY: ICD-10-CM

## 2018-06-22 DIAGNOSIS — C56.9 MALIGNANT NEOPLASM OF OVARY, UNSPECIFIED LATERALITY: Primary | ICD-10-CM

## 2018-06-22 LAB
ALBUMIN SERPL BCP-MCNC: 3.4 G/DL
ALP SERPL-CCNC: 100 U/L
ALT SERPL W/O P-5'-P-CCNC: 45 U/L
ANION GAP SERPL CALC-SCNC: 9 MMOL/L
AST SERPL-CCNC: 45 U/L
BASOPHILS # BLD AUTO: 0.03 K/UL
BASOPHILS NFR BLD: 0.9 %
BILIRUB SERPL-MCNC: 0.8 MG/DL
BUN SERPL-MCNC: 12 MG/DL
CALCIUM SERPL-MCNC: 9.2 MG/DL
CHLORIDE SERPL-SCNC: 107 MMOL/L
CO2 SERPL-SCNC: 24 MMOL/L
CREAT SERPL-MCNC: 0.7 MG/DL
DIFFERENTIAL METHOD: ABNORMAL
DRUG STUDY SPECIMEN TYPE: NORMAL
DRUG STUDY TEST NAME: NORMAL
DRUG STUDY TEST RESULT: NORMAL
EOSINOPHIL # BLD AUTO: 0.1 K/UL
EOSINOPHIL NFR BLD: 2.2 %
ERYTHROCYTE [DISTWIDTH] IN BLOOD BY AUTOMATED COUNT: 15 %
EST. GFR  (AFRICAN AMERICAN): >60 ML/MIN/1.73 M^2
EST. GFR  (NON AFRICAN AMERICAN): >60 ML/MIN/1.73 M^2
GLUCOSE SERPL-MCNC: 95 MG/DL
HCT VFR BLD AUTO: 42.1 %
HGB BLD-MCNC: 14.3 G/DL
IMM GRANULOCYTES # BLD AUTO: 0 K/UL
IMM GRANULOCYTES NFR BLD AUTO: 0 %
LYMPHOCYTES # BLD AUTO: 0.9 K/UL
LYMPHOCYTES NFR BLD: 28.9 %
MCH RBC QN AUTO: 31.5 PG
MCHC RBC AUTO-ENTMCNC: 34 G/DL
MCV RBC AUTO: 93 FL
MONOCYTES # BLD AUTO: 0.3 K/UL
MONOCYTES NFR BLD: 9.4 %
NEUTROPHILS # BLD AUTO: 1.9 K/UL
NEUTROPHILS NFR BLD: 58.6 %
NRBC BLD-RTO: 0 /100 WBC
PLATELET # BLD AUTO: 102 K/UL
PMV BLD AUTO: 9.9 FL
POTASSIUM SERPL-SCNC: 3.9 MMOL/L
PROT SERPL-MCNC: 6.7 G/DL
RBC # BLD AUTO: 4.54 M/UL
SODIUM SERPL-SCNC: 140 MMOL/L
WBC # BLD AUTO: 3.18 K/UL

## 2018-06-22 PROCEDURE — 96367 TX/PROPH/DG ADDL SEQ IV INF: CPT

## 2018-06-22 PROCEDURE — 99000 SPECIMEN HANDLING OFFICE-LAB: CPT

## 2018-06-22 PROCEDURE — 80053 COMPREHEN METABOLIC PANEL: CPT

## 2018-06-22 PROCEDURE — 96415 CHEMO IV INFUSION ADDL HR: CPT

## 2018-06-22 PROCEDURE — 25000003 PHARM REV CODE 250: Performed by: OBSTETRICS & GYNECOLOGY

## 2018-06-22 PROCEDURE — 3008F BODY MASS INDEX DOCD: CPT | Mod: CPTII,S$GLB,, | Performed by: OBSTETRICS & GYNECOLOGY

## 2018-06-22 PROCEDURE — 99214 OFFICE O/P EST MOD 30 MIN: CPT | Mod: S$GLB,,, | Performed by: OBSTETRICS & GYNECOLOGY

## 2018-06-22 PROCEDURE — 85025 COMPLETE CBC W/AUTO DIFF WBC: CPT

## 2018-06-22 PROCEDURE — 99999 PR PBB SHADOW E&M-EST. PATIENT-LVL III: CPT | Mod: PBBFAC,,, | Performed by: OBSTETRICS & GYNECOLOGY

## 2018-06-22 PROCEDURE — 63600175 PHARM REV CODE 636 W HCPCS: Performed by: OBSTETRICS & GYNECOLOGY

## 2018-06-22 PROCEDURE — 36415 COLL VENOUS BLD VENIPUNCTURE: CPT

## 2018-06-22 PROCEDURE — 96413 CHEMO IV INFUSION 1 HR: CPT

## 2018-06-22 RX ORDER — SODIUM CHLORIDE 0.9 % (FLUSH) 0.9 %
10 SYRINGE (ML) INJECTION
Status: CANCELLED | OUTPATIENT
Start: 2018-06-22

## 2018-06-22 RX ORDER — SODIUM CHLORIDE 0.9 % (FLUSH) 0.9 %
10 SYRINGE (ML) INJECTION
Status: DISCONTINUED | OUTPATIENT
Start: 2018-06-22 | End: 2018-06-22 | Stop reason: HOSPADM

## 2018-06-22 RX ORDER — ACETAMINOPHEN 325 MG/1
650 TABLET ORAL
Status: COMPLETED | OUTPATIENT
Start: 2018-06-22 | End: 2018-06-22

## 2018-06-22 RX ORDER — HEPARIN 100 UNIT/ML
500 SYRINGE INTRAVENOUS
Status: CANCELLED | OUTPATIENT
Start: 2018-06-22

## 2018-06-22 RX ORDER — ACETAMINOPHEN 325 MG/1
650 TABLET ORAL
Status: CANCELLED | OUTPATIENT
Start: 2018-06-22

## 2018-06-22 RX ADMIN — DEXTROSE: 50 INJECTION, SOLUTION INTRAVENOUS at 11:06

## 2018-06-22 RX ADMIN — DEXAMETHASONE SODIUM PHOSPHATE 10 MG: 4 INJECTION, SOLUTION INTRA-ARTICULAR; INTRALESIONAL; INTRAMUSCULAR; INTRAVENOUS; SOFT TISSUE at 10:06

## 2018-06-22 RX ADMIN — DIPHENHYDRAMINE HYDROCHLORIDE 50 MG: 50 INJECTION, SOLUTION INTRAMUSCULAR; INTRAVENOUS at 10:06

## 2018-06-22 RX ADMIN — ACETAMINOPHEN 650 MG: 325 TABLET ORAL at 10:06

## 2018-06-22 NOTE — PROGRESS NOTES
"Subjective:       Patient ID: Mickie Don is a 62 y.o. female.    Chief Complaint: Ovarian Cancer (follow up )    HPI   Patient comes in today for cycle 10/D1 of FORWARD1 trial. She is on study drug.     Didn't get treatment last week due to thrombocytopenia.      She is a patient of Dr. Tran and is on FORWARD1.     Complains of a cold. No fever. Cough but clear sputum. Right ear feels blocked. No ear pain.     Performance Status: 1.   No visual complaints.      Treatment History  Xlap/Omentectomy on 11/1/16 - mass was unresectable  Stage IIIC ovarian cancer  Initiated Taxotere/Carbo on 11/18/16, now s/p 6 cycles completed 3/8/17  Xlap/ISIS/BSO/Radical mass resection > 10 cm, optimal debulking on 4/11/17  Initiated PRIMA on 8/21/17  BRCA negative           Review of Systems   Constitutional: Negative for chills, fatigue and fever.   Respiratory: Negative for cough, shortness of breath and wheezing.    Cardiovascular: Negative for chest pain, palpitations and leg swelling.   Gastrointestinal: Negative for abdominal pain, constipation, diarrhea, nausea and vomiting.   Genitourinary: Negative for difficulty urinating, dysuria, frequency, genital sores, hematuria, urgency, vaginal bleeding, vaginal discharge and vaginal pain.   Musculoskeletal: Negative for gait problem.   Neurological: Positive for numbness (grade 1). Negative for weakness.   Hematological: Negative for adenopathy. Does not bruise/bleed easily.   Psychiatric/Behavioral: The patient is not nervous/anxious.        Objective:   BP (!) 120/59   Pulse 73   Ht 5' 7" (1.702 m)   Wt 86.7 kg (191 lb 2.2 oz)   BMI 29.94 kg/m²      Physical Exam   Constitutional: She is oriented to person, place, and time. She appears well-developed and well-nourished.   HENT:   Head: Normocephalic and atraumatic.   Eyes: No scleral icterus.   Neck: No tracheal deviation present. No thyromegaly present.   Cardiovascular: Normal rate and regular rhythm.    Pulmonary/Chest: " Effort normal and breath sounds normal.   Abdominal: Soft. She exhibits no distension and no mass. There is no tenderness. There is no rebound and no guarding. No hernia.   Genitourinary:   Genitourinary Comments: Not performed.    Musculoskeletal: She exhibits no edema or tenderness.   Lymphadenopathy:     She has no cervical adenopathy.   Neurological: She is alert and oriented to person, place, and time.   Skin: Skin is warm and dry.   Psychiatric: She has a normal mood and affect. Her behavior is normal. Judgment and thought content normal.       Assessment:       1. Ovarian cancer, unspecified laterality    2. Examination of participant in clinical trial        Plan:   Ovarian cancer, unspecified laterality  Proceed with cycle 10.    Examination of participant in clinical trial    Other orders  -     alteplase injection 2 mg; 2 mg by Intra-Catheter route as needed (To restore central venous catheter function).

## 2018-06-22 NOTE — PLAN OF CARE
Problem: Patient Care Overview  Goal: Plan of Care Review  Outcome: Ongoing (interventions implemented as appropriate)  Patient tolerated Forward1 trial drug mirvetuximab well today. NAD noted upon discharge. PIV removed, catheter tip intact. AVS given. Met with research RN Annette as well today. Verbalized understanding to call MD for any questions/concerns. Discharged home, ambulated independently.

## 2018-06-25 NOTE — PROGRESS NOTES
May 30, 2018     Protocol: FORWARD1/IMGN-0403  Principle Investigator: BETH Weston  Treating Physician: GUERITA Tran Pt Initials: NAV VAZ  Study ID: [id number]  IRB#: [id number]     FORWARD 1: A Randomized, Open Label Phase 3 Study to Evaluate the Safety and Efficacy of Mirvetuximab soravtansine ([id number]) Versus Investigators Choice of Chemotherapy in Women with Folate Receptor á?positive Advanced Epithelial Ovarian Cancer, Primary Peritoneal Cancer or Fallopian Tube Cancer      Cycle 9, Day 8  Study allocation was made on 11/27/17.  Patient randomized to Arm A:[id number], 6 mg/kg2(from [facility]), q 3 weeks.       Patient had scans on 4/2/18.  Per RECIST, patient has partial response to therapy.     Patient presented to clinic for C9D8 on the above mentioned clinical trial.  No MD visit on this day, per protocol.  Patient reports improvement in ocular symptoms - blurred vision and dry eye present but improved.  Shell see Dr. Mcgrath (Ophthalmologist) on 6/5/18 to evaluate current symptoms.  She confirms she has been compliant with Refresh and steroid eye drops.  Minor fatigue (stable) and or other symptoms reported.       Central performed on 5/30/18.  Reviewed baseline conditions and current medications with patient.  See Concomitant Medication and Baseline Medical History sheets.  Central safety labs collected, processed, and shipped to  per protocol.      All of the patient's questions were answered by me to her satisfaction. Patient expressed her willingness to continue to participate in the above mentioned clinical trial. Instructed patient to notify Dr. Tran and/or me with any questions, concerns, or changes in health status. Patient verbalized understanding.      Baseline Medical History  1. 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    2. Anxiety, Grade 1.  3. Dermatofibroma.  Benign.  Diagnosed by biopsy on 7/17/17.  No further intervention  needed.  4. Decreased platelet count, Grade 1.  Resolved 11/17/17.  5. Constipation, Grade 1.  6. Myalgia, Grade 1.  7. Peripheral neuropathy- feet, Grade 1.      Adverse Events - For the most up-to-date AE log, please refer to the paper AE log in the subjects research file.  1. Platelet count decreased/thrombocytopenia, remains Grade 1.  Will continue to monitor.  2. Peripheral neuropathy - fingers, remains Grade 1.  Patient tolerating neuropathy with medication. Will continue to monitor.  3. Fatigue, resolved Grade 1.  Will continue to monitor.  4. Blurred vision, Grade 1.  Repeat eye exam on 6/5/18 to evaluate.  Will continue to monitor.  5. Eye disorders - keratopathy, Grade 2.  Repeat eye exam on 6/5/18 to evaluate.  Will continue to monitor.  6. Dry eye, Grade 1.  Repeat eye exam on 6/5/18 to evaluate.  Will continue to monitor.

## 2018-06-25 NOTE — PROGRESS NOTES
"June 22, 2018     Protocol: FORWARD1/IMGN-0403  Principle Investigator: BETH Weston  Treating Physician: GUERITA Tran Pt Initials: NAV VAZ  Study ID: [id number]  IRB#: [id number]     FORWARD 1: A Randomized, Open Label Phase 3 Study to Evaluate the Safety and Efficacy of Mirvetuximab soravtansine (QGIU021) Versus Investigators Choice of Chemotherapy in Women with Folate Receptor á?positive Advanced Epithelial Ovarian Cancer, Primary Peritoneal Cancer or Fallopian Tube Cancer      Cycle 10, Day 1  Study allocation was made on 11/27/17.  Patient randomized to Arm A:[id number], 6 mg/kg2(from [facility]), q 3 weeks.       Patient had scans on 5/15/18.  Per RECIST, patient has continued "Partial Response" to therapy.  Week 30 scans scheduled for 6/29/18.     Patient presented to clinic for C10D1 on the above mentioned clinical trial.  Patient alert and oriented, mood and affect appropriate to the situation. She denies fever, nausea, diarrhea, vomiting, shortness of breath, or dizziness.  C10D1 treatment originally scheduled for 6/13/18 but delayed for decreased platelet count (platelets @ 77 on 6/13/18).  Platelets = 102 today, okay for treatment. Patient reports stable blurred vision, primarily left eye.  Patient reports continued neuropathy in bilateral fingers - mostly stable.  Patient reports having a "cold" with 6 days of congestion, cough, and occasional headache - no fever reported and not noted today.  Dr. Guerra recommends decongestant for symptoms but no other intervention prescribed.         Physical exam, ECOG assessment performed on 6/22/18.  ECOG = 1 per MD.  Local labs were performed on 6/22/18 and reviewed by the physician.   Physical exam unremarkable per physician, ocular symptoms stable.  Reviewed baseline conditions and current medications with patient.  See Concomitant Medication and Baseline Medical History sheets.  Central safety labs collected, processed, and shipped to  per protocol. QOL completed on 6/13/18.   "   All of the patient's questions were answered by Dr. Weston and me to her satisfaction. Patient expressed her willingness to continue to participate in the above mentioned clinical trial. Instructed patient to notify Dr. Tran and/or me with any questions, concerns, or changes in health status. Patient verbalized understanding.      Baseline Medical History  1. 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    2. Anxiety, Grade 1.  3. Dermatofibroma.  Benign.  Diagnosed by biopsy on 7/17/17.  No further intervention needed.  4. Decreased platelet count, Grade 1.  Resolved 11/17/17.  5. Constipation, Grade 1.  6. Myalgia, Grade 1.  7. Peripheral neuropathy- feet, Grade 1.      Adverse Events - For the most up-to-date AE log, please refer to the paper AE log in the subjects research file.  1. Platelet count decreased/thrombocytopenia, improved to Grade 1.  Will continue to monitor.  2. Peripheral neuropathy - fingers, remains Grade 1.  Patient discontinued Neurontin, tolerating neuropathy okay. Will continue to monitor.  3. Fatigue, remains Grade 1.  Will continue to monitor.  4. Nasal congestion, Grade 2.  Will continue to monitor.  5. Productive cough, Grade 2.  Will continue to monitor.  6. Headache - intermittent, Grade 1.  Will continue to monitor.

## 2018-06-26 ENCOUNTER — TELEPHONE (OUTPATIENT)
Dept: HEMATOLOGY/ONCOLOGY | Facility: CLINIC | Age: 63
End: 2018-06-26

## 2018-06-28 ENCOUNTER — LAB VISIT (OUTPATIENT)
Dept: LAB | Facility: HOSPITAL | Age: 63
End: 2018-06-28
Attending: OBSTETRICS & GYNECOLOGY
Payer: COMMERCIAL

## 2018-06-28 DIAGNOSIS — C56.9 MALIGNANT NEOPLASM OF OVARY, UNSPECIFIED LATERALITY: ICD-10-CM

## 2018-06-28 DIAGNOSIS — Z00.6 EXAMINATION OF PARTICIPANT IN CLINICAL TRIAL: ICD-10-CM

## 2018-06-28 LAB
ALBUMIN SERPL BCP-MCNC: 3.7 G/DL
ALP SERPL-CCNC: 112 U/L
ALT SERPL W/O P-5'-P-CCNC: 56 U/L
ANION GAP SERPL CALC-SCNC: 10 MMOL/L
AST SERPL-CCNC: 65 U/L
BASOPHILS # BLD AUTO: 0.04 K/UL
BASOPHILS NFR BLD: 1 %
BILIRUB SERPL-MCNC: 1.2 MG/DL
BUN SERPL-MCNC: 10 MG/DL
CALCIUM SERPL-MCNC: 9.8 MG/DL
CHLORIDE SERPL-SCNC: 101 MMOL/L
CO2 SERPL-SCNC: 27 MMOL/L
CREAT SERPL-MCNC: 0.7 MG/DL
DIFFERENTIAL METHOD: ABNORMAL
DRUG STUDY SPECIMEN TYPE: NORMAL
DRUG STUDY TEST NAME: NORMAL
DRUG STUDY TEST RESULT: NORMAL
EOSINOPHIL # BLD AUTO: 0.1 K/UL
EOSINOPHIL NFR BLD: 1.5 %
ERYTHROCYTE [DISTWIDTH] IN BLOOD BY AUTOMATED COUNT: 14.7 %
EST. GFR  (AFRICAN AMERICAN): >60 ML/MIN/1.73 M^2
EST. GFR  (NON AFRICAN AMERICAN): >60 ML/MIN/1.73 M^2
GLUCOSE SERPL-MCNC: 105 MG/DL
HCT VFR BLD AUTO: 42.4 %
HGB BLD-MCNC: 14.2 G/DL
IMM GRANULOCYTES # BLD AUTO: 0.01 K/UL
IMM GRANULOCYTES NFR BLD AUTO: 0.2 %
LYMPHOCYTES # BLD AUTO: 1 K/UL
LYMPHOCYTES NFR BLD: 25.1 %
MCH RBC QN AUTO: 30.4 PG
MCHC RBC AUTO-ENTMCNC: 33.5 G/DL
MCV RBC AUTO: 91 FL
MONOCYTES # BLD AUTO: 0.4 K/UL
MONOCYTES NFR BLD: 10.6 %
NEUTROPHILS # BLD AUTO: 2.5 K/UL
NEUTROPHILS NFR BLD: 61.6 %
NRBC BLD-RTO: 0 /100 WBC
PLATELET # BLD AUTO: 57 K/UL
PMV BLD AUTO: 11.9 FL
POTASSIUM SERPL-SCNC: 3.5 MMOL/L
PROT SERPL-MCNC: 7.2 G/DL
RBC # BLD AUTO: 4.67 M/UL
SODIUM SERPL-SCNC: 138 MMOL/L
WBC # BLD AUTO: 4.06 K/UL

## 2018-06-28 PROCEDURE — 36415 COLL VENOUS BLD VENIPUNCTURE: CPT

## 2018-06-28 PROCEDURE — 85025 COMPLETE CBC W/AUTO DIFF WBC: CPT

## 2018-06-28 PROCEDURE — 99000 SPECIMEN HANDLING OFFICE-LAB: CPT

## 2018-06-28 PROCEDURE — 80053 COMPREHEN METABOLIC PANEL: CPT

## 2018-06-29 ENCOUNTER — RESEARCH ENCOUNTER (OUTPATIENT)
Dept: RESEARCH | Facility: HOSPITAL | Age: 63
End: 2018-06-29

## 2018-06-29 ENCOUNTER — HOSPITAL ENCOUNTER (OUTPATIENT)
Dept: RADIOLOGY | Facility: OTHER | Age: 63
Discharge: HOME | End: 2018-06-29
Attending: OBSTETRICS & GYNECOLOGY
Payer: COMMERCIAL

## 2018-06-29 DIAGNOSIS — C56.9 MALIGNANT NEOPLASM OF OVARY, UNSPECIFIED LATERALITY: ICD-10-CM

## 2018-06-29 PROCEDURE — 71260 CT THORAX DX C+: CPT | Mod: 26,,, | Performed by: RADIOLOGY

## 2018-06-29 PROCEDURE — 25500020 PHARM REV CODE 255: Performed by: OBSTETRICS & GYNECOLOGY

## 2018-06-29 PROCEDURE — 74177 CT ABD & PELVIS W/CONTRAST: CPT | Mod: TC

## 2018-06-29 PROCEDURE — 74177 CT ABD & PELVIS W/CONTRAST: CPT | Mod: 26,,, | Performed by: RADIOLOGY

## 2018-06-29 RX ADMIN — IOHEXOL 100 ML: 350 INJECTION, SOLUTION INTRAVENOUS at 09:06

## 2018-06-29 RX ADMIN — IOHEXOL 15 ML: 350 INJECTION, SOLUTION INTRAVENOUS at 07:06

## 2018-06-29 NOTE — PROGRESS NOTES
Jun. 28, 2018    Protocol: FORWARD1/IMGN-0403  Principle Investigator: BETH Weston  Treating Physician: GUERITA Tran Pt Initials: NAV VAZ  Study ID: [id number]  IRB#: [id number]     FORWARD 1: A Randomized, Open Label Phase 3 Study to Evaluate the Safety and Efficacy of Mirvetuximab soravtansine ([id number]) Versus Investigators Choice of Chemotherapy in Women with Folate Receptor á?positive Advanced Epithelial Ovarian Cancer, Primary Peritoneal Cancer or Fallopian Tube Cancer      Cycle 10, Day 8  Study allocation was made on 11/27/17.  Patient randomized to Arm A:[id number], 6 mg/kg2(from [facility]), q 3 weeks.       Patient had scans on 4/2/18.  Per RECIST, patient has partial response to therapy.  Next scans scheduled for 6/29/18,     Patient presented to clinic for C10D8 on the above mentioned clinical trial.  No MD visit on this day, per protocol.  Patient reports no change in ocular symptoms - some blurred vision, no issue with dry eyes.  She confirms she has been compliant with Refresh and steroid eye drops.  Cold symptoms from C10D1 improving but not resolved.       Central and local labs collected on 6/28/18.  Reviewed baseline conditions and current medications with patient.  See Concomitant Medication and Baseline Medical History sheets.  Central safety labs collected, processed, and shipped to  per protocol.      All of the patient's questions were answered by me to her satisfaction. Patient expressed her willingness to continue to participate in the above mentioned clinical trial. Instructed patient to notify Dr. Tran and/or me with any questions, concerns, or changes in health status. Patient verbalized understanding.      Baseline Medical History  1. 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    2. Anxiety, Grade 1.  3. Dermatofibroma.  Benign.  Diagnosed by biopsy on 7/17/17.  No further intervention needed.  4. Decreased platelet count, Grade 1.  Resolved  11/17/17.  5. Constipation, Grade 1.  6. Myalgia, Grade 1.  7. Peripheral neuropathy- feet, Grade 1.      Adverse Events - For the most up-to-date AE log, please refer to the paper AE log in the subjects research file.  1. Platelet count decreased/thrombocytopenia, worsened to Grade 1.  Will continue to monitor.  2. Peripheral neuropathy - fingers, remains Grade 1.  Patient discontinued Neurontin, tolerating neuropathy okay. Will continue to monitor.  3. Fatigue, remains Grade 1.  Will continue to monitor.  4. Nasal congestion, improved to Grade 1.  Will continue to monitor.  5. Productive cough, improved to Grade 1.  Will continue to monitor.  6. Headache - intermittent, resolved from Grade 1.  Will continue to monitor.

## 2018-07-06 DIAGNOSIS — D49.59 OVARIAN NEOPLASM: ICD-10-CM

## 2018-07-12 ENCOUNTER — HOSPITAL ENCOUNTER (OUTPATIENT)
Dept: RADIOLOGY | Facility: HOSPITAL | Age: 63
Discharge: HOME | End: 2018-07-12
Attending: OBSTETRICS & GYNECOLOGY
Payer: COMMERCIAL

## 2018-07-12 DIAGNOSIS — D49.59 OVARIAN NEOPLASM: ICD-10-CM

## 2018-07-12 LAB — POCT GLUCOSE: 91 MG/DL (ref 70–110)

## 2018-07-12 PROCEDURE — 78815 PET IMAGE W/CT SKULL-THIGH: CPT | Mod: TC

## 2018-07-12 PROCEDURE — 78815 PET IMAGE W/CT SKULL-THIGH: CPT | Mod: 26,PS,, | Performed by: RADIOLOGY

## 2018-07-12 PROCEDURE — A9552 F18 FDG: HCPCS

## 2018-07-13 ENCOUNTER — RESEARCH ENCOUNTER (OUTPATIENT)
Dept: RESEARCH | Facility: HOSPITAL | Age: 63
End: 2018-07-13

## 2018-07-13 ENCOUNTER — OFFICE VISIT (OUTPATIENT)
Dept: GYNECOLOGIC ONCOLOGY | Facility: CLINIC | Age: 63
End: 2018-07-13
Payer: COMMERCIAL

## 2018-07-13 ENCOUNTER — LAB VISIT (OUTPATIENT)
Dept: LAB | Facility: OTHER | Age: 63
End: 2018-07-13
Attending: OBSTETRICS & GYNECOLOGY
Payer: COMMERCIAL

## 2018-07-13 VITALS
WEIGHT: 187 LBS | BODY MASS INDEX: 29.29 KG/M2 | SYSTOLIC BLOOD PRESSURE: 140 MMHG | HEART RATE: 81 BPM | DIASTOLIC BLOOD PRESSURE: 64 MMHG

## 2018-07-13 DIAGNOSIS — Z51.11 ENCOUNTER FOR ANTINEOPLASTIC CHEMOTHERAPY: ICD-10-CM

## 2018-07-13 DIAGNOSIS — Z00.6 EXAMINATION OF PARTICIPANT IN CLINICAL TRIAL: ICD-10-CM

## 2018-07-13 DIAGNOSIS — C56.9 OVARIAN CANCER, UNSPECIFIED LATERALITY: Primary | ICD-10-CM

## 2018-07-13 DIAGNOSIS — C56.9 MALIGNANT NEOPLASM OF OVARY, UNSPECIFIED LATERALITY: ICD-10-CM

## 2018-07-13 LAB
ALBUMIN SERPL BCP-MCNC: 3.8 G/DL
ALP SERPL-CCNC: 84 U/L
ALT SERPL W/O P-5'-P-CCNC: 59 U/L
ANION GAP SERPL CALC-SCNC: 9 MMOL/L
AST SERPL-CCNC: 60 U/L
BASOPHILS # BLD AUTO: 0.03 K/UL
BASOPHILS NFR BLD: 1 %
BILIRUB SERPL-MCNC: 0.9 MG/DL
BUN SERPL-MCNC: 9 MG/DL
CALCIUM SERPL-MCNC: 10 MG/DL
CHLORIDE SERPL-SCNC: 107 MMOL/L
CO2 SERPL-SCNC: 27 MMOL/L
CREAT SERPL-MCNC: 0.7 MG/DL
DIFFERENTIAL METHOD: ABNORMAL
EOSINOPHIL # BLD AUTO: 0.1 K/UL
EOSINOPHIL NFR BLD: 2.3 %
ERYTHROCYTE [DISTWIDTH] IN BLOOD BY AUTOMATED COUNT: 15.1 %
EST. GFR  (AFRICAN AMERICAN): >60 ML/MIN/1.73 M^2
EST. GFR  (NON AFRICAN AMERICAN): >60 ML/MIN/1.73 M^2
GLUCOSE SERPL-MCNC: 93 MG/DL
HCT VFR BLD AUTO: 46.2 %
HGB BLD-MCNC: 15.4 G/DL
LYMPHOCYTES # BLD AUTO: 0.9 K/UL
LYMPHOCYTES NFR BLD: 28.4 %
MCH RBC QN AUTO: 30.7 PG
MCHC RBC AUTO-ENTMCNC: 33.3 G/DL
MCV RBC AUTO: 92 FL
MONOCYTES # BLD AUTO: 0.3 K/UL
MONOCYTES NFR BLD: 9.6 %
NEUTROPHILS # BLD AUTO: 1.8 K/UL
NEUTROPHILS NFR BLD: 58.4 %
PLATELET # BLD AUTO: 85 K/UL
PMV BLD AUTO: 9.6 FL
POTASSIUM SERPL-SCNC: 4.7 MMOL/L
PROT SERPL-MCNC: 7.5 G/DL
RBC # BLD AUTO: 5.02 M/UL
SODIUM SERPL-SCNC: 143 MMOL/L
WBC # BLD AUTO: 3.03 K/UL

## 2018-07-13 PROCEDURE — 85025 COMPLETE CBC W/AUTO DIFF WBC: CPT

## 2018-07-13 PROCEDURE — 99999 PR PBB SHADOW E&M-EST. PATIENT-LVL II: CPT | Mod: PBBFAC,,, | Performed by: OBSTETRICS & GYNECOLOGY

## 2018-07-13 PROCEDURE — 80053 COMPREHEN METABOLIC PANEL: CPT

## 2018-07-13 PROCEDURE — 3008F BODY MASS INDEX DOCD: CPT | Mod: CPTII,S$GLB,, | Performed by: OBSTETRICS & GYNECOLOGY

## 2018-07-13 PROCEDURE — 99214 OFFICE O/P EST MOD 30 MIN: CPT | Mod: S$GLB,,, | Performed by: OBSTETRICS & GYNECOLOGY

## 2018-07-13 NOTE — PROGRESS NOTES
Subjective:      Patient ID: Mickie Don is a 62 y.o. female.    Chief Complaint: No chief complaint on file.    Treatment History  Xlap/Omentectomy on 11/1/16 - mass was unresectable  Stage IIIC ovarian cancer  Initiated Taxotere/Carbo on 11/18/16, now s/p 6 cycles completed 3/8/17  Xlap/ISIS/BSO/Radical mass resection > 10 cm, optimal debulking on 4/11/17  Initiated PRIMA on 8/21/17  BRCA negative    HPI  Here today for FORWARD1 visit, C11D1.   PS is 0.  Continues to see optho and reports improved vision with eye drops.  Otherwise without complaints today.  Denies F/C, N/V, VB.  CBC reviewed and noted to be thrombocytopenic with plt 85.   Will therefore need to be delayed.  Had a CT with unclear read related to disease status.  Subsequent PET r/o pelvic findings from CT as malignant.  She did have a para-aortic node that was positive but was previously described on CT.  Given these findings, I concluded there was no new disease and that she could continue on trial.       Review of Systems   Constitutional: Positive for fatigue (off of work this week). Negative for appetite change, chills, fever and unexpected weight change.   HENT: Negative for mouth sores.    Respiratory: Negative for cough and shortness of breath.    Cardiovascular: Negative for leg swelling.   Gastrointestinal: Negative for abdominal pain, blood in stool, constipation, diarrhea, nausea and vomiting.   Endocrine: Negative for cold intolerance.   Genitourinary: Negative for decreased urine volume, dysuria, frequency, hematuria, pelvic pain, vaginal bleeding, vaginal discharge and vaginal pain.   Musculoskeletal: Negative for back pain and myalgias.   Skin: Negative for rash.   Allergic/Immunologic: Negative.    Neurological: Negative for weakness, numbness and headaches.   Hematological: Negative for adenopathy. Does not bruise/bleed easily.   Psychiatric/Behavioral: Negative for confusion. The patient is not nervous/anxious.        Objective:    Physical Exam:   Constitutional: She is oriented to person, place, and time. She appears well-developed and well-nourished. No distress.    HENT:   Head: Normocephalic and atraumatic.    Eyes: No scleral icterus.    Neck: Normal range of motion. Neck supple.    Cardiovascular: Normal rate and intact distal pulses.  Exam reveals no cyanosis and no edema.     Pulmonary/Chest: Effort normal. No respiratory distress. She exhibits no tenderness.        Abdominal: Soft. She exhibits no distension, no fluid wave, no ascites and no mass. There is no tenderness. There is no rebound and no guarding. No hernia.     Genitourinary: Rectum normal and vagina normal. Pelvic exam was performed with patient supine. There is no rash, tenderness or lesion on the right labia. There is no rash, tenderness or lesion on the left labia. Uterus is absent. There is an absent adnexa. Right adnexum displays no mass, no tenderness and no fullness. Left adnexum displays no mass, no tenderness and no fullness. No bleeding (no cuff lesions) or unspecified prolapse of vaginal walls in the vagina. No vaginal discharge found. Vaginal cuff normal.Labial bartholins normal.Cervix exhibits absence.   Genitourinary Comments: Pelvic exam deferred           Musculoskeletal: Moves all extremeties.      Lymphadenopathy:     She has no cervical adenopathy.        Right: No inguinal and no supraclavicular adenopathy present.        Left: No inguinal and no supraclavicular adenopathy present.    Neurological: She is alert and oriented to person, place, and time.    Skin: Skin is warm and dry. No rash noted. No cyanosis.    Psychiatric: She has a normal mood and affect.       Assessment:     1. Ovarian cancer, unspecified laterality    2. Encounter for antineoplastic chemotherapy    3. Examination of participant in clinical trial        Plan:     Will delay 1 week due to plt count.  Repeat labs in 1 week and will try to treat then.  Discussed radiology results  with her and all questions answered.

## 2018-07-18 ENCOUNTER — TELEPHONE (OUTPATIENT)
Dept: GYNECOLOGIC ONCOLOGY | Facility: CLINIC | Age: 63
End: 2018-07-18

## 2018-07-19 ENCOUNTER — RESEARCH ENCOUNTER (OUTPATIENT)
Dept: RESEARCH | Facility: HOSPITAL | Age: 63
End: 2018-07-19

## 2018-07-19 ENCOUNTER — LAB VISIT (OUTPATIENT)
Dept: LAB | Facility: HOSPITAL | Age: 63
End: 2018-07-19
Payer: COMMERCIAL

## 2018-07-19 ENCOUNTER — OFFICE VISIT (OUTPATIENT)
Dept: GYNECOLOGIC ONCOLOGY | Facility: CLINIC | Age: 63
End: 2018-07-19
Payer: COMMERCIAL

## 2018-07-19 VITALS
BODY MASS INDEX: 30.69 KG/M2 | DIASTOLIC BLOOD PRESSURE: 57 MMHG | SYSTOLIC BLOOD PRESSURE: 128 MMHG | HEIGHT: 67 IN | WEIGHT: 195.56 LBS | HEART RATE: 71 BPM

## 2018-07-19 DIAGNOSIS — D69.59 THROMBOCYTOPENIA DUE TO DRUGS: ICD-10-CM

## 2018-07-19 DIAGNOSIS — Z51.11 CHEMOTHERAPY MANAGEMENT, ENCOUNTER FOR: Primary | ICD-10-CM

## 2018-07-19 DIAGNOSIS — C56.9 MALIGNANT NEOPLASM OF OVARY, UNSPECIFIED LATERALITY: ICD-10-CM

## 2018-07-19 DIAGNOSIS — Z00.6 EXAMINATION OF PARTICIPANT IN CLINICAL TRIAL: ICD-10-CM

## 2018-07-19 DIAGNOSIS — T50.905A THROMBOCYTOPENIA DUE TO DRUGS: ICD-10-CM

## 2018-07-19 LAB
ALBUMIN SERPL BCP-MCNC: 3.5 G/DL
ALP SERPL-CCNC: 85 U/L
ALT SERPL W/O P-5'-P-CCNC: 40 U/L
ANION GAP SERPL CALC-SCNC: 6 MMOL/L
AST SERPL-CCNC: 42 U/L
BASOPHILS # BLD AUTO: 0.02 K/UL
BASOPHILS NFR BLD: 0.7 %
BILIRUB SERPL-MCNC: 0.9 MG/DL
BUN SERPL-MCNC: 8 MG/DL
CALCIUM SERPL-MCNC: 9.4 MG/DL
CHLORIDE SERPL-SCNC: 105 MMOL/L
CO2 SERPL-SCNC: 30 MMOL/L
CREAT SERPL-MCNC: 0.7 MG/DL
DIFFERENTIAL METHOD: ABNORMAL
DRUG STUDY SPECIMEN TYPE: NORMAL
DRUG STUDY TEST NAME: NORMAL
DRUG STUDY TEST RESULT: NORMAL
EOSINOPHIL # BLD AUTO: 0.1 K/UL
EOSINOPHIL NFR BLD: 2.6 %
ERYTHROCYTE [DISTWIDTH] IN BLOOD BY AUTOMATED COUNT: 14.9 %
EST. GFR  (AFRICAN AMERICAN): >60 ML/MIN/1.73 M^2
EST. GFR  (NON AFRICAN AMERICAN): >60 ML/MIN/1.73 M^2
GLUCOSE SERPL-MCNC: 92 MG/DL
HCT VFR BLD AUTO: 43.5 %
HGB BLD-MCNC: 14.2 G/DL
IMM GRANULOCYTES # BLD AUTO: 0 K/UL
IMM GRANULOCYTES NFR BLD AUTO: 0 %
LYMPHOCYTES # BLD AUTO: 0.7 K/UL
LYMPHOCYTES NFR BLD: 24.5 %
MCH RBC QN AUTO: 30.3 PG
MCHC RBC AUTO-ENTMCNC: 32.6 G/DL
MCV RBC AUTO: 93 FL
MONOCYTES # BLD AUTO: 0.3 K/UL
MONOCYTES NFR BLD: 9.5 %
NEUTROPHILS # BLD AUTO: 1.7 K/UL
NEUTROPHILS NFR BLD: 62.7 %
NRBC BLD-RTO: 0 /100 WBC
PLATELET # BLD AUTO: 88 K/UL
PMV BLD AUTO: 10 FL
POTASSIUM SERPL-SCNC: 4.2 MMOL/L
PROT SERPL-MCNC: 6.8 G/DL
RBC # BLD AUTO: 4.69 M/UL
SODIUM SERPL-SCNC: 141 MMOL/L
WBC # BLD AUTO: 2.74 K/UL

## 2018-07-19 PROCEDURE — 80053 COMPREHEN METABOLIC PANEL: CPT

## 2018-07-19 PROCEDURE — 99000 SPECIMEN HANDLING OFFICE-LAB: CPT

## 2018-07-19 PROCEDURE — 36415 COLL VENOUS BLD VENIPUNCTURE: CPT

## 2018-07-19 PROCEDURE — 85025 COMPLETE CBC W/AUTO DIFF WBC: CPT

## 2018-07-19 PROCEDURE — 3008F BODY MASS INDEX DOCD: CPT | Mod: CPTII,S$GLB,, | Performed by: OBSTETRICS & GYNECOLOGY

## 2018-07-19 PROCEDURE — 99999 PR PBB SHADOW E&M-EST. PATIENT-LVL III: CPT | Mod: PBBFAC,,, | Performed by: OBSTETRICS & GYNECOLOGY

## 2018-07-19 PROCEDURE — 99213 OFFICE O/P EST LOW 20 MIN: CPT | Mod: S$GLB,,, | Performed by: OBSTETRICS & GYNECOLOGY

## 2018-07-19 RX ORDER — PREDNISOLONE ACETATE 10 MG/ML
1 SUSPENSION/ DROPS OPHTHALMIC
COMMUNITY
End: 2018-09-10 | Stop reason: SDUPTHER

## 2018-07-19 NOTE — PROGRESS NOTES
"Subjective:       Patient ID: Mickie Don is a 62 y.o. female.    Chief Complaint: Ovarian Cancer (follow up )    HPI     Patient comes in today for cycle 11/D1 of FORWARD1 trial. She is on study drug.      Didn't get treatment last week due to thrombocytopenia. However, platelet count today is 88,000. WBC 2740 (3,030) ANC 1700.      She is a patient of Dr. Tran and is on FORWARD1.        Performance Status: 1.   Blurring of left eye. Stable. Grade 1.      Treatment History  Xlap/Omentectomy on 11/1/16 - mass was unresectable  Stage IIIC ovarian cancer  Initiated Taxotere/Carbo on 11/18/16, now s/p 6 cycles completed 3/8/17  Xlap/ISIS/BSO/Radical mass resection > 10 cm, optimal debulking on 4/11/17  Initiated PRIMA on 8/21/17  BRCA negative     Review of Systems   Constitutional: Negative for chills, fatigue and fever.   Respiratory: Negative for cough, shortness of breath and wheezing.    Cardiovascular: Negative for chest pain, palpitations and leg swelling.   Gastrointestinal: Negative for abdominal pain, constipation, diarrhea, nausea and vomiting.   Genitourinary: Negative for difficulty urinating, dysuria, frequency, genital sores, hematuria, urgency, vaginal bleeding, vaginal discharge and vaginal pain.   Musculoskeletal: Negative for gait problem.   Neurological: Positive for numbness (hands and feet. ). Negative for weakness.   Hematological: Negative for adenopathy. Does not bruise/bleed easily.   Psychiatric/Behavioral: The patient is not nervous/anxious.        Objective:   BP (!) 128/57   Pulse 71   Ht 5' 7" (1.702 m)   Wt 88.7 kg (195 lb 8.8 oz)   BMI 30.63 kg/m²      Physical Exam      Not performed.     Assessment:       1. Chemotherapy management, encounter for    2. Malignant neoplasm of ovary, unspecified laterality    3. Thrombocytopenia due to drugs        Plan:   Chemotherapy management, encounter for  Cancel chemotherapy today due to low plt count.   Repeat labs in 1 week.   Per " protocol plt count needs to to 100,000 or greater.   No dose reduction due to thrombocytopenia.   If goes 3 weeks post planned treatment date, will need to contact sponsor to see if patient can continue with a dose reduction.     Malignant neoplasm of ovary, unspecified laterality    Thrombocytopenia due to drugs

## 2018-07-26 ENCOUNTER — RESEARCH ENCOUNTER (OUTPATIENT)
Dept: RESEARCH | Facility: HOSPITAL | Age: 63
End: 2018-07-26

## 2018-07-26 ENCOUNTER — OFFICE VISIT (OUTPATIENT)
Dept: GYNECOLOGIC ONCOLOGY | Facility: CLINIC | Age: 63
End: 2018-07-26
Payer: COMMERCIAL

## 2018-07-26 VITALS
DIASTOLIC BLOOD PRESSURE: 57 MMHG | WEIGHT: 192.69 LBS | SYSTOLIC BLOOD PRESSURE: 120 MMHG | BODY MASS INDEX: 30.24 KG/M2 | HEIGHT: 67 IN | HEART RATE: 69 BPM

## 2018-07-26 DIAGNOSIS — D69.59 THROMBOCYTOPENIA DUE TO DRUGS: ICD-10-CM

## 2018-07-26 DIAGNOSIS — C56.9 OVARIAN CANCER, UNSPECIFIED LATERALITY: Primary | ICD-10-CM

## 2018-07-26 DIAGNOSIS — T50.905A THROMBOCYTOPENIA DUE TO DRUGS: ICD-10-CM

## 2018-07-26 PROCEDURE — 99999 PR PBB SHADOW E&M-EST. PATIENT-LVL III: CPT | Mod: PBBFAC,,, | Performed by: OBSTETRICS & GYNECOLOGY

## 2018-07-26 PROCEDURE — 3008F BODY MASS INDEX DOCD: CPT | Mod: CPTII,S$GLB,, | Performed by: OBSTETRICS & GYNECOLOGY

## 2018-07-26 PROCEDURE — 99212 OFFICE O/P EST SF 10 MIN: CPT | Mod: S$GLB,,, | Performed by: OBSTETRICS & GYNECOLOGY

## 2018-07-26 NOTE — PROGRESS NOTES
Jul. 13, 2018     Protocol: FORWARD1/IMGN-0403  Principle Investigator: BETH Weston  Treating Physician: GUERITA Tran Pt Initials: NAV VAZ  Study ID: [id number]  IRB#: [id number]     FORWARD 1: A Randomized, Open Label Phase 3 Study to Evaluate the Safety and Efficacy of Mirvetuximab soravtansine (SWWA340) Versus Investigators Choice of Chemotherapy in Women with Folate Receptor ??positive Advanced Epithelial Ovarian Cancer, Primary Peritoneal Cancer or Fallopian Tube Cancer      Unscheduled - planned as C11  Study allocation was made on 11/27/17.  Patient randomized to Arm A:[id number], 6 mg/kg2(from [facility]), q 3 weeks.       Patient had scans on 6/29/18, which had some unclear pelvic finds related to disease status.  PET on 7/12/18 ruled out pelvic findings from CT as malignant.  PET reported a para-aortic node, which was previously described on CT.  MD concluded there is no new disease and she could continue on trial.  Per RECIST, patient has partial response to therapy.  Next scans due 8/8/18 ± 7 day.       Patient presented to clinic for C11 on the above mentioned clinical trial.  Platelets are 85k today - must be ? 100k for treatment.  She will delay treatment 1 week.  Patient reports no change in ocular symptoms - some blurred vision, no issue with dry eyes.  She confirms she has been compliant with Refresh eye drops.  Neuropathy mostly stable.       Local labs drawn on 7/13/18, no central labs collected.  Reviewed baseline conditions and current medications with patient.  See Concomitant Medication and Baseline Medical History sheets.  Central safety labs collected, processed, and shipped to  per protocol.      All of the patient's questions were answered by me to her satisfaction. Patient expressed her willingness to continue to participate in the above mentioned clinical trial. Instructed patient to notify Dr. Tran and/or me with any questions, concerns, or changes in health status. Patient verbalized understanding.       Baseline Medical History  1. 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    2. Anxiety, Grade 1.  3. Dermatofibroma.  Benign.  Diagnosed by biopsy on 7/17/17.  No further intervention needed.  4. Decreased platelet count, Grade 1.  Resolved 11/17/17.  5. Constipation, Grade 1.  6. Myalgia, Grade 1.  7. Peripheral neuropathy- feet, Grade 1.      Adverse Events - For the most up-to-date AE log, please refer to the paper AE log in the subjects research file.  1. Platelet count decreased/thrombocytopenia,  remains Grade 1.  Too low for treatment today.  Will continue to monitor.  2. Peripheral neuropathy - fingers, remains Grade 1.  Patient discontinued Neurontin, tolerating neuropathy okay. Will continue to monitor.  3. Fatigue, remains Grade 1.  Will continue to monitor.

## 2018-07-26 NOTE — PROGRESS NOTES
Jul. 26, 2018     Protocol: FORWARD1/IMGN-0403  Principle Investigator: BETH Weston  Treating Physician: GUERITA Tran Pt Initials: NAV VAZ  Study ID: [id number]  IRB#: [id number]     FORWARD 1: A Randomized, Open Label Phase 3 Study to Evaluate the Safety and Efficacy of Mirvetuximab soravtansine ([id number]) Versus Investigators Choice of Chemotherapy in Women with Folate Receptor á?positive Advanced Epithelial Ovarian Cancer, Primary Peritoneal Cancer or Fallopian Tube Cancer      Unscheduled - planned as C11  Study allocation was made on 11/27/17.  Patient randomized to Arm A:[id number], 6 mg/kg2(from [facility]), q 3 weeks.       Patient had scans on 6/29/18, which had some unclear pelvic finds related to disease status.  PET on 7/12/18 ruled out pelvic findings from CT as malignant.  PET reported a para-aortic node, which was previously described on CT.  MD concluded there is no new disease and she could continue on trial.  Per RECIST, patient has partial response to therapy.  Next scans due 8/8/18 ± 7 day.       Patient presented to clinic for C11 on the above mentioned clinical trial.  Platelets are 82k today - must be ? 100k for treatment.  She will delay treatment 1 week.  If patient is not able to resume treatments next week, she will have to d/c protocol treatment or we will need a wiaver from study sponsor.  Patient reports no change in ocular symptoms - some blurred vision, no issue with dry eyes.  She confirms she has been compliant with Refresh eye drops.  Neuropathy mostly stable.       Central and local labs collected on 7/26/18.  Reviewed baseline conditions and current medications with patient.  See Concomitant Medication and Baseline Medical History sheets.  Central safety labs collected, processed, and shipped to  per protocol.      All of the patient's questions were answered by me to her satisfaction. Patient expressed her willingness to continue to participate in the above mentioned clinical trial. Instructed  patient to notify Dr. rTan and/or me with any questions, concerns, or changes in health status. Patient verbalized understanding.      Baseline Medical History  1. 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    2. Anxiety, Grade 1.  3. Dermatofibroma.  Benign.  Diagnosed by biopsy on 7/17/17.  No further intervention needed.  4. Decreased platelet count, Grade 1.  Resolved 11/17/17.  5. Constipation, Grade 1.  6. Myalgia, Grade 1.  7. Peripheral neuropathy- feet, Grade 1.      Adverse Events - For the most up-to-date AE log, please refer to the paper AE log in the subjects research file.  1. Platelet count decreased/thrombocytopenia,  remains Grade 1.  Too low for treatment today.  Will continue to monitor.  2. Peripheral neuropathy - fingers, remains Grade 1.  Patient discontinued Neurontin, tolerating neuropathy okay. Will continue to monitor.  3. Fatigue, remains Grade 1.  Will continue to monitor.

## 2018-07-26 NOTE — PROGRESS NOTES
"Subjective:       Patient ID: Mickie Don is a 62 y.o. female.    Chief Complaint: Chemotherapy (C11/D1/Forward1 Trial ) and Ovarian Cancer    HPI     Patient comes in today for cycle 11/D8 of FORWARD1 trial. She is on study drug.      Didn't get treatment last week due to thrombocytopenia.     However, platelet count today is 82,000 (<88,000) WBC 3600 ANC 2300.      She is a patient of Dr. Tran and is on FORWARD1.         Performance Status: 1.   Blurring of left eye. Stable. Grade 1.      Treatment History  Xlap/Omentectomy on 11/1/16 - mass was unresectable  Stage IIIC ovarian cancer  Initiated Taxotere/Carbo on 11/18/16, now s/p 6 cycles completed 3/8/17  Xlap/ISIS/BSO/Radical mass resection > 10 cm, optimal debulking on 4/11/17  Initiated PRIMA on 8/21/17  BRCA negative  Review of Systems   Constitutional: Negative for chills, fatigue and fever.   Respiratory: Negative for cough, shortness of breath and wheezing.    Cardiovascular: Negative for chest pain, palpitations and leg swelling.   Gastrointestinal: Negative for abdominal pain, constipation, diarrhea, nausea and vomiting.   Genitourinary: Negative for difficulty urinating, dysuria, frequency, genital sores, hematuria, urgency, vaginal bleeding, vaginal discharge and vaginal pain.   Neurological: Positive for numbness (feet). Negative for weakness.   Hematological: Negative for adenopathy. Does not bruise/bleed easily.   Psychiatric/Behavioral: The patient is not nervous/anxious.        Objective:   BP (!) 120/57   Pulse 69   Ht 5' 7" (1.702 m)   Wt 87.4 kg (192 lb 10.9 oz)   BMI 30.18 kg/m²      Physical Exam    Not performed.   Assessment:       1. Ovarian cancer, unspecified laterality    2. Thrombocytopenia due to drugs        Plan:   Ovarian cancer, unspecified laterality  Cancel chemotherapy today.   Recheck labs in 1 week.   Will send message to trial monitor to see if she can be treated at a lower PLT count and with dose reduction. "   Thrombocytopenia due to drugs

## 2018-07-26 NOTE — PROGRESS NOTES
Jul. 19, 2018     Protocol: FORWARD1/IMGN-0403  Principle Investigator: BETH Weston  Treating Physician: GUERITA Tran Pt Initials: NAV VAZ  Study ID: [id number]  IRB#: [id number]     FORWARD 1: A Randomized, Open Label Phase 3 Study to Evaluate the Safety and Efficacy of Mirvetuximab soravtansine (KUEA590) Versus Investigators Choice of Chemotherapy in Women with Folate Receptor ??positive Advanced Epithelial Ovarian Cancer, Primary Peritoneal Cancer or Fallopian Tube Cancer      Unscheduled - planned as C11  Study allocation was made on 11/27/17.  Patient randomized to Arm A:[id number], 6 mg/kg2(from [facility]), q 3 weeks.       Patient had scans on 6/29/18, which had some unclear pelvic finds related to disease status.  PET on 7/12/18 ruled out pelvic findings from CT as malignant.  PET reported a para-aortic node, which was previously described on CT.  MD concluded there is no new disease and she could continue on trial.  Per RECIST, patient has partial response to therapy.  Next scans due 8/8/18 ± 7 day.       Patient presented to clinic for C11 on the above mentioned clinical trial.  Platelets are 88k today - must be ? 100k for treatment.  She will delay treatment 1 week.  Patient reports no change in ocular symptoms - some blurred vision, no issue with dry eyes.  She confirms she has been compliant with Refresh eye drops.  Neuropathy mostly stable.       Central and local labs collected on 7/19/18.  Reviewed baseline conditions and current medications with patient.  See Concomitant Medication and Baseline Medical History sheets.  Central safety labs collected, processed, and shipped to  per protocol.      All of the patient's questions were answered by me to her satisfaction. Patient expressed her willingness to continue to participate in the above mentioned clinical trial. Instructed patient to notify Dr. Guerra/Chelsea and/or me with any questions, concerns, or changes in health status. Patient verbalized understanding.       Baseline Medical History  1. 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    2. Anxiety, Grade 1.  3. Dermatofibroma.  Benign.  Diagnosed by biopsy on 7/17/17.  No further intervention needed.  4. Decreased platelet count, Grade 1.  Resolved 11/17/17.  5. Constipation, Grade 1.  6. Myalgia, Grade 1.  7. Peripheral neuropathy- feet, Grade 1.      Adverse Events - For the most up-to-date AE log, please refer to the paper AE log in the subjects research file.  1. Platelet count decreased/thrombocytopenia,  remains Grade 1.  Too low for treatment today.  Will continue to monitor.  2. Peripheral neuropathy - fingers, remains Grade 1.  Patient discontinued Neurontin, tolerating neuropathy okay. Will continue to monitor.  3. Fatigue, remains Grade 1.  Will continue to monitor.

## 2018-08-03 ENCOUNTER — LAB VISIT (OUTPATIENT)
Dept: LAB | Facility: HOSPITAL | Age: 63
End: 2018-08-03
Attending: INTERNAL MEDICINE
Payer: COMMERCIAL

## 2018-08-03 DIAGNOSIS — C56.9 MALIGNANT NEOPLASM OF OVARY, UNSPECIFIED LATERALITY: ICD-10-CM

## 2018-08-03 DIAGNOSIS — C56.9 OVARIAN CANCER, UNSPECIFIED LATERALITY: ICD-10-CM

## 2018-08-03 DIAGNOSIS — Z00.6 EXAMINATION OF PARTICIPANT IN CLINICAL TRIAL: ICD-10-CM

## 2018-08-03 LAB
ALBUMIN SERPL BCP-MCNC: 3.8 G/DL
ALP SERPL-CCNC: 85 U/L
ALT SERPL W/O P-5'-P-CCNC: 38 U/L
ANION GAP SERPL CALC-SCNC: 6 MMOL/L
AST SERPL-CCNC: 40 U/L
BASOPHILS # BLD AUTO: 0.03 K/UL
BASOPHILS NFR BLD: 0.7 %
BILIRUB SERPL-MCNC: 0.5 MG/DL
BUN SERPL-MCNC: 14 MG/DL
CALCIUM SERPL-MCNC: 9.9 MG/DL
CANCER AG125 SERPL-ACNC: 14 U/ML
CHLORIDE SERPL-SCNC: 102 MMOL/L
CO2 SERPL-SCNC: 33 MMOL/L
CREAT SERPL-MCNC: 0.8 MG/DL
DIFFERENTIAL METHOD: ABNORMAL
EOSINOPHIL # BLD AUTO: 0.1 K/UL
EOSINOPHIL NFR BLD: 2.5 %
ERYTHROCYTE [DISTWIDTH] IN BLOOD BY AUTOMATED COUNT: 15.4 %
EST. GFR  (AFRICAN AMERICAN): >60 ML/MIN/1.73 M^2
EST. GFR  (NON AFRICAN AMERICAN): >60 ML/MIN/1.73 M^2
GLUCOSE SERPL-MCNC: 101 MG/DL
HCT VFR BLD AUTO: 42 %
HGB BLD-MCNC: 13.9 G/DL
LYMPHOCYTES # BLD AUTO: 1.1 K/UL
LYMPHOCYTES NFR BLD: 26 %
MCH RBC QN AUTO: 29.8 PG
MCHC RBC AUTO-ENTMCNC: 33.1 G/DL
MCV RBC AUTO: 90 FL
MONOCYTES # BLD AUTO: 0.5 K/UL
MONOCYTES NFR BLD: 11.1 %
NEUTROPHILS # BLD AUTO: 2.6 K/UL
NEUTROPHILS NFR BLD: 59.7 %
PLATELET # BLD AUTO: 111 K/UL
PMV BLD AUTO: 10 FL
POTASSIUM SERPL-SCNC: 4.6 MMOL/L
PROT SERPL-MCNC: 7 G/DL
RBC # BLD AUTO: 4.66 M/UL
SODIUM SERPL-SCNC: 141 MMOL/L
WBC # BLD AUTO: 4.34 K/UL

## 2018-08-03 PROCEDURE — 86304 IMMUNOASSAY TUMOR CA 125: CPT

## 2018-08-03 PROCEDURE — 80053 COMPREHEN METABOLIC PANEL: CPT

## 2018-08-03 PROCEDURE — 36415 COLL VENOUS BLD VENIPUNCTURE: CPT

## 2018-08-03 PROCEDURE — 85025 COMPLETE CBC W/AUTO DIFF WBC: CPT

## 2018-08-06 ENCOUNTER — INFUSION (OUTPATIENT)
Dept: INFUSION THERAPY | Facility: HOSPITAL | Age: 63
End: 2018-08-06
Attending: OBSTETRICS & GYNECOLOGY
Payer: COMMERCIAL

## 2018-08-06 ENCOUNTER — OFFICE VISIT (OUTPATIENT)
Dept: GYNECOLOGIC ONCOLOGY | Facility: CLINIC | Age: 63
End: 2018-08-06
Payer: COMMERCIAL

## 2018-08-06 ENCOUNTER — RESEARCH ENCOUNTER (OUTPATIENT)
Dept: RESEARCH | Facility: HOSPITAL | Age: 63
End: 2018-08-06

## 2018-08-06 VITALS
SYSTOLIC BLOOD PRESSURE: 114 MMHG | DIASTOLIC BLOOD PRESSURE: 55 MMHG | HEART RATE: 66 BPM | HEIGHT: 67 IN | WEIGHT: 193.13 LBS | BODY MASS INDEX: 30.31 KG/M2

## 2018-08-06 VITALS
DIASTOLIC BLOOD PRESSURE: 56 MMHG | HEART RATE: 67 BPM | RESPIRATION RATE: 18 BRPM | SYSTOLIC BLOOD PRESSURE: 112 MMHG | TEMPERATURE: 98 F

## 2018-08-06 DIAGNOSIS — Z51.11 ENCOUNTER FOR ANTINEOPLASTIC CHEMOTHERAPY: ICD-10-CM

## 2018-08-06 DIAGNOSIS — C56.9 OVARIAN CANCER, UNSPECIFIED LATERALITY: ICD-10-CM

## 2018-08-06 DIAGNOSIS — Z00.6 EXAMINATION OF PARTICIPANT IN CLINICAL TRIAL: Primary | ICD-10-CM

## 2018-08-06 PROCEDURE — 25000003 PHARM REV CODE 250: Performed by: OBSTETRICS & GYNECOLOGY

## 2018-08-06 PROCEDURE — 63600175 PHARM REV CODE 636 W HCPCS: Performed by: OBSTETRICS & GYNECOLOGY

## 2018-08-06 PROCEDURE — 96413 CHEMO IV INFUSION 1 HR: CPT

## 2018-08-06 PROCEDURE — 99999 PR PBB SHADOW E&M-EST. PATIENT-LVL III: CPT | Mod: PBBFAC,,, | Performed by: OBSTETRICS & GYNECOLOGY

## 2018-08-06 PROCEDURE — 3008F BODY MASS INDEX DOCD: CPT | Mod: CPTII,S$GLB,, | Performed by: OBSTETRICS & GYNECOLOGY

## 2018-08-06 PROCEDURE — 99214 OFFICE O/P EST MOD 30 MIN: CPT | Mod: S$GLB,,, | Performed by: OBSTETRICS & GYNECOLOGY

## 2018-08-06 PROCEDURE — 96367 TX/PROPH/DG ADDL SEQ IV INF: CPT

## 2018-08-06 RX ORDER — ACETAMINOPHEN 325 MG/1
650 TABLET ORAL
Status: COMPLETED | OUTPATIENT
Start: 2018-08-06 | End: 2018-08-06

## 2018-08-06 RX ORDER — ACETAMINOPHEN 325 MG/1
650 TABLET ORAL
Status: CANCELLED | OUTPATIENT
Start: 2018-08-06

## 2018-08-06 RX ORDER — SODIUM CHLORIDE 0.9 % (FLUSH) 0.9 %
10 SYRINGE (ML) INJECTION
Status: CANCELLED | OUTPATIENT
Start: 2018-08-06

## 2018-08-06 RX ORDER — HEPARIN 100 UNIT/ML
500 SYRINGE INTRAVENOUS
Status: CANCELLED | OUTPATIENT
Start: 2018-08-06

## 2018-08-06 RX ADMIN — ACETAMINOPHEN 650 MG: 325 TABLET, FILM COATED ORAL at 11:08

## 2018-08-06 RX ADMIN — DEXTROSE: 50 INJECTION, SOLUTION INTRAVENOUS at 11:08

## 2018-08-06 RX ADMIN — DIPHENHYDRAMINE HYDROCHLORIDE 50 MG: 50 INJECTION, SOLUTION INTRAMUSCULAR; INTRAVENOUS at 11:08

## 2018-08-06 RX ADMIN — DEXAMETHASONE SODIUM PHOSPHATE 10 MG: 4 INJECTION, SOLUTION INTRA-ARTICULAR; INTRALESIONAL; INTRAMUSCULAR; INTRAVENOUS; SOFT TISSUE at 11:08

## 2018-08-06 NOTE — PLAN OF CARE
Problem: Patient Care Overview  Goal: Plan of Care Review  Outcome: Ongoing (interventions implemented as appropriate)  Pt received INV mirvetuximab at a rate of 150cc/hr with no complications. VSS. Pt instructed to call MD with any problems. NAD. Pt discharged home independently.

## 2018-08-07 NOTE — PROGRESS NOTES
Aug. 6, 2018     Protocol: FORWARD1/IMGN-0403  Principle Investigator: BETH Weston  Treating Physician: GUERITA Tran Pt Initials: NAV VAZ  Study ID: [id number]  IRB#: [id number]     FORWARD 1: A Randomized, Open Label Phase 3 Study to Evaluate the Safety and Efficacy of Mirvetuximab soravtansine (IWTM323) Versus Investigators Choice of Chemotherapy in Women with Folate Receptor á?positive Advanced Epithelial Ovarian Cancer, Primary Peritoneal Cancer or Fallopian Tube Cancer      Cycle 11  Study allocation was made on 11/27/17.  Patient randomized to Arm A:[id number], 6 mg/kg2(from [facility]), q 3 weeks.       Patient had scans on 6/29/18, which had some unclear pelvic finds related to disease status.  PET on 7/12/18 ruled out pelvic findings from CT as malignant.  PET reported a para-aortic node, which was previously described on CT.  MD concluded there is no new disease and she could continue on trial.  Per RECIST, patient has partial response to therapy.  Next scans scheduled for 8/10/18.       Patient presented to clinic for C11 on the above mentioned clinical trial.  C11 was originally scheduled for 7/13/18.  Patient has been delayed for 3 weeks due to thrombocytopenia.  Patient had labs 8/3/18 - platelets were 111k, which was acceptable for treatment.  Per communication with Medical Monitor, patient will reduced [id number] at reduced dose of 5 mg/kg.  Patient reports no change in ocular symptoms - some blurred vision, no issue with dry eyes.  She confirms she has been compliant with Refresh eye drops.  Neuropathy mostly stable, more noticeable when on her work week.       Local labs collected on 8/3/18.  Attempted to collect Central labs via peripheral line but Infusion nurse was unable to collect.  Reviewed baseline conditions and current medications with patient.  See Concomitant Medication and Baseline Medical History sheets.        All of the patient's questions were answered by me to her satisfaction. Patient expressed her  willingness to continue to participate in the above mentioned clinical trial. Instructed patient to notify Dr. Tran and/or me with any questions, concerns, or changes in health status. Patient verbalized understanding.      Baseline Medical History  1. 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    2. Anxiety, Grade 1.  3. Dermatofibroma.  Benign.  Diagnosed by biopsy on 7/17/17.  No further intervention needed.  4. Decreased platelet count, Grade 1.  Resolved 11/17/17.  5. Constipation, Grade 1.  6. Myalgia, Grade 1.  7. Peripheral neuropathy- feet, Grade 1.      Adverse Events - For the most up-to-date AE log, please refer to the paper AE log in the subjects research file.  1. Platelet count decreased/thrombocytopenia, remains Grade 1.  Platelet count acceptable for treatment today.  Will continue to monitor.  2. Peripheral neuropathy - fingers, remains Grade 1.  Patient discontinued Neurontin, tolerating neuropathy okay. Will continue to monitor.  3. Fatigue, remains Grade 1.  Will continue to monitor.

## 2018-08-09 ENCOUNTER — DOCUMENTATION ONLY (OUTPATIENT)
Dept: GYNECOLOGIC ONCOLOGY | Facility: CLINIC | Age: 63
End: 2018-08-09

## 2018-08-10 ENCOUNTER — HOSPITAL ENCOUNTER (OUTPATIENT)
Dept: RADIOLOGY | Facility: OTHER | Age: 63
Discharge: HOME | End: 2018-08-10
Attending: OBSTETRICS & GYNECOLOGY
Payer: COMMERCIAL

## 2018-08-10 DIAGNOSIS — C56.9 MALIGNANT NEOPLASM OF OVARY, UNSPECIFIED LATERALITY: ICD-10-CM

## 2018-08-10 PROCEDURE — 71260 CT THORAX DX C+: CPT | Mod: 26,,, | Performed by: RADIOLOGY

## 2018-08-10 PROCEDURE — 74177 CT ABD & PELVIS W/CONTRAST: CPT | Mod: 26,,, | Performed by: RADIOLOGY

## 2018-08-10 PROCEDURE — 25500020 PHARM REV CODE 255: Performed by: OBSTETRICS & GYNECOLOGY

## 2018-08-10 PROCEDURE — 74177 CT ABD & PELVIS W/CONTRAST: CPT | Mod: TC

## 2018-08-10 RX ADMIN — IOHEXOL 100 ML: 350 INJECTION, SOLUTION INTRAVENOUS at 10:08

## 2018-08-10 RX ADMIN — IOHEXOL 30 ML: 350 INJECTION, SOLUTION INTRAVENOUS at 09:08

## 2018-08-12 NOTE — PROGRESS NOTES
Subjective:      Patient ID: Mickie Don is a 63 y.o. female.    Chief Complaint: Chemotherapy      HPI  Patient comes in today for cycle 11 of FORWARD1 trial. She is on study drug.      Didn't get treatment last week due to thrombocytopenia.      However, platelet count today is 111.      She is a patient of Dr. Tran and is on FORWARD1.         Performance Status: 1.   Blurring of left eye. Stable. Grade 1.      Treatment History  Xlap/Omentectomy on 11/1/16 - mass was unresectable  Stage IIIC ovarian cancer  Initiated Taxotere/Carbo on 11/18/16, now s/p 6 cycles completed 3/8/17  Xlap/ISIS/BSO/Radical mass resection > 10 cm, optimal debulking on 4/11/17  Initiated PRIMA on 8/21/17  BRCA negative  Review of Systems   Constitutional: Negative for appetite change, chills, fatigue and fever.   HENT: Negative for mouth sores.    Respiratory: Negative for cough and shortness of breath.    Cardiovascular: Negative for leg swelling.   Gastrointestinal: Negative for abdominal pain, blood in stool, constipation and diarrhea.   Endocrine: Negative for cold intolerance.   Genitourinary: Negative for dysuria and vaginal bleeding.   Musculoskeletal: Negative for myalgias.   Skin: Negative for rash.   Allergic/Immunologic: Negative.    Neurological: Negative for weakness and numbness.   Hematological: Negative for adenopathy. Does not bruise/bleed easily.   Psychiatric/Behavioral: Negative for confusion.       Objective:   Physical Exam:   Constitutional: She is oriented to person, place, and time. She appears well-developed and well-nourished.    HENT:   Head: Normocephalic and atraumatic.    Eyes: EOM are normal. Pupils are equal, round, and reactive to light.    Neck: Normal range of motion. Neck supple. No thyromegaly present.    Cardiovascular: Normal rate, regular rhythm and intact distal pulses.     Pulmonary/Chest: Effort normal and breath sounds normal. No respiratory distress. She has no wheezes.        Abdominal:  Soft. Bowel sounds are normal. She exhibits no distension, no ascites and no mass. There is no tenderness.             Musculoskeletal: Normal range of motion and moves all extremeties.      Lymphadenopathy:     She has no cervical adenopathy.        Right: No supraclavicular adenopathy present.        Left: No supraclavicular adenopathy present.    Neurological: She is alert and oriented to person, place, and time.    Skin: Skin is warm and dry. No rash noted.    Psychiatric: She has a normal mood and affect.       Assessment:     1. Examination of participant in clinical trial    2. Ovarian cancer, unspecified laterality    3. Encounter for antineoplastic chemotherapy        Plan:   No orders of the defined types were placed in this encounter.    Okay to treat with dose reduction per protocol due to thrombocytopenia.   RTC per zina.     I spent approximately 25 minutes reviewing the available records and evaluating the patient, out of which over 50% of the time was spent face to face with the patient in counseling and coordinating this patient's care.

## 2018-08-13 ENCOUNTER — RESEARCH ENCOUNTER (OUTPATIENT)
Dept: RESEARCH | Facility: HOSPITAL | Age: 63
End: 2018-08-13

## 2018-08-15 NOTE — PROGRESS NOTES
Aug. 13, 2018    Protocol: FORWARD1/IMGN-0403  Principle Investigator: BETH Weston  Treating Physician: GUERITA Tran Pt Initials: NAV VAZ  Study ID: [id number]  IRB#: 2016.439.A     FORWARD 1: A Randomized, Open Label Phase 3 Study to Evaluate the Safety and Efficacy of Mirvetuximab soravtansine (NXSQ493) Versus Investigators Choice of Chemotherapy in Women with Folate Receptor á?positive Advanced Epithelial Ovarian Cancer, Primary Peritoneal Cancer or Fallopian Tube Cancer      Cycle 11, Day 8  Study allocation was made on 11/27/17.  Patient randomized to Arm A:[id number], 6 mg/kg2(from [facility]), q 3 weeks.       Patient had scans on 8/10/18.  Per RECIST, patient has continued partial response to therapy per RECIST1.1 criteria.  Next scans to be in 12 weeks.       Patient presented to clinic for C11D8 on the above mentioned clinical trial.  No MD visit on this day, per protocol.  Patient reports no change in ocular symptoms - some blurred vision, no issue with dry eyes.  She confirms she has been compliant with Refresh and steroid eye drops.  Neuropathy is stable.       Central labs collected on 8/13/18.  Reviewed baseline conditions and current medications with patient.  See Concomitant Medication and Baseline Medical History sheets.  Central safety labs collected, processed, and shipped to  per protocol.      All of the patient's questions were answered by me to her satisfaction. Patient expressed her willingness to continue to participate in the above mentioned clinical trial. Instructed patient to notify Dr. Tran and/or me with any questions, concerns, or changes in health status. Patient verbalized understanding.      Baseline Medical History  1. 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    2. Anxiety, Grade 1.  3. Dermatofibroma.  Benign.  Diagnosed by biopsy on 7/17/17.  No further intervention needed.  4. Decreased platelet count, Grade 1.  Resolved  11/17/17.  5. Constipation, Grade 1.  6. Myalgia, Grade 1.  7. Peripheral neuropathy- feet, Grade 1.      Adverse Events - For the most up-to-date AE log, please refer to the paper AE log in the subjects research file.  1. Platelet count decreased/thrombocytopenia, worsened to Grade 2.  Will continue to monitor.  2. Peripheral neuropathy - fingers, remains Grade 1.  Patient discontinued Neurontin, tolerating neuropathy okay. Will continue to monitor.  3. Fatigue, remains Grade 1.  Will continue to monitor.

## 2018-08-23 ENCOUNTER — HOSPITAL ENCOUNTER (EMERGENCY)
Facility: HOSPITAL | Age: 63
Discharge: HOME | End: 2018-08-23
Attending: EMERGENCY MEDICINE
Payer: COMMERCIAL

## 2018-08-23 VITALS
HEART RATE: 81 BPM | HEIGHT: 67 IN | OXYGEN SATURATION: 100 % | DIASTOLIC BLOOD PRESSURE: 63 MMHG | SYSTOLIC BLOOD PRESSURE: 137 MMHG | TEMPERATURE: 99 F | RESPIRATION RATE: 18 BRPM | WEIGHT: 180 LBS | BODY MASS INDEX: 28.25 KG/M2

## 2018-08-23 DIAGNOSIS — S61.323A LACERATION OF LEFT MIDDLE FINGER WITH FOREIGN BODY AND DAMAGE TO NAIL, INITIAL ENCOUNTER: ICD-10-CM

## 2018-08-23 DIAGNOSIS — S62.639B OPEN FRACTURE OF TUFT OF DISTAL PHALANX OF FINGER: Primary | ICD-10-CM

## 2018-08-23 LAB
BASOPHILS # BLD AUTO: 0.04 K/UL
BASOPHILS NFR BLD: 0.8 %
DIFFERENTIAL METHOD: ABNORMAL
EOSINOPHIL # BLD AUTO: 0.1 K/UL
EOSINOPHIL NFR BLD: 1.3 %
ERYTHROCYTE [DISTWIDTH] IN BLOOD BY AUTOMATED COUNT: 14.8 %
HCT VFR BLD AUTO: 42.1 %
HGB BLD-MCNC: 14.4 G/DL
IMM GRANULOCYTES # BLD AUTO: 0.01 K/UL
IMM GRANULOCYTES NFR BLD AUTO: 0.2 %
LYMPHOCYTES # BLD AUTO: 1 K/UL
LYMPHOCYTES NFR BLD: 18.7 %
MCH RBC QN AUTO: 30.6 PG
MCHC RBC AUTO-ENTMCNC: 34.2 G/DL
MCV RBC AUTO: 89 FL
MONOCYTES # BLD AUTO: 0.4 K/UL
MONOCYTES NFR BLD: 8.4 %
NEUTROPHILS # BLD AUTO: 3.7 K/UL
NEUTROPHILS NFR BLD: 70.6 %
NRBC BLD-RTO: 0 /100 WBC
PLATELET # BLD AUTO: 89 K/UL
PMV BLD AUTO: 9.8 FL
RBC # BLD AUTO: 4.71 M/UL
WBC # BLD AUTO: 5.25 K/UL

## 2018-08-23 PROCEDURE — 90715 TDAP VACCINE 7 YRS/> IM: CPT | Performed by: EMERGENCY MEDICINE

## 2018-08-23 PROCEDURE — 12001 RPR S/N/AX/GEN/TRNK 2.5CM/<: CPT | Mod: F2,,, | Performed by: EMERGENCY MEDICINE

## 2018-08-23 PROCEDURE — 85025 COMPLETE CBC W/AUTO DIFF WBC: CPT

## 2018-08-23 PROCEDURE — 99284 EMERGENCY DEPT VISIT MOD MDM: CPT | Mod: 25

## 2018-08-23 PROCEDURE — 90471 IMMUNIZATION ADMIN: CPT | Performed by: EMERGENCY MEDICINE

## 2018-08-23 PROCEDURE — 12001 RPR S/N/AX/GEN/TRNK 2.5CM/<: CPT | Mod: F2

## 2018-08-23 PROCEDURE — 25000003 PHARM REV CODE 250: Performed by: EMERGENCY MEDICINE

## 2018-08-23 PROCEDURE — 29130 APPL FINGER SPLINT STATIC: CPT | Mod: F2,59

## 2018-08-23 PROCEDURE — 63600175 PHARM REV CODE 636 W HCPCS: Performed by: EMERGENCY MEDICINE

## 2018-08-23 PROCEDURE — 99283 EMERGENCY DEPT VISIT LOW MDM: CPT | Mod: 25,,, | Performed by: EMERGENCY MEDICINE

## 2018-08-23 RX ORDER — CEPHALEXIN 500 MG/1
500 CAPSULE ORAL 4 TIMES DAILY
Qty: 20 CAPSULE | Refills: 0 | Status: SHIPPED | OUTPATIENT
Start: 2018-08-23 | End: 2018-08-28

## 2018-08-23 RX ORDER — LIDOCAINE HYDROCHLORIDE 10 MG/ML
5 INJECTION, SOLUTION EPIDURAL; INFILTRATION; INTRACAUDAL; PERINEURAL
Status: COMPLETED | OUTPATIENT
Start: 2018-08-23 | End: 2018-08-23

## 2018-08-23 RX ADMIN — CLOSTRIDIUM TETANI TOXOID ANTIGEN (FORMALDEHYDE INACTIVATED), CORYNEBACTERIUM DIPHTHERIAE TOXOID ANTIGEN (FORMALDEHYDE INACTIVATED), BORDETELLA PERTUSSIS TOXOID ANTIGEN (GLUTARALDEHYDE INACTIVATED), BORDETELLA PERTUSSIS FILAMENTOUS HEMAGGLUTININ ANTIGEN (FORMALDEHYDE INACTIVATED), BORDETELLA PERTUSSIS PERTACTIN ANTIGEN, AND BORDETELLA PERTUSSIS FIMBRIAE 2/3 ANTIGEN 0.5 ML: 5; 2; 2.5; 5; 3; 5 INJECTION, SUSPENSION INTRAMUSCULAR at 02:08

## 2018-08-23 RX ADMIN — LIDOCAINE HYDROCHLORIDE 50 MG: 10 INJECTION, SOLUTION EPIDURAL; INFILTRATION; INTRACAUDAL; PERINEURAL at 03:08

## 2018-08-23 NOTE — ED NOTES
Patient smashed left 3rd finger while moving furniture. Patient states she had chemo 2 weeks ago and is concerned about her platelets. Denies use of blood thinners. Bandage to finger intact with no active bleeding. Not sure about being up to date on Tetanus shot.

## 2018-08-23 NOTE — ED PROVIDER NOTES
Encounter Date: 8/23/2018    SCRIBE #1 NOTE: I, Humberto Molina , am scribing for, and in the presence of,  Dr. Oglesby . I have scribed the entire note.       History     Chief Complaint   Patient presents with    Hand Pain     laceration to left 3rd finger, bleeding controlled     63 y.o. female presents to the ED with laceration on her left third finger. Patient was picking up a banister which then fell on her finger. She had chemotherapy two weeks ago and is concerned about her platelet count. She says she can still move her finger and there was no gushing of blood. Bleeding was contained PTA.       The history is provided by the patient.     Review of patient's allergies indicates:  No Known Allergies  Past Medical History:   Diagnosis Date    Dry eyes 11/29/2017    Neuropathy due to chemotherapeutic drug 1/10/2018    Open angle with borderline findings and low glaucoma risk in both eyes 6/20/2013     Past Surgical History:   Procedure Laterality Date    BUNIONECTOMY Left     CHOLECYSTECTOMY  1992    HYSTERECTOMY      left leg surgery  2005    achilles tendon    [place] REMOVAL OF OVARY/TUBE(S)      right leg surgery  2006    broken, including ankle     Family History   Problem Relation Age of Onset    Cancer Father         ? liver ca    Diabetes Father     Cataracts Mother     Breast cancer Maternal Aunt     Cancer Son         sarcoma    Melanoma Sister     Colon cancer Neg Hx     Ovarian cancer Neg Hx     Amblyopia Neg Hx     Blindness Neg Hx     Glaucoma Neg Hx     Hypertension Neg Hx     Macular degeneration Neg Hx     Retinal detachment Neg Hx     Strabismus Neg Hx     Stroke Neg Hx     Thyroid disease Neg Hx      Social History     Tobacco Use    Smoking status: Never Smoker    Smokeless tobacco: Never Used   Substance Use Topics    Alcohol use: Yes     Alcohol/week: 1.8 oz     Types: 1 Glasses of wine, 2 Shots of liquor per week     Comment: Socially    Drug use: No     Review  of Systems   Constitutional: Negative.    HENT: Negative.    Eyes: Negative.    Respiratory: Negative.    Cardiovascular: Negative.    Gastrointestinal: Negative.    Endocrine: Negative.    Genitourinary: Negative.    Musculoskeletal: Negative.    Skin:        Laceration to middle left long finger    Allergic/Immunologic: Negative.    Neurological: Negative.    Hematological: Negative.    Psychiatric/Behavioral: Negative.    All other systems reviewed and are negative.      Physical Exam     Initial Vitals [08/23/18 1346]   BP Pulse Resp Temp SpO2   137/63 81 18 98.5 °F (36.9 °C) 100 %      MAP       --         Physical Exam    Skin:   Middle left long finger nail with transaxial bisection   Curved partial amputation to distal pulp to left long finger          ED Course   Lac Repair  Date/Time: 8/23/2018 3:39 PM  Performed by: Saúl Oglesby MD  Authorized by: Saúl Oglesby MD   Body area: upper extremity  Location details: left long finger  Laceration length: 2 cm  Contamination: The wound is contaminated.  Foreign bodies: metal  Tendon involvement: none  Nerve involvement: none  Vascular damage: no  Anesthesia: digital block    Anesthesia:  Local Anesthetic: lidocaine 1% without epinephrine  Anesthetic total: 5 mL  Patient sedated: no  Irrigation solution: saline  Debridement: none  Degree of undermining: none  Skin closure: 6-0 Prolene  Number of sutures: 5  Technique: simple  Approximation: close  Approximation difficulty: simple  Patient tolerance: Patient tolerated the procedure well with no immediate complications  Comments: Possibel. Small fb and tuft fracture.         Labs Reviewed   CBC W/ AUTO DIFFERENTIAL - Abnormal; Notable for the following components:       Result Value    RDW 14.8 (*)     Platelets 89 (*)     All other components within normal limits          Imaging Results          X-Ray Finger 2 or More Views Left (Final result)  Result time 08/23/18 14:59:32    Final result by  Juve Garcia Jr., MD (08/23/18 14:59:32)                 Impression:      Fracture distal tuft distal phalanx long finger.  There is deformity of the nail bed.  There is a punctate hyperdensity overlying the nail bed and correlation with the mechanism of injury to exclude a small foreign body.      Electronically signed by: Juve Garcia MD  Date:    08/23/2018  Time:    14:59             Narrative:    EXAMINATION:  XR FINGER 2 OR MORE VIEWS LEFT    CLINICAL HISTORY:  fingertip injury;    TECHNIQUE:  Left long finger three views.    COMPARISON:  None    FINDINGS:  Fracture distal tuft distal phalanx of the long finger. Associated deformity of the nail bed.  Correlate with the mechanism of injury to help exclude a foreign body as there are adjacent punctate high-density at the level of the nail bed.  More proximal phalanges are intact.                                 Medical Decision Making:   ED Management:  Discussed tuft fracture and possible fb with pt.                       Clinical Impression:   The primary encounter diagnosis was Open fracture of tuft of distal phalanx of finger. A diagnosis of Laceration of left middle finger with foreign body and damage to nail, initial encounter was also pertinent to this visit.                             Saúl Oglesby MD  08/23/18 1538       Saúl Oglesby MD  08/23/18 1539       Saúl Oglesby MD  08/23/18 1543

## 2018-08-29 ENCOUNTER — LAB VISIT (OUTPATIENT)
Dept: LAB | Facility: HOSPITAL | Age: 63
End: 2018-08-29
Attending: OBSTETRICS & GYNECOLOGY
Payer: COMMERCIAL

## 2018-08-29 DIAGNOSIS — D49.59 OVARIAN NEOPLASM: ICD-10-CM

## 2018-08-29 DIAGNOSIS — Z00.6 EXAMINATION OF PARTICIPANT IN CLINICAL TRIAL: ICD-10-CM

## 2018-08-29 DIAGNOSIS — C56.9 MALIGNANT NEOPLASM OF OVARY, UNSPECIFIED LATERALITY: ICD-10-CM

## 2018-08-29 LAB
ALBUMIN SERPL BCP-MCNC: 3.6 G/DL
ALP SERPL-CCNC: 78 U/L
ALT SERPL W/O P-5'-P-CCNC: 40 U/L
ANION GAP SERPL CALC-SCNC: 8 MMOL/L
AST SERPL-CCNC: 43 U/L
BASOPHILS # BLD AUTO: 0.03 K/UL
BASOPHILS NFR BLD: 1.2 %
BILIRUB SERPL-MCNC: 1.1 MG/DL
BUN SERPL-MCNC: 10 MG/DL
CALCIUM SERPL-MCNC: 9.3 MG/DL
CHLORIDE SERPL-SCNC: 105 MMOL/L
CO2 SERPL-SCNC: 28 MMOL/L
CREAT SERPL-MCNC: 0.7 MG/DL
DIFFERENTIAL METHOD: ABNORMAL
DRUG STUDY SPECIMEN TYPE: NORMAL
DRUG STUDY TEST NAME: NORMAL
DRUG STUDY TEST RESULT: NORMAL
EOSINOPHIL # BLD AUTO: 0 K/UL
EOSINOPHIL NFR BLD: 1.6 %
ERYTHROCYTE [DISTWIDTH] IN BLOOD BY AUTOMATED COUNT: 15.4 %
EST. GFR  (AFRICAN AMERICAN): >60 ML/MIN/1.73 M^2
EST. GFR  (NON AFRICAN AMERICAN): >60 ML/MIN/1.73 M^2
GLUCOSE SERPL-MCNC: 122 MG/DL
HCT VFR BLD AUTO: 41.7 %
HGB BLD-MCNC: 13.8 G/DL
IMM GRANULOCYTES # BLD AUTO: 0 K/UL
IMM GRANULOCYTES NFR BLD AUTO: 0 %
LYMPHOCYTES # BLD AUTO: 0.7 K/UL
LYMPHOCYTES NFR BLD: 29.2 %
MCH RBC QN AUTO: 30.1 PG
MCHC RBC AUTO-ENTMCNC: 33.1 G/DL
MCV RBC AUTO: 91 FL
MONOCYTES # BLD AUTO: 0.2 K/UL
MONOCYTES NFR BLD: 8.3 %
NEUTROPHILS # BLD AUTO: 1.5 K/UL
NEUTROPHILS NFR BLD: 59.7 %
NRBC BLD-RTO: 0 /100 WBC
PLATELET # BLD AUTO: 92 K/UL
PMV BLD AUTO: 10.9 FL
POTASSIUM SERPL-SCNC: 3.8 MMOL/L
PROT SERPL-MCNC: 6.8 G/DL
RBC # BLD AUTO: 4.58 M/UL
SODIUM SERPL-SCNC: 141 MMOL/L
WBC # BLD AUTO: 2.53 K/UL

## 2018-08-29 PROCEDURE — 99000 SPECIMEN HANDLING OFFICE-LAB: CPT

## 2018-08-29 PROCEDURE — 36415 COLL VENOUS BLD VENIPUNCTURE: CPT

## 2018-08-29 PROCEDURE — 80053 COMPREHEN METABOLIC PANEL: CPT

## 2018-08-29 PROCEDURE — 85025 COMPLETE CBC W/AUTO DIFF WBC: CPT

## 2018-09-05 ENCOUNTER — LAB VISIT (OUTPATIENT)
Dept: LAB | Facility: HOSPITAL | Age: 63
End: 2018-09-05
Attending: OBSTETRICS & GYNECOLOGY
Payer: COMMERCIAL

## 2018-09-05 DIAGNOSIS — Z00.6 EXAMINATION OF PARTICIPANT IN CLINICAL TRIAL: ICD-10-CM

## 2018-09-05 DIAGNOSIS — C56.9 MALIGNANT NEOPLASM OF OVARY, UNSPECIFIED LATERALITY: ICD-10-CM

## 2018-09-05 DIAGNOSIS — D49.59 OVARIAN NEOPLASM: ICD-10-CM

## 2018-09-05 DIAGNOSIS — C56.9 OVARIAN CANCER, UNSPECIFIED LATERALITY: ICD-10-CM

## 2018-09-05 LAB
ALBUMIN SERPL BCP-MCNC: 3.8 G/DL
ALP SERPL-CCNC: 88 U/L
ALT SERPL W/O P-5'-P-CCNC: 43 U/L
ANION GAP SERPL CALC-SCNC: 6 MMOL/L
AST SERPL-CCNC: 42 U/L
BASOPHILS # BLD AUTO: 0.04 K/UL
BASOPHILS NFR BLD: 1 %
BILIRUB SERPL-MCNC: 0.8 MG/DL
BUN SERPL-MCNC: 15 MG/DL
CALCIUM SERPL-MCNC: 9.5 MG/DL
CANCER AG125 SERPL-ACNC: 16 U/ML
CHLORIDE SERPL-SCNC: 104 MMOL/L
CO2 SERPL-SCNC: 29 MMOL/L
CREAT SERPL-MCNC: 0.8 MG/DL
DIFFERENTIAL METHOD: ABNORMAL
EOSINOPHIL # BLD AUTO: 0.1 K/UL
EOSINOPHIL NFR BLD: 2.1 %
ERYTHROCYTE [DISTWIDTH] IN BLOOD BY AUTOMATED COUNT: 15.5 %
EST. GFR  (AFRICAN AMERICAN): >60 ML/MIN/1.73 M^2
EST. GFR  (NON AFRICAN AMERICAN): >60 ML/MIN/1.73 M^2
GLUCOSE SERPL-MCNC: 96 MG/DL
HCT VFR BLD AUTO: 42.1 %
HGB BLD-MCNC: 14.2 G/DL
LYMPHOCYTES # BLD AUTO: 1.2 K/UL
LYMPHOCYTES NFR BLD: 31.6 %
MCH RBC QN AUTO: 30.2 PG
MCHC RBC AUTO-ENTMCNC: 33.7 G/DL
MCV RBC AUTO: 90 FL
MONOCYTES # BLD AUTO: 0.4 K/UL
MONOCYTES NFR BLD: 9.9 %
NEUTROPHILS # BLD AUTO: 2.1 K/UL
NEUTROPHILS NFR BLD: 55.4 %
PLATELET # BLD AUTO: 105 K/UL
PMV BLD AUTO: 9.7 FL
POTASSIUM SERPL-SCNC: 4.8 MMOL/L
PROT SERPL-MCNC: 7.1 G/DL
RBC # BLD AUTO: 4.7 M/UL
SODIUM SERPL-SCNC: 139 MMOL/L
WBC # BLD AUTO: 3.83 K/UL

## 2018-09-05 PROCEDURE — 86304 IMMUNOASSAY TUMOR CA 125: CPT

## 2018-09-05 PROCEDURE — 36415 COLL VENOUS BLD VENIPUNCTURE: CPT

## 2018-09-05 PROCEDURE — 80053 COMPREHEN METABOLIC PANEL: CPT

## 2018-09-05 PROCEDURE — 85025 COMPLETE CBC W/AUTO DIFF WBC: CPT

## 2018-09-05 RX ORDER — SODIUM CHLORIDE 0.9 % (FLUSH) 0.9 %
10 SYRINGE (ML) INJECTION
Status: CANCELLED | OUTPATIENT
Start: 2018-09-05

## 2018-09-05 RX ORDER — ACETAMINOPHEN 325 MG/1
650 TABLET ORAL
Status: CANCELLED | OUTPATIENT
Start: 2018-09-05

## 2018-09-05 RX ORDER — HEPARIN 100 UNIT/ML
500 SYRINGE INTRAVENOUS
Status: CANCELLED | OUTPATIENT
Start: 2018-09-05

## 2018-09-06 ENCOUNTER — RESEARCH ENCOUNTER (OUTPATIENT)
Dept: RESEARCH | Facility: HOSPITAL | Age: 63
End: 2018-09-06

## 2018-09-06 ENCOUNTER — OFFICE VISIT (OUTPATIENT)
Dept: GYNECOLOGIC ONCOLOGY | Facility: CLINIC | Age: 63
End: 2018-09-06
Payer: COMMERCIAL

## 2018-09-06 ENCOUNTER — OFFICE VISIT (OUTPATIENT)
Dept: INTERNAL MEDICINE | Facility: CLINIC | Age: 63
End: 2018-09-06
Payer: COMMERCIAL

## 2018-09-06 ENCOUNTER — INFUSION (OUTPATIENT)
Dept: INFUSION THERAPY | Facility: HOSPITAL | Age: 63
End: 2018-09-06
Attending: OBSTETRICS & GYNECOLOGY
Payer: COMMERCIAL

## 2018-09-06 VITALS
WEIGHT: 194 LBS | BODY MASS INDEX: 30.45 KG/M2 | HEART RATE: 78 BPM | SYSTOLIC BLOOD PRESSURE: 121 MMHG | HEIGHT: 67 IN | DIASTOLIC BLOOD PRESSURE: 56 MMHG

## 2018-09-06 VITALS
RESPIRATION RATE: 18 BRPM | TEMPERATURE: 98 F | SYSTOLIC BLOOD PRESSURE: 109 MMHG | DIASTOLIC BLOOD PRESSURE: 54 MMHG | HEART RATE: 67 BPM

## 2018-09-06 VITALS
BODY MASS INDEX: 28.25 KG/M2 | HEIGHT: 67 IN | TEMPERATURE: 98 F | DIASTOLIC BLOOD PRESSURE: 70 MMHG | HEART RATE: 66 BPM | WEIGHT: 180 LBS | OXYGEN SATURATION: 99 % | SYSTOLIC BLOOD PRESSURE: 104 MMHG

## 2018-09-06 DIAGNOSIS — Z51.11 CHEMOTHERAPY MANAGEMENT, ENCOUNTER FOR: ICD-10-CM

## 2018-09-06 DIAGNOSIS — C56.9 MALIGNANT NEOPLASM OF OVARY, UNSPECIFIED LATERALITY: Primary | ICD-10-CM

## 2018-09-06 DIAGNOSIS — Z48.02 VISIT FOR SUTURE REMOVAL: Primary | ICD-10-CM

## 2018-09-06 PROCEDURE — 63600175 PHARM REV CODE 636 W HCPCS: Performed by: OBSTETRICS & GYNECOLOGY

## 2018-09-06 PROCEDURE — 99212 OFFICE O/P EST SF 10 MIN: CPT | Mod: S$GLB,,, | Performed by: NURSE PRACTITIONER

## 2018-09-06 PROCEDURE — 25000003 PHARM REV CODE 250: Performed by: OBSTETRICS & GYNECOLOGY

## 2018-09-06 PROCEDURE — 36415 COLL VENOUS BLD VENIPUNCTURE: CPT

## 2018-09-06 PROCEDURE — 99999 PR PBB SHADOW E&M-EST. PATIENT-LVL III: CPT | Mod: PBBFAC,,, | Performed by: NURSE PRACTITIONER

## 2018-09-06 PROCEDURE — 3008F BODY MASS INDEX DOCD: CPT | Mod: CPTII,S$GLB,, | Performed by: OBSTETRICS & GYNECOLOGY

## 2018-09-06 PROCEDURE — 99214 OFFICE O/P EST MOD 30 MIN: CPT | Mod: S$GLB,,, | Performed by: OBSTETRICS & GYNECOLOGY

## 2018-09-06 PROCEDURE — 3008F BODY MASS INDEX DOCD: CPT | Mod: CPTII,S$GLB,, | Performed by: NURSE PRACTITIONER

## 2018-09-06 PROCEDURE — 99999 PR PBB SHADOW E&M-EST. PATIENT-LVL III: CPT | Mod: PBBFAC,,, | Performed by: OBSTETRICS & GYNECOLOGY

## 2018-09-06 PROCEDURE — 96413 CHEMO IV INFUSION 1 HR: CPT

## 2018-09-06 PROCEDURE — 96367 TX/PROPH/DG ADDL SEQ IV INF: CPT

## 2018-09-06 RX ORDER — SODIUM CHLORIDE 0.9 % (FLUSH) 0.9 %
10 SYRINGE (ML) INJECTION
Status: DISCONTINUED | OUTPATIENT
Start: 2018-09-06 | End: 2018-09-06 | Stop reason: HOSPADM

## 2018-09-06 RX ORDER — ACETAMINOPHEN 325 MG/1
650 TABLET ORAL
Status: COMPLETED | OUTPATIENT
Start: 2018-09-06 | End: 2018-09-06

## 2018-09-06 RX ORDER — DEXAMETHASONE SODIUM PHOSPHATE 4 MG/ML
INJECTION, SOLUTION INTRA-ARTICULAR; INTRALESIONAL; INTRAMUSCULAR; INTRAVENOUS; SOFT TISSUE
Status: DISCONTINUED
Start: 2018-09-06 | End: 2018-09-06 | Stop reason: WASHOUT

## 2018-09-06 RX ORDER — HEPARIN 100 UNIT/ML
500 SYRINGE INTRAVENOUS
Status: DISCONTINUED | OUTPATIENT
Start: 2018-09-06 | End: 2018-09-06 | Stop reason: HOSPADM

## 2018-09-06 RX ADMIN — DEXAMETHASONE SODIUM PHOSPHATE: 4 INJECTION, SOLUTION INTRAMUSCULAR; INTRAVENOUS at 01:09

## 2018-09-06 RX ADMIN — DEXTROSE: 50 INJECTION, SOLUTION INTRAVENOUS at 01:09

## 2018-09-06 RX ADMIN — ACETAMINOPHEN 650 MG: 325 TABLET ORAL at 01:09

## 2018-09-06 RX ADMIN — Medication 50 MG: at 02:09

## 2018-09-06 NOTE — PLAN OF CARE
Problem: Patient Care Overview  Goal: Plan of Care Review  Outcome: Ongoing (interventions implemented as appropriate)  4541 -- Patient tolerated treatment well.  No issues noted.  Drug infused at ordered 150 ml/hr (5mg/min) Vital signs stable before and after.  No apparent distress noted.  Discharged without S/S of adverse effects.  Patient declined AVS.  Patient instructed to call provider with any questions or concerns.

## 2018-09-06 NOTE — PLAN OF CARE
Problem: Patient Care Overview  Goal: Individualization & Mutuality  PIV  2 blankets   Outcome: Ongoing (interventions implemented as appropriate)  3313 -- Patient's labs, history, allergies, and medication reviewed.  Assessment complete.  Vital signs stable.  Patient to receive INV [id number] Mirvetuximab. Discussed plan of care with patient.  Patient in agreement. Labs drawn peripherally and given to Annette Research Nurse.  Chair reclined.  Warm blanket and snack offered.  Will monitor.

## 2018-09-06 NOTE — PROGRESS NOTES
Subjective:       Patient ID: Mickie Don is a 63 y.o. female.    Chief Complaint: Suture / Staple Removal    Pt here for suture removal.  Was helping her son move furniture and got her left middle finger caught and sustained a laceration of the tip of her finger and a tuft fracture.  Pt had 5 sutures placed on 8/23.  One suture fell out yesterday.  No pain, swelling or redness/ discharge.        Review of Systems   Constitutional: Negative for activity change, chills, fever and unexpected weight change.   HENT: Negative for hearing loss, rhinorrhea and trouble swallowing.    Eyes: Negative for discharge and visual disturbance.   Respiratory: Negative for chest tightness and wheezing.    Cardiovascular: Negative for chest pain and palpitations.   Gastrointestinal: Negative for blood in stool, constipation, diarrhea and vomiting.   Endocrine: Negative for polydipsia and polyuria.   Genitourinary: Negative for difficulty urinating, dysuria, hematuria and menstrual problem.   Musculoskeletal: Negative for arthralgias, joint swelling and neck pain.   Neurological: Negative for weakness and headaches.   Psychiatric/Behavioral: Negative for confusion and dysphoric mood.         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 Works at [employer], [employer] lives with her. Gym frequ.     Family History   Problem Relation Age of Onset    Cancer Father         ? liver ca    Diabetes Father     Cataracts Mother     Breast  "cancer Maternal Aunt     Cancer Son         sarcoma    Melanoma Sister     Colon cancer Neg Hx     Ovarian cancer Neg Hx     Amblyopia Neg Hx     Blindness Neg Hx     Glaucoma Neg Hx     Hypertension Neg Hx     Macular degeneration Neg Hx     Retinal detachment Neg Hx     Strabismus Neg Hx     Stroke Neg Hx     Thyroid disease Neg Hx      Outpatient Encounter Medications as of 9/6/2018   Medication Sig Dispense Refill    INV carboxymethylcellulose-glycerin drops Apply 1 drop to eye daily as needed for Dry Eyes. Administer at least 15 minutes after steroids drops. Investigational Medication. Patient Study # [id number] 1 each 6    prednisoLONE acetate (PRED FORTE) 1 % DrpS Place 1 drop into both eyes. 6 times a day for the first 4 days after chemotherapy then 4 times a day for the next 4 days       No facility-administered encounter medications on file as of 9/6/2018.      Last 3 sets of Vitals  Vitals - 1 value per visit 8/6/2018 8/23/2018 9/6/2018   SYSTOLIC 112 137 104   DIASTOLIC 56 63 70   PULSE 67 81 66   TEMPERATURE 98.4 98.5 97.8   RESPIRATIONS 18 18 -   SPO2 - 100 99   Weight (lb) - 180 180   Weight (kg) - 81.647 81.647   HEIGHT - 5' 7" 5' 7"   BODY MASS INDEX - 28.19 28.19   VISIT REPORT - - -   Pain Score  0 - 0   Some recent data might be hidden         Objective:      Physical Exam   Constitutional: She is oriented to person, place, and time. She appears well-developed and well-nourished. No distress.   Pulmonary/Chest: Effort normal.   Neurological: She is alert and oriented to person, place, and time.   Skin: Skin is warm and dry. Capillary refill takes less than 2 seconds. She is not diaphoretic.   Splint and sutures intact to left middle finger x 4.    Sutures removed x 4 without difficulty  Edges well approximated  No erythema  No exudate     Psychiatric: She has a normal mood and affect. Her behavior is normal. Judgment and thought content normal.           Lab Results   Component Value " Date    WBC 3.83 (L) 09/05/2018    RBC 4.70 09/05/2018    HGB 14.2 09/05/2018    HCT 42.1 09/05/2018    MCV 90 09/05/2018    MCH 30.2 09/05/2018    MCHC 33.7 09/05/2018    RDW 15.5 (H) 09/05/2018     (L) 09/05/2018    MPV 9.7 09/05/2018    GRAN 2.1 09/05/2018    GRAN 55.4 09/05/2018    LYMPH 1.2 09/05/2018    LYMPH 31.6 09/05/2018    MONO 0.4 09/05/2018    MONO 9.9 09/05/2018    EOS 0.1 09/05/2018    BASO 0.04 09/05/2018    EOSINOPHIL 2.1 09/05/2018    BASOPHIL 1.0 09/05/2018     Lab Results   Component Value Date    WBC 3.83 (L) 09/05/2018    HGB 14.2 09/05/2018    HCT 42.1 09/05/2018     (L) 09/05/2018    CHOL 217 (H) 09/19/2016    TRIG 92 09/19/2016    HDL 58 09/19/2016    ALT 43 09/05/2018    AST 42 (H) 09/05/2018     09/05/2018    K 4.8 09/05/2018     09/05/2018    CREATININE 0.8 09/05/2018    BUN 15 09/05/2018    CO2 29 09/05/2018    TSH 0.814 05/23/2015    INR 0.9 11/29/2017       Assessment:       1. Visit for suture removal        Plan:           Mickie was seen today for suture / staple removal.    Diagnoses and all orders for this visit:    Visit for suture removal      There are no Patient Instructions on file for this visit.

## 2018-09-06 NOTE — PROGRESS NOTES
"Subjective:       Patient ID: Mickie Don is a 63 y.o. female.    Chief Complaint: Chemotherapy    HPI     Patient comes in today for cycle 12/D1 of FORWARD1 trial. She is on study drug.      One week delay due to thrombocytopenia.   Dose reduction with cycle 11 due to persistent thrombocytopenia.      She is a patient of Dr. Tran and is on FORWARD1.      is 16.       Chemotherapy labs have been reviewed and are appropriate for treatment.     Performance Status: 1.   Blurring of left eye. Stable. Grade 1.      Treatment History  Xlap/Omentectomy on 11/1/16 - mass was unresectable  Stage IIIC ovarian cancer  Initiated Taxotere/Carbo on 11/18/16, now s/p 6 cycles completed 3/8/17  Xlap/ISIS/BSO/Radical mass resection > 10 cm, optimal debulking on 4/11/17  Initiated PRIMA on 8/21/17  BRCA negative  Review of Systems   Constitutional: Negative for chills, fatigue and fever.   Respiratory: Negative for cough, shortness of breath and wheezing.    Cardiovascular: Negative for chest pain, palpitations and leg swelling.   Gastrointestinal: Negative for abdominal pain, constipation, diarrhea, nausea and vomiting.   Genitourinary: Negative for difficulty urinating, dysuria, frequency, genital sores, hematuria, urgency, vaginal bleeding, vaginal discharge and vaginal pain.   Musculoskeletal: Negative for gait problem.   Neurological: Negative for weakness and numbness.   Hematological: Negative for adenopathy. Does not bruise/bleed easily.   Psychiatric/Behavioral: The patient is not nervous/anxious.        Objective:   BP (!) 121/56   Pulse 78   Ht 5' 7" (1.702 m)   Wt 88 kg (194 lb 0.1 oz)   BMI 30.39 kg/m²      Physical Exam   Constitutional: She is oriented to person, place, and time. She appears well-developed and well-nourished.   HENT:   Head: Normocephalic and atraumatic.   Eyes: No scleral icterus.   Neck: No tracheal deviation present. No thyromegaly present.   Cardiovascular: Normal rate and regular " rhythm.   Pulmonary/Chest: Effort normal and breath sounds normal.   Abdominal: Soft. She exhibits no distension and no mass. There is no tenderness. There is no rebound and no guarding. No hernia.   Genitourinary:   Genitourinary Comments: Not performed.    Musculoskeletal: She exhibits no edema or tenderness.   Lymphadenopathy:     She has no cervical adenopathy.   Neurological: She is alert and oriented to person, place, and time.   Skin: Skin is warm and dry.   Psychiatric: She has a normal mood and affect. Her behavior is normal. Judgment and thought content normal.       Assessment:       1. Malignant neoplasm of ovary, unspecified laterality    2. Chemotherapy management, encounter for        Plan:   Malignant neoplasm of ovary, unspecified laterality  Proceed with cycle 12.   Chemotherapy management, encounter for

## 2018-09-07 NOTE — PROGRESS NOTES
Sept. 6, 2018     Protocol: FORWARD1/IMGN-0403  Principle Investigator: BETH Weston  Treating Physician: GUERITA Tran Pt Initials: NAV VAZ  Study ID: [id number]  IRB#: [id number]     FORWARD 1: A Randomized, Open Label Phase 3 Study to Evaluate the Safety and Efficacy of Mirvetuximab soravtansine (PLUQ622) Versus Investigators Choice of Chemotherapy in Women with Folate Receptor á?positive Advanced Epithelial Ovarian Cancer, Primary Peritoneal Cancer or Fallopian Tube Cancer      Cycle 12  Study allocation was made on 11/27/17.  Patient randomized to Arm A:[id number], 6 mg/kg (from AIBW), q3 weeks.  Dose reduced to 5 mg/kg (based on AIBW) on C11 (8/6/18) d/t persistent thrombocytopenia.      Patient had scans on 8/10/18, which showed stable disease from previous CT scan.  Per RECIST, patient has had partial response to therapy compared to Baseline scans. Next scans scheduled for 8/10/18.       Patient presented to clinic for C12 on the above mentioned clinical trial.  C12 was originally scheduled for 8/30/18.  Patient was delayed 1 week due to thrombocytopenia.  Labs on 9/5/18 - platelets were 105k, which was acceptable for treatment.  She denies cough, headache, fatigue, fever, nausea, diarrhea, vomiting, shortness of breath, or dizziness.  Patient reports continued neuropathy in bilateral fingers - stable, more noticeable when she works.  Patient reports no change in ocular symptoms - some blurred vision, no issue with dry eyes.  She confirms she has Refresh and steroid eye drops to take post-infusion.      Physical exam, ECOG assessment performed on 9/6/18.  ECOG = 0 per MD.  Local labs were performed on 9/5/18 and reviewed by the physician.   Physical exam unremarkable per physician, ocular symptoms noted.  Central safety labs collected, processed, and shipped to  per protocol on 9/6/18.  Reviewed current medications with patient, see Concomitant Medication sheet.    [id number] dose was reduced dose of 5 mg/kg on C11 d/t  persistent thrombocytopenia - patient to receive reduced dose today.   Lot # [id number], 11/2020.     All of the patient's questions were answered by me to her satisfaction. Patient expressed her willingness to continue to participate in the above mentioned clinical trial. Instructed patient to notify Dr. Tran and/or me with any questions, concerns, or changes in health status. Patient verbalized understanding.      Baseline Medical History  1. 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    2. Anxiety, Grade 1.  3. Dermatofibroma.  Benign.  Diagnosed by biopsy on 7/17/17.  No further intervention needed.  4. Decreased platelet count, Grade 1.  Resolved 11/17/17.  5. Constipation, Grade 1.  6. Myalgia, Grade 1.  7. Peripheral neuropathy- feet, Grade 1.      Adverse Events - For the most up-to-date AE log, please refer to the paper AE log in the subjects research file.  1. Platelet count decreased/thrombocytopenia, remains Grade 1.  Platelet count acceptable for treatment today.  Will continue to monitor.  2. Peripheral neuropathy - fingers, remains Grade 1.  Patient discontinued Neurontin, tolerating neuropathy okay. Will continue to monitor.  3. Fatigue, remains Grade 1.  Will continue to monitor.  4. Surgical and medical procedures - laceration - finger, Grade 2.  Patient had stiches d/t finger laceration.  Will continue to monitor.

## 2018-09-10 DIAGNOSIS — C56.9 MALIGNANT NEOPLASM OF OVARY, UNSPECIFIED LATERALITY: Primary | ICD-10-CM

## 2018-09-13 ENCOUNTER — RESEARCH ENCOUNTER (OUTPATIENT)
Dept: RESEARCH | Facility: HOSPITAL | Age: 63
End: 2018-09-13

## 2018-09-13 ENCOUNTER — LAB VISIT (OUTPATIENT)
Dept: LAB | Facility: HOSPITAL | Age: 63
End: 2018-09-13
Attending: OBSTETRICS & GYNECOLOGY
Payer: COMMERCIAL

## 2018-09-13 DIAGNOSIS — Z00.6 EXAMINATION OF PARTICIPANT IN CLINICAL TRIAL: ICD-10-CM

## 2018-09-13 DIAGNOSIS — C56.9 MALIGNANT NEOPLASM OF OVARY, UNSPECIFIED LATERALITY: ICD-10-CM

## 2018-09-13 LAB
DRUG STUDY SPECIMEN TYPE: NORMAL
DRUG STUDY TEST NAME: NORMAL
DRUG STUDY TEST RESULT: NORMAL

## 2018-09-13 PROCEDURE — 99000 SPECIMEN HANDLING OFFICE-LAB: CPT

## 2018-09-13 PROCEDURE — 36415 COLL VENOUS BLD VENIPUNCTURE: CPT

## 2018-09-25 NOTE — PROGRESS NOTES
Sept. 13, 2018     Protocol: FORWARD1/IMGN-0403  Principle Investigator: BETH Weston  Treating Physician: GUERITA Tran Pt Initials: ANV VAZ  Study ID: [id number]  IRB#: [id number]     FORWARD 1: A Randomized, Open Label Phase 3 Study to Evaluate the Safety and Efficacy of Mirvetuximab soravtansine (QREU571) Versus Investigators Choice of Chemotherapy in Women with Folate Receptor á?positive Advanced Epithelial Ovarian Cancer, Primary Peritoneal Cancer or Fallopian Tube Cancer      Cycle 12, Day 8  Study allocation was made on 11/27/17.  Patient randomized to Arm A:[id number], 6 mg/kg2(from [facility]), q 3 weeks.       Patient had scans on 8/10/18.  Per RECIST, patient has continued partial response to therapy per RECIST1.1 criteria.  Next scan to be in 12 weeks.       Patient presented to clinic for C12D8 on the above mentioned clinical trial.  No MD visit on this day, per protocol.  Patient reports no change in ocular symptoms - some blurred vision, no issue with dry eyes.  She confirms she has been compliant with Refresh and steroid eye drops.  Neuropathy is stable.       Central labs collected on 9/13/18.  Reviewed baseline conditions and current medications with patient.  See Concomitant Medication and Baseline Medical History sheets.  Central safety labs collected, processed, and shipped to  per protocol.      All of the patient's questions were answered by me to her satisfaction. Patient expressed her willingness to continue to participate in the above mentioned clinical trial. Instructed patient to notify Dr. Tran and/or me with any questions, concerns, or changes in health status. Patient verbalized understanding.      Baseline Medical History  1. 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    2. Anxiety, Grade 1.  3. Dermatofibroma.  Benign.  Diagnosed by biopsy on 7/17/17.  No further intervention needed.  4. Decreased platelet count, Grade 1.  Resolved  11/17/17.  5. Constipation, Grade 1.  6. Myalgia, Grade 1.  7. Peripheral neuropathy- feet, Grade 1.      Adverse Events - For the most up-to-date AE log, please refer to the paper AE log in the subjects research file.  1. Platelet count decreased/thrombocytopenia, improved to Grade 1 at C12D1.  Will continue to monitor.  2. Peripheral neuropathy - fingers, remains Grade 1.  Patient discontinued Neurontin, tolerating neuropathy okay. Will continue to monitor.  3. Fatigue, remains Grade 1.  Will continue to monitor.

## 2018-09-27 ENCOUNTER — RESEARCH ENCOUNTER (OUTPATIENT)
Dept: RESEARCH | Facility: HOSPITAL | Age: 63
End: 2018-09-27

## 2018-09-27 ENCOUNTER — LAB VISIT (OUTPATIENT)
Dept: LAB | Facility: HOSPITAL | Age: 63
End: 2018-09-27
Attending: OBSTETRICS & GYNECOLOGY
Payer: COMMERCIAL

## 2018-09-27 ENCOUNTER — TELEPHONE (OUTPATIENT)
Dept: GYNECOLOGIC ONCOLOGY | Facility: CLINIC | Age: 63
End: 2018-09-27

## 2018-09-27 DIAGNOSIS — Z00.6 EXAMINATION OF PARTICIPANT IN CLINICAL TRIAL: ICD-10-CM

## 2018-09-27 DIAGNOSIS — C56.9 MALIGNANT NEOPLASM OF OVARY, UNSPECIFIED LATERALITY: ICD-10-CM

## 2018-09-27 DIAGNOSIS — D49.59 OVARIAN NEOPLASM: ICD-10-CM

## 2018-09-27 LAB
ALBUMIN SERPL BCP-MCNC: 3.5 G/DL
ALP SERPL-CCNC: 91 U/L
ALT SERPL W/O P-5'-P-CCNC: 40 U/L
ANION GAP SERPL CALC-SCNC: 6 MMOL/L
AST SERPL-CCNC: 38 U/L
BASOPHILS # BLD AUTO: 0.04 K/UL
BASOPHILS NFR BLD: 1.1 %
BILIRUB SERPL-MCNC: 0.9 MG/DL
BUN SERPL-MCNC: 10 MG/DL
CALCIUM SERPL-MCNC: 9.7 MG/DL
CHLORIDE SERPL-SCNC: 107 MMOL/L
CO2 SERPL-SCNC: 28 MMOL/L
CREAT SERPL-MCNC: 0.7 MG/DL
DIFFERENTIAL METHOD: ABNORMAL
DRUG STUDY SPECIMEN TYPE: NORMAL
DRUG STUDY TEST NAME: NORMAL
DRUG STUDY TEST RESULT: NORMAL
EOSINOPHIL # BLD AUTO: 0.1 K/UL
EOSINOPHIL NFR BLD: 2.3 %
ERYTHROCYTE [DISTWIDTH] IN BLOOD BY AUTOMATED COUNT: 15 %
EST. GFR  (AFRICAN AMERICAN): >60 ML/MIN/1.73 M^2
EST. GFR  (NON AFRICAN AMERICAN): >60 ML/MIN/1.73 M^2
GLUCOSE SERPL-MCNC: 104 MG/DL
HCT VFR BLD AUTO: 45 %
HGB BLD-MCNC: 14.7 G/DL
IMM GRANULOCYTES # BLD AUTO: 0.01 K/UL
IMM GRANULOCYTES NFR BLD AUTO: 0.3 %
LYMPHOCYTES # BLD AUTO: 1 K/UL
LYMPHOCYTES NFR BLD: 28 %
MCH RBC QN AUTO: 29.9 PG
MCHC RBC AUTO-ENTMCNC: 32.7 G/DL
MCV RBC AUTO: 92 FL
MONOCYTES # BLD AUTO: 0.3 K/UL
MONOCYTES NFR BLD: 8.2 %
NEUTROPHILS # BLD AUTO: 2.1 K/UL
NEUTROPHILS NFR BLD: 60.1 %
NRBC BLD-RTO: 0 /100 WBC
PLATELET # BLD AUTO: 95 K/UL
PMV BLD AUTO: 9.7 FL
POTASSIUM SERPL-SCNC: 4.1 MMOL/L
PROT SERPL-MCNC: 6.9 G/DL
RBC # BLD AUTO: 4.91 M/UL
SODIUM SERPL-SCNC: 141 MMOL/L
WBC # BLD AUTO: 3.53 K/UL

## 2018-09-27 PROCEDURE — 36415 COLL VENOUS BLD VENIPUNCTURE: CPT

## 2018-09-27 PROCEDURE — 85025 COMPLETE CBC W/AUTO DIFF WBC: CPT

## 2018-09-27 PROCEDURE — 99000 SPECIMEN HANDLING OFFICE-LAB: CPT

## 2018-09-27 PROCEDURE — 80053 COMPREHEN METABOLIC PANEL: CPT

## 2018-09-27 NOTE — TELEPHONE ENCOUNTER
Left voice mail message per Dr. Guerra regarding appt on 9/28 Dr. Guerra need to see patient at 8 am instead of 9 am. KJ

## 2018-10-03 ENCOUNTER — LAB VISIT (OUTPATIENT)
Dept: LAB | Facility: HOSPITAL | Age: 63
End: 2018-10-03
Attending: OBSTETRICS & GYNECOLOGY
Payer: COMMERCIAL

## 2018-10-03 ENCOUNTER — RESEARCH ENCOUNTER (OUTPATIENT)
Dept: RESEARCH | Facility: HOSPITAL | Age: 63
End: 2018-10-03

## 2018-10-03 ENCOUNTER — OFFICE VISIT (OUTPATIENT)
Dept: GYNECOLOGIC ONCOLOGY | Facility: CLINIC | Age: 63
End: 2018-10-03
Payer: COMMERCIAL

## 2018-10-03 VITALS
BODY MASS INDEX: 29.83 KG/M2 | DIASTOLIC BLOOD PRESSURE: 57 MMHG | HEART RATE: 71 BPM | WEIGHT: 190.06 LBS | HEIGHT: 67 IN | TEMPERATURE: 99 F | SYSTOLIC BLOOD PRESSURE: 113 MMHG | RESPIRATION RATE: 16 BRPM

## 2018-10-03 DIAGNOSIS — T50.905A THROMBOCYTOPENIA DUE TO DRUGS: ICD-10-CM

## 2018-10-03 DIAGNOSIS — T45.1X5A CHEMOTHERAPY-INDUCED NEUROPATHY: Primary | ICD-10-CM

## 2018-10-03 DIAGNOSIS — C56.9 OVARIAN CANCER, UNSPECIFIED LATERALITY: ICD-10-CM

## 2018-10-03 DIAGNOSIS — Z51.11 ENCOUNTER FOR ANTINEOPLASTIC CHEMOTHERAPY: ICD-10-CM

## 2018-10-03 DIAGNOSIS — G62.0 CHEMOTHERAPY-INDUCED NEUROPATHY: Primary | ICD-10-CM

## 2018-10-03 DIAGNOSIS — D49.59 OVARIAN NEOPLASM: ICD-10-CM

## 2018-10-03 DIAGNOSIS — C56.9 MALIGNANT NEOPLASM OF OVARY, UNSPECIFIED LATERALITY: ICD-10-CM

## 2018-10-03 DIAGNOSIS — Z00.6 EXAMINATION OF PARTICIPANT IN CLINICAL TRIAL: ICD-10-CM

## 2018-10-03 DIAGNOSIS — D69.59 THROMBOCYTOPENIA DUE TO DRUGS: ICD-10-CM

## 2018-10-03 LAB
ALBUMIN SERPL BCP-MCNC: 3.5 G/DL
ALP SERPL-CCNC: 87 U/L
ALT SERPL W/O P-5'-P-CCNC: 40 U/L
ANION GAP SERPL CALC-SCNC: 11 MMOL/L
AST SERPL-CCNC: 45 U/L
BASOPHILS # BLD AUTO: 0.03 K/UL
BASOPHILS NFR BLD: 0.8 %
BILIRUB SERPL-MCNC: 0.8 MG/DL
BUN SERPL-MCNC: 16 MG/DL
CALCIUM SERPL-MCNC: 9.1 MG/DL
CHLORIDE SERPL-SCNC: 106 MMOL/L
CO2 SERPL-SCNC: 22 MMOL/L
CREAT SERPL-MCNC: 0.7 MG/DL
DIFFERENTIAL METHOD: ABNORMAL
DRUG STUDY SPECIMEN TYPE: NORMAL
DRUG STUDY TEST NAME: NORMAL
DRUG STUDY TEST RESULT: NORMAL
EOSINOPHIL # BLD AUTO: 0.1 K/UL
EOSINOPHIL NFR BLD: 2.9 %
ERYTHROCYTE [DISTWIDTH] IN BLOOD BY AUTOMATED COUNT: 14.9 %
EST. GFR  (AFRICAN AMERICAN): >60 ML/MIN/1.73 M^2
EST. GFR  (NON AFRICAN AMERICAN): >60 ML/MIN/1.73 M^2
GLUCOSE SERPL-MCNC: 95 MG/DL
HCT VFR BLD AUTO: 43 %
HGB BLD-MCNC: 14.6 G/DL
IMM GRANULOCYTES # BLD AUTO: 0.01 K/UL
IMM GRANULOCYTES NFR BLD AUTO: 0.3 %
LYMPHOCYTES # BLD AUTO: 1.1 K/UL
LYMPHOCYTES NFR BLD: 28.3 %
MCH RBC QN AUTO: 30.8 PG
MCHC RBC AUTO-ENTMCNC: 34 G/DL
MCV RBC AUTO: 91 FL
MONOCYTES # BLD AUTO: 0.3 K/UL
MONOCYTES NFR BLD: 8.7 %
NEUTROPHILS # BLD AUTO: 2.2 K/UL
NEUTROPHILS NFR BLD: 59 %
NRBC BLD-RTO: 0 /100 WBC
PLATELET # BLD AUTO: 93 K/UL
PMV BLD AUTO: 10 FL
POTASSIUM SERPL-SCNC: 3.9 MMOL/L
PROT SERPL-MCNC: 6.9 G/DL
RBC # BLD AUTO: 4.74 M/UL
SODIUM SERPL-SCNC: 139 MMOL/L
WBC # BLD AUTO: 3.78 K/UL

## 2018-10-03 PROCEDURE — 3008F BODY MASS INDEX DOCD: CPT | Mod: CPTII,S$GLB,, | Performed by: OBSTETRICS & GYNECOLOGY

## 2018-10-03 PROCEDURE — 36415 COLL VENOUS BLD VENIPUNCTURE: CPT

## 2018-10-03 PROCEDURE — 80053 COMPREHEN METABOLIC PANEL: CPT

## 2018-10-03 PROCEDURE — 99214 OFFICE O/P EST MOD 30 MIN: CPT | Mod: S$GLB,,, | Performed by: OBSTETRICS & GYNECOLOGY

## 2018-10-03 PROCEDURE — 85025 COMPLETE CBC W/AUTO DIFF WBC: CPT

## 2018-10-03 PROCEDURE — 99999 PR PBB SHADOW E&M-EST. PATIENT-LVL III: CPT | Mod: PBBFAC,,, | Performed by: OBSTETRICS & GYNECOLOGY

## 2018-10-03 PROCEDURE — 99000 SPECIMEN HANDLING OFFICE-LAB: CPT

## 2018-10-03 RX ORDER — PREGABALIN 100 MG/1
100 CAPSULE ORAL 2 TIMES DAILY
Qty: 60 CAPSULE | Refills: 3 | Status: SHIPPED | OUTPATIENT
Start: 2018-10-03 | End: 2018-12-12 | Stop reason: SDUPTHER

## 2018-10-04 NOTE — PROGRESS NOTES
Subjective:      Patient ID: Mickie Don is a 63 y.o. female.    Chief Complaint: Chemotherapy (C13D1)      HPI  Patient comes in today for C13 D1 of FORWARD1 trial. She is on study drug.      One week delay due to thrombocytopenia last week. Still too low for treatment, plts 93   Dose reduction with cycle 11 due to persistent thrombocytopenia.       is 15.      Performance Status: 1.   Blurring of left eye. Stable. Grade 1.     Today desires to start on Lyrica for neuropathy. Also endorses visual changes--previously difficulty with reading, not reports difficulty seeing far distances.      Treatment History  Xlap/Omentectomy on 11/1/16 - mass was unresectable  Stage IIIC ovarian cancer  Initiated Taxotere/Carbo on 11/18/16, now s/p 6 cycles completed 3/8/17  Xlap/ISIS/BSO/Radical mass resection > 10 cm, optimal debulking on 4/11/17  Initiated PRIMA on 8/21/17  BRCA negative  Review of Systems   Constitutional: Negative for appetite change, chills, fatigue and fever.   HENT: Negative for mouth sores.    Respiratory: Negative for cough and shortness of breath.    Cardiovascular: Negative for leg swelling.   Gastrointestinal: Negative for abdominal pain, blood in stool, constipation and diarrhea.   Endocrine: Negative for cold intolerance.   Genitourinary: Negative for dysuria and vaginal bleeding.   Musculoskeletal: Negative for myalgias.   Skin: Negative for rash.   Allergic/Immunologic: Negative.    Neurological: Positive for numbness. Negative for weakness.   Hematological: Negative for adenopathy. Does not bruise/bleed easily.   Psychiatric/Behavioral: Negative for confusion.       Objective:   Physical Exam:   Constitutional: She is oriented to person, place, and time. She appears well-developed and well-nourished.    HENT:   Head: Normocephalic and atraumatic.    Eyes: EOM are normal. Pupils are equal, round, and reactive to light.    Neck: Normal range of motion. Neck supple. No thyromegaly present.     Cardiovascular: Normal rate, regular rhythm and intact distal pulses.     Pulmonary/Chest: Effort normal and breath sounds normal. No respiratory distress. She has no wheezes.        Abdominal: Soft. Bowel sounds are normal. She exhibits no distension, no ascites and no mass. There is no tenderness.             Musculoskeletal: Normal range of motion and moves all extremeties.      Lymphadenopathy:     She has no cervical adenopathy.        Right: No supraclavicular adenopathy present.        Left: No supraclavicular adenopathy present.    Neurological: She is alert and oriented to person, place, and time.    Skin: Skin is warm and dry. No rash noted.    Psychiatric: She has a normal mood and affect.       Assessment:     1. Chemotherapy-induced neuropathy    2. Examination of participant in clinical trial    3. Ovarian cancer, unspecified laterality    4. Encounter for antineoplastic chemotherapy    5. Thrombocytopenia due to drugs        Plan:   No orders of the defined types were placed in this encounter.    Lyrica for neuropathic pain.   Delay another week due to thrombocytopenia  Will see Dr. Mcgrath again for visual changes.     Will repeat labs next week and if appropriate will treat per protocol.   RTC per protocol.     I spent approximately 25 minutes reviewing the available records and evaluating the patient, out of which over 50% of the time was spent face to face with the patient in counseling and coordinating this patient's care.

## 2018-10-09 NOTE — PROGRESS NOTES
Subjective:       Patient ID: Mickie Don is a 63 y.o. female.    Chief Complaint: Ovarian Cancer and Chemotherapy (Forward trial, C13)    HPI     Patient comes in today for cycle 13/D1 of FORWARD1 trial. She is on study drug.      Two week delay due to thrombocytopenia.      ECOG PS of 0.      Chemotherapy labs have been reviewed and plt count is 88.        Blurring of left eye. Stable. Grade 1.      Treatment History  Xlap/Omentectomy on 11/1/16 - mass was unresectable  Stage IIIC ovarian cancer  Initiated Taxotere/Carbo on 11/18/16, now s/p 6 cycles completed 3/8/17  Xlap/ISIS/BSO/Radical mass resection > 10 cm, optimal debulking on 4/11/17  Initiated PRIMA on 8/21/17  BRCA negative  Review of Systems   Constitutional: Negative for chills, fatigue and fever.   Respiratory: Negative for cough, shortness of breath and wheezing.    Cardiovascular: Negative for chest pain, palpitations and leg swelling.   Gastrointestinal: Negative for abdominal pain, constipation, diarrhea, nausea and vomiting.   Genitourinary: Negative for difficulty urinating, dysuria, frequency, genital sores, hematuria, urgency, vaginal bleeding, vaginal discharge and vaginal pain.   Musculoskeletal: Negative for gait problem.   Neurological: Positive for numbness (hands and feet. Grade 1. ). Negative for weakness.   Hematological: Negative for adenopathy. Does not bruise/bleed easily.   Psychiatric/Behavioral: The patient is not nervous/anxious.        Objective:   BP (!) 111/53   Pulse 71   Wt 86.8 kg (191 lb 5.8 oz)   BMI 29.97 kg/m²      Physical Exam   Constitutional: She is oriented to person, place, and time. She appears well-developed and well-nourished.   HENT:   Head: Normocephalic and atraumatic.   Eyes: No scleral icterus.   Neck: No tracheal deviation present. No thyromegaly present.   Cardiovascular: Normal rate and regular rhythm.   Pulmonary/Chest: Effort normal and breath sounds normal.   Abdominal: Soft. She exhibits no  distension and no mass. There is no tenderness. There is no rebound and no guarding. No hernia.   Genitourinary:   Genitourinary Comments: Not performed.    Musculoskeletal: She exhibits no edema or tenderness.   Lymphadenopathy:     She has no cervical adenopathy.   Neurological: She is alert and oriented to person, place, and time.   Skin: Skin is warm and dry.   Psychiatric: She has a normal mood and affect. Her behavior is normal. Judgment and thought content normal.       Assessment:       1. Malignant neoplasm of ovary, unspecified laterality    2. Encounter for antineoplastic chemotherapy    3. Chemotherapy management, encounter for    4. Neuropathy due to chemotherapeutic drug    5. Thrombocytopenia due to drugs        Plan:   Malignant neoplasm of ovary, unspecified laterality  Delay one week due to thrombocytopenia and recheck labs  Encounter for antineoplastic chemotherapy    Chemotherapy management, encounter for    Neuropathy due to chemotherapeutic drug    Thrombocytopenia due to drugs  Repeat in 1 week.   Consider further dose reduction

## 2018-10-10 ENCOUNTER — TELEPHONE (OUTPATIENT)
Dept: GYNECOLOGIC ONCOLOGY | Facility: CLINIC | Age: 63
End: 2018-10-10

## 2018-10-10 ENCOUNTER — RESEARCH ENCOUNTER (OUTPATIENT)
Dept: RESEARCH | Facility: HOSPITAL | Age: 63
End: 2018-10-10

## 2018-10-11 ENCOUNTER — OFFICE VISIT (OUTPATIENT)
Dept: GYNECOLOGIC ONCOLOGY | Facility: CLINIC | Age: 63
End: 2018-10-11
Payer: COMMERCIAL

## 2018-10-11 ENCOUNTER — LAB VISIT (OUTPATIENT)
Dept: LAB | Facility: HOSPITAL | Age: 63
End: 2018-10-11
Attending: OBSTETRICS & GYNECOLOGY
Payer: COMMERCIAL

## 2018-10-11 VITALS
BODY MASS INDEX: 29.97 KG/M2 | WEIGHT: 191.38 LBS | HEART RATE: 71 BPM | DIASTOLIC BLOOD PRESSURE: 53 MMHG | SYSTOLIC BLOOD PRESSURE: 111 MMHG

## 2018-10-11 DIAGNOSIS — C56.9 MALIGNANT NEOPLASM OF OVARY, UNSPECIFIED LATERALITY: Primary | ICD-10-CM

## 2018-10-11 DIAGNOSIS — G62.0 NEUROPATHY DUE TO CHEMOTHERAPEUTIC DRUG: ICD-10-CM

## 2018-10-11 DIAGNOSIS — D69.59 THROMBOCYTOPENIA DUE TO DRUGS: ICD-10-CM

## 2018-10-11 DIAGNOSIS — Z51.11 CHEMOTHERAPY MANAGEMENT, ENCOUNTER FOR: ICD-10-CM

## 2018-10-11 DIAGNOSIS — T50.905A THROMBOCYTOPENIA DUE TO DRUGS: ICD-10-CM

## 2018-10-11 DIAGNOSIS — Z51.11 ENCOUNTER FOR ANTINEOPLASTIC CHEMOTHERAPY: ICD-10-CM

## 2018-10-11 DIAGNOSIS — C56.9 MALIGNANT NEOPLASM OF OVARY, UNSPECIFIED LATERALITY: ICD-10-CM

## 2018-10-11 DIAGNOSIS — T45.1X5A NEUROPATHY DUE TO CHEMOTHERAPEUTIC DRUG: ICD-10-CM

## 2018-10-11 DIAGNOSIS — Z00.6 EXAMINATION OF PARTICIPANT IN CLINICAL TRIAL: ICD-10-CM

## 2018-10-11 DIAGNOSIS — D49.59 OVARIAN NEOPLASM: ICD-10-CM

## 2018-10-11 LAB
ALBUMIN SERPL BCP-MCNC: 3.4 G/DL
ALP SERPL-CCNC: 88 U/L
ALT SERPL W/O P-5'-P-CCNC: 43 U/L
ANION GAP SERPL CALC-SCNC: 9 MMOL/L
AST SERPL-CCNC: 42 U/L
BASOPHILS # BLD AUTO: 0.03 K/UL
BASOPHILS NFR BLD: 0.9 %
BILIRUB SERPL-MCNC: 0.8 MG/DL
BUN SERPL-MCNC: 12 MG/DL
CALCIUM SERPL-MCNC: 9.2 MG/DL
CHLORIDE SERPL-SCNC: 106 MMOL/L
CO2 SERPL-SCNC: 26 MMOL/L
CREAT SERPL-MCNC: 0.6 MG/DL
DIFFERENTIAL METHOD: ABNORMAL
DRUG STUDY SPECIMEN TYPE: NORMAL
DRUG STUDY TEST NAME: NORMAL
DRUG STUDY TEST RESULT: NORMAL
EOSINOPHIL # BLD AUTO: 0.1 K/UL
EOSINOPHIL NFR BLD: 2.1 %
ERYTHROCYTE [DISTWIDTH] IN BLOOD BY AUTOMATED COUNT: 15.3 %
EST. GFR  (AFRICAN AMERICAN): >60 ML/MIN/1.73 M^2
EST. GFR  (NON AFRICAN AMERICAN): >60 ML/MIN/1.73 M^2
GLUCOSE SERPL-MCNC: 97 MG/DL
HCT VFR BLD AUTO: 44 %
HGB BLD-MCNC: 14.7 G/DL
IMM GRANULOCYTES # BLD AUTO: 0.02 K/UL
IMM GRANULOCYTES NFR BLD AUTO: 0.6 %
LYMPHOCYTES # BLD AUTO: 0.8 K/UL
LYMPHOCYTES NFR BLD: 25.1 %
MCH RBC QN AUTO: 30.7 PG
MCHC RBC AUTO-ENTMCNC: 33.4 G/DL
MCV RBC AUTO: 92 FL
MONOCYTES # BLD AUTO: 0.2 K/UL
MONOCYTES NFR BLD: 6.6 %
NEUTROPHILS # BLD AUTO: 2.1 K/UL
NEUTROPHILS NFR BLD: 64.7 %
NRBC BLD-RTO: 0 /100 WBC
PLATELET # BLD AUTO: 88 K/UL
PMV BLD AUTO: 9.5 FL
POTASSIUM SERPL-SCNC: 4 MMOL/L
PROT SERPL-MCNC: 6.7 G/DL
RBC # BLD AUTO: 4.79 M/UL
SODIUM SERPL-SCNC: 141 MMOL/L
WBC # BLD AUTO: 3.31 K/UL

## 2018-10-11 PROCEDURE — 80053 COMPREHEN METABOLIC PANEL: CPT

## 2018-10-11 PROCEDURE — 36415 COLL VENOUS BLD VENIPUNCTURE: CPT

## 2018-10-11 PROCEDURE — 3008F BODY MASS INDEX DOCD: CPT | Mod: CPTII,S$GLB,, | Performed by: OBSTETRICS & GYNECOLOGY

## 2018-10-11 PROCEDURE — 99214 OFFICE O/P EST MOD 30 MIN: CPT | Mod: S$GLB,,, | Performed by: OBSTETRICS & GYNECOLOGY

## 2018-10-11 PROCEDURE — 85025 COMPLETE CBC W/AUTO DIFF WBC: CPT

## 2018-10-11 PROCEDURE — 99999 PR PBB SHADOW E&M-EST. PATIENT-LVL II: CPT | Mod: PBBFAC,,, | Performed by: OBSTETRICS & GYNECOLOGY

## 2018-10-11 PROCEDURE — 99000 SPECIMEN HANDLING OFFICE-LAB: CPT

## 2018-10-16 NOTE — PROGRESS NOTES
Oct. 10, 2018     Protocol: FORWARD1/IMGN-0403  Principle Investigator: BETH Weston  Treating Physician: GUERITA Tran Pt Initials: NAV VAZ  Study ID: [id number]  IRB#: [id number]     FORWARD 1: A Randomized, Open Label Phase 3 Study to Evaluate the Safety and Efficacy of Mirvetuximab soravtansine (WEBN103) Versus Investigators Choice of Chemotherapy in Women with Folate Receptor á?positive Advanced Epithelial Ovarian Cancer, Primary Peritoneal Cancer or Fallopian Tube Cancer      Unscheduled - planned as Cycle 13  Study allocation was made on 11/27/17.  Patient randomized to Arm A:[id number], 6 mg/kg (from AIBW), q3 weeks.  Dose reduced to 5 mg/kg (based on AIBW) on C11 (8/6/18) d/t persistent thrombocytopenia.      Patient had scans on 8/10/18, which showed stable disease from previous CT scan.  Per RECIST, patient has had partial response to therapy compared to Baseline scans. Next scans scheduled for 11/2/18.       Patient presented to clinic for C13 on the above mentioned clinical trial.  C13 was originally scheduled for 9/27/18, delayed to 10/3/18, and then delayed to today.   Labs show platelets are 88k today, which is  too low for treatment (must be 100k).  She denies cough, headache, fatigue, fever, nausea, diarrhea, vomiting, shortness of breath, or dizziness.  Patient reports continued neuropathy in bilateral fingers - stable, more noticeable when she works.  Patient reports no change in ocular symptoms - some blurred vision, no issue with dry eyes.  She confirms she has Refresh and steroid eye drops.       Physical exam, ECOG assessment performed on 10/10/18.  ECOG = 0 per MD.  Local labs were performed on 10/10/18 and reviewed by the physician.   Physical exam unremarkable per physician, ocular symptoms noted.  Reviewed current medications with patient, see Concomitant Medication sheet.       Patient will be rescheduled for next week for Cycle 13.  Will email MM about possible dose reduction d/t persistent  thrombocytopenia, and to confirm patient can continued treatment if delayed by > than 1 cycle (3 weeks).    All of the patient's questions were answered by me to her satisfaction. Patient expressed her willingness to continue to participate in the above mentioned clinical trial. Instructed patient to notify Dr. Tran and/or me with any questions, concerns, or changes in health status. Patient verbalized understanding.        Baseline Medical History  1. 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    2. Anxiety, Grade 1.  3. Dermatofibroma.  Benign.  Diagnosed by biopsy on 7/17/17.  No further intervention needed.  4. Decreased platelet count, Grade 1.  Resolved 11/17/17.  5. Constipation, Grade 1.  6. Myalgia, Grade 1.  7. Peripheral neuropathy- feet, Grade 1.      Adverse Events - For the most up-to-date AE log, please refer to the paper AE log in the subjects research file.  1. Platelet count decreased/thrombocytopenia, remains Grade 1.  Platelet count acceptable for treatment today.  Will continue to monitor.  2. Peripheral neuropathy - fingers, remains Grade 1.  Patient prescribed Lyrica at 10/3/18 visit but has not started medication.  Will continue to monitor.  3. Fatigue, remains Grade 1.  Will continue to monitor.

## 2018-10-16 NOTE — PROGRESS NOTES
Oct. 3, 2018     Protocol: FORWARD1/IMGN-0403  Principle Investigator: BETH Weston  Treating Physician: GUERITA Tran Pt Initials: NAV VAZ  Study ID: [id number]  IRB#: [id number]     FORWARD 1: A Randomized, Open Label Phase 3 Study to Evaluate the Safety and Efficacy of Mirvetuximab soravtansine ([id number]) Versus Investigators Choice of Chemotherapy in Women with Folate Receptor á?positive Advanced Epithelial Ovarian Cancer, Primary Peritoneal Cancer or Fallopian Tube Cancer      Unscheduled - planned as Cycle 13  Study allocation was made on 11/27/17.  Patient randomized to Arm A:[id number], 6 mg/kg (from AIBW), q3 weeks.  Dose reduced to 5 mg/kg (based on AIBW) on C11 (8/6/18) d/t persistent thrombocytopenia.      Patient had scans on 8/10/18, which showed stable disease from previous CT scan.  Per RECIST, patient has had partial response to therapy compared to Baseline scans. Next scans scheduled for 11/2/18.       Patient presented to clinic for C13 on the above mentioned clinical trial.  C13 was originally scheduled for 9/27/18.  Patient was delayed 1 week due to thrombocytopenia.  Labs on 10/3/18 - platelets are 93k today, which is  too low for treatment (must be 100k).  She denies cough, headache, fatigue, fever, nausea, diarrhea, vomiting, shortness of breath, or dizziness.  Patient reports continued neuropathy in bilateral fingers - stable, more noticeable when she works.  Patient prescribed Lyrica to see if it helps with neuropathy.  Patient reports no change in ocular symptoms - some blurred vision, no issue with dry eyes.  She confirms she has Refresh and steroid eye drops.       Physical exam, ECOG assessment performed on 10/3/18.  ECOG = 1 per MD.  Local labs were performed on 10/3/18 and reviewed by the physician.   Physical exam unremarkable per physician, ocular symptoms noted.  Reviewed current medications with patient, see Concomitant Medication sheet.      Patient will be rescheduled for next week for Cycle  13.    All of the patient's questions were answered by me to her satisfaction. Patient expressed her willingness to continue to participate in the above mentioned clinical trial. Instructed patient to notify Dr. Tran and/or me with any questions, concerns, or changes in health status. Patient verbalized understanding.        Baseline Medical History  1. 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    2. Anxiety, Grade 1.  3. Dermatofibroma.  Benign.  Diagnosed by biopsy on 7/17/17.  No further intervention needed.  4. Decreased platelet count, Grade 1.  Resolved 11/17/17.  5. Constipation, Grade 1.  6. Myalgia, Grade 1.  7. Peripheral neuropathy- feet, Grade 1.      Adverse Events - For the most up-to-date AE log, please refer to the paper AE log in the subjects research file.  1. Platelet count decreased/thrombocytopenia, remains Grade 1.  Platelet count acceptable for treatment today.  Will continue to monitor.  2. Peripheral neuropathy - fingers, remains Grade 1.  Patient discontinued Neurontin, tolerating neuropathy okay. Will continue to monitor.  3. Fatigue, remains Grade 1.  Will continue to monitor.

## 2018-10-16 NOTE — PROGRESS NOTES
Sept. 27, 2018     Protocol: FORWARD1/IMGN-0403  Principle Investigator: BETH Weston  Treating Physician: GUERITA Tran Pt Initials: NAV VAZ  Study ID: [id number]  IRB#: [id number]     FORWARD 1: A Randomized, Open Label Phase 3 Study to Evaluate the Safety and Efficacy of Mirvetuximab soravtansine (OKDJ609) Versus Investigators Choice of Chemotherapy in Women with Folate Receptor á?positive Advanced Epithelial Ovarian Cancer, Primary Peritoneal Cancer or Fallopian Tube Cancer      Unscheduled - planned as Cycle 13  Study allocation was made on 11/27/17.  Patient randomized to Arm A:[id number], 6 mg/kg (from AIBW), q3 weeks.  Dose reduced to 5 mg/kg (based on AIBW) on C11 (8/6/18) d/t persistent thrombocytopenia.      Patient had scans on 8/10/18, which showed stable disease from previous CT scan.  Per RECIST, patient has had partial response to therapy compared to Baseline scans. Next scans scheduled for 11/2/18.       Patient had labs for Cycle 13.  Platelets are 95 today - too low for treatment (needs to be ? 100k).  Cancelled MD visit and chemo for tomorrow.  Called patient to let her know, and informed her I will reschedule apps for next week.     All of the patient's questions were answered by me to her satisfaction. Patient expressed her willingness to continue to participate in the above mentioned clinical trial. Instructed patient to notify Dr. Tran and/or me with any questions, concerns, or changes in health status. Patient verbalized understanding.      Baseline Medical History  1. 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    2. Anxiety, Grade 1.  3. Dermatofibroma.  Benign.  Diagnosed by biopsy on 7/17/17.  No further intervention needed.  4. Decreased platelet count, Grade 1.  Resolved 11/17/17.  5. Constipation, Grade 1.  6. Myalgia, Grade 1.  7. Peripheral neuropathy- feet, Grade 1.      Adverse Events - For the most up-to-date AE log, please refer  to the paper AE log in the subjects research file.  1. Platelet count decreased/thrombocytopenia, remains Grade 1.  Platelet count acceptable for treatment today.  Will continue to monitor.  2. Peripheral neuropathy - fingers, remains Grade 1.  Patient discontinued Neurontin, tolerating neuropathy okay. Will continue to monitor.  3. Fatigue, remains Grade 1.  Will continue to monitor.

## 2018-10-17 ENCOUNTER — TELEPHONE (OUTPATIENT)
Dept: GYNECOLOGIC ONCOLOGY | Facility: CLINIC | Age: 63
End: 2018-10-17

## 2018-10-17 ENCOUNTER — LAB VISIT (OUTPATIENT)
Dept: LAB | Facility: HOSPITAL | Age: 63
End: 2018-10-17
Attending: OBSTETRICS & GYNECOLOGY
Payer: COMMERCIAL

## 2018-10-17 ENCOUNTER — OFFICE VISIT (OUTPATIENT)
Dept: OPHTHALMOLOGY | Facility: CLINIC | Age: 63
End: 2018-10-17
Payer: COMMERCIAL

## 2018-10-17 DIAGNOSIS — C56.9 MALIGNANT NEOPLASM OF OVARY, UNSPECIFIED LATERALITY: ICD-10-CM

## 2018-10-17 DIAGNOSIS — H25.13 NUCLEAR SCLEROTIC CATARACT OF BOTH EYES: ICD-10-CM

## 2018-10-17 DIAGNOSIS — Z00.6 EXAMINATION OF PARTICIPANT IN CLINICAL TRIAL: ICD-10-CM

## 2018-10-17 DIAGNOSIS — H40.013 OPEN ANGLE WITH BORDERLINE FINDINGS AND LOW GLAUCOMA RISK IN BOTH EYES: ICD-10-CM

## 2018-10-17 DIAGNOSIS — Z00.6 PATIENT IN CANCER RELATED RESEARCH STUDY: Primary | ICD-10-CM

## 2018-10-17 DIAGNOSIS — C56.9 OVARIAN CANCER, UNSPECIFIED LATERALITY: ICD-10-CM

## 2018-10-17 DIAGNOSIS — D49.59 OVARIAN NEOPLASM: ICD-10-CM

## 2018-10-17 LAB
ALBUMIN SERPL BCP-MCNC: 3.7 G/DL
ALP SERPL-CCNC: 87 U/L
ALT SERPL W/O P-5'-P-CCNC: 47 U/L
ANION GAP SERPL CALC-SCNC: 7 MMOL/L
AST SERPL-CCNC: 46 U/L
BASOPHILS # BLD AUTO: 0.03 K/UL
BASOPHILS # BLD AUTO: 0.03 K/UL
BASOPHILS NFR BLD: 0.7 %
BASOPHILS NFR BLD: 0.7 %
BILIRUB SERPL-MCNC: 0.9 MG/DL
BUN SERPL-MCNC: 13 MG/DL
CALCIUM SERPL-MCNC: 9.5 MG/DL
CHLORIDE SERPL-SCNC: 103 MMOL/L
CO2 SERPL-SCNC: 31 MMOL/L
CREAT SERPL-MCNC: 0.7 MG/DL
DIFFERENTIAL METHOD: ABNORMAL
DIFFERENTIAL METHOD: ABNORMAL
EOSINOPHIL # BLD AUTO: 0.1 K/UL
EOSINOPHIL # BLD AUTO: 0.1 K/UL
EOSINOPHIL NFR BLD: 1.7 %
EOSINOPHIL NFR BLD: 1.7 %
ERYTHROCYTE [DISTWIDTH] IN BLOOD BY AUTOMATED COUNT: 15.4 %
ERYTHROCYTE [DISTWIDTH] IN BLOOD BY AUTOMATED COUNT: 15.4 %
EST. GFR  (AFRICAN AMERICAN): >60 ML/MIN/1.73 M^2
EST. GFR  (NON AFRICAN AMERICAN): >60 ML/MIN/1.73 M^2
GLUCOSE SERPL-MCNC: 93 MG/DL
HCT VFR BLD AUTO: 41.6 %
HCT VFR BLD AUTO: 41.6 %
HGB BLD-MCNC: 14 G/DL
HGB BLD-MCNC: 14 G/DL
LYMPHOCYTES # BLD AUTO: 0.9 K/UL
LYMPHOCYTES # BLD AUTO: 0.9 K/UL
LYMPHOCYTES NFR BLD: 21.3 %
LYMPHOCYTES NFR BLD: 21.3 %
MCH RBC QN AUTO: 30.2 PG
MCH RBC QN AUTO: 30.2 PG
MCHC RBC AUTO-ENTMCNC: 33.7 G/DL
MCHC RBC AUTO-ENTMCNC: 33.7 G/DL
MCV RBC AUTO: 90 FL
MCV RBC AUTO: 90 FL
MONOCYTES # BLD AUTO: 0.4 K/UL
MONOCYTES # BLD AUTO: 0.4 K/UL
MONOCYTES NFR BLD: 9.7 %
MONOCYTES NFR BLD: 9.7 %
NEUTROPHILS # BLD AUTO: 2.7 K/UL
NEUTROPHILS # BLD AUTO: 2.7 K/UL
NEUTROPHILS NFR BLD: 66.6 %
NEUTROPHILS NFR BLD: 66.6 %
PLATELET # BLD AUTO: 105 K/UL
PLATELET # BLD AUTO: 105 K/UL
PMV BLD AUTO: 9.7 FL
PMV BLD AUTO: 9.7 FL
POTASSIUM SERPL-SCNC: 4.4 MMOL/L
PROT SERPL-MCNC: 7 G/DL
RBC # BLD AUTO: 4.64 M/UL
RBC # BLD AUTO: 4.64 M/UL
SODIUM SERPL-SCNC: 141 MMOL/L
WBC # BLD AUTO: 4.03 K/UL
WBC # BLD AUTO: 4.03 K/UL

## 2018-10-17 PROCEDURE — 99999 PR PBB SHADOW E&M-EST. PATIENT-LVL III: CPT | Mod: PBBFAC,,, | Performed by: OPHTHALMOLOGY

## 2018-10-17 PROCEDURE — 36415 COLL VENOUS BLD VENIPUNCTURE: CPT

## 2018-10-17 PROCEDURE — 80053 COMPREHEN METABOLIC PANEL: CPT

## 2018-10-17 PROCEDURE — 86304 IMMUNOASSAY TUMOR CA 125: CPT

## 2018-10-17 PROCEDURE — 85025 COMPLETE CBC W/AUTO DIFF WBC: CPT

## 2018-10-17 PROCEDURE — 99499 UNLISTED E&M SERVICE: CPT | Mod: S$GLB,,, | Performed by: OPHTHALMOLOGY

## 2018-10-17 RX ORDER — SODIUM CHLORIDE 0.9 % (FLUSH) 0.9 %
10 SYRINGE (ML) INJECTION
Status: CANCELLED | OUTPATIENT
Start: 2018-10-18

## 2018-10-17 RX ORDER — HEPARIN 100 UNIT/ML
500 SYRINGE INTRAVENOUS
Status: CANCELLED | OUTPATIENT
Start: 2018-10-18

## 2018-10-17 RX ORDER — ACETAMINOPHEN 325 MG/1
650 TABLET ORAL
Status: CANCELLED | OUTPATIENT
Start: 2018-10-18

## 2018-10-17 NOTE — PROGRESS NOTES
HPI     DLS:06/05/2018 Ramila  Patient here for 5th visit for research protocol Ovarian Cancer.  Pt states OU vision has been blurry at dist, but not near.  Not wearing readers anymore.  No eye pain.  Occasionally using refresh OU    I have personally interviewed the patient, reviewed the history and   examined the patient and agree with the technician's exam.    Last edited by Aaron Mcgrath MD on 10/17/2018  9:44 AM. (History)            Assessment /Plan     For exam results, see Encounter Report.    Patient in cancer related research study    Ovarian cancer, unspecified laterality    Nuclear sclerotic cataract of both eyes    Open angle with borderline findings and low glaucoma risk in both eyes      Ms. Don has shown rapid progression of her lenticular nuclear sclerosis bilaterally resulting in a significant myopic shift. I provided a prescription for new eyeglasses. Ms. Don will try the new eyeglasses first before considering cataract surgery. She will return to me per research protocol.

## 2018-10-17 NOTE — PATIENT INSTRUCTIONS
Try new eyeglasses.  If eyeglasses are satisfactory, return per protocol.  If eyeglasses are not satisfactory, consider cataract surgery if permitted under protocol.

## 2018-10-17 NOTE — PROGRESS NOTES
Subjective:       Patient ID: Mickie Don is a 63 y.o. female.    Chief Complaint: Chemotherapy (Cycle 12)    HPI     Patient comes in today for cycle 13/D1 of FORWARD1 trial. She is on study drug.       Had 3 week delay with cycle 11. She had a dose reduction for cycle 11. For cycle 12 she had a one week delay due to thrombocytopenia. She has had a 3 week delay for cycle 13.  have been normal.    According to medical monitor, we can do another dose reduction which would be a minor deviation.     Seen by Dr. Mcgrath because of vision changes. She has developed cataracts. Felt to be age related and not due to study drug or steroid drops. Medical monitor agreed with continuing treatment.     Started on Lyrica for neuropathy and this is helping.     Overall, is doing well. Continues to work.     .       ECOG PS of 0.      Chemotherapy labs have been reviewed and are appropriate for treatment.           Blurring of both eyes.      Treatment History  Xlap/Omentectomy on 11/1/16 - mass was unresectable  Stage IIIC ovarian cancer  Initiated Taxotere/Carbo on 11/18/16, now s/p 6 cycles completed 3/8/17  Xlap/ISIS/BSO/Radical mass resection > 10 cm, optimal debulking on 4/11/17  Initiated PRIMA on 8/21/17  BRCA negative      Review of Systems   Constitutional: Negative for chills, fatigue and fever.   Respiratory: Negative for cough, shortness of breath and wheezing.    Cardiovascular: Negative for chest pain, palpitations and leg swelling.   Gastrointestinal: Negative for abdominal pain, constipation, diarrhea, nausea and vomiting.   Genitourinary: Negative for difficulty urinating, dysuria, frequency, genital sores, hematuria, urgency, vaginal bleeding, vaginal discharge and vaginal pain.   Musculoskeletal: Negative for gait problem.   Neurological: Positive for numbness (finger and toes and feet. grade 1. ). Negative for weakness.   Hematological: Negative for adenopathy. Does not bruise/bleed easily.  "  Psychiatric/Behavioral: The patient is not nervous/anxious.        Objective:   BP (!) 118/57   Pulse 72   Temp 96.1 °F (35.6 °C)   Resp 16   Ht 5' 7" (1.702 m)   Wt 88.7 kg (195 lb 8.8 oz)   BMI 30.63 kg/m²      Physical Exam   Constitutional: She is oriented to person, place, and time. She appears well-developed and well-nourished.   HENT:   Head: Normocephalic and atraumatic.   Eyes: No scleral icterus.   Neck: No tracheal deviation present. No thyromegaly present.   Cardiovascular: Normal rate and regular rhythm.   Pulmonary/Chest: Effort normal and breath sounds normal.   Abdominal: Soft. She exhibits no distension and no mass. There is no tenderness. There is no rebound and no guarding. No hernia.   Genitourinary:   Genitourinary Comments: Not performed.    Musculoskeletal: She exhibits no edema or tenderness.   Lymphadenopathy:     She has no cervical adenopathy.   Neurological: She is alert and oriented to person, place, and time.   Skin: Skin is warm and dry.   Psychiatric: She has a normal mood and affect. Her behavior is normal. Judgment and thought content normal.       Assessment:       1. Ovarian cancer, unspecified laterality    2. Malignant neoplasm of ovary, unspecified laterality        Plan:   Malignant neoplasm of ovary, unspecified laterality    Proceed with cycle 13.   Dose reduction to 4 mg/kg. Last dose reduction that is allowed.     Cataracts  Being monitor in ophthalmology.     "

## 2018-10-18 ENCOUNTER — PATIENT MESSAGE (OUTPATIENT)
Dept: GYNECOLOGIC ONCOLOGY | Facility: CLINIC | Age: 63
End: 2018-10-18

## 2018-10-18 ENCOUNTER — OFFICE VISIT (OUTPATIENT)
Dept: GYNECOLOGIC ONCOLOGY | Facility: CLINIC | Age: 63
End: 2018-10-18
Payer: COMMERCIAL

## 2018-10-18 ENCOUNTER — RESEARCH ENCOUNTER (OUTPATIENT)
Dept: RESEARCH | Facility: HOSPITAL | Age: 63
End: 2018-10-18

## 2018-10-18 ENCOUNTER — INFUSION (OUTPATIENT)
Dept: INFUSION THERAPY | Facility: HOSPITAL | Age: 63
End: 2018-10-18
Attending: OBSTETRICS & GYNECOLOGY
Payer: COMMERCIAL

## 2018-10-18 VITALS
HEIGHT: 67 IN | BODY MASS INDEX: 30.69 KG/M2 | HEART RATE: 72 BPM | DIASTOLIC BLOOD PRESSURE: 57 MMHG | WEIGHT: 195.56 LBS | SYSTOLIC BLOOD PRESSURE: 118 MMHG | TEMPERATURE: 96 F | RESPIRATION RATE: 16 BRPM

## 2018-10-18 VITALS
SYSTOLIC BLOOD PRESSURE: 124 MMHG | TEMPERATURE: 98 F | HEIGHT: 67 IN | WEIGHT: 195.56 LBS | HEART RATE: 63 BPM | BODY MASS INDEX: 30.69 KG/M2 | RESPIRATION RATE: 18 BRPM | DIASTOLIC BLOOD PRESSURE: 62 MMHG

## 2018-10-18 DIAGNOSIS — C56.9 OVARIAN CANCER, UNSPECIFIED LATERALITY: Primary | ICD-10-CM

## 2018-10-18 DIAGNOSIS — C56.9 MALIGNANT NEOPLASM OF OVARY, UNSPECIFIED LATERALITY: ICD-10-CM

## 2018-10-18 LAB — CANCER AG125 SERPL-ACNC: 16 U/ML

## 2018-10-18 PROCEDURE — 25000003 PHARM REV CODE 250: Performed by: OBSTETRICS & GYNECOLOGY

## 2018-10-18 PROCEDURE — 3008F BODY MASS INDEX DOCD: CPT | Mod: CPTII,S$GLB,, | Performed by: OBSTETRICS & GYNECOLOGY

## 2018-10-18 PROCEDURE — 99999 PR PBB SHADOW E&M-EST. PATIENT-LVL III: CPT | Mod: PBBFAC,,, | Performed by: OBSTETRICS & GYNECOLOGY

## 2018-10-18 PROCEDURE — 96413 CHEMO IV INFUSION 1 HR: CPT

## 2018-10-18 PROCEDURE — 99214 OFFICE O/P EST MOD 30 MIN: CPT | Mod: S$GLB,,, | Performed by: OBSTETRICS & GYNECOLOGY

## 2018-10-18 PROCEDURE — 96367 TX/PROPH/DG ADDL SEQ IV INF: CPT

## 2018-10-18 PROCEDURE — 63600175 PHARM REV CODE 636 W HCPCS: Performed by: OBSTETRICS & GYNECOLOGY

## 2018-10-18 RX ORDER — ACETAMINOPHEN 325 MG/1
650 TABLET ORAL
Status: COMPLETED | OUTPATIENT
Start: 2018-10-18 | End: 2018-10-18

## 2018-10-18 RX ADMIN — Medication 50 MG: at 10:10

## 2018-10-18 RX ADMIN — ACETAMINOPHEN 650 MG: 325 TABLET ORAL at 10:10

## 2018-10-18 RX ADMIN — DEXAMETHASONE SODIUM PHOSPHATE 10 MG: 4 INJECTION, SOLUTION INTRA-ARTICULAR; INTRALESIONAL; INTRAMUSCULAR; INTRAVENOUS; SOFT TISSUE at 10:10

## 2018-10-18 NOTE — PLAN OF CARE
Problem: Chemotherapy Effects (Adult)  Goal: Signs and Symptoms of Listed Potential Problems Will be Absent, Minimized or Managed (Chemotherapy Effects)  Signs and symptoms of listed potential problems will be absent, minimized or managed by discharge/transition of care (reference Chemotherapy Effects (Adult) CPG).   Outcome: Ongoing (interventions implemented as appropriate)  Patient here for Forward Trial.  Assessment completed and labs reviewed.  VSS. No needs at this time. Chair reclined and blanket offered.  Will continue to monitor.

## 2018-10-18 NOTE — PROGRESS NOTES
Oct. 18, 2018     Protocol: FORWARD1/IMGN-0403  Principle Investigator: BETH Weston  Treating Physician: GUERITA Tran Pt Initials: NAV VAZ  Study ID: [id number]  IRB#: [id number]     FORWARD 1: A Randomized, Open Label Phase 3 Study to Evaluate the Safety and Efficacy of Mirvetuximab soravtansine (MDKT946) Versus Investigators Choice of Chemotherapy in Women with Folate Receptor á?positive Advanced Epithelial Ovarian Cancer, Primary Peritoneal Cancer or Fallopian Tube Cancer      Cycle 13  Study allocation was made on 11/27/17.  Patient randomized to Arm A:[id number], 6 mg/kg (from AIBW), q3 weeks.  Dose reduced to 5 mg/kg (based on AIBW) on C11 (8/6/18) d/t persistent thrombocytopenia.  Dose reduced to 4 mg/kg (based on AIBW) on C13 (10/18/18) d/t persistent thrombocytopenia.     Patient had scans on 8/10/18, which showed stable disease from previous CT scan.  Per RECIST, patient has had partial response to therapy compared to Baseline scans. Next scans scheduled for 11/2/18.       Patient presented to clinic for C13 on the above mentioned clinical trial.  C13 has been delayed (originally scheduled for 9/27/18) due to thrombocytopenia.  Platelets are 105k on 10/17/18, which is acceptable for treatment.  Patient will be dose-reduced to 4 mg/kg today d/t persistent thrombocytopenia.  Medical monitor states dose reduction is at MDs discretion and will be a minor deviation (see MM email correspondence).  Subject denies cough, headache, fatigue, fever, nausea, diarrhea, vomiting, shortness of breath, or dizziness.  Patient reports continued neuropathy in bilateral fingers - stable, more noticeable when she works.  Patient started Lyrica on 109/18, which has improved symptoms.  Patient saw Ophthalmologist on 10/17/18 who noted blurred vision and new cataracts (see on-treatment eye assessment form completed by Dr. Mcgrath in patients research chart).  Per discussion with Dr. Mcgrath, he feels cataracts are likely age-related and  unlikely due to protocol treatment.  He updated subjects eye glass prescription due to cataract-related vision changes.  Medical Monitor confirms patient can continue protocol treatment (see MMemail correspondence).  Patient confirms she has Refresh and steroid eye drops.       Physical exam, ECOG assessment performed on 10/18/18.  ECOG = 0 per MD.  Local labs were performed on 10/117/18 and reviewed by the physician.   Ca125 = 16 for 10/17/18.  Physical exam unremarkable per physician, ocular symptoms noted.  Reviewed current medications with patient, see Concomitant Medication sheet.       All of the patient's questions were answered by me to her satisfaction. Patient expressed her willingness to continue to participate in the above mentioned clinical trial. Instructed patient to notify Dr. Tran and/or me with any questions, concerns, or changes in health status. Patient verbalized understanding.      Baseline Medical History  1. 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    2. Anxiety, Grade 1.  3. Dermatofibroma.  Benign.  Diagnosed by biopsy on 7/17/17.  No further intervention needed.  4. Decreased platelet count, Grade 1.  Resolved 11/17/17.  5. Constipation, Grade 1.  6. Myalgia, Grade 1.  7. Peripheral neuropathy- feet, Grade 1.      Adverse Events - For the most up-to-date AE log, please refer to the paper AE log in the subjects research file.  1. Platelet count decreased/thrombocytopenia, remains Grade 1.  Platelet count acceptable for treatment today.  Will continue to monitor.  2. Peripheral neuropathy - fingers, remains Grade 1.  Patient discontinued Neurontin, tolerating neuropathy okay. Will continue to monitor.  3. Fatigue, remains Grade 1.  Will continue to monitor.  4. Cataracts, Grade 2.  New glasses ordered and being followed by Dr. Mcgrath. Will continue to monitor.  5. Blurred vision, Grade 2. Due to cataracts per Ophthalmologist.  Dose delay not required per MM.  Will continue to monitor.  6. Photophobia, Grade 1.  Will continue to monitor.

## 2018-10-26 ENCOUNTER — RESEARCH ENCOUNTER (OUTPATIENT)
Dept: RESEARCH | Facility: HOSPITAL | Age: 63
End: 2018-10-26

## 2018-10-26 ENCOUNTER — LAB VISIT (OUTPATIENT)
Dept: LAB | Facility: HOSPITAL | Age: 63
End: 2018-10-26
Attending: OBSTETRICS & GYNECOLOGY
Payer: COMMERCIAL

## 2018-10-26 DIAGNOSIS — C56.9 MALIGNANT NEOPLASM OF OVARY, UNSPECIFIED LATERALITY: ICD-10-CM

## 2018-10-26 DIAGNOSIS — Z00.6 EXAMINATION OF PARTICIPANT IN CLINICAL TRIAL: ICD-10-CM

## 2018-10-26 LAB
DRUG STUDY SPECIMEN TYPE: NORMAL
DRUG STUDY TEST NAME: NORMAL
DRUG STUDY TEST RESULT: NORMAL

## 2018-10-26 PROCEDURE — 36415 COLL VENOUS BLD VENIPUNCTURE: CPT

## 2018-10-26 PROCEDURE — 99000 SPECIMEN HANDLING OFFICE-LAB: CPT

## 2018-11-02 ENCOUNTER — HOSPITAL ENCOUNTER (OUTPATIENT)
Dept: RADIOLOGY | Facility: OTHER | Age: 63
Discharge: HOME | End: 2018-11-02
Attending: OBSTETRICS & GYNECOLOGY
Payer: COMMERCIAL

## 2018-11-02 DIAGNOSIS — C56.9 MALIGNANT NEOPLASM OF OVARY, UNSPECIFIED LATERALITY: ICD-10-CM

## 2018-11-02 PROCEDURE — 74177 CT ABD & PELVIS W/CONTRAST: CPT | Mod: TC

## 2018-11-02 PROCEDURE — 74177 CT ABD & PELVIS W/CONTRAST: CPT | Mod: 26,,, | Performed by: RADIOLOGY

## 2018-11-02 PROCEDURE — 71260 CT THORAX DX C+: CPT | Mod: 26,,, | Performed by: RADIOLOGY

## 2018-11-02 PROCEDURE — 25500020 PHARM REV CODE 255: Performed by: OBSTETRICS & GYNECOLOGY

## 2018-11-02 RX ADMIN — IOHEXOL 30 ML: 350 INJECTION, SOLUTION INTRAVENOUS at 12:11

## 2018-11-02 RX ADMIN — IOHEXOL 100 ML: 350 INJECTION, SOLUTION INTRAVENOUS at 10:11

## 2018-11-12 ENCOUNTER — INFUSION (OUTPATIENT)
Dept: INFUSION THERAPY | Facility: HOSPITAL | Age: 63
End: 2018-11-12
Attending: OBSTETRICS & GYNECOLOGY
Payer: COMMERCIAL

## 2018-11-12 ENCOUNTER — OFFICE VISIT (OUTPATIENT)
Dept: GYNECOLOGIC ONCOLOGY | Facility: CLINIC | Age: 63
End: 2018-11-12
Payer: COMMERCIAL

## 2018-11-12 ENCOUNTER — RESEARCH ENCOUNTER (OUTPATIENT)
Dept: RESEARCH | Facility: HOSPITAL | Age: 63
End: 2018-11-12

## 2018-11-12 VITALS
HEART RATE: 63 BPM | WEIGHT: 197.31 LBS | SYSTOLIC BLOOD PRESSURE: 106 MMHG | RESPIRATION RATE: 18 BRPM | TEMPERATURE: 99 F | HEIGHT: 67 IN | BODY MASS INDEX: 30.97 KG/M2 | OXYGEN SATURATION: 100 % | DIASTOLIC BLOOD PRESSURE: 59 MMHG

## 2018-11-12 VITALS
TEMPERATURE: 96 F | DIASTOLIC BLOOD PRESSURE: 57 MMHG | HEIGHT: 67 IN | WEIGHT: 197.31 LBS | BODY MASS INDEX: 30.97 KG/M2 | SYSTOLIC BLOOD PRESSURE: 114 MMHG | RESPIRATION RATE: 14 BRPM | HEART RATE: 67 BPM

## 2018-11-12 DIAGNOSIS — Z00.6 EXAMINATION OF PARTICIPANT IN CLINICAL TRIAL: ICD-10-CM

## 2018-11-12 DIAGNOSIS — Z51.11 ENCOUNTER FOR ANTINEOPLASTIC CHEMOTHERAPY: ICD-10-CM

## 2018-11-12 DIAGNOSIS — C56.9 OVARIAN CANCER, UNSPECIFIED LATERALITY: Primary | ICD-10-CM

## 2018-11-12 PROCEDURE — 96413 CHEMO IV INFUSION 1 HR: CPT

## 2018-11-12 PROCEDURE — 96367 TX/PROPH/DG ADDL SEQ IV INF: CPT

## 2018-11-12 PROCEDURE — 3008F BODY MASS INDEX DOCD: CPT | Mod: CPTII,S$GLB,, | Performed by: OBSTETRICS & GYNECOLOGY

## 2018-11-12 PROCEDURE — 25000003 PHARM REV CODE 250: Performed by: OBSTETRICS & GYNECOLOGY

## 2018-11-12 PROCEDURE — 63600175 PHARM REV CODE 636 W HCPCS: Performed by: OBSTETRICS & GYNECOLOGY

## 2018-11-12 PROCEDURE — A4216 STERILE WATER/SALINE, 10 ML: HCPCS | Performed by: OBSTETRICS & GYNECOLOGY

## 2018-11-12 PROCEDURE — 99999 PR PBB SHADOW E&M-EST. PATIENT-LVL III: CPT | Mod: PBBFAC,,, | Performed by: OBSTETRICS & GYNECOLOGY

## 2018-11-12 PROCEDURE — 99214 OFFICE O/P EST MOD 30 MIN: CPT | Mod: S$GLB,,, | Performed by: OBSTETRICS & GYNECOLOGY

## 2018-11-12 RX ORDER — ACETAMINOPHEN 325 MG/1
650 TABLET ORAL
Status: CANCELLED | OUTPATIENT
Start: 2018-11-12

## 2018-11-12 RX ORDER — SODIUM CHLORIDE 0.9 % (FLUSH) 0.9 %
10 SYRINGE (ML) INJECTION
Status: DISCONTINUED | OUTPATIENT
Start: 2018-11-12 | End: 2018-11-12 | Stop reason: HOSPADM

## 2018-11-12 RX ORDER — ACETAMINOPHEN 325 MG/1
650 TABLET ORAL
Status: COMPLETED | OUTPATIENT
Start: 2018-11-12 | End: 2018-11-12

## 2018-11-12 RX ORDER — HEPARIN 100 UNIT/ML
500 SYRINGE INTRAVENOUS
Status: CANCELLED | OUTPATIENT
Start: 2018-11-12

## 2018-11-12 RX ORDER — SODIUM CHLORIDE 0.9 % (FLUSH) 0.9 %
10 SYRINGE (ML) INJECTION
Status: CANCELLED | OUTPATIENT
Start: 2018-11-12

## 2018-11-12 RX ORDER — HEPARIN 100 UNIT/ML
500 SYRINGE INTRAVENOUS
Status: DISCONTINUED | OUTPATIENT
Start: 2018-11-12 | End: 2018-11-12 | Stop reason: HOSPADM

## 2018-11-12 RX ADMIN — Medication 10 ML: at 11:11

## 2018-11-12 RX ADMIN — ACETAMINOPHEN 650 MG: 325 TABLET ORAL at 09:11

## 2018-11-12 RX ADMIN — DEXAMETHASONE SODIUM PHOSPHATE: 4 INJECTION, SOLUTION INTRAMUSCULAR; INTRAVENOUS at 09:11

## 2018-11-12 RX ADMIN — DIPHENHYDRAMINE HYDROCHLORIDE 50 MG: 50 INJECTION, SOLUTION INTRAMUSCULAR; INTRAVENOUS at 09:11

## 2018-11-12 RX ADMIN — DEXTROSE: 50 INJECTION, SOLUTION INTRAVENOUS at 10:11

## 2018-11-12 NOTE — PLAN OF CARE
Problem: Patient Care Overview  Goal: Plan of Care Review  Outcome: Ongoing (interventions implemented as appropriate)  Pt tolerated Forward trial without complications. Pre and post VSS. No s/s of reaction. Tolerated at a rate of 150cc/hr.  Instructed to contact MD with any questions. PIV removed and AVS given to patient.

## 2018-11-16 NOTE — PROGRESS NOTES
Nov 12, 2018     Protocol: FORWARD1/IMGN-0403  Principle Investigator: BETH Weston  Treating Physician: GUERITA Tran Pt Initials: NAV VAZ  Study ID: [id number]  IRB#: [id number]     FORWARD 1: A Randomized, Open Label Phase 3 Study to Evaluate the Safety and Efficacy of Mirvetuximab soravtansine (USOA174) Versus Investigators Choice of Chemotherapy in Women with Folate Receptor á?positive Advanced Epithelial Ovarian Cancer, Primary Peritoneal Cancer or Fallopian Tube Cancer      Cycle 14  Study allocation was made on 11/27/17.  Patient randomized to Arm A:[id number], 6 mg/kg (from AIBW), q3 weeks.  Dose reduced to 5 mg/kg (based on AIBW) on C11 (8/6/18) d/t persistent thrombocytopenia.  Dose reduced to 4 mg/kg (based on AIBW) on C13 (10/18/18) d/t persistent thrombocytopenia.     Patient had scans on 11/2/18, which showed stable disease from previous CT scan.  Per RECIST, patient has had partial response to therapy compared to Baseline scans. Dr. Tran reviewed scans and agrees with assessment and that patient can continue on protocol treatment.  Next scans scheduled for 12/25/18.       Patient presented to clinic for C14 on the above mentioned clinical trial.  C13 was delayed 3 weeks due to thrombocytopenia.  Platelets were 105k today, which is acceptable for treatment.  Patient was dose-reduced at C13 to 4 mg/kg and will continue on this dose.    Subject denies cough, headache, fever, nausea, diarrhea, vomiting, shortness of breath, or dizziness.  Patient reports continued neuropathy in bilateral fingers - stable, more noticeable when she works.  Patient started Lyrica on 109/18, which has improved symptoms.  Patient saw Ophthalmologist on 10/17/18 who noted blurred vision and new cataracts (see on-treatment eye assessment form completed by Dr. Mcgrath in patients research chart).  Per discussion with Dr. Mcgrath, he feels cataracts are likely age-related and unlikely due to protocol treatment.  He updated subjects eye  glass prescription due to cataract-related vision changes.  Medical Monitor confirms patient can continue protocol treatment (see MM email correspondence).  Patient confirms she has sufficient Refresh and steroid eye drops.       Physical exam, ECOG assessment performed on 11/12/18.  ECOG = 0 per MD.  Local labs were performed on 11/12/18 and reviewed by the physician.   Ca125 not drawn and will be collected at next visit ( 16 at last cycle).  Physical exam unremarkable per physician, ocular symptoms noted as listed above.  Reviewed current medications with patient, see Concomitant Medication sheet.       All of the patient's questions were answered by me to her satisfaction. Patient expressed her willingness to continue to participate in the above mentioned clinical trial. Instructed patient to notify Dr. Tran and/or me with any questions, concerns, or changes in health status. Patient verbalized understanding.      Baseline Medical History  1. 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    2. Anxiety, Grade 1.  3. Dermatofibroma.  Benign.  Diagnosed by biopsy on 7/17/17.  No further intervention needed.  4. Decreased platelet count, Grade 1.  Resolved 11/17/17.  5. Constipation, Grade 1.  6. Myalgia, Grade 1.  7. Peripheral neuropathy- feet, Grade 1.      Adverse Events - For the most up-to-date AE log, please refer to the paper AE log in the subjects research file.  1. Platelet count decreased/thrombocytopenia, remains Grade 1.  Platelet count acceptable for treatment today.  Will continue to monitor.  2. Peripheral neuropathy - fingers, remains Grade 1.  Patient discontinued Neurontin, tolerating neuropathy okay. Will continue to monitor.  3. Fatigue, remains Grade 1.  Will continue to monitor.  4. Cataracts, remains Grade 2.  New glasses ordered and being followed by Dr. Mcgrath. Will continue to monitor.  5. Blurred vision, remains Grade 2. Due  to cataracts per Ophthalmologist. Dose delay not required per MM.  Will continue to monitor.  6. Photophobia, remains Grade 1.  Will continue to monitor.

## 2018-11-19 ENCOUNTER — LAB VISIT (OUTPATIENT)
Dept: LAB | Facility: HOSPITAL | Age: 63
End: 2018-11-19
Attending: OBSTETRICS & GYNECOLOGY
Payer: COMMERCIAL

## 2018-11-19 ENCOUNTER — RESEARCH ENCOUNTER (OUTPATIENT)
Dept: RESEARCH | Facility: HOSPITAL | Age: 63
End: 2018-11-19

## 2018-11-19 DIAGNOSIS — C56.9 MALIGNANT NEOPLASM OF OVARY, UNSPECIFIED LATERALITY: ICD-10-CM

## 2018-11-19 DIAGNOSIS — D49.59 OVARIAN NEOPLASM: ICD-10-CM

## 2018-11-19 DIAGNOSIS — Z00.6 EXAMINATION OF PARTICIPANT IN CLINICAL TRIAL: ICD-10-CM

## 2018-11-19 LAB
DRUG STUDY SPECIMEN TYPE: NORMAL
DRUG STUDY TEST NAME: NORMAL
DRUG STUDY TEST RESULT: NORMAL

## 2018-11-19 PROCEDURE — 36415 COLL VENOUS BLD VENIPUNCTURE: CPT

## 2018-11-19 PROCEDURE — 99000 SPECIMEN HANDLING OFFICE-LAB: CPT

## 2018-11-19 NOTE — PROGRESS NOTES
Oct. 18, 2018     Protocol: FORWARD1/IMGN-0403  : JOSE ALFREDO Weston MD  Treating Physician: SURY Guerra MD  Pt Initials: NAV BERMUDEZ  Study ID: [id number]  IRB#: [id number]     FORWARD 1: A Randomized, Open Label Phase 3 Study to Evaluate the Safety and Efficacy of Mirvetuximab soravtansine (LIVK972) Versus Investigators Choice of Chemotherapy in Women with Folate Receptor ??positive Advanced Epithelial Ovarian Cancer, Primary Peritoneal Cancer or Fallopian Tube Cancer      Met with patient on 10/18/18 re-consent patient TMMA147/FORWARD1 clinical trial.     Patient was on experimental/[id number] treatment arm and discontinued study treatment on      Informed patient about changes to consent including changes to Risks and Personal Data use sections.      Please click on the encounter link below to view attachments under media for consent details and process.

## 2018-11-22 NOTE — PROGRESS NOTES
Subjective:      Patient ID: Mickie Don is a 63 y.o. female.    Chief Complaint: Chemotherapy      HPI  Patient comes in today for cycle 14/D1 of FORWARD1 trial. She is on study drug.      Had 3 week delay with cycle 11. She had a dose reduction for cycle 11. For cycle 12 she had a one week delay due to thrombocytopenia. She has had a 3 week delay for cycle 13.  have been normal.     According to medical monitor, we can do another dose reduction which would be a minor deviation. Lowest dose reduction now allowed.      Seen by Dr. Mcgrath because of vision changes. She has developed cataracts. Felt to be age related and not due to study drug or steroid drops. Medical monitor agreed with continuing treatment.      Started on Lyrica for neuropathy and this is helping.      Overall, is doing well. Continues to work.      ECOG PS of 0.   Chemotherapy labs have been reviewed and are appropriate for treatment.       Treatment History  Xlap/Omentectomy on 11/1/16 - mass was unresectable  Stage IIIC ovarian cancer  Initiated Taxotere/Carbo on 11/18/16, now s/p 6 cycles completed 3/8/17  Xlap/ISIS/BSO/Radical mass resection > 10 cm, optimal debulking on 4/11/17  Initiated PRIMA on 8/21/17  BRCA negative  Review of Systems   Constitutional: Negative for appetite change, chills, fatigue and fever.   HENT: Negative for mouth sores.    Respiratory: Negative for cough and shortness of breath.    Cardiovascular: Negative for leg swelling.   Gastrointestinal: Negative for abdominal pain, blood in stool, constipation and diarrhea.   Endocrine: Negative for cold intolerance.   Genitourinary: Negative for dysuria and vaginal bleeding.   Musculoskeletal: Negative for myalgias.   Skin: Negative for rash.   Allergic/Immunologic: Negative.    Neurological: Negative for weakness and numbness.   Hematological: Negative for adenopathy. Does not bruise/bleed easily.   Psychiatric/Behavioral: Negative for confusion.       Objective:    Physical Exam:   Constitutional: She is oriented to person, place, and time. She appears well-developed and well-nourished.    HENT:   Head: Normocephalic and atraumatic.    Eyes: EOM are normal. Pupils are equal, round, and reactive to light.    Neck: Normal range of motion. Neck supple. No thyromegaly present.    Cardiovascular: Normal rate, regular rhythm and intact distal pulses.     Pulmonary/Chest: Effort normal and breath sounds normal. No respiratory distress. She has no wheezes.        Abdominal: Soft. Bowel sounds are normal. She exhibits no distension, no ascites and no mass. There is no tenderness.             Musculoskeletal: Normal range of motion and moves all extremeties.      Lymphadenopathy:     She has no cervical adenopathy.        Right: No supraclavicular adenopathy present.        Left: No supraclavicular adenopathy present.    Neurological: She is alert and oriented to person, place, and time.    Skin: Skin is warm and dry. No rash noted.    Psychiatric: She has a normal mood and affect.       Assessment:     1. Ovarian cancer, unspecified laterality    2. Encounter for antineoplastic chemotherapy    3. Examination of participant in clinical trial        Plan:   No orders of the defined types were placed in this encounter.    Okay to treat C14.  RTC prechemo per protocol or sooner if needed.     I spent approximately 25 minutes reviewing the available records and evaluating the patient, out of which over 50% of the time was spent face to face with the patient in counseling and coordinating this patient's care.

## 2018-11-26 DIAGNOSIS — C56.9 OVARIAN CANCER, UNSPECIFIED LATERALITY: Primary | ICD-10-CM

## 2018-11-26 DIAGNOSIS — Z00.6 EXAMINATION OF PARTICIPANT IN CLINICAL TRIAL: ICD-10-CM

## 2018-12-05 ENCOUNTER — OFFICE VISIT (OUTPATIENT)
Dept: GYNECOLOGIC ONCOLOGY | Facility: CLINIC | Age: 63
End: 2018-12-05
Payer: COMMERCIAL

## 2018-12-05 ENCOUNTER — LAB VISIT (OUTPATIENT)
Dept: LAB | Facility: HOSPITAL | Age: 63
End: 2018-12-05
Attending: OBSTETRICS & GYNECOLOGY
Payer: COMMERCIAL

## 2018-12-05 ENCOUNTER — RESEARCH ENCOUNTER (OUTPATIENT)
Dept: RESEARCH | Facility: HOSPITAL | Age: 63
End: 2018-12-05

## 2018-12-05 VITALS
TEMPERATURE: 99 F | DIASTOLIC BLOOD PRESSURE: 60 MMHG | BODY MASS INDEX: 30.86 KG/M2 | HEART RATE: 78 BPM | SYSTOLIC BLOOD PRESSURE: 139 MMHG | HEIGHT: 67 IN | WEIGHT: 196.63 LBS

## 2018-12-05 DIAGNOSIS — C56.9 MALIGNANT NEOPLASM OF OVARY, UNSPECIFIED LATERALITY: Primary | ICD-10-CM

## 2018-12-05 DIAGNOSIS — Z00.6 EXAMINATION OF PARTICIPANT IN CLINICAL TRIAL: ICD-10-CM

## 2018-12-05 DIAGNOSIS — D49.59 OVARIAN NEOPLASM: ICD-10-CM

## 2018-12-05 DIAGNOSIS — C56.9 OVARIAN CANCER, UNSPECIFIED LATERALITY: ICD-10-CM

## 2018-12-05 DIAGNOSIS — C56.9 MALIGNANT NEOPLASM OF OVARY, UNSPECIFIED LATERALITY: ICD-10-CM

## 2018-12-05 DIAGNOSIS — Z51.11 CHEMOTHERAPY MANAGEMENT, ENCOUNTER FOR: ICD-10-CM

## 2018-12-05 LAB
ALBUMIN SERPL BCP-MCNC: 3.6 G/DL
ALP SERPL-CCNC: 100 U/L
ALT SERPL W/O P-5'-P-CCNC: 36 U/L
ANION GAP SERPL CALC-SCNC: 8 MMOL/L
AST SERPL-CCNC: 43 U/L
BASOPHILS # BLD AUTO: 0.04 K/UL
BASOPHILS NFR BLD: 1 %
BILIRUB SERPL-MCNC: 0.8 MG/DL
BUN SERPL-MCNC: 15 MG/DL
CALCIUM SERPL-MCNC: 9.6 MG/DL
CANCER AG125 SERPL-ACNC: 25 U/ML
CHLORIDE SERPL-SCNC: 104 MMOL/L
CO2 SERPL-SCNC: 29 MMOL/L
CREAT SERPL-MCNC: 0.8 MG/DL
DIFFERENTIAL METHOD: ABNORMAL
DRUG STUDY SPECIMEN TYPE: NORMAL
DRUG STUDY TEST NAME: NORMAL
DRUG STUDY TEST RESULT: NORMAL
EOSINOPHIL # BLD AUTO: 0.1 K/UL
EOSINOPHIL NFR BLD: 2.8 %
ERYTHROCYTE [DISTWIDTH] IN BLOOD BY AUTOMATED COUNT: 14.9 %
EST. GFR  (AFRICAN AMERICAN): >60 ML/MIN/1.73 M^2
EST. GFR  (NON AFRICAN AMERICAN): >60 ML/MIN/1.73 M^2
GLUCOSE SERPL-MCNC: 98 MG/DL
HCT VFR BLD AUTO: 43.5 %
HGB BLD-MCNC: 14.6 G/DL
IMM GRANULOCYTES # BLD AUTO: 0.01 K/UL
IMM GRANULOCYTES NFR BLD AUTO: 0.3 %
LYMPHOCYTES # BLD AUTO: 1.1 K/UL
LYMPHOCYTES NFR BLD: 27.1 %
MCH RBC QN AUTO: 31.6 PG
MCHC RBC AUTO-ENTMCNC: 33.6 G/DL
MCV RBC AUTO: 94 FL
MONOCYTES # BLD AUTO: 0.4 K/UL
MONOCYTES NFR BLD: 9.5 %
NEUTROPHILS # BLD AUTO: 2.3 K/UL
NEUTROPHILS NFR BLD: 59.3 %
NRBC BLD-RTO: 0 /100 WBC
PLATELET # BLD AUTO: 83 K/UL
PMV BLD AUTO: 10.1 FL
POTASSIUM SERPL-SCNC: 3.6 MMOL/L
PROT SERPL-MCNC: 7 G/DL
RBC # BLD AUTO: 4.62 M/UL
SODIUM SERPL-SCNC: 141 MMOL/L
WBC # BLD AUTO: 3.91 K/UL

## 2018-12-05 PROCEDURE — 99214 OFFICE O/P EST MOD 30 MIN: CPT | Mod: S$GLB,,, | Performed by: OBSTETRICS & GYNECOLOGY

## 2018-12-05 PROCEDURE — 85025 COMPLETE CBC W/AUTO DIFF WBC: CPT

## 2018-12-05 PROCEDURE — 36415 COLL VENOUS BLD VENIPUNCTURE: CPT

## 2018-12-05 PROCEDURE — 3008F BODY MASS INDEX DOCD: CPT | Mod: CPTII,S$GLB,, | Performed by: OBSTETRICS & GYNECOLOGY

## 2018-12-05 PROCEDURE — 99999 PR PBB SHADOW E&M-EST. PATIENT-LVL III: CPT | Mod: PBBFAC,,, | Performed by: OBSTETRICS & GYNECOLOGY

## 2018-12-05 PROCEDURE — 80053 COMPREHEN METABOLIC PANEL: CPT

## 2018-12-05 PROCEDURE — 86304 IMMUNOASSAY TUMOR CA 125: CPT

## 2018-12-05 PROCEDURE — 99000 SPECIMEN HANDLING OFFICE-LAB: CPT

## 2018-12-05 NOTE — PROGRESS NOTES
Dec 5, 2018     Protocol: FORWARD1/IMGN-0403  Principle Investigator: BETH Weston  Treating Physician: GUERITA Tran Pt Initials: NAV VAZ  Study ID: [id number]  IRB#: 2016.439.A     FORWARD 1: A Randomized, Open Label Phase 3 Study to Evaluate the Safety and Efficacy of Mirvetuximab soravtansine ([id number]) Versus Investigators Choice of Chemotherapy in Women with Folate Receptor á?positive Advanced Epithelial Ovarian Cancer, Primary Peritoneal Cancer or Fallopian Tube Cancer      Unscheduled - planned as Cycle 15  Study allocation was made on 11/27/17.  Patient randomized to Arm A:[id number], 6 mg/kg (from AIBW), q3 weeks.  Dose reduced to 5 mg/kg (based on AIBW) on C11 (8/6/18) d/t persistent thrombocytopenia.  Dose reduced to 4 mg/kg (based on AIBW) on C13 (10/18/18) d/t persistent thrombocytopenia.     Patient had scans on 11/2/18, which showed stable disease from previous CT scan.  Per RECIST, patient has had partial response to therapy compared to Baseline scans. Dr. Tran reviewed scans and agrees with assessment and that patient can continue on protocol treatment.  Next scans scheduled for 12/25/18.       Patient presented to clinic for C15 on the above mentioned clinical trial.  C13 was delayed 3 weeks due to thrombocytopenia.  Platelets were 83k today, too low for treatment.  Patient will be reevaluated next week for treatment.      Subject denies cough, headache, fever, nausea, diarrhea, vomiting, shortness of breath, or dizziness.  Patient reports continued neuropathy in bilateral fingers - stable, more noticeable when she works.  Patient started Lyrica on 109/18, which has improved symptoms.  Patient saw Ophthalmologist on 10/17/18 who noted blurred vision and new cataracts (see on-treatment eye assessment form completed by Dr. Mcgrath in patients research chart).  Per discussion with Dr. Mcgrath, he feels cataracts are likely age-related and unlikely due to protocol treatment.  He updated subjects eye glass  prescription due to cataract-related vision changes.  Medical Monitor confirms patient can continue protocol treatment (see MM email correspondence).  Patient confirms she has sufficient Refresh and steroid eye drops.       Physical exam, ECOG assessment performed on 12/5/18.  ECOG = 0 per MD.  Local labs were performed on 12/5/18 and reviewed by the physician.    is 25 today ( 16 previously).  Physical exam unremarkable per physician, ocular symptoms noted as listed above.  Reviewed current medications with patient, see Concomitant Medication sheet.       All of the patient's questions were answered by Dr. Guerra and me to her satisfaction. Patient expressed her willingness to continue to participate in the above mentioned clinical trial. Instructed patient to notify Dr. Tran and/or me with any questions, concerns, or changes in health status. Patient verbalized understanding.      Baseline Medical History  1. 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    2. Anxiety, Grade 1.  3. Dermatofibroma.  Benign.  Diagnosed by biopsy on 7/17/17.  No further intervention needed.  4. Decreased platelet count, Grade 1.  Resolved 11/17/17.  5. Constipation, Grade 1.  6. Myalgia, Grade 1.  7. Peripheral neuropathy- feet, Grade 1.      Adverse Events - For the most up-to-date AE log, please refer to the paper AE log in the subjects research file.  1. Platelet count decreased/thrombocytopenia, Grade 1.  Platelet count below 100k threshold for treatment.  Will re-check next week.  2. Peripheral neuropathy - fingers, remains Grade 1.  Patient discontinued Neurontin, tolerating neuropathy okay. Will continue to monitor.  3. Fatigue, remains Grade 1.  Will continue to monitor.  4. Cataracts, remains Grade 2.  New glasses ordered and being followed by Dr. Mcgrath. Will continue to monitor.  5. Blurred vision, remains Grade 2. Due to cataracts per Ophthalmologist.  Dose delay not required per MM.  Will continue to monitor.  6. Photophobia, resolved from Grade 1.  Will continue to monitor.

## 2018-12-05 NOTE — PROGRESS NOTES
Subjective:       Patient ID: Mickie Don is a 63 y.o. female.    Chief Complaint: Ovarian Cancer and Chemotherapy (forward 1, C15D1)    HPI     Patient comes in today for cycle 15/D1 of FORWARD1 trial. She is on study drug.       Had 3 week delay with cycle 11. She had a dose reduction for cycle 11. For cycle 12 she had a one week delay due to thrombocytopenia. She has had a 3 week delay for cycle 13.  have been normal.     According to medical monitor, we can do another dose reduction which would be a minor deviation.      Seen by Dr. Mcgrath because of vision changes. She has developed cataracts. Felt to be age related and not due to study drug or steroid drops. Medical monitor agreed with continuing treatment.      Started on Lyrica for neuropathy and this is helping.      Overall, is doing well. Continues to work.      .       ECOG PS of 0.      Chemotherapy labs have been reviewed and plt count too low at 83,000.                 Treatment History  Xlap/Omentectomy on 11/1/16 - mass was unresectable  Stage IIIC ovarian cancer  Initiated Taxotere/Carbo on 11/18/16, now s/p 6 cycles completed 3/8/17  Xlap/ISIS/BSO/Radical mass resection > 10 cm, optimal debulking on 4/11/17  Initiated PRIMA on 8/21/17  BRCA negative     Review of Systems   Constitutional: Negative for chills, fatigue and fever.   Respiratory: Negative for cough, shortness of breath and wheezing.    Cardiovascular: Negative for chest pain, palpitations and leg swelling.   Gastrointestinal: Negative for abdominal pain, constipation, diarrhea, nausea and vomiting.   Genitourinary: Negative for difficulty urinating, dysuria, frequency, genital sores, hematuria, urgency, vaginal bleeding, vaginal discharge and vaginal pain.   Musculoskeletal: Negative for gait problem.   Neurological: Negative for weakness and numbness.   Hematological: Negative for adenopathy. Does not bruise/bleed easily.   Psychiatric/Behavioral: The patient is not  "nervous/anxious.        Objective:   /60   Pulse 78   Temp 98.7 °F (37.1 °C) (Oral)   Ht 5' 7" (1.702 m)   Wt 89.2 kg (196 lb 10.4 oz)   BMI 30.80 kg/m²      Physical Exam   Constitutional: She is oriented to person, place, and time. She appears well-developed and well-nourished.   HENT:   Head: Normocephalic and atraumatic.   Eyes: No scleral icterus.   Neck: No tracheal deviation present. No thyromegaly present.   Cardiovascular: Normal rate and regular rhythm.   Pulmonary/Chest: Effort normal and breath sounds normal.   Abdominal: Soft. She exhibits no distension and no mass. There is no tenderness. There is no rebound and no guarding. No hernia.   Genitourinary:   Genitourinary Comments: Not performed.    Musculoskeletal: She exhibits no edema or tenderness.   Lymphadenopathy:     She has no cervical adenopathy.   Neurological: She is alert and oriented to person, place, and time.   Skin: Skin is warm and dry.   Psychiatric: She has a normal mood and affect. Her behavior is normal. Judgment and thought content normal.       Assessment:       1. Malignant neoplasm of ovary, unspecified laterality    2. Chemotherapy management, encounter for        Plan:   Malignant neoplasm of ovary, unspecified laterality  Cancel chemo for today.   Repeat labs in one week.   Chemotherapy management, encounter for        "

## 2018-12-12 ENCOUNTER — RESEARCH ENCOUNTER (OUTPATIENT)
Dept: RESEARCH | Facility: HOSPITAL | Age: 63
End: 2018-12-12

## 2018-12-12 ENCOUNTER — LAB VISIT (OUTPATIENT)
Dept: LAB | Facility: HOSPITAL | Age: 63
End: 2018-12-12
Attending: OBSTETRICS & GYNECOLOGY
Payer: COMMERCIAL

## 2018-12-12 ENCOUNTER — OFFICE VISIT (OUTPATIENT)
Dept: GYNECOLOGIC ONCOLOGY | Facility: CLINIC | Age: 63
End: 2018-12-12
Payer: COMMERCIAL

## 2018-12-12 VITALS
SYSTOLIC BLOOD PRESSURE: 123 MMHG | WEIGHT: 191 LBS | BODY MASS INDEX: 29.91 KG/M2 | TEMPERATURE: 99 F | HEART RATE: 75 BPM | DIASTOLIC BLOOD PRESSURE: 57 MMHG

## 2018-12-12 DIAGNOSIS — T45.1X5A CHEMOTHERAPY-INDUCED NEUROPATHY: ICD-10-CM

## 2018-12-12 DIAGNOSIS — C56.9 MALIGNANT NEOPLASM OF OVARY, UNSPECIFIED LATERALITY: ICD-10-CM

## 2018-12-12 DIAGNOSIS — Z00.6 EXAMINATION OF PARTICIPANT IN CLINICAL TRIAL: ICD-10-CM

## 2018-12-12 DIAGNOSIS — C56.9 OVARIAN CANCER, UNSPECIFIED LATERALITY: ICD-10-CM

## 2018-12-12 DIAGNOSIS — D49.59 OVARIAN NEOPLASM: ICD-10-CM

## 2018-12-12 DIAGNOSIS — Z51.11 ENCOUNTER FOR ANTINEOPLASTIC CHEMOTHERAPY: ICD-10-CM

## 2018-12-12 DIAGNOSIS — C56.9 OVARIAN CANCER, UNSPECIFIED LATERALITY: Primary | ICD-10-CM

## 2018-12-12 DIAGNOSIS — G62.0 CHEMOTHERAPY-INDUCED NEUROPATHY: ICD-10-CM

## 2018-12-12 LAB
ALBUMIN SERPL BCP-MCNC: 4 G/DL
ALP SERPL-CCNC: 91 U/L
ALT SERPL W/O P-5'-P-CCNC: 38 U/L
ANION GAP SERPL CALC-SCNC: 8 MMOL/L
AST SERPL-CCNC: 41 U/L
BASOPHILS # BLD AUTO: 0.04 K/UL
BASOPHILS NFR BLD: 1 %
BILIRUB SERPL-MCNC: 1.4 MG/DL
BUN SERPL-MCNC: 15 MG/DL
CALCIUM SERPL-MCNC: 9.7 MG/DL
CANCER AG125 SERPL-ACNC: 23 U/ML
CHLORIDE SERPL-SCNC: 102 MMOL/L
CO2 SERPL-SCNC: 30 MMOL/L
CREAT SERPL-MCNC: 0.7 MG/DL
DIFFERENTIAL METHOD: ABNORMAL
DRUG STUDY SPECIMEN TYPE: NORMAL
DRUG STUDY TEST NAME: NORMAL
DRUG STUDY TEST RESULT: NORMAL
EOSINOPHIL # BLD AUTO: 0.1 K/UL
EOSINOPHIL NFR BLD: 1.9 %
ERYTHROCYTE [DISTWIDTH] IN BLOOD BY AUTOMATED COUNT: 14.5 %
EST. GFR  (AFRICAN AMERICAN): >60 ML/MIN/1.73 M^2
EST. GFR  (NON AFRICAN AMERICAN): >60 ML/MIN/1.73 M^2
GLUCOSE SERPL-MCNC: 90 MG/DL
HCT VFR BLD AUTO: 44.5 %
HGB BLD-MCNC: 14.6 G/DL
IMM GRANULOCYTES # BLD AUTO: 0.01 K/UL
IMM GRANULOCYTES NFR BLD AUTO: 0.2 %
LYMPHOCYTES # BLD AUTO: 1.3 K/UL
LYMPHOCYTES NFR BLD: 30.6 %
MCH RBC QN AUTO: 30.3 PG
MCHC RBC AUTO-ENTMCNC: 32.8 G/DL
MCV RBC AUTO: 92 FL
MONOCYTES # BLD AUTO: 0.4 K/UL
MONOCYTES NFR BLD: 9.2 %
NEUTROPHILS # BLD AUTO: 2.4 K/UL
NEUTROPHILS NFR BLD: 57.1 %
NRBC BLD-RTO: 0 /100 WBC
PLATELET # BLD AUTO: 97 K/UL
PMV BLD AUTO: 10.3 FL
POTASSIUM SERPL-SCNC: 3.8 MMOL/L
PROT SERPL-MCNC: 7.4 G/DL
RBC # BLD AUTO: 4.82 M/UL
SODIUM SERPL-SCNC: 140 MMOL/L
WBC # BLD AUTO: 4.15 K/UL

## 2018-12-12 PROCEDURE — 86304 IMMUNOASSAY TUMOR CA 125: CPT

## 2018-12-12 PROCEDURE — 36415 COLL VENOUS BLD VENIPUNCTURE: CPT

## 2018-12-12 PROCEDURE — 85025 COMPLETE CBC W/AUTO DIFF WBC: CPT | Mod: 91

## 2018-12-12 PROCEDURE — 99000 SPECIMEN HANDLING OFFICE-LAB: CPT

## 2018-12-12 PROCEDURE — 80053 COMPREHEN METABOLIC PANEL: CPT | Mod: 91

## 2018-12-12 PROCEDURE — 99214 OFFICE O/P EST MOD 30 MIN: CPT | Mod: S$GLB,,, | Performed by: OBSTETRICS & GYNECOLOGY

## 2018-12-12 PROCEDURE — 3008F BODY MASS INDEX DOCD: CPT | Mod: CPTII,S$GLB,, | Performed by: OBSTETRICS & GYNECOLOGY

## 2018-12-12 PROCEDURE — 99999 PR PBB SHADOW E&M-EST. PATIENT-LVL III: CPT | Mod: PBBFAC,,, | Performed by: OBSTETRICS & GYNECOLOGY

## 2018-12-12 RX ORDER — PREGABALIN 100 MG/1
100 CAPSULE ORAL 2 TIMES DAILY
Qty: 60 CAPSULE | Refills: 3 | Status: SHIPPED | OUTPATIENT
Start: 2018-12-12 | End: 2019-01-09

## 2018-12-12 RX ORDER — HEPARIN 100 UNIT/ML
500 SYRINGE INTRAVENOUS
Status: CANCELLED | OUTPATIENT
Start: 2018-12-12

## 2018-12-12 RX ORDER — ACETAMINOPHEN 325 MG/1
650 TABLET ORAL
Status: CANCELLED | OUTPATIENT
Start: 2018-12-12

## 2018-12-12 RX ORDER — SODIUM CHLORIDE 0.9 % (FLUSH) 0.9 %
10 SYRINGE (ML) INJECTION
Status: CANCELLED | OUTPATIENT
Start: 2018-12-12

## 2018-12-12 NOTE — PROGRESS NOTES
Dec 12, 2018     Protocol: FORWARD1/IMGN-0403  Principle Investigator: BETH Weston  Treating Physician: GUERITA Tran Pt Initials: NAV VAZ  Study ID: [id number]  IRB#: [id number].A     FORWARD 1: A Randomized, Open Label Phase 3 Study to Evaluate the Safety and Efficacy of Mirvetuximab soravtansine (EERU269) Versus Investigators Choice of Chemotherapy in Women with Folate Receptor á?positive Advanced Epithelial Ovarian Cancer, Primary Peritoneal Cancer or Fallopian Tube Cancer      Cycle 15  Study allocation was made on 11/27/17.  Patient randomized to Arm A:[id number], 6 mg/kg (from AIBW), q3 weeks.  Dose reduced to 5 mg/kg (based on AIBW) on C11 (8/6/18) d/t persistent thrombocytopenia.  Dose reduced to 4 mg/kg (based on AIBW) on C13 (10/18/18) d/t persistent thrombocytopenia.     Patient had scans on 11/2/18, which showed stable disease from previous CT scan.  Per RECIST, patient has had partial response to therapy compared to Baseline scans. Dr. Tran reviewed scans and agrees with assessment and that patient can continue on protocol treatment.  Next scans scheduled for 1/25/18.       Patient presented to clinic for C15 on the above-mentioned clinical trial.  C15 was delayed 1 week due to thrombocytopenia.  Platelets were 110k today, which is acceptable for treatment.  Patient will treatment on Friday 12/15/18.      Subject denies cough, headache, fever, nausea, diarrhea, vomiting, shortness of breath, or dizziness.  Patient reports continued neuropathy in bilateral fingers - stable, more noticeable when she works.  Patient started Lyrica on 10/9/18, which has improved symptoms.  Patient saw Ophthalmologist on 10/17/18 who noted blurred vision and new cataracts (see on-treatment eye assessment form completed by Dr. Mcgrath in patients research chart).  Per discussion with Dr. Mcgrath, he feels cataracts are likely age-related and unlikely due to protocol treatment.  He updated subjects eye glass prescription due to  cataract-related vision changes.  Medical Monitor confirms patient can continue protocol treatment (see MM email correspondence).  Patient confirms she has sufficient Refresh and steroid eye drops.       Physical exam, ECOG assessment performed on 12/12/18.  ECOG = 0 per MD.  Local labs were performed on 12/12/18 and reviewed by the physician.    is 23 today ( 16 previously).  Physical exam unremarkable per physician, ocular symptoms noted as listed above.  Reviewed current medications with patient, see Concomitant Medication sheet.       All of the patient's questions were answered by Dr. Tran and me to her satisfaction. Patient expressed her willingness to continue to participate in the above mentioned clinical trial. Instructed patient to notify Dr. Tran and/or me with any questions, concerns, or changes in health status. Patient verbalized understanding.      Baseline Medical History  1. 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    2. Anxiety, Grade 1.  3. Dermatofibroma.  Benign.  Diagnosed by biopsy on 7/17/17.  No further intervention needed.  4. Decreased platelet count, Grade 1.  Resolved 11/17/17.  5. Constipation, Grade 1.  6. Myalgia, Grade 1.  7. Peripheral neuropathy- feet, Grade 1.      Adverse Events - For the most up-to-date AE log, please refer to the paper AE log in the subjects research file.  1. Platelet count decreased/thrombocytopenia, remains Grade 1.  Will continue to monitor.  2. Peripheral neuropathy - fingers, remains Grade 1.  Patient discontinued Neurontin, tolerating neuropathy okay. Will continue to monitor.  3. Fatigue, remains Grade 1.  Will continue to monitor.  4. Cataracts, remains Grade 2.  New glasses ordered and being followed by Dr. Mcgrath. Will continue to monitor.  5. Blurred vision, remains Grade 2. Due to cataracts per Ophthalmologist. Dose delay not required per MM.  Will continue to  monitor.  6. Photophobia, resolved from Grade 1.  Will continue to monitor.

## 2018-12-12 NOTE — PROGRESS NOTES
Subjective:       Patient ID: Mickie Don is a 63 y.o. female.    Chief Complaint: Chemotherapy    HPI     Patient comes in today for cycle 15/D1 of FORWARD1 trial. She is on study drug.       Had 3 week delay with cycle 11. She had a dose reduction for cycle 11. For cycle 12 she had a one week delay due to thrombocytopenia. She has had a 3 week delay for cycle 13.  have been normal.     According to medical monitor, we can do another dose reduction which would be a minor deviation.      Seen by Dr. Mcgrath because of vision changes. She has developed cataracts. Felt to be age related and not due to study drug or steroid drops. Medical monitor agreed with continuing treatment.      Started on Lyrica for neuropathy and this is helping.      Overall, is doing well. Continues to work.      .       ECOG PS of 0.      Chemotherapy labs have been reviewed and plt count is 110.  She was delayed last week due to platelet count.  Last CT showed overall stable disease, although we are keeping a close eye on some of the lesions.  CA-125 increased from 16 to 25.  PS 0.  Just returned from [facility].  Neuropathy stable.  Eyesight is stable.                Treatment History  Xlap/Omentectomy on 11/1/16 - mass was unresectable  Stage IIIC ovarian cancer  Initiated Taxotere/Carbo on 11/18/16, now s/p 6 cycles completed 3/8/17  Xlap/ISIS/BSO/Radical mass resection > 10 cm, optimal debulking on 4/11/17  Initiated PRIMA on 8/21/17  BRCA negative     Review of Systems   Constitutional: Negative for chills, fatigue and fever.   Respiratory: Negative for cough, shortness of breath and wheezing.    Cardiovascular: Negative for chest pain, palpitations and leg swelling.   Gastrointestinal: Negative for abdominal pain, constipation, diarrhea, nausea and vomiting.   Genitourinary: Negative for difficulty urinating, dysuria, frequency, genital sores, hematuria, urgency, vaginal bleeding, vaginal discharge and vaginal pain.    Musculoskeletal: Negative for gait problem.   Neurological: Negative for weakness and numbness.   Hematological: Negative for adenopathy. Does not bruise/bleed easily.   Psychiatric/Behavioral: The patient is not nervous/anxious.        Objective:   BP (!) 123/57   Pulse 75   Temp 98.7 °F (37.1 °C)   Wt 86.6 kg (191 lb)   BMI 29.91 kg/m²      Physical Exam   Constitutional: She is oriented to person, place, and time. She appears well-developed and well-nourished.   HENT:   Head: Normocephalic and atraumatic.   Eyes: No scleral icterus.   Neck: No tracheal deviation present. No thyromegaly present.   Cardiovascular: Normal rate and regular rhythm.   Pulmonary/Chest: Effort normal and breath sounds normal.   Abdominal: Soft. She exhibits no distension and no mass. There is no tenderness. There is no rebound and no guarding. No hernia.   Genitourinary:   Genitourinary Comments: Not performed.    Musculoskeletal: She exhibits no edema or tenderness.   Lymphadenopathy:     She has no cervical adenopathy.   Neurological: She is alert and oriented to person, place, and time.   Skin: Skin is warm and dry.   Psychiatric: She has a normal mood and affect. Her behavior is normal. Judgment and thought content normal.       Assessment:       1. Ovarian cancer, unspecified laterality    2. Encounter for antineoplastic chemotherapy        Plan:   Malignant neoplasm of ovary, unspecified laterality  Labs ok for treatment.  Will monitor CA-125 and scans.  If CA-125 rises past 35 will need to consider coming off trial.  Chemotherapy management, encounter for

## 2018-12-14 ENCOUNTER — INFUSION (OUTPATIENT)
Dept: INFUSION THERAPY | Facility: HOSPITAL | Age: 63
End: 2018-12-14
Attending: OBSTETRICS & GYNECOLOGY
Payer: COMMERCIAL

## 2018-12-14 VITALS
SYSTOLIC BLOOD PRESSURE: 143 MMHG | HEIGHT: 67 IN | TEMPERATURE: 98 F | OXYGEN SATURATION: 97 % | HEART RATE: 68 BPM | RESPIRATION RATE: 18 BRPM | WEIGHT: 191 LBS | DIASTOLIC BLOOD PRESSURE: 63 MMHG | BODY MASS INDEX: 29.98 KG/M2

## 2018-12-14 DIAGNOSIS — C56.9 OVARIAN CANCER, UNSPECIFIED LATERALITY: Primary | ICD-10-CM

## 2018-12-14 PROCEDURE — 63600175 PHARM REV CODE 636 W HCPCS: Performed by: OBSTETRICS & GYNECOLOGY

## 2018-12-14 PROCEDURE — 25000003 PHARM REV CODE 250: Performed by: OBSTETRICS & GYNECOLOGY

## 2018-12-14 PROCEDURE — 96413 CHEMO IV INFUSION 1 HR: CPT

## 2018-12-14 PROCEDURE — 96367 TX/PROPH/DG ADDL SEQ IV INF: CPT

## 2018-12-14 RX ORDER — ACETAMINOPHEN 325 MG/1
650 TABLET ORAL
Status: COMPLETED | OUTPATIENT
Start: 2018-12-14 | End: 2018-12-14

## 2018-12-14 RX ADMIN — DIPHENHYDRAMINE HYDROCHLORIDE 50 MG: 50 INJECTION, SOLUTION INTRAMUSCULAR; INTRAVENOUS at 09:12

## 2018-12-14 RX ADMIN — ACETAMINOPHEN 650 MG: 325 TABLET ORAL at 08:12

## 2018-12-14 RX ADMIN — DEXAMETHASONE SODIUM PHOSPHATE 10 MG: 4 INJECTION, SOLUTION INTRA-ARTICULAR; INTRALESIONAL; INTRAMUSCULAR; INTRAVENOUS; SOFT TISSUE at 08:12

## 2018-12-14 RX ADMIN — DEXTROSE: 5 SOLUTION INTRAVENOUS at 08:12

## 2018-12-14 NOTE — PLAN OF CARE
Problem: Adult Inpatient Plan of Care  Goal: Patient-Specific Goal (Individualization)  Outcome: Ongoing (interventions implemented as appropriate)  1011- Infusion started- study medication infusing at 150ml/hr to peripheral IV.

## 2018-12-14 NOTE — PLAN OF CARE
Problem: Adult Inpatient Plan of Care  Goal: Patient-Specific Goal (Individualization)  Outcome: Ongoing (interventions implemented as appropriate)  0840-Labs , hx, and medications reviewed, patient was seen by provider earlier this week. Assessment completed. Discussed plan of care with patient. Patient in agreement. Chair reclined and warm blanket and snack offered.

## 2018-12-14 NOTE — PLAN OF CARE
Problem: Adult Inpatient Plan of Care  Goal: Plan of Care Review  Outcome: Ongoing (interventions implemented as appropriate)  1118-Patient tolerated treatment well. Discharged without complaints or S/S of adverse event. Patient given research questions to fill out, verbalized will return as instructed.  Instructed to call provider for any questions or concerns.

## 2018-12-17 NOTE — PROGRESS NOTES
Nov. 19, 2018     Protocol: FORWARD1/IMGN-0403  Principle Investigator: BETH Weston  Treating Physician: GUERITA Tran Pt Initials: NAV VAZ  Study ID: [id number]  IRB#: [id number]     FORWARD 1: A Randomized, Open Label Phase 3 Study to Evaluate the Safety and Efficacy of Mirvetuximab soravtansine (EAHE630) Versus Investigators Choice of Chemotherapy in Women with Folate Receptor á?positive Advanced Epithelial Ovarian Cancer, Primary Peritoneal Cancer or Fallopian Tube Cancer      Cycle 14, Day 8  Study allocation was made on 11/27/17.  Patient randomized to Arm A:[id number], 6 mg/kg (from AIBW), q3 weeks.  Dose reduced to 5 mg/kg (based on AIBW) on C11 (8/6/18) d/t persistent thrombocytopenia.  Dose reduced to 4 mg/kg (based on AIBW) on C13 (10/18/18) d/t persistent thrombocytopenia.     Patient had scans on 8/10/18, which showed stable disease from previous CT scan.  Per RECIST, patient has had partial response to therapy compared to Baseline scans. Next scans scheduled for 11/2/18.       Patient presented to clinic for C14D8 on the above mentioned clinical trial.  No MD visit on this day, per protocol.  Patient reports no change in ocular symptoms from visit with Dr. Mcgrath on 10/17/18 -blurred vision due to distance vision changes, no issue with dry eyes.  She confirms she has been compliant with Refresh and steroid eye drops.  Neuropathy is stable and she feels Lyrica is helping symptoms.       Central labs collected on 11/19/18.  Reviewed baseline conditions and current medications with patient.  See Concomitant Medication and Baseline Medical History sheets.  Central safety labs collected, processed, and shipped to  per protocol.      All of the patient's questions were answered by me to her satisfaction. Patient expressed her willingness to continue to participate in the above mentioned clinical trial. Instructed patient to notify Dr. Trna and/or me with any questions, concerns, or changes in health status. Patient  verbalized understanding.      Baseline Medical History  1. 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    2. Anxiety, Grade 1.  3. Dermatofibroma.  Benign.  Diagnosed by biopsy on 7/17/17.  No further intervention needed.  4. Decreased platelet count, Grade 1.  Resolved 11/17/17.  5. Constipation, Grade 1.  6. Myalgia, Grade 1.  7. Peripheral neuropathy- feet, Grade 1.      Adverse Events - For the most up-to-date AE log, please refer to the paper AE log in the subjects research file.  1. Platelet count decreased/thrombocytopenia, remains Grade 1.  Central labs Hematology collected today and sent to study. Will continue to monitor.  2. Peripheral neuropathy - fingers, remains Grade 1.  Patient discontinued Neurontin, tolerating neuropathy okay. Will continue to monitor.  3. Fatigue, remains Grade 1.  Will continue to monitor.  4. Cataracts, remains Grade 2.  New glasses ordered and being followed by Dr. Mcgrath. Will continue to monitor.  5. Blurred vision, remains Grade 2. Due to cataracts per Ophthalmologist. Dose delay not required per MM.  Will continue to monitor.  6. Photophobia, remains Grade 1.  Will continue to monitor.

## 2018-12-20 ENCOUNTER — RESEARCH ENCOUNTER (OUTPATIENT)
Dept: RESEARCH | Facility: HOSPITAL | Age: 63
End: 2018-12-20

## 2018-12-21 NOTE — PROGRESS NOTES
Dec. 20, 2018     Protocol: FORWARD1/IMGN-0403  Principle Investigator: BETH Weston  Treating Physician: GUERITA Tran Pt Initials: NAV VAZ  Study ID: [id number]  IRB#: [id number]     FORWARD 1: A Randomized, Open Label Phase 3 Study to Evaluate the Safety and Efficacy of Mirvetuximab soravtansine (KAAT112) Versus Investigators Choice of Chemotherapy in Women with Folate Receptor á?positive Advanced Epithelial Ovarian Cancer, Primary Peritoneal Cancer or Fallopian Tube Cancer      Cycle 15, Day 8  Study allocation was made on 11/27/17.  Patient randomized to Arm [id number], 6 mg/kg (from AIBW), q3 weeks.  Dose reduced to 5 mg/kg (based on AIBW) on C11 (8/6/18) d/t persistent thrombocytopenia.  Dose reduced to 4 mg/kg (based on AIBW) on C13 (10/18/18) d/t persistent thrombocytopenia.     Patient had scans on 11/2/18, which showed stable disease from previous CT scan.  Per RECIST, patient has had partial response to therapy compared to Baseline scans. Dr. Tran reviewed scans and agrees with assessment and that patient can continue on protocol treatment.  Next scans scheduled for 1/25/18.      Patient presented to clinic for C15D8 on the above mentioned clinical trial.  No MD visit on this day, per protocol.  Patient reports no change in ocular symptoms from visit with Dr. Mcgrath on 10/17/18 -blurred vision due to distance vision changes, no issue with dry eyes.  She confirms she has been compliant with Refresh and steroid eye drops.  Neuropathy is stable and she feels Lyrica is helping symptoms.       Central labs collected on 12/20/18.  Reviewed baseline conditions and current medications with patient.  See Concomitant Medication and Baseline Medical History sheets.  Central safety labs collected, processed, and shipped to  per protocol.      All of the patient's questions were answered by me to her satisfaction. Patient expressed her willingness to continue to participate in the above mentioned clinical trial. Instructed  patient to notify Dr. Tran and/or me with any questions, concerns, or changes in health status. Patient verbalized understanding.      Baseline Medical History  1. 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    2. Anxiety, Grade 1.  3. Dermatofibroma.  Benign.  Diagnosed by biopsy on 7/17/17.  No further intervention needed.  4. Decreased platelet count, Grade 1.  Resolved 11/17/17.  5. Constipation, Grade 1.  6. Myalgia, Grade 1.  7. Peripheral neuropathy- feet, Grade 1.      Adverse Events - For the most up-to-date AE log, please refer to the paper AE log in the subjects research file.  1. Platelet count decreased/thrombocytopenia, remains Grade 1.  Central labs Hematology collected today and sent to study. Will continue to monitor.  2. Peripheral neuropathy - fingers, remains Grade 1.  Patient discontinued Neurontin, tolerating neuropathy okay. Will continue to monitor.  3. Fatigue, remains Grade 1.  Will continue to monitor.  4. Cataracts, remains Grade 2.  New glasses ordered and being followed by Dr. Mcgrath. Will continue to monitor.  5. Blurred vision, remains Grade 2. Due to cataracts per Ophthalmologist. Dose delay not required per MM.  Will continue to monitor.  6. Photophobia, remains Grade 1.  Will continue to monitor.

## 2019-01-02 ENCOUNTER — OFFICE VISIT (OUTPATIENT)
Dept: GYNECOLOGIC ONCOLOGY | Facility: CLINIC | Age: 64
End: 2019-01-02
Payer: COMMERCIAL

## 2019-01-02 ENCOUNTER — LAB VISIT (OUTPATIENT)
Dept: LAB | Facility: HOSPITAL | Age: 64
End: 2019-01-02
Attending: OBSTETRICS & GYNECOLOGY
Payer: COMMERCIAL

## 2019-01-02 ENCOUNTER — RESEARCH ENCOUNTER (OUTPATIENT)
Dept: RESEARCH | Facility: HOSPITAL | Age: 64
End: 2019-01-02

## 2019-01-02 VITALS
BODY MASS INDEX: 29.6 KG/M2 | DIASTOLIC BLOOD PRESSURE: 55 MMHG | SYSTOLIC BLOOD PRESSURE: 110 MMHG | HEART RATE: 82 BPM | WEIGHT: 189 LBS

## 2019-01-02 DIAGNOSIS — Z00.6 EXAMINATION OF PARTICIPANT IN CLINICAL TRIAL: ICD-10-CM

## 2019-01-02 DIAGNOSIS — D49.59 OVARIAN NEOPLASM: ICD-10-CM

## 2019-01-02 DIAGNOSIS — Z00.6 PATIENT IN CANCER RELATED RESEARCH STUDY: ICD-10-CM

## 2019-01-02 DIAGNOSIS — Z51.11 CHEMOTHERAPY MANAGEMENT, ENCOUNTER FOR: ICD-10-CM

## 2019-01-02 DIAGNOSIS — C56.9 OVARIAN CANCER, UNSPECIFIED LATERALITY: ICD-10-CM

## 2019-01-02 DIAGNOSIS — C56.9 MALIGNANT NEOPLASM OF OVARY, UNSPECIFIED LATERALITY: ICD-10-CM

## 2019-01-02 DIAGNOSIS — C56.9 MALIGNANT NEOPLASM OF OVARY, UNSPECIFIED LATERALITY: Primary | ICD-10-CM

## 2019-01-02 LAB
ALBUMIN SERPL BCP-MCNC: 3.4 G/DL
ALP SERPL-CCNC: 97 U/L
ALT SERPL W/O P-5'-P-CCNC: 45 U/L
ANION GAP SERPL CALC-SCNC: 8 MMOL/L
AST SERPL-CCNC: 46 U/L
BASOPHILS # BLD AUTO: 0.04 K/UL
BASOPHILS NFR BLD: 1.3 %
BILIRUB SERPL-MCNC: 1 MG/DL
BUN SERPL-MCNC: 10 MG/DL
CALCIUM SERPL-MCNC: 9.6 MG/DL
CANCER AG125 SERPL-ACNC: 43 U/ML
CHLORIDE SERPL-SCNC: 105 MMOL/L
CO2 SERPL-SCNC: 28 MMOL/L
CREAT SERPL-MCNC: 0.7 MG/DL
DIFFERENTIAL METHOD: ABNORMAL
DRUG STUDY SPECIMEN TYPE: NORMAL
DRUG STUDY TEST NAME: NORMAL
DRUG STUDY TEST RESULT: NORMAL
EOSINOPHIL # BLD AUTO: 0.1 K/UL
EOSINOPHIL NFR BLD: 1.9 %
ERYTHROCYTE [DISTWIDTH] IN BLOOD BY AUTOMATED COUNT: 14.4 %
EST. GFR  (AFRICAN AMERICAN): >60 ML/MIN/1.73 M^2
EST. GFR  (NON AFRICAN AMERICAN): >60 ML/MIN/1.73 M^2
GLUCOSE SERPL-MCNC: 97 MG/DL
HCT VFR BLD AUTO: 46.7 %
HGB BLD-MCNC: 15.3 G/DL
IMM GRANULOCYTES # BLD AUTO: 0.01 K/UL
IMM GRANULOCYTES NFR BLD AUTO: 0.3 %
LYMPHOCYTES # BLD AUTO: 0.9 K/UL
LYMPHOCYTES NFR BLD: 27.5 %
MCH RBC QN AUTO: 30.8 PG
MCHC RBC AUTO-ENTMCNC: 32.8 G/DL
MCV RBC AUTO: 94 FL
MONOCYTES # BLD AUTO: 0.2 K/UL
MONOCYTES NFR BLD: 7.5 %
NEUTROPHILS # BLD AUTO: 2 K/UL
NEUTROPHILS NFR BLD: 61.5 %
NRBC BLD-RTO: 0 /100 WBC
PLATELET # BLD AUTO: 84 K/UL
PMV BLD AUTO: 10.1 FL
POTASSIUM SERPL-SCNC: 4.3 MMOL/L
PROT SERPL-MCNC: 7 G/DL
RBC # BLD AUTO: 4.97 M/UL
SODIUM SERPL-SCNC: 141 MMOL/L
WBC # BLD AUTO: 3.2 K/UL

## 2019-01-02 PROCEDURE — 99499 NO LOS: ICD-10-PCS | Mod: S$GLB,,, | Performed by: OBSTETRICS & GYNECOLOGY

## 2019-01-02 PROCEDURE — 86304 IMMUNOASSAY TUMOR CA 125: CPT

## 2019-01-02 PROCEDURE — 99999 PR PBB SHADOW E&M-EST. PATIENT-LVL III: ICD-10-PCS | Mod: PBBFAC,,, | Performed by: OBSTETRICS & GYNECOLOGY

## 2019-01-02 PROCEDURE — 85025 COMPLETE CBC W/AUTO DIFF WBC: CPT

## 2019-01-02 PROCEDURE — 36415 COLL VENOUS BLD VENIPUNCTURE: CPT

## 2019-01-02 PROCEDURE — 99000 SPECIMEN HANDLING OFFICE-LAB: CPT

## 2019-01-02 PROCEDURE — 80053 COMPREHEN METABOLIC PANEL: CPT

## 2019-01-02 PROCEDURE — 99999 PR PBB SHADOW E&M-EST. PATIENT-LVL III: CPT | Mod: PBBFAC,,, | Performed by: OBSTETRICS & GYNECOLOGY

## 2019-01-02 PROCEDURE — 99499 UNLISTED E&M SERVICE: CPT | Mod: S$GLB,,, | Performed by: OBSTETRICS & GYNECOLOGY

## 2019-01-02 NOTE — PROGRESS NOTES
Subjective:       Patient ID: Mickie Don is a 63 y.o. female.    Chief Complaint: No chief complaint on file.    HPI     Patient not seen as labs were not appropriate for treatment.   .       ECOG PS of 0.      Chemotherapy labs have been reviewed                   Treatment History  Xlap/Omentectomy on 11/1/16 - mass was unresectable  Stage IIIC ovarian cancer  Initiated Taxotere/Carbo on 11/18/16, now s/p 6 cycles completed 3/8/17  Xlap/ISIS/BSO/Radical mass resection > 10 cm, optimal debulking on 4/11/17  Initiated PRIMA on 8/21/17  BRCA negative     Review of Systems    Objective:   There were no vitals taken for this visit.     Physical Exam    Assessment:       1. Malignant neoplasm of ovary, unspecified laterality    2. Patient in cancer related research study    3. Chemotherapy management, encounter for        Plan:   Malignant neoplasm of ovary, unspecified laterality    Patient in cancer related research study    Chemotherapy management, encounter for

## 2019-01-03 ENCOUNTER — TELEPHONE (OUTPATIENT)
Dept: GYNECOLOGIC ONCOLOGY | Facility: CLINIC | Age: 64
End: 2019-01-03

## 2019-01-03 NOTE — PROGRESS NOTES
Jan. 2, 2019     Protocol: FORWARD1/IMGN-0403  Principle Investigator: BETH Weston  Treating Physician: GUERITA Tran Pt Initials: NAV VAZ  Study ID: [id number]  IRB#: [id number]     FORWARD 1: A Randomized, Open Label Phase 3 Study to Evaluate the Safety and Efficacy of Mirvetuximab soravtansine (ULCD719) Versus Investigators Choice of Chemotherapy in Women with Folate Receptor á?positive Advanced Epithelial Ovarian Cancer, Primary Peritoneal Cancer or Fallopian Tube Cancer      Unscheduled - planned as Cycle 16  Study allocation was made on 11/27/17.  Patient randomized to Arm A:[id number], 6 mg/kg (from AIBW), q3 weeks.  Dose reduced to 5 mg/kg (based on AIBW) on C11 (8/6/18) d/t persistent thrombocytopenia.  Dose reduced to 4 mg/kg (based on AIBW) on C13 (10/18/18) d/t persistent thrombocytopenia.     Patient had scans on 11/2/18, which showed stable disease from previous CT scan.  Per RECIST, patient has had partial response to therapy compared to Baseline scans. Dr. Tran reviewed scans and agrees with assessment and that patient can continue on protocol treatment.        Patient presented to clinic for C16 on the above mentioned clinical trial.  C13 was delayed 3 weeks due to thrombocytopenia and dose was reduced.  Cycle 15 delayed 1 week d/t  due to thrombocytopenia.  Patients platelets were 84k today, too low for treatment.  Patients  is 43 today.  Patient will get a CT-CAP to evaluate disease status.  CT scheduled for 1/4/19.  If disease is stable, patient will be reevaluated next week for treatment.      Patient did not see MD today as labs were unacceptable for treatment.  Central labs collected and sent to outside lab vendor.  Local labs were performed on 1/2/18 and reviewed by Dr. Tran.   is 43 today ( 16 and 23  previously).   Reviewed current medications with patient, see Concomitant Medication sheet.       All of the patient's questions were answered to her satisfaction. Patient expressed her  willingness to continue to participate in the above mentioned clinical trial. Instructed patient to notify Dr. Tran and/or me with any questions, concerns, or changes in health status. Patient verbalized understanding.      Baseline Medical History  1. 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    2. Anxiety, Grade 1.  3. Dermatofibroma.  Benign.  Diagnosed by biopsy on 7/17/17.  No further intervention needed.  4. Decreased platelet count, Grade 1.  Resolved 11/17/17.  5. Constipation, Grade 1.  6. Myalgia, Grade 1.  7. Peripheral neuropathy- feet, Grade 1.      Adverse Events - For the most up-to-date AE log, please refer to the paper AE log in the subjects research file.  1. Platelet count decreased/thrombocytopenia, remains Grade 1.  Platelet count below 100k threshold for treatment.  Will re-check next week.  2. Peripheral neuropathy - fingers, remains Grade 1.  Patient discontinued Neurontin, tolerating neuropathy okay. Will continue to monitor.  3. Fatigue, remains Grade 1.  Will continue to monitor.  4. Cataracts, remains Grade 2.  New glasses ordered and being followed by Dr. Mcgrath. Will continue to monitor.  5. Blurred vision, remains Grade 2. Due to cataracts per Ophthalmologist. Dose delay not required per MM.  Will continue to monitor.  6. Photophobia, resolved from Grade 1.  Will continue to monitor.

## 2019-01-03 NOTE — TELEPHONE ENCOUNTER
Dk 3, 2019     Protocol: FORWARD1/IMGN-0403  Principle Investigator: BETH Weston  Treating Physician: GUERITA Tran Pt Initials: NAV VAZ  Study ID: [id number]  IRB#: [id number]     FORWARD 1: A Randomized, Open Label Phase 3 Study to Evaluate the Safety and Efficacy of Mirvetuximab soravtansine (RVHB700) Versus Investigators Choice of Chemotherapy in Women with Folate Receptor ??positive Advanced Epithelial Ovarian Cancer, Primary Peritoneal Cancer or Fallopian Tube Cancer      Called patient to inform patient of CT - chest/ab/pelvis schedule for 1/4/19 11:00 at [facility].  Patient confirmed.

## 2019-01-04 ENCOUNTER — PATIENT MESSAGE (OUTPATIENT)
Dept: GYNECOLOGIC ONCOLOGY | Facility: CLINIC | Age: 64
End: 2019-01-04

## 2019-01-04 ENCOUNTER — HOSPITAL ENCOUNTER (OUTPATIENT)
Dept: RADIOLOGY | Facility: OTHER | Age: 64
Discharge: HOME | End: 2019-01-04
Attending: OBSTETRICS & GYNECOLOGY
Payer: COMMERCIAL

## 2019-01-04 DIAGNOSIS — C56.9 MALIGNANT NEOPLASM OF OVARY, UNSPECIFIED LATERALITY: ICD-10-CM

## 2019-01-04 DIAGNOSIS — Z00.6 EXAMINATION OF PARTICIPANT IN CLINICAL TRIAL: ICD-10-CM

## 2019-01-04 PROCEDURE — 25500020 PHARM REV CODE 255: Performed by: OBSTETRICS & GYNECOLOGY

## 2019-01-04 PROCEDURE — 74177 CT ABD & PELVIS W/CONTRAST: CPT | Mod: TC

## 2019-01-04 PROCEDURE — 74177 CT ABD & PELVIS W/CONTRAST: CPT | Mod: 26,,, | Performed by: RADIOLOGY

## 2019-01-04 PROCEDURE — 71260 CT CHEST ABDOMEN PELVIS WITH CONTRAST (XPD): ICD-10-PCS | Mod: 26,,, | Performed by: RADIOLOGY

## 2019-01-04 PROCEDURE — 71260 CT THORAX DX C+: CPT | Mod: 26,,, | Performed by: RADIOLOGY

## 2019-01-04 PROCEDURE — 74177 CT CHEST ABDOMEN PELVIS WITH CONTRAST (XPD): ICD-10-PCS | Mod: 26,,, | Performed by: RADIOLOGY

## 2019-01-04 RX ADMIN — IOHEXOL 100 ML: 350 INJECTION, SOLUTION INTRAVENOUS at 09:01

## 2019-01-04 RX ADMIN — IOHEXOL 30 ML: 350 INJECTION, SOLUTION INTRAVENOUS at 10:01

## 2019-01-09 ENCOUNTER — RESEARCH ENCOUNTER (OUTPATIENT)
Dept: RESEARCH | Facility: HOSPITAL | Age: 64
End: 2019-01-09

## 2019-01-09 ENCOUNTER — OFFICE VISIT (OUTPATIENT)
Dept: GYNECOLOGIC ONCOLOGY | Facility: CLINIC | Age: 64
End: 2019-01-09
Payer: COMMERCIAL

## 2019-01-09 VITALS
HEART RATE: 81 BPM | BODY MASS INDEX: 30.38 KG/M2 | DIASTOLIC BLOOD PRESSURE: 56 MMHG | SYSTOLIC BLOOD PRESSURE: 119 MMHG | WEIGHT: 194 LBS

## 2019-01-09 DIAGNOSIS — C56.3 MALIGNANT NEOPLASM OF BOTH OVARIES: Primary | ICD-10-CM

## 2019-01-09 DIAGNOSIS — T50.905A THROMBOCYTOPENIA DUE TO DRUGS: ICD-10-CM

## 2019-01-09 DIAGNOSIS — Z51.11 ENCOUNTER FOR ANTINEOPLASTIC CHEMOTHERAPY: ICD-10-CM

## 2019-01-09 DIAGNOSIS — D69.59 THROMBOCYTOPENIA DUE TO DRUGS: ICD-10-CM

## 2019-01-09 PROCEDURE — 99214 OFFICE O/P EST MOD 30 MIN: CPT | Mod: S$GLB,,, | Performed by: OBSTETRICS & GYNECOLOGY

## 2019-01-09 PROCEDURE — 99214 PR OFFICE/OUTPT VISIT, EST, LEVL IV, 30-39 MIN: ICD-10-PCS | Mod: S$GLB,,, | Performed by: OBSTETRICS & GYNECOLOGY

## 2019-01-09 PROCEDURE — 3008F BODY MASS INDEX DOCD: CPT | Mod: CPTII,S$GLB,, | Performed by: OBSTETRICS & GYNECOLOGY

## 2019-01-09 PROCEDURE — 99999 PR PBB SHADOW E&M-EST. PATIENT-LVL III: CPT | Mod: PBBFAC,,, | Performed by: OBSTETRICS & GYNECOLOGY

## 2019-01-09 PROCEDURE — 3008F PR BODY MASS INDEX (BMI) DOCUMENTED: ICD-10-PCS | Mod: CPTII,S$GLB,, | Performed by: OBSTETRICS & GYNECOLOGY

## 2019-01-09 PROCEDURE — 99999 PR PBB SHADOW E&M-EST. PATIENT-LVL III: ICD-10-PCS | Mod: PBBFAC,,, | Performed by: OBSTETRICS & GYNECOLOGY

## 2019-01-09 NOTE — PROGRESS NOTES
Jan. 9, 2019     Protocol: FORWARD1/IMGN-0403  Principle Investigator: BETH Weston  Treating Physician: UGERITA Tran Pt Initials: NAV VAZ  Study ID: [id number]  IRB#: [id number]     FORWARD 1: A Randomized, Open Label Phase 3 Study to Evaluate the Safety and Efficacy of Mirvetuximab soravtansine (EWPX124) Versus Investigators Choice of Chemotherapy in Women with Folate Receptor á?positive Advanced Epithelial Ovarian Cancer, Primary Peritoneal Cancer or Fallopian Tube Cancer      EOT  Study allocation was made on 11/27/17.  Patient randomized to Arm A:[id number], 6 mg/kg (from AIBW), q3 weeks.  Dose reduced to 5 mg/kg (based on AIBW) on C11 (8/6/18) d/t persistent thrombocytopenia.  Dose reduced to 4 mg/kg (based on AIBW) on C13 (10/18/18) d/t persistent thrombocytopenia.     Patient presented to clinic for EOT on the above-mentioned clinical trial.  Patient had scans on 1/4/19, which showed progressive disease based on RECIST1.1.  Dr. Tran reviewed scans and agrees with PD assessment, and that patient should discontinue protocol treatment.        Physical exam and ECOG assessment performed per protocol.  Patient will have EOT EKG and eye exam on 1/15/19.  Central labs for EOT treatment collected and sent to central vendors.  Local labs collected and results were reviewed by Dr. Tran with patient.  Platelets remain low at 88k today, Grade 1.  Patient temp is normal at 98.5F.  Patient reports blurred vision with glasses.  A more complete evaluation will be done by ophthalmologist on 1/15/19.  QoL completed by patient in clinic.  Reviewed current medications with patient, see Concomitant Medication sheet.       All of the patient's questions were answered to her satisfaction. Patient expressed her willingness to participate in the follow-up phase of the above-mentioned clinical trial. Instructed patient to notify Dr. Tran and/or me with any questions, concerns, or changes in health status. Patient verbalized understanding.       Baseline Medical History  1. 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    2. Anxiety, Grade 1.  3. Dermatofibroma.  Benign.  Diagnosed by biopsy on 7/17/17.  No further intervention needed.  4. Decreased platelet count, Grade 1.  Resolved 11/17/17.  5. Constipation, Grade 1.  6. Myalgia, Grade 1.  7. Peripheral neuropathy- feet, Grade 1.      Adverse Events - For the most up-to-date AE log, please refer to the paper AE log in the subjects research file.  1. Platelet count decreased/thrombocytopenia, remains Grade 1.  Platelet count below 100k threshold for treatment.  Will re-check next week.  2. Peripheral neuropathy - fingers, remains Grade 1.  Patient discontinued Neurontin, tolerating neuropathy okay. Will continue to monitor.  3. Fatigue, remains Grade 1.  Will continue to monitor.  4. Cataracts, remains Grade 2.  Will be assessed by Dr. Mcgrath on 1/15/19.  Will continue to monitor.  5. Blurred vision, remains Grade 2. Due to cataracts per Ophthalmologist. Will continue to monitor.  6. Photophobia, resolved from Grade 1.  Will continue to monitor.

## 2019-01-09 NOTE — PROGRESS NOTES
Subjective:       Patient ID: Mickie Don is a 63 y.o. female.    Chief Complaint: Ovarian Cancer (discuss tx)    Treatment History  Xlap/Omentectomy on 11/1/16 - mass was unresectable  Stage IIIC ovarian cancer  Initiated Taxotere/Carbo on 11/18/16, now s/p 6 cycles completed 3/8/17  Xlap/ISIS/BSO/Radical mass resection > 10 cm, optimal debulking on 4/11/17  Initiated PRIMA on 8/21/17  BRCA negative  Started FORWARD1 on 11/27/17    HPI     Patient comes in today for continued evaluation.  Received a total of 15 cycles of treatment.  Had issues with treatment delays due to thrombocytopenia.  CA-125 increased from 23 to 43, which represents an overall trend upward.  Taken overall, her scans do meet criteria for progression.  As such, will remove from treatment trial.  She remains asymptomatic with PS of 0.  Here today to discuss further therapy.     Review of Systems   Constitutional: Negative for chills, fatigue and fever.   Respiratory: Negative for cough, shortness of breath and wheezing.    Cardiovascular: Negative for chest pain, palpitations and leg swelling.   Gastrointestinal: Negative for abdominal pain, constipation, diarrhea, nausea and vomiting.   Genitourinary: Negative for difficulty urinating, dysuria, frequency, genital sores, hematuria, urgency, vaginal bleeding, vaginal discharge and vaginal pain.   Musculoskeletal: Negative for gait problem.   Neurological: Negative for weakness and numbness.   Hematological: Negative for adenopathy. Does not bruise/bleed easily.   Psychiatric/Behavioral: The patient is not nervous/anxious.        Objective:   BP (!) 119/56   Pulse 81   Wt 88 kg (194 lb)   BMI 30.38 kg/m²      Physical Exam   Constitutional: She is oriented to person, place, and time. She appears well-developed and well-nourished.   HENT:   Head: Normocephalic and atraumatic.   Eyes: No scleral icterus.   Neck: No tracheal deviation present. No thyromegaly present.   Cardiovascular: Normal  rate and regular rhythm.   Pulmonary/Chest: Effort normal and breath sounds normal.   Abdominal: Soft. She exhibits no distension and no mass. There is no tenderness. There is no rebound and no guarding. No hernia.   Genitourinary: Rectum normal and vagina normal. Pelvic exam was performed with patient supine. There is no rash, tenderness or lesion on the right labia. There is no rash, tenderness or lesion on the left labia. Right adnexum displays no mass, no tenderness and no fullness. Left adnexum displays no mass, no tenderness and no fullness.   Genitourinary Comments: Not performed.    Musculoskeletal: She exhibits no edema or tenderness.   Lymphadenopathy:     She has no cervical adenopathy.   Neurological: She is alert and oriented to person, place, and time.   Skin: Skin is warm and dry.   Psychiatric: She has a normal mood and affect. Her behavior is normal. Judgment and thought content normal.       Assessment:       1. Malignant neoplasm of both ovaries    2. Encounter for antineoplastic chemotherapy    3. Thrombocytopenia due to drugs        Plan:     Patient overall doing well.  Will take off of trial and move on to different therapy.  Persistent low platelets noted, so will delay treatment until increased.  Repeat labs next week on WB.  Discussed treatment options both on and off trial with her.  I would treat with Doxil/Avastin anyway at this point, so enrollment onto trial where that is a treatment option with PDL-1 inhibition would be logical.  She is interested.  Will f/u on labs and get her enrolled once resulted.  All questions were answered and she is in agreement with the plan.

## 2019-01-15 ENCOUNTER — OFFICE VISIT (OUTPATIENT)
Dept: OPHTHALMOLOGY | Facility: CLINIC | Age: 64
End: 2019-01-15
Payer: COMMERCIAL

## 2019-01-15 ENCOUNTER — HOSPITAL ENCOUNTER (OUTPATIENT)
Dept: CARDIOLOGY | Facility: CLINIC | Age: 64
Discharge: HOME | End: 2019-01-15
Payer: COMMERCIAL

## 2019-01-15 DIAGNOSIS — Z00.6 EXAMINATION OF PARTICIPANT IN CLINICAL TRIAL: ICD-10-CM

## 2019-01-15 DIAGNOSIS — C56.9 OVARIAN CANCER, UNSPECIFIED LATERALITY: ICD-10-CM

## 2019-01-15 DIAGNOSIS — H25.813 COMBINED FORMS OF AGE-RELATED CATARACT OF BOTH EYES: Primary | ICD-10-CM

## 2019-01-15 DIAGNOSIS — Z00.6 PATIENT IN CANCER RELATED RESEARCH STUDY: ICD-10-CM

## 2019-01-15 PROCEDURE — 93005 EKG 12-LEAD: ICD-10-PCS | Mod: S$GLB,,, | Performed by: OBSTETRICS & GYNECOLOGY

## 2019-01-15 PROCEDURE — 93010 ELECTROCARDIOGRAM REPORT: CPT | Mod: S$GLB,,, | Performed by: INTERNAL MEDICINE

## 2019-01-15 PROCEDURE — 99999 PR PBB SHADOW E&M-EST. PATIENT-LVL III: CPT | Mod: PBBFAC,,, | Performed by: OPHTHALMOLOGY

## 2019-01-15 PROCEDURE — 99499 NO LOS: ICD-10-PCS | Mod: S$GLB,,, | Performed by: OPHTHALMOLOGY

## 2019-01-15 PROCEDURE — 99999 PR PBB SHADOW E&M-EST. PATIENT-LVL III: ICD-10-PCS | Mod: PBBFAC,,, | Performed by: OPHTHALMOLOGY

## 2019-01-15 PROCEDURE — 93005 ELECTROCARDIOGRAM TRACING: CPT | Mod: S$GLB,,, | Performed by: OBSTETRICS & GYNECOLOGY

## 2019-01-15 PROCEDURE — 99499 UNLISTED E&M SERVICE: CPT | Mod: S$GLB,,, | Performed by: OPHTHALMOLOGY

## 2019-01-15 PROCEDURE — 93010 EKG 12-LEAD: ICD-10-PCS | Mod: S$GLB,,, | Performed by: INTERNAL MEDICINE

## 2019-01-15 NOTE — PROGRESS NOTES
HPI     DLS:10/17/2018 Ramila  Patient here for 5th visit for research protocol Ovarian Cancer.  Patient states OU vision seem blurry at dist.    I have personally interviewed the patient, reviewed the history and   examined the patient and agree with the technician's exam.    She is now off the study medication.    Last edited by Aaron Mcgrath MD on 1/15/2019  9:47 AM. (History)            Assessment /Plan     For exam results, see Encounter Report.    Combined forms of age-related cataract of both eyes    Ovarian cancer, unspecified laterality    Patient in cancer related research study      Ms. Don has visually significant cataract formation consistent with her visual symptoms. I will schedule an appointment with Dr. Butt to evaluate for possible cataract surgery. She will return to me based on her study protocol.

## 2019-01-18 DIAGNOSIS — C56.9 OVARIAN CANCER, UNSPECIFIED LATERALITY: Primary | ICD-10-CM

## 2019-01-18 DIAGNOSIS — Z00.6 EXAMINATION OF PARTICIPANT IN CLINICAL TRIAL: ICD-10-CM

## 2019-01-21 ENCOUNTER — PATIENT MESSAGE (OUTPATIENT)
Dept: GYNECOLOGIC ONCOLOGY | Facility: CLINIC | Age: 64
End: 2019-01-21

## 2019-01-22 ENCOUNTER — RESEARCH ENCOUNTER (OUTPATIENT)
Dept: RESEARCH | Facility: HOSPITAL | Age: 64
End: 2019-01-22

## 2019-01-22 ENCOUNTER — HOSPITAL ENCOUNTER (OUTPATIENT)
Dept: CARDIOLOGY | Facility: CLINIC | Age: 64
Discharge: HOME | End: 2019-01-22
Attending: OBSTETRICS & GYNECOLOGY
Payer: COMMERCIAL

## 2019-01-22 ENCOUNTER — LAB VISIT (OUTPATIENT)
Dept: LAB | Facility: HOSPITAL | Age: 64
End: 2019-01-22
Attending: OBSTETRICS & GYNECOLOGY
Payer: COMMERCIAL

## 2019-01-22 VITALS
BODY MASS INDEX: 30.45 KG/M2 | DIASTOLIC BLOOD PRESSURE: 64 MMHG | SYSTOLIC BLOOD PRESSURE: 136 MMHG | HEART RATE: 62 BPM | WEIGHT: 194 LBS | HEIGHT: 67 IN

## 2019-01-22 VITALS
OXYGEN SATURATION: 99 % | TEMPERATURE: 99 F | HEART RATE: 72 BPM | WEIGHT: 194 LBS | RESPIRATION RATE: 17 BRPM | BODY MASS INDEX: 30.38 KG/M2 | SYSTOLIC BLOOD PRESSURE: 125 MMHG | DIASTOLIC BLOOD PRESSURE: 61 MMHG

## 2019-01-22 DIAGNOSIS — C56.9 OVARIAN CANCER, UNSPECIFIED LATERALITY: ICD-10-CM

## 2019-01-22 DIAGNOSIS — Z00.6 EXAMINATION OF PARTICIPANT IN CLINICAL TRIAL: ICD-10-CM

## 2019-01-22 DIAGNOSIS — D49.59 OVARIAN NEOPLASM: ICD-10-CM

## 2019-01-22 DIAGNOSIS — C56.9 MALIGNANT NEOPLASM OF OVARY, UNSPECIFIED LATERALITY: ICD-10-CM

## 2019-01-22 LAB
ALBUMIN SERPL BCP-MCNC: 3.7 G/DL
ALP SERPL-CCNC: 103 U/L
ALT SERPL W/O P-5'-P-CCNC: 47 U/L
ANION GAP SERPL CALC-SCNC: 9 MMOL/L
APTT BLDCRRT: 27.9 SEC
ASCENDING AORTA: 3.07 CM
AST SERPL-CCNC: 50 U/L
BASOPHILS # BLD AUTO: 0.04 K/UL
BASOPHILS NFR BLD: 1.1 %
BILIRUB SERPL-MCNC: 1 MG/DL
BSA FOR ECHO PROCEDURE: 2.04 M2
BUN SERPL-MCNC: 14 MG/DL
CALCIUM SERPL-MCNC: 9.6 MG/DL
CANCER AG125 SERPL-ACNC: 28 U/ML
CHLORIDE SERPL-SCNC: 103 MMOL/L
CO2 SERPL-SCNC: 29 MMOL/L
CREAT SERPL-MCNC: 0.7 MG/DL
CV ECHO LV RWT: 0.37 CM
DIFFERENTIAL METHOD: ABNORMAL
DOP CALC LVOT AREA: 3.66 CM2
DOP CALC LVOT DIAMETER: 2.16 CM
DOP CALC LVOT PEAK VEL: 1.43 M/S
DOP CALC LVOT STROKE VOLUME: 111.49 CM3
DOP CALCLVOT PEAK VEL VTI: 30.44 CM
E WAVE DECELERATION TIME: 195.63 MSEC
E/A RATIO: 0.78
E/E' RATIO: 7.68
ECHO LV POSTERIOR WALL: 0.79 CM (ref 0.6–1.1)
EOSINOPHIL # BLD AUTO: 0.1 K/UL
EOSINOPHIL NFR BLD: 1.4 %
ERYTHROCYTE [DISTWIDTH] IN BLOOD BY AUTOMATED COUNT: 14.4 %
EST. GFR  (AFRICAN AMERICAN): >60 ML/MIN/1.73 M^2
EST. GFR  (NON AFRICAN AMERICAN): >60 ML/MIN/1.73 M^2
FRACTIONAL SHORTENING: 45 % (ref 28–44)
GLUCOSE SERPL-MCNC: 95 MG/DL
HBV CORE IGM SERPL QL IA: NEGATIVE
HBV SURFACE AG SERPL QL IA: NEGATIVE
HCT VFR BLD AUTO: 42.3 %
HCV AB SERPL QL IA: NEGATIVE
HGB BLD-MCNC: 13.8 G/DL
IMM GRANULOCYTES # BLD AUTO: 0.01 K/UL
IMM GRANULOCYTES NFR BLD AUTO: 0.3 %
INR PPP: 1
INTERVENTRICULAR SEPTUM: 0.8 CM (ref 0.6–1.1)
IVRT: 0.07 MSEC
LA MAJOR: 5.47 CM
LA MINOR: 5.23 CM
LA WIDTH: 3.88 CM
LEFT ATRIUM SIZE: 3.42 CM
LEFT ATRIUM VOLUME INDEX: 30.2 ML/M2
LEFT ATRIUM VOLUME: 60.31 CM3
LEFT INTERNAL DIMENSION IN SYSTOLE: 2.33 CM (ref 2.1–4)
LEFT VENTRICLE DIASTOLIC VOLUME INDEX: 40.57 ML/M2
LEFT VENTRICLE DIASTOLIC VOLUME: 80.98 ML
LEFT VENTRICLE MASS INDEX: 51.3 G/M2
LEFT VENTRICLE SYSTOLIC VOLUME INDEX: 9.4 ML/M2
LEFT VENTRICLE SYSTOLIC VOLUME: 18.69 ML
LEFT VENTRICULAR INTERNAL DIMENSION IN DIASTOLE: 4.25 CM (ref 3.5–6)
LEFT VENTRICULAR MASS: 102.45 G
LV LATERAL E/E' RATIO: 6.64
LV SEPTAL E/E' RATIO: 9.13
LYMPHOCYTES # BLD AUTO: 0.8 K/UL
LYMPHOCYTES NFR BLD: 22.2 %
MCH RBC QN AUTO: 30 PG
MCHC RBC AUTO-ENTMCNC: 32.6 G/DL
MCV RBC AUTO: 92 FL
MONOCYTES # BLD AUTO: 0.3 K/UL
MONOCYTES NFR BLD: 8.4 %
MV PEAK A VEL: 0.93 M/S
MV PEAK E VEL: 0.73 M/S
NEUTROPHILS # BLD AUTO: 2.5 K/UL
NEUTROPHILS NFR BLD: 66.6 %
NRBC BLD-RTO: 0 /100 WBC
PISA TR MAX VEL: 2.7 M/S
PLATELET # BLD AUTO: 95 K/UL
PMV BLD AUTO: 10.1 FL
POTASSIUM SERPL-SCNC: 4.4 MMOL/L
PROT SERPL-MCNC: 7.1 G/DL
PROTHROMBIN TIME: 10 SEC
PULM VEIN S/D RATIO: 1.62
PV PEAK D VEL: 0.42 M/S
PV PEAK S VEL: 0.68 M/S
RA MAJOR: 5.42 CM
RA PRESSURE: 3 MMHG
RA WIDTH: 3.45 CM
RBC # BLD AUTO: 4.6 M/UL
RIGHT VENTRICULAR END-DIASTOLIC DIMENSION: 4.79 CM
RV TISSUE DOPPLER FREE WALL SYSTOLIC VELOCITY 1 (APICAL 4 CHAMBER VIEW): 19.3 M/S
SINUS: 3.03 CM
SODIUM SERPL-SCNC: 141 MMOL/L
STJ: 2.86 CM
TDI LATERAL: 0.11
TDI SEPTAL: 0.08
TDI: 0.1
TR MAX PG: 29.16 MMHG
TRICUSPID ANNULAR PLANE SYSTOLIC EXCURSION: 2.74 CM
TSH SERPL DL<=0.005 MIU/L-ACNC: 0.73 UIU/ML
TV REST PULMONARY ARTERY PRESSURE: 32 MMHG
WBC # BLD AUTO: 3.7 K/UL

## 2019-01-22 PROCEDURE — 80053 COMPREHEN METABOLIC PANEL: CPT

## 2019-01-22 PROCEDURE — 86803 HEPATITIS C AB TEST: CPT

## 2019-01-22 PROCEDURE — 36415 COLL VENOUS BLD VENIPUNCTURE: CPT

## 2019-01-22 PROCEDURE — 85025 COMPLETE CBC W/AUTO DIFF WBC: CPT

## 2019-01-22 PROCEDURE — 93306 TRANSTHORACIC ECHO (TTE) COMPLETE (CUPID ONLY): ICD-10-PCS | Mod: S$GLB,,, | Performed by: INTERNAL MEDICINE

## 2019-01-22 PROCEDURE — 85610 PROTHROMBIN TIME: CPT

## 2019-01-22 PROCEDURE — 93306 TTE W/DOPPLER COMPLETE: CPT | Mod: S$GLB,,, | Performed by: INTERNAL MEDICINE

## 2019-01-22 PROCEDURE — 84443 ASSAY THYROID STIM HORMONE: CPT

## 2019-01-22 PROCEDURE — 85730 THROMBOPLASTIN TIME PARTIAL: CPT

## 2019-01-22 PROCEDURE — 86705 HEP B CORE ANTIBODY IGM: CPT

## 2019-01-22 PROCEDURE — 87340 HEPATITIS B SURFACE AG IA: CPT

## 2019-01-22 PROCEDURE — 86304 IMMUNOASSAY TUMOR CA 125: CPT

## 2019-01-22 NOTE — PROGRESS NOTES
Jan. 22, 2019     Protocol: NRG-  : Kiana Weston MD  Treating Physician: Benny Tran MD  Pt Initials:  NAV BERMUDEZ  Study ID: [id number]  IRB#: [id number]     A Randomized Phase II/III Study of Pegylated Liposomal Doxorubicin and Atezolizumab vs. Pegylated Liposomal Doxorubicin/Bevacizumab/ Atezolizumab vs. Pegylated Liposomal Doxorubicin/ Bevacizumab in Platinum-resistant Ovarian Cancer.    Patient identified as a potential candidate for NRG  study.  Dr. Tran agrees patient fits eligibility criteria and presented trial to patient.  Patient stated she would like to participate.    Today, met with patient the study design, schedule, and informed consent document.  No study-specific screening procedures were performed prior to patient signing Informed Consent.        Please click on the encounter link below to view attachments under media for consent details

## 2019-01-24 NOTE — PROGRESS NOTES
Protocol: NRG-  : Kiana Weston MD  Treating Physician: Benny Tran MD  Pt Initials:  NAV BERMUDEZ  Study ID: [id number]  IRB#: [id number]     A Randomized Phase II/III Study of Pegylated Liposomal Doxorubicin and Atezolizumab vs. Pegylated Liposomal Doxorubicin/Bevacizumab/ Atezolizumab vs. Pegylated Liposomal Doxorubicin/ Bevacizumab in Platinum-resistant Ovarian Cancer.    No protocol-specific procedures were performed prior to patient signing Informed Consent document (12/31/18).     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  Patient has platinum-resistant ovarian cancer as defined as progression within < 6 months from completion of platinum-based therapy. Last platinum was 6/21/2017 with radiographic recurrence on 10/19/2017.  Patient has received 2 prior regimens with maintenance therapy after primary therapy (see chemo flow sheet).  Prior therapy eligibility was confirmed with Study Chair (email communication).   Patient has measurable disease (defined by RECIST v 1.1) on CT-CAP from 1/4/2019.  Patient is female, ? 18 yrs., with a Performance Status 0 (MD note from 1/9/2019).   Patient has adequate hematologic, renal, and hepatic function within 14 days prior to registration defined as follows: ANC 3000, platelets ? 102,000, Hgb 14.6, creatinine 0.7, urine protein creatinine (UPC) ratio < 0.23, total bilirubin 1.0, and AST/ALT 50/47.   Patient has INR and aPTT of 1.0 and 27.9, respectively; and TSH 0.734 (WNL).   Patient signed study-specific informed consent prior to study entry on 1/22/2019 and prior to any study-specific procedures.     Ineligibility Criteria   Patient has not had prior allogeneic bone marrow transplantation or prior solid organ  transplantation.   Patient has not received systemic anticancer therapy within 3 weeks prior to entering the study (last Tx 12/14/2018) and has not received hormonal therapy nor prior treatment with anti-PD-1/ anti-PD-L1/ anti-CTLA-4 therapeutic antibody/ or other similar agents.   Patient has not received prior treatment with bevacizumab (or any other anti-vascular therapy, e.g., cediranib), prior PLD, nor prior radiotherapy to the abdomen or pelvis.   Patient does not have treatment-related adverse events to < grade 1.   Patient has not received treatment with any other investigational agent within 4 weeks prior to Cycle 1, Day 1 (last treatment 12/14/2018).   Patient has not received treatment with systemic immunostimulatory agents (including, but not limited to, interferon [IFN]-alpha or interleukin [IL]-2) within 6 weeks prior to Cycle 1, Day 1, treatment with systemic immunosuppressive medications (including, but not limited to, prednisone, cyclophosphamide, azathioprine, methotrexate, thalidomide, and anti-tumor necrosis factor [anti-TNF] agents) within 2 weeks prior to Cycle 1, Day 1, nor bisphosphonate therapy.  Patients has no known primary central nervous system (CNS) malignancy or symptomatic CNS metastases.   Patient has no known hypersensitivity to Chinese hamster ovary cell products or other recombinant human antibodies; nor history of severe allergic, anaphylactic, or other hypersensitivity reactions to chimeric or humanized antibodies or fusion proteins.   Patient does not require treatment with a RANKL inhibitor; does not have known clinically significant liver disease, including active viral, alcoholic, or other hepatitis; cirrhosis; fatty liver; and inherited liver disease; past or resolved hepatitis B infection (see HepB testing 1/22/2019); nor positive hepatitis C virus (HCV) infection (see HepC testing 1/22/2019).  Patient has no history or risk of autoimmune disease, including but not limited  to systemic lupus erythematosus, rheumatoid arthritis, inflammatory bowel disease, vascular thrombosis associated with antiphospholipid syndrome, Wegeners granulomatosis, Sjögrens syndrome, Bells palsy, Guillain-Barré syndrome, multiple sclerosis, autoimmune thyroid disease, vasculitis, or glomerulonephritis; no history of idiopathic pulmonary fibrosis, pneumonitis (including drug induced), organizing pneumonia (i.e., bronchiolitis obliterans, cryptogenic organizing pneumonia, etc.), or evidence of active pneumonitis on screening chest computed tomography (CT) scan; no active tuberculosis (TB); no severe infections within 4 weeks prior to Cycle 1, Day 1; no signs or symptoms of infection within 2 weeks prior to Cycle 1, Day 1.   Patient has not received oral or intravenous (IV) antibiotics within 2 weeks prior to Cycle 1, Day 1, is not receiving prophylactic antibiotics (e.g., for prevention of a urinary tract infection or chronic obstructive pulmonary disease), has not had major surgical procedure within 28 days prior to Cycle 1, Day 1 or anticipation of need for a major surgical procedure during the study.   Patient will not receive or require administration of a live, attenuated vaccine within 4 weeks before Cycle 1, Day 1 or during the study.   Patient does not have uncontrolled intercurrent illness including, but not limited to, ongoing or active infection, symptomatic congestive heart failure, unstable angina pectoris, cardiac arrhythmia, or psychiatric illness/social situations that would limit compliance with study requirements.   Patients has no known positivity for human immunodeficiency virus (HIV).  Patient has no prior invasive malignancy (except non-melanomatous skin cancer).  Patient has no severe, active co-morbidity, no significant cardiovascular or cerebrovascular disease including uncontrolled hypertension (SBP >150; DBP >90), no history of abdominal/pelvic or tracheoesophageal fistula, and no  gastrointestinal perforation and/or abscess within 6 months prior to initiation of treatment.   Patient is not pregnant nor lactating.    Patient was enrolled onto trial in 1/22/19 and was assigned to Arm3: Pegylated Liposomal Doxorubicin, 40 mg/m2, q28d and Bevacizumab, 10 mg/kg, q14d.  Patient and Dr. Tran made aware of treatment assignment.  Patient is scheduled for C1D1 treatment on 1/29/19.

## 2019-01-25 ENCOUNTER — TELEPHONE (OUTPATIENT)
Dept: GYNECOLOGIC ONCOLOGY | Facility: CLINIC | Age: 64
End: 2019-01-25

## 2019-01-28 ENCOUNTER — OFFICE VISIT (OUTPATIENT)
Dept: GYNECOLOGIC ONCOLOGY | Facility: CLINIC | Age: 64
End: 2019-01-28
Payer: COMMERCIAL

## 2019-01-28 VITALS
WEIGHT: 195.75 LBS | BODY MASS INDEX: 30.66 KG/M2 | HEART RATE: 77 BPM | SYSTOLIC BLOOD PRESSURE: 124 MMHG | DIASTOLIC BLOOD PRESSURE: 57 MMHG | RESPIRATION RATE: 18 BRPM | TEMPERATURE: 98 F

## 2019-01-28 DIAGNOSIS — Z00.6 EXAMINATION OF PARTICIPANT IN CLINICAL TRIAL: ICD-10-CM

## 2019-01-28 DIAGNOSIS — Z51.11 ENCOUNTER FOR ANTINEOPLASTIC CHEMOTHERAPY: ICD-10-CM

## 2019-01-28 DIAGNOSIS — C56.3 MALIGNANT NEOPLASM OF BOTH OVARIES: Primary | ICD-10-CM

## 2019-01-28 PROCEDURE — 99999 PR PBB SHADOW E&M-EST. PATIENT-LVL III: ICD-10-PCS | Mod: PBBFAC,,, | Performed by: OBSTETRICS & GYNECOLOGY

## 2019-01-28 PROCEDURE — 99999 PR PBB SHADOW E&M-EST. PATIENT-LVL III: CPT | Mod: PBBFAC,,, | Performed by: OBSTETRICS & GYNECOLOGY

## 2019-01-28 PROCEDURE — 99214 PR OFFICE/OUTPT VISIT, EST, LEVL IV, 30-39 MIN: ICD-10-PCS | Mod: S$GLB,,, | Performed by: OBSTETRICS & GYNECOLOGY

## 2019-01-28 PROCEDURE — 3008F PR BODY MASS INDEX (BMI) DOCUMENTED: ICD-10-PCS | Mod: CPTII,S$GLB,, | Performed by: OBSTETRICS & GYNECOLOGY

## 2019-01-28 PROCEDURE — 3008F BODY MASS INDEX DOCD: CPT | Mod: CPTII,S$GLB,, | Performed by: OBSTETRICS & GYNECOLOGY

## 2019-01-28 PROCEDURE — 99214 OFFICE O/P EST MOD 30 MIN: CPT | Mod: S$GLB,,, | Performed by: OBSTETRICS & GYNECOLOGY

## 2019-01-29 ENCOUNTER — INFUSION (OUTPATIENT)
Dept: INFUSION THERAPY | Facility: HOSPITAL | Age: 64
End: 2019-01-29
Attending: OBSTETRICS & GYNECOLOGY
Payer: COMMERCIAL

## 2019-01-29 ENCOUNTER — RESEARCH ENCOUNTER (OUTPATIENT)
Dept: RESEARCH | Facility: HOSPITAL | Age: 64
End: 2019-01-29

## 2019-01-29 VITALS
WEIGHT: 195 LBS | TEMPERATURE: 99 F | BODY MASS INDEX: 30.61 KG/M2 | HEIGHT: 67 IN | DIASTOLIC BLOOD PRESSURE: 68 MMHG | RESPIRATION RATE: 18 BRPM | SYSTOLIC BLOOD PRESSURE: 129 MMHG | HEART RATE: 81 BPM

## 2019-01-29 DIAGNOSIS — C56.9 OVARIAN CANCER, UNSPECIFIED LATERALITY: Primary | ICD-10-CM

## 2019-01-29 PROCEDURE — 63600175 PHARM REV CODE 636 W HCPCS: Mod: JG | Performed by: OBSTETRICS & GYNECOLOGY

## 2019-01-29 PROCEDURE — 96367 TX/PROPH/DG ADDL SEQ IV INF: CPT

## 2019-01-29 PROCEDURE — 96417 CHEMO IV INFUS EACH ADDL SEQ: CPT

## 2019-01-29 PROCEDURE — 96413 CHEMO IV INFUSION 1 HR: CPT

## 2019-01-29 PROCEDURE — 96415 CHEMO IV INFUSION ADDL HR: CPT

## 2019-01-29 PROCEDURE — 25000003 PHARM REV CODE 250: Performed by: OBSTETRICS & GYNECOLOGY

## 2019-01-29 RX ORDER — EPINEPHRINE 0.3 MG/.3ML
0.3 INJECTION SUBCUTANEOUS ONCE AS NEEDED
Status: DISCONTINUED | OUTPATIENT
Start: 2019-01-29 | End: 2019-01-29 | Stop reason: HOSPADM

## 2019-01-29 RX ORDER — SODIUM CHLORIDE 0.9 % (FLUSH) 0.9 %
10 SYRINGE (ML) INJECTION
Status: DISCONTINUED | OUTPATIENT
Start: 2019-01-29 | End: 2019-01-29 | Stop reason: HOSPADM

## 2019-01-29 RX ORDER — DIPHENHYDRAMINE HYDROCHLORIDE 50 MG/ML
50 INJECTION INTRAMUSCULAR; INTRAVENOUS ONCE AS NEEDED
Status: CANCELLED | OUTPATIENT
Start: 2019-01-29 | End: 2019-01-29

## 2019-01-29 RX ORDER — HEPARIN 100 UNIT/ML
500 SYRINGE INTRAVENOUS
Status: CANCELLED | OUTPATIENT
Start: 2019-01-29

## 2019-01-29 RX ORDER — SODIUM CHLORIDE 0.9 % (FLUSH) 0.9 %
10 SYRINGE (ML) INJECTION
Status: CANCELLED | OUTPATIENT
Start: 2019-01-29

## 2019-01-29 RX ORDER — EPINEPHRINE 0.3 MG/.3ML
0.3 INJECTION SUBCUTANEOUS ONCE AS NEEDED
Status: CANCELLED | OUTPATIENT
Start: 2019-01-29 | End: 2019-01-29

## 2019-01-29 RX ORDER — HEPARIN 100 UNIT/ML
500 SYRINGE INTRAVENOUS
Status: DISCONTINUED | OUTPATIENT
Start: 2019-01-29 | End: 2019-01-29 | Stop reason: HOSPADM

## 2019-01-29 RX ORDER — DIPHENHYDRAMINE HYDROCHLORIDE 50 MG/ML
50 INJECTION INTRAMUSCULAR; INTRAVENOUS ONCE AS NEEDED
Status: DISCONTINUED | OUTPATIENT
Start: 2019-01-29 | End: 2019-01-29 | Stop reason: HOSPADM

## 2019-01-29 RX ADMIN — DIPHENHYDRAMINE HYDROCHLORIDE 50 MG: 50 INJECTION, SOLUTION INTRAMUSCULAR; INTRAVENOUS at 10:01

## 2019-01-29 RX ADMIN — BEVACIZUMAB 880 MG: 400 INJECTION, SOLUTION INTRAVENOUS at 11:01

## 2019-01-29 RX ADMIN — DOXORUBICIN HYDROCHLORIDE 82 MG: 2 INJECTION, SUSPENSION, LIPOSOMAL INTRAVENOUS at 10:01

## 2019-01-29 RX ADMIN — SODIUM CHLORIDE: 0.9 INJECTION, SOLUTION INTRAVENOUS at 10:01

## 2019-01-29 NOTE — PLAN OF CARE
Problem: Adult Inpatient Plan of Care  Goal: Plan of Care Review  Outcome: Ongoing (interventions implemented as appropriate)  1455:  Patient tolerated new treatment well, VSS, NAD noted.  Patient slept during and following Avastin, awoke without any complaints.  PIV removed intact, AVS declined.  Patient released in stable condition, ambulated off unit independently.

## 2019-01-29 NOTE — PROGRESS NOTES
Subjective:       Patient ID: Mickie Don is a 63 y.o. female.    Chief Complaint: No chief complaint on file.    Treatment History  Xlap/Omentectomy on 11/1/16 - mass was unresectable  Stage IIIC ovarian cancer  Initiated Taxotere/Carbo on 11/18/16, now s/p 6 cycles completed 3/8/17  Xlap/ISIS/BSO/Radical mass resection > 10 cm, optimal debulking on 4/11/17  Initiated PRIMA on 8/21/17  BRCA negative  Started FORWARD1 on 11/27/17    HPI     Patient comes in today prior to C1 for treatment on NRG  Arm 3. Labs reviewed and are ok for treatment.  Platelet count has recovered to 101.  BP ok.  PS 0.  No complaints except mild nasal congestion.  Denies fever.     Review of Systems   Constitutional: Negative for chills, fatigue and fever.   Respiratory: Negative for cough, shortness of breath and wheezing.    Cardiovascular: Negative for chest pain, palpitations and leg swelling.   Gastrointestinal: Negative for abdominal pain, constipation, diarrhea, nausea and vomiting.   Genitourinary: Negative for difficulty urinating, dysuria, frequency, genital sores, hematuria, urgency, vaginal bleeding, vaginal discharge and vaginal pain.   Musculoskeletal: Negative for gait problem.   Neurological: Negative for weakness and numbness.   Hematological: Negative for adenopathy. Does not bruise/bleed easily.   Psychiatric/Behavioral: The patient is not nervous/anxious.        Objective:   BP (!) 124/57   Pulse 77   Temp 97.7 °F (36.5 °C)   Resp 18   Wt 88.8 kg (195 lb 12.3 oz)   BMI 30.66 kg/m²      Physical Exam   Constitutional: She is oriented to person, place, and time. She appears well-developed and well-nourished.   HENT:   Head: Normocephalic and atraumatic.   Eyes: No scleral icterus.   Neck: No tracheal deviation present. No thyromegaly present.   Cardiovascular: Normal rate and regular rhythm.   Pulmonary/Chest: Effort normal and breath sounds normal.   Abdominal: Soft. She exhibits no distension and no mass.  There is no tenderness. There is no rebound and no guarding. No hernia.   Genitourinary: Rectum normal and vagina normal. Pelvic exam was performed with patient supine. There is no rash, tenderness or lesion on the right labia. There is no rash, tenderness or lesion on the left labia. Right adnexum displays no mass, no tenderness and no fullness. Left adnexum displays no mass, no tenderness and no fullness.   Genitourinary Comments: Not performed.    Musculoskeletal: She exhibits no edema or tenderness.   Lymphadenopathy:     She has no cervical adenopathy.   Neurological: She is alert and oriented to person, place, and time.   Skin: Skin is warm and dry.   Psychiatric: She has a normal mood and affect. Her behavior is normal. Judgment and thought content normal.       Assessment:       1. Malignant neoplasm of both ovaries    2. Encounter for antineoplastic chemotherapy    3. Examination of participant in clinical trial        Plan:     Patient appears fit for treatment, with labs and PS as above.  Orders signed and will proceed as planned tomorrow.  RTC as scheduled.

## 2019-01-30 ENCOUNTER — RESEARCH ENCOUNTER (OUTPATIENT)
Dept: RESEARCH | Facility: HOSPITAL | Age: 64
End: 2019-01-30

## 2019-02-02 ENCOUNTER — OFFICE VISIT (OUTPATIENT)
Dept: INTERNAL MEDICINE | Facility: CLINIC | Age: 64
End: 2019-02-02
Payer: COMMERCIAL

## 2019-02-02 VITALS
SYSTOLIC BLOOD PRESSURE: 124 MMHG | BODY MASS INDEX: 30.1 KG/M2 | OXYGEN SATURATION: 99 % | TEMPERATURE: 99 F | DIASTOLIC BLOOD PRESSURE: 80 MMHG | HEIGHT: 67 IN | WEIGHT: 191.81 LBS | HEART RATE: 83 BPM

## 2019-02-02 DIAGNOSIS — J06.9 UPPER RESPIRATORY TRACT INFECTION, UNSPECIFIED TYPE: Primary | ICD-10-CM

## 2019-02-02 PROCEDURE — 99999 PR PBB SHADOW E&M-EST. PATIENT-LVL III: CPT | Mod: PBBFAC,,, | Performed by: INTERNAL MEDICINE

## 2019-02-02 PROCEDURE — 99213 PR OFFICE/OUTPT VISIT, EST, LEVL III, 20-29 MIN: ICD-10-PCS | Mod: S$GLB,,, | Performed by: INTERNAL MEDICINE

## 2019-02-02 PROCEDURE — 99999 PR PBB SHADOW E&M-EST. PATIENT-LVL III: ICD-10-PCS | Mod: PBBFAC,,, | Performed by: INTERNAL MEDICINE

## 2019-02-02 PROCEDURE — 99213 OFFICE O/P EST LOW 20 MIN: CPT | Mod: S$GLB,,, | Performed by: INTERNAL MEDICINE

## 2019-02-02 PROCEDURE — 3008F PR BODY MASS INDEX (BMI) DOCUMENTED: ICD-10-PCS | Mod: CPTII,S$GLB,, | Performed by: INTERNAL MEDICINE

## 2019-02-02 PROCEDURE — 3008F BODY MASS INDEX DOCD: CPT | Mod: CPTII,S$GLB,, | Performed by: INTERNAL MEDICINE

## 2019-02-02 RX ORDER — AMOXICILLIN 875 MG/1
875 TABLET, FILM COATED ORAL EVERY 12 HOURS
Qty: 14 TABLET | Refills: 0 | Status: SHIPPED | OUTPATIENT
Start: 2019-02-02 | End: 2019-02-09

## 2019-02-04 ENCOUNTER — OFFICE VISIT (OUTPATIENT)
Dept: OPHTHALMOLOGY | Facility: CLINIC | Age: 64
End: 2019-02-04
Payer: COMMERCIAL

## 2019-02-04 DIAGNOSIS — H25.11 NUCLEAR SCLEROTIC CATARACT OF RIGHT EYE: ICD-10-CM

## 2019-02-04 DIAGNOSIS — C56.9 OVARIAN CANCER, UNSPECIFIED LATERALITY: Primary | ICD-10-CM

## 2019-02-04 DIAGNOSIS — H18.519 FUCHS' CORNEAL DYSTROPHY: ICD-10-CM

## 2019-02-04 DIAGNOSIS — H25.12 NUCLEAR SCLEROTIC CATARACT OF LEFT EYE: ICD-10-CM

## 2019-02-04 DIAGNOSIS — H40.003 GLAUCOMA SUSPECT OF BOTH EYES: ICD-10-CM

## 2019-02-04 PROCEDURE — 92133 CPTRZD OPH DX IMG PST SGM ON: CPT | Mod: S$GLB,,, | Performed by: OPHTHALMOLOGY

## 2019-02-04 PROCEDURE — 92136 OPHTHALMIC BIOMETRY: CPT | Mod: LT,S$GLB,, | Performed by: OPHTHALMOLOGY

## 2019-02-04 PROCEDURE — 92014 COMPRE OPH EXAM EST PT 1/>: CPT | Mod: S$GLB,,, | Performed by: OPHTHALMOLOGY

## 2019-02-04 PROCEDURE — 92014 PR EYE EXAM, EST PATIENT,COMPREHESV: ICD-10-PCS | Mod: S$GLB,,, | Performed by: OPHTHALMOLOGY

## 2019-02-04 PROCEDURE — 92136 IOL MASTER - OU - BOTH EYES: ICD-10-PCS | Mod: LT,S$GLB,, | Performed by: OPHTHALMOLOGY

## 2019-02-04 PROCEDURE — 92133 POSTERIOR SEGMENT OCT OPTIC NERVE(OCULAR COHERENCE TOMOGRAPHY) - OU - BOTH EYES: ICD-10-PCS | Mod: S$GLB,,, | Performed by: OPHTHALMOLOGY

## 2019-02-04 PROCEDURE — 92025 COMPUTERIZED CORNEAL TOPOGRAPHY: ICD-10-PCS | Mod: S$GLB,,, | Performed by: OPHTHALMOLOGY

## 2019-02-04 PROCEDURE — 99999 PR PBB SHADOW E&M-EST. PATIENT-LVL II: CPT | Mod: PBBFAC,,, | Performed by: OPHTHALMOLOGY

## 2019-02-04 PROCEDURE — 92025 CPTRIZED CORNEAL TOPOGRAPHY: CPT | Mod: S$GLB,,, | Performed by: OPHTHALMOLOGY

## 2019-02-04 PROCEDURE — 99999 PR PBB SHADOW E&M-EST. PATIENT-LVL II: ICD-10-PCS | Mod: PBBFAC,,, | Performed by: OPHTHALMOLOGY

## 2019-02-04 NOTE — PROGRESS NOTES
HPI     Referred by Dr Mcgrath     research protocol Ovarian Cancer  Myopia  Fuchs  GS    \Pt referred by Dr Mcgrath for cataract eval. Pt states she was on an   investigational medication and developed cataracts. Pt states glare is   very bothersome and she has blurry VA OU.    Last edited by Karen Butt MD on 2/4/2019 12:23 PM. (History)            Assessment /Plan     For exam results, see Encounter Report.    Ovarian cancer, unspecified laterality    Nuclear sclerotic cataract of left eye  -     IOL Master - OU - Both Eyes  -     Computerized corneal topography    Nuclear sclerotic cataract of right eye    Glaucoma suspect of both eyes  -     Posterior Segment OCT Optic Nerve- Both eyes    Fuchs' corneal dystrophy      Visually significant nuclear sclerotic cataract   - Interfering with activities of daily living.  Pt desires cataract surgery for [facility] rehabilitation.   - R/B/A discussed and pt agrees to proceed with surgery.   - IOL options discussed according to patient's goals and concomitant ocular pathology; and pt content with monofocal lens.    - Target: plano.    OD first    pcboo 19.0 OD    (pcboo 19.0 OS)    Fuchs  - pachy: 620/613  - discussed risk of corneal decompensation post cat sx    GS  - oct nerves - today  - increase in c/d ratio compared to old photos from 2013  - IOP wnl today  - would benefit from baseline GS testing post cat sx.

## 2019-02-12 ENCOUNTER — INFUSION (OUTPATIENT)
Dept: INFUSION THERAPY | Facility: HOSPITAL | Age: 64
End: 2019-02-12
Attending: OBSTETRICS & GYNECOLOGY
Payer: COMMERCIAL

## 2019-02-12 ENCOUNTER — OFFICE VISIT (OUTPATIENT)
Dept: INTERNAL MEDICINE | Facility: CLINIC | Age: 64
End: 2019-02-12
Payer: COMMERCIAL

## 2019-02-12 ENCOUNTER — RESEARCH ENCOUNTER (OUTPATIENT)
Dept: RESEARCH | Facility: HOSPITAL | Age: 64
End: 2019-02-12

## 2019-02-12 VITALS
SYSTOLIC BLOOD PRESSURE: 99 MMHG | DIASTOLIC BLOOD PRESSURE: 78 MMHG | HEIGHT: 68 IN | TEMPERATURE: 99 F | BODY MASS INDEX: 28.97 KG/M2 | WEIGHT: 191.13 LBS | HEART RATE: 83 BPM | OXYGEN SATURATION: 98 %

## 2019-02-12 VITALS
RESPIRATION RATE: 18 BRPM | DIASTOLIC BLOOD PRESSURE: 62 MMHG | SYSTOLIC BLOOD PRESSURE: 128 MMHG | HEART RATE: 74 BPM | TEMPERATURE: 100 F

## 2019-02-12 DIAGNOSIS — J06.9 UPPER RESPIRATORY TRACT INFECTION, UNSPECIFIED TYPE: Primary | ICD-10-CM

## 2019-02-12 DIAGNOSIS — J01.01 ACUTE RECURRENT MAXILLARY SINUSITIS: ICD-10-CM

## 2019-02-12 DIAGNOSIS — C56.9 OVARIAN CANCER, UNSPECIFIED LATERALITY: Primary | ICD-10-CM

## 2019-02-12 LAB
INFLUENZA A, MOLECULAR: NEGATIVE
INFLUENZA B, MOLECULAR: NEGATIVE
SPECIMEN SOURCE: NORMAL

## 2019-02-12 PROCEDURE — 63600175 PHARM REV CODE 636 W HCPCS: Mod: JG | Performed by: OBSTETRICS & GYNECOLOGY

## 2019-02-12 PROCEDURE — 3008F PR BODY MASS INDEX (BMI) DOCUMENTED: ICD-10-PCS | Mod: CPTII,S$GLB,, | Performed by: NURSE PRACTITIONER

## 2019-02-12 PROCEDURE — 99999 PR PBB SHADOW E&M-EST. PATIENT-LVL IV: CPT | Mod: PBBFAC,,, | Performed by: NURSE PRACTITIONER

## 2019-02-12 PROCEDURE — 3008F BODY MASS INDEX DOCD: CPT | Mod: CPTII,S$GLB,, | Performed by: NURSE PRACTITIONER

## 2019-02-12 PROCEDURE — 25000003 PHARM REV CODE 250: Performed by: OBSTETRICS & GYNECOLOGY

## 2019-02-12 PROCEDURE — 87502 INFLUENZA DNA AMP PROBE: CPT

## 2019-02-12 PROCEDURE — 99999 PR PBB SHADOW E&M-EST. PATIENT-LVL IV: ICD-10-PCS | Mod: PBBFAC,,, | Performed by: NURSE PRACTITIONER

## 2019-02-12 PROCEDURE — 99214 OFFICE O/P EST MOD 30 MIN: CPT | Mod: S$GLB,,, | Performed by: NURSE PRACTITIONER

## 2019-02-12 PROCEDURE — 96413 CHEMO IV INFUSION 1 HR: CPT

## 2019-02-12 PROCEDURE — 99214 PR OFFICE/OUTPT VISIT, EST, LEVL IV, 30-39 MIN: ICD-10-PCS | Mod: S$GLB,,, | Performed by: NURSE PRACTITIONER

## 2019-02-12 RX ORDER — HEPARIN 100 UNIT/ML
500 SYRINGE INTRAVENOUS
Status: DISCONTINUED | OUTPATIENT
Start: 2019-02-12 | End: 2019-02-12 | Stop reason: HOSPADM

## 2019-02-12 RX ORDER — DIPHENHYDRAMINE HYDROCHLORIDE 50 MG/ML
50 INJECTION INTRAMUSCULAR; INTRAVENOUS ONCE AS NEEDED
Status: DISCONTINUED | OUTPATIENT
Start: 2019-02-12 | End: 2019-02-12 | Stop reason: HOSPADM

## 2019-02-12 RX ORDER — EPINEPHRINE 0.3 MG/.3ML
0.3 INJECTION SUBCUTANEOUS ONCE AS NEEDED
Status: DISCONTINUED | OUTPATIENT
Start: 2019-02-12 | End: 2019-02-12 | Stop reason: HOSPADM

## 2019-02-12 RX ORDER — HEPARIN 100 UNIT/ML
500 SYRINGE INTRAVENOUS
Status: CANCELLED | OUTPATIENT
Start: 2019-02-12

## 2019-02-12 RX ORDER — DIPHENHYDRAMINE HYDROCHLORIDE 50 MG/ML
50 INJECTION INTRAMUSCULAR; INTRAVENOUS ONCE AS NEEDED
Status: CANCELLED | OUTPATIENT
Start: 2019-02-12 | End: 2019-02-12

## 2019-02-12 RX ORDER — AMOXICILLIN AND CLAVULANATE POTASSIUM 875; 125 MG/1; MG/1
1 TABLET, FILM COATED ORAL EVERY 12 HOURS
Qty: 20 TABLET | Refills: 0 | Status: SHIPPED | OUTPATIENT
Start: 2019-02-12 | End: 2019-02-22

## 2019-02-12 RX ORDER — SODIUM CHLORIDE 0.9 % (FLUSH) 0.9 %
10 SYRINGE (ML) INJECTION
Status: CANCELLED | OUTPATIENT
Start: 2019-02-12

## 2019-02-12 RX ORDER — EPINEPHRINE 0.3 MG/.3ML
0.3 INJECTION SUBCUTANEOUS ONCE AS NEEDED
Status: CANCELLED | OUTPATIENT
Start: 2019-02-12 | End: 2019-02-12

## 2019-02-12 RX ORDER — SODIUM CHLORIDE 0.9 % (FLUSH) 0.9 %
10 SYRINGE (ML) INJECTION
Status: DISCONTINUED | OUTPATIENT
Start: 2019-02-12 | End: 2019-02-12 | Stop reason: HOSPADM

## 2019-02-12 RX ADMIN — BEVACIZUMAB 880 MG: 100 INJECTION, SOLUTION INTRAVENOUS at 10:02

## 2019-02-12 NOTE — PROGRESS NOTES
Subjective:       Patient ID: Mickie Don is a 63 y.o. female.    Chief Complaint: Sinus Problem    Disclaimer: This note has been generated using voice-recognition software. There may be typographical errors that have been missed during proof-reading    Pt of Dr Ma here for same-day appointment for sinusitis.  Patient was recently treated with amoxicillin for sinusitis and still has symptoms.  Patient states she has had foul odor in her breath and it is foul-smelling purulent rhinorrhea.  Temperature started today 100.0 prior to arrival.  Patient is a chemo patient.      Sinus Problem   Associated symptoms include coughing and sinus pressure. Pertinent negatives include no chills, congestion, shortness of breath, sneezing or sore throat.     Review of Systems   Constitutional: Positive for fatigue and fever. Negative for chills.   HENT: Positive for postnasal drip, rhinorrhea and sinus pressure. Negative for congestion, drooling, sinus pain, sneezing, sore throat, tinnitus, trouble swallowing and voice change.    Respiratory: Positive for cough. Negative for chest tightness and shortness of breath.    Cardiovascular: Negative for chest pain and palpitations.   Gastrointestinal: Negative for abdominal pain, diarrhea, nausea and vomiting.   Musculoskeletal: Positive for myalgias. Negative for arthralgias.   Skin: Negative for rash.   Psychiatric/Behavioral: Negative for sleep disturbance.         Past Medical History:   Diagnosis Date    Dry eyes 11/29/2017    Neuropathy due to chemotherapeutic drug 1/10/2018    Open angle with borderline findings and low glaucoma risk in both eyes 6/20/2013     Past Surgical History:   Procedure Laterality Date    BUNIONECTOMY Left     CHOLECYSTECTOMY  1992    COLONOSCOPY N/A 1/3/2014    Performed by [facility] at [facility] ENDO (4TH FLR)    EXPLORATORY-LAPAROTOMY N/A 4/11/2017    Performed by Benny Tran MD at [facility] OR 2ND FLR    HYSTERECTOMY       HYSTERECTOMY-ABDOMINAL-TOTAL (DORIAN) N/A 4/11/2017    [facility]formed [facility] at [facility] OR 2ND FLR    LAPAROTOMY-EXPLORATORY  11/1/2016    Performed by [facility] at [facility] OR 2ND FLR    left leg surgery  2005    achilles tendon    OMENTECTOMY N/A 11/1/2016    Performed by [facility]an MD at [facility] OR 2ND FLR    PA REMOVAL OF OVARY/TUBE(S)      REPAIR-HAMMER TOE 2nd toe Left 9/7/2016    Performed by Fernie Fonseca MD at [facility] OR 1ST FLR    RESECTION-MASS N/A 4/11/2017    Performed by [facility]an [facility] at [facility] OR 2ND FLR    right leg surgery  2006    broken, including ankle    HVNBUEBW-RXUNOFBXGTNT-EWVOKIUSV (BSO) Bilateral 4/11/2017    Performed by [facility]enny Tran MD at [facility] OR 2ND FLR     Social History     Social History Narrative    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 Works at [employer], [employer] lives with her. Gym frequ.     Family History   Problem Relation Age of Onset    Cancer Father         ? liver ca    Diabetes Father     Cataracts Mother     Breast cancer Maternal Aunt     Cancer Son         sarcoma    Melanoma Sister     Colon cancer Neg Hx     Ovarian cancer Neg Hx     Amblyopia Neg Hx     Blindness Neg Hx     Glaucoma Neg Hx     Hypertension Neg Hx     Macular degeneration Neg Hx     Retinal detachment Neg Hx     Strabismus Neg Hx     Stroke Neg Hx     Thyroid disease Neg Hx      Outpatient Encounter Medications as of 2/12/2019   Medication Sig Dispense Refill    INV dextrose 5% SolP 250 mL with INV DOXOrubicin liposome 2 mg/mL Susp Inject into the vein. FOR INVESTIGATIONAL USE ONLY      INV sodium chloride 0.9 % SolP 100 mL with INV bevacizumab 25 mg/ml Soln 15 mg/kg Inject 15 mg/kg into the vein once. FOR INVESTIGATIONAL USE ONLY      amoxicillin-clavulanate 875-125mg (AUGMENTIN) 875-125 mg per tablet Take 1 tablet by mouth every 12 (twelve) hours. for 10 days 20 tablet 0     No facility-administered encounter medications on file as of 2/12/2019.   "    Last 3 sets of Vitals  Vitals - 1 value per visit 2/2/2019 2/4/2019 2/12/2019   SYSTOLIC 124 - 99   DIASTOLIC 80 - 78   PULSE 83 - 83   TEMPERATURE 99 - 99.1   RESPIRATIONS - - -   SPO2 99 - 98   Weight (lb) 191.8 - 191.14   Weight (kg) 87 - 86.7   HEIGHT 5' 7" - 5' 8"   BODY MASS INDEX 30.04 - 29.06   VISIT REPORT - - -   Pain Score  0 0 0   Some recent data might be hidden         Objective:      Physical Exam   Constitutional: She is oriented to person, place, and time. She appears well-developed and well-nourished. No distress.   HENT:   Head: Normocephalic and atraumatic.   Right Ear: External ear normal.   Left Ear: External ear normal.   Nose: Mucosal edema and rhinorrhea present.   Mouth/Throat: Uvula is midline, oropharynx is clear and moist and mucous membranes are normal. No oropharyngeal exudate.   Purulent PND noted     Eyes: Conjunctivae and EOM are normal. Pupils are equal, round, and reactive to light.   Neck: Normal range of motion. Neck supple.   Cardiovascular: Normal rate, regular rhythm, normal heart sounds and intact distal pulses.   Pulmonary/Chest: Effort normal and breath sounds normal. No stridor. No respiratory distress. She has no wheezes. She has no rales.   Lymphadenopathy:     She has no cervical adenopathy.   Neurological: She is alert and oriented to person, place, and time.   Skin: Skin is warm and dry. Capillary refill takes less than 2 seconds. No rash noted. She is not diaphoretic. No erythema. No pallor.   Psychiatric: She has a normal mood and affect. Her behavior is normal. Judgment and thought content normal.   Nursing note and vitals reviewed.          Lab Results   Component Value Date    WBC 5.63 01/28/2019    RBC 4.69 01/28/2019    HGB 14.4 01/28/2019    HCT 42.1 01/28/2019    MCV 90 01/28/2019    MCH 30.7 01/28/2019    MCHC 34.2 01/28/2019    RDW 14.9 (H) 01/28/2019     (L) 01/28/2019    MPV 9.9 01/28/2019    GRAN 4.0 01/28/2019    GRAN 70.6 01/28/2019    " LYMPH 1.1 01/28/2019    LYMPH 18.7 01/28/2019    MONO 0.5 01/28/2019    MONO 8.5 01/28/2019    EOS 0.1 01/28/2019    BASO 0.02 01/28/2019    EOSINOPHIL 1.6 01/28/2019    BASOPHIL 0.4 01/28/2019     Lab Results   Component Value Date    WBC 5.63 01/28/2019    HGB 14.4 01/28/2019    HCT 42.1 01/28/2019     (L) 01/28/2019    CHOL 217 (H) 09/19/2016    TRIG 92 09/19/2016    HDL 58 09/19/2016    ALT 47 (H) 01/28/2019    AST 49 (H) 01/28/2019     01/28/2019    K 4.3 01/28/2019     01/28/2019    CREATININE 0.7 01/28/2019    BUN 10 01/28/2019    CO2 27 01/28/2019    TSH 0.734 01/22/2019    INR 1.0 01/22/2019       Assessment:       1. Upper respiratory tract infection, unspecified type    2. Acute recurrent maxillary sinusitis        Plan:         Will cover sinusitis with Augmentin, pt advised to eat before taking  Will send pt [facility] message with flu test results    Mickie was seen today for sinus problem.    Diagnoses and all orders for this visit:    Upper respiratory tract infection, unspecified type  -     Influenza A & B by Molecular    Acute recurrent maxillary sinusitis  -     amoxicillin-clavulanate 875-125mg (AUGMENTIN) 875-125 mg per tablet; Take 1 tablet by mouth every 12 (twelve) hours. for 10 days      Patient Instructions     Acute Sinusitis    Acute sinusitis is irritation and swelling of the sinuses. It is usually caused by a viral infection after a common cold. Your doctor can help you find relief.  What is acute sinusitis?  Sinuses are air-filled spaces in the skull behind the face. They are kept moist and clean by a lining of mucosa. Things such as pollen, smoke, and chemical fumes can irritate the mucosa. It can then swell up. As a response to irritation, the mucosa makes more mucus and other fluids. Tiny hairlike cilia cover the mucosa. Cilia help carry mucus toward the opening of the sinus. Too much mucus may cause the cilia to stop working. This blocks the sinus opening. A  buildup of fluid in the sinuses then causes pain and pressure. It can also encourage bacteria to grow in the sinuses.  Common symptoms of acute sinusitis  You may have:  · Facial soreness pain  · Headache  · Fever  · Fluid draining in the back of the throat (postnasal drip)  · Congestion  · Drainage that is thick and colored, instead of clear  · Cough  Diagnosing acute sinusitis  Your doctor will ask about your symptoms and health history. He or she will look at your ear, nose, and throat. You usually won't need to have X-rays taken.    The doctor may take a sample of mucus to check for bacteria. If you have sinusitis that keeps coming back, you may need imaging tests such as X-rays or CAT scans. This will help your doctor check for a structural problem that may be causing the infection.  Treating acute sinusitis  Treatment is aimed at unblocking the sinus opening and helping the cilia work again. You may need to take antihistamine and decongestant medicine. These can reduce inflammation and decrease the amount of fluid your sinuses make. If you have a bacterial infection, you will need to take antibiotic medicine for 10 to 14 days. Take this medicine until it is gone, even if you feel better.  Date Last Reviewed: 10/1/2016  © 3009-1923 [employer]. [address] [place] [address], [place] [address]. All rights reserved. This information is not intended as a substitute for professional medical care. Always follow your healthcare professional's instructions.    I will send you a message with your flu test results

## 2019-02-12 NOTE — PLAN OF CARE
Problem: Adult Inpatient Plan of Care  Goal: Plan of Care Review  Outcome: Ongoing (interventions implemented as appropriate)  1155:  Patient tolerated Avastin well, NAD noted, denies any new issues.  PIV removed intact, AVS declined.  Patient released in stable condition, ambulated off unit independently.

## 2019-02-12 NOTE — PATIENT INSTRUCTIONS
Acute Sinusitis    Acute sinusitis is irritation and swelling of the sinuses. It is usually caused by a viral infection after a common cold. Your doctor can help you find relief.  What is acute sinusitis?  Sinuses are air-filled spaces in the skull behind the face. They are kept moist and clean by a lining of mucosa. Things such as pollen, smoke, and chemical fumes can irritate the mucosa. It can then swell up. As a response to irritation, the mucosa makes more mucus and other fluids. Tiny hairlike cilia cover the mucosa. Cilia help carry mucus toward the opening of the sinus. Too much mucus may cause the cilia to stop working. This blocks the sinus opening. A buildup of fluid in the sinuses then causes pain and pressure. It can also encourage bacteria to grow in the sinuses.  Common symptoms of acute sinusitis  You may have:  · Facial soreness pain  · Headache  · Fever  · Fluid draining in the back of the throat (postnasal drip)  · Congestion  · Drainage that is thick and colored, instead of clear  · Cough  Diagnosing acute sinusitis  Your doctor will ask about your symptoms and health history. He or she will look at your ear, nose, and throat. You usually won't need to have X-rays taken.    The doctor may take a sample of mucus to check for bacteria. If you have sinusitis that keeps coming back, you may need imaging tests such as X-rays or CAT scans. This will help your doctor check for a structural problem that may be causing the infection.  Treating acute sinusitis  Treatment is aimed at unblocking the sinus opening and helping the cilia work again. You may need to take antihistamine and decongestant medicine. These can reduce inflammation and decrease the amount of fluid your sinuses make. If you have a bacterial infection, you will need to take antibiotic medicine for 10 to 14 days. Take this medicine until it is gone, even if you feel better.  Date Last Reviewed: 10/1/2016  © 9351-2165 [employer] [address] [place] [address]. All rights reserved. This information is not intended as a substitute for professional medical care. Always follow your healthcare professional's instructions.    I will send you a message with your flu test results

## 2019-02-14 NOTE — PROGRESS NOTES
Feb. 12, 2019    Protocol: NRG-  : Kiana Weston MD  Treating Physician: Benny Tran MD  Pt Initials:  NAV BERMUDEZ  Study ID: [id number]  IRB#: [id number]     A Randomized Phase II/III Study of Pegylated Liposomal Doxorubicin and Atezolizumab vs. Pegylated Liposomal Doxorubicin/Bevacizumab/ Atezolizumab vs. Pegylated Liposomal Doxorubicin/ Bevacizumab in Platinum-resistant Ovarian Cancer.    Cycle 1 Day 15  Patient randomized on 1/23/19 to Arm 3: Pegylated Liposomal Doxorubicin, 40 mg/m2, q28d and Bevacizumab, 10 mg/kg, q14d.     Patient presented to clinic for treatment on the above-mentioned clinical trial. Patient alert and oriented, mood and affect appropriate to the situation. Patient has a sinus infection and is on antibiotics.  She has no other issues to report.  She denies fever, vomiting, pain, diarrhea, constipation, headaches, and dizziness.     No physical exam, labs, or performance status assessment due at this visit, per protocol.  Reviewed current medications with patient.  See Concomitant Medication sheets.  C2 pre-dose central labs were collected on 2/12/19, processed, and stored per protocol.      Patient scheduled to return for C2D1 on 2/27/19.     All of the patient's questions were answered by Dr. Tran and me to her satisfaction. Patient expressed her willingness to continue to participate in the above-mentioned clinical trial.  Instructed patient to notify Dr. Tran and/or me with any questions, concerns, or changes in health status. Patient verbalized understanding.      Baseline History:  1. 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  2. Peripheral neuropathy- toes, Grade 1.  3. Peripheral neuropathy- fingers, Grade 1.  4. Platelet count decreased/thrombocytopenia, Grade 1.  5. Fatigue, Grade 1.  Will continue to monitor.  6. Cataracts, Grade 2.  Will be assessed by Dr. Mcgrath on 1/15/19.  Will continue to  monitor.  7. Blurred vision, Grade 2. Due to cataracts per Ophthalmologist. Will continue to monitor.    Adverse Events - For the most up-to-date AE log, please refer to the paper AE log in the subjects research file.  1. Sinus infection, Grade 2.  Patient on Augmentin.  Will continue to monitor.

## 2019-02-14 NOTE — PROGRESS NOTES
Jan. 28, 2019    Protocol: NRG-  : Kiana Weston MD  Treating Physician: Benny Tran MD  Pt Initials:  NAV BERMUDEZ  Study ID: [id number]  IRB#: [id number]     A Randomized Phase II/III Study of Pegylated Liposomal Doxorubicin and Atezolizumab vs. Pegylated Liposomal Doxorubicin/Bevacizumab/ Atezolizumab vs. Pegylated Liposomal Doxorubicin/ Bevacizumab in Platinum-resistant Ovarian Cancer.    Patient randomized on 1/23/19 to Arm 3: Pegylated Liposomal Doxorubicin, 40 mg/m2, q28d and Bevacizumab, 10 mg/kg, q14d.     Cycle 1 Day 1  Patient presented to clinic with her son to begin treatment on the above-mentioned clinical trial. Patient alert and oriented, mood and affect appropriate to the situation. Patient reports Baseline issues as listed below.  She denies fever, vomiting, pain, diarrhea, constipation, headaches, and dizziness.     Physical exam, performance status assessment, and vitals performed in clinic on 1/28/19.  All findings were within parameters to begin treatment.  Labs were reviewed by Dr. Tran with the patient.  Physical exam unremarkable per physician.  Reviewed baseline conditions and current medications with patient.  See Concomitant Medication and Baseline Medical History sheets.  C1 pre-dose central labs were collected on 1/29/19, processed, and stored per protocol.  QoLs completed prior to Infusion.      Reviewed drug information, possible side effects, and trial schedule with patient.       Patient scheduled to return for C1D15 on 2/12/19.     All of the patient's questions were answered by Dr. Tran and me to her satisfaction. Patient expressed her willingness to continue to participate in the above-mentioned clinical trial.  Instructed patient to notify Dr. Tran and/or me with any questions, concerns, or changes in health status. Patient verbalized understanding.      Baseline History:  1. Eye disorders, other - Open angle with borderline findings and low glaucoma  risk in both eyes, Grade 1.  2. Peripheral neuropathy- toes, Grade 1.  3. Peripheral neuropathy- fingers, Grade 1.  4. Platelet count decreased/thrombocytopenia, Grade 1.  5. Fatigue, Grade 1.  Will continue to monitor.  6. Cataracts, Grade 2.  Will be assessed by Dr. Mcgrath on 1/15/19.  Will continue to monitor.  7. Blurred vision, Grade 2. Due to cataracts per Ophthalmologist. Will continue to monitor.

## 2019-02-21 DIAGNOSIS — Z00.6 EXAMINATION OF PARTICIPANT IN CLINICAL TRIAL: ICD-10-CM

## 2019-02-21 DIAGNOSIS — C56.9 OVARIAN CANCER, UNSPECIFIED LATERALITY: Primary | ICD-10-CM

## 2019-02-25 ENCOUNTER — TELEPHONE (OUTPATIENT)
Dept: OPHTHALMOLOGY | Facility: CLINIC | Age: 64
End: 2019-02-25

## 2019-02-25 DIAGNOSIS — H25.11 NUCLEAR SCLEROTIC CATARACT OF RIGHT EYE: Primary | ICD-10-CM

## 2019-02-26 ENCOUNTER — TELEPHONE (OUTPATIENT)
Dept: GYNECOLOGIC ONCOLOGY | Facility: CLINIC | Age: 64
End: 2019-02-26

## 2019-02-27 ENCOUNTER — RESEARCH ENCOUNTER (OUTPATIENT)
Dept: RESEARCH | Facility: HOSPITAL | Age: 64
End: 2019-02-27

## 2019-02-27 ENCOUNTER — OFFICE VISIT (OUTPATIENT)
Dept: GYNECOLOGIC ONCOLOGY | Facility: CLINIC | Age: 64
End: 2019-02-27
Payer: COMMERCIAL

## 2019-02-27 ENCOUNTER — INFUSION (OUTPATIENT)
Dept: INFUSION THERAPY | Facility: HOSPITAL | Age: 64
End: 2019-02-27
Attending: OBSTETRICS & GYNECOLOGY
Payer: COMMERCIAL

## 2019-02-27 VITALS
RESPIRATION RATE: 18 BRPM | DIASTOLIC BLOOD PRESSURE: 59 MMHG | RESPIRATION RATE: 18 BRPM | WEIGHT: 187 LBS | HEIGHT: 67 IN | TEMPERATURE: 98 F | HEART RATE: 82 BPM | SYSTOLIC BLOOD PRESSURE: 122 MMHG | SYSTOLIC BLOOD PRESSURE: 106 MMHG | DIASTOLIC BLOOD PRESSURE: 62 MMHG | BODY MASS INDEX: 29.35 KG/M2 | HEART RATE: 64 BPM

## 2019-02-27 DIAGNOSIS — C56.9 OVARIAN CANCER, UNSPECIFIED LATERALITY: Primary | ICD-10-CM

## 2019-02-27 DIAGNOSIS — C56.9 MALIGNANT NEOPLASM OF OVARY, UNSPECIFIED LATERALITY: Primary | ICD-10-CM

## 2019-02-27 DIAGNOSIS — Z00.6 EXAMINATION OF PARTICIPANT IN CLINICAL TRIAL: ICD-10-CM

## 2019-02-27 DIAGNOSIS — Z51.11 ENCOUNTER FOR ANTINEOPLASTIC CHEMOTHERAPY: ICD-10-CM

## 2019-02-27 PROCEDURE — 3008F PR BODY MASS INDEX (BMI) DOCUMENTED: ICD-10-PCS | Mod: CPTII,S$GLB,, | Performed by: OBSTETRICS & GYNECOLOGY

## 2019-02-27 PROCEDURE — 25000003 PHARM REV CODE 250: Performed by: OBSTETRICS & GYNECOLOGY

## 2019-02-27 PROCEDURE — 99999 PR PBB SHADOW E&M-EST. PATIENT-LVL III: CPT | Mod: PBBFAC,,, | Performed by: OBSTETRICS & GYNECOLOGY

## 2019-02-27 PROCEDURE — 63600175 PHARM REV CODE 636 W HCPCS: Mod: JG | Performed by: OBSTETRICS & GYNECOLOGY

## 2019-02-27 PROCEDURE — 96413 CHEMO IV INFUSION 1 HR: CPT

## 2019-02-27 PROCEDURE — 3008F BODY MASS INDEX DOCD: CPT | Mod: CPTII,S$GLB,, | Performed by: OBSTETRICS & GYNECOLOGY

## 2019-02-27 PROCEDURE — 99999 PR PBB SHADOW E&M-EST. PATIENT-LVL III: ICD-10-PCS | Mod: PBBFAC,,, | Performed by: OBSTETRICS & GYNECOLOGY

## 2019-02-27 PROCEDURE — 99214 OFFICE O/P EST MOD 30 MIN: CPT | Mod: S$GLB,,, | Performed by: OBSTETRICS & GYNECOLOGY

## 2019-02-27 PROCEDURE — 96417 CHEMO IV INFUS EACH ADDL SEQ: CPT

## 2019-02-27 PROCEDURE — 99214 PR OFFICE/OUTPT VISIT, EST, LEVL IV, 30-39 MIN: ICD-10-PCS | Mod: S$GLB,,, | Performed by: OBSTETRICS & GYNECOLOGY

## 2019-02-27 PROCEDURE — 96367 TX/PROPH/DG ADDL SEQ IV INF: CPT

## 2019-02-27 RX ORDER — HEPARIN 100 UNIT/ML
500 SYRINGE INTRAVENOUS
Status: CANCELLED | OUTPATIENT
Start: 2019-02-27

## 2019-02-27 RX ORDER — DIPHENHYDRAMINE HYDROCHLORIDE 50 MG/ML
50 INJECTION INTRAMUSCULAR; INTRAVENOUS ONCE AS NEEDED
Status: CANCELLED | OUTPATIENT
Start: 2019-02-27 | End: 2019-02-27

## 2019-02-27 RX ORDER — DIPHENHYDRAMINE HYDROCHLORIDE 50 MG/ML
50 INJECTION INTRAMUSCULAR; INTRAVENOUS ONCE AS NEEDED
Status: DISCONTINUED | OUTPATIENT
Start: 2019-02-27 | End: 2019-02-27 | Stop reason: HOSPADM

## 2019-02-27 RX ORDER — SODIUM CHLORIDE 0.9 % (FLUSH) 0.9 %
10 SYRINGE (ML) INJECTION
Status: DISCONTINUED | OUTPATIENT
Start: 2019-02-27 | End: 2019-02-27 | Stop reason: HOSPADM

## 2019-02-27 RX ORDER — EPINEPHRINE 0.3 MG/.3ML
0.3 INJECTION SUBCUTANEOUS ONCE AS NEEDED
Status: CANCELLED | OUTPATIENT
Start: 2019-02-27 | End: 2019-02-27

## 2019-02-27 RX ORDER — HEPARIN 100 UNIT/ML
500 SYRINGE INTRAVENOUS
Status: DISCONTINUED | OUTPATIENT
Start: 2019-02-27 | End: 2019-02-27 | Stop reason: HOSPADM

## 2019-02-27 RX ORDER — SODIUM CHLORIDE 0.9 % (FLUSH) 0.9 %
10 SYRINGE (ML) INJECTION
Status: CANCELLED | OUTPATIENT
Start: 2019-02-27

## 2019-02-27 RX ORDER — EPINEPHRINE 0.3 MG/.3ML
0.3 INJECTION SUBCUTANEOUS ONCE AS NEEDED
Status: DISCONTINUED | OUTPATIENT
Start: 2019-02-27 | End: 2019-02-27 | Stop reason: HOSPADM

## 2019-02-27 RX ADMIN — DOXORUBICIN HYDROCHLORIDE 82 MG: 2 INJECTION, SUSPENSION, LIPOSOMAL INTRAVENOUS at 10:02

## 2019-02-27 RX ADMIN — DIPHENHYDRAMINE HYDROCHLORIDE 50 MG: 50 INJECTION, SOLUTION INTRAMUSCULAR; INTRAVENOUS at 09:02

## 2019-02-27 RX ADMIN — BEVACIZUMAB 880 MG: 400 INJECTION, SOLUTION INTRAVENOUS at 10:02

## 2019-02-27 NOTE — PLAN OF CARE
Problem: Adult Inpatient Plan of Care  Goal: Plan of Care Review  Outcome: Ongoing (interventions implemented as appropriate)  Pt tolerated Avastin/Doxil well.  No s/s of reaction. Vitals stable, NAD.

## 2019-02-27 NOTE — PROGRESS NOTES
"Subjective:       Patient ID: Mickie Don is a 63 y.o. female.    Chief Complaint: Ovarian Cancer    Treatment History  Xlap/Omentectomy on 11/1/16 - mass was unresectable  Stage IIIC ovarian cancer  Initiated Taxotere/Carbo on 11/18/16, now s/p 6 cycles completed 3/8/17  Xlap/ISIS/BSO/Radical mass resection > 10 cm, optimal debulking on 4/11/17  Initiated PRIMA on 8/21/17  BRCA negative  Started FORWARD1 on 11/27/17    HPI     Patient comes in today prior to C2D1 for treatment on NRG  Arm 3. Labs reviewed and are ok for treatment. CA-125 down to 15.  BP ok.  PS 0.  No complaints except mild nasal congestion.  Denies fever.     Review of Systems   Constitutional: Negative for chills, fatigue and fever.   Respiratory: Negative for cough, shortness of breath and wheezing.    Cardiovascular: Negative for chest pain, palpitations and leg swelling.   Gastrointestinal: Negative for abdominal pain, constipation, diarrhea, nausea and vomiting.   Genitourinary: Negative for difficulty urinating, dysuria, frequency, genital sores, hematuria, urgency, vaginal bleeding, vaginal discharge and vaginal pain.   Musculoskeletal: Negative for gait problem.   Neurological: Negative for weakness and numbness.   Hematological: Negative for adenopathy. Does not bruise/bleed easily.   Psychiatric/Behavioral: The patient is not nervous/anxious.        Objective:   /62   Pulse 82   Temp 97.7 °F (36.5 °C) (Oral)   Resp 18   Ht 5' 7" (1.702 m)   Wt 84.8 kg (187 lb)   BMI 29.29 kg/m²      Physical Exam   Constitutional: She is oriented to person, place, and time. She appears well-developed and well-nourished.   HENT:   Head: Normocephalic and atraumatic.   Eyes: No scleral icterus.   Neck: No tracheal deviation present. No thyromegaly present.   Cardiovascular: Normal rate and regular rhythm.   Pulmonary/Chest: Effort normal and breath sounds normal.   Abdominal: Soft. She exhibits no distension and no mass. There is no " tenderness. There is no rebound and no guarding. No hernia.   Genitourinary: Rectum normal and vagina normal. Pelvic exam was performed with patient supine. There is no rash, tenderness or lesion on the right labia. There is no rash, tenderness or lesion on the left labia. Right adnexum displays no mass, no tenderness and no fullness. Left adnexum displays no mass, no tenderness and no fullness.   Genitourinary Comments: Not performed.    Musculoskeletal: She exhibits no edema or tenderness.   Lymphadenopathy:     She has no cervical adenopathy.   Neurological: She is alert and oriented to person, place, and time.   Skin: Skin is warm and dry.   Psychiatric: She has a normal mood and affect. Her behavior is normal. Judgment and thought content normal.       Assessment:       1. Malignant neoplasm of ovary, unspecified laterality    2. Encounter for antineoplastic chemotherapy    3. Examination of participant in clinical trial        Plan:     Patient appears fit for treatment, with labs and PS as above.  Orders signed and will proceed as planned tomorrow.  RTC as scheduled.

## 2019-03-04 ENCOUNTER — TELEPHONE (OUTPATIENT)
Dept: OPHTHALMOLOGY | Facility: CLINIC | Age: 64
End: 2019-03-04

## 2019-03-04 NOTE — TELEPHONE ENCOUNTER
----- Message from Domenica Morataya sent at 3/4/2019 10:09 AM CST -----  Contact: Mickie  Needs Advice    Reason for call:Pt called to f/u on her Rx for eyedrops before surgery.        Communication Preference:[phone]    Additional Information:Please call pt at 4 pm.

## 2019-03-04 NOTE — TELEPHONE ENCOUNTER
Spoke to pt and advised that Jace is out of the office and will call her back when she returns and advised that I sent Jace the message to send in Rx for the eyedrops for surgery .  Pt understood.

## 2019-03-06 RX ORDER — PREDNISOLONE ACETATE-GATIFLOXACIN-BROMFENAC .75; 5; 1 MG/ML; MG/ML; MG/ML
1 SUSPENSION/ DROPS OPHTHALMIC 3 TIMES DAILY
Qty: 7 ML | Refills: 3 | Status: SHIPPED | OUTPATIENT
Start: 2019-03-06 | End: 2019-03-22

## 2019-03-07 NOTE — PROGRESS NOTES
Feb. 27. 2019    Protocol: NRG-  : Kiana Weston MD  Treating Physician: Benny Tran MD  Pt Initials:  NAV BERMUDEZ  Study ID: [id number]  IRB#: [id number]     A Randomized Phase II/III Study of Pegylated Liposomal Doxorubicin and Atezolizumab vs. Pegylated Liposomal Doxorubicin/Bevacizumab/ Atezolizumab vs. Pegylated Liposomal Doxorubicin/ Bevacizumab in Platinum-resistant Ovarian Cancer.     Cycle 2 Day 1  Patient randomized on 1/23/19 to Arm 3: Pegylated Liposomal Doxorubicin, 40 mg/m2, q28d and Bevacizumab, 10 mg/kg, q14d.      Patient presented to clinic for treatment on the above-mentioned clinical trial. Patient alert and oriented, mood and affect appropriate to the situation. Patient completed antibiotics for sinus infection - still has some nasal congestion.  She denies fever, vomiting, pain, diarrhea, constipation, headaches, and dizziness.     Physical exam, local labs, and performance status assessment at this visit, per protocol.  Labs reviewed by Dr. Tran prior to treatment.  Reviewed current medications with patient.  See Concomitant Medication sheets.  C2 pre-dose central labs were collected on 2/27/19, processed, and stored per protocol.      Patient scheduled to return for C2D15 on 3/13/19.     All of the patient's questions were answered by Dr. Tran and me to her satisfaction. Patient expressed her willingness to continue to participate in the above-mentioned clinical trial.  Instructed patient to notify Dr. Tran and/or me with any questions, concerns, or changes in health status. Patient verbalized understanding.      Baseline History:  1. 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  2. Peripheral neuropathy- toes, Grade 1.  3. Peripheral neuropathy- fingers, Grade 1.  4. Platelet count decreased/thrombocytopenia, Grade 1.  5. Fatigue, Grade 1.  Will continue to monitor.  6. Cataracts, Grade 2.  Will be  assessed by Dr. Mcgrath on 1/15/19.  Will continue to monitor.  7. Blurred vision, Grade 2. Due to cataracts per Ophthalmologist. Will continue to monitor.     Adverse Events - For the most up-to-date AE log, please refer to the paper AE log in the subjects research file.  1. Sinus infection, resolved from Grade 2.  Patient on Augmentin.  Will continue to monitor.  2. Nasal congestion, Grade 1.  Will continue to monitor.

## 2019-03-11 ENCOUNTER — OFFICE VISIT (OUTPATIENT)
Dept: OTOLARYNGOLOGY | Facility: CLINIC | Age: 64
End: 2019-03-11
Payer: COMMERCIAL

## 2019-03-11 VITALS
DIASTOLIC BLOOD PRESSURE: 90 MMHG | SYSTOLIC BLOOD PRESSURE: 140 MMHG | HEART RATE: 83 BPM | BODY MASS INDEX: 29.31 KG/M2 | WEIGHT: 186.75 LBS | HEIGHT: 67 IN

## 2019-03-11 DIAGNOSIS — J32.9 CHRONIC SINUSITIS, UNSPECIFIED LOCATION: Primary | ICD-10-CM

## 2019-03-11 PROCEDURE — 99999 PR PBB SHADOW E&M-EST. PATIENT-LVL III: CPT | Mod: PBBFAC,,, | Performed by: OTOLARYNGOLOGY

## 2019-03-11 PROCEDURE — 31231 NASAL ENDOSCOPY DX: CPT | Mod: S$GLB,,, | Performed by: OTOLARYNGOLOGY

## 2019-03-11 PROCEDURE — 99204 PR OFFICE/OUTPT VISIT, NEW, LEVL IV, 45-59 MIN: ICD-10-PCS | Mod: 25,S$GLB,, | Performed by: OTOLARYNGOLOGY

## 2019-03-11 PROCEDURE — 99999 PR PBB SHADOW E&M-EST. PATIENT-LVL III: ICD-10-PCS | Mod: PBBFAC,,, | Performed by: OTOLARYNGOLOGY

## 2019-03-11 PROCEDURE — 87070 CULTURE OTHR SPECIMN AEROBIC: CPT

## 2019-03-11 PROCEDURE — 99204 OFFICE O/P NEW MOD 45 MIN: CPT | Mod: 25,S$GLB,, | Performed by: OTOLARYNGOLOGY

## 2019-03-11 PROCEDURE — 3008F BODY MASS INDEX DOCD: CPT | Mod: CPTII,S$GLB,, | Performed by: OTOLARYNGOLOGY

## 2019-03-11 PROCEDURE — 87076 CULTURE ANAEROBE IDENT EACH: CPT

## 2019-03-11 PROCEDURE — 3008F PR BODY MASS INDEX (BMI) DOCUMENTED: ICD-10-PCS | Mod: CPTII,S$GLB,, | Performed by: OTOLARYNGOLOGY

## 2019-03-11 PROCEDURE — 87102 FUNGUS ISOLATION CULTURE: CPT

## 2019-03-11 PROCEDURE — 31231 PR NASAL ENDOSCOPY, DX: ICD-10-PCS | Mod: S$GLB,,, | Performed by: OTOLARYNGOLOGY

## 2019-03-11 PROCEDURE — 87075 CULTR BACTERIA EXCEPT BLOOD: CPT

## 2019-03-12 NOTE — CONSULTS
Ms. Don presents for consultation.    VITAL SIGNS:  Per nurses' notes.    CHIEF COMPLAINT:  Sinus infection.    HISTORY OF PRESENT ILLNESS:  This is a 63-year-old white female who is presently   being treated for ovarian cancer over the last two years with chemotherapy   regime.  She states that six weeks ago her grandchildren were visiting and she   developed a URI, which then developed into some foul smelling purulent   discharge.  She was treated by her PCP with Amoxil for one week, which did not   resolve issues.  She then went back and was treated with Augmentin for 10 days,   which again has not resolved her issue.  She has greenish discharge.  She is   using sinus rinses, which she states is rinsing out some of this debris.  She   also has some nasal congestion and postnasal drip.    REVIEW OF SYSTEMS:  CONSTITUTIONAL: weight loss or weight gain: Negative.  ALLERGY/IMMUNOLOGIC: Negative.  ENT/Mouth:  Hearing Loss/Dizziness/Tinnitus: Negative.  Ear Infections/Otalgia: Negative.  Rhinitis/Sinusitis/Epistaxis: Negative.  Headache/Facial Pain: Negative.  Nasal Obstruction/Snoring/OLIVIA: Negative.  Throat: Infections/Pain: Negative.  Hoarseness/Speech Disturbance: Negative.  Salivary Glands Disorder: Negative.  Trauma:  Hx: Negative.    Cardiovascular:  M/I Angina: Negative.  Hypertension: Negative.  Endo: DM/Steroids: Negative.  Eyes: Negative.  GI: Dysphagia/Reflux: Negative.  : GYN Pregnancy: Negative.      Renal: Dialysis: Negative.  Lymph: Neck Mass/Lymphadenopathy: Negative.  Musculoskeletal: Negative.  Hem: Bleeding Disorders/Anemia: Negative.  Neuro: Cranial/Neuralgia: Negative.  Pulm: Asthma/SOB/Cough: Negative.  Skin/Breast: Negative.    PAST MEDICAL/FAMILY/SOCIAL HISTORY:    Past Medical History   ENT Surgery: Negative.   Occupational Exposure: Negative.    Problems: Negative.   Cancer:  Ovarian cancer.    Past medical history positive for glaucoma.    Past surgery includes cholecystectomy,  leg surgeries including Achilles tendon,   bunionectomy and hysterectomy.    Past Family History   Family history hearing loss: Negative.   Family history cancer:  Positive.    Family history positive for diabetes, breast cancer, melanoma and sarcoma.    Past Social History   Tobacco:  Nonsmoker.   Alcohol:  She is a moderate social drinker.    MEDICATIONS:  Per Med Card.    ALLERGIES:  Per Med Card.    EXAMINATION:  General Appearance:  Well-developed, well-nourished 63-year-old white female in   no apparent distress.  Communication Ability:  Good.  EARS, NOSE, THROAT, MOUTH;  EARS:   External auditory canals: Clear.   Hearing: Grossly Intact.   Tympanic membranes: Clear.  NOSE:   External:  Normal.   Intranasal:  Septum is straight.  Turbinates are 1+.  Her airway is clear.  MOUTH:   Intraorally: Lips, teeth and gums: Normal.   Oropharynx: Normal.   Mucosa: Normal.  THROAT:   Tongue: Normal.   Palate: Normal.   Tonsils:  Minimum.   Posterior pharynx: Normal.  HEAD/FACE INSPECTION: Normal and atraumatic.   Palpation/Percussion:  Non tender.   Facial Strength: Normal and symmetric.   Salivary glands: Normal.    On exam with rigid 0-degree scope #120T22, she does have some purulence of the   right middle meatus area, which she states is the side where most of her   discharge is coming from.  I was able to get a culture on this and we will send   this in at this time.    NECK: Supple.  THYROID: No masses.  LYMPHATICS: No nodes.  RESPIRATORY:   Effort: Normal.  EYES:   Ocular Mobility: Normal.   Vision: Grossly intact.  NEURO/PSYCH:   Cranial nerves: 2-12 grossly intact.   Orientation: x3.   Mood/Affect: Normal.    I have asked her to continue with her sinus rinses.  She will contact us via the   portal to follow up with the cultures and we may refer her to Infectious   Disease depending on culture and sensitivities for this persistent sinus   infection.  She will contact us towards the end of the week.      HG/HN  dd:  03/11/2019 15:21:28 (CDT)  td: 03/12/2019 08:51:40 (SRAVAN)  Doc ID   #[id number]  Job ID #[id number]    CC:

## 2019-03-13 ENCOUNTER — INFUSION (OUTPATIENT)
Dept: INFUSION THERAPY | Facility: HOSPITAL | Age: 64
End: 2019-03-13
Attending: OBSTETRICS & GYNECOLOGY
Payer: COMMERCIAL

## 2019-03-13 ENCOUNTER — RESEARCH ENCOUNTER (OUTPATIENT)
Dept: RESEARCH | Facility: HOSPITAL | Age: 64
End: 2019-03-13

## 2019-03-13 VITALS
TEMPERATURE: 98 F | WEIGHT: 186.75 LBS | HEIGHT: 67 IN | BODY MASS INDEX: 29.31 KG/M2 | SYSTOLIC BLOOD PRESSURE: 130 MMHG | DIASTOLIC BLOOD PRESSURE: 65 MMHG | RESPIRATION RATE: 18 BRPM | HEART RATE: 65 BPM

## 2019-03-13 DIAGNOSIS — C56.9 OVARIAN CANCER, UNSPECIFIED LATERALITY: Primary | ICD-10-CM

## 2019-03-13 LAB — BACTERIA SPEC AEROBE CULT: NORMAL

## 2019-03-13 PROCEDURE — 96413 CHEMO IV INFUSION 1 HR: CPT

## 2019-03-13 PROCEDURE — 25000003 PHARM REV CODE 250: Performed by: OBSTETRICS & GYNECOLOGY

## 2019-03-13 PROCEDURE — 63600175 PHARM REV CODE 636 W HCPCS: Mod: JG | Performed by: OBSTETRICS & GYNECOLOGY

## 2019-03-13 RX ORDER — DIPHENHYDRAMINE HYDROCHLORIDE 50 MG/ML
50 INJECTION INTRAMUSCULAR; INTRAVENOUS ONCE AS NEEDED
Status: CANCELLED | OUTPATIENT
Start: 2019-03-13 | End: 2019-03-13

## 2019-03-13 RX ORDER — DIPHENHYDRAMINE HYDROCHLORIDE 50 MG/ML
50 INJECTION INTRAMUSCULAR; INTRAVENOUS ONCE AS NEEDED
Status: DISCONTINUED | OUTPATIENT
Start: 2019-03-13 | End: 2019-03-13 | Stop reason: HOSPADM

## 2019-03-13 RX ORDER — HEPARIN 100 UNIT/ML
500 SYRINGE INTRAVENOUS
Status: CANCELLED | OUTPATIENT
Start: 2019-03-13

## 2019-03-13 RX ORDER — EPINEPHRINE 0.3 MG/.3ML
0.3 INJECTION SUBCUTANEOUS ONCE AS NEEDED
Status: DISCONTINUED | OUTPATIENT
Start: 2019-03-13 | End: 2019-03-13 | Stop reason: HOSPADM

## 2019-03-13 RX ORDER — EPINEPHRINE 0.3 MG/.3ML
0.3 INJECTION SUBCUTANEOUS ONCE AS NEEDED
Status: CANCELLED | OUTPATIENT
Start: 2019-03-13 | End: 2019-03-13

## 2019-03-13 RX ORDER — SODIUM CHLORIDE 0.9 % (FLUSH) 0.9 %
10 SYRINGE (ML) INJECTION
Status: CANCELLED | OUTPATIENT
Start: 2019-03-13

## 2019-03-13 RX ADMIN — SODIUM CHLORIDE: 9 INJECTION, SOLUTION INTRAVENOUS at 03:03

## 2019-03-13 RX ADMIN — BEVACIZUMAB 880 MG: 100 INJECTION, SOLUTION INTRAVENOUS at 03:03

## 2019-03-13 NOTE — PLAN OF CARE
Problem: Adult Inpatient Plan of Care  Goal: Patient-Specific Goal (Individualization)  Outcome: Ongoing (interventions implemented as appropriate)  1500-Labs , hx, and medications reviewed, no labs or md appointment needed today. Assessment completed. Discussed plan of care with patient. Patient in agreement. Chair reclined and warm blanket and snack offered.

## 2019-03-13 NOTE — PLAN OF CARE
Problem: Adult Inpatient Plan of Care  Goal: Plan of Care Review  Outcome: Ongoing (interventions implemented as appropriate)  1634-Patient tolerated treatment well, vital signs rechecked at 10 minutes post avastin infusion. Discharged without complaints or S/S of adverse event.  Instructed to call provider for any questions or concerns.

## 2019-03-15 LAB
BACTERIA SPEC ANAEROBE CULT: NORMAL
BACTERIA SPEC ANAEROBE CULT: NORMAL

## 2019-03-16 ENCOUNTER — PATIENT MESSAGE (OUTPATIENT)
Dept: OTOLARYNGOLOGY | Facility: CLINIC | Age: 64
End: 2019-03-16

## 2019-03-18 DIAGNOSIS — J32.9 CHRONIC SINUSITIS, UNSPECIFIED LOCATION: Primary | ICD-10-CM

## 2019-03-18 RX ORDER — CLINDAMYCIN HYDROCHLORIDE 300 MG/1
300 CAPSULE ORAL 3 TIMES DAILY
Qty: 42 CAPSULE | Refills: 0 | Status: SHIPPED | OUTPATIENT
Start: 2019-03-18 | End: 2019-04-01

## 2019-03-18 NOTE — PROGRESS NOTES
Mar. 13, 2019     Protocol: NRG-  : Kiana Weston MD  Treating Physician: Benny Tran MD  Pt Initials:  NAV EBRMUDEZ  Study ID: [id number]  IRB#: [id number]     A Randomized Phase II/III Study of Pegylated Liposomal Doxorubicin and Atezolizumab vs. Pegylated Liposomal Doxorubicin/Bevacizumab/ Atezolizumab vs. Pegylated Liposomal Doxorubicin/ Bevacizumab in Platinum-resistant Ovarian Cancer.     Cycle 2 Day 15  Patient randomized on 1/23/19 to Arm 3: Pegylated Liposomal Doxorubicin, 40 mg/m2, q28d and Bevacizumab, 10 mg/kg, q14d.      Patient presented to clinic for treatment on the above-mentioned clinical trial. Patient alert and oriented, mood and affect appropriate to the situation. Patient still has a sinus infection.  ENT took sample for culture and will wait for results before prescribing new antibiotic.  She has no other issues to report.  She denies fever, vomiting, pain, diarrhea, constipation, headaches, and dizziness.     No physical exam, labs, or performance status assessment due at this visit, per protocol.  Reviewed current medications with patient.  See Concomitant Medication sheets.     Patient scheduled to return for C3D1 on 3/27/19.     All of the patient's questions were answered by Dr. Tran and me to her satisfaction. Patient expressed her willingness to continue to participate in the above-mentioned clinical trial.  Instructed patient to notify Dr. Tran and/or me with any questions, concerns, or changes in health status. Patient verbalized understanding.      Baseline History:  1. 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  2. Peripheral neuropathy- toes, Grade 1.  3. Peripheral neuropathy- fingers, Grade 1.  4. Platelet count decreased/thrombocytopenia, Grade 1.  5. Fatigue, Grade 1.  Will continue to monitor.  6. Cataracts, Grade 2.  Will be assessed by Dr. Mcgrath on 1/15/19.  Will continue to  monitor.  7. Blurred vision, Grade 2. Due to cataracts per Ophthalmologist. Will continue to monitor.     Adverse Events - For the most up-to-date AE log, please refer to the paper AE log in the subjects research file.  1. Sinus infection, Grade 2.  Will continue to monitor.

## 2019-03-20 ENCOUNTER — PATIENT MESSAGE (OUTPATIENT)
Dept: OTOLARYNGOLOGY | Facility: CLINIC | Age: 64
End: 2019-03-20

## 2019-03-21 ENCOUNTER — PATIENT MESSAGE (OUTPATIENT)
Dept: SURGERY | Facility: HOSPITAL | Age: 64
End: 2019-03-21

## 2019-03-21 ENCOUNTER — PATIENT MESSAGE (OUTPATIENT)
Dept: OTOLARYNGOLOGY | Facility: CLINIC | Age: 64
End: 2019-03-21

## 2019-03-21 DIAGNOSIS — R84.5: Primary | ICD-10-CM

## 2019-03-22 ENCOUNTER — OFFICE VISIT (OUTPATIENT)
Dept: INFECTIOUS DISEASES | Facility: CLINIC | Age: 64
End: 2019-03-22
Payer: COMMERCIAL

## 2019-03-22 VITALS
TEMPERATURE: 98 F | HEART RATE: 93 BPM | SYSTOLIC BLOOD PRESSURE: 129 MMHG | WEIGHT: 184.31 LBS | DIASTOLIC BLOOD PRESSURE: 76 MMHG | BODY MASS INDEX: 28.93 KG/M2 | HEIGHT: 67 IN

## 2019-03-22 DIAGNOSIS — R09.81 NASAL CONGESTION: ICD-10-CM

## 2019-03-22 DIAGNOSIS — B96.89 SINUSITIS, BACTERIAL: ICD-10-CM

## 2019-03-22 DIAGNOSIS — J32.9 SINUSITIS, BACTERIAL: ICD-10-CM

## 2019-03-22 PROCEDURE — 99999 PR PBB SHADOW E&M-EST. PATIENT-LVL III: ICD-10-PCS | Mod: PBBFAC,,, | Performed by: INTERNAL MEDICINE

## 2019-03-22 PROCEDURE — 3008F PR BODY MASS INDEX (BMI) DOCUMENTED: ICD-10-PCS | Mod: CPTII,S$GLB,, | Performed by: INTERNAL MEDICINE

## 2019-03-22 PROCEDURE — 99204 OFFICE O/P NEW MOD 45 MIN: CPT | Mod: S$GLB,,, | Performed by: INTERNAL MEDICINE

## 2019-03-22 PROCEDURE — 3008F BODY MASS INDEX DOCD: CPT | Mod: CPTII,S$GLB,, | Performed by: INTERNAL MEDICINE

## 2019-03-22 PROCEDURE — 99204 PR OFFICE/OUTPT VISIT, NEW, LEVL IV, 45-59 MIN: ICD-10-PCS | Mod: S$GLB,,, | Performed by: INTERNAL MEDICINE

## 2019-03-22 PROCEDURE — 99999 PR PBB SHADOW E&M-EST. PATIENT-LVL III: CPT | Mod: PBBFAC,,, | Performed by: INTERNAL MEDICINE

## 2019-03-22 NOTE — LETTER
March 22, 2019      Gee Suárez III, MD  [address]  [facility] [address]           [facility] - Infectious Diseases  [address]  [facility] [address]  Phone: [phone]  Fax: [phone]          Patient: Mickie Don   MR Number: [id number]   YOB: 1955   Date of Visit: 3/22/2019       Dear Dr. Gee Suárez III:    Thank you for referring Mickie Don to me for evaluation. Attached you will find relevant portions of my assessment and plan of care.    If you have questions, please do not hesitate to call me. I look forward to following Mickie Don along with you.    Sincerely,    Fletcher Frankel MD    Enclosure  CC:  No Recipients    If you would like to receive this communication electronically, please contact [email or web address] or [phone] to request more information on [employer] access.    For providers and/or their staff who would like to refer a patient to [facility], please contact us through our one-stop-shop provider referral line, [facility], at [phone].    If you feel you have received this communication in error or would no longer like to receive these types of communications, please e-mail [email or web address]

## 2019-03-22 NOTE — PROGRESS NOTES
Subjective:      Patient ID: Mickie Don is a 63 y.o. female.    Chief Complaint:No chief complaint on file.      History of Present Illness    Patient presents for follow-up of sinus cultures.  She states that she has been under treatment for ovarian cancer since 2016.  She is currently receiving doxorubicin and Avastin monthly.  In January she began having congestion and sinusitis.  She was given amoxicillin for 7 days.  This did not resolve her problem, and she was given Augmentin for 10 days.  She then noticed a bad smell.  She has a  and works the night shift for 7 days on at a time.  She was seen by ENT on March 13th.  At that time she had a rigid sinus scope performed, showing purulence in the right middle meatus:  Cultures were performed of this.  Aerobic cultures were negative, but anaerobic cultures grew many Prevotella Intermedia and Eggerthis catenaformis.  She was placed on clindamycin, starting this on March 18th.  The smell is resolved.  She does have significant drainage when she is at work, but not at home.  She claims to have mild temperature elevations around 99°, but no fever.  She denies any sinus congestion.    She denies having any dental problems prior to this.  She also had no sinus problems prior to this.  She did have some issues with left mandibular soreness      Review of Systems   Constitution: Negative for fever, malaise/fatigue, night sweats and weight loss.   HENT: Negative for ear discharge, ear pain, odynophagia and sore throat.    Respiratory: Negative for cough and sputum production.    Gastrointestinal: Negative for constipation.   All other systems reviewed and are negative.    Objective:   Physical Exam   Constitutional: She is oriented to person, place, and time. She appears well-developed and well-nourished.   HENT:   Head: Normocephalic and atraumatic.   Right Ear: External ear normal.   Left Ear: External ear normal.   Nose: Nose normal. No mucosal edema or  rhinorrhea. Right sinus exhibits no maxillary sinus tenderness and no frontal sinus tenderness. Left sinus exhibits no maxillary sinus tenderness and no frontal sinus tenderness.   Mouth/Throat: Uvula is midline, oropharynx is clear and moist and mucous membranes are normal. No oral lesions. Normal dentition. No dental abscesses or dental caries. No oropharyngeal exudate.   Teeth are normal. No evidence of gingivitis or gingival hyperplasia.  No ulcers or evidence to support tooth abscess.   Eyes: Conjunctivae and EOM are normal. Pupils are equal, round, and reactive to light.   Neck: Normal range of motion. Neck supple.   Cardiovascular: Normal rate, regular rhythm and normal heart sounds.   Pulmonary/Chest: Effort normal.   Musculoskeletal: Normal range of motion. She exhibits no edema.   Lymphadenopathy:        Head (right side): No submental, no submandibular, no tonsillar, no preauricular, no posterior auricular and no occipital adenopathy present.        Head (left side): No submental, no submandibular, no tonsillar, no preauricular, no posterior auricular and no occipital adenopathy present.     She has no cervical adenopathy.        Right cervical: No superficial cervical, no deep cervical and no posterior cervical adenopathy present.       Left cervical: No superficial cervical, no deep cervical and no posterior cervical adenopathy present.   Neurological: She is alert and oriented to person, place, and time.   Skin: Skin is warm and dry. No erythema.   Nursing note and vitals reviewed.    Assessment:       1. Nasal congestion    2. Sinusitis, bacterial          Plan:       At this time I find no evidence of infection.  The smell has resolved and the drainage has improved.  She has no sinus congestion, or any sign of sinusitis at this time.  The anaerobes that grew from her culture, would suggest an odontogenic source.  There is no evidence of gingivitis or tooth abscess at this time.  She has 10 more days  of clindamycin.  She is due for her chemotherapy next week.  I see no reason to postpone chemotherapy.      She will continue clindamycin for now.  I have instructed her to notify immediately if she has evidence of yeast infections or diarrhea.  She will also notify me if she has worsening congestion, headache, or tooth pain.  She will have a CT to evaluate possible foreign body or tooth abscess as a cause for her anaerobic culture findings.  Further recommendations will be made after CT findings return and she finishes her antibiotics.  She will see me in 2 weeks.

## 2019-03-24 ENCOUNTER — PATIENT MESSAGE (OUTPATIENT)
Dept: INFECTIOUS DISEASES | Facility: CLINIC | Age: 64
End: 2019-03-24

## 2019-03-25 ENCOUNTER — HOSPITAL ENCOUNTER (OUTPATIENT)
Dept: RADIOLOGY | Facility: OTHER | Age: 64
Discharge: HOME | End: 2019-03-25
Attending: OBSTETRICS & GYNECOLOGY
Payer: COMMERCIAL

## 2019-03-25 ENCOUNTER — HOSPITAL ENCOUNTER (OUTPATIENT)
Dept: RADIOLOGY | Facility: OTHER | Age: 64
Discharge: HOME | End: 2019-03-25
Attending: INTERNAL MEDICINE
Payer: COMMERCIAL

## 2019-03-25 DIAGNOSIS — R09.81 NASAL CONGESTION: ICD-10-CM

## 2019-03-25 DIAGNOSIS — C56.9 OVARIAN CANCER, UNSPECIFIED LATERALITY: ICD-10-CM

## 2019-03-25 DIAGNOSIS — Z00.6 EXAMINATION OF PARTICIPANT IN CLINICAL TRIAL: ICD-10-CM

## 2019-03-25 PROCEDURE — 70487 CT MAXILLOFACIAL W/DYE: CPT | Mod: TC

## 2019-03-25 PROCEDURE — 70487 CT MAXILLOFACIAL W/DYE: CPT | Mod: 26,,, | Performed by: RADIOLOGY

## 2019-03-25 PROCEDURE — 74177 CT CHEST ABDOMEN PELVIS WITH CONTRAST (XPD): ICD-10-PCS | Mod: 26,,, | Performed by: RADIOLOGY

## 2019-03-25 PROCEDURE — 70487 CT SINUSES WITH CONTRAST: ICD-10-PCS | Mod: 26,,, | Performed by: RADIOLOGY

## 2019-03-25 PROCEDURE — 25500020 PHARM REV CODE 255: Performed by: OBSTETRICS & GYNECOLOGY

## 2019-03-25 PROCEDURE — 71260 CT CHEST ABDOMEN PELVIS WITH CONTRAST (XPD): ICD-10-PCS | Mod: 26,,, | Performed by: RADIOLOGY

## 2019-03-25 PROCEDURE — 74177 CT ABD & PELVIS W/CONTRAST: CPT | Mod: TC

## 2019-03-25 PROCEDURE — 71260 CT THORAX DX C+: CPT | Mod: 26,,, | Performed by: RADIOLOGY

## 2019-03-25 PROCEDURE — 74177 CT ABD & PELVIS W/CONTRAST: CPT | Mod: 26,,, | Performed by: RADIOLOGY

## 2019-03-25 RX ADMIN — IOHEXOL 100 ML: 350 INJECTION, SOLUTION INTRAVENOUS at 10:03

## 2019-03-25 RX ADMIN — IOHEXOL 30 ML: 350 INJECTION, SOLUTION INTRAVENOUS at 09:03

## 2019-03-27 ENCOUNTER — LAB VISIT (OUTPATIENT)
Dept: LAB | Facility: HOSPITAL | Age: 64
End: 2019-03-27
Attending: PSYCHIATRY & NEUROLOGY
Payer: COMMERCIAL

## 2019-03-27 ENCOUNTER — RESEARCH ENCOUNTER (OUTPATIENT)
Dept: RESEARCH | Facility: HOSPITAL | Age: 64
End: 2019-03-27

## 2019-03-27 ENCOUNTER — INFUSION (OUTPATIENT)
Dept: INFUSION THERAPY | Facility: HOSPITAL | Age: 64
End: 2019-03-27
Attending: OBSTETRICS & GYNECOLOGY
Payer: COMMERCIAL

## 2019-03-27 ENCOUNTER — OFFICE VISIT (OUTPATIENT)
Dept: GYNECOLOGIC ONCOLOGY | Facility: CLINIC | Age: 64
End: 2019-03-27
Payer: COMMERCIAL

## 2019-03-27 VITALS
HEIGHT: 67 IN | TEMPERATURE: 98 F | WEIGHT: 184 LBS | BODY MASS INDEX: 28.88 KG/M2 | RESPIRATION RATE: 18 BRPM | HEART RATE: 72 BPM | SYSTOLIC BLOOD PRESSURE: 140 MMHG | DIASTOLIC BLOOD PRESSURE: 70 MMHG

## 2019-03-27 VITALS
WEIGHT: 184 LBS | SYSTOLIC BLOOD PRESSURE: 129 MMHG | DIASTOLIC BLOOD PRESSURE: 58 MMHG | HEART RATE: 82 BPM | BODY MASS INDEX: 28.82 KG/M2

## 2019-03-27 DIAGNOSIS — C56.9 OVARIAN CANCER, UNSPECIFIED LATERALITY: ICD-10-CM

## 2019-03-27 DIAGNOSIS — Z51.11 ENCOUNTER FOR ANTINEOPLASTIC CHEMOTHERAPY: ICD-10-CM

## 2019-03-27 DIAGNOSIS — C56.9 OVARIAN CANCER, UNSPECIFIED LATERALITY: Primary | ICD-10-CM

## 2019-03-27 DIAGNOSIS — Z00.6 PATIENT IN CANCER RELATED RESEARCH STUDY: ICD-10-CM

## 2019-03-27 DIAGNOSIS — C56.9 MALIGNANT NEOPLASM OF OVARY, UNSPECIFIED LATERALITY: Primary | ICD-10-CM

## 2019-03-27 DIAGNOSIS — Z00.6 EXAMINATION OF PARTICIPANT IN CLINICAL TRIAL: ICD-10-CM

## 2019-03-27 LAB
ALBUMIN SERPL BCP-MCNC: 3.4 G/DL (ref 3.5–5.2)
ALP SERPL-CCNC: 184 U/L (ref 55–135)
ALT SERPL W/O P-5'-P-CCNC: 44 U/L (ref 10–44)
ANION GAP SERPL CALC-SCNC: 11 MMOL/L (ref 8–16)
AST SERPL-CCNC: 45 U/L (ref 10–40)
BASOPHILS # BLD AUTO: 0.07 K/UL (ref 0–0.2)
BASOPHILS NFR BLD: 1.7 % (ref 0–1.9)
BILIRUB SERPL-MCNC: 0.7 MG/DL (ref 0.1–1)
BUN SERPL-MCNC: 10 MG/DL (ref 8–23)
CALCIUM SERPL-MCNC: 9.7 MG/DL (ref 8.7–10.5)
CANCER AG125 SERPL-ACNC: 34 U/ML (ref 0–30)
CHLORIDE SERPL-SCNC: 102 MMOL/L (ref 95–110)
CO2 SERPL-SCNC: 26 MMOL/L (ref 23–29)
CREAT SERPL-MCNC: 0.7 MG/DL (ref 0.5–1.4)
CREAT UR-MCNC: 402 MG/DL (ref 15–325)
DIFFERENTIAL METHOD: ABNORMAL
EOSINOPHIL # BLD AUTO: 0.1 K/UL (ref 0–0.5)
EOSINOPHIL NFR BLD: 1.5 % (ref 0–8)
ERYTHROCYTE [DISTWIDTH] IN BLOOD BY AUTOMATED COUNT: 15.6 % (ref 11.5–14.5)
EST. GFR  (AFRICAN AMERICAN): >60 ML/MIN/1.73 M^2
EST. GFR  (NON AFRICAN AMERICAN): >60 ML/MIN/1.73 M^2
GLUCOSE SERPL-MCNC: 102 MG/DL (ref 70–110)
HCT VFR BLD AUTO: 46.3 % (ref 37–48.5)
HGB BLD-MCNC: 15.3 G/DL (ref 12–16)
IMM GRANULOCYTES # BLD AUTO: 0.02 K/UL (ref 0–0.04)
IMM GRANULOCYTES NFR BLD AUTO: 0.5 % (ref 0–0.5)
LYMPHOCYTES # BLD AUTO: 1.3 K/UL (ref 1–4.8)
LYMPHOCYTES NFR BLD: 31.5 % (ref 18–48)
MCH RBC QN AUTO: 31.1 PG (ref 27–31)
MCHC RBC AUTO-ENTMCNC: 33 G/DL (ref 32–36)
MCV RBC AUTO: 94 FL (ref 82–98)
MONOCYTES # BLD AUTO: 0.5 K/UL (ref 0.3–1)
MONOCYTES NFR BLD: 12.9 % (ref 4–15)
NEUTROPHILS # BLD AUTO: 2.1 K/UL (ref 1.8–7.7)
NEUTROPHILS NFR BLD: 51.9 % (ref 38–73)
NRBC BLD-RTO: 0 /100 WBC
PLATELET # BLD AUTO: 136 K/UL (ref 150–350)
PMV BLD AUTO: 9.4 FL (ref 9.2–12.9)
POTASSIUM SERPL-SCNC: 4.4 MMOL/L (ref 3.5–5.1)
PROT SERPL-MCNC: 7.4 G/DL (ref 6–8.4)
PROT UR-MCNC: 34 MG/DL (ref 0–15)
PROT/CREAT UR: 0.08 MG/G{CREAT} (ref 0–0.2)
RBC # BLD AUTO: 4.92 M/UL (ref 4–5.4)
SODIUM SERPL-SCNC: 139 MMOL/L (ref 136–145)
TSH SERPL DL<=0.005 MIU/L-ACNC: 2.07 UIU/ML (ref 0.4–4)
WBC # BLD AUTO: 4.03 K/UL (ref 3.9–12.7)

## 2019-03-27 PROCEDURE — 86304 IMMUNOASSAY TUMOR CA 125: CPT

## 2019-03-27 PROCEDURE — 63600175 PHARM REV CODE 636 W HCPCS: Mod: JG | Performed by: OBSTETRICS & GYNECOLOGY

## 2019-03-27 PROCEDURE — 99214 PR OFFICE/OUTPT VISIT, EST, LEVL IV, 30-39 MIN: ICD-10-PCS | Mod: S$GLB,,, | Performed by: OBSTETRICS & GYNECOLOGY

## 2019-03-27 PROCEDURE — 99214 OFFICE O/P EST MOD 30 MIN: CPT | Mod: S$GLB,,, | Performed by: OBSTETRICS & GYNECOLOGY

## 2019-03-27 PROCEDURE — 25000003 PHARM REV CODE 250: Performed by: OBSTETRICS & GYNECOLOGY

## 2019-03-27 PROCEDURE — 96413 CHEMO IV INFUSION 1 HR: CPT

## 2019-03-27 PROCEDURE — 96367 TX/PROPH/DG ADDL SEQ IV INF: CPT

## 2019-03-27 PROCEDURE — 36415 COLL VENOUS BLD VENIPUNCTURE: CPT

## 2019-03-27 PROCEDURE — 85025 COMPLETE CBC W/AUTO DIFF WBC: CPT

## 2019-03-27 PROCEDURE — 96417 CHEMO IV INFUS EACH ADDL SEQ: CPT

## 2019-03-27 PROCEDURE — 99999 PR PBB SHADOW E&M-EST. PATIENT-LVL III: CPT | Mod: PBBFAC,,, | Performed by: OBSTETRICS & GYNECOLOGY

## 2019-03-27 PROCEDURE — 3008F PR BODY MASS INDEX (BMI) DOCUMENTED: ICD-10-PCS | Mod: CPTII,S$GLB,, | Performed by: OBSTETRICS & GYNECOLOGY

## 2019-03-27 PROCEDURE — 84443 ASSAY THYROID STIM HORMONE: CPT

## 2019-03-27 PROCEDURE — 82570 ASSAY OF URINE CREATININE: CPT

## 2019-03-27 PROCEDURE — 80053 COMPREHEN METABOLIC PANEL: CPT

## 2019-03-27 PROCEDURE — 99999 PR PBB SHADOW E&M-EST. PATIENT-LVL III: ICD-10-PCS | Mod: PBBFAC,,, | Performed by: OBSTETRICS & GYNECOLOGY

## 2019-03-27 PROCEDURE — 3008F BODY MASS INDEX DOCD: CPT | Mod: CPTII,S$GLB,, | Performed by: OBSTETRICS & GYNECOLOGY

## 2019-03-27 RX ORDER — HEPARIN 100 UNIT/ML
500 SYRINGE INTRAVENOUS
Status: DISCONTINUED | OUTPATIENT
Start: 2019-03-27 | End: 2019-03-27 | Stop reason: HOSPADM

## 2019-03-27 RX ORDER — HEPARIN 100 UNIT/ML
500 SYRINGE INTRAVENOUS
Status: CANCELLED | OUTPATIENT
Start: 2019-03-27

## 2019-03-27 RX ORDER — DIPHENHYDRAMINE HYDROCHLORIDE 50 MG/ML
50 INJECTION INTRAMUSCULAR; INTRAVENOUS ONCE AS NEEDED
Status: CANCELLED | OUTPATIENT
Start: 2019-03-27

## 2019-03-27 RX ORDER — DIPHENHYDRAMINE HYDROCHLORIDE 50 MG/ML
50 INJECTION INTRAMUSCULAR; INTRAVENOUS ONCE AS NEEDED
Status: DISCONTINUED | OUTPATIENT
Start: 2019-03-27 | End: 2019-03-27 | Stop reason: HOSPADM

## 2019-03-27 RX ORDER — SODIUM CHLORIDE 0.9 % (FLUSH) 0.9 %
10 SYRINGE (ML) INJECTION
Status: DISCONTINUED | OUTPATIENT
Start: 2019-03-27 | End: 2019-03-27 | Stop reason: HOSPADM

## 2019-03-27 RX ORDER — EPINEPHRINE 0.3 MG/.3ML
0.3 INJECTION SUBCUTANEOUS ONCE AS NEEDED
Status: CANCELLED | OUTPATIENT
Start: 2019-03-27

## 2019-03-27 RX ORDER — SODIUM CHLORIDE 0.9 % (FLUSH) 0.9 %
10 SYRINGE (ML) INJECTION
Status: CANCELLED | OUTPATIENT
Start: 2019-03-27

## 2019-03-27 RX ORDER — EPINEPHRINE 0.3 MG/.3ML
0.3 INJECTION SUBCUTANEOUS ONCE AS NEEDED
Status: DISCONTINUED | OUTPATIENT
Start: 2019-03-27 | End: 2019-03-27 | Stop reason: HOSPADM

## 2019-03-27 RX ADMIN — DIPHENHYDRAMINE HYDROCHLORIDE 50 MG: 50 INJECTION, SOLUTION INTRAMUSCULAR; INTRAVENOUS at 10:03

## 2019-03-27 RX ADMIN — DOXORUBICIN HYDROCHLORIDE 82 MG: 2 INJECTION, SUSPENSION, LIPOSOMAL INTRAVENOUS at 11:03

## 2019-03-27 RX ADMIN — BEVACIZUMAB 880 MG: 400 INJECTION, SOLUTION INTRAVENOUS at 11:03

## 2019-03-27 RX ADMIN — SODIUM CHLORIDE: 9 INJECTION, SOLUTION INTRAVENOUS at 10:03

## 2019-03-27 NOTE — PLAN OF CARE
Problem: Adult Inpatient Plan of Care  Goal: Plan of Care Review  Outcome: Ongoing (interventions implemented as appropriate)  Pt here for avastin/Doxil infusion, labs, hx, meds, allergies reviewed, pt with no complaints at this time, reclined in chair, continue to monitor

## 2019-03-27 NOTE — PROGRESS NOTES
Subjective:       Patient ID: Mickie Don is a 63 y.o. female.    Chief Complaint: Chemotherapy    Treatment History  Xlap/Omentectomy on 11/1/16 - mass was unresectable  Stage IIIC ovarian cancer  Initiated Taxotere/Carbo on 11/18/16, now s/p 6 cycles completed 3/8/17  Xlap/ISIS/BSO/Radical mass resection > 10 cm, optimal debulking on 4/11/17  Initiated PRIMA on 8/21/17  BRCA negative  Started FORWARD1 on 11/27/17  Started Doxil/Anju as part of NRG  2/27/19    HPI     Patient comes in today prior to C2D1 for treatment on NRG  Arm 3. CBC ok for treatment, BMP pending. CA-125 pending today.  BP ok.  PS 0.  Looks and feels well.  No hand or foot issues.  CT on 3/25/19 shows stable to minimally responsive disease.     Review of Systems   Constitutional: Negative for chills, fatigue and fever.   Respiratory: Negative for cough, shortness of breath and wheezing.    Cardiovascular: Negative for chest pain, palpitations and leg swelling.   Gastrointestinal: Negative for abdominal pain, constipation, diarrhea, nausea and vomiting.   Genitourinary: Negative for difficulty urinating, dysuria, frequency, genital sores, hematuria, urgency, vaginal bleeding, vaginal discharge and vaginal pain.   Musculoskeletal: Negative for gait problem.   Neurological: Negative for weakness and numbness.   Hematological: Negative for adenopathy. Does not bruise/bleed easily.   Psychiatric/Behavioral: The patient is not nervous/anxious.        Objective:   BP (!) 129/58   Pulse 82   Wt 83.5 kg (184 lb)   BMI 28.82 kg/m²      Physical Exam   Constitutional: She is oriented to person, place, and time. She appears well-developed and well-nourished.   HENT:   Head: Normocephalic and atraumatic.   Eyes: No scleral icterus.   Neck: No tracheal deviation present. No thyromegaly present.   Cardiovascular: Normal rate and regular rhythm.   Pulmonary/Chest: Effort normal and breath sounds normal.   Abdominal: Soft. She exhibits no  distension and no mass. There is no tenderness. There is no rebound and no guarding. No hernia.   Genitourinary: Rectum normal and vagina normal. Pelvic exam was performed with patient supine. There is no rash, tenderness or lesion on the right labia. There is no rash, tenderness or lesion on the left labia. Right adnexum displays no mass, no tenderness and no fullness. Left adnexum displays no mass, no tenderness and no fullness.   Genitourinary Comments: Not performed.    Musculoskeletal: She exhibits no edema or tenderness.   Lymphadenopathy:     She has no cervical adenopathy.   Neurological: She is alert and oriented to person, place, and time.   Skin: Skin is warm and dry.   Psychiatric: She has a normal mood and affect. Her behavior is normal. Judgment and thought content normal.       Assessment:       1. Malignant neoplasm of ovary, unspecified laterality    2. Ovarian cancer, unspecified laterality    3. Examination of participant in clinical trial    4. Encounter for antineoplastic chemotherapy    5. Patient in cancer related research study        Plan:     Patient appears fit for treatment, with labs and PS as above.  Will sign orders once all labs are back.  RTC as scheduled.

## 2019-04-01 NOTE — PROGRESS NOTES
Mar. 27, 2019     Protocol: NRG-  : Kiana Weston MD  Treating Physician: Benny Tran MD  Pt Initials:  NAV BERMUDEZ  Study ID: [id number]  IRB#: [id number]     A Randomized Phase II/III Study of Pegylated Liposomal Doxorubicin and Atezolizumab vs. Pegylated Liposomal Doxorubicin/Bevacizumab/ Atezolizumab vs. Pegylated Liposomal Doxorubicin/ Bevacizumab in Platinum-resistant Ovarian Cancer.     Cycle 3 Day 1  Patient randomized on 1/23/19 to Arm 3: Pegylated Liposomal Doxorubicin, 40 mg/m2, q28d and Bevacizumab, 10 mg/kg, q14d.      Patient had CT-CAP on 3/25/19.  Per RECIST1.1, patient has stable disease.  Dr. Tran reviewed the scans and confirmed with assessment.  He agrees patient should remain on protocol.    Patient presented to clinic for C3D1 treatment on the above-mentioned clinical trial. Patient alert and oriented, mood and affect appropriate to the situation. Since patients last trial visit, ENT took sample for culture, which grew anaerobic Prevotella and Eggerthia.  Patient started on clindamycin.  ENT states patients sinus infection has resolved.  She has no other issues to report.  She denies fever, nausea vomiting, pain, diarrhea, constipation, headaches, or dizziness.     Physical exam and performance status assessment done per protocol.  Local labs reviewed by Dr. Tran prior to treatment and were acceptable for treattment.  Dose confirmed and were correct in Epic.  Reviewed current medications with patient.  See Concomitant Medication sheets.       Patient scheduled to return for C3D15 on 4/10/19.     All of the patient's questions were answered by Dr. Tran and me to her satisfaction. Patient expressed her willingness to continue to participate in the above-mentioned clinical trial.  Instructed patient to notify Dr. Tran and/or me with any questions, concerns, or changes in health status. Patient verbalized understanding.      Baseline History:  1. 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  2. Peripheral neuropathy- toes, Grade 1.  3. Peripheral neuropathy- fingers, Grade 1.  4. Platelet count decreased/thrombocytopenia, Grade 1.  5. Fatigue, Grade 1.  Will continue to monitor.  6. Cataracts, Grade 2.  Will be assessed by Dr. Mcgrath on 1/15/19.  Will continue to monitor.  7. Blurred vision, Grade 2. Due to cataracts per Ophthalmologist. Will continue to monitor.     Adverse Events - For the most up-to-date AE log, please refer to the paper AE log in the subjects research file.  1. Sinus infection, resolved from Grade 2.  Will continue to monitor.

## 2019-04-02 ENCOUNTER — TELEPHONE (OUTPATIENT)
Dept: OPTOMETRY | Facility: CLINIC | Age: 64
End: 2019-04-02

## 2019-04-02 NOTE — H&P
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    Family History/Social History: refer to chart    Allergies: Review of patient's allergies indicates:  No Known Allergies    Current Medications: No current facility-administered medications for this encounter.     Current Outpatient Medications:     INV dextrose 5% SolP 250 mL with INV DOXOrubicin liposome 2 mg/mL Susp, Inject into the vein. FOR INVESTIGATIONAL USE ONLY, Disp: , Rfl:     INV sodium chloride 0.9 % SolP 100 mL with INV bevacizumab 25 mg/ml Soln 15 mg/kg, Inject 15 mg/kg into the vein once. FOR INVESTIGATIONAL USE ONLY, Disp: , Rfl:     Physical Exam    BP: Vital signs stable  General: No apparent distress  HEENT: nuclear sclerotic cataract  Lungs: adequate respirations  Heart: + pulses  Abdomen: soft  Rectal/pelvic: deferred    Impression: Visually significant Cataract    Plan: Phacoemulsification with implantation of Intraocular lens

## 2019-04-03 NOTE — PRE-PROCEDURE INSTRUCTIONS
Preop instructions: NPO solids/ milk products after midnight or clears up to 2 hours before arrival (clear liquids are: water, apple juice, Gatorade & Jell-O, black coffee/no milk, cream or creamer), shower instructions, directions, leave all valuables at home, medication instructions for PM prior & am of procedure explained. Patient stated an understanding.      Patient denies any side effects or issues with anesthesia or sedation.                                                                                                                                    Pt aware that instructions apply to second eye surgery scheduled in ~2-3 weeks. Pt verbalized an understanding.

## 2019-04-04 ENCOUNTER — ANESTHESIA (OUTPATIENT)
Dept: SURGERY | Facility: HOSPITAL | Age: 64
End: 2019-04-04
Payer: COMMERCIAL

## 2019-04-04 ENCOUNTER — OFFICE VISIT (OUTPATIENT)
Dept: OPHTHALMOLOGY | Facility: CLINIC | Age: 64
End: 2019-04-04
Payer: COMMERCIAL

## 2019-04-04 ENCOUNTER — ANESTHESIA EVENT (OUTPATIENT)
Dept: SURGERY | Facility: HOSPITAL | Age: 64
End: 2019-04-04
Payer: COMMERCIAL

## 2019-04-04 ENCOUNTER — HOSPITAL ENCOUNTER (OUTPATIENT)
Facility: HOSPITAL | Age: 64
Discharge: HOME | End: 2019-04-04
Attending: OPHTHALMOLOGY | Admitting: OPHTHALMOLOGY
Payer: COMMERCIAL

## 2019-04-04 VITALS
BODY MASS INDEX: 28.09 KG/M2 | HEIGHT: 67 IN | DIASTOLIC BLOOD PRESSURE: 55 MMHG | SYSTOLIC BLOOD PRESSURE: 109 MMHG | OXYGEN SATURATION: 100 % | TEMPERATURE: 98 F | WEIGHT: 179 LBS | RESPIRATION RATE: 16 BRPM | HEART RATE: 63 BPM

## 2019-04-04 DIAGNOSIS — H25.11 NUCLEAR SCLEROTIC CATARACT OF RIGHT EYE: Primary | ICD-10-CM

## 2019-04-04 DIAGNOSIS — H25.12 NUCLEAR SCLEROTIC CATARACT OF LEFT EYE: ICD-10-CM

## 2019-04-04 DIAGNOSIS — H18.519 FUCHS' CORNEAL DYSTROPHY: ICD-10-CM

## 2019-04-04 DIAGNOSIS — H25.10 SENILE NUCLEAR SCLEROSIS: ICD-10-CM

## 2019-04-04 DIAGNOSIS — Z98.41 STATUS POST CATARACT EXTRACTION AND INSERTION OF INTRAOCULAR LENS, RIGHT: Primary | ICD-10-CM

## 2019-04-04 DIAGNOSIS — Z96.1 STATUS POST CATARACT EXTRACTION AND INSERTION OF INTRAOCULAR LENS, RIGHT: Primary | ICD-10-CM

## 2019-04-04 PROCEDURE — 36000707: Performed by: OPHTHALMOLOGY

## 2019-04-04 PROCEDURE — D9220A PRA ANESTHESIA: ICD-10-PCS | Mod: ANES,,, | Performed by: ANESTHESIOLOGY

## 2019-04-04 PROCEDURE — 99024 PR POST-OP FOLLOW-UP VISIT: ICD-10-PCS | Mod: S$GLB,,, | Performed by: OPHTHALMOLOGY

## 2019-04-04 PROCEDURE — 25000003 PHARM REV CODE 250: Performed by: OPHTHALMOLOGY

## 2019-04-04 PROCEDURE — 37000008 HC ANESTHESIA 1ST 15 MINUTES: Performed by: OPHTHALMOLOGY

## 2019-04-04 PROCEDURE — 37000009 HC ANESTHESIA EA ADD 15 MINS: Performed by: OPHTHALMOLOGY

## 2019-04-04 PROCEDURE — 63600175 PHARM REV CODE 636 W HCPCS: Performed by: NURSE ANESTHETIST, CERTIFIED REGISTERED

## 2019-04-04 PROCEDURE — D9220A PRA ANESTHESIA: Mod: CRNA,,, | Performed by: NURSE ANESTHETIST, CERTIFIED REGISTERED

## 2019-04-04 PROCEDURE — D9220A PRA ANESTHESIA: Mod: ANES,,, | Performed by: ANESTHESIOLOGY

## 2019-04-04 PROCEDURE — C9447 INJ, PHENYLEPHRINE KETOROLAC: HCPCS | Performed by: OPHTHALMOLOGY

## 2019-04-04 PROCEDURE — 63600175 PHARM REV CODE 636 W HCPCS: Performed by: OPHTHALMOLOGY

## 2019-04-04 PROCEDURE — 66984 XCAPSL CTRC RMVL W/O ECP: CPT | Mod: RT,,, | Performed by: OPHTHALMOLOGY

## 2019-04-04 PROCEDURE — 36000706: Performed by: OPHTHALMOLOGY

## 2019-04-04 PROCEDURE — 99024 POSTOP FOLLOW-UP VISIT: CPT | Mod: S$GLB,,, | Performed by: OPHTHALMOLOGY

## 2019-04-04 PROCEDURE — 66984 PR REMOVAL, CATARACT, W/INSRT INTRAOC LENS, W/O ENDO CYCLO: ICD-10-PCS | Mod: RT,,, | Performed by: OPHTHALMOLOGY

## 2019-04-04 PROCEDURE — 99999 PR PBB SHADOW E&M-EST. PATIENT-LVL II: ICD-10-PCS | Mod: PBBFAC,,, | Performed by: OPHTHALMOLOGY

## 2019-04-04 PROCEDURE — 71000015 HC POSTOP RECOV 1ST HR: Performed by: OPHTHALMOLOGY

## 2019-04-04 PROCEDURE — 25000003 PHARM REV CODE 250: Performed by: ANESTHESIOLOGY

## 2019-04-04 PROCEDURE — 99999 PR PBB SHADOW E&M-EST. PATIENT-LVL II: CPT | Mod: PBBFAC,,, | Performed by: OPHTHALMOLOGY

## 2019-04-04 PROCEDURE — V2632 POST CHMBR INTRAOCULAR LENS: HCPCS | Performed by: OPHTHALMOLOGY

## 2019-04-04 PROCEDURE — D9220A PRA ANESTHESIA: ICD-10-PCS | Mod: CRNA,,, | Performed by: NURSE ANESTHETIST, CERTIFIED REGISTERED

## 2019-04-04 DEVICE — LENS IOL ITEC PRELOAD 19.0D: Type: IMPLANTABLE DEVICE | Site: EYE | Status: FUNCTIONAL

## 2019-04-04 RX ORDER — MOXIFLOXACIN 5 MG/ML
SOLUTION/ DROPS OPHTHALMIC
Status: DISCONTINUED | OUTPATIENT
Start: 2019-04-04 | End: 2019-04-04 | Stop reason: HOSPADM

## 2019-04-04 RX ORDER — LIDOCAINE HYDROCHLORIDE 40 MG/ML
INJECTION, SOLUTION RETROBULBAR
Status: DISCONTINUED
Start: 2019-04-04 | End: 2019-04-04 | Stop reason: HOSPADM

## 2019-04-04 RX ORDER — LIDOCAINE HYDROCHLORIDE 10 MG/ML
INJECTION, SOLUTION EPIDURAL; INFILTRATION; INTRACAUDAL; PERINEURAL
Status: DISCONTINUED | OUTPATIENT
Start: 2019-04-04 | End: 2019-04-04 | Stop reason: HOSPADM

## 2019-04-04 RX ORDER — LIDOCAINE HYDROCHLORIDE 40 MG/ML
INJECTION, SOLUTION RETROBULBAR
Status: DISCONTINUED | OUTPATIENT
Start: 2019-04-04 | End: 2019-04-04 | Stop reason: HOSPADM

## 2019-04-04 RX ORDER — ACETAMINOPHEN 325 MG/1
650 TABLET ORAL EVERY 4 HOURS PRN
Status: CANCELLED | OUTPATIENT
Start: 2019-04-04

## 2019-04-04 RX ORDER — SODIUM CHLORIDE 9 MG/ML
INJECTION, SOLUTION INTRAVENOUS CONTINUOUS
Status: DISCONTINUED | OUTPATIENT
Start: 2019-04-04 | End: 2019-04-04 | Stop reason: HOSPADM

## 2019-04-04 RX ORDER — MOXIFLOXACIN 5 MG/ML
1 SOLUTION/ DROPS OPHTHALMIC
Status: COMPLETED | OUTPATIENT
Start: 2019-04-04 | End: 2019-04-04

## 2019-04-04 RX ORDER — PREDNISOLONE ACETATE 10 MG/ML
SUSPENSION/ DROPS OPHTHALMIC
Status: DISCONTINUED | OUTPATIENT
Start: 2019-04-04 | End: 2019-04-04 | Stop reason: HOSPADM

## 2019-04-04 RX ORDER — PROPARACAINE HYDROCHLORIDE 5 MG/ML
1 SOLUTION/ DROPS OPHTHALMIC
Status: DISCONTINUED | OUTPATIENT
Start: 2019-04-04 | End: 2019-04-04 | Stop reason: HOSPADM

## 2019-04-04 RX ORDER — PREDNISOLONE ACETATE 10 MG/ML
SUSPENSION/ DROPS OPHTHALMIC
Status: DISCONTINUED
Start: 2019-04-04 | End: 2019-04-04 | Stop reason: HOSPADM

## 2019-04-04 RX ORDER — MIDAZOLAM HYDROCHLORIDE 1 MG/ML
INJECTION, SOLUTION INTRAMUSCULAR; INTRAVENOUS
Status: DISCONTINUED | OUTPATIENT
Start: 2019-04-04 | End: 2019-04-04

## 2019-04-04 RX ORDER — TROPICAMIDE 10 MG/ML
1 SOLUTION/ DROPS OPHTHALMIC
Status: DISCONTINUED | OUTPATIENT
Start: 2019-04-04 | End: 2019-04-04 | Stop reason: HOSPADM

## 2019-04-04 RX ORDER — PHENYLEPHRINE HYDROCHLORIDE 25 MG/ML
1 SOLUTION/ DROPS OPHTHALMIC
Status: DISCONTINUED | OUTPATIENT
Start: 2019-04-04 | End: 2019-04-04 | Stop reason: HOSPADM

## 2019-04-04 RX ORDER — PREDNISOLONE ACETATE-GATIFLOXACIN-BROMFENAC .75; 5; 1 MG/ML; MG/ML; MG/ML
1 SUSPENSION/ DROPS OPHTHALMIC 3 TIMES DAILY
COMMUNITY
End: 2019-05-08 | Stop reason: ALTCHOICE

## 2019-04-04 RX ORDER — ACETAMINOPHEN 325 MG/1
650 TABLET ORAL EVERY 4 HOURS PRN
Status: DISCONTINUED | OUTPATIENT
Start: 2019-04-04 | End: 2019-04-04 | Stop reason: HOSPADM

## 2019-04-04 RX ORDER — KETOROLAC TROMETHAMINE 5 MG/ML
1 SOLUTION OPHTHALMIC ONCE
Status: COMPLETED | OUTPATIENT
Start: 2019-04-04 | End: 2019-04-04

## 2019-04-04 RX ORDER — LIDOCAINE HYDROCHLORIDE 10 MG/ML
INJECTION, SOLUTION EPIDURAL; INFILTRATION; INTRACAUDAL; PERINEURAL
Status: DISCONTINUED
Start: 2019-04-04 | End: 2019-04-04 | Stop reason: HOSPADM

## 2019-04-04 RX ORDER — SODIUM CHLORIDE 0.9 % (FLUSH) 0.9 %
3 SYRINGE (ML) INJECTION
Status: DISCONTINUED | OUTPATIENT
Start: 2019-04-04 | End: 2019-04-04 | Stop reason: HOSPADM

## 2019-04-04 RX ADMIN — MIDAZOLAM HYDROCHLORIDE 1 MG: 1 INJECTION, SOLUTION INTRAMUSCULAR; INTRAVENOUS at 09:04

## 2019-04-04 RX ADMIN — PHENYLEPHRINE HYDROCHLORIDE 1 DROP: 25 SOLUTION/ DROPS OPHTHALMIC at 08:04

## 2019-04-04 RX ADMIN — MOXIFLOXACIN 1 DROP: 5 SOLUTION/ DROPS OPHTHALMIC at 08:04

## 2019-04-04 RX ADMIN — PHENYLEPHRINE HYDROCHLORIDE 1 DROP: 25 SOLUTION/ DROPS OPHTHALMIC at 09:04

## 2019-04-04 RX ADMIN — KETOROLAC TROMETHAMINE 1 DROP: 5 SOLUTION/ DROPS OPHTHALMIC at 08:04

## 2019-04-04 RX ADMIN — MOXIFLOXACIN 1 DROP: 5 SOLUTION/ DROPS OPHTHALMIC at 09:04

## 2019-04-04 RX ADMIN — SODIUM CHLORIDE 10 ML/HR: 0.9 INJECTION, SOLUTION INTRAVENOUS at 09:04

## 2019-04-04 RX ADMIN — MIDAZOLAM HYDROCHLORIDE 1 MG: 1 INJECTION, SOLUTION INTRAMUSCULAR; INTRAVENOUS at 10:04

## 2019-04-04 RX ADMIN — TROPICAMIDE 1 DROP: 10 SOLUTION/ DROPS OPHTHALMIC at 08:04

## 2019-04-04 RX ADMIN — TROPICAMIDE 1 DROP: 10 SOLUTION/ DROPS OPHTHALMIC at 09:04

## 2019-04-04 RX ADMIN — PROPARACAINE HYDROCHLORIDE 1 DROP: 5 SOLUTION/ DROPS OPHTHALMIC at 08:04

## 2019-04-04 NOTE — PLAN OF CARE
Patient and family state they are ready to be discharged. Instructions and eye bag given to patient and family. Both verbalize understanding. Patient tolerating po liquids with no difficulty. Patient denies pain. Anesthesia consent and surgical consent in chart upon patient's discharge from [facility].

## 2019-04-04 NOTE — TRANSFER OF CARE
"Anesthesia Transfer of Care Note    Patient: Mickie Don    Procedure(s) Performed: Procedure(s) (LRB):  PHACOEMULSIFICATION, CATARACT (Right)    Patient location: PACU    Anesthesia Type: MAC    Transport from OR: Transported from OR on room air with adequate spontaneous ventilation    Post pain: adequate analgesia    Post assessment: no apparent anesthetic complications and tolerated procedure well    Post vital signs: stable    Level of consciousness: awake, alert and oriented    Nausea/Vomiting: no nausea/vomiting    Complications: none    Transfer of care protocol was followed      Last vitals:   Visit Vitals  /66 (BP Location: Left arm, Patient Position: Lying)   Pulse 61   Temp 36.6 °C (97.8 °F) (Oral)   Resp 16   Ht 5' 7" (1.702 m)   Wt 81.2 kg (179 lb)   SpO2 100%   BMI 28.04 kg/m²     "

## 2019-04-04 NOTE — DISCHARGE INSTRUCTIONS
Discharge Instructions for Cataract Surgery  A surgeon removed the cloudy lens in your eye and replaced it with a clear man-made lens. Be sure to have an adult family member or friend drive you home after surgery. Heres what you can expect following surgery and tips for a healthy recovery.  It is normal to have the following:  · Bruised or bloodshot eye for 7 days  · Itching and mild discomfort for several days  · Some fluid discharge  · Sensitivity to light  · Scratchy, sandlike feeling in the eye for 2 weeks  · Feeling tired, especially during the first 24 hours  Activity level  · Do not drive for 2 days or as instructed by your doctor.  · Do not drink alcohol for at least 24 hours.  · Avoid bending at the waist to  objects or lifting anything heavy for 2 days.  · Relax for the first 24 hours after surgery. Watching TV and reading are OK and wont harm your eye.  Eye protection  · Do not rub or press on your eye.  · Sleep on your back or on your unoperated side for 2 nights.  · If instructed, wear a bandage over your eye for 2 days and 2 nights.  · If instructed, wear a shield to protect your eye for 2 days and 2 nights.  Using eyedrops  You may be given special eyedrops or ointment. Here is one way to use eyedrops:  · Tilt your head back.  · Pull your bottom eyelid down.  · Squeeze one drop into your eye. Do not touch your eye with the bottle tip.  · Close your eyes for a few seconds.  · If you need more than one drop, wait a few minutes before adding the next one.   Call your doctor right away if you have any of the following:  · Bleeding or discharge from the eye  · Your vision suddenly becomes worse  · Pain medicine you are told to take does not ease your pain  · Nausea or vomiting  · Chills or fever over 100.4°F (39.1°C)   Date Last Reviewed: 5/30/2015  © 7821-2327 [employer]. [address] [place] [address], [place] [address]. All rights reserved. This information is not intended as a  substitute for professional medical care. Always follow your healthcare professional's instructions.      Recovery After Procedural Sedation (Adult)  You have been given medicine by vein to make you sleep during your surgery. This may have included both a pain medicine and sleeping medicine. Most of the effects have worn off. But you may still have some drowsiness for the next 6 to 8 hours.  Home care  Follow these guidelines when you get home:  · For the next 8 hours, you should be watched by a responsible adult. This person should make sure your condition is not getting worse.  · Don't drink any alcohol for the next 24 hours.  · Don't drive, operate dangerous machinery, or make important business or personal decisions during the next 24 hours.  Note: Your healthcare provider may tell you not to take any medicine by mouth for pain or sleep in the next 4 hours. These medicines may react with the medicines you were given in the hospital. This could cause a much stronger response than usual.  Follow-up care  Follow up with your healthcare provider if you are not alert and back to your usual level of activity within 12 hours.  When to seek medical advice  Call your healthcare provider right away if any of these occur:  · Drowsiness gets worse  · Weakness or dizziness gets worse  · Repeated vomiting  · You can't be awakened   Date Last Reviewed: 10/18/2016  © 6859-2187 [employer]. [address] [place] [address], [place] [address]. All rights reserved. This information is not intended as a substitute for professional medical care. Always follow your healthcare professional's instructions.

## 2019-04-04 NOTE — OP NOTE
DATE OF PROCEDURE: 04/04/2019    SURGEON: JORDANA LION MD    PREOPERATIVE DIAGNOSIS:  Senile nuclear sclerotic cataract right eye.     POSTOPERATIVE DIAGNOSIS: Senile nuclear sclerotic cataract right eye.     PROCEDURE PERFORMED:  Phacoemulsification with placement of intraocular lens, right eye.    IMPLANT:  PCBOO 19.0    ANESTHESIA:  Topical and MAC    COMPLICATIONS: none    ESTIMATED BLOOD LOSS: <1cc    SPECIMENS: none    INDICATIONS FOR PROCEDURE:  This patient presented to the clinic with decreased vision in the right eye and was found to have a cataract.  The risks, benefits, and alternatives were discussed and the patient agreed to proceed with phacoemulsification and implantation of a lens in the right eye.     PROCEDURE IN DETAIL:  The patient was met in the preop holding area.  Consent was confirmed to be signed.  The operative site was marked.  The patient was brought into the operating room by the anesthesia team and placed under monitored anesthesia care.  The right eye was prepped and draped in a sterile ophthalmic fashion.  A Deepika speculum was placed into the right eye.   A paracentesis site was made and 1% preservative-free lidocaine was injected into the anterior chamber.  Viscoelastic  material was injected into the anterior chamber.  A keratome blade was used to make a clear corneal incision.  A cystotome was used to initiate the continuous curvilinear capsulorrhexis which was completed with Utrata forceps.  BSS on a gaxiola cannula was used to perform hydrodissection.  The phacoemulsification tip was introduced into the eye and the nucleus was removed in a standard divide-and-conquer fashion.  Remaining cortical material was removed from the eye using irrigation-aspiration.  The capsular bag was filled with viscoelastic material and the intraocular lens was injected and positioned into place. Remaining viscoelastic material was removed from the eye using irrigation and aspiration.  The  corneal wounds were hydrated.  The eye was filled to physiologic pressure. The wounds were found to be watertight. Drops of Vigamox and prednisilone were placed into the eye.  The eye was washed, dried, and shielded.  The patient tolerated the procedure well and knows to follow up with me tomorrow morning, sooner if needed.

## 2019-04-04 NOTE — DISCHARGE SUMMARY
BRIEF DISCHARGE NOTE:    Reason for hospitalization -  Cataract surgery     Final Diagnosis - Visually significant Cataract    Procedures and treatment provided - Status post phacoemulsification with placement of intraocular lens     Diet - Advance to regular as tolerated    Activity - as tolerated    Disposition at the end of the case - Good.    Discharge: to home    The patient tolerated the procedure well and knows to follow up with me tomorrow morning in the eye clinic, sooner if needed.    Patient and family instructions (as appropriate) - Given to patient on discharge    Karen Butt MD

## 2019-04-05 NOTE — ANESTHESIA POSTPROCEDURE EVALUATION
Anesthesia Post Evaluation    Patient: Mickie Don    Procedure(s) Performed: Procedure(s) (LRB):  PHACOEMULSIFICATION, CATARACT (Right)    Final Anesthesia Type: MAC  Patient location during evaluation: PACU  Patient participation: Yes- Able to Participate  Level of consciousness: awake and alert  Pain management: adequate  Airway patency: patent  PONV status at discharge: No PONV  Anesthetic complications: no      Cardiovascular status: blood pressure returned to baseline  Respiratory status: unassisted, spontaneous ventilation and room air  Hydration status: euvolemic  Follow-up not needed.          Vitals Value Taken Time   /55 4/4/2019 10:46 AM   Temp 36.4 °C (97.5 °F) 4/4/2019 10:15 AM   Pulse 65 4/4/2019 10:47 AM   Resp 16 4/4/2019 10:45 AM   SpO2 100 % 4/4/2019 10:47 AM   Vitals shown include unvalidated device data.      No case tracking events are documented in the log.      Pain/Maddie Score: No data recorded

## 2019-04-05 NOTE — ANESTHESIA PREPROCEDURE EVALUATION
04/05/2019  Mickie Don is a 63 y.o., female.    Anesthesia Evaluation    I have reviewed the Patient Summary Reports.     I have reviewed the Medications.     Review of Systems  Anesthesia Hx:  History of prior surgery of interest to airway management or planning:  Denies Personal Hx of Anesthesia complications.   Social:  Non-Smoker, No Alcohol Use    Hematology/Oncology:  Hematology Normal   Oncology Normal     Cardiovascular:  Cardiovascular Normal Exercise tolerance: good     Pulmonary:  Pulmonary Normal    Renal/:  Renal/ Normal     Hepatic/GI:  Hepatic/GI Normal    Musculoskeletal:  Musculoskeletal Normal    Neurological:  Neurology Normal    Dermatological:  Skin Normal    Psych:  Psychiatric Normal           Physical Exam  General:  Well nourished    Airway/Jaw/Neck:  Airway Findings: Mouth Opening: Normal Tongue: Normal  General Airway Assessment: Adult, Good  Mallampati: II  TM Distance: Normal, at least 6 cm     Eyes/Ears/Nose:  EYES/EARS/NOSE FINDINGS: Normal   Dental:  Dental Findings: In tact   Chest/Lungs:  Chest/Lungs Findings: Clear to auscultation, Normal Respiratory Rate     Heart/Vascular:  Heart Findings: Rate: Normal  Rhythm: Regular Rhythm  Sounds: Normal  Heart murmur: negative       Mental Status:  Mental Status Findings:  Cooperative, Alert and Oriented         Anesthesia Plan  Type of Anesthesia, risks & benefits discussed:  Anesthesia Type:  MAC  Patient's Preference:   Intra-op Monitoring Plan:   Intra-op Monitoring Plan Comments:   Post Op Pain Control Plan:   Post Op Pain Control Plan Comments:   Induction:   IV  Beta Blocker:  Patient is not currently on a Beta-Blocker (No further documentation required).       Informed Consent: Patient understands risks and agrees with Anesthesia plan.  Questions answered. Anesthesia consent signed with patient.  ASA Score: 1     Day  of Surgery Review of History & Physical:    H&P update referred to the surgeon.     Anesthesia Plan Notes:   63F cataracts for phaco/iol under local and mild sedation        Ready For Surgery From Anesthesia Perspective.

## 2019-04-09 RX ORDER — DIPHENHYDRAMINE HYDROCHLORIDE 50 MG/ML
50 INJECTION INTRAMUSCULAR; INTRAVENOUS ONCE AS NEEDED
Status: CANCELLED | OUTPATIENT
Start: 2019-04-09

## 2019-04-09 RX ORDER — SODIUM CHLORIDE 0.9 % (FLUSH) 0.9 %
10 SYRINGE (ML) INJECTION
Status: CANCELLED | OUTPATIENT
Start: 2019-04-09

## 2019-04-09 RX ORDER — HEPARIN 100 UNIT/ML
500 SYRINGE INTRAVENOUS
Status: CANCELLED | OUTPATIENT
Start: 2019-04-09

## 2019-04-09 RX ORDER — EPINEPHRINE 0.3 MG/.3ML
0.3 INJECTION SUBCUTANEOUS ONCE AS NEEDED
Status: CANCELLED | OUTPATIENT
Start: 2019-04-09

## 2019-04-10 ENCOUNTER — OFFICE VISIT (OUTPATIENT)
Dept: OPHTHALMOLOGY | Facility: CLINIC | Age: 64
End: 2019-04-10
Payer: COMMERCIAL

## 2019-04-10 ENCOUNTER — INFUSION (OUTPATIENT)
Dept: INFUSION THERAPY | Facility: HOSPITAL | Age: 64
End: 2019-04-10
Attending: OBSTETRICS & GYNECOLOGY
Payer: COMMERCIAL

## 2019-04-10 ENCOUNTER — TELEPHONE (OUTPATIENT)
Dept: OPHTHALMOLOGY | Facility: CLINIC | Age: 64
End: 2019-04-10

## 2019-04-10 ENCOUNTER — RESEARCH ENCOUNTER (OUTPATIENT)
Dept: RESEARCH | Facility: HOSPITAL | Age: 64
End: 2019-04-10

## 2019-04-10 VITALS
RESPIRATION RATE: 18 BRPM | HEIGHT: 67 IN | OXYGEN SATURATION: 100 % | HEART RATE: 63 BPM | SYSTOLIC BLOOD PRESSURE: 124 MMHG | BODY MASS INDEX: 28.09 KG/M2 | DIASTOLIC BLOOD PRESSURE: 69 MMHG | WEIGHT: 179 LBS | TEMPERATURE: 98 F

## 2019-04-10 DIAGNOSIS — H25.12 NUCLEAR SCLEROTIC CATARACT OF LEFT EYE: Primary | ICD-10-CM

## 2019-04-10 DIAGNOSIS — Z98.41 STATUS POST CATARACT EXTRACTION AND INSERTION OF INTRAOCULAR LENS, RIGHT: Primary | ICD-10-CM

## 2019-04-10 DIAGNOSIS — Z96.1 STATUS POST CATARACT EXTRACTION AND INSERTION OF INTRAOCULAR LENS, RIGHT: Primary | ICD-10-CM

## 2019-04-10 DIAGNOSIS — H25.12 NUCLEAR SCLEROTIC CATARACT OF LEFT EYE: ICD-10-CM

## 2019-04-10 DIAGNOSIS — H18.519 FUCHS' CORNEAL DYSTROPHY: ICD-10-CM

## 2019-04-10 DIAGNOSIS — H40.003 GLAUCOMA SUSPECT OF BOTH EYES: ICD-10-CM

## 2019-04-10 DIAGNOSIS — C56.9 OVARIAN CANCER, UNSPECIFIED LATERALITY: Primary | ICD-10-CM

## 2019-04-10 PROCEDURE — 99999 PR PBB SHADOW E&M-EST. PATIENT-LVL II: CPT | Mod: PBBFAC,,, | Performed by: OPHTHALMOLOGY

## 2019-04-10 PROCEDURE — 92025 CPTRIZED CORNEAL TOPOGRAPHY: CPT | Mod: S$GLB,,, | Performed by: OPHTHALMOLOGY

## 2019-04-10 PROCEDURE — 99999 PR PBB SHADOW E&M-EST. PATIENT-LVL II: ICD-10-PCS | Mod: PBBFAC,,, | Performed by: OPHTHALMOLOGY

## 2019-04-10 PROCEDURE — 25000003 PHARM REV CODE 250: Performed by: OBSTETRICS & GYNECOLOGY

## 2019-04-10 PROCEDURE — 92025 COMPUTERIZED CORNEAL TOPOGRAPHY: ICD-10-PCS | Mod: S$GLB,,, | Performed by: OPHTHALMOLOGY

## 2019-04-10 PROCEDURE — 96413 CHEMO IV INFUSION 1 HR: CPT

## 2019-04-10 PROCEDURE — 99024 PR POST-OP FOLLOW-UP VISIT: ICD-10-PCS | Mod: S$GLB,,, | Performed by: OPHTHALMOLOGY

## 2019-04-10 PROCEDURE — 92136 OPHTHALMIC BIOMETRY: CPT | Mod: 26,LT,S$GLB, | Performed by: OPHTHALMOLOGY

## 2019-04-10 PROCEDURE — 63600175 PHARM REV CODE 636 W HCPCS: Mod: JG | Performed by: OBSTETRICS & GYNECOLOGY

## 2019-04-10 PROCEDURE — 92136 IOL MASTER - OU - BOTH EYES: ICD-10-PCS | Mod: 26,LT,S$GLB, | Performed by: OPHTHALMOLOGY

## 2019-04-10 PROCEDURE — 99024 POSTOP FOLLOW-UP VISIT: CPT | Mod: S$GLB,,, | Performed by: OPHTHALMOLOGY

## 2019-04-10 RX ORDER — SODIUM CHLORIDE 0.9 % (FLUSH) 0.9 %
10 SYRINGE (ML) INJECTION
Status: DISCONTINUED | OUTPATIENT
Start: 2019-04-10 | End: 2019-04-10 | Stop reason: HOSPADM

## 2019-04-10 RX ORDER — EPINEPHRINE 0.3 MG/.3ML
0.3 INJECTION SUBCUTANEOUS ONCE AS NEEDED
Status: DISCONTINUED | OUTPATIENT
Start: 2019-04-10 | End: 2019-04-10 | Stop reason: HOSPADM

## 2019-04-10 RX ORDER — DIPHENHYDRAMINE HYDROCHLORIDE 50 MG/ML
50 INJECTION INTRAMUSCULAR; INTRAVENOUS ONCE AS NEEDED
Status: DISCONTINUED | OUTPATIENT
Start: 2019-04-10 | End: 2019-04-10 | Stop reason: HOSPADM

## 2019-04-10 RX ORDER — HEPARIN 100 UNIT/ML
500 SYRINGE INTRAVENOUS
Status: DISCONTINUED | OUTPATIENT
Start: 2019-04-10 | End: 2019-04-10 | Stop reason: HOSPADM

## 2019-04-10 RX ADMIN — BEVACIZUMAB 880 MG: 100 INJECTION, SOLUTION INTRAVENOUS at 10:04

## 2019-04-10 RX ADMIN — SODIUM CHLORIDE: 0.9 INJECTION, SOLUTION INTRAVENOUS at 10:04

## 2019-04-10 NOTE — NURSING
Pt reports burning of the palms and soles of the feet. Not red, reports one blister on foot. Per [facility] nurse, pt has grade 2 hand-foot syndrome related to Doxil treatment. Okay for avastin today. Monitoring. MD aware. Pt has not had labs since last avastin/chemo treatment. Per MD okay without them for Avastin only and okay to receive Avastin post cataract surgery.

## 2019-04-10 NOTE — PLAN OF CARE
Problem: Adult Inpatient Plan of Care  Goal: Plan of Care Review  Outcome: Ongoing (interventions implemented as appropriate)  Pt tolerated Avastin infusion and stayed for 10 minutes post infusion observation. BP stable. PIV flushed with 50 cc NS and removed. No questions at this time.

## 2019-04-10 NOTE — PLAN OF CARE
Problem: Neurotoxicity (Chemotherapy Effects)  Goal: Neurotoxicity Symptom Control    Intervention: Prevent or Manage Neurotoxicity Effects  Pt report continued neuropathy complicated by hand foot syndrome (see nursing note). MD and research team aware. Monitoring. Per MD, pt to be treated without new labs and okay post-cataract surgery to receive Avastin. No questions at this time.

## 2019-04-10 NOTE — PROGRESS NOTES
HPI     Referred by Dr Mcgrath     S/p phaco w/IOL OD 4/4/19  research protocol Ovarian Cancer  Myopia  Fuchs (brother recently had K tx for fuchs)  GS    Combo drops TID OD    Pt here for post op OD.  Pt states doing well. Pt denies eye pain OD.     Last edited by Rosita Wood MA on 4/10/2019  9:25 AM.   (History)            Assessment /Plan     For exam results, see Encounter Report.    Status post cataract extraction and insertion of intraocular lens, right    Nuclear sclerotic cataract of left eye  -     IOL Master - OU - Both Eyes  -     Computerized corneal topography    Fuchs' corneal dystrophy    Glaucoma suspect of both eyes      PO week #1 s/p phaco/IOL -    - doing well, no issues    Continue combo drops for a total of 1 month versus: d/c abx gtt, continue PF/ketorolocTID for total of 1 month    - f/up 3-4 wks for MRx, DFE    Visually significant nuclear sclerotic cataract   - Interfering with activities of daily living.  Pt desires cataract surgery for [facility] rehabilitation.   - R/B/A discussed and pt agrees to proceed with surgery.   - IOL options discussed according to patient's goals and concomitant ocular pathology; and pt content with monofocal lens.    - Target: plano.      (pcboo 19.0 OS)    Fuchs  - pachy: 620/613  - discussed risk of corneal decompensation post cat sx    GS  - oct nerves - today  - increase in c/d ratio compared to old photos from 2013  - IOP wnl today  - would benefit from baseline GS testing post cat sx.

## 2019-04-12 ENCOUNTER — PATIENT MESSAGE (OUTPATIENT)
Dept: GYNECOLOGIC ONCOLOGY | Facility: CLINIC | Age: 64
End: 2019-04-12

## 2019-04-16 LAB — FUNGUS SPEC CULT: NORMAL

## 2019-04-23 DIAGNOSIS — Z00.6 EXAMINATION OF PARTICIPANT IN CLINICAL TRIAL: ICD-10-CM

## 2019-04-23 DIAGNOSIS — C56.9 OVARIAN CANCER, UNSPECIFIED LATERALITY: Primary | ICD-10-CM

## 2019-04-24 ENCOUNTER — RESEARCH ENCOUNTER (OUTPATIENT)
Dept: RESEARCH | Facility: HOSPITAL | Age: 64
End: 2019-04-24

## 2019-04-24 ENCOUNTER — OFFICE VISIT (OUTPATIENT)
Dept: GYNECOLOGIC ONCOLOGY | Facility: CLINIC | Age: 64
End: 2019-04-24
Payer: COMMERCIAL

## 2019-04-24 ENCOUNTER — LAB VISIT (OUTPATIENT)
Dept: LAB | Facility: HOSPITAL | Age: 64
End: 2019-04-24
Attending: OBSTETRICS & GYNECOLOGY
Payer: COMMERCIAL

## 2019-04-24 VITALS
HEART RATE: 70 BPM | SYSTOLIC BLOOD PRESSURE: 121 MMHG | BODY MASS INDEX: 29.13 KG/M2 | DIASTOLIC BLOOD PRESSURE: 61 MMHG | WEIGHT: 185.63 LBS | HEIGHT: 67 IN

## 2019-04-24 DIAGNOSIS — Z00.6 EXAMINATION OF PARTICIPANT IN CLINICAL TRIAL: ICD-10-CM

## 2019-04-24 DIAGNOSIS — C56.9 OVARIAN CANCER, UNSPECIFIED LATERALITY: ICD-10-CM

## 2019-04-24 DIAGNOSIS — C56.9 MALIGNANT NEOPLASM OF OVARY, UNSPECIFIED LATERALITY: Primary | ICD-10-CM

## 2019-04-24 DIAGNOSIS — Z51.11 ENCOUNTER FOR ANTINEOPLASTIC CHEMOTHERAPY: ICD-10-CM

## 2019-04-24 LAB
ALBUMIN SERPL BCP-MCNC: 3.3 G/DL (ref 3.5–5.2)
ALP SERPL-CCNC: 109 U/L (ref 55–135)
ALT SERPL W/O P-5'-P-CCNC: 28 U/L (ref 10–44)
ANION GAP SERPL CALC-SCNC: 9 MMOL/L (ref 8–16)
AST SERPL-CCNC: 32 U/L (ref 10–40)
BASOPHILS # BLD AUTO: 0.04 K/UL (ref 0–0.2)
BASOPHILS NFR BLD: 1.7 % (ref 0–1.9)
BILIRUB SERPL-MCNC: 0.9 MG/DL (ref 0.1–1)
BUN SERPL-MCNC: 11 MG/DL (ref 8–23)
CALCIUM SERPL-MCNC: 9.7 MG/DL (ref 8.7–10.5)
CANCER AG125 SERPL-ACNC: 59 U/ML (ref 0–30)
CHLORIDE SERPL-SCNC: 107 MMOL/L (ref 95–110)
CO2 SERPL-SCNC: 27 MMOL/L (ref 23–29)
CREAT SERPL-MCNC: 0.8 MG/DL (ref 0.5–1.4)
DIFFERENTIAL METHOD: ABNORMAL
EOSINOPHIL # BLD AUTO: 0 K/UL (ref 0–0.5)
EOSINOPHIL NFR BLD: 1.7 % (ref 0–8)
ERYTHROCYTE [DISTWIDTH] IN BLOOD BY AUTOMATED COUNT: 17.2 % (ref 11.5–14.5)
EST. GFR  (AFRICAN AMERICAN): >60 ML/MIN/1.73 M^2
EST. GFR  (NON AFRICAN AMERICAN): >60 ML/MIN/1.73 M^2
GLUCOSE SERPL-MCNC: 102 MG/DL (ref 70–110)
HCT VFR BLD AUTO: 46.5 % (ref 37–48.5)
HGB BLD-MCNC: 15.2 G/DL (ref 12–16)
IMM GRANULOCYTES # BLD AUTO: 0.01 K/UL (ref 0–0.04)
IMM GRANULOCYTES NFR BLD AUTO: 0.4 % (ref 0–0.5)
LYMPHOCYTES # BLD AUTO: 0.8 K/UL (ref 1–4.8)
LYMPHOCYTES NFR BLD: 31.9 % (ref 18–48)
MCH RBC QN AUTO: 31.7 PG (ref 27–31)
MCHC RBC AUTO-ENTMCNC: 32.7 G/DL (ref 32–36)
MCV RBC AUTO: 97 FL (ref 82–98)
MONOCYTES # BLD AUTO: 0.4 K/UL (ref 0.3–1)
MONOCYTES NFR BLD: 16.4 % (ref 4–15)
NEUTROPHILS # BLD AUTO: 1.1 K/UL (ref 1.8–7.7)
NEUTROPHILS NFR BLD: 47.9 % (ref 38–73)
NRBC BLD-RTO: 0 /100 WBC
PLATELET # BLD AUTO: 80 K/UL (ref 150–350)
PMV BLD AUTO: 10.4 FL (ref 9.2–12.9)
POTASSIUM SERPL-SCNC: 4.7 MMOL/L (ref 3.5–5.1)
PROT SERPL-MCNC: 6.7 G/DL (ref 6–8.4)
RBC # BLD AUTO: 4.79 M/UL (ref 4–5.4)
SODIUM SERPL-SCNC: 143 MMOL/L (ref 136–145)
TSH SERPL DL<=0.005 MIU/L-ACNC: 1.66 UIU/ML (ref 0.4–4)
WBC # BLD AUTO: 2.38 K/UL (ref 3.9–12.7)

## 2019-04-24 PROCEDURE — 84443 ASSAY THYROID STIM HORMONE: CPT

## 2019-04-24 PROCEDURE — 99214 OFFICE O/P EST MOD 30 MIN: CPT | Mod: S$GLB,,, | Performed by: OBSTETRICS & GYNECOLOGY

## 2019-04-24 PROCEDURE — 80053 COMPREHEN METABOLIC PANEL: CPT

## 2019-04-24 PROCEDURE — 99999 PR PBB SHADOW E&M-EST. PATIENT-LVL III: CPT | Mod: PBBFAC,,, | Performed by: OBSTETRICS & GYNECOLOGY

## 2019-04-24 PROCEDURE — 99999 PR PBB SHADOW E&M-EST. PATIENT-LVL III: ICD-10-PCS | Mod: PBBFAC,,, | Performed by: OBSTETRICS & GYNECOLOGY

## 2019-04-24 PROCEDURE — 99214 PR OFFICE/OUTPT VISIT, EST, LEVL IV, 30-39 MIN: ICD-10-PCS | Mod: S$GLB,,, | Performed by: OBSTETRICS & GYNECOLOGY

## 2019-04-24 PROCEDURE — 3008F PR BODY MASS INDEX (BMI) DOCUMENTED: ICD-10-PCS | Mod: CPTII,S$GLB,, | Performed by: OBSTETRICS & GYNECOLOGY

## 2019-04-24 PROCEDURE — 85025 COMPLETE CBC W/AUTO DIFF WBC: CPT

## 2019-04-24 PROCEDURE — 86304 IMMUNOASSAY TUMOR CA 125: CPT

## 2019-04-24 PROCEDURE — 3008F BODY MASS INDEX DOCD: CPT | Mod: CPTII,S$GLB,, | Performed by: OBSTETRICS & GYNECOLOGY

## 2019-04-24 RX ORDER — PREGABALIN 100 MG/1
100 CAPSULE ORAL 2 TIMES DAILY
COMMUNITY
End: 2019-05-27

## 2019-04-24 NOTE — PROGRESS NOTES
"Subjective:       Patient ID: Mickie Don is a 63 y.o. female.    Chief Complaint: Chemotherapy    Treatment History  Xlap/Omentectomy on 11/1/16 - mass was unresectable  Stage IIIC ovarian cancer  Initiated Taxotere/Carbo on 11/18/16, now s/p 6 cycles completed 3/8/17  Xlap/ISIS/BSO/Radical mass resection > 10 cm, optimal debulking on 4/11/17  Initiated PRIMA on 8/21/17  BRCA negative  Started FORWARD1 on 11/27/17  Started Doxil/Anju as part of NRG  2/27/19    HPI     Patient comes in today prior to C4D1 for treatment on NRG  Arm 3. ANC is 1100 and too low for treatment.  BP ok.  PS 0.  Looks and feels well.  Foot had some mild blisters but resolved.   Review of Systems   Constitutional: Negative for chills, fatigue and fever.   Respiratory: Negative for cough, shortness of breath and wheezing.    Cardiovascular: Negative for chest pain, palpitations and leg swelling.   Gastrointestinal: Negative for abdominal pain, constipation, diarrhea, nausea and vomiting.   Genitourinary: Negative for difficulty urinating, dysuria, frequency, genital sores, hematuria, urgency, vaginal bleeding, vaginal discharge and vaginal pain.   Musculoskeletal: Negative for gait problem.   Neurological: Negative for weakness and numbness.   Hematological: Negative for adenopathy. Does not bruise/bleed easily.   Psychiatric/Behavioral: The patient is not nervous/anxious.        Objective:   /61   Pulse 70   Ht 5' 7" (1.702 m)   Wt 84.2 kg (185 lb 10 oz)   BMI 29.07 kg/m²      Physical Exam   Constitutional: She is oriented to person, place, and time. She appears well-developed and well-nourished.   HENT:   Head: Normocephalic and atraumatic.   Eyes: No scleral icterus.   Neck: No tracheal deviation present. No thyromegaly present.   Cardiovascular: Normal rate and regular rhythm.   Pulmonary/Chest: Effort normal and breath sounds normal.   Abdominal: Soft. She exhibits no distension and no mass. There is no tenderness. " There is no rebound and no guarding. No hernia.   Genitourinary: Rectum normal and vagina normal. Pelvic exam was performed with patient supine. There is no rash, tenderness or lesion on the right labia. There is no rash, tenderness or lesion on the left labia. Right adnexum displays no mass, no tenderness and no fullness. Left adnexum displays no mass, no tenderness and no fullness.   Genitourinary Comments: Not performed.    Musculoskeletal: She exhibits no edema or tenderness.   Lymphadenopathy:     She has no cervical adenopathy.   Neurological: She is alert and oriented to person, place, and time.   Skin: Skin is warm and dry.   Psychiatric: She has a normal mood and affect. Her behavior is normal. Judgment and thought content normal.       Assessment:       1. Malignant neoplasm of ovary, unspecified laterality    2. Ovarian cancer, unspecified laterality    3. Examination of participant in clinical trial    4. Encounter for antineoplastic chemotherapy        Plan:     Will delay due to neutropenia.  Repeat labs in 1 week and treat if ok.

## 2019-04-26 NOTE — PROGRESS NOTES
Apr. 24, 2019     Protocol: NRG-  : Kiana Weston MD  Treating Physician: Benny Tran MD  Pt Initials:  NAV BERMUDEZ  Study ID: [id number]  IRB#: [id number]     A Randomized Phase II/III Study of Pegylated Liposomal Doxorubicin and Atezolizumab vs. Pegylated Liposomal Doxorubicin/Bevacizumab/ Atezolizumab vs. Pegylated Liposomal Doxorubicin/ Bevacizumab in Platinum-resistant Ovarian Cancer.     Unscheduled - planned as Cycle 4 Day 1  Patient randomized on 1/23/19 to Arm 3: Pegylated Liposomal Doxorubicin, 40 mg/m2, q28d and Bevacizumab, 10 mg/kg, q14d.      Patient had CT-CAP on 3/25/19.  Per RECIST1.1, patient has stable disease.  Dr. Tran reviewed the scans and confirmed with assessment.  He agrees patient should remain on protocol.     Patient presented to clinic for C4D1 treatment on the above-mentioned clinical trial. Patient alert and oriented, mood and affect appropriate to the situation. Patient reports Grade 2 Hand-Foot to prior week - issues with erythema and discomfort on both feet, with 2 small blisters on right foot.  Patient taking Lyrica for symptoms.  Symptoms have improved to Grade1.  She denies fever, nausea vomiting, pain, diarrhea, constipation, headaches, or dizziness.     Physical exam and performance status assessment done per protocol.  Local labs reviewed by Dr. Tran prior to treatment and ANC was 1.1, which is too low of treatment.  Patient will be delayed 1 week.  Reviewed current medications with patient.  See Concomitant Medication sheets.        Patient scheduled to return for C4D1 on 5/1/19.     All of the patient's questions were answered by Dr. Tran and me to her satisfaction. Patient expressed her willingness to continue to participate in the above-mentioned clinical trial.  Instructed patient to notify Dr. Tran and/or me with any questions, concerns, or changes in health status. Patient verbalized understanding.      Baseline History:  1. Eye disorders,  other - Open angle with borderline findings and low glaucoma risk in both eyes, Grade 1.  2. Peripheral neuropathy- toes, Grade 1.  3. Peripheral neuropathy- fingers, Grade 1.  4. Platelet count decreased/thrombocytopenia, Grade 1.  5. Fatigue, Grade 1.  Will continue to monitor.  6. Cataracts, Grade 2.  Will be assessed by Dr. Mcgrath on 1/15/19.  Will continue to monitor.  7. Blurred vision, Grade 2. Due to cataracts per Ophthalmologist. Will continue to monitor.     Adverse Events - For the most up-to-date AE log, please refer to the paper AE log in the subjects research file.  1. Hand-Foot Syndrome, resolved from Grade 2 now Grade 1.  Will continue to monitor.  2. Afebrile neutropenia, Grade 2.  Patient delayed 1 week.  Will continue to monitor.

## 2019-04-26 NOTE — PROGRESS NOTES
Apr. 10, 2019     Protocol: NRG-  : Kiana Weston MD  Treating Physician: Benny Tran MD  Pt Initials:  NAV BERMUDEZ  Study ID: [id number]  IRB#: [id number]     A Randomized Phase II/III Study of Pegylated Liposomal Doxorubicin and Atezolizumab vs. Pegylated Liposomal Doxorubicin/Bevacizumab/ Atezolizumab vs. Pegylated Liposomal Doxorubicin/ Bevacizumab in Platinum-resistant Ovarian Cancer.     Cycle 3 Day 14  Patient randomized on 1/23/19 to Arm 3: Pegylated Liposomal Doxorubicin, 40 mg/m2, q28d and Bevacizumab, 10 mg/kg, q14d.      Patient had CT-CAP on 3/25/19.  Per RECIST1.1, patient has stable disease.  Dr. Tran reviewed the scans and confirmed with assessment.  He agrees patient should remain on protocol.     Patient presented to clinic for C3D14 treatment on the above-mentioned clinical trial. Patient alert and oriented, mood and affect appropriate to the situation. Patients sinus infection resolved.  Fatigue is stable.  She has no other issues to report.  She denies fever, nausea vomiting, pain, diarrhea, constipation, headaches, or dizziness.     Physical exam and performance status assessment not done per protocol.  Dose confirmed and was correct in Epic.  Reviewed current medications with patient.  See Concomitant Medication sheets.        Patient scheduled to return for C4D1 on 4/26/19.     All of the patient's questions were answered by Dr. Tran and me to her satisfaction. Patient expressed her willingness to continue to participate in the above-mentioned clinical trial.  Instructed patient to notify Dr. Tran and/or me with any questions, concerns, or changes in health status. Patient verbalized understanding.      Baseline History:  1. 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  2. Peripheral neuropathy- toes, Grade 1.  3. Peripheral neuropathy- fingers, Grade 1.  4. Platelet count decreased/thrombocytopenia,  Grade 1.  5. Fatigue, Grade 1.  Will continue to monitor.  6. Cataracts, Grade 2.  Will be assessed by Dr. Mcgrath on 1/15/19.  Will continue to monitor.  7. Blurred vision, Grade 2. Due to cataracts per Ophthalmologist. Will continue to monitor.     Adverse Events - For the most up-to-date AE log, please refer to the paper AE log in the subjects research file.  1. Sinus infection, resolved from Grade 2.  Will continue to monitor.

## 2019-04-30 ENCOUNTER — TELEPHONE (OUTPATIENT)
Dept: HEMATOLOGY/ONCOLOGY | Facility: CLINIC | Age: 64
End: 2019-04-30

## 2019-04-30 NOTE — H&P
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    Family History/Social History: refer to chart    Allergies: Review of patient's allergies indicates:  No Known Allergies    Current Medications: No current facility-administered medications for this encounter.     Current Outpatient Medications:     INV dextrose 5% SolP 250 mL with INV DOXOrubicin liposome 2 mg/mL Susp, Inject into the vein. FOR INVESTIGATIONAL USE ONLY, Disp: , Rfl:     INV sodium chloride 0.9 % SolP 100 mL with INV bevacizumab 25 mg/ml Soln 15 mg/kg, Inject 15 mg/kg into the vein once. FOR INVESTIGATIONAL USE ONLY, Disp: , Rfl:     prednisolon/gatiflox/bromfenac (PREDNISOL ACE-GATIFLOX-BROMFEN) 1-0.5-0.075 % DrpS, Place 1 drop into the right eye 3 (three) times daily., Disp: , Rfl:     pregabalin (LYRICA) 100 MG capsule, Take 100 mg by mouth 2 (two) times daily., Disp: , Rfl:     Physical Exam    BP: Vital signs stable  General: No apparent distress  HEENT: nuclear sclerotic cataract  Lungs: adequate respirations  Heart: + pulses  Abdomen: soft  Rectal/pelvic: deferred    Impression: Visually significant Cataract    Plan: Phacoemulsification with implantation of Intraocular lens

## 2019-04-30 NOTE — TELEPHONE ENCOUNTER
Apr. 30, 2019     Protocol: FORWARD1/IMGN-0403  Principle Investigator: BETH Weston  Treating Physician: GUERITA Tran Pt Initials: NAV VAZ  Study ID: [id number]  IRB#: [id number]     FORWARD 1: A Randomized, Open Label Phase 3 Study to Evaluate the Safety and Efficacy of Mirvetuximab soravtansine (HQAJ655) Versus Investigators Choice of Chemotherapy in Women with Folate Receptor ??positive Advanced Epithelial Ovarian Cancer, Primary Peritoneal Cancer or Fallopian Tube Cancer      Patient called for Survival Sweep phone call on above-listed study.  Patient doing well.  Currently on  trial - Doxil/Avastin.

## 2019-05-01 ENCOUNTER — LAB VISIT (OUTPATIENT)
Dept: LAB | Facility: HOSPITAL | Age: 64
End: 2019-05-01
Attending: INTERNAL MEDICINE
Payer: COMMERCIAL

## 2019-05-01 ENCOUNTER — INFUSION (OUTPATIENT)
Dept: INFUSION THERAPY | Facility: HOSPITAL | Age: 64
End: 2019-05-01
Attending: OBSTETRICS & GYNECOLOGY
Payer: COMMERCIAL

## 2019-05-01 ENCOUNTER — OFFICE VISIT (OUTPATIENT)
Dept: GYNECOLOGIC ONCOLOGY | Facility: CLINIC | Age: 64
End: 2019-05-01
Payer: COMMERCIAL

## 2019-05-01 ENCOUNTER — RESEARCH ENCOUNTER (OUTPATIENT)
Dept: RESEARCH | Facility: HOSPITAL | Age: 64
End: 2019-05-01

## 2019-05-01 VITALS
DIASTOLIC BLOOD PRESSURE: 69 MMHG | SYSTOLIC BLOOD PRESSURE: 139 MMHG | HEART RATE: 57 BPM | BODY MASS INDEX: 29.83 KG/M2 | WEIGHT: 190.06 LBS | RESPIRATION RATE: 18 BRPM | TEMPERATURE: 97 F | HEIGHT: 67 IN

## 2019-05-01 VITALS
SYSTOLIC BLOOD PRESSURE: 124 MMHG | BODY MASS INDEX: 29.76 KG/M2 | WEIGHT: 190.06 LBS | HEART RATE: 67 BPM | DIASTOLIC BLOOD PRESSURE: 58 MMHG

## 2019-05-01 DIAGNOSIS — C56.9 OVARIAN CANCER, UNSPECIFIED LATERALITY: Primary | ICD-10-CM

## 2019-05-01 DIAGNOSIS — C56.9 OVARIAN CANCER, UNSPECIFIED LATERALITY: ICD-10-CM

## 2019-05-01 DIAGNOSIS — Z51.11 ENCOUNTER FOR ANTINEOPLASTIC CHEMOTHERAPY: ICD-10-CM

## 2019-05-01 DIAGNOSIS — Z00.6 EXAMINATION OF PARTICIPANT IN CLINICAL TRIAL: ICD-10-CM

## 2019-05-01 LAB
ALBUMIN SERPL BCP-MCNC: 3.6 G/DL (ref 3.5–5.2)
ALP SERPL-CCNC: 113 U/L (ref 55–135)
ALT SERPL W/O P-5'-P-CCNC: 29 U/L (ref 10–44)
ANION GAP SERPL CALC-SCNC: 5 MMOL/L (ref 8–16)
AST SERPL-CCNC: 40 U/L (ref 10–40)
BACTERIA #/AREA URNS AUTO: NORMAL /HPF
BASOPHILS # BLD AUTO: 0.04 K/UL (ref 0–0.2)
BASOPHILS NFR BLD: 1.1 % (ref 0–1.9)
BILIRUB SERPL-MCNC: 0.8 MG/DL (ref 0.1–1)
BILIRUB UR QL STRIP: NEGATIVE
BUN SERPL-MCNC: 15 MG/DL (ref 8–23)
CALCIUM SERPL-MCNC: 9.9 MG/DL (ref 8.7–10.5)
CANCER AG125 SERPL-ACNC: 56 U/ML (ref 0–30)
CHLORIDE SERPL-SCNC: 104 MMOL/L (ref 95–110)
CLARITY UR REFRACT.AUTO: CLEAR
CO2 SERPL-SCNC: 31 MMOL/L (ref 23–29)
COLOR UR AUTO: YELLOW
CREAT SERPL-MCNC: 0.8 MG/DL (ref 0.5–1.4)
CREAT UR-MCNC: 129 MG/DL (ref 15–325)
DIFFERENTIAL METHOD: ABNORMAL
EOSINOPHIL # BLD AUTO: 0.1 K/UL (ref 0–0.5)
EOSINOPHIL NFR BLD: 1.4 % (ref 0–8)
ERYTHROCYTE [DISTWIDTH] IN BLOOD BY AUTOMATED COUNT: 16.3 % (ref 11.5–14.5)
EST. GFR  (AFRICAN AMERICAN): >60 ML/MIN/1.73 M^2
EST. GFR  (NON AFRICAN AMERICAN): >60 ML/MIN/1.73 M^2
GLUCOSE SERPL-MCNC: 115 MG/DL (ref 70–110)
GLUCOSE UR QL STRIP: NEGATIVE
HCT VFR BLD AUTO: 44.3 % (ref 37–48.5)
HGB BLD-MCNC: 14.5 G/DL (ref 12–16)
HGB UR QL STRIP: NEGATIVE
KETONES UR QL STRIP: ABNORMAL
LEUKOCYTE ESTERASE UR QL STRIP: NEGATIVE
LYMPHOCYTES # BLD AUTO: 1 K/UL (ref 1–4.8)
LYMPHOCYTES NFR BLD: 28.6 % (ref 18–48)
MCH RBC QN AUTO: 31.3 PG (ref 27–31)
MCHC RBC AUTO-ENTMCNC: 32.7 G/DL (ref 32–36)
MCV RBC AUTO: 96 FL (ref 82–98)
MICROSCOPIC COMMENT: NORMAL
MONOCYTES # BLD AUTO: 0.5 K/UL (ref 0.3–1)
MONOCYTES NFR BLD: 13.4 % (ref 4–15)
NEUTROPHILS # BLD AUTO: 2 K/UL (ref 1.8–7.7)
NEUTROPHILS NFR BLD: 55.2 % (ref 38–73)
NITRITE UR QL STRIP: NEGATIVE
PH UR STRIP: 5 [PH] (ref 5–8)
PLATELET # BLD AUTO: 80 K/UL (ref 150–350)
PMV BLD AUTO: 9.8 FL (ref 9.2–12.9)
POTASSIUM SERPL-SCNC: 4.8 MMOL/L (ref 3.5–5.1)
PROT SERPL-MCNC: 7.1 G/DL (ref 6–8.4)
PROT UR QL STRIP: NEGATIVE
PROT UR-MCNC: <7 MG/DL (ref 0–15)
PROT/CREAT UR: NORMAL MG/G{CREAT} (ref 0–0.2)
RBC # BLD AUTO: 4.64 M/UL (ref 4–5.4)
RBC #/AREA URNS AUTO: 0 /HPF (ref 0–4)
SODIUM SERPL-SCNC: 140 MMOL/L (ref 136–145)
SP GR UR STRIP: 1.02 (ref 1–1.03)
SQUAMOUS #/AREA URNS AUTO: 0 /HPF
TSH SERPL DL<=0.005 MIU/L-ACNC: 1.52 UIU/ML (ref 0.4–4)
URN SPEC COLLECT METH UR: ABNORMAL
WBC # BLD AUTO: 3.57 K/UL (ref 3.9–12.7)
WBC #/AREA URNS AUTO: 0 /HPF (ref 0–5)

## 2019-05-01 PROCEDURE — 84156 ASSAY OF PROTEIN URINE: CPT

## 2019-05-01 PROCEDURE — 85025 COMPLETE CBC W/AUTO DIFF WBC: CPT

## 2019-05-01 PROCEDURE — 99213 OFFICE O/P EST LOW 20 MIN: CPT | Mod: S$GLB,,, | Performed by: NURSE PRACTITIONER

## 2019-05-01 PROCEDURE — 99999 PR PBB SHADOW E&M-EST. PATIENT-LVL III: CPT | Mod: PBBFAC,,, | Performed by: NURSE PRACTITIONER

## 2019-05-01 PROCEDURE — 3008F BODY MASS INDEX DOCD: CPT | Mod: CPTII,S$GLB,, | Performed by: NURSE PRACTITIONER

## 2019-05-01 PROCEDURE — 80053 COMPREHEN METABOLIC PANEL: CPT

## 2019-05-01 PROCEDURE — 96367 TX/PROPH/DG ADDL SEQ IV INF: CPT

## 2019-05-01 PROCEDURE — 84443 ASSAY THYROID STIM HORMONE: CPT

## 2019-05-01 PROCEDURE — 96413 CHEMO IV INFUSION 1 HR: CPT

## 2019-05-01 PROCEDURE — 96417 CHEMO IV INFUS EACH ADDL SEQ: CPT

## 2019-05-01 PROCEDURE — 25000003 PHARM REV CODE 250: Performed by: OBSTETRICS & GYNECOLOGY

## 2019-05-01 PROCEDURE — 99213 PR OFFICE/OUTPT VISIT, EST, LEVL III, 20-29 MIN: ICD-10-PCS | Mod: S$GLB,,, | Performed by: NURSE PRACTITIONER

## 2019-05-01 PROCEDURE — 99999 PR PBB SHADOW E&M-EST. PATIENT-LVL III: ICD-10-PCS | Mod: PBBFAC,,, | Performed by: NURSE PRACTITIONER

## 2019-05-01 PROCEDURE — 86304 IMMUNOASSAY TUMOR CA 125: CPT

## 2019-05-01 PROCEDURE — 3008F PR BODY MASS INDEX (BMI) DOCUMENTED: ICD-10-PCS | Mod: CPTII,S$GLB,, | Performed by: NURSE PRACTITIONER

## 2019-05-01 PROCEDURE — 36415 COLL VENOUS BLD VENIPUNCTURE: CPT

## 2019-05-01 PROCEDURE — 63600175 PHARM REV CODE 636 W HCPCS: Performed by: OBSTETRICS & GYNECOLOGY

## 2019-05-01 PROCEDURE — 81001 URINALYSIS AUTO W/SCOPE: CPT

## 2019-05-01 RX ORDER — HEPARIN 100 UNIT/ML
500 SYRINGE INTRAVENOUS
Status: CANCELLED | OUTPATIENT
Start: 2019-05-01

## 2019-05-01 RX ORDER — EPINEPHRINE 0.3 MG/.3ML
0.3 INJECTION SUBCUTANEOUS ONCE AS NEEDED
Status: DISCONTINUED | OUTPATIENT
Start: 2019-05-01 | End: 2019-05-01 | Stop reason: HOSPADM

## 2019-05-01 RX ORDER — SODIUM CHLORIDE 0.9 % (FLUSH) 0.9 %
10 SYRINGE (ML) INJECTION
Status: CANCELLED | OUTPATIENT
Start: 2019-05-01

## 2019-05-01 RX ORDER — DIPHENHYDRAMINE HYDROCHLORIDE 50 MG/ML
50 INJECTION INTRAMUSCULAR; INTRAVENOUS ONCE AS NEEDED
Status: CANCELLED | OUTPATIENT
Start: 2019-05-01

## 2019-05-01 RX ORDER — HEPARIN 100 UNIT/ML
500 SYRINGE INTRAVENOUS
Status: DISCONTINUED | OUTPATIENT
Start: 2019-05-01 | End: 2019-05-01 | Stop reason: HOSPADM

## 2019-05-01 RX ORDER — SODIUM CHLORIDE 0.9 % (FLUSH) 0.9 %
10 SYRINGE (ML) INJECTION
Status: DISCONTINUED | OUTPATIENT
Start: 2019-05-01 | End: 2019-05-01 | Stop reason: HOSPADM

## 2019-05-01 RX ORDER — EPINEPHRINE 0.3 MG/.3ML
0.3 INJECTION SUBCUTANEOUS ONCE AS NEEDED
Status: CANCELLED | OUTPATIENT
Start: 2019-05-01

## 2019-05-01 RX ORDER — DIPHENHYDRAMINE HYDROCHLORIDE 50 MG/ML
50 INJECTION INTRAMUSCULAR; INTRAVENOUS ONCE AS NEEDED
Status: DISCONTINUED | OUTPATIENT
Start: 2019-05-01 | End: 2019-05-01 | Stop reason: HOSPADM

## 2019-05-01 RX ADMIN — BEVACIZUMAB 880 MG: 100 INJECTION, SOLUTION INTRAVENOUS at 11:05

## 2019-05-01 RX ADMIN — DIPHENHYDRAMINE HYDROCHLORIDE 50 MG: 50 INJECTION, SOLUTION INTRAMUSCULAR; INTRAVENOUS at 10:05

## 2019-05-01 RX ADMIN — DOXORUBICIN HYDROCHLORIDE 82 MG: 2 INJECTION, SUSPENSION, LIPOSOMAL INTRAVENOUS at 12:05

## 2019-05-01 RX ADMIN — SODIUM CHLORIDE: 0.9 INJECTION, SOLUTION INTRAVENOUS at 10:05

## 2019-05-01 NOTE — PROGRESS NOTES
Subjective:       Patient ID: Mickie Don is a 63 y.o. female.    Chief Complaint: malignant neoplasm of ovary, unspecified laterality and Chemotherapy    Treatment History  Xlap/Omentectomy on 11/1/16 - mass was unresectable  Stage IIIC ovarian cancer  Initiated Taxotere/Carbo on 11/18/16, now s/p 6 cycles completed 3/8/17  Xlap/ISIS/BSO/Radical mass resection > 10 cm, optimal debulking on 4/11/17  Initiated PRIMA on 8/21/17  BRCA negative  Started FORWARD1 on 11/27/17  Started Doxil/Anju as part of NRG  2/27/19    HPI     Patient comes in today prior to C4D1 for treatment on NRG  Arm 3. ANC was 1100 and too low for treatment last week so delayed. Today at 2000. BP ok.  PS 0.  Looks and feels well. Hand and foot neuropathy but taking Lyrica daily. No PPE.    Review of Systems   Constitutional: Negative for appetite change, chills, diaphoresis, fatigue, fever and unexpected weight change.   HENT: Negative for mouth sores and tinnitus.    Respiratory: Negative for cough, chest tightness, shortness of breath and wheezing.    Cardiovascular: Negative for chest pain, palpitations and leg swelling.   Gastrointestinal: Negative for abdominal distention, abdominal pain, blood in stool, constipation, diarrhea, nausea and vomiting.   Genitourinary: Negative for difficulty urinating, dysuria, flank pain, frequency, hematuria, pelvic pain, urgency, vaginal bleeding, vaginal discharge and vaginal pain.   Musculoskeletal: Negative for arthralgias, back pain and gait problem.   Skin: Negative for color change and rash.   Neurological: Positive for numbness. Negative for dizziness, weakness and headaches.   Hematological: Negative for adenopathy. Does not bruise/bleed easily.   Psychiatric/Behavioral: Negative for confusion and sleep disturbance. The patient is not nervous/anxious.        Objective:   BP (!) 124/58   Pulse 67   Wt 86.2 kg (190 lb 0.6 oz)   BMI 29.76 kg/m²      Physical Exam   Constitutional: She is  oriented to person, place, and time. She appears well-developed and well-nourished. No distress.   HENT:   Head: Normocephalic and atraumatic.   Eyes: No scleral icterus.   Neck: Normal range of motion.   Pulmonary/Chest: Effort normal. No respiratory distress.   Abdominal: Soft. She exhibits no distension.   Genitourinary:   Genitourinary Comments: Not performed   Musculoskeletal: Normal range of motion. She exhibits no edema or tenderness.   Neurological: She is alert and oriented to person, place, and time.   Skin: Skin is warm and dry. No pallor.   Psychiatric: She has a normal mood and affect. Her behavior is normal. Judgment and thought content normal.       Assessment:       1. Ovarian cancer, unspecified laterality    2. Examination of participant in clinical trial    3. Encounter for antineoplastic chemotherapy        Plan:     Labs reviewed with Dr. Tran and ok to proceed with C4D1 Doxil/Avastin.  RTC as scheduled.

## 2019-05-01 NOTE — PLAN OF CARE
Problem: Adult Inpatient Plan of Care  Goal: Plan of Care Review  Outcome: Ongoing (interventions implemented as appropriate)  1333-Patient tolerated treatment well, she had her vital signs checked ten minutes after avastin per protocol and she had ice packs placed to her hands and feet during doxil infusion. Discharged without complaints or S/S of adverse event.  Instructed to call provider for any questions or concerns.

## 2019-05-01 NOTE — PLAN OF CARE
Problem: Adult Inpatient Plan of Care  Goal: Patient-Specific Goal (Individualization)  Outcome: Ongoing (interventions implemented as appropriate)  1015-Labs , hx, and medications reviewed, patient was seen in MD office prior to arrival, also seen by research nurse. Assessment completed. Discussed plan of care with patient. Patient in agreement. Chair reclined and warm blanket and snack offered.

## 2019-05-06 ENCOUNTER — TELEPHONE (OUTPATIENT)
Dept: OPTOMETRY | Facility: CLINIC | Age: 64
End: 2019-05-06

## 2019-05-07 ENCOUNTER — HOSPITAL ENCOUNTER (OUTPATIENT)
Facility: OTHER | Age: 64
Discharge: HOME | End: 2019-05-07
Attending: OPHTHALMOLOGY | Admitting: OPHTHALMOLOGY
Payer: COMMERCIAL

## 2019-05-07 ENCOUNTER — ANESTHESIA EVENT (OUTPATIENT)
Dept: SURGERY | Facility: OTHER | Age: 64
End: 2019-05-07
Payer: COMMERCIAL

## 2019-05-07 ENCOUNTER — ANESTHESIA (OUTPATIENT)
Dept: SURGERY | Facility: OTHER | Age: 64
End: 2019-05-07
Payer: COMMERCIAL

## 2019-05-07 VITALS
RESPIRATION RATE: 18 BRPM | HEART RATE: 62 BPM | BODY MASS INDEX: 27.47 KG/M2 | TEMPERATURE: 98 F | OXYGEN SATURATION: 99 % | DIASTOLIC BLOOD PRESSURE: 65 MMHG | SYSTOLIC BLOOD PRESSURE: 111 MMHG | WEIGHT: 175 LBS | HEIGHT: 67 IN

## 2019-05-07 DIAGNOSIS — H25.12 AGE-RELATED NUCLEAR CATARACT, LEFT: ICD-10-CM

## 2019-05-07 DIAGNOSIS — H25.12 NUCLEAR SENILE CATARACT OF LEFT EYE: Primary | ICD-10-CM

## 2019-05-07 PROCEDURE — 66984 XCAPSL CTRC RMVL W/O ECP: CPT | Mod: 79,LT,, | Performed by: OPHTHALMOLOGY

## 2019-05-07 PROCEDURE — 37000008 HC ANESTHESIA 1ST 15 MINUTES: Performed by: OPHTHALMOLOGY

## 2019-05-07 PROCEDURE — 63600175 PHARM REV CODE 636 W HCPCS: Performed by: NURSE ANESTHETIST, CERTIFIED REGISTERED

## 2019-05-07 PROCEDURE — 36000707: Performed by: OPHTHALMOLOGY

## 2019-05-07 PROCEDURE — 71000015 HC POSTOP RECOV 1ST HR: Performed by: OPHTHALMOLOGY

## 2019-05-07 PROCEDURE — 66984 PR REMOVAL, CATARACT, W/INSRT INTRAOC LENS, W/O ENDO CYCLO: ICD-10-PCS | Mod: 79,LT,, | Performed by: OPHTHALMOLOGY

## 2019-05-07 PROCEDURE — 37000009 HC ANESTHESIA EA ADD 15 MINS: Performed by: OPHTHALMOLOGY

## 2019-05-07 PROCEDURE — 25000003 PHARM REV CODE 250: Performed by: OPHTHALMOLOGY

## 2019-05-07 PROCEDURE — 36000706: Performed by: OPHTHALMOLOGY

## 2019-05-07 PROCEDURE — V2632 POST CHMBR INTRAOCULAR LENS: HCPCS | Performed by: OPHTHALMOLOGY

## 2019-05-07 DEVICE — LENS IOL ITEC PRELOAD 19.0D: Type: IMPLANTABLE DEVICE | Site: EYE | Status: FUNCTIONAL

## 2019-05-07 RX ORDER — MOXIFLOXACIN 5 MG/ML
1 SOLUTION/ DROPS OPHTHALMIC
Status: COMPLETED | OUTPATIENT
Start: 2019-05-07 | End: 2019-05-07

## 2019-05-07 RX ORDER — TROPICAMIDE 10 MG/ML
1 SOLUTION/ DROPS OPHTHALMIC
Status: DISCONTINUED | OUTPATIENT
Start: 2019-05-07 | End: 2019-05-07 | Stop reason: HOSPADM

## 2019-05-07 RX ORDER — TETRACAINE HYDROCHLORIDE 5 MG/ML
SOLUTION OPHTHALMIC
Status: DISCONTINUED | OUTPATIENT
Start: 2019-05-07 | End: 2019-05-07 | Stop reason: HOSPADM

## 2019-05-07 RX ORDER — PROPARACAINE HYDROCHLORIDE 5 MG/ML
1 SOLUTION/ DROPS OPHTHALMIC
Status: DISCONTINUED | OUTPATIENT
Start: 2019-05-07 | End: 2019-05-07 | Stop reason: HOSPADM

## 2019-05-07 RX ORDER — TETRACAINE HYDROCHLORIDE 5 MG/ML
1 SOLUTION OPHTHALMIC
Status: COMPLETED | OUTPATIENT
Start: 2019-05-07 | End: 2019-05-07

## 2019-05-07 RX ORDER — PHENYLEPHRINE HYDROCHLORIDE 25 MG/ML
1 SOLUTION/ DROPS OPHTHALMIC
Status: DISCONTINUED | OUTPATIENT
Start: 2019-05-07 | End: 2019-05-07 | Stop reason: HOSPADM

## 2019-05-07 RX ORDER — LIDOCAINE HYDROCHLORIDE 10 MG/ML
INJECTION, SOLUTION EPIDURAL; INFILTRATION; INTRACAUDAL; PERINEURAL
Status: DISCONTINUED | OUTPATIENT
Start: 2019-05-07 | End: 2019-05-07 | Stop reason: HOSPADM

## 2019-05-07 RX ORDER — ACETAMINOPHEN 325 MG/1
650 TABLET ORAL EVERY 4 HOURS PRN
Status: DISCONTINUED | OUTPATIENT
Start: 2019-05-07 | End: 2019-05-07 | Stop reason: HOSPADM

## 2019-05-07 RX ORDER — MOXIFLOXACIN 5 MG/ML
SOLUTION/ DROPS OPHTHALMIC
Status: DISCONTINUED | OUTPATIENT
Start: 2019-05-07 | End: 2019-05-07 | Stop reason: HOSPADM

## 2019-05-07 RX ORDER — LIDOCAINE HYDROCHLORIDE 40 MG/ML
INJECTION, SOLUTION RETROBULBAR
Status: DISCONTINUED | OUTPATIENT
Start: 2019-05-07 | End: 2019-05-07 | Stop reason: HOSPADM

## 2019-05-07 RX ORDER — PREDNISOLONE ACETATE 10 MG/ML
SUSPENSION/ DROPS OPHTHALMIC
Status: DISCONTINUED | OUTPATIENT
Start: 2019-05-07 | End: 2019-05-07 | Stop reason: HOSPADM

## 2019-05-07 RX ORDER — MIDAZOLAM HYDROCHLORIDE 1 MG/ML
INJECTION INTRAMUSCULAR; INTRAVENOUS
Status: DISCONTINUED | OUTPATIENT
Start: 2019-05-07 | End: 2019-05-07

## 2019-05-07 RX ADMIN — MOXIFLOXACIN HYDROCHLORIDE 1 DROP: 5 SOLUTION/ DROPS OPHTHALMIC at 09:05

## 2019-05-07 RX ADMIN — TETRACAINE HYDROCHLORIDE 1 DROP: 5 SOLUTION OPHTHALMIC at 09:05

## 2019-05-07 RX ADMIN — MOXIFLOXACIN 1 DROP: 5 SOLUTION/ DROPS OPHTHALMIC at 10:05

## 2019-05-07 RX ADMIN — TROPICAMIDE 1 DROP: 10 SOLUTION/ DROPS OPHTHALMIC at 09:05

## 2019-05-07 RX ADMIN — MIDAZOLAM HYDROCHLORIDE 2 MG: 1 INJECTION, SOLUTION INTRAMUSCULAR; INTRAVENOUS at 10:05

## 2019-05-07 RX ADMIN — PHENYLEPHRINE HYDROCHLORIDE 1 DROP: 25 SOLUTION/ DROPS OPHTHALMIC at 09:05

## 2019-05-07 NOTE — OP NOTE
DATE OF PROCEDURE: 05/07/2019    SURGEON: JORDANA LION MD    PREOPERATIVE DIAGNOSIS:  Senile nuclear sclerotic cataract left eye.     POSTOPERATIVE DIAGNOSIS: Senile nuclear sclerotic cataract left eye.     PROCEDURE PERFORMED:  Phacoemulsification with placement of intraocular lens, left eye.    IMPLANT:  PCBOO 19.0    ANESTHESIA:  Topical and MAC    COMPLICATIONS: none    ESTIMATED BLOOD LOSS: <1cc    SPECIMENS: none    INDICATIONS FOR PROCEDURE:  This patient presented to the clinic with decreased vision in the left eye and was found to have a cataract.  The risks, benefits, and alternatives were discussed and the patient agreed to proceed with phacoemulsification and implantation of a lens in the left eye.     PROCEDURE IN DETAIL:  The patient was met in the preop holding area.  Consent was confirmed to be signed.  The operative site was marked.  The patient was brought into the operating room by the anesthesia team and placed under monitored anesthesia care.  The left eye was prepped and draped in a sterile ophthalmic fashion.  A Deepika speculum was placed into the left eye.   A paracentesis site was made and 1% preservative-free lidocaine was injected into the anterior chamber.  Viscoelastic  material was injected into the anterior chamber.  A keratome blade was used to make a clear corneal incision.  A cystotome was used to initiate the continuous curvilinear capsulorrhexis which was completed with Utrata forceps.  BSS on a gaxiola cannula was used to perform hydrodissection.  The phacoemulsification tip was introduced into the eye and the nucleus was removed in a standard divide-and-conquer fashion.  Remaining cortical material was removed from the eye using irrigation-aspiration.  The capsular bag was filled with viscoelastic material and the intraocular lens was injected and positioned into place. Remaining viscoelastic material was removed from the eye using irrigation and aspiration.  The corneal  wounds were hydrated.  The eye was filled to physiologic pressure. The wounds were found to be watertight. Drops of Vigamox and prednisilone were placed into the eye.  The eye was washed, dried, and shielded.  The patient tolerated the procedure well and knows to follow up with me tomorrow morning, sooner if needed.

## 2019-05-07 NOTE — ANESTHESIA POSTPROCEDURE EVALUATION
Anesthesia Post Evaluation    Patient: Mickie Don    Procedure(s) Performed: Procedure(s) (LRB):  EXTRACTION, CATARACT, WITH IOL INSERTION (Left)    Final Anesthesia Type: MAC  Patient location during evaluation: [facility]  Patient participation: Yes- Able to Participate  Level of consciousness: awake and alert, awake and oriented  Post-procedure vital signs: reviewed and stable  Pain management: adequate  Airway patency: patent  PONV status at discharge: No PONV  Anesthetic complications: no      Cardiovascular status: blood pressure returned to baseline  Respiratory status: unassisted  Hydration status: euvolemic  Follow-up not needed.          Vitals Value Taken Time   /69 5/7/2019  9:20 AM   Temp 37.2 °C (99 °F) 5/7/2019  9:20 AM   Pulse 65 5/7/2019  9:20 AM   Resp 18 5/7/2019  9:20 AM   SpO2 98 % 5/7/2019  9:20 AM         No case tracking events are documented in the log.      Pain/Maddie Score: No data recorded

## 2019-05-07 NOTE — DISCHARGE INSTRUCTIONS
Anesthesia: Monitored Anesthesia Care (MAC)      What is monitored anesthesia care?  MAC keeps you very drowsy during surgery. You may be awake, but you will likely not remember much. And you wont feel pain. With MAC, medicines are given through an IV line into a vein in your arm or hand. A local anesthetic will usually be injected into the skin and muscle around the surgical site to numb it. The anesthesia provider monitors you during the procedure. He or she checks your heart rate and rhythm, blood pressure, and blood oxygen level.  Anesthesia tools and medicines that may be near you during your procedure  You will likely have:  · A pulse oximeter on the end of your finger. This measures your blood oxygen level.  · Electrocardiography leads (electrodes) on your chest. These record your heart rate and rhythm.  · Medicines given through an IV. These relax you and prevent pain. You may be awake or sleep lightly. If you have local anesthetic, it is injected directly into your skin.  · A facemask to give you oxygen, if needed.  Risks and possible complications  MAC has some risks. These include:  · Breathing problems  · Nausea and vomiting  · Allergic reaction to the anesthetic    Anesthesia safety  Tips for anesthesia safety include the following:   · Follow all instructions you are given for how long not to eat or drink before your procedure.  · Be sure your healthcare provider knows what medicines you take, especially any anti-inflammatory medicine or blood thinners. This includes aspirin and any other over-the-counter medicines, herbs, and supplements.  · Have an adult family member or friend drive you home after the procedure.  · For the first 24 hours after your surgery:  ¨ Do not drive or use heavy equipment.  ¨ Do not make important decisions or sign documents.  ¨ Avoid alcohol.  ¨ Have someone stay with you, if possible. They can watch for problems and help keep you safe.  Date Last Reviewed: 12/1/2016  ©  2765-5070 [employer]. [address] [place] [address], [place] [address]. All rights reserved. This information is not intended as a substitute for professional medical care. Always follow your healthcare professional's instructions.

## 2019-05-07 NOTE — ANESTHESIA PREPROCEDURE EVALUATION
05/07/2019  Mickie Don is a 63 y.o., female.    Anesthesia Evaluation    I have reviewed the Patient Summary Reports.    I have reviewed the Nursing Notes.   I have reviewed the Medications.     Review of Systems  Anesthesia Hx:  History of prior surgery of interest to airway management or planning: Previous anesthesia: MAC  4/19 phaco, rec'd 2mg versed, did well with MAC.  Denies Family Hx of Anesthesia complications.   Denies Personal Hx of Anesthesia complications.   Social:  Non-Smoker    Hematology/Oncology:  Hematology Normal      Current/Recent Cancer. (ovarian)   EENT/Dental:  EENT/Dental Normal Glaucoma     Cardiovascular:  Cardiovascular Normal     Pulmonary:  Pulmonary Normal    Renal/:  Renal/ Normal     Hepatic/GI:  Hepatic/GI Normal    Musculoskeletal:  Musculoskeletal Normal    Neurological:   Neuromuscular Disease, (chemo)    Endocrine:  Endocrine Normal    Dermatological:  Skin Normal    Psych:  Psychiatric Normal           Physical Exam  General:  Well nourished    Airway/Jaw/Neck:  Airway Findings: Mouth Opening: Normal Tongue: Normal  General Airway Assessment: Adult, Good  Mallampati: II  TM Distance: Normal, at least 6 cm     Eyes/Ears/Nose:  EYES/EARS/NOSE FINDINGS: Normal   Dental:  Dental Findings: In tact        Mental Status:  Mental Status Findings:  Cooperative, Alert and Oriented         Anesthesia Plan  Type of Anesthesia, risks & benefits discussed:  Anesthesia Type:  MAC  Patient's Preference:   Intra-op Monitoring Plan: standard ASA monitors  Intra-op Monitoring Plan Comments:   Post Op Pain Control Plan:   Post Op Pain Control Plan Comments:   Induction:    Beta Blocker:         Informed Consent: Patient understands risks and agrees with Anesthesia plan.  Questions answered. Anesthesia consent signed with patient.  ASA Score: 3     Day of Surgery Review of History &  Physical:    H&P update referred to the surgeon.         Ready For Surgery From Anesthesia Perspective.

## 2019-05-07 NOTE — PLAN OF CARE
Mickie Don has met all discharge criteria from Phase II. Vital Signs are stable, ambulating  without difficulty. Discharge instructions given, patient verbalized understanding. Discharged from facility via wheelchair in stable condition.

## 2019-05-08 ENCOUNTER — OFFICE VISIT (OUTPATIENT)
Dept: OPHTHALMOLOGY | Facility: CLINIC | Age: 64
End: 2019-05-08
Payer: COMMERCIAL

## 2019-05-08 DIAGNOSIS — Z96.1 STATUS POST CATARACT EXTRACTION AND INSERTION OF INTRAOCULAR LENS, LEFT: ICD-10-CM

## 2019-05-08 DIAGNOSIS — Z98.41 STATUS POST CATARACT EXTRACTION AND INSERTION OF INTRAOCULAR LENS, RIGHT: Primary | ICD-10-CM

## 2019-05-08 DIAGNOSIS — Z98.42 STATUS POST CATARACT EXTRACTION AND INSERTION OF INTRAOCULAR LENS, LEFT: ICD-10-CM

## 2019-05-08 DIAGNOSIS — Z96.1 STATUS POST CATARACT EXTRACTION AND INSERTION OF INTRAOCULAR LENS, RIGHT: Primary | ICD-10-CM

## 2019-05-08 DIAGNOSIS — H18.519 FUCHS' CORNEAL DYSTROPHY: ICD-10-CM

## 2019-05-08 PROCEDURE — 99999 PR PBB SHADOW E&M-EST. PATIENT-LVL II: CPT | Mod: PBBFAC,,, | Performed by: OPHTHALMOLOGY

## 2019-05-08 PROCEDURE — 99024 POSTOP FOLLOW-UP VISIT: CPT | Mod: S$GLB,,, | Performed by: OPHTHALMOLOGY

## 2019-05-08 PROCEDURE — 99024 PR POST-OP FOLLOW-UP VISIT: ICD-10-PCS | Mod: S$GLB,,, | Performed by: OPHTHALMOLOGY

## 2019-05-08 PROCEDURE — 99999 PR PBB SHADOW E&M-EST. PATIENT-LVL II: ICD-10-PCS | Mod: PBBFAC,,, | Performed by: OPHTHALMOLOGY

## 2019-05-09 NOTE — PROGRESS NOTES
HPI     Post-op Evaluation      Additional comments: Pt states no complaints today and that left eye did   okay              Comments     Referred by Dr Mcgrath     s/p Phaco IOL OS 5/7/19  S/p Phaco IOL OD 4/4/19  Fuchs     PGB Combo TID OS            Last edited by Karen Butt MD on 5/9/2019  8:20 AM. (History)            Assessment /Plan     For exam results, see Encounter Report.    Status post cataract extraction and insertion of intraocular lens, right    Status post cataract extraction and insertion of intraocular lens, left    Fuchs' corneal dystrophy      Slit Lamp Exam  L/L - normal  C/s - quiet  Cornea - clear  A/C - 1+ cell  Lens - PCIOL    POD #1 s/p phaco/IOL  - doing well  - continue the following drops:    vigamox or ocuflox TID x 1 wk then stop  Pred forte or durezol or dexamethasone TID x  4 wks  Ketorolac TID until runs out    Versus:    Combination drop - 1 drop TID x total of 1 month    Appropriate precautions and post op medications reviewed.  Patient instructed to call or come in if symptoms of redness, decreased vision, or pain are experienced.    -f/up 4 wks, sooner PRN. Arx/mrx OU. Then re-establish care with [facility]

## 2019-05-14 RX ORDER — HEPARIN 100 UNIT/ML
500 SYRINGE INTRAVENOUS
Status: CANCELLED | OUTPATIENT
Start: 2019-05-14

## 2019-05-14 RX ORDER — DIPHENHYDRAMINE HYDROCHLORIDE 50 MG/ML
50 INJECTION INTRAMUSCULAR; INTRAVENOUS ONCE AS NEEDED
Status: CANCELLED | OUTPATIENT
Start: 2019-05-14

## 2019-05-14 RX ORDER — EPINEPHRINE 0.3 MG/.3ML
0.3 INJECTION SUBCUTANEOUS ONCE AS NEEDED
Status: CANCELLED | OUTPATIENT
Start: 2019-05-14

## 2019-05-14 RX ORDER — SODIUM CHLORIDE 0.9 % (FLUSH) 0.9 %
10 SYRINGE (ML) INJECTION
Status: CANCELLED | OUTPATIENT
Start: 2019-05-14

## 2019-05-15 ENCOUNTER — RESEARCH ENCOUNTER (OUTPATIENT)
Dept: RESEARCH | Facility: HOSPITAL | Age: 64
End: 2019-05-15

## 2019-05-15 ENCOUNTER — INFUSION (OUTPATIENT)
Dept: INFUSION THERAPY | Facility: HOSPITAL | Age: 64
End: 2019-05-15
Attending: OBSTETRICS & GYNECOLOGY
Payer: COMMERCIAL

## 2019-05-15 VITALS
WEIGHT: 188.5 LBS | DIASTOLIC BLOOD PRESSURE: 74 MMHG | HEART RATE: 76 BPM | SYSTOLIC BLOOD PRESSURE: 136 MMHG | RESPIRATION RATE: 18 BRPM | TEMPERATURE: 98 F | BODY MASS INDEX: 29.58 KG/M2 | HEIGHT: 67 IN

## 2019-05-15 DIAGNOSIS — C56.9 OVARIAN CANCER, UNSPECIFIED LATERALITY: Primary | ICD-10-CM

## 2019-05-15 PROCEDURE — 96413 CHEMO IV INFUSION 1 HR: CPT

## 2019-05-15 PROCEDURE — 63600175 PHARM REV CODE 636 W HCPCS: Mod: JG | Performed by: OBSTETRICS & GYNECOLOGY

## 2019-05-15 PROCEDURE — 25000003 PHARM REV CODE 250: Performed by: OBSTETRICS & GYNECOLOGY

## 2019-05-15 RX ORDER — DIPHENHYDRAMINE HYDROCHLORIDE 50 MG/ML
50 INJECTION INTRAMUSCULAR; INTRAVENOUS ONCE AS NEEDED
Status: DISCONTINUED | OUTPATIENT
Start: 2019-05-15 | End: 2019-05-15 | Stop reason: HOSPADM

## 2019-05-15 RX ORDER — HEPARIN 100 UNIT/ML
500 SYRINGE INTRAVENOUS
Status: DISCONTINUED | OUTPATIENT
Start: 2019-05-15 | End: 2019-05-15 | Stop reason: HOSPADM

## 2019-05-15 RX ORDER — EPINEPHRINE 0.3 MG/.3ML
0.3 INJECTION SUBCUTANEOUS ONCE AS NEEDED
Status: DISCONTINUED | OUTPATIENT
Start: 2019-05-15 | End: 2019-05-15 | Stop reason: HOSPADM

## 2019-05-15 RX ORDER — SODIUM CHLORIDE 0.9 % (FLUSH) 0.9 %
10 SYRINGE (ML) INJECTION
Status: DISCONTINUED | OUTPATIENT
Start: 2019-05-15 | End: 2019-05-15 | Stop reason: HOSPADM

## 2019-05-15 RX ADMIN — BEVACIZUMAB 880 MG: 400 INJECTION, SOLUTION INTRAVENOUS at 10:05

## 2019-05-15 RX ADMIN — SODIUM CHLORIDE: 9 INJECTION, SOLUTION INTRAVENOUS at 10:05

## 2019-05-15 NOTE — PLAN OF CARE
Problem: Adult Inpatient Plan of Care  Goal: Plan of Care Review  Outcome: Ongoing (interventions implemented as appropriate)  Pt here for Avastin infusion D15C4, hx, meds, allergies reviewed, pt with some complaints of pain to left side speaking with Annette moncada nurse about it, recline din chair, warm blanket provided, continue to monitor

## 2019-05-15 NOTE — PLAN OF CARE
Problem: Adult Inpatient Plan of Care  Goal: Plan of Care Review  Outcome: Ongoing (interventions implemented as appropriate)  Pt tolerated avastin infusion without issue, pt to rtc 5/27/19, no distress noted upon d/c to home

## 2019-05-16 DIAGNOSIS — C56.9 OVARIAN CANCER, UNSPECIFIED LATERALITY: Primary | ICD-10-CM

## 2019-05-16 DIAGNOSIS — Z00.6 EXAMINATION OF PARTICIPANT IN CLINICAL TRIAL: ICD-10-CM

## 2019-05-16 NOTE — PROGRESS NOTES
May 1, 2019     Protocol: NRG-  : Kiana Weston MD  Treating Physician: Benny Tran MD  Pt Initials:  NAV BERMUDEZ  Study ID: [id number]  IRB#: [id number]     A Randomized Phase II/III Study of Pegylated Liposomal Doxorubicin and Atezolizumab vs. Pegylated Liposomal Doxorubicin/Bevacizumab/ Atezolizumab vs. Pegylated Liposomal Doxorubicin/ Bevacizumab in Platinum-resistant Ovarian Cancer.     Cycle 4 Day 1 - delayed 1 week  Patient randomized on 1/23/19 to Arm 3: Pegylated Liposomal Doxorubicin, 40 mg/m2, q28d and Bevacizumab, 10 mg/kg, q14d.      Patient had CT-CAP on 3/25/19.  Per RECIST1.1, patient has stable disease.  Dr. Tran reviewed the scans and confirmed with assessment.  He agrees patient should remain on protocol.     Patient presented to clinic for C4D1 treatment on the above-mentioned clinical trial. Patient alert and oriented, mood and affect appropriate to the situation. Patient reports improvement in Grade 1 Hand-Foot - no blister/peeling but with discomfort on both feet and hands.  Patient taking not currently taking Lyrica for symptoms.   She denies fever, nausea vomiting, pain, diarrhea, constipation, headaches, or dizziness.     Physical exam, labs, and performance status assessment assessed per protocol.  Local labs reviewed by Dr. Tran prior to treatment.  Doxil/Avastin dose reviewed and verified with Dr. Tran.  Reviewed current medications with patient.  See Concomitant Medication sheets.        Patient scheduled to return for C4D15 on 5/15/19.     All of the patient's questions were answered to her satisfaction. Patient expressed her willingness to continue to participate in the above-mentioned clinical trial.  Instructed patient to notify Dr. Tran and/or me with any questions, concerns, or changes in health status. Patient verbalized understanding.      Baseline History:  1. Eye disorders, other - Open angle with borderline findings and low glaucoma risk in both  eyes, Grade 1.  2. Peripheral neuropathy- toes, Grade 1.  3. Peripheral neuropathy- fingers, Grade 1.  4. Platelet count decreased/thrombocytopenia, Grade 1.  5. Fatigue, Grade 1.  Will continue to monitor.  6. Cataracts, Grade 2.  Will be assessed by Dr. Mcgrath on 1/15/19.  Will continue to monitor.  7. Blurred vision, Grade 2. Due to cataracts per Ophthalmologist. Will continue to monitor.     Adverse Events - For the most up-to-date AE log, please refer to the paper AE log in the subjects research file.  1. Hand-Foot Syndrome, remains Grade 1.  Will continue to monitor.  2. Afebrile neutropenia, resolved from Grade 2.   Will continue to monitor.

## 2019-05-16 NOTE — PROGRESS NOTES
May 15, 2019     Protocol: NRG-  : Kiana Weston MD  Treating Physician: Benny Tran MD  Pt Initials:  NAV BERMUDEZ  Study ID: [id number]  IRB#: [id number]     A Randomized Phase II/III Study of Pegylated Liposomal Doxorubicin and Atezolizumab vs. Pegylated Liposomal Doxorubicin/Bevacizumab/ Atezolizumab vs. Pegylated Liposomal Doxorubicin/ Bevacizumab in Platinum-resistant Ovarian Cancer.     Cycle 4 Day 15  Patient randomized on 1/23/19 to Arm 3: Pegylated Liposomal Doxorubicin, 40 mg/m2, q28d and Bevacizumab, 10 mg/kg, q14d.      Patient had CT-CAP on 3/25/19.  Per RECIST1.1, patient has stable disease.  Dr. Tran reviewed the scans and confirmed with assessment.  He agrees patient should remain on protocol.     Patient presented to clinic for C4D15 treatment on the above-mentioned clinical trial. Patient alert and oriented, mood and affect appropriate to the situation. Patient reports Grade 1 Hand-Foot - no blister/peeling but with discomfort on both feet and hands.  Patient taking not currently taking Lyrica for symptoms.  Patient reports /pain in left side/back.   Patient had 2nd cataract surgery on 5/7/19 and is recovering well.  First surgery was on 4/4/19.  She denies fever, nausea vomiting, pain, diarrhea, constipation, headaches, or dizziness.     Physical exam, labs, and performance status assessment not required per protocol.  Dose reviewed and confirmed with Dr. Tran via Epic messageReviewed current medications with patient.  See Concomitant Medication sheets.        Patient scheduled to return for CT and ECHO on 5/24/19 and C5D1 on 5/27/19.     All of the patient's questions were answered by Dr. Tran and me to her satisfaction. Patient expressed her willingness to continue to participate in the above-mentioned clinical trial.  Instructed patient to notify Dr. Tran and/or me with any questions, concerns, or changes in health status. Patient verbalized understanding.       Baseline History:  1. 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  2. Peripheral neuropathy- toes, Grade 1.  3. Peripheral neuropathy- fingers, Grade 1.  4. Platelet count decreased/thrombocytopenia, Grade 1.  5. Fatigue, Grade 1.  Will continue to monitor.  6. Cataracts, Grade 2.  Will be assessed by Dr. Mcgrath on 1/15/19.  Will continue to monitor.  7. Blurred vision, Grade 2. Due to cataracts per Ophthalmologist. Will continue to monitor.     Adverse Events - For the most up-to-date AE log, please refer to the paper AE log in the subjects research file.  1. Hand-Foot Syndrome, remains Grade 1.  Will continue to monitor.  2. Pain - lower back, Grade 1.  Will continue to monitor.  3. Afebrile neutropenia, resolved from Grade 2.   Will continue to monitor.

## 2019-05-24 ENCOUNTER — HOSPITAL ENCOUNTER (OUTPATIENT)
Dept: RADIOLOGY | Facility: OTHER | Age: 64
Discharge: HOME | End: 2019-05-24
Attending: OBSTETRICS & GYNECOLOGY
Payer: COMMERCIAL

## 2019-05-24 ENCOUNTER — HOSPITAL ENCOUNTER (OUTPATIENT)
Dept: CARDIOLOGY | Facility: OTHER | Age: 64
Discharge: HOME | End: 2019-05-24
Attending: OBSTETRICS & GYNECOLOGY
Payer: COMMERCIAL

## 2019-05-24 DIAGNOSIS — C56.9 OVARIAN CANCER, UNSPECIFIED LATERALITY: ICD-10-CM

## 2019-05-24 DIAGNOSIS — Z00.6 EXAMINATION OF PARTICIPANT IN CLINICAL TRIAL: ICD-10-CM

## 2019-05-24 PROCEDURE — 71260 CT THORAX DX C+: CPT | Mod: 26,,, | Performed by: INTERNAL MEDICINE

## 2019-05-24 PROCEDURE — 74177 CT ABD & PELVIS W/CONTRAST: CPT | Mod: 26,,, | Performed by: INTERNAL MEDICINE

## 2019-05-24 PROCEDURE — 74177 CT CHEST ABDOMEN PELVIS WITH CONTRAST (XPD): ICD-10-PCS | Mod: 26,,, | Performed by: INTERNAL MEDICINE

## 2019-05-24 PROCEDURE — 74177 CT ABD & PELVIS W/CONTRAST: CPT | Mod: TC

## 2019-05-24 PROCEDURE — 71260 CT CHEST ABDOMEN PELVIS WITH CONTRAST (XPD): ICD-10-PCS | Mod: 26,,, | Performed by: INTERNAL MEDICINE

## 2019-05-24 PROCEDURE — 25500020 PHARM REV CODE 255: Performed by: OBSTETRICS & GYNECOLOGY

## 2019-05-24 RX ADMIN — IOHEXOL 100 ML: 350 INJECTION, SOLUTION INTRAVENOUS at 09:05

## 2019-05-24 RX ADMIN — IOHEXOL 30 ML: 350 INJECTION, SOLUTION INTRAVENOUS at 08:05

## 2019-05-27 ENCOUNTER — RESEARCH ENCOUNTER (OUTPATIENT)
Dept: RESEARCH | Facility: HOSPITAL | Age: 64
End: 2019-05-27

## 2019-05-27 ENCOUNTER — OFFICE VISIT (OUTPATIENT)
Dept: GYNECOLOGIC ONCOLOGY | Facility: CLINIC | Age: 64
End: 2019-05-27
Payer: COMMERCIAL

## 2019-05-27 ENCOUNTER — TELEPHONE (OUTPATIENT)
Dept: INTERNAL MEDICINE | Facility: CLINIC | Age: 64
End: 2019-05-27

## 2019-05-27 VITALS
HEART RATE: 66 BPM | BODY MASS INDEX: 29.29 KG/M2 | WEIGHT: 187 LBS | SYSTOLIC BLOOD PRESSURE: 144 MMHG | DIASTOLIC BLOOD PRESSURE: 66 MMHG

## 2019-05-27 DIAGNOSIS — Z51.11 ENCOUNTER FOR ANTINEOPLASTIC CHEMOTHERAPY: Primary | ICD-10-CM

## 2019-05-27 DIAGNOSIS — Z00.6 EXAMINATION OF PARTICIPANT IN CLINICAL TRIAL: ICD-10-CM

## 2019-05-27 DIAGNOSIS — C56.9 OVARIAN CANCER, UNSPECIFIED LATERALITY: ICD-10-CM

## 2019-05-27 PROCEDURE — 99214 PR OFFICE/OUTPT VISIT, EST, LEVL IV, 30-39 MIN: ICD-10-PCS | Mod: S$GLB,,, | Performed by: OBSTETRICS & GYNECOLOGY

## 2019-05-27 PROCEDURE — 99999 PR PBB SHADOW E&M-EST. PATIENT-LVL III: ICD-10-PCS | Mod: PBBFAC,,, | Performed by: OBSTETRICS & GYNECOLOGY

## 2019-05-27 PROCEDURE — 3008F BODY MASS INDEX DOCD: CPT | Mod: CPTII,S$GLB,, | Performed by: OBSTETRICS & GYNECOLOGY

## 2019-05-27 PROCEDURE — 3008F PR BODY MASS INDEX (BMI) DOCUMENTED: ICD-10-PCS | Mod: CPTII,S$GLB,, | Performed by: OBSTETRICS & GYNECOLOGY

## 2019-05-27 PROCEDURE — 99214 OFFICE O/P EST MOD 30 MIN: CPT | Mod: S$GLB,,, | Performed by: OBSTETRICS & GYNECOLOGY

## 2019-05-27 PROCEDURE — 99999 PR PBB SHADOW E&M-EST. PATIENT-LVL III: CPT | Mod: PBBFAC,,, | Performed by: OBSTETRICS & GYNECOLOGY

## 2019-05-27 RX ORDER — CIPROFLOXACIN 500 MG/1
500 TABLET ORAL 2 TIMES DAILY
Qty: 14 TABLET | Refills: 0 | Status: SHIPPED | OUTPATIENT
Start: 2019-05-27 | End: 2019-06-03

## 2019-05-27 RX ORDER — METRONIDAZOLE 500 MG/1
500 TABLET ORAL EVERY 12 HOURS
Qty: 14 TABLET | Refills: 0 | Status: SHIPPED | OUTPATIENT
Start: 2019-05-27 | End: 2019-06-03

## 2019-05-27 NOTE — PROGRESS NOTES
Subjective:       Patient ID: Mickie Don is a 63 y.o. female.    Chief Complaint: Chemotherapy (doxil/avastin)    Treatment History  Xlap/Omentectomy on 11/1/16 - mass was unresectable  Stage IIIC ovarian cancer  Initiated Taxotere/Carbo on 11/18/16, now s/p 6 cycles completed 3/8/17  Xlap/ISIS/BSO/Radical mass resection > 10 cm, optimal debulking on 4/11/17  Initiated PRIMA on 8/21/17  BRCA negative  Started FORWARD1 on 11/27/17  Started Doxil/Anju as part of NRG  2/27/19    HPI     Patient comes in today prior to C5D1 for treatment on NRG  Arm 3. ANC was 1300 and platelets were 69, so too low for treatment.  CA-125 increased to 171, from 56.  CT performed on Friday showed really no measurable disease change, but did report increased fat stranding in the left upper quadrant and pelvis.  There was no ascites.  She has been having LLQ pain and occasional diarrhea.  Denies fevers.  Also a lot of gas.    Review of Systems   Constitutional: Negative for appetite change, chills, diaphoresis, fatigue, fever and unexpected weight change.   HENT: Negative for mouth sores and tinnitus.    Respiratory: Negative for cough, chest tightness, shortness of breath and wheezing.    Cardiovascular: Negative for chest pain, palpitations and leg swelling.   Gastrointestinal: Negative for abdominal distention, abdominal pain, blood in stool, constipation, diarrhea, nausea and vomiting.   Genitourinary: Negative for difficulty urinating, dysuria, flank pain, frequency, hematuria, pelvic pain, urgency, vaginal bleeding, vaginal discharge and vaginal pain.   Musculoskeletal: Negative for arthralgias, back pain and gait problem.   Skin: Negative for color change and rash.   Neurological: Positive for numbness. Negative for dizziness, weakness and headaches.   Hematological: Negative for adenopathy. Does not bruise/bleed easily.   Psychiatric/Behavioral: Negative for confusion and sleep disturbance. The patient is not  nervous/anxious.        Objective:   BP (!) 144/66   Pulse 66   Wt 84.8 kg (187 lb)   BMI 29.29 kg/m²      Physical Exam   Constitutional: She is oriented to person, place, and time. She appears well-developed and well-nourished. No distress.   HENT:   Head: Normocephalic and atraumatic.   Eyes: No scleral icterus.   Neck: Normal range of motion.   Pulmonary/Chest: Effort normal. No respiratory distress.   Abdominal: Soft. She exhibits no distension and no mass.   Tender to palpation in LLQ, along sigmoid   Genitourinary: Vagina normal. Pelvic exam was performed with patient supine. There is no rash, tenderness or lesion on the right labia. There is no rash, tenderness or lesion on the left labia. Right adnexum displays no mass. Left adnexum displays no mass.   Genitourinary Comments: Pelvic normal   Musculoskeletal: Normal range of motion. She exhibits no edema or tenderness.   Neurological: She is alert and oriented to person, place, and time.   Skin: Skin is warm and dry. No pallor.   Psychiatric: She has a normal mood and affect. Her behavior is normal. Judgment and thought content normal.       Assessment:       1. Encounter for antineoplastic chemotherapy    2. Ovarian cancer, unspecified laterality    3. Examination of participant in clinical trial        Plan:     Will hold C5 due to ANC and platelets.  I reviewed imaging and CA-125 with her.  I do think this may be progression, however, will treat with abx for 1 week and then repeat labs in case this is a diverticulitis or colitis issue.  Plan labs in 1 week. WIll call her with plan after that.

## 2019-05-27 NOTE — TELEPHONE ENCOUNTER
----- Message from Kylie Dmitri sent at 5/27/2019 10:28 AM CDT -----  Contact: [facility]  Type:  Mammogram    Caller is requesting to schedule their annual mammogram appointment.  Order is not listed in EPIC.  Please enter order and contact patient to schedule.  Name of Caller:Patient  Where would they like the mammogram performed?[facility]  Would the patient rather a call back or a response via [facility]? [email or web address]  Best Call Back Number:[phone]  Additional Information:NA

## 2019-05-28 NOTE — PROGRESS NOTES
May 27, 2019     Protocol: NRG-  : Kiana Weston MD  Treating Physician: Benny Tran MD  Pt Initials:  NAV BERMUDEZ  Study ID: [id number]  IRB#: [id number]     A Randomized Phase II/III Study of Pegylated Liposomal Doxorubicin and Atezolizumab vs. Pegylated Liposomal Doxorubicin/Bevacizumab/ Atezolizumab vs. Pegylated Liposomal Doxorubicin/ Bevacizumab in Platinum-resistant Ovarian Cancer.     Cycle 5 Day 1: DELAYED   Patient randomized on 1/23/19 to Arm 3: Pegylated Liposomal Doxorubicin, 40 mg/m2, q28d and Bevacizumab, 10 mg/kg, q14d.      Patient presented to clinic for C4D15 treatment on the above-mentioned clinical trial. Patient alert and oriented, mood and affect appropriate to the situation. Patient reports Grade 1 Hand-Foot - no blister/peeling but with discomfort on both feet and hands.  Patient taking not currently taking Lyrica for symptoms.  Patient reports /pain in left side/back.   She denies fever, nausea vomiting, constipation, headaches, or dizziness.    Patient had CT-CAP on 5/24/19.  Per RECIST1.1, patient has stable disease.  Dr. Tran reviewed the scans and confirmed with assessment, however there is increased fat stranding and pt's CA-125 has increased to 171.  Pt also complaining of LLQ pain, gas with occasional diarrhea.  Per Dr. Tran potential progression of disease vs. Colitis.        Physical exam and performance status assessment done per protocol.  Local labs reviewed by Dr. Tran prior to treatment and ANC was 1.3 and platelets of 69  which are too low of treatment.  Patient will be delayed 1 week. Reviewed current medications with patient. Pt to start antibiotics for colitis.  See Concomitant Medication sheets        All of the patient's questions were answered by Dr. Tran and me to her satisfaction. Patient expressed her willingness to continue to participate in the above-mentioned clinical trial.  Instructed patient to notify Dr. Tran and/or me with any  questions, concerns, or changes in health status. Patient verbalized understanding.     Pt is going out of town and states she is unable to return to clinic for treatment until June 5th.  Will return then for C5D1 if labs better and tumor marker dropping per Dr. Tran.       Baseline History:  1. 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  2. Peripheral neuropathy- toes, Grade 1.  3. Peripheral neuropathy- fingers, Grade 1.  4. Platelet count decreased/thrombocytopenia, Grade 1.  5. Fatigue, Grade 1.  Will continue to monitor.  6. Cataracts, Grade 2.  Will be assessed by Dr. Mcgrath on 1/15/19.  Will continue to monitor.  7. Blurred vision, Grade 2. Due to cataracts per Ophthalmologist. Will continue to monitor      Adverse Events - For the most up-to-date AE log, please refer to the paper AE log in the subjects research file.  1. Hand-Foot Syndrome, remains Grade 1.  Will continue to monitor.  2. Pain - lower back, Grade 1.  Will continue to monitor.  3. Afebrile neutropenia, Grade 1.   Treatment delayed.  Will continue to monitor.  4.   Platelet count decreased/thrombocytopenia, Grade 2. Treatment delayed.  Will monitor.   5.   Colitis.  Grade 2.  Starting RX today.  AE ongoing.  Will monitor.

## 2019-05-29 ENCOUNTER — PATIENT OUTREACH (OUTPATIENT)
Dept: ADMINISTRATIVE | Facility: HOSPITAL | Age: 64
End: 2019-05-29

## 2019-05-29 DIAGNOSIS — Z12.39 SCREENING FOR BREAST CANCER: Primary | ICD-10-CM

## 2019-05-29 NOTE — PROGRESS NOTES
Received massage pt requested a mammogram.  Mammogram ordered, pt contacted and screening scheduled.

## 2019-06-04 ENCOUNTER — TELEPHONE (OUTPATIENT)
Dept: GYNECOLOGIC ONCOLOGY | Facility: CLINIC | Age: 64
End: 2019-06-04

## 2019-06-05 ENCOUNTER — LAB VISIT (OUTPATIENT)
Dept: LAB | Facility: OTHER | Age: 64
End: 2019-06-05
Attending: OBSTETRICS & GYNECOLOGY
Payer: COMMERCIAL

## 2019-06-05 ENCOUNTER — OFFICE VISIT (OUTPATIENT)
Dept: GYNECOLOGIC ONCOLOGY | Facility: CLINIC | Age: 64
End: 2019-06-05
Payer: COMMERCIAL

## 2019-06-05 VITALS
DIASTOLIC BLOOD PRESSURE: 64 MMHG | HEART RATE: 79 BPM | WEIGHT: 184.5 LBS | BODY MASS INDEX: 28.96 KG/M2 | SYSTOLIC BLOOD PRESSURE: 132 MMHG | HEIGHT: 67 IN

## 2019-06-05 DIAGNOSIS — C56.9 OVARIAN CANCER, UNSPECIFIED LATERALITY: Primary | ICD-10-CM

## 2019-06-05 DIAGNOSIS — Z00.6 EXAMINATION OF PARTICIPANT IN CLINICAL TRIAL: ICD-10-CM

## 2019-06-05 DIAGNOSIS — C56.9 OVARIAN CANCER, UNSPECIFIED LATERALITY: ICD-10-CM

## 2019-06-05 DIAGNOSIS — Z51.11 ENCOUNTER FOR ANTINEOPLASTIC CHEMOTHERAPY: ICD-10-CM

## 2019-06-05 LAB
ALBUMIN SERPL BCP-MCNC: 3.7 G/DL (ref 3.5–5.2)
ALP SERPL-CCNC: 80 U/L (ref 55–135)
ALT SERPL W/O P-5'-P-CCNC: 22 U/L (ref 10–44)
ANION GAP SERPL CALC-SCNC: 7 MMOL/L (ref 8–16)
AST SERPL-CCNC: 29 U/L (ref 10–40)
BASOPHILS # BLD AUTO: 0.02 K/UL (ref 0–0.2)
BASOPHILS NFR BLD: 0.5 % (ref 0–1.9)
BILIRUB SERPL-MCNC: 0.9 MG/DL (ref 0.1–1)
BUN SERPL-MCNC: 10 MG/DL (ref 8–23)
CALCIUM SERPL-MCNC: 9.5 MG/DL (ref 8.7–10.5)
CANCER AG125 SERPL-ACNC: 266 U/ML (ref 0–30)
CHLORIDE SERPL-SCNC: 107 MMOL/L (ref 95–110)
CO2 SERPL-SCNC: 27 MMOL/L (ref 23–29)
CREAT SERPL-MCNC: 0.7 MG/DL (ref 0.5–1.4)
DIFFERENTIAL METHOD: ABNORMAL
EOSINOPHIL # BLD AUTO: 0 K/UL (ref 0–0.5)
EOSINOPHIL NFR BLD: 1.1 % (ref 0–8)
ERYTHROCYTE [DISTWIDTH] IN BLOOD BY AUTOMATED COUNT: 15.3 % (ref 11.5–14.5)
EST. GFR  (AFRICAN AMERICAN): >60 ML/MIN/1.73 M^2
EST. GFR  (NON AFRICAN AMERICAN): >60 ML/MIN/1.73 M^2
GLUCOSE SERPL-MCNC: 96 MG/DL (ref 70–110)
HCT VFR BLD AUTO: 46.1 % (ref 37–48.5)
HGB BLD-MCNC: 15.7 G/DL (ref 12–16)
LYMPHOCYTES # BLD AUTO: 0.8 K/UL (ref 1–4.8)
LYMPHOCYTES NFR BLD: 21.1 % (ref 18–48)
MCH RBC QN AUTO: 32.6 PG (ref 27–31)
MCHC RBC AUTO-ENTMCNC: 34.1 G/DL (ref 32–36)
MCV RBC AUTO: 96 FL (ref 82–98)
MONOCYTES # BLD AUTO: 0.5 K/UL (ref 0.3–1)
MONOCYTES NFR BLD: 12.3 % (ref 4–15)
NEUTROPHILS # BLD AUTO: 2.4 K/UL (ref 1.8–7.7)
NEUTROPHILS NFR BLD: 64.7 % (ref 38–73)
PLATELET # BLD AUTO: 72 K/UL (ref 150–350)
PMV BLD AUTO: 9.3 FL (ref 9.2–12.9)
POTASSIUM SERPL-SCNC: 4.7 MMOL/L (ref 3.5–5.1)
PROT SERPL-MCNC: 7 G/DL (ref 6–8.4)
RBC # BLD AUTO: 4.82 M/UL (ref 4–5.4)
SODIUM SERPL-SCNC: 141 MMOL/L (ref 136–145)
TSH SERPL DL<=0.005 MIU/L-ACNC: 1.56 UIU/ML (ref 0.4–4)
WBC # BLD AUTO: 3.74 K/UL (ref 3.9–12.7)

## 2019-06-05 PROCEDURE — 84443 ASSAY THYROID STIM HORMONE: CPT

## 2019-06-05 PROCEDURE — 3008F PR BODY MASS INDEX (BMI) DOCUMENTED: ICD-10-PCS | Mod: CPTII,S$GLB,, | Performed by: OBSTETRICS & GYNECOLOGY

## 2019-06-05 PROCEDURE — 86304 IMMUNOASSAY TUMOR CA 125: CPT

## 2019-06-05 PROCEDURE — 3008F BODY MASS INDEX DOCD: CPT | Mod: CPTII,S$GLB,, | Performed by: OBSTETRICS & GYNECOLOGY

## 2019-06-05 PROCEDURE — 36415 COLL VENOUS BLD VENIPUNCTURE: CPT

## 2019-06-05 PROCEDURE — 99999 PR PBB SHADOW E&M-EST. PATIENT-LVL III: ICD-10-PCS | Mod: PBBFAC,,, | Performed by: OBSTETRICS & GYNECOLOGY

## 2019-06-05 PROCEDURE — 80053 COMPREHEN METABOLIC PANEL: CPT

## 2019-06-05 PROCEDURE — 99213 OFFICE O/P EST LOW 20 MIN: CPT | Mod: S$GLB,,, | Performed by: OBSTETRICS & GYNECOLOGY

## 2019-06-05 PROCEDURE — 99999 PR PBB SHADOW E&M-EST. PATIENT-LVL III: CPT | Mod: PBBFAC,,, | Performed by: OBSTETRICS & GYNECOLOGY

## 2019-06-05 PROCEDURE — 85025 COMPLETE CBC W/AUTO DIFF WBC: CPT

## 2019-06-05 PROCEDURE — 99213 PR OFFICE/OUTPT VISIT, EST, LEVL III, 20-29 MIN: ICD-10-PCS | Mod: S$GLB,,, | Performed by: OBSTETRICS & GYNECOLOGY

## 2019-06-05 NOTE — Clinical Note
June 13, 2019      Benny Tran MD  [address]  [facility] [address]           [facility] [address] [facility] [address]  [address], [address]  [facility] [address]  Phone: [phone]  Fax: [phone]          Patient: Mickie Don   MR Number: [id number]   YOB: 1955   Date of Visit: 6/5/2019       Dear Dr. Benny Tran:    Thank you for referring Mickie Don to me for evaluation. Attached you will find relevant portions of my assessment and plan of care.    If you have questions, please do not hesitate to call me. I look forward to following Mickie Don along with you.    Sincerely,    Nisreen Escamilla  CC:  No Recipients    If you would like to receive this communication electronically, please contact [email or web address] or [phone] to request more information on [employer] access.    For providers and/or their staff who would like to refer a patient to [facility], please contact us through our one-stop-shop provider referral line, [facility], at [phone].    If you feel you have received this communication in error or would no longer like to receive these types of communications, please e-mail [email or web address]

## 2019-06-10 ENCOUNTER — OFFICE VISIT (OUTPATIENT)
Dept: OPHTHALMOLOGY | Facility: CLINIC | Age: 64
End: 2019-06-10
Payer: COMMERCIAL

## 2019-06-10 DIAGNOSIS — Z98.41 STATUS POST CATARACT EXTRACTION AND INSERTION OF INTRAOCULAR LENS, RIGHT: Primary | ICD-10-CM

## 2019-06-10 DIAGNOSIS — H18.519 FUCHS' CORNEAL DYSTROPHY: ICD-10-CM

## 2019-06-10 DIAGNOSIS — Z96.1 STATUS POST CATARACT EXTRACTION AND INSERTION OF INTRAOCULAR LENS, LEFT: ICD-10-CM

## 2019-06-10 DIAGNOSIS — Z96.1 STATUS POST CATARACT EXTRACTION AND INSERTION OF INTRAOCULAR LENS, RIGHT: Primary | ICD-10-CM

## 2019-06-10 DIAGNOSIS — Z98.42 STATUS POST CATARACT EXTRACTION AND INSERTION OF INTRAOCULAR LENS, LEFT: ICD-10-CM

## 2019-06-10 DIAGNOSIS — H40.003 GLAUCOMA SUSPECT OF BOTH EYES: ICD-10-CM

## 2019-06-10 PROCEDURE — 99999 PR PBB SHADOW E&M-EST. PATIENT-LVL II: CPT | Mod: PBBFAC,,, | Performed by: OPHTHALMOLOGY

## 2019-06-10 PROCEDURE — 99024 POSTOP FOLLOW-UP VISIT: CPT | Mod: S$GLB,,, | Performed by: OPHTHALMOLOGY

## 2019-06-10 PROCEDURE — 99999 PR PBB SHADOW E&M-EST. PATIENT-LVL II: ICD-10-PCS | Mod: PBBFAC,,, | Performed by: OPHTHALMOLOGY

## 2019-06-10 PROCEDURE — 99024 PR POST-OP FOLLOW-UP VISIT: ICD-10-PCS | Mod: S$GLB,,, | Performed by: OPHTHALMOLOGY

## 2019-06-10 NOTE — PROGRESS NOTES
HPI     Referred by Dr Mcgrath     s/p Phaco IOL OS 5/7/19  S/p Phaco IOL OD 4/4/19  Fuchs     Pt here for 1 month post op OU.  Pt states she gets affected by the bright   light. Pt denies eye pain OU.  Pt states floaters OU x 2 weeks ago.     Last edited by Rosita Wood MA on 6/10/2019  9:24 AM.   (History)            Assessment /Plan     For exam results, see Encounter Report.    Status post cataract extraction and insertion of intraocular lens, right    Status post cataract extraction and insertion of intraocular lens, left    Fuchs' corneal dystrophy    Glaucoma suspect of both eyes      s/p Phaco IOL OS 5/7/19  S/p Phaco IOL OD 4/4/19    Doing well, content with [facility], readers.      Fuchs  - pachy pre-op: 620/613    GS  - oct nerves - pre-op  - increase in c/d ratio compared to old photos from 2013  - IOP wnl today  - would benefit from baseline GS testing post cat sx --> f/up dr null / re-establish care

## 2019-06-11 ENCOUNTER — TELEPHONE (OUTPATIENT)
Dept: GYNECOLOGIC ONCOLOGY | Facility: CLINIC | Age: 64
End: 2019-06-11

## 2019-06-11 ENCOUNTER — LAB VISIT (OUTPATIENT)
Dept: LAB | Facility: HOSPITAL | Age: 64
End: 2019-06-11
Attending: INTERNAL MEDICINE
Payer: COMMERCIAL

## 2019-06-11 DIAGNOSIS — C56.9 OVARIAN CANCER, UNSPECIFIED LATERALITY: ICD-10-CM

## 2019-06-11 DIAGNOSIS — Z00.6 EXAMINATION OF PARTICIPANT IN CLINICAL TRIAL: ICD-10-CM

## 2019-06-11 LAB
ALBUMIN SERPL BCP-MCNC: 3.9 G/DL (ref 3.5–5.2)
ALP SERPL-CCNC: 83 U/L (ref 55–135)
ALT SERPL W/O P-5'-P-CCNC: 22 U/L (ref 10–44)
ANION GAP SERPL CALC-SCNC: 8 MMOL/L (ref 8–16)
AST SERPL-CCNC: 31 U/L (ref 10–40)
BASOPHILS # BLD AUTO: 0.04 K/UL (ref 0–0.2)
BASOPHILS NFR BLD: 0.8 % (ref 0–1.9)
BILIRUB SERPL-MCNC: 0.9 MG/DL (ref 0.1–1)
BUN SERPL-MCNC: 19 MG/DL (ref 8–23)
CALCIUM SERPL-MCNC: 9.7 MG/DL (ref 8.7–10.5)
CANCER AG125 SERPL-ACNC: 202 U/ML (ref 0–30)
CHLORIDE SERPL-SCNC: 101 MMOL/L (ref 95–110)
CO2 SERPL-SCNC: 30 MMOL/L (ref 23–29)
CREAT SERPL-MCNC: 0.7 MG/DL (ref 0.5–1.4)
DIFFERENTIAL METHOD: ABNORMAL
EOSINOPHIL # BLD AUTO: 0.1 K/UL (ref 0–0.5)
EOSINOPHIL NFR BLD: 1 % (ref 0–8)
ERYTHROCYTE [DISTWIDTH] IN BLOOD BY AUTOMATED COUNT: 14.8 % (ref 11.5–14.5)
EST. GFR  (AFRICAN AMERICAN): >60 ML/MIN/1.73 M^2
EST. GFR  (NON AFRICAN AMERICAN): >60 ML/MIN/1.73 M^2
GLUCOSE SERPL-MCNC: 95 MG/DL (ref 70–110)
HCT VFR BLD AUTO: 44.5 % (ref 37–48.5)
HGB BLD-MCNC: 14.7 G/DL (ref 12–16)
LYMPHOCYTES # BLD AUTO: 1.1 K/UL (ref 1–4.8)
LYMPHOCYTES NFR BLD: 21.8 % (ref 18–48)
MCH RBC QN AUTO: 31.5 PG (ref 27–31)
MCHC RBC AUTO-ENTMCNC: 33 G/DL (ref 32–36)
MCV RBC AUTO: 95 FL (ref 82–98)
MONOCYTES # BLD AUTO: 0.5 K/UL (ref 0.3–1)
MONOCYTES NFR BLD: 10.3 % (ref 4–15)
NEUTROPHILS # BLD AUTO: 3.2 K/UL (ref 1.8–7.7)
NEUTROPHILS NFR BLD: 65.9 % (ref 38–73)
PLATELET # BLD AUTO: 100 K/UL (ref 150–350)
PMV BLD AUTO: 9.4 FL (ref 9.2–12.9)
POTASSIUM SERPL-SCNC: 4 MMOL/L (ref 3.5–5.1)
PROT SERPL-MCNC: 7.2 G/DL (ref 6–8.4)
RBC # BLD AUTO: 4.67 M/UL (ref 4–5.4)
SODIUM SERPL-SCNC: 139 MMOL/L (ref 136–145)
TSH SERPL DL<=0.005 MIU/L-ACNC: 1.21 UIU/ML (ref 0.4–4)
WBC # BLD AUTO: 4.86 K/UL (ref 3.9–12.7)

## 2019-06-11 PROCEDURE — 85025 COMPLETE CBC W/AUTO DIFF WBC: CPT

## 2019-06-11 PROCEDURE — 86304 IMMUNOASSAY TUMOR CA 125: CPT

## 2019-06-11 PROCEDURE — 84443 ASSAY THYROID STIM HORMONE: CPT

## 2019-06-11 PROCEDURE — 36415 COLL VENOUS BLD VENIPUNCTURE: CPT

## 2019-06-11 PROCEDURE — 80053 COMPREHEN METABOLIC PANEL: CPT

## 2019-06-11 NOTE — TELEPHONE ENCOUNTER
"Spoke with Nerissa in research. Per Nerissa "Dr. Tran informed her to cancel pt appt for Tuesday 6/11/19 with Dr. Weston and reschedule her for Friday 6/14/19 with Dr. Tran.   "

## 2019-06-12 ENCOUNTER — TELEPHONE (OUTPATIENT)
Dept: GYNECOLOGIC ONCOLOGY | Facility: CLINIC | Age: 64
End: 2019-06-12

## 2019-06-13 ENCOUNTER — HOSPITAL ENCOUNTER (OUTPATIENT)
Dept: RADIOLOGY | Facility: HOSPITAL | Age: 64
Discharge: HOME | End: 2019-06-13
Attending: INTERNAL MEDICINE
Payer: COMMERCIAL

## 2019-06-13 DIAGNOSIS — Z12.39 SCREENING FOR BREAST CANCER: ICD-10-CM

## 2019-06-13 PROCEDURE — 77063 BREAST TOMOSYNTHESIS BI: CPT | Mod: 26,,, | Performed by: RADIOLOGY

## 2019-06-13 PROCEDURE — 77067 SCR MAMMO BI INCL CAD: CPT | Mod: 26,,, | Performed by: RADIOLOGY

## 2019-06-13 PROCEDURE — 77063 MAMMO DIGITAL SCREENING BILAT WITH TOMOSYNTHESIS_CAD: ICD-10-PCS | Mod: 26,,, | Performed by: RADIOLOGY

## 2019-06-13 PROCEDURE — 77067 SCR MAMMO BI INCL CAD: CPT | Mod: TC,PO

## 2019-06-13 PROCEDURE — 77067 MAMMO DIGITAL SCREENING BILAT WITH TOMOSYNTHESIS_CAD: ICD-10-PCS | Mod: 26,,, | Performed by: RADIOLOGY

## 2019-06-14 ENCOUNTER — OFFICE VISIT (OUTPATIENT)
Dept: GYNECOLOGIC ONCOLOGY | Facility: CLINIC | Age: 64
End: 2019-06-14
Payer: COMMERCIAL

## 2019-06-14 VITALS
DIASTOLIC BLOOD PRESSURE: 63 MMHG | BODY MASS INDEX: 28.62 KG/M2 | SYSTOLIC BLOOD PRESSURE: 145 MMHG | WEIGHT: 182.75 LBS

## 2019-06-14 DIAGNOSIS — Z00.6 EXAMINATION OF PARTICIPANT IN CLINICAL TRIAL: ICD-10-CM

## 2019-06-14 DIAGNOSIS — Z00.6 PATIENT IN CANCER RELATED RESEARCH STUDY: ICD-10-CM

## 2019-06-14 DIAGNOSIS — Z51.11 ENCOUNTER FOR ANTINEOPLASTIC CHEMOTHERAPY: ICD-10-CM

## 2019-06-14 DIAGNOSIS — C56.9 OVARIAN CANCER, UNSPECIFIED LATERALITY: Primary | ICD-10-CM

## 2019-06-14 PROCEDURE — 99214 PR OFFICE/OUTPT VISIT, EST, LEVL IV, 30-39 MIN: ICD-10-PCS | Mod: S$GLB,,, | Performed by: OBSTETRICS & GYNECOLOGY

## 2019-06-14 PROCEDURE — 3008F PR BODY MASS INDEX (BMI) DOCUMENTED: ICD-10-PCS | Mod: CPTII,S$GLB,, | Performed by: OBSTETRICS & GYNECOLOGY

## 2019-06-14 PROCEDURE — 3008F BODY MASS INDEX DOCD: CPT | Mod: CPTII,S$GLB,, | Performed by: OBSTETRICS & GYNECOLOGY

## 2019-06-14 PROCEDURE — 99999 PR PBB SHADOW E&M-EST. PATIENT-LVL II: CPT | Mod: PBBFAC,,, | Performed by: OBSTETRICS & GYNECOLOGY

## 2019-06-14 PROCEDURE — 99999 PR PBB SHADOW E&M-EST. PATIENT-LVL II: ICD-10-PCS | Mod: PBBFAC,,, | Performed by: OBSTETRICS & GYNECOLOGY

## 2019-06-14 PROCEDURE — 99214 OFFICE O/P EST MOD 30 MIN: CPT | Mod: S$GLB,,, | Performed by: OBSTETRICS & GYNECOLOGY

## 2019-06-14 NOTE — PROGRESS NOTES
Subjective:       Patient ID: Mickie Don is a 63 y.o. female.    Chief Complaint: Chemotherapy (discuss new regimen)    Treatment History  Xlap/Omentectomy on 11/1/16 - mass was unresectable  Stage IIIC ovarian cancer  Initiated Taxotere/Carbo on 11/18/16, now s/p 6 cycles completed 3/8/17  Xlap/ISIS/BSO/Radical mass resection > 10 cm, optimal debulking on 4/11/17  Initiated PRIMA on 8/21/17  BRCA negative  Started FORWARD1 on 11/27/17  Started Doxil/Anju as part of NRG  2/27/19    HPI     Patient comes in today after completing abx.  Feels a little better.  CA-125 decreased to 202.  Still with flatus but lerss pain.  PS is 0.  Labs have recovered.    Review of Systems   Constitutional: Negative for appetite change, chills, diaphoresis, fatigue, fever and unexpected weight change.   HENT: Negative for mouth sores and tinnitus.    Respiratory: Negative for cough, chest tightness, shortness of breath and wheezing.    Cardiovascular: Negative for chest pain, palpitations and leg swelling.   Gastrointestinal: Negative for abdominal distention, abdominal pain, blood in stool, constipation, diarrhea, nausea and vomiting.   Genitourinary: Negative for difficulty urinating, dysuria, flank pain, frequency, hematuria, pelvic pain, urgency, vaginal bleeding, vaginal discharge and vaginal pain.   Musculoskeletal: Negative for arthralgias, back pain and gait problem.   Skin: Negative for color change and rash.   Neurological: Positive for numbness. Negative for dizziness, weakness and headaches.   Hematological: Negative for adenopathy. Does not bruise/bleed easily.   Psychiatric/Behavioral: Negative for confusion and sleep disturbance. The patient is not nervous/anxious.        Objective:   BP (!) 145/63   Wt 82.9 kg (182 lb 12.2 oz)   BMI 28.62 kg/m²      Physical Exam   Constitutional: She is oriented to person, place, and time. She appears well-developed and well-nourished. No distress.   HENT:   Head: Normocephalic  and atraumatic.   Eyes: No scleral icterus.   Neck: Normal range of motion.   Pulmonary/Chest: Effort normal. No respiratory distress.   Abdominal: Soft. She exhibits no distension and no mass.   Tender to palpation in LLQ, along sigmoid   Genitourinary: Vagina normal. Pelvic exam was performed with patient supine. There is no rash, tenderness or lesion on the right labia. There is no rash, tenderness or lesion on the left labia. Right adnexum displays no mass. Left adnexum displays no mass.   Genitourinary Comments: Pelvic normal   Musculoskeletal: Normal range of motion. She exhibits no edema or tenderness.   Neurological: She is alert and oriented to person, place, and time.   Skin: Skin is warm and dry. No pallor.   Psychiatric: She has a normal mood and affect. Her behavior is normal. Judgment and thought content normal.       Assessment:       1. Ovarian cancer, unspecified laterality    2. Patient in cancer related research study    3. Examination of participant in clinical trial    4. Encounter for antineoplastic chemotherapy        Plan:     She has recovered and CA-125 decreased.  Will continue on trial next week.  She voiced understanding and agreement with the plan

## 2019-06-16 NOTE — PROGRESS NOTES
Subjective:      Patient ID: Mickie Don is a 63 y.o. female.    Chief Complaint: Chemotherapy ( Doxil, Avastin,C5/D1) and Ovarian Cancer      HPI  Treatment History  Xlap/Omentectomy on 11/1/16 - mass was unresectable  Stage IIIC ovarian cancer  Initiated Taxotere/Carbo on 11/18/16, now s/p 6 cycles completed 3/8/17  Xlap/ISIS/BSO/Radical mass resection > 10 cm, optimal debulking on 4/11/17  Initiated PRIMA on 8/21/17  BRCA negative  Started FORWARD1 on 11/27/17  Started Doxil/Anju as part of NRG  2/27/19     HPI      Patient comes in today prior to C5D1 for treatment on NRG  Arm 3.  Plts 72 too low for treatment.      Review of Systems   Constitutional: Negative for appetite change, chills, fatigue and fever.   HENT: Negative for mouth sores.    Respiratory: Negative for cough and shortness of breath.    Cardiovascular: Negative for leg swelling.   Gastrointestinal: Negative for abdominal pain, blood in stool, constipation and diarrhea.   Endocrine: Negative for cold intolerance.   Genitourinary: Negative for dysuria and vaginal bleeding.   Musculoskeletal: Negative for myalgias.   Skin: Negative for rash.   Allergic/Immunologic: Negative.    Neurological: Negative for weakness and numbness.   Hematological: Negative for adenopathy. Does not bruise/bleed easily.   Psychiatric/Behavioral: Negative for confusion.       Objective:   Physical Exam:   Constitutional: She is oriented to person, place, and time. She appears well-developed and well-nourished.    HENT:   Head: Normocephalic and atraumatic.    Eyes: Pupils are equal, round, and reactive to light. EOM are normal.    Neck: Normal range of motion. Neck supple. No thyromegaly present.    Cardiovascular: Normal rate, regular rhythm and intact distal pulses.     Pulmonary/Chest: Effort normal and breath sounds normal. No respiratory distress. She has no wheezes.        Abdominal: Soft. Bowel sounds are normal. She exhibits no distension, no  ascites and no mass. There is no tenderness.             Musculoskeletal: Normal range of motion and moves all extremeties.      Lymphadenopathy:     She has no cervical adenopathy.        Right: No supraclavicular adenopathy present.        Left: No supraclavicular adenopathy present.    Neurological: She is alert and oriented to person, place, and time.    Skin: Skin is warm and dry. No rash noted.    Psychiatric: She has a normal mood and affect.       Assessment:     1. Ovarian cancer, unspecified laterality    2. Encounter for antineoplastic chemotherapy        Plan:   No orders of the defined types were placed in this encounter.    Thrombocytopenia.  Will delay trial treatment by 1 week.

## 2019-06-17 ENCOUNTER — RESEARCH ENCOUNTER (OUTPATIENT)
Dept: RESEARCH | Facility: HOSPITAL | Age: 64
End: 2019-06-17

## 2019-06-17 ENCOUNTER — INFUSION (OUTPATIENT)
Dept: INFUSION THERAPY | Facility: HOSPITAL | Age: 64
End: 2019-06-17
Attending: OBSTETRICS & GYNECOLOGY
Payer: COMMERCIAL

## 2019-06-17 VITALS
HEIGHT: 67 IN | SYSTOLIC BLOOD PRESSURE: 129 MMHG | TEMPERATURE: 99 F | BODY MASS INDEX: 28.68 KG/M2 | RESPIRATION RATE: 16 BRPM | DIASTOLIC BLOOD PRESSURE: 67 MMHG | WEIGHT: 182.75 LBS | HEART RATE: 54 BPM

## 2019-06-17 DIAGNOSIS — C56.9 OVARIAN CANCER, UNSPECIFIED LATERALITY: Primary | ICD-10-CM

## 2019-06-17 PROCEDURE — 96417 CHEMO IV INFUS EACH ADDL SEQ: CPT

## 2019-06-17 PROCEDURE — 96367 TX/PROPH/DG ADDL SEQ IV INF: CPT

## 2019-06-17 PROCEDURE — 96413 CHEMO IV INFUSION 1 HR: CPT

## 2019-06-17 PROCEDURE — A4216 STERILE WATER/SALINE, 10 ML: HCPCS | Performed by: OBSTETRICS & GYNECOLOGY

## 2019-06-17 PROCEDURE — 63600175 PHARM REV CODE 636 W HCPCS: Mod: JG | Performed by: OBSTETRICS & GYNECOLOGY

## 2019-06-17 PROCEDURE — 25000003 PHARM REV CODE 250: Performed by: OBSTETRICS & GYNECOLOGY

## 2019-06-17 RX ORDER — SODIUM CHLORIDE 0.9 % (FLUSH) 0.9 %
10 SYRINGE (ML) INJECTION
Status: DISCONTINUED | OUTPATIENT
Start: 2019-06-17 | End: 2019-06-17 | Stop reason: HOSPADM

## 2019-06-17 RX ORDER — DIPHENHYDRAMINE HYDROCHLORIDE 50 MG/ML
50 INJECTION INTRAMUSCULAR; INTRAVENOUS ONCE AS NEEDED
Status: DISCONTINUED | OUTPATIENT
Start: 2019-06-17 | End: 2019-06-17 | Stop reason: HOSPADM

## 2019-06-17 RX ORDER — HEPARIN 100 UNIT/ML
500 SYRINGE INTRAVENOUS
Status: CANCELLED | OUTPATIENT
Start: 2019-06-17

## 2019-06-17 RX ORDER — EPINEPHRINE 0.3 MG/.3ML
0.3 INJECTION SUBCUTANEOUS ONCE AS NEEDED
Status: DISCONTINUED | OUTPATIENT
Start: 2019-06-17 | End: 2019-06-17 | Stop reason: HOSPADM

## 2019-06-17 RX ORDER — EPINEPHRINE 0.3 MG/.3ML
0.3 INJECTION SUBCUTANEOUS ONCE AS NEEDED
Status: CANCELLED | OUTPATIENT
Start: 2019-06-17

## 2019-06-17 RX ORDER — HEPARIN 100 UNIT/ML
500 SYRINGE INTRAVENOUS
Status: DISCONTINUED | OUTPATIENT
Start: 2019-06-17 | End: 2019-06-17 | Stop reason: HOSPADM

## 2019-06-17 RX ORDER — DIPHENHYDRAMINE HYDROCHLORIDE 50 MG/ML
50 INJECTION INTRAMUSCULAR; INTRAVENOUS ONCE AS NEEDED
Status: CANCELLED | OUTPATIENT
Start: 2019-06-17

## 2019-06-17 RX ORDER — SODIUM CHLORIDE 0.9 % (FLUSH) 0.9 %
10 SYRINGE (ML) INJECTION
Status: CANCELLED | OUTPATIENT
Start: 2019-06-17

## 2019-06-17 RX ADMIN — SODIUM CHLORIDE: 0.9 INJECTION, SOLUTION INTRAVENOUS at 08:06

## 2019-06-17 RX ADMIN — BEVACIZUMAB 880 MG: 400 INJECTION, SOLUTION INTRAVENOUS at 09:06

## 2019-06-17 RX ADMIN — DIPHENHYDRAMINE HYDROCHLORIDE 50 MG: 50 INJECTION, SOLUTION INTRAMUSCULAR; INTRAVENOUS at 08:06

## 2019-06-17 RX ADMIN — DOXORUBICIN HYDROCHLORIDE 82 MG: 2 INJECTION, SUSPENSION, LIPOSOMAL INTRAVENOUS at 10:06

## 2019-06-17 RX ADMIN — Medication 10 ML: at 11:06

## 2019-06-17 NOTE — PROGRESS NOTES
June 17, 2019     Protocol: NRG-  : Kiana Weston MD  Treating Physician: Benny Tran MD  Pt Initials:  NAV BERMUDEZ  Study ID: [id number]  IRB#: [id number]     A Randomized Phase II/III Study of Pegylated Liposomal Doxorubicin and Atezolizumab vs. Pegylated Liposomal Doxorubicin/Bevacizumab/ Atezolizumab vs. Pegylated Liposomal Doxorubicin/ Bevacizumab in Platinum-resistant Ovarian Cancer.     Cycle 5 Day 1:     Patient randomized on 1/23/19 to Arm 3: Pegylated Liposomal Doxorubicin, 40 mg/m2, q28d and Bevacizumab, 10 mg/kg, q14d.      Patient presented to clinic for C5D1 treatment on the above-mentioned clinical trial. Patient alert and oriented, mood and affect appropriate to the situation. Patient reports Grade 1 Hand-Foot - no blister/peeling but with discomfort on both feet and hands.  Patient not currently taking Lyrica for symptoms.  Pt states her abdominal pain is improved.  She denies fever, nausea vomiting, constipation, headaches, or dizziness.    Patient had CT-CAP on 5/24/19.  Per RECIST1.1, patient has stable disease.  Dr. Tran reviewed the scans and confirmed this assessment.  Since this time pt has taken antibiotics for colitis and her CA-125 has continued to decreased.  Pt also has improvement of LLQ pain, gas and occasional diarrhea.  Per Dr. Tran pt to continue on clinical trial.       Physical exam and performance status assessment done per protocol on 6/14/19 by Dr. Tran.  Per Dr. Tran ECOG is 0.  Local labs reviewed by Dr. Tran prior to treatment and ANC and platelets which are in the range to receive  treatment.   Reviewed current medications with patient.  See Concomitant Medication sheets        All of the patient's questions were answered by Dr. Tran and me to her satisfaction. Patient expressed her willingness to continue to participate in the above-mentioned clinical trial.  Instructed patient to notify Dr. Tran and/or me with any questions, concerns, or  changes in health status. Patient verbalized understanding.     Research timeout performed via text with Dr. Tran.  Doses were agreed upon and per protocol before being signed off in Epic.       Will return then for C5D15 infusion.       Baseline History:  1. 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  2. Peripheral neuropathy- toes, Grade 1.  3. Peripheral neuropathy- fingers, Grade 1.  4. Platelet count decreased/thrombocytopenia, Grade 1.  5. Fatigue, Grade 1.  Will continue to monitor.  6. Cataracts, Grade 2.  Will be assessed by Dr. Mcgrath on 1/15/19.  Will continue to monitor.  7. Blurred vision, Grade 2. Due to cataracts per Ophthalmologist. Will continue to monitor      Adverse Events - For the most up-to-date AE log, please refer to the paper AE log in the subjects research file.  1. Hand-Foot Syndrome, remains Grade 1.  Will continue to monitor.  2. Pain - lower back, Grade 1.  Will continue to monitor.  3. Afebrile neutropenia, Grade 1.   Treatment delayed.  Resolved   4.   Platelet count decreased/thrombocytopenia, Grade 2.  Resolved.  Will monitor.

## 2019-06-17 NOTE — PLAN OF CARE
Problem: Adult Inpatient Plan of Care  Goal: Plan of Care Review  Outcome: Ongoing (interventions implemented as appropriate)  Pt tolerated Avastin/doxil without complications. VSS. No s/s of reaction. Ice applied to hands/feet during doxil per protocol. Instructed to contact MD with any questions. PIV removed and AVS given to patient.

## 2019-07-01 ENCOUNTER — INFUSION (OUTPATIENT)
Dept: INFUSION THERAPY | Facility: HOSPITAL | Age: 64
End: 2019-07-01
Attending: OBSTETRICS & GYNECOLOGY
Payer: COMMERCIAL

## 2019-07-01 ENCOUNTER — RESEARCH ENCOUNTER (OUTPATIENT)
Dept: RESEARCH | Facility: HOSPITAL | Age: 64
End: 2019-07-01

## 2019-07-01 VITALS
DIASTOLIC BLOOD PRESSURE: 72 MMHG | RESPIRATION RATE: 16 BRPM | BODY MASS INDEX: 28.68 KG/M2 | TEMPERATURE: 98 F | SYSTOLIC BLOOD PRESSURE: 127 MMHG | HEART RATE: 63 BPM | HEIGHT: 67 IN | WEIGHT: 182.75 LBS

## 2019-07-01 DIAGNOSIS — C56.9 OVARIAN CANCER, UNSPECIFIED LATERALITY: Primary | ICD-10-CM

## 2019-07-01 PROCEDURE — 25000003 PHARM REV CODE 250: Performed by: OBSTETRICS & GYNECOLOGY

## 2019-07-01 PROCEDURE — 96413 CHEMO IV INFUSION 1 HR: CPT

## 2019-07-01 PROCEDURE — 63600175 PHARM REV CODE 636 W HCPCS: Mod: JG | Performed by: OBSTETRICS & GYNECOLOGY

## 2019-07-01 RX ORDER — SODIUM CHLORIDE 0.9 % (FLUSH) 0.9 %
10 SYRINGE (ML) INJECTION
Status: DISCONTINUED | OUTPATIENT
Start: 2019-07-01 | End: 2019-07-01 | Stop reason: HOSPADM

## 2019-07-01 RX ORDER — HEPARIN 100 UNIT/ML
500 SYRINGE INTRAVENOUS
Status: CANCELLED | OUTPATIENT
Start: 2019-07-01

## 2019-07-01 RX ORDER — SODIUM CHLORIDE 0.9 % (FLUSH) 0.9 %
10 SYRINGE (ML) INJECTION
Status: CANCELLED | OUTPATIENT
Start: 2019-07-01

## 2019-07-01 RX ORDER — DIPHENHYDRAMINE HYDROCHLORIDE 50 MG/ML
50 INJECTION INTRAMUSCULAR; INTRAVENOUS ONCE AS NEEDED
Status: CANCELLED | OUTPATIENT
Start: 2019-07-01

## 2019-07-01 RX ORDER — EPINEPHRINE 0.3 MG/.3ML
0.3 INJECTION SUBCUTANEOUS ONCE AS NEEDED
Status: DISCONTINUED | OUTPATIENT
Start: 2019-07-01 | End: 2019-07-01 | Stop reason: HOSPADM

## 2019-07-01 RX ORDER — DIPHENHYDRAMINE HYDROCHLORIDE 50 MG/ML
50 INJECTION INTRAMUSCULAR; INTRAVENOUS ONCE AS NEEDED
Status: DISCONTINUED | OUTPATIENT
Start: 2019-07-01 | End: 2019-07-01 | Stop reason: HOSPADM

## 2019-07-01 RX ORDER — EPINEPHRINE 0.3 MG/.3ML
0.3 INJECTION SUBCUTANEOUS ONCE AS NEEDED
Status: CANCELLED | OUTPATIENT
Start: 2019-07-01

## 2019-07-01 RX ADMIN — SODIUM CHLORIDE: 0.9 INJECTION, SOLUTION INTRAVENOUS at 08:07

## 2019-07-01 RX ADMIN — BEVACIZUMAB 880 MG: 400 INJECTION, SOLUTION INTRAVENOUS at 09:07

## 2019-07-01 NOTE — PROGRESS NOTES
July 1, 2019     Protocol: NRG-  : Kiana Weston MD  Treating Physician: Benny Tran MD  Pt Initials:  NAV BERMUDEZ  Study ID: [id number]  IRB#: [id number].N     A Randomized Phase II/III Study of Pegylated Liposomal Doxorubicin and Atezolizumab vs. Pegylated Liposomal Doxorubicin/Bevacizumab/ Atezolizumab vs. Pegylated Liposomal Doxorubicin/ Bevacizumab in Platinum-resistant Ovarian Cancer.     Cycle 5 Day 15:     Patient randomized on 1/23/19 to Arm 3: Pegylated Liposomal Doxorubicin, 40 mg/m2, q28d and Bevacizumab, 10 mg/kg, q14d.      Patient presented to clinic for C5D15 treatment on the above-mentioned clinical trial. Patient alert and oriented, mood and affect appropriate to the situation. Patient reports Grade 1 Hand-Foot continuing - no blister/peeling but with discomfort on both feet and hands.  Patient not currently taking Lyrica for symptoms.  Pt states her abdominal pain is improved, but she is still having problems with excess gas causing some discomfort.  She denies fever, nausea vomiting, constipation, headaches, or dizziness.     No labs or exam required this visit per protocol  Reviewed current medications with patient.  See Concomitant Medication sheets        All of the patient's questions have been answered by Dr. Tran and me to her satisfaction. Patient expressed her willingness to continue to participate in the above-mentioned clinical trial.  Instructed patient to notify Dr. Tran and/or me with any questions, concerns, or changes in health status. Patient verbalized understanding.     Research timeout performed via text with Dr. Tran.  Doses were agreed upon and per protocol before being signed off in Epic.       Will return then for C5D15 infusion.       Baseline History:  1. 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  2. Peripheral neuropathy- toes, Grade 1.  3. Peripheral neuropathy- fingers, Grade  1.  4. Platelet count decreased/thrombocytopenia, Grade 1.  5. Fatigue, Grade 1.  Will continue to monitor.  6. Cataracts, Grade 2.  Will be assessed by Dr. Mcgrath on 1/15/19.  Will continue to monitor.  7. Blurred vision, Grade 2. Due to cataracts per Ophthalmologist. Will continue to monitor      Adverse Events - For the most up-to-date AE log, please refer to the paper AE log in the subjects research file.  1. Hand-Foot Syndrome, remains Grade 1.  Will continue to monitor.  2. Pain - lower back, Grade 1.  Will continue to monitor.  3. Afebrile neutropenia, Grade 1.   Treatment delayed.  Resolved   4.   Platelet count decreased/thrombocytopenia, Grade 2.  Resolved.  Will monitor.

## 2019-07-01 NOTE — PLAN OF CARE
Problem: Adult Inpatient Plan of Care  Goal: Plan of Care Review  Outcome: Ongoing (interventions implemented as appropriate)  Patient tolerated avastin well today. Met with Research nurse, ALTA Multani at chairside today. NAD noted upon discharge. Declined avs, uses my [facility]. PIV removed, catheter tip intact. Discharged home, ambulated independently.

## 2019-07-15 ENCOUNTER — OFFICE VISIT (OUTPATIENT)
Dept: GYNECOLOGIC ONCOLOGY | Facility: CLINIC | Age: 64
End: 2019-07-15
Payer: COMMERCIAL

## 2019-07-15 ENCOUNTER — RESEARCH ENCOUNTER (OUTPATIENT)
Dept: RESEARCH | Facility: HOSPITAL | Age: 64
End: 2019-07-15

## 2019-07-15 VITALS
DIASTOLIC BLOOD PRESSURE: 79 MMHG | BODY MASS INDEX: 28.19 KG/M2 | HEART RATE: 79 BPM | SYSTOLIC BLOOD PRESSURE: 120 MMHG | WEIGHT: 180 LBS

## 2019-07-15 DIAGNOSIS — D70.2 NEUTROPENIA, DRUG-INDUCED: ICD-10-CM

## 2019-07-15 DIAGNOSIS — C56.9 MALIGNANT NEOPLASM OF OVARY, UNSPECIFIED LATERALITY: Primary | ICD-10-CM

## 2019-07-15 DIAGNOSIS — T50.905A THROMBOCYTOPENIA DUE TO DRUGS: ICD-10-CM

## 2019-07-15 DIAGNOSIS — Z00.6 EXAMINATION OF PARTICIPANT IN CLINICAL TRIAL: ICD-10-CM

## 2019-07-15 DIAGNOSIS — Z51.11 ENCOUNTER FOR ANTINEOPLASTIC CHEMOTHERAPY: ICD-10-CM

## 2019-07-15 DIAGNOSIS — D69.59 THROMBOCYTOPENIA DUE TO DRUGS: ICD-10-CM

## 2019-07-15 PROCEDURE — 3008F BODY MASS INDEX DOCD: CPT | Mod: CPTII,S$GLB,, | Performed by: OBSTETRICS & GYNECOLOGY

## 2019-07-15 PROCEDURE — 99214 PR OFFICE/OUTPT VISIT, EST, LEVL IV, 30-39 MIN: ICD-10-PCS | Mod: S$GLB,,, | Performed by: OBSTETRICS & GYNECOLOGY

## 2019-07-15 PROCEDURE — 99999 PR PBB SHADOW E&M-EST. PATIENT-LVL III: CPT | Mod: PBBFAC,,, | Performed by: OBSTETRICS & GYNECOLOGY

## 2019-07-15 PROCEDURE — 99999 PR PBB SHADOW E&M-EST. PATIENT-LVL III: ICD-10-PCS | Mod: PBBFAC,,, | Performed by: OBSTETRICS & GYNECOLOGY

## 2019-07-15 PROCEDURE — 3008F PR BODY MASS INDEX (BMI) DOCUMENTED: ICD-10-PCS | Mod: CPTII,S$GLB,, | Performed by: OBSTETRICS & GYNECOLOGY

## 2019-07-15 PROCEDURE — 99214 OFFICE O/P EST MOD 30 MIN: CPT | Mod: S$GLB,,, | Performed by: OBSTETRICS & GYNECOLOGY

## 2019-07-15 NOTE — PROGRESS NOTES
Subjective:       Patient ID: Mickie Don is a 63 y.o. female.    Chief Complaint: Chemotherapy    Treatment History  Xlap/Omentectomy on 11/1/16 - mass was unresectable  Stage IIIC ovarian cancer  Initiated Taxotere/Carbo on 11/18/16, now s/p 6 cycles completed 3/8/17  Xlap/ISIS/BSO/Radical mass resection > 10 cm, optimal debulking on 4/11/17  Initiated PRIMA on 8/21/17  BRCA negative  Started FORWARD1 on 11/27/17  Started Doxil/Anju as part of NRG  2/27/19    HPI     Here today prior to treatment on protocol.  CA-125 had decreased to 202, pending today.  Still having lower abdominal discomfort and pressure.  Denies diarrhea.   PS is 0.  Labs reviewed and ANC is 1300.    Review of Systems   Constitutional: Negative for appetite change, chills, diaphoresis, fatigue, fever and unexpected weight change.   HENT: Negative for mouth sores and tinnitus.    Respiratory: Negative for cough, chest tightness, shortness of breath and wheezing.    Cardiovascular: Negative for chest pain, palpitations and leg swelling.   Gastrointestinal: Negative for abdominal distention, abdominal pain, blood in stool, constipation, diarrhea, nausea and vomiting.   Genitourinary: Negative for difficulty urinating, dysuria, flank pain, frequency, hematuria, pelvic pain, urgency, vaginal bleeding, vaginal discharge and vaginal pain.   Musculoskeletal: Negative for arthralgias, back pain and gait problem.   Skin: Negative for color change and rash.   Neurological: Positive for numbness. Negative for dizziness, weakness and headaches.   Hematological: Negative for adenopathy. Does not bruise/bleed easily.   Psychiatric/Behavioral: Negative for confusion and sleep disturbance. The patient is not nervous/anxious.        Objective:   /79   Pulse 79   Wt 81.6 kg (180 lb)   BMI 28.19 kg/m²      Physical Exam   Constitutional: She is oriented to person, place, and time. She appears well-developed and well-nourished. No distress.   HENT:    Head: Normocephalic and atraumatic.   Eyes: No scleral icterus.   Neck: Normal range of motion.   Pulmonary/Chest: Effort normal. No respiratory distress.   Abdominal: Soft. She exhibits no distension and no mass.   Tender to palpation in LLQ, along sigmoid   Genitourinary: Vagina normal. Pelvic exam was performed with patient supine. There is no rash, tenderness or lesion on the right labia. There is no rash, tenderness or lesion on the left labia. Right adnexum displays no mass. Left adnexum displays no mass.   Genitourinary Comments: Pelvic normal   Musculoskeletal: Normal range of motion. She exhibits no edema or tenderness.   Neurological: She is alert and oriented to person, place, and time.   Skin: Skin is warm and dry. No pallor.   Psychiatric: She has a normal mood and affect. Her behavior is normal. Judgment and thought content normal.       Assessment:       1. Malignant neoplasm of ovary, unspecified laterality    2. Encounter for antineoplastic chemotherapy    3. Examination of participant in clinical trial        Plan:     ANC too low for treatment.  F/U CA-125 as well.  Repeat labs 1 week.

## 2019-07-16 ENCOUNTER — TELEPHONE (OUTPATIENT)
Dept: GYNECOLOGIC ONCOLOGY | Facility: CLINIC | Age: 64
End: 2019-07-16

## 2019-07-16 ENCOUNTER — PATIENT MESSAGE (OUTPATIENT)
Dept: GYNECOLOGIC ONCOLOGY | Facility: CLINIC | Age: 64
End: 2019-07-16

## 2019-07-16 NOTE — TELEPHONE ENCOUNTER
Left message about her CA-125.  It's still overall stable compared to prior cycle.  Will repeat labs and plan to treat next week.  She is due for scans on 8/8 so will keep that schedule.

## 2019-07-16 NOTE — PROGRESS NOTES
July 15, 2019     Protocol: NRG-  : Kiana Weston MD  Treating Physician: Benny Tran MD  Pt Initials:  NAV BERMUDEZ  Study ID: [id number]  IRB#: [id number]     A Randomized Phase II/III Study of Pegylated Liposomal Doxorubicin and Atezolizumab vs. Pegylated Liposomal Doxorubicin/Bevacizumab/ Atezolizumab vs. Pegylated Liposomal Doxorubicin/ Bevacizumab in Platinum-resistant Ovarian Cancer.     Cycle 6 Day 1: DELAYED    Patient randomized on 1/23/19 to Arm 3: Pegylated Liposomal Doxorubicin, 40 mg/m2, q28d and Bevacizumab, 10 mg/kg, q14d.      Patient presented to clinic for C6D1 treatment on the above-mentioned clinical trial. Patient alert and oriented, mood and affect appropriate to the situation. Patient reports Grade 1 Hand-Foot continuing - no blister/peeling but with discomfort on both feet and hands.  Patient not currently taking Lyrica for symptoms.  Pt states her abdominal pain is still present, but has not been worsening.  She denies fever, nausea vomiting, constipation, headaches, or dizziness.     Physical exam and performance status assessment done per protocol.  Local labs reviewed by Dr. Tran prior to treatment and ANC was 1.3 which is too low of treatment.  Patient will be delayed 1 week. Reviewed current medications with patient. All Conmeds reviewed with pt.  See Concomitant Medication sheets        All of the patient's questions have been answered by Dr. Tran and me to her satisfaction. Patient expressed her willingness to continue to participate in the above-mentioned clinical trial.  Instructed patient to notify Dr. Tran and/or me with any questions, concerns, or changes in health status. Patient verbalized understanding.       Will return then for C6D1 Exam, lab and infusion on 7/22/19      Baseline History:  1. 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  2. Peripheral neuropathy- toes, Grade  1.  3. Peripheral neuropathy- fingers, Grade 1.  4. Platelet count decreased/thrombocytopenia, Grade 1.  5. Fatigue, Grade 1.  Will continue to monitor.  6. Cataracts, Grade 2.  Will be assessed by Dr. Mcgrath on 1/15/19.  Will continue to monitor.  7. Blurred vision, Grade 2. Due to cataracts per Ophthalmologist. Will continue to monitor      Adverse Events - For the most up-to-date AE log, please refer to the paper AE log in the subjects research file.  1. Hand-Foot Syndrome, remains Grade 1.  Will continue to monitor.  2. Pain - lower back, Grade 1.  Will continue to monitor.  3. Afebrile neutropenia, Grade 3.   Treatment delayed.  AE going.  Will monitor.   4.   Platelet count decreased/thrombocytopenia, Grade 2.  Resolved.  Will monitor.   5.   Abdominal pain.  Grade 1.  AE ongoing.  Will monitor

## 2019-07-18 DIAGNOSIS — C56.9 MALIGNANT NEOPLASM OF OVARY, UNSPECIFIED LATERALITY: Primary | ICD-10-CM

## 2019-07-18 DIAGNOSIS — Z00.6 EXAMINATION OF PARTICIPANT IN CLINICAL TRIAL: ICD-10-CM

## 2019-07-22 ENCOUNTER — INFUSION (OUTPATIENT)
Dept: INFUSION THERAPY | Facility: HOSPITAL | Age: 64
End: 2019-07-22
Attending: OBSTETRICS & GYNECOLOGY
Payer: COMMERCIAL

## 2019-07-22 ENCOUNTER — OFFICE VISIT (OUTPATIENT)
Dept: GYNECOLOGIC ONCOLOGY | Facility: CLINIC | Age: 64
End: 2019-07-22
Payer: COMMERCIAL

## 2019-07-22 ENCOUNTER — RESEARCH ENCOUNTER (OUTPATIENT)
Dept: RESEARCH | Facility: HOSPITAL | Age: 64
End: 2019-07-22

## 2019-07-22 VITALS
SYSTOLIC BLOOD PRESSURE: 128 MMHG | HEART RATE: 86 BPM | HEIGHT: 67 IN | TEMPERATURE: 97 F | DIASTOLIC BLOOD PRESSURE: 74 MMHG | BODY MASS INDEX: 28.41 KG/M2 | WEIGHT: 181 LBS

## 2019-07-22 VITALS
TEMPERATURE: 98 F | SYSTOLIC BLOOD PRESSURE: 138 MMHG | RESPIRATION RATE: 18 BRPM | HEART RATE: 59 BPM | DIASTOLIC BLOOD PRESSURE: 70 MMHG

## 2019-07-22 DIAGNOSIS — Z00.6 EXAMINATION OF PARTICIPANT IN CLINICAL TRIAL: Primary | ICD-10-CM

## 2019-07-22 DIAGNOSIS — C56.9 OVARIAN CANCER, UNSPECIFIED LATERALITY: ICD-10-CM

## 2019-07-22 DIAGNOSIS — C56.9 OVARIAN CANCER, UNSPECIFIED LATERALITY: Primary | ICD-10-CM

## 2019-07-22 DIAGNOSIS — Z51.11 ENCOUNTER FOR ANTINEOPLASTIC CHEMOTHERAPY: ICD-10-CM

## 2019-07-22 PROCEDURE — 99999 PR PBB SHADOW E&M-EST. PATIENT-LVL III: ICD-10-PCS | Mod: PBBFAC,,, | Performed by: OBSTETRICS & GYNECOLOGY

## 2019-07-22 PROCEDURE — 96413 CHEMO IV INFUSION 1 HR: CPT

## 2019-07-22 PROCEDURE — 99214 OFFICE O/P EST MOD 30 MIN: CPT | Mod: S$GLB,,, | Performed by: OBSTETRICS & GYNECOLOGY

## 2019-07-22 PROCEDURE — 99999 PR PBB SHADOW E&M-EST. PATIENT-LVL III: CPT | Mod: PBBFAC,,, | Performed by: OBSTETRICS & GYNECOLOGY

## 2019-07-22 PROCEDURE — 3008F PR BODY MASS INDEX (BMI) DOCUMENTED: ICD-10-PCS | Mod: CPTII,S$GLB,, | Performed by: OBSTETRICS & GYNECOLOGY

## 2019-07-22 PROCEDURE — 63600175 PHARM REV CODE 636 W HCPCS: Mod: JG | Performed by: OBSTETRICS & GYNECOLOGY

## 2019-07-22 PROCEDURE — 96367 TX/PROPH/DG ADDL SEQ IV INF: CPT

## 2019-07-22 PROCEDURE — 3008F BODY MASS INDEX DOCD: CPT | Mod: CPTII,S$GLB,, | Performed by: OBSTETRICS & GYNECOLOGY

## 2019-07-22 PROCEDURE — 25000003 PHARM REV CODE 250: Performed by: OBSTETRICS & GYNECOLOGY

## 2019-07-22 PROCEDURE — 99214 PR OFFICE/OUTPT VISIT, EST, LEVL IV, 30-39 MIN: ICD-10-PCS | Mod: S$GLB,,, | Performed by: OBSTETRICS & GYNECOLOGY

## 2019-07-22 PROCEDURE — 96417 CHEMO IV INFUS EACH ADDL SEQ: CPT

## 2019-07-22 RX ORDER — HEPARIN 100 UNIT/ML
500 SYRINGE INTRAVENOUS
Status: CANCELLED | OUTPATIENT
Start: 2019-07-22

## 2019-07-22 RX ORDER — EPINEPHRINE 0.3 MG/.3ML
0.3 INJECTION SUBCUTANEOUS ONCE AS NEEDED
Status: DISCONTINUED | OUTPATIENT
Start: 2019-07-22 | End: 2019-07-22 | Stop reason: HOSPADM

## 2019-07-22 RX ORDER — HEPARIN 100 UNIT/ML
500 SYRINGE INTRAVENOUS
Status: CANCELLED | OUTPATIENT
Start: 2019-07-23

## 2019-07-22 RX ORDER — EPINEPHRINE 0.3 MG/.3ML
0.3 INJECTION SUBCUTANEOUS ONCE AS NEEDED
Status: CANCELLED | OUTPATIENT
Start: 2019-07-23

## 2019-07-22 RX ORDER — SODIUM CHLORIDE 0.9 % (FLUSH) 0.9 %
10 SYRINGE (ML) INJECTION
Status: CANCELLED | OUTPATIENT
Start: 2019-07-22

## 2019-07-22 RX ORDER — DIPHENHYDRAMINE HYDROCHLORIDE 50 MG/ML
50 INJECTION INTRAMUSCULAR; INTRAVENOUS ONCE AS NEEDED
Status: CANCELLED | OUTPATIENT
Start: 2019-07-23

## 2019-07-22 RX ORDER — EPINEPHRINE 0.3 MG/.3ML
0.3 INJECTION SUBCUTANEOUS ONCE AS NEEDED
Status: CANCELLED | OUTPATIENT
Start: 2019-07-22

## 2019-07-22 RX ORDER — SODIUM CHLORIDE 0.9 % (FLUSH) 0.9 %
10 SYRINGE (ML) INJECTION
Status: CANCELLED | OUTPATIENT
Start: 2019-07-23

## 2019-07-22 RX ORDER — DIPHENHYDRAMINE HYDROCHLORIDE 50 MG/ML
50 INJECTION INTRAMUSCULAR; INTRAVENOUS ONCE AS NEEDED
Status: DISCONTINUED | OUTPATIENT
Start: 2019-07-22 | End: 2019-07-22 | Stop reason: HOSPADM

## 2019-07-22 RX ORDER — DIPHENHYDRAMINE HYDROCHLORIDE 50 MG/ML
50 INJECTION INTRAMUSCULAR; INTRAVENOUS ONCE AS NEEDED
Status: CANCELLED | OUTPATIENT
Start: 2019-07-22

## 2019-07-22 RX ADMIN — SODIUM CHLORIDE: 0.9 INJECTION, SOLUTION INTRAVENOUS at 10:07

## 2019-07-22 RX ADMIN — DOXORUBICIN HYDROCHLORIDE 82 MG: 2 INJECTION, SUSPENSION, LIPOSOMAL INTRAVENOUS at 11:07

## 2019-07-22 RX ADMIN — DIPHENHYDRAMINE HYDROCHLORIDE 50 MG: 50 INJECTION, SOLUTION INTRAMUSCULAR; INTRAVENOUS at 10:07

## 2019-07-22 RX ADMIN — BEVACIZUMAB 880 MG: 100 INJECTION, SOLUTION INTRAVENOUS at 10:07

## 2019-07-22 NOTE — PROGRESS NOTES
July 22, 2019     Protocol: NRG-  : Kiana Weston MD  Treating Physician: Benny Tran MD  Pt Initials:  NAV BERMUDEZ  Study ID: [id number]  IRB#: [id number]     A Randomized Phase II/III Study of Pegylated Liposomal Doxorubicin and Atezolizumab vs. Pegylated Liposomal Doxorubicin/Bevacizumab/ Atezolizumab vs. Pegylated Liposomal Doxorubicin/ Bevacizumab in Platinum-resistant Ovarian Cancer.     Cycle 6 Day 1:     Patient randomized on 1/23/19 to Arm 3: Pegylated Liposomal Doxorubicin, 40 mg/m2, q28d and Bevacizumab, 10 mg/kg, q14d.      Patient presented to clinic for C6D1 treatment on the above-mentioned clinical trial. Patient alert and oriented, mood and affect appropriate to the situation. Patient reports Grade 1 Hand-Foot continuing - no blister/peeling but with discomfort on both feet and hands.  Patient not currently taking Lyrica for symptoms.  Pt states her abdominal pain is still present, mostly on the left side, but has not been worsening. Continues to complain of abdominal bloating.  She denies fever, nausea, vomiting, constipation, headaches, or dizziness.     Physical exam and performance status assessment done per protocol.  Local labs reviewed by Dr. Weston prior to treatment.   Labs accordingly to protocol good for treatment today.  Reviewed current medications with patient. All Conmeds reviewed with pt.  See Concomitant Medication sheets        All of the patient's questions have been answered by Dr. Weston and me to her satisfaction. Patient expressed her willingness to continue to participate in the above-mentioned clinical trial.  Instructed patient to notify Dr. Tran and/or me with any questions, concerns, or changes in health status. Patient verbalized understanding.       Will return then for C6D15 infusion on 8/05/19      Baseline History:  1. 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  2. Peripheral  neuropathy- toes, Grade 1.  3. Peripheral neuropathy- fingers, Grade 1.  4. Platelet count decreased/thrombocytopenia, Grade 1.  5. Fatigue, Grade 1.  Will continue to monitor.  6. Cataracts, Grade 2.  Will be assessed by Dr. Mcgrath on 1/15/19.  Will continue to monitor.  7. Blurred vision, Grade 2. Due to cataracts per Ophthalmologist. Will continue to monitor      Adverse Events - For the most up-to-date AE log, please refer to the paper AE log in the subjects research file.  1. Hand-Foot Syndrome, remains Grade 1.  Will continue to monitor.  2. Pain - lower back, Grade 1.  Will continue to monitor.  3. Afebrile neutropenia, Grade 3.   Treatment delayed.  AE going.  Will monitor.   4.   Platelet count decreased/thrombocytopenia, Grade 2.  Resolved.  Will monitor.   5.   Abdominal pain.  Grade 1.  AE ongoing.  Will monitor

## 2019-07-22 NOTE — PLAN OF CARE
Problem: Adult Inpatient Plan of Care  Goal: Plan of Care Review  Outcome: Ongoing (interventions implemented as appropriate)  Per research RNNerissa, jame okay to proceed with today's treatment. Pt tolerated Avastin + Doxil with no complications. VSS. Pt instructed to call MD with any problems. NAD. Pt discharged home independently.

## 2019-07-24 NOTE — PROGRESS NOTES
Subjective:      Patient ID: Mickie Don is a 63 y.o. female.    Chief Complaint: Ovarian Cancer      HPI  Xlap/Omentectomy on 11/1/16 - mass was unresectable  Stage IIIC ovarian cancer  Initiated Taxotere/Carbo on 11/18/16, now s/p 6 cycles completed 3/8/17  Xlap/ISIS/BSO/Radical mass resection > 10 cm, optimal debulking on 4/11/17  Initiated PRIMA on 8/21/17  BRCA negative  Started FORWARD1 on 11/27/17  Started Doxil/Anju as part of NRG  2/27/19     HPI   Here today prior to treatment on protocol C6.  CA-125 202>272>262. PS is 0. Delayed last week for neutropenia. Labs reviewed and appropriate for treatment. Continues with Intermittent lower abdominal pain. Otherwise feels well.   /74  Urine 5/1/19 negative protein       Review of Systems   Constitutional: Negative for appetite change, chills, fatigue and fever.   HENT: Negative for mouth sores.    Respiratory: Negative for cough and shortness of breath.    Cardiovascular: Negative for leg swelling.   Gastrointestinal: Negative for abdominal pain, blood in stool, constipation and diarrhea.   Endocrine: Negative for cold intolerance.   Genitourinary: Negative for dysuria and vaginal bleeding.   Musculoskeletal: Negative for myalgias.   Skin: Negative for rash.   Allergic/Immunologic: Negative.    Neurological: Negative for weakness and numbness.   Hematological: Negative for adenopathy. Does not bruise/bleed easily.   Psychiatric/Behavioral: Negative for confusion.       Objective:   Physical Exam:   Constitutional: She is oriented to person, place, and time. She appears well-developed and well-nourished.    HENT:   Head: Normocephalic and atraumatic.    Eyes: Pupils are equal, round, and reactive to light. EOM are normal.    Neck: Normal range of motion. Neck supple. No thyromegaly present.    Cardiovascular: Normal rate, regular rhythm and intact distal pulses.     Pulmonary/Chest: Effort normal and breath sounds normal. No respiratory distress. She  has no wheezes.        Abdominal: Soft. Bowel sounds are normal. She exhibits no distension, no ascites and no mass. There is no tenderness.             Musculoskeletal: Normal range of motion and moves all extremeties.      Lymphadenopathy:     She has no cervical adenopathy.        Right: No supraclavicular adenopathy present.        Left: No supraclavicular adenopathy present.    Neurological: She is alert and oriented to person, place, and time.    Skin: Skin is warm and dry. No rash noted.    Psychiatric: She has a normal mood and affect.       Assessment:     1. Examination of participant in clinical trial    2. Encounter for antineoplastic chemotherapy    3. Ovarian cancer, unspecified laterality        Plan:   No orders of the defined types were placed in this encounter.    Okay to treat C6 per protocol.  Imaging assessment after this cycle.   stable.   Tolerating without significant toxicity.

## 2019-07-25 ENCOUNTER — TELEPHONE (OUTPATIENT)
Dept: RESEARCH | Facility: HOSPITAL | Age: 64
End: 2019-07-25

## 2019-07-26 ENCOUNTER — HOSPITAL ENCOUNTER (OUTPATIENT)
Dept: CARDIOLOGY | Facility: OTHER | Age: 64
Discharge: HOME | End: 2019-07-26
Attending: OBSTETRICS & GYNECOLOGY
Payer: COMMERCIAL

## 2019-07-26 ENCOUNTER — HOSPITAL ENCOUNTER (OUTPATIENT)
Dept: RADIOLOGY | Facility: OTHER | Age: 64
Discharge: HOME | End: 2019-07-26
Attending: OBSTETRICS & GYNECOLOGY
Payer: COMMERCIAL

## 2019-07-26 DIAGNOSIS — C56.9 OVARIAN CANCER, UNSPECIFIED LATERALITY: ICD-10-CM

## 2019-07-26 DIAGNOSIS — Z00.6 EXAMINATION OF PARTICIPANT IN CLINICAL TRIAL: ICD-10-CM

## 2019-07-26 PROCEDURE — 71260 CT THORAX DX C+: CPT | Mod: 26,,, | Performed by: RADIOLOGY

## 2019-07-26 PROCEDURE — 93306 TTE W/DOPPLER COMPLETE: CPT

## 2019-07-26 PROCEDURE — 25500020 PHARM REV CODE 255: Performed by: OBSTETRICS & GYNECOLOGY

## 2019-07-26 PROCEDURE — 74177 CT ABD & PELVIS W/CONTRAST: CPT | Mod: TC

## 2019-07-26 PROCEDURE — 74177 CT ABD & PELVIS W/CONTRAST: CPT | Mod: 26,,, | Performed by: RADIOLOGY

## 2019-07-26 PROCEDURE — 71260 CT CHEST ABDOMEN PELVIS WITH CONTRAST (XPD): ICD-10-PCS | Mod: 26,,, | Performed by: RADIOLOGY

## 2019-07-26 PROCEDURE — 74177 CT CHEST ABDOMEN PELVIS WITH CONTRAST (XPD): ICD-10-PCS | Mod: 26,,, | Performed by: RADIOLOGY

## 2019-07-26 RX ADMIN — IOHEXOL 1000 ML: 9 SOLUTION ORAL at 11:07

## 2019-07-26 RX ADMIN — IOHEXOL 100 ML: 350 INJECTION, SOLUTION INTRAVENOUS at 12:07

## 2019-07-27 LAB
AORTIC ROOT ANNULUS: 2.44 CM
ASCENDING AORTA: 2.74 CM
AV INDEX (PROSTH): 0.82
AV MEAN GRADIENT: 6 MMHG
AV PEAK GRADIENT: 9 MMHG
AV VALVE AREA: 2.68 CM2
AV VELOCITY RATIO: 0.69
CV ECHO LV RWT: 0.43 CM
DOP CALC AO PEAK VEL: 1.54 M/S
DOP CALC AO VTI: 35.54 CM
DOP CALC LVOT AREA: 3.3 CM2
DOP CALC LVOT DIAMETER: 2.04 CM
DOP CALC LVOT PEAK VEL: 1.06 M/S
DOP CALC LVOT STROKE VOLUME: 95.39 CM3
DOP CALCLVOT PEAK VEL VTI: 29.2 CM
E WAVE DECELERATION TIME: 168.88 MSEC
E/A RATIO: 0.96
E/E' RATIO: 7.58 M/S
ECHO LV POSTERIOR WALL: 0.94 CM (ref 0.6–1.1)
FRACTIONAL SHORTENING: 37 % (ref 28–44)
INTERVENTRICULAR SEPTUM: 0.77 CM (ref 0.6–1.1)
LA MAJOR: 4.03 CM
LA MINOR: 4.27 CM
LA WIDTH: 2.89 CM
LEFT ATRIUM SIZE: 3.28 CM
LEFT ATRIUM VOLUME: 33.41 CM3
LEFT INTERNAL DIMENSION IN SYSTOLE: 2.74 CM (ref 2.1–4)
LEFT VENTRICLE DIASTOLIC VOLUME: 84.35 ML
LEFT VENTRICLE SYSTOLIC VOLUME: 27.98 ML
LEFT VENTRICULAR INTERNAL DIMENSION IN DIASTOLE: 4.33 CM (ref 3.5–6)
LEFT VENTRICULAR MASS: 116.39 G
LV LATERAL E/E' RATIO: 9 M/S
LV SEPTAL E/E' RATIO: 6.55 M/S
MV PEAK A VEL: 0.75 M/S
MV PEAK E VEL: 0.72 M/S
PISA TR MAX VEL: 2.88 M/S
RA MAJOR: 3.6 CM
RA WIDTH: 2.51 CM
RIGHT VENTRICULAR END-DIASTOLIC DIMENSION: 3.12 CM
SINUS: 2.69 CM
STJ: 2.74 CM
TDI LATERAL: 0.08 M/S
TDI SEPTAL: 0.11 M/S
TDI: 0.1 M/S
TR MAX PG: 33 MMHG
TRICUSPID ANNULAR PLANE SYSTOLIC EXCURSION: 2.66 CM

## 2019-07-30 ENCOUNTER — RESEARCH ENCOUNTER (OUTPATIENT)
Dept: RESEARCH | Facility: HOSPITAL | Age: 64
End: 2019-07-30

## 2019-08-01 NOTE — TELEPHONE ENCOUNTER
Thursday, August 1st, 2019    Pt Initials: LILO BERMUDEZ  Protocol: NRG-  Study #: [id number]  IRB#: 2018.028      Contacted and spoke to Pt regarding QOL per Protocol. Pt will complete QOL in up coming scheduled appointment in office.

## 2019-08-01 NOTE — PROGRESS NOTES
July 30, 2019     Protocol: NRG-  : Kiana Weston MD  Treating Physician: Benny Tran MD  Pt Initials:  NAV BERMUDEZ  Study ID: [id number]  IRB#: 2018.028.N     A Randomized Phase II/III Study of Pegylated Liposomal Doxorubicin and Atezolizumab vs. Pegylated Liposomal Doxorubicin/Bevacizumab/ Atezolizumab vs. Pegylated Liposomal Doxorubicin/ Bevacizumab in Platinum-resistant Ovarian Cancer.     End of Study:     Patient randomized on 1/23/19 to Arm 3: Pegylated Liposomal Doxorubicin, 40 mg/m2, q28d and Bevacizumab, 10 mg/kg, q14d.      Patient presented to clinic for treatment on the above-mentioned clinical trial. Patient alert and oriented, mood and affect appropriate to the situation. Patient reports Grade 1 Hand-Foot continuing - no blister/peeling but with discomfort on both feet and hands.  Patient not currently taking Lyrica for symptoms.  Pt states her abdominal pain is still present, mostly on the left side, has seem to be worsening. Continues to complain of abdominal bloating. This also appears to be worsening.  She denies fever, nausea, vomiting, constipation, headaches, or dizziness.     Reviewed current medications with patient. All Conmeds reviewed with pt.  See Concomitant Medication sheets.    Discussed scans done 7/26/19 with Dr. Tran and Pt.  Per Dr. Tran pt to come off trial for clinical progression.  Scans do not show progression per Rec[facility], but per. Dr. Tran pt is no longer clinically benefiting from the trial.      All of the patient's questions have been answered by Dr. Tran and me to her satisfaction.  Instructed patient to notify Dr. Tran and/or me with any questions, concerns, or changes in health status. Patient verbalized understanding.         Baseline History:  1. 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  2. Peripheral neuropathy- toes, Grade 1.  3. Peripheral neuropathy- fingers, Grade  1.  4. Platelet count decreased/thrombocytopenia, Grade 1.  5. Fatigue, Grade 1.  Will continue to monitor.  6. Cataracts, Grade 2.  Will be assessed by Dr. Mcgrath on 1/15/19.  Will continue to monitor.  7. Blurred vision, Grade 2. Due to cataracts per Ophthalmologist. Will continue to monitor      Adverse Events - For the most up-to-date AE log, please refer to the paper AE log in the subjects research file.  1. Hand-Foot Syndrome, remains Grade 1.  Will continue to monitor.  2. Pain - lower back, Grade 1.  Will continue to monitor.  3. Afebrile neutropenia, Grade 3.   Treatment delayed.  AE going.  Will monitor.   4.   Platelet count decreased/thrombocytopenia, Grade 2.  Resolved.  Will monitor.   5.   Abdominal pain.  Grade 2.  AE ongoing.  Will monitor  6.   Bloating.  Grade 2.  AE ongoing.  Will monitor

## 2019-08-05 ENCOUNTER — PATIENT MESSAGE (OUTPATIENT)
Dept: GYNECOLOGIC ONCOLOGY | Facility: CLINIC | Age: 64
End: 2019-08-05

## 2019-08-07 ENCOUNTER — PATIENT MESSAGE (OUTPATIENT)
Dept: INTERNAL MEDICINE | Facility: CLINIC | Age: 64
End: 2019-08-07

## 2019-08-08 ENCOUNTER — OFFICE VISIT (OUTPATIENT)
Dept: INTERNAL MEDICINE | Facility: CLINIC | Age: 64
End: 2019-08-08
Payer: COMMERCIAL

## 2019-08-08 VITALS
SYSTOLIC BLOOD PRESSURE: 116 MMHG | TEMPERATURE: 98 F | WEIGHT: 176.13 LBS | HEIGHT: 67 IN | HEART RATE: 86 BPM | BODY MASS INDEX: 27.64 KG/M2 | DIASTOLIC BLOOD PRESSURE: 76 MMHG | OXYGEN SATURATION: 98 %

## 2019-08-08 DIAGNOSIS — N73.9 PELVIC ABSCESS IN FEMALE: ICD-10-CM

## 2019-08-08 DIAGNOSIS — Z00.00 ROUTINE GENERAL MEDICAL EXAMINATION AT A HEALTH CARE FACILITY: Primary | ICD-10-CM

## 2019-08-08 PROCEDURE — 99397 PER PM REEVAL EST PAT 65+ YR: CPT | Mod: S$GLB,,, | Performed by: INTERNAL MEDICINE

## 2019-08-08 PROCEDURE — 99999 PR PBB SHADOW E&M-EST. PATIENT-LVL III: ICD-10-PCS | Mod: PBBFAC,,, | Performed by: INTERNAL MEDICINE

## 2019-08-08 PROCEDURE — 99999 PR PBB SHADOW E&M-EST. PATIENT-LVL III: CPT | Mod: PBBFAC,,, | Performed by: INTERNAL MEDICINE

## 2019-08-08 PROCEDURE — 99397 PR PREVENTIVE VISIT,EST,65 & OVER: ICD-10-PCS | Mod: S$GLB,,, | Performed by: INTERNAL MEDICINE

## 2019-08-08 NOTE — Clinical Note
"DR Tran.Saw Ms Don today. She has been having abdominal pain for the last several months. CT scan abdomen and pelvis dated 7/26/19, revealed " a locular fluid collection within the right paramedian pelvis indenting along the superior margin of the urinary bladder measuring 6.4 by 6.4 by 5.5 cm in AP by TV by CC dimensions respectively. "She is tender in the right lower quadrant.  Getting a pelvic ultrasound tomorrow.  Wonder if she has an abscess.  She may need percutaneous drainage. I will let you know the ultrasound shows.Elle Ma M.D."

## 2019-08-08 NOTE — PROGRESS NOTES
"CHIEF COMPLAINT: annual physical    HISTORY OF PRESENT ILLNESS:   This is a 64 year old female who presents today for annual physical.     She has had progressively worsening abdominal discomfort over the last several months. She has had a lot of gas and bloating.  Pain is worse when she feels more bloated and is better after passing pita. She is taking simethicone 125 mg 2 tablets several times a day which is not helping. She generally has a BM daily.  Yesterday, she had worsened abdominal pain when she took a deep breath which prompted her to come in for a visit.  She has pain after eating.  No nausea, vomiting, fever, chills. Pain is not as bad when she does not eat. She has lost 10 pounds in the last 3-4 months.  Pain is 6/10 and can localize to the right lower quadrant. She took 7 days of cipro and flagyl starting on 6/3/19 without improvement in her symtoms.     I have not seen her since 2015.   SHe has stage 3 ovarian cancer diagnosed 11/2016. She is followed by GYN oncology and is in a clinical trial.  levels have risen in the last several months.   She had a ct scan chest abdomen and pelvis on 7/26/19 which has generally been stable except for "a locular fluid collection within the right paramedian pelvis indenting along the superior margin of the urinary bladder measuring 6.4 by 6.4 by 5.5 cm in AP by TV by CC dimensions respectively."    . She denies hearing loss, sinus congestion, sore throat, chest pain, shortness of breath, nausea, vomiting, constipation, diarrhea, heartburn, melena, bright red blood per rectum, dysuria, hematuria, joint or muscle pain, rashes, headaches, polydipsia, polyuria, hot or cold intolerance       PAST MEDICAL HISTORY:  1. Ovarian cancer - stage 3 diagnosed 11/2016  2. Endochondroma of the right shoulder - stable - saw Dr Cain 5/10 - no shoulder pain  3. Fracture left 4th and 5th metatarsal 2013  4. Shingles 2/2014 on right lower back    PAST SURGICAL HISTORY:   1. Left " "Achilles tendon severance in . (two surgeries)   2. S/P-ORIF of the right ankle .   3. Cholecystectomy.   4. Bunion surgery   5. Xlap/ISIS/BSO/Radical mass resection > 10 cm, optimal debulking on 17    SOCIAL HISTORY:   No tobacco use. Drinks alcohol once a month. She is , has four children. 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     FAMILY HISTORY:   Mother  at age 89 of heart failure. Father  age 90; had liver cancer 3 months before his death. Has siblings; one brother had prostate cancer. Her son  in 2012 of metastatic spindle cell carcinoma      SCREENING:   Mammogram     Colonoscopy 2014 normal due     PHYSICAL EXAMINATIONR  /76 (BP Location: Right arm, Patient Position: Sitting, BP Method: Large (Manual))   Pulse 86   Temp 98.3 °F (36.8 °C) (Oral)   Ht 5' 7" (1.702 m)   Wt 79.9 kg (176 lb 2.4 oz)   SpO2 98%   BMI 27.59 kg/m²     General: Is alert, oriented, in no apparent distress. Affect is within normal limits.   HEENT: Conjunctivae are anicteric. PERRL. TM's are clear. Oropharynx is clear.   Neck: Supple. No cervical lymphadenopathy. No thyromegaly. Respiratory effort is normal.   Lungs: Clear to auscultation bilaterally.   Heart: Regular rate and rhythm, no murmur, rub or gallop. No lower   extremity edema.   Abdomen: Soft, right lower quadrant tenderness, nondistended. Bowel sounds present. No   hepatosplenomegaly.        ASSESSMENT AND PLAN:   1. Annual exam  2. RIght lower quadrant abdominal pain with abnormal CT scan abdomen and pelvis suspicious for an abscess.  WIll do pelvic ultrasound tomorrow.  May need drainage of this area.  3. Ovarian cancer- to see Dr Tran next week.  4. Will see back in 3 months, sooner if problems arise     Answers for HPI/ROS submitted by the patient on 2019   Abdominal pain  Chronicity: recurrent  Onset: more than 1 month ago  Onset quality: sudden  Frequency: daily  Episode duration: 5 " hours  Progression since onset: gradually worsening  Pain location: suprapubic region, left flank  Pain - numeric: 8/10  Pain quality: aching, cramping, a sensation of fullness  anorexia: No  arthralgias: No  belching: No  constipation: No  diarrhea: No  dysuria: No  fever: No  flatus: Yes  frequency: No  headaches: Yes  hematochezia: No  hematuria: No  melena: No  myalgias: Yes  nausea: No  weight loss: Yes  vomiting: No  Aggravated by: certain positions, coughing, deep breathing  Relieved by: being still, certain positions  Diagnostic workup: CT scan  Pain severity: moderate  Treatments tried: acetaminophen, antacids, antibiotics  Improvement on treatment: no relief  abdominal surgery: Yes  colon cancer: No  Crohn's disease: No  gallstones: Yes  GERD: No  irritable bowel syndrome: Yes  kidney stones: No  pancreatitis: No  PUD: No  ulcerative colitis: No  UTI: No

## 2019-08-09 ENCOUNTER — HOSPITAL ENCOUNTER (OUTPATIENT)
Dept: RADIOLOGY | Facility: HOSPITAL | Age: 64
Discharge: HOME | End: 2019-08-09
Attending: INTERNAL MEDICINE
Payer: COMMERCIAL

## 2019-08-09 DIAGNOSIS — N73.9 PELVIC ABSCESS IN FEMALE: ICD-10-CM

## 2019-08-09 PROCEDURE — 76856 US PELVIS COMPLETE NON OB: ICD-10-PCS | Mod: 26,,, | Performed by: RADIOLOGY

## 2019-08-09 PROCEDURE — 76856 US EXAM PELVIC COMPLETE: CPT | Mod: 26,,, | Performed by: RADIOLOGY

## 2019-08-09 PROCEDURE — 76856 US EXAM PELVIC COMPLETE: CPT | Mod: TC

## 2019-08-13 ENCOUNTER — TELEPHONE (OUTPATIENT)
Dept: ADMINISTRATIVE | Facility: OTHER | Age: 64
End: 2019-08-13

## 2019-08-14 ENCOUNTER — OFFICE VISIT (OUTPATIENT)
Dept: GYNECOLOGIC ONCOLOGY | Facility: CLINIC | Age: 64
End: 2019-08-14
Payer: COMMERCIAL

## 2019-08-14 ENCOUNTER — LAB VISIT (OUTPATIENT)
Dept: LAB | Facility: HOSPITAL | Age: 64
End: 2019-08-14
Attending: INTERNAL MEDICINE
Payer: COMMERCIAL

## 2019-08-14 VITALS
HEART RATE: 83 BPM | BODY MASS INDEX: 28.16 KG/M2 | WEIGHT: 179.44 LBS | DIASTOLIC BLOOD PRESSURE: 61 MMHG | HEIGHT: 67 IN | SYSTOLIC BLOOD PRESSURE: 129 MMHG

## 2019-08-14 DIAGNOSIS — Z00.6 EXAMINATION OF PARTICIPANT IN CLINICAL TRIAL: ICD-10-CM

## 2019-08-14 DIAGNOSIS — Z51.11 ENCOUNTER FOR ANTINEOPLASTIC CHEMOTHERAPY: ICD-10-CM

## 2019-08-14 DIAGNOSIS — Z00.6 PATIENT IN CANCER RELATED RESEARCH STUDY: ICD-10-CM

## 2019-08-14 DIAGNOSIS — C56.9 OVARIAN CANCER, UNSPECIFIED LATERALITY: ICD-10-CM

## 2019-08-14 DIAGNOSIS — C56.9 OVARIAN CANCER, UNSPECIFIED LATERALITY: Primary | ICD-10-CM

## 2019-08-14 LAB
ALBUMIN SERPL BCP-MCNC: 3.7 G/DL (ref 3.5–5.2)
ALP SERPL-CCNC: 127 U/L (ref 55–135)
ALT SERPL W/O P-5'-P-CCNC: 21 U/L (ref 10–44)
ANION GAP SERPL CALC-SCNC: 8 MMOL/L (ref 8–16)
AST SERPL-CCNC: 30 U/L (ref 10–40)
BASOPHILS # BLD AUTO: 0.05 K/UL (ref 0–0.2)
BASOPHILS NFR BLD: 1.7 % (ref 0–1.9)
BILIRUB SERPL-MCNC: 0.8 MG/DL (ref 0.1–1)
BUN SERPL-MCNC: 16 MG/DL (ref 8–23)
CALCIUM SERPL-MCNC: 9.6 MG/DL (ref 8.7–10.5)
CANCER AG125 SERPL-ACNC: 381 U/ML (ref 0–30)
CHLORIDE SERPL-SCNC: 102 MMOL/L (ref 95–110)
CO2 SERPL-SCNC: 29 MMOL/L (ref 23–29)
CREAT SERPL-MCNC: 0.8 MG/DL (ref 0.5–1.4)
DIFFERENTIAL METHOD: ABNORMAL
EOSINOPHIL # BLD AUTO: 0.1 K/UL (ref 0–0.5)
EOSINOPHIL NFR BLD: 1.7 % (ref 0–8)
ERYTHROCYTE [DISTWIDTH] IN BLOOD BY AUTOMATED COUNT: 16.2 % (ref 11.5–14.5)
EST. GFR  (AFRICAN AMERICAN): >60 ML/MIN/1.73 M^2
EST. GFR  (NON AFRICAN AMERICAN): >60 ML/MIN/1.73 M^2
GLUCOSE SERPL-MCNC: 98 MG/DL (ref 70–110)
HCT VFR BLD AUTO: 41.7 % (ref 37–48.5)
HGB BLD-MCNC: 13.6 G/DL (ref 12–16)
LYMPHOCYTES # BLD AUTO: 0.9 K/UL (ref 1–4.8)
LYMPHOCYTES NFR BLD: 30.1 % (ref 18–48)
MCH RBC QN AUTO: 31.1 PG (ref 27–31)
MCHC RBC AUTO-ENTMCNC: 32.6 G/DL (ref 32–36)
MCV RBC AUTO: 95 FL (ref 82–98)
MONOCYTES # BLD AUTO: 0.6 K/UL (ref 0.3–1)
MONOCYTES NFR BLD: 20.9 % (ref 4–15)
NEUTROPHILS # BLD AUTO: 1.3 K/UL (ref 1.8–7.7)
NEUTROPHILS NFR BLD: 45.6 % (ref 38–73)
PLATELET # BLD AUTO: 101 K/UL (ref 150–350)
PMV BLD AUTO: 9 FL (ref 9.2–12.9)
POTASSIUM SERPL-SCNC: 4.2 MMOL/L (ref 3.5–5.1)
PROT SERPL-MCNC: 7.2 G/DL (ref 6–8.4)
RBC # BLD AUTO: 4.37 M/UL (ref 4–5.4)
SODIUM SERPL-SCNC: 139 MMOL/L (ref 136–145)
TSH SERPL DL<=0.005 MIU/L-ACNC: 2.3 UIU/ML (ref 0.4–4)
WBC # BLD AUTO: 2.96 K/UL (ref 3.9–12.7)

## 2019-08-14 PROCEDURE — 3008F PR BODY MASS INDEX (BMI) DOCUMENTED: ICD-10-PCS | Mod: CPTII,S$GLB,, | Performed by: OBSTETRICS & GYNECOLOGY

## 2019-08-14 PROCEDURE — 99999 PR PBB SHADOW E&M-EST. PATIENT-LVL III: CPT | Mod: PBBFAC,,, | Performed by: OBSTETRICS & GYNECOLOGY

## 2019-08-14 PROCEDURE — 99214 PR OFFICE/OUTPT VISIT, EST, LEVL IV, 30-39 MIN: ICD-10-PCS | Mod: S$GLB,,, | Performed by: OBSTETRICS & GYNECOLOGY

## 2019-08-14 PROCEDURE — 84443 ASSAY THYROID STIM HORMONE: CPT

## 2019-08-14 PROCEDURE — 85025 COMPLETE CBC W/AUTO DIFF WBC: CPT

## 2019-08-14 PROCEDURE — 80053 COMPREHEN METABOLIC PANEL: CPT

## 2019-08-14 PROCEDURE — 99999 PR PBB SHADOW E&M-EST. PATIENT-LVL III: ICD-10-PCS | Mod: PBBFAC,,, | Performed by: OBSTETRICS & GYNECOLOGY

## 2019-08-14 PROCEDURE — 36415 COLL VENOUS BLD VENIPUNCTURE: CPT

## 2019-08-14 PROCEDURE — 99214 OFFICE O/P EST MOD 30 MIN: CPT | Mod: S$GLB,,, | Performed by: OBSTETRICS & GYNECOLOGY

## 2019-08-14 PROCEDURE — 86304 IMMUNOASSAY TUMOR CA 125: CPT

## 2019-08-14 PROCEDURE — 1101F PR PT FALLS ASSESS DOC 0-1 FALLS W/OUT INJ PAST YR: ICD-10-PCS | Mod: CPTII,S$GLB,, | Performed by: OBSTETRICS & GYNECOLOGY

## 2019-08-14 PROCEDURE — 3008F BODY MASS INDEX DOCD: CPT | Mod: CPTII,S$GLB,, | Performed by: OBSTETRICS & GYNECOLOGY

## 2019-08-14 PROCEDURE — 1101F PT FALLS ASSESS-DOCD LE1/YR: CPT | Mod: CPTII,S$GLB,, | Performed by: OBSTETRICS & GYNECOLOGY

## 2019-08-14 NOTE — PROGRESS NOTES
Subjective:       Patient ID: Mickie Don is a 64 y.o. female.    Chief Complaint: No chief complaint on file.    Treatment History  Xlap/Omentectomy on 11/1/16 - mass was unresectable  Stage IIIC ovarian cancer  Initiated Taxotere/Carbo on 11/18/16, now s/p 6 cycles completed 3/8/17  Xlap/ISIS/BSO/Radical mass resection > 10 cm, optimal debulking on 4/11/17  Initiated PRIMA on 8/21/17  BRCA negative  Started FORWARD1 on 11/27/17  Started Doxil/Anju as part of NRG  2/27/19, received total of 6 cycles    HPI     Here today for treatment discussion.  Decided to take patient off of trial due to increasing CA-125 and what was felt to be progressive disease on scans at end of July.  She has a 6 cm fluid collection int he pelvis that is probably loculated ascites and less likely abscess.  Labs reviewed and she remains neutropenic with ANC 1.3.  Remainder of CBC ok and CMP is WNL.  CA-125 has increased to 381 from 262.  Last treatment was 8/5    Review of Systems   Constitutional: Negative for appetite change, chills, diaphoresis, fatigue, fever and unexpected weight change.   HENT: Negative for mouth sores and tinnitus.    Respiratory: Negative for cough, chest tightness, shortness of breath and wheezing.    Cardiovascular: Negative for chest pain, palpitations and leg swelling.   Gastrointestinal: Negative for abdominal distention, abdominal pain, blood in stool, constipation, diarrhea, nausea and vomiting.   Genitourinary: Negative for difficulty urinating, dysuria, flank pain, frequency, hematuria, pelvic pain, urgency, vaginal bleeding, vaginal discharge and vaginal pain.   Musculoskeletal: Negative for arthralgias, back pain and gait problem.   Skin: Negative for color change and rash.   Neurological: Positive for numbness. Negative for dizziness, weakness and headaches.   Hematological: Negative for adenopathy. Does not bruise/bleed easily.   Psychiatric/Behavioral: Negative for confusion and sleep  disturbance. The patient is not nervous/anxious.        Objective:   There were no vitals taken for this visit.     Physical Exam   Constitutional: She is oriented to person, place, and time. She appears well-developed and well-nourished. No distress.   HENT:   Head: Normocephalic and atraumatic.   Eyes: No scleral icterus.   Neck: Normal range of motion.   Pulmonary/Chest: Effort normal. No respiratory distress.   Abdominal: Soft. She exhibits no distension and no mass.   Tender to palpation in LLQ, along sigmoid   Genitourinary: Vagina normal. Pelvic exam was performed with patient supine. There is no rash, tenderness or lesion on the right labia. There is no rash, tenderness or lesion on the left labia. Right adnexum displays no mass. Left adnexum displays no mass.   Genitourinary Comments: Pelvic normal   Musculoskeletal: Normal range of motion. She exhibits no edema or tenderness.   Neurological: She is alert and oriented to person, place, and time.   Skin: Skin is warm and dry. No pallor.   Psychiatric: She has a normal mood and affect. Her behavior is normal. Judgment and thought content normal.       Assessment:       1. Ovarian cancer, unspecified laterality    2. Encounter for antineoplastic chemotherapy    3. Patient in cancer related research study        Plan:     Long discussion regarding treatment options for her.  I recommended Gemzar monotherapy, however, will need to recover from a neutropenia standpoint prior to starting.  Repeat labs in 1 week

## 2019-08-19 ENCOUNTER — TELEPHONE (OUTPATIENT)
Dept: GYNECOLOGIC ONCOLOGY | Facility: CLINIC | Age: 64
End: 2019-08-19

## 2019-08-19 ENCOUNTER — PATIENT MESSAGE (OUTPATIENT)
Dept: GYNECOLOGIC ONCOLOGY | Facility: CLINIC | Age: 64
End: 2019-08-19

## 2019-08-19 DIAGNOSIS — C56.9 MALIGNANT NEOPLASM OF OVARY, UNSPECIFIED LATERALITY: Primary | ICD-10-CM

## 2019-08-20 ENCOUNTER — PATIENT MESSAGE (OUTPATIENT)
Dept: GYNECOLOGIC ONCOLOGY | Facility: CLINIC | Age: 64
End: 2019-08-20

## 2019-08-21 ENCOUNTER — LAB VISIT (OUTPATIENT)
Dept: LAB | Facility: HOSPITAL | Age: 64
End: 2019-08-21
Attending: INTERNAL MEDICINE
Payer: COMMERCIAL

## 2019-08-21 ENCOUNTER — PATIENT MESSAGE (OUTPATIENT)
Dept: GYNECOLOGIC ONCOLOGY | Facility: CLINIC | Age: 64
End: 2019-08-21

## 2019-08-21 DIAGNOSIS — Z00.6 EXAMINATION OF PARTICIPANT IN CLINICAL TRIAL: ICD-10-CM

## 2019-08-21 DIAGNOSIS — C56.9 MALIGNANT NEOPLASM OF OVARY, UNSPECIFIED LATERALITY: ICD-10-CM

## 2019-08-21 DIAGNOSIS — C56.9 OVARIAN CANCER, UNSPECIFIED LATERALITY: ICD-10-CM

## 2019-08-21 LAB
ALBUMIN SERPL BCP-MCNC: 3.8 G/DL (ref 3.5–5.2)
ALP SERPL-CCNC: 126 U/L (ref 55–135)
ALT SERPL W/O P-5'-P-CCNC: 26 U/L (ref 10–44)
ANION GAP SERPL CALC-SCNC: 9 MMOL/L (ref 8–16)
AST SERPL-CCNC: 36 U/L (ref 10–40)
BASOPHILS # BLD AUTO: 0.02 K/UL (ref 0–0.2)
BASOPHILS NFR BLD: 0.7 % (ref 0–1.9)
BILIRUB SERPL-MCNC: 0.9 MG/DL (ref 0.1–1)
BUN SERPL-MCNC: 16 MG/DL (ref 8–23)
CALCIUM SERPL-MCNC: 9.6 MG/DL (ref 8.7–10.5)
CANCER AG125 SERPL-ACNC: 370 U/ML (ref 0–30)
CHLORIDE SERPL-SCNC: 102 MMOL/L (ref 95–110)
CO2 SERPL-SCNC: 29 MMOL/L (ref 23–29)
CREAT SERPL-MCNC: 0.8 MG/DL (ref 0.5–1.4)
DIFFERENTIAL METHOD: ABNORMAL
EOSINOPHIL # BLD AUTO: 0 K/UL (ref 0–0.5)
EOSINOPHIL NFR BLD: 1 % (ref 0–8)
ERYTHROCYTE [DISTWIDTH] IN BLOOD BY AUTOMATED COUNT: 15.5 % (ref 11.5–14.5)
EST. GFR  (AFRICAN AMERICAN): >60 ML/MIN/1.73 M^2
EST. GFR  (NON AFRICAN AMERICAN): >60 ML/MIN/1.73 M^2
GLUCOSE SERPL-MCNC: 96 MG/DL (ref 70–110)
HCT VFR BLD AUTO: 42.2 % (ref 37–48.5)
HGB BLD-MCNC: 14.1 G/DL (ref 12–16)
LYMPHOCYTES # BLD AUTO: 0.8 K/UL (ref 1–4.8)
LYMPHOCYTES NFR BLD: 28.6 % (ref 18–48)
MCH RBC QN AUTO: 31.4 PG (ref 27–31)
MCHC RBC AUTO-ENTMCNC: 33.4 G/DL (ref 32–36)
MCV RBC AUTO: 94 FL (ref 82–98)
MONOCYTES # BLD AUTO: 0.5 K/UL (ref 0.3–1)
MONOCYTES NFR BLD: 17.4 % (ref 4–15)
NEUTROPHILS # BLD AUTO: 1.5 K/UL (ref 1.8–7.7)
NEUTROPHILS NFR BLD: 52.3 % (ref 38–73)
PLATELET # BLD AUTO: 95 K/UL (ref 150–350)
PMV BLD AUTO: 9.1 FL (ref 9.2–12.9)
POTASSIUM SERPL-SCNC: 4 MMOL/L (ref 3.5–5.1)
PROT SERPL-MCNC: 7.4 G/DL (ref 6–8.4)
RBC # BLD AUTO: 4.49 M/UL (ref 4–5.4)
SODIUM SERPL-SCNC: 140 MMOL/L (ref 136–145)
WBC # BLD AUTO: 2.87 K/UL (ref 3.9–12.7)

## 2019-08-21 PROCEDURE — 36415 COLL VENOUS BLD VENIPUNCTURE: CPT

## 2019-08-21 PROCEDURE — 80053 COMPREHEN METABOLIC PANEL: CPT

## 2019-08-21 PROCEDURE — 85025 COMPLETE CBC W/AUTO DIFF WBC: CPT

## 2019-08-21 PROCEDURE — 86304 IMMUNOASSAY TUMOR CA 125: CPT

## 2019-08-21 RX ORDER — HEPARIN 100 UNIT/ML
500 SYRINGE INTRAVENOUS
Status: CANCELLED | OUTPATIENT
Start: 2019-08-21

## 2019-08-21 RX ORDER — ONDANSETRON 2 MG/ML
8 INJECTION INTRAMUSCULAR; INTRAVENOUS
Status: CANCELLED | OUTPATIENT
Start: 2019-08-21

## 2019-08-21 RX ORDER — SODIUM CHLORIDE 0.9 % (FLUSH) 0.9 %
10 SYRINGE (ML) INJECTION
Status: CANCELLED | OUTPATIENT
Start: 2019-08-21

## 2019-08-22 ENCOUNTER — TELEPHONE (OUTPATIENT)
Dept: GYNECOLOGIC ONCOLOGY | Facility: CLINIC | Age: 64
End: 2019-08-22

## 2019-08-22 ENCOUNTER — PATIENT MESSAGE (OUTPATIENT)
Dept: GYNECOLOGIC ONCOLOGY | Facility: CLINIC | Age: 64
End: 2019-08-22

## 2019-08-22 DIAGNOSIS — C56.9 MALIGNANT NEOPLASM OF OVARY, UNSPECIFIED LATERALITY: Primary | ICD-10-CM

## 2019-08-28 ENCOUNTER — LAB VISIT (OUTPATIENT)
Dept: LAB | Facility: HOSPITAL | Age: 64
End: 2019-08-28
Attending: OBSTETRICS & GYNECOLOGY
Payer: COMMERCIAL

## 2019-08-28 DIAGNOSIS — C56.9 MALIGNANT NEOPLASM OF OVARY, UNSPECIFIED LATERALITY: ICD-10-CM

## 2019-08-28 LAB
ALBUMIN SERPL BCP-MCNC: 3.4 G/DL (ref 3.5–5.2)
ALP SERPL-CCNC: 142 U/L (ref 55–135)
ALT SERPL W/O P-5'-P-CCNC: 31 U/L (ref 10–44)
ANION GAP SERPL CALC-SCNC: 8 MMOL/L (ref 8–16)
AST SERPL-CCNC: 44 U/L (ref 10–40)
BILIRUB SERPL-MCNC: 0.6 MG/DL (ref 0.1–1)
BUN SERPL-MCNC: 14 MG/DL (ref 8–23)
CALCIUM SERPL-MCNC: 9.1 MG/DL (ref 8.7–10.5)
CHLORIDE SERPL-SCNC: 104 MMOL/L (ref 95–110)
CO2 SERPL-SCNC: 29 MMOL/L (ref 23–29)
CREAT SERPL-MCNC: 0.8 MG/DL (ref 0.5–1.4)
ERYTHROCYTE [DISTWIDTH] IN BLOOD BY AUTOMATED COUNT: 16.1 % (ref 11.5–14.5)
EST. GFR  (AFRICAN AMERICAN): >60 ML/MIN/1.73 M^2
EST. GFR  (NON AFRICAN AMERICAN): >60 ML/MIN/1.73 M^2
GLUCOSE SERPL-MCNC: 85 MG/DL (ref 70–110)
HCT VFR BLD AUTO: 41.9 % (ref 37–48.5)
HGB BLD-MCNC: 14.1 G/DL (ref 12–16)
MCH RBC QN AUTO: 31.9 PG (ref 27–31)
MCHC RBC AUTO-ENTMCNC: 33.7 G/DL (ref 32–36)
MCV RBC AUTO: 95 FL (ref 82–98)
NEUTROPHILS # BLD AUTO: 2.7 K/UL (ref 1.8–7.7)
PLATELET # BLD AUTO: 92 K/UL (ref 150–350)
PMV BLD AUTO: ABNORMAL FL (ref 9.2–12.9)
POTASSIUM SERPL-SCNC: 4.5 MMOL/L (ref 3.5–5.1)
PROT SERPL-MCNC: 6.8 G/DL (ref 6–8.4)
RBC # BLD AUTO: 4.42 M/UL (ref 4–5.4)
SODIUM SERPL-SCNC: 141 MMOL/L (ref 136–145)
WBC # BLD AUTO: 4.22 K/UL (ref 3.9–12.7)

## 2019-08-28 PROCEDURE — 80053 COMPREHEN METABOLIC PANEL: CPT

## 2019-08-28 PROCEDURE — 84443 ASSAY THYROID STIM HORMONE: CPT

## 2019-08-28 PROCEDURE — 36415 COLL VENOUS BLD VENIPUNCTURE: CPT

## 2019-08-28 PROCEDURE — 85027 COMPLETE CBC AUTOMATED: CPT

## 2019-08-28 PROCEDURE — 86304 IMMUNOASSAY TUMOR CA 125: CPT

## 2019-08-29 ENCOUNTER — INFUSION (OUTPATIENT)
Dept: INFUSION THERAPY | Facility: HOSPITAL | Age: 64
End: 2019-08-29
Attending: OBSTETRICS & GYNECOLOGY
Payer: COMMERCIAL

## 2019-08-29 ENCOUNTER — TELEPHONE (OUTPATIENT)
Dept: GYNECOLOGIC ONCOLOGY | Facility: CLINIC | Age: 64
End: 2019-08-29

## 2019-08-29 VITALS
TEMPERATURE: 98 F | DIASTOLIC BLOOD PRESSURE: 61 MMHG | OXYGEN SATURATION: 98 % | HEART RATE: 70 BPM | HEIGHT: 67 IN | RESPIRATION RATE: 18 BRPM | SYSTOLIC BLOOD PRESSURE: 115 MMHG | WEIGHT: 175.25 LBS | BODY MASS INDEX: 27.51 KG/M2

## 2019-08-29 DIAGNOSIS — C56.9 OVARIAN CANCER, UNSPECIFIED LATERALITY: Primary | ICD-10-CM

## 2019-08-29 LAB
CANCER AG125 SERPL-ACNC: 413 U/ML (ref 0–30)
TSH SERPL DL<=0.005 MIU/L-ACNC: 0.74 UIU/ML (ref 0.4–4)

## 2019-08-29 PROCEDURE — 96413 CHEMO IV INFUSION 1 HR: CPT

## 2019-08-29 PROCEDURE — 63600175 PHARM REV CODE 636 W HCPCS: Performed by: OBSTETRICS & GYNECOLOGY

## 2019-08-29 PROCEDURE — 96375 TX/PRO/DX INJ NEW DRUG ADDON: CPT

## 2019-08-29 RX ORDER — ONDANSETRON 2 MG/ML
8 INJECTION INTRAMUSCULAR; INTRAVENOUS
Status: CANCELLED | OUTPATIENT
Start: 2019-08-30

## 2019-08-29 RX ORDER — SODIUM CHLORIDE 0.9 % (FLUSH) 0.9 %
10 SYRINGE (ML) INJECTION
Status: DISCONTINUED | OUTPATIENT
Start: 2019-08-29 | End: 2019-08-29 | Stop reason: HOSPADM

## 2019-08-29 RX ORDER — HEPARIN 100 UNIT/ML
500 SYRINGE INTRAVENOUS
Status: CANCELLED | OUTPATIENT
Start: 2019-08-30

## 2019-08-29 RX ORDER — ONDANSETRON 2 MG/ML
8 INJECTION INTRAMUSCULAR; INTRAVENOUS
Status: COMPLETED | OUTPATIENT
Start: 2019-08-29 | End: 2019-08-29

## 2019-08-29 RX ORDER — HEPARIN 100 UNIT/ML
500 SYRINGE INTRAVENOUS
Status: DISCONTINUED | OUTPATIENT
Start: 2019-08-29 | End: 2019-08-29 | Stop reason: HOSPADM

## 2019-08-29 RX ORDER — SODIUM CHLORIDE 0.9 % (FLUSH) 0.9 %
10 SYRINGE (ML) INJECTION
Status: CANCELLED | OUTPATIENT
Start: 2019-08-30

## 2019-08-29 RX ADMIN — GEMCITABINE HYDROCHLORIDE 1570 MG: 200 INJECTION, SOLUTION INTRAVENOUS at 04:08

## 2019-08-29 RX ADMIN — SODIUM CHLORIDE: 0.9 INJECTION, SOLUTION INTRAVENOUS at 03:08

## 2019-08-29 RX ADMIN — ONDANSETRON 8 MG: 2 INJECTION, SOLUTION INTRAMUSCULAR; INTRAVENOUS at 03:08

## 2019-08-29 NOTE — TELEPHONE ENCOUNTER
----- Message from Edwina Abad sent at 8/29/2019 10:47 AM CDT -----  Contact: REGINALDO DIEZ [[id number]]    Name of Who is Calling: REGINALDO DIEZ [[id number]]       What is the request in detail: Patient is returning a call from Jennifer, patient states that she have chemo today and would like to know what the call was in regards too......Please contact to further discuss and advise.     Can the clinic reply by [facility]: no     What Number to Call Back if not in [facility]:  [phone]

## 2019-09-04 ENCOUNTER — TELEPHONE (OUTPATIENT)
Dept: GYNECOLOGIC ONCOLOGY | Facility: CLINIC | Age: 64
End: 2019-09-04

## 2019-09-04 ENCOUNTER — LAB VISIT (OUTPATIENT)
Dept: LAB | Facility: HOSPITAL | Age: 64
End: 2019-09-04
Attending: OBSTETRICS & GYNECOLOGY
Payer: COMMERCIAL

## 2019-09-04 DIAGNOSIS — C56.9 MALIGNANT NEOPLASM OF OVARY, UNSPECIFIED LATERALITY: ICD-10-CM

## 2019-09-04 LAB
ALBUMIN SERPL BCP-MCNC: 3.7 G/DL (ref 3.5–5.2)
ALP SERPL-CCNC: 188 U/L (ref 55–135)
ALT SERPL W/O P-5'-P-CCNC: 43 U/L (ref 10–44)
ANION GAP SERPL CALC-SCNC: 12 MMOL/L (ref 8–16)
AST SERPL-CCNC: 51 U/L (ref 10–40)
BILIRUB SERPL-MCNC: 0.7 MG/DL (ref 0.1–1)
BUN SERPL-MCNC: 14 MG/DL (ref 8–23)
CALCIUM SERPL-MCNC: 9.3 MG/DL (ref 8.7–10.5)
CANCER AG125 SERPL-ACNC: 447 U/ML (ref 0–30)
CHLORIDE SERPL-SCNC: 103 MMOL/L (ref 95–110)
CO2 SERPL-SCNC: 28 MMOL/L (ref 23–29)
CREAT SERPL-MCNC: 0.7 MG/DL (ref 0.5–1.4)
ERYTHROCYTE [DISTWIDTH] IN BLOOD BY AUTOMATED COUNT: 14.7 % (ref 11.5–14.5)
EST. GFR  (AFRICAN AMERICAN): >60 ML/MIN/1.73 M^2
EST. GFR  (NON AFRICAN AMERICAN): >60 ML/MIN/1.73 M^2
GLUCOSE SERPL-MCNC: 85 MG/DL (ref 70–110)
HCT VFR BLD AUTO: 39.2 % (ref 37–48.5)
HGB BLD-MCNC: 13.2 G/DL (ref 12–16)
MCH RBC QN AUTO: 31.8 PG (ref 27–31)
MCHC RBC AUTO-ENTMCNC: 33.7 G/DL (ref 32–36)
MCV RBC AUTO: 95 FL (ref 82–98)
NEUTROPHILS # BLD AUTO: 1.7 K/UL (ref 1.8–7.7)
PLATELET # BLD AUTO: 68 K/UL (ref 150–350)
PMV BLD AUTO: 8.8 FL (ref 9.2–12.9)
POTASSIUM SERPL-SCNC: 3.9 MMOL/L (ref 3.5–5.1)
PROT SERPL-MCNC: 7.5 G/DL (ref 6–8.4)
RBC # BLD AUTO: 4.15 M/UL (ref 4–5.4)
SODIUM SERPL-SCNC: 143 MMOL/L (ref 136–145)
WBC # BLD AUTO: 2.56 K/UL (ref 3.9–12.7)

## 2019-09-04 PROCEDURE — 36415 COLL VENOUS BLD VENIPUNCTURE: CPT

## 2019-09-04 PROCEDURE — 86304 IMMUNOASSAY TUMOR CA 125: CPT

## 2019-09-04 PROCEDURE — 80053 COMPREHEN METABOLIC PANEL: CPT

## 2019-09-04 PROCEDURE — 85027 COMPLETE CBC AUTOMATED: CPT

## 2019-09-19 ENCOUNTER — LAB VISIT (OUTPATIENT)
Dept: LAB | Facility: HOSPITAL | Age: 64
End: 2019-09-19
Attending: INTERNAL MEDICINE
Payer: COMMERCIAL

## 2019-09-19 DIAGNOSIS — C56.9 MALIGNANT NEOPLASM OF OVARY, UNSPECIFIED LATERALITY: ICD-10-CM

## 2019-09-19 LAB
ALBUMIN SERPL BCP-MCNC: 3.8 G/DL (ref 3.5–5.2)
ALP SERPL-CCNC: 149 U/L (ref 55–135)
ALT SERPL W/O P-5'-P-CCNC: 37 U/L (ref 10–44)
ANION GAP SERPL CALC-SCNC: 9 MMOL/L (ref 8–16)
AST SERPL-CCNC: 45 U/L (ref 10–40)
BILIRUB SERPL-MCNC: 0.8 MG/DL (ref 0.1–1)
BUN SERPL-MCNC: 17 MG/DL (ref 8–23)
CALCIUM SERPL-MCNC: 9.4 MG/DL (ref 8.7–10.5)
CANCER AG125 SERPL-ACNC: 263 U/ML (ref 0–30)
CHLORIDE SERPL-SCNC: 103 MMOL/L (ref 95–110)
CO2 SERPL-SCNC: 26 MMOL/L (ref 23–29)
CREAT SERPL-MCNC: 0.7 MG/DL (ref 0.5–1.4)
ERYTHROCYTE [DISTWIDTH] IN BLOOD BY AUTOMATED COUNT: 16.7 % (ref 11.5–14.5)
EST. GFR  (AFRICAN AMERICAN): >60 ML/MIN/1.73 M^2
EST. GFR  (NON AFRICAN AMERICAN): >60 ML/MIN/1.73 M^2
GLUCOSE SERPL-MCNC: 97 MG/DL (ref 70–110)
HCT VFR BLD AUTO: 41.8 % (ref 37–48.5)
HGB BLD-MCNC: 13.9 G/DL (ref 12–16)
MCH RBC QN AUTO: 31.9 PG (ref 27–31)
MCHC RBC AUTO-ENTMCNC: 33.3 G/DL (ref 32–36)
MCV RBC AUTO: 96 FL (ref 82–98)
NEUTROPHILS # BLD AUTO: 3 K/UL (ref 1.8–7.7)
PLATELET # BLD AUTO: 119 K/UL (ref 150–350)
PMV BLD AUTO: 8.7 FL (ref 9.2–12.9)
POTASSIUM SERPL-SCNC: 4.3 MMOL/L (ref 3.5–5.1)
PROT SERPL-MCNC: 7.3 G/DL (ref 6–8.4)
RBC # BLD AUTO: 4.36 M/UL (ref 4–5.4)
SODIUM SERPL-SCNC: 138 MMOL/L (ref 136–145)
WBC # BLD AUTO: 4.36 K/UL (ref 3.9–12.7)

## 2019-09-19 PROCEDURE — 80053 COMPREHEN METABOLIC PANEL: CPT

## 2019-09-19 PROCEDURE — 86304 IMMUNOASSAY TUMOR CA 125: CPT

## 2019-09-19 PROCEDURE — 36415 COLL VENOUS BLD VENIPUNCTURE: CPT

## 2019-09-19 PROCEDURE — 85027 COMPLETE CBC AUTOMATED: CPT

## 2019-09-19 RX ORDER — HEPARIN 100 UNIT/ML
500 SYRINGE INTRAVENOUS
Status: CANCELLED | OUTPATIENT
Start: 2019-09-19

## 2019-09-19 RX ORDER — ONDANSETRON 2 MG/ML
8 INJECTION INTRAMUSCULAR; INTRAVENOUS
Status: CANCELLED | OUTPATIENT
Start: 2019-09-19

## 2019-09-19 RX ORDER — SODIUM CHLORIDE 0.9 % (FLUSH) 0.9 %
10 SYRINGE (ML) INJECTION
Status: CANCELLED | OUTPATIENT
Start: 2019-09-19

## 2019-09-20 ENCOUNTER — INFUSION (OUTPATIENT)
Dept: INFUSION THERAPY | Facility: HOSPITAL | Age: 64
End: 2019-09-20
Attending: OBSTETRICS & GYNECOLOGY
Payer: COMMERCIAL

## 2019-09-20 ENCOUNTER — OFFICE VISIT (OUTPATIENT)
Dept: GYNECOLOGIC ONCOLOGY | Facility: CLINIC | Age: 64
End: 2019-09-20
Payer: COMMERCIAL

## 2019-09-20 VITALS
BODY MASS INDEX: 27.41 KG/M2 | SYSTOLIC BLOOD PRESSURE: 135 MMHG | TEMPERATURE: 99 F | HEIGHT: 67 IN | HEIGHT: 67 IN | SYSTOLIC BLOOD PRESSURE: 129 MMHG | DIASTOLIC BLOOD PRESSURE: 64 MMHG | RESPIRATION RATE: 16 BRPM | BODY MASS INDEX: 27.16 KG/M2 | DIASTOLIC BLOOD PRESSURE: 75 MMHG | WEIGHT: 173.06 LBS | HEART RATE: 91 BPM | HEART RATE: 64 BPM | WEIGHT: 174.63 LBS

## 2019-09-20 DIAGNOSIS — Z51.11 ENCOUNTER FOR ANTINEOPLASTIC CHEMOTHERAPY: ICD-10-CM

## 2019-09-20 DIAGNOSIS — C56.9 OVARIAN CANCER, UNSPECIFIED LATERALITY: Primary | ICD-10-CM

## 2019-09-20 DIAGNOSIS — C56.9 MALIGNANT NEOPLASM OF OVARY, UNSPECIFIED LATERALITY: Primary | ICD-10-CM

## 2019-09-20 PROCEDURE — 1101F PR PT FALLS ASSESS DOC 0-1 FALLS W/OUT INJ PAST YR: ICD-10-PCS | Mod: CPTII,S$GLB,, | Performed by: OBSTETRICS & GYNECOLOGY

## 2019-09-20 PROCEDURE — 99999 PR PBB SHADOW E&M-EST. PATIENT-LVL III: ICD-10-PCS | Mod: PBBFAC,,, | Performed by: OBSTETRICS & GYNECOLOGY

## 2019-09-20 PROCEDURE — 3008F BODY MASS INDEX DOCD: CPT | Mod: CPTII,S$GLB,, | Performed by: OBSTETRICS & GYNECOLOGY

## 2019-09-20 PROCEDURE — 1101F PT FALLS ASSESS-DOCD LE1/YR: CPT | Mod: CPTII,S$GLB,, | Performed by: OBSTETRICS & GYNECOLOGY

## 2019-09-20 PROCEDURE — 99214 PR OFFICE/OUTPT VISIT, EST, LEVL IV, 30-39 MIN: ICD-10-PCS | Mod: S$GLB,,, | Performed by: OBSTETRICS & GYNECOLOGY

## 2019-09-20 PROCEDURE — 3008F PR BODY MASS INDEX (BMI) DOCUMENTED: ICD-10-PCS | Mod: CPTII,S$GLB,, | Performed by: OBSTETRICS & GYNECOLOGY

## 2019-09-20 PROCEDURE — 96376 TX/PRO/DX INJ SAME DRUG ADON: CPT

## 2019-09-20 PROCEDURE — 63600175 PHARM REV CODE 636 W HCPCS: Performed by: OBSTETRICS & GYNECOLOGY

## 2019-09-20 PROCEDURE — 99999 PR PBB SHADOW E&M-EST. PATIENT-LVL III: CPT | Mod: PBBFAC,,, | Performed by: OBSTETRICS & GYNECOLOGY

## 2019-09-20 PROCEDURE — 96413 CHEMO IV INFUSION 1 HR: CPT

## 2019-09-20 PROCEDURE — 99214 OFFICE O/P EST MOD 30 MIN: CPT | Mod: S$GLB,,, | Performed by: OBSTETRICS & GYNECOLOGY

## 2019-09-20 RX ORDER — ONDANSETRON 2 MG/ML
8 INJECTION INTRAMUSCULAR; INTRAVENOUS
Status: COMPLETED | OUTPATIENT
Start: 2019-09-20 | End: 2019-09-20

## 2019-09-20 RX ORDER — SODIUM CHLORIDE 0.9 % (FLUSH) 0.9 %
10 SYRINGE (ML) INJECTION
Status: DISCONTINUED | OUTPATIENT
Start: 2019-09-20 | End: 2019-09-20 | Stop reason: HOSPADM

## 2019-09-20 RX ORDER — HEPARIN 100 UNIT/ML
500 SYRINGE INTRAVENOUS
Status: DISCONTINUED | OUTPATIENT
Start: 2019-09-20 | End: 2019-09-20 | Stop reason: HOSPADM

## 2019-09-20 RX ADMIN — ONDANSETRON 8 MG: 2 INJECTION, SOLUTION INTRAMUSCULAR; INTRAVENOUS at 10:09

## 2019-09-20 RX ADMIN — GEMCITABINE HYDROCHLORIDE 1570 MG: 200 INJECTION, SOLUTION INTRAVENOUS at 10:09

## 2019-09-20 RX ADMIN — SODIUM CHLORIDE: 0.9 INJECTION, SOLUTION INTRAVENOUS at 10:09

## 2019-09-20 NOTE — PLAN OF CARE
Problem: Anemia (Chemotherapy Effects)  Goal: Anemia Symptom Improvement    Intervention: Monitor and Manage Anemia  Gemzar infuison tolerated without issues, PIV removed cath tip intact, no redness or swelling present, site covered with 2x2 gauze and co wrap, avs given to pt and reviewed. Leaves clinic ambulatory no distress instructed to contact MD office with questions concerns in the interim.

## 2019-09-20 NOTE — PROGRESS NOTES
Subjective:       Patient ID: Mickie Don is a 64 y.o. female.    Chief Complaint: ovarian cancer, unspecified laterality    Treatment History  Xlap/Omentectomy on 11/1/16 - mass was unresectable  Stage IIIC ovarian cancer  Initiated Taxotere/Carbo on 11/18/16, now s/p 6 cycles completed 3/8/17  Xlap/ISIS/BSO/Radical mass resection > 10 cm, optimal debulking on 4/11/17  Initiated PRIMA on 8/21/17  BRCA negative  Started FORWARD1 on 11/27/17  Started Doxil/Anju as part of NRG  2/27/19, received total of 6 cycles  Gemzar monotherapy started 8/29/19    HPI     Here today for C2 of Gemzar.  Reports minimal toxicity.  Labs reviewed and ok for treatment.  CA-125 decreased to 263 from above 400.  Denies F/C, N/V, VB.    Review of Systems   Constitutional: Negative for appetite change, chills, diaphoresis, fatigue, fever and unexpected weight change.   HENT: Negative for mouth sores and tinnitus.    Respiratory: Negative for cough, chest tightness, shortness of breath and wheezing.    Cardiovascular: Negative for chest pain, palpitations and leg swelling.   Gastrointestinal: Negative for abdominal distention, abdominal pain, blood in stool, constipation, diarrhea, nausea and vomiting.   Genitourinary: Negative for difficulty urinating, dysuria, flank pain, frequency, hematuria, pelvic pain, urgency, vaginal bleeding, vaginal discharge and vaginal pain.   Musculoskeletal: Negative for arthralgias, back pain and gait problem.   Skin: Negative for color change and rash.   Neurological: Positive for numbness. Negative for dizziness, weakness and headaches.   Hematological: Negative for adenopathy. Does not bruise/bleed easily.   Psychiatric/Behavioral: Negative for confusion and sleep disturbance. The patient is not nervous/anxious.        Objective:   There were no vitals taken for this visit.     Physical Exam   Constitutional: She is oriented to person, place, and time. She appears well-developed and well-nourished. No  distress.   HENT:   Head: Normocephalic and atraumatic.   Eyes: No scleral icterus.   Neck: Normal range of motion.   Pulmonary/Chest: Effort normal. No respiratory distress.   Abdominal: Soft. She exhibits no distension and no mass.   Tender to palpation in LLQ, along sigmoid   Genitourinary: Vagina normal. Pelvic exam was performed with patient supine. There is no rash, tenderness or lesion on the right labia. There is no rash, tenderness or lesion on the left labia. Right adnexum displays no mass. Left adnexum displays no mass.   Genitourinary Comments: Pelvic normal   Musculoskeletal: Normal range of motion. She exhibits no edema or tenderness.   Neurological: She is alert and oriented to person, place, and time.   Skin: Skin is warm and dry. No pallor.   Psychiatric: She has a normal mood and affect. Her behavior is normal. Judgment and thought content normal.       Assessment:       1. Malignant neoplasm of ovary, unspecified laterality    2. Encounter for antineoplastic chemotherapy        Plan:     Treat with C2 Gemzar as planned given tolerating treatment and CA-125 decreasing.  RTC as scheduled.

## 2019-09-26 ENCOUNTER — TELEPHONE (OUTPATIENT)
Dept: GYNECOLOGIC ONCOLOGY | Facility: CLINIC | Age: 64
End: 2019-09-26

## 2019-09-27 ENCOUNTER — TELEPHONE (OUTPATIENT)
Dept: GYNECOLOGIC ONCOLOGY | Facility: CLINIC | Age: 64
End: 2019-09-27

## 2019-09-27 NOTE — TELEPHONE ENCOUNTER
----- Message from Karma Cavazos MA sent at 9/26/2019  4:12 PM CDT -----  Contact: REGINALDO DIEZ [[id number]]  Patient return call and said her chemo should be pushed back until 10/11 not the week of 10/4. Patient states she is doing chemo on D1, 7 skip 15 and skip the 3rd week. Would like to speak with you regarding chemo. Inform patient that you were in Chemo class and will call her on tomorrow. Patient voiced understanding. KJ   ----- Message -----  From: Candie Gustafson  Sent: 9/26/2019   4:04 PM CDT  To: [facility] Zapata Staff    Type:  Patient Returning Call    Who Called: REGINALDO DIEZ [[id number]]    Who Left Message for Patient: Jennifer LAURENE    Does the patient know what this is regarding?: yes    Best Call Back Number: [phone]    Additional Information: Patient states she might have to get chemo on the 10/4 only 10/11

## 2019-10-10 ENCOUNTER — LAB VISIT (OUTPATIENT)
Dept: LAB | Facility: HOSPITAL | Age: 64
End: 2019-10-10
Attending: OBSTETRICS & GYNECOLOGY
Payer: COMMERCIAL

## 2019-10-10 DIAGNOSIS — C56.9 MALIGNANT NEOPLASM OF OVARY, UNSPECIFIED LATERALITY: ICD-10-CM

## 2019-10-11 ENCOUNTER — OFFICE VISIT (OUTPATIENT)
Dept: GYNECOLOGIC ONCOLOGY | Facility: CLINIC | Age: 64
End: 2019-10-11
Payer: COMMERCIAL

## 2019-10-11 ENCOUNTER — INFUSION (OUTPATIENT)
Dept: INFUSION THERAPY | Facility: HOSPITAL | Age: 64
End: 2019-10-11
Attending: OBSTETRICS & GYNECOLOGY
Payer: COMMERCIAL

## 2019-10-11 VITALS
SYSTOLIC BLOOD PRESSURE: 125 MMHG | HEART RATE: 73 BPM | DIASTOLIC BLOOD PRESSURE: 62 MMHG | BODY MASS INDEX: 27.25 KG/M2 | WEIGHT: 174 LBS

## 2019-10-11 VITALS
DIASTOLIC BLOOD PRESSURE: 69 MMHG | SYSTOLIC BLOOD PRESSURE: 127 MMHG | HEIGHT: 67 IN | HEART RATE: 70 BPM | BODY MASS INDEX: 27.31 KG/M2 | RESPIRATION RATE: 18 BRPM | TEMPERATURE: 99 F | WEIGHT: 174 LBS

## 2019-10-11 DIAGNOSIS — C56.9 OVARIAN CANCER, UNSPECIFIED LATERALITY: Primary | ICD-10-CM

## 2019-10-11 DIAGNOSIS — C56.9 MALIGNANT NEOPLASM OF OVARY, UNSPECIFIED LATERALITY: Primary | ICD-10-CM

## 2019-10-11 DIAGNOSIS — T50.905A THROMBOCYTOPENIA DUE TO DRUGS: ICD-10-CM

## 2019-10-11 DIAGNOSIS — D69.59 THROMBOCYTOPENIA DUE TO DRUGS: ICD-10-CM

## 2019-10-11 DIAGNOSIS — Z51.11 ENCOUNTER FOR ANTINEOPLASTIC CHEMOTHERAPY: ICD-10-CM

## 2019-10-11 LAB
ALBUMIN SERPL BCP-MCNC: 3.9 G/DL (ref 3.5–5.2)
ALP SERPL-CCNC: 149 U/L (ref 55–135)
ALT SERPL W/O P-5'-P-CCNC: 37 U/L (ref 10–44)
ANION GAP SERPL CALC-SCNC: 12 MMOL/L (ref 8–16)
AST SERPL-CCNC: 42 U/L (ref 10–40)
BILIRUB SERPL-MCNC: 0.8 MG/DL (ref 0.1–1)
BUN SERPL-MCNC: 21 MG/DL (ref 8–23)
CALCIUM SERPL-MCNC: 9.7 MG/DL (ref 8.7–10.5)
CANCER AG125 SERPL-ACNC: 238 U/ML (ref 0–30)
CHLORIDE SERPL-SCNC: 102 MMOL/L (ref 95–110)
CO2 SERPL-SCNC: 24 MMOL/L (ref 23–29)
CREAT SERPL-MCNC: 0.8 MG/DL (ref 0.5–1.4)
ERYTHROCYTE [DISTWIDTH] IN BLOOD BY AUTOMATED COUNT: 16.2 % (ref 11.5–14.5)
EST. GFR  (AFRICAN AMERICAN): >60 ML/MIN/1.73 M^2
EST. GFR  (NON AFRICAN AMERICAN): >60 ML/MIN/1.73 M^2
GLUCOSE SERPL-MCNC: 109 MG/DL (ref 70–110)
HCT VFR BLD AUTO: 40.6 % (ref 37–48.5)
HGB BLD-MCNC: 13.4 G/DL (ref 12–16)
IMM GRANULOCYTES # BLD AUTO: 0.01 K/UL (ref 0–0.04)
MCH RBC QN AUTO: 32.2 PG (ref 27–31)
MCHC RBC AUTO-ENTMCNC: 33 G/DL (ref 32–36)
MCV RBC AUTO: 98 FL (ref 82–98)
NEUTROPHILS # BLD AUTO: 3.6 K/UL (ref 1.8–7.7)
PLATELET # BLD AUTO: 137 K/UL (ref 150–350)
PMV BLD AUTO: 9.2 FL (ref 9.2–12.9)
POTASSIUM SERPL-SCNC: 4.1 MMOL/L (ref 3.5–5.1)
PROT SERPL-MCNC: 7.2 G/DL (ref 6–8.4)
RBC # BLD AUTO: 4.16 M/UL (ref 4–5.4)
SODIUM SERPL-SCNC: 138 MMOL/L (ref 136–145)
WBC # BLD AUTO: 4.84 K/UL (ref 3.9–12.7)

## 2019-10-11 PROCEDURE — 63600175 PHARM REV CODE 636 W HCPCS: Performed by: OBSTETRICS & GYNECOLOGY

## 2019-10-11 PROCEDURE — 96375 TX/PRO/DX INJ NEW DRUG ADDON: CPT

## 2019-10-11 PROCEDURE — 36415 COLL VENOUS BLD VENIPUNCTURE: CPT

## 2019-10-11 PROCEDURE — 96413 CHEMO IV INFUSION 1 HR: CPT

## 2019-10-11 PROCEDURE — 86304 IMMUNOASSAY TUMOR CA 125: CPT

## 2019-10-11 PROCEDURE — 99999 PR PBB SHADOW E&M-EST. PATIENT-LVL III: CPT | Mod: PBBFAC,,, | Performed by: OBSTETRICS & GYNECOLOGY

## 2019-10-11 PROCEDURE — 1101F PT FALLS ASSESS-DOCD LE1/YR: CPT | Mod: CPTII,S$GLB,, | Performed by: OBSTETRICS & GYNECOLOGY

## 2019-10-11 PROCEDURE — 3008F BODY MASS INDEX DOCD: CPT | Mod: CPTII,S$GLB,, | Performed by: OBSTETRICS & GYNECOLOGY

## 2019-10-11 PROCEDURE — 3008F PR BODY MASS INDEX (BMI) DOCUMENTED: ICD-10-PCS | Mod: CPTII,S$GLB,, | Performed by: OBSTETRICS & GYNECOLOGY

## 2019-10-11 PROCEDURE — 85027 COMPLETE CBC AUTOMATED: CPT

## 2019-10-11 PROCEDURE — 99214 PR OFFICE/OUTPT VISIT, EST, LEVL IV, 30-39 MIN: ICD-10-PCS | Mod: S$GLB,,, | Performed by: OBSTETRICS & GYNECOLOGY

## 2019-10-11 PROCEDURE — 99214 OFFICE O/P EST MOD 30 MIN: CPT | Mod: S$GLB,,, | Performed by: OBSTETRICS & GYNECOLOGY

## 2019-10-11 PROCEDURE — 80053 COMPREHEN METABOLIC PANEL: CPT

## 2019-10-11 PROCEDURE — 99999 PR PBB SHADOW E&M-EST. PATIENT-LVL III: ICD-10-PCS | Mod: PBBFAC,,, | Performed by: OBSTETRICS & GYNECOLOGY

## 2019-10-11 PROCEDURE — 1101F PR PT FALLS ASSESS DOC 0-1 FALLS W/OUT INJ PAST YR: ICD-10-PCS | Mod: CPTII,S$GLB,, | Performed by: OBSTETRICS & GYNECOLOGY

## 2019-10-11 RX ORDER — HEPARIN 100 UNIT/ML
500 SYRINGE INTRAVENOUS
Status: DISCONTINUED | OUTPATIENT
Start: 2019-10-11 | End: 2019-10-11 | Stop reason: HOSPADM

## 2019-10-11 RX ORDER — SODIUM CHLORIDE 0.9 % (FLUSH) 0.9 %
10 SYRINGE (ML) INJECTION
Status: DISCONTINUED | OUTPATIENT
Start: 2019-10-11 | End: 2019-10-11 | Stop reason: HOSPADM

## 2019-10-11 RX ORDER — ONDANSETRON 2 MG/ML
8 INJECTION INTRAMUSCULAR; INTRAVENOUS
Status: COMPLETED | OUTPATIENT
Start: 2019-10-11 | End: 2019-10-11

## 2019-10-11 RX ORDER — HEPARIN 100 UNIT/ML
500 SYRINGE INTRAVENOUS
Status: CANCELLED | OUTPATIENT
Start: 2019-10-11

## 2019-10-11 RX ORDER — SODIUM CHLORIDE 0.9 % (FLUSH) 0.9 %
10 SYRINGE (ML) INJECTION
Status: CANCELLED | OUTPATIENT
Start: 2019-10-11

## 2019-10-11 RX ORDER — HYDROMORPHONE HYDROCHLORIDE 2 MG/ML
2 INJECTION, SOLUTION INTRAMUSCULAR; INTRAVENOUS; SUBCUTANEOUS ONCE AS NEEDED
Status: DISCONTINUED | OUTPATIENT
Start: 2019-10-11 | End: 2019-10-11 | Stop reason: HOSPADM

## 2019-10-11 RX ORDER — ONDANSETRON 2 MG/ML
8 INJECTION INTRAMUSCULAR; INTRAVENOUS
Status: CANCELLED | OUTPATIENT
Start: 2019-10-11

## 2019-10-11 RX ADMIN — ONDANSETRON 8 MG: 2 INJECTION, SOLUTION INTRAMUSCULAR; INTRAVENOUS at 10:10

## 2019-10-11 RX ADMIN — GEMCITABINE HYDROCHLORIDE 1570 MG: 200 INJECTION, SOLUTION INTRAVENOUS at 10:10

## 2019-10-11 NOTE — PROGRESS NOTES
Subjective:       Patient ID: Mickie Don is a 64 y.o. female.    Chief Complaint: No chief complaint on file.    Treatment History  Xlap/Omentectomy on 11/1/16 - mass was unresectable  Stage IIIC ovarian cancer  Initiated Taxotere/Carbo on 11/18/16, now s/p 6 cycles completed 3/8/17  Xlap/ISIS/BSO/Radical mass resection > 10 cm, optimal debulking on 4/11/17  Initiated PRIMA on 8/21/17  BRCA negative  Started FORWARD1 on 11/27/17  Started Doxil/Anju as part of NRG  2/27/19, received total of 6 cycles  Gemzar monotherapy started 8/29/19    HPI     Here today for C3D1 of Gemzar.  Reports minimal toxicity.  Labs reviewed and ok for treatment.  CA-125 decreased to 238 from 263.  Denies F/C, N/V, VB.    Review of Systems   Constitutional: Negative for appetite change, chills, diaphoresis, fatigue, fever and unexpected weight change.   HENT: Negative for mouth sores and tinnitus.    Respiratory: Negative for cough, chest tightness, shortness of breath and wheezing.    Cardiovascular: Negative for chest pain, palpitations and leg swelling.   Gastrointestinal: Negative for abdominal distention, abdominal pain, blood in stool, constipation, diarrhea, nausea and vomiting.   Genitourinary: Negative for difficulty urinating, dysuria, flank pain, frequency, hematuria, pelvic pain, urgency, vaginal bleeding, vaginal discharge and vaginal pain.   Musculoskeletal: Negative for arthralgias, back pain and gait problem.   Skin: Negative for color change and rash.   Neurological: Positive for numbness. Negative for dizziness, weakness and headaches.   Hematological: Negative for adenopathy. Does not bruise/bleed easily.   Psychiatric/Behavioral: Negative for confusion and sleep disturbance. The patient is not nervous/anxious.        Objective:   There were no vitals taken for this visit.     Physical Exam   Constitutional: She is oriented to person, place, and time. She appears well-developed and well-nourished. No distress.    HENT:   Head: Normocephalic and atraumatic.   Eyes: No scleral icterus.   Neck: Normal range of motion.   Pulmonary/Chest: Effort normal. No respiratory distress.   Abdominal: Soft. She exhibits no distension and no mass.   Tender to palpation in LLQ, along sigmoid   Genitourinary: Vagina normal. Pelvic exam was performed with patient supine. There is no rash, tenderness or lesion on the right labia. There is no rash, tenderness or lesion on the left labia. Right adnexum displays no mass. Left adnexum displays no mass.   Genitourinary Comments: Pelvic normal   Musculoskeletal: Normal range of motion. She exhibits no edema or tenderness.   Neurological: She is alert and oriented to person, place, and time.   Skin: Skin is warm and dry. No pallor.   Psychiatric: She has a normal mood and affect. Her behavior is normal. Judgment and thought content normal.       Assessment:       1. Malignant neoplasm of ovary, unspecified laterality    2. Encounter for antineoplastic chemotherapy    3. Thrombocytopenia due to drugs        Plan:     Treat with C3 Gemzar as planned given tolerating treatment and CA-125 decreasing.  RTC as scheduled.

## 2019-10-28 ENCOUNTER — PATIENT MESSAGE (OUTPATIENT)
Dept: INTERNAL MEDICINE | Facility: CLINIC | Age: 64
End: 2019-10-28

## 2019-10-31 ENCOUNTER — LAB VISIT (OUTPATIENT)
Dept: LAB | Facility: HOSPITAL | Age: 64
End: 2019-10-31
Attending: INTERNAL MEDICINE
Payer: COMMERCIAL

## 2019-10-31 DIAGNOSIS — C56.9 MALIGNANT NEOPLASM OF OVARY, UNSPECIFIED LATERALITY: ICD-10-CM

## 2019-10-31 LAB
ALBUMIN SERPL BCP-MCNC: 3.5 G/DL (ref 3.5–5.2)
ALP SERPL-CCNC: 151 U/L (ref 55–135)
ALT SERPL W/O P-5'-P-CCNC: 36 U/L (ref 10–44)
ANION GAP SERPL CALC-SCNC: 8 MMOL/L (ref 8–16)
AST SERPL-CCNC: 38 U/L (ref 10–40)
BILIRUB SERPL-MCNC: 0.4 MG/DL (ref 0.1–1)
BUN SERPL-MCNC: 13 MG/DL (ref 8–23)
CALCIUM SERPL-MCNC: 9.5 MG/DL (ref 8.7–10.5)
CANCER AG125 SERPL-ACNC: 261 U/ML (ref 0–30)
CHLORIDE SERPL-SCNC: 103 MMOL/L (ref 95–110)
CO2 SERPL-SCNC: 29 MMOL/L (ref 23–29)
CREAT SERPL-MCNC: 0.8 MG/DL (ref 0.5–1.4)
ERYTHROCYTE [DISTWIDTH] IN BLOOD BY AUTOMATED COUNT: 15.7 % (ref 11.5–14.5)
EST. GFR  (AFRICAN AMERICAN): >60 ML/MIN/1.73 M^2
EST. GFR  (NON AFRICAN AMERICAN): >60 ML/MIN/1.73 M^2
GLUCOSE SERPL-MCNC: 96 MG/DL (ref 70–110)
HCT VFR BLD AUTO: 40.5 % (ref 37–48.5)
HGB BLD-MCNC: 13.3 G/DL (ref 12–16)
IMM GRANULOCYTES # BLD AUTO: 0.01 K/UL (ref 0–0.04)
MCH RBC QN AUTO: 32.6 PG (ref 27–31)
MCHC RBC AUTO-ENTMCNC: 32.8 G/DL (ref 32–36)
MCV RBC AUTO: 99 FL (ref 82–98)
NEUTROPHILS # BLD AUTO: 3.2 K/UL (ref 1.8–7.7)
PLATELET # BLD AUTO: 144 K/UL (ref 150–350)
PMV BLD AUTO: 9.4 FL (ref 9.2–12.9)
POTASSIUM SERPL-SCNC: 4.6 MMOL/L (ref 3.5–5.1)
PROT SERPL-MCNC: 6.9 G/DL (ref 6–8.4)
RBC # BLD AUTO: 4.08 M/UL (ref 4–5.4)
SODIUM SERPL-SCNC: 140 MMOL/L (ref 136–145)
WBC # BLD AUTO: 4.41 K/UL (ref 3.9–12.7)

## 2019-10-31 PROCEDURE — 86304 IMMUNOASSAY TUMOR CA 125: CPT

## 2019-10-31 PROCEDURE — 80053 COMPREHEN METABOLIC PANEL: CPT

## 2019-10-31 PROCEDURE — 36415 COLL VENOUS BLD VENIPUNCTURE: CPT

## 2019-10-31 PROCEDURE — 85027 COMPLETE CBC AUTOMATED: CPT

## 2019-11-01 ENCOUNTER — OFFICE VISIT (OUTPATIENT)
Dept: GYNECOLOGIC ONCOLOGY | Facility: CLINIC | Age: 64
End: 2019-11-01
Payer: COMMERCIAL

## 2019-11-01 ENCOUNTER — INFUSION (OUTPATIENT)
Dept: INFUSION THERAPY | Facility: HOSPITAL | Age: 64
End: 2019-11-01
Attending: OBSTETRICS & GYNECOLOGY
Payer: COMMERCIAL

## 2019-11-01 VITALS
SYSTOLIC BLOOD PRESSURE: 137 MMHG | WEIGHT: 174 LBS | BODY MASS INDEX: 27.25 KG/M2 | HEART RATE: 70 BPM | DIASTOLIC BLOOD PRESSURE: 65 MMHG

## 2019-11-01 VITALS
HEIGHT: 67 IN | BODY MASS INDEX: 27.34 KG/M2 | TEMPERATURE: 99 F | HEART RATE: 73 BPM | DIASTOLIC BLOOD PRESSURE: 70 MMHG | WEIGHT: 174.19 LBS | RESPIRATION RATE: 18 BRPM | SYSTOLIC BLOOD PRESSURE: 135 MMHG

## 2019-11-01 DIAGNOSIS — D69.59 THROMBOCYTOPENIA DUE TO DRUGS: ICD-10-CM

## 2019-11-01 DIAGNOSIS — Z51.11 ENCOUNTER FOR ANTINEOPLASTIC CHEMOTHERAPY: ICD-10-CM

## 2019-11-01 DIAGNOSIS — C56.9 MALIGNANT NEOPLASM OF OVARY, UNSPECIFIED LATERALITY: Primary | ICD-10-CM

## 2019-11-01 DIAGNOSIS — C56.9 OVARIAN CANCER, UNSPECIFIED LATERALITY: Primary | ICD-10-CM

## 2019-11-01 DIAGNOSIS — T50.905A THROMBOCYTOPENIA DUE TO DRUGS: ICD-10-CM

## 2019-11-01 PROCEDURE — 3008F PR BODY MASS INDEX (BMI) DOCUMENTED: ICD-10-PCS | Mod: CPTII,S$GLB,, | Performed by: OBSTETRICS & GYNECOLOGY

## 2019-11-01 PROCEDURE — 96413 CHEMO IV INFUSION 1 HR: CPT

## 2019-11-01 PROCEDURE — 99214 PR OFFICE/OUTPT VISIT, EST, LEVL IV, 30-39 MIN: ICD-10-PCS | Mod: S$GLB,,, | Performed by: OBSTETRICS & GYNECOLOGY

## 2019-11-01 PROCEDURE — 63600175 PHARM REV CODE 636 W HCPCS: Performed by: OBSTETRICS & GYNECOLOGY

## 2019-11-01 PROCEDURE — 1101F PT FALLS ASSESS-DOCD LE1/YR: CPT | Mod: CPTII,S$GLB,, | Performed by: OBSTETRICS & GYNECOLOGY

## 2019-11-01 PROCEDURE — 3008F BODY MASS INDEX DOCD: CPT | Mod: CPTII,S$GLB,, | Performed by: OBSTETRICS & GYNECOLOGY

## 2019-11-01 PROCEDURE — 1101F PR PT FALLS ASSESS DOC 0-1 FALLS W/OUT INJ PAST YR: ICD-10-PCS | Mod: CPTII,S$GLB,, | Performed by: OBSTETRICS & GYNECOLOGY

## 2019-11-01 PROCEDURE — 99999 PR PBB SHADOW E&M-EST. PATIENT-LVL III: CPT | Mod: PBBFAC,,, | Performed by: OBSTETRICS & GYNECOLOGY

## 2019-11-01 PROCEDURE — 99214 OFFICE O/P EST MOD 30 MIN: CPT | Mod: S$GLB,,, | Performed by: OBSTETRICS & GYNECOLOGY

## 2019-11-01 PROCEDURE — 96367 TX/PROPH/DG ADDL SEQ IV INF: CPT

## 2019-11-01 PROCEDURE — 99999 PR PBB SHADOW E&M-EST. PATIENT-LVL III: ICD-10-PCS | Mod: PBBFAC,,, | Performed by: OBSTETRICS & GYNECOLOGY

## 2019-11-01 RX ORDER — SODIUM CHLORIDE 0.9 % (FLUSH) 0.9 %
10 SYRINGE (ML) INJECTION
Status: CANCELLED | OUTPATIENT
Start: 2019-11-01

## 2019-11-01 RX ORDER — SODIUM CHLORIDE 0.9 % (FLUSH) 0.9 %
10 SYRINGE (ML) INJECTION
Status: DISCONTINUED | OUTPATIENT
Start: 2019-11-01 | End: 2019-11-01 | Stop reason: HOSPADM

## 2019-11-01 RX ORDER — HEPARIN 100 UNIT/ML
500 SYRINGE INTRAVENOUS
Status: CANCELLED | OUTPATIENT
Start: 2019-11-09

## 2019-11-01 RX ORDER — ONDANSETRON 2 MG/ML
8 INJECTION INTRAMUSCULAR; INTRAVENOUS
Status: CANCELLED | OUTPATIENT
Start: 2019-11-01

## 2019-11-01 RX ORDER — ONDANSETRON 2 MG/ML
8 INJECTION INTRAMUSCULAR; INTRAVENOUS
Status: COMPLETED | OUTPATIENT
Start: 2019-11-01 | End: 2019-11-01

## 2019-11-01 RX ORDER — HEPARIN 100 UNIT/ML
500 SYRINGE INTRAVENOUS
Status: CANCELLED | OUTPATIENT
Start: 2019-11-01

## 2019-11-01 RX ORDER — SODIUM CHLORIDE 0.9 % (FLUSH) 0.9 %
10 SYRINGE (ML) INJECTION
Status: CANCELLED | OUTPATIENT
Start: 2019-11-09

## 2019-11-01 RX ORDER — HEPARIN 100 UNIT/ML
500 SYRINGE INTRAVENOUS
Status: DISCONTINUED | OUTPATIENT
Start: 2019-11-01 | End: 2019-11-01 | Stop reason: HOSPADM

## 2019-11-01 RX ORDER — ONDANSETRON 2 MG/ML
8 INJECTION INTRAMUSCULAR; INTRAVENOUS
Status: CANCELLED | OUTPATIENT
Start: 2019-11-09

## 2019-11-01 RX ORDER — ONDANSETRON 2 MG/ML
8 INJECTION INTRAMUSCULAR; INTRAVENOUS
Status: CANCELLED | OUTPATIENT
Start: 2019-11-02

## 2019-11-01 RX ORDER — SODIUM CHLORIDE 0.9 % (FLUSH) 0.9 %
10 SYRINGE (ML) INJECTION
Status: CANCELLED | OUTPATIENT
Start: 2019-11-02

## 2019-11-01 RX ORDER — HEPARIN 100 UNIT/ML
500 SYRINGE INTRAVENOUS
Status: CANCELLED | OUTPATIENT
Start: 2019-11-02

## 2019-11-01 RX ADMIN — GEMCITABINE HYDROCHLORIDE 1325 MG: 200 INJECTION, POWDER, LYOPHILIZED, FOR SOLUTION INTRAVENOUS at 10:11

## 2019-11-01 RX ADMIN — ONDANSETRON 8 MG: 2 INJECTION, SOLUTION INTRAMUSCULAR; INTRAVENOUS at 10:11

## 2019-11-01 RX ADMIN — SODIUM CHLORIDE: 9 INJECTION, SOLUTION INTRAVENOUS at 10:11

## 2019-11-01 NOTE — PLAN OF CARE
1110:  tolerated Gemzar infusion well, NAD noted, denies any new issues.  PIV removed intact, AVS declined.  Released in stable condition, ambulated off unit independently.

## 2019-11-01 NOTE — PROGRESS NOTES
Subjective:       Patient ID: Mickie Don is a 64 y.o. female.    Chief Complaint: Chemotherapy    Treatment History  Xlap/Omentectomy on 11/1/16 - mass was unresectable  Stage IIIC ovarian cancer  Initiated Taxotere/Carbo on 11/18/16, now s/p 6 cycles completed 3/8/17  Xlap/ISIS/BSO/Radical mass resection > 10 cm, optimal debulking on 4/11/17  Initiated PRIMA on 8/21/17  BRCA negative  Started FORWARD1 on 11/27/17  Started Doxil/Anju as part of NRG  2/27/19, received total of 6 cycles  Gemzar monotherapy started 8/29/19    HPI     Here today for C4D1 of Gemzar.  Reports minimal toxicity.  Labs reviewed and ok for treatment.  CA-125 261, slightly up from 238.  Denies F/C, N/V, VB.  Had a great trip to [facility].    Review of Systems   Constitutional: Negative for appetite change, chills, diaphoresis, fatigue, fever and unexpected weight change.   HENT: Negative for mouth sores and tinnitus.    Respiratory: Negative for cough, chest tightness, shortness of breath and wheezing.    Cardiovascular: Negative for chest pain, palpitations and leg swelling.   Gastrointestinal: Negative for abdominal distention, abdominal pain, blood in stool, constipation, diarrhea, nausea and vomiting.   Genitourinary: Negative for difficulty urinating, dysuria, flank pain, frequency, hematuria, pelvic pain, urgency, vaginal bleeding, vaginal discharge and vaginal pain.   Musculoskeletal: Negative for arthralgias, back pain and gait problem.   Skin: Negative for color change and rash.   Neurological: Positive for numbness. Negative for dizziness, weakness and headaches.   Hematological: Negative for adenopathy. Does not bruise/bleed easily.   Psychiatric/Behavioral: Negative for confusion and sleep disturbance. The patient is not nervous/anxious.        Objective:   /65   Pulse 70   Wt 78.9 kg (174 lb)   BMI 27.25 kg/m²      Physical Exam   Constitutional: She is oriented to person, place, and time. She appears well-developed  and well-nourished. No distress.   HENT:   Head: Normocephalic and atraumatic.   Eyes: No scleral icterus.   Neck: Normal range of motion.   Pulmonary/Chest: Effort normal. No respiratory distress.   Abdominal: Soft. She exhibits no distension and no mass.   Tender to palpation in LLQ, along sigmoid   Genitourinary: Vagina normal. Pelvic exam was performed with patient supine. There is no rash, tenderness or lesion on the right labia. There is no rash, tenderness or lesion on the left labia. Right adnexum displays no mass. Left adnexum displays no mass.   Genitourinary Comments: Pelvic normal   Musculoskeletal: Normal range of motion. She exhibits no edema or tenderness.   Neurological: She is alert and oriented to person, place, and time.   Skin: Skin is warm and dry. No pallor.   Psychiatric: She has a normal mood and affect. Her behavior is normal. Judgment and thought content normal.       Assessment:       1. Malignant neoplasm of ovary, unspecified laterality    2. Encounter for antineoplastic chemotherapy    3. Thrombocytopenia due to drugs        Plan:     Treat with C4 Gemzar.  Dose reduce. Will monitor CA-125.  I think this is ok since she missed her D8.  RTC as scheduled.

## 2019-11-08 ENCOUNTER — LAB VISIT (OUTPATIENT)
Dept: LAB | Facility: HOSPITAL | Age: 64
End: 2019-11-08
Attending: INTERNAL MEDICINE
Payer: COMMERCIAL

## 2019-11-08 ENCOUNTER — INFUSION (OUTPATIENT)
Dept: INFUSION THERAPY | Facility: HOSPITAL | Age: 64
End: 2019-11-08
Attending: OBSTETRICS & GYNECOLOGY
Payer: COMMERCIAL

## 2019-11-08 VITALS
WEIGHT: 174 LBS | DIASTOLIC BLOOD PRESSURE: 76 MMHG | BODY MASS INDEX: 27.31 KG/M2 | HEIGHT: 67 IN | RESPIRATION RATE: 18 BRPM | TEMPERATURE: 98 F | HEART RATE: 71 BPM | SYSTOLIC BLOOD PRESSURE: 130 MMHG

## 2019-11-08 DIAGNOSIS — C56.9 OVARIAN CANCER, UNSPECIFIED LATERALITY: Primary | ICD-10-CM

## 2019-11-08 DIAGNOSIS — C56.9 MALIGNANT NEOPLASM OF OVARY, UNSPECIFIED LATERALITY: ICD-10-CM

## 2019-11-08 LAB
ALBUMIN SERPL BCP-MCNC: 3.2 G/DL (ref 3.5–5.2)
ALP SERPL-CCNC: 181 U/L (ref 55–135)
ALT SERPL W/O P-5'-P-CCNC: 52 U/L (ref 10–44)
ANION GAP SERPL CALC-SCNC: 8 MMOL/L (ref 8–16)
AST SERPL-CCNC: 49 U/L (ref 10–40)
BILIRUB SERPL-MCNC: 0.8 MG/DL (ref 0.1–1)
BUN SERPL-MCNC: 14 MG/DL (ref 8–23)
CALCIUM SERPL-MCNC: 9.5 MG/DL (ref 8.7–10.5)
CHLORIDE SERPL-SCNC: 103 MMOL/L (ref 95–110)
CO2 SERPL-SCNC: 28 MMOL/L (ref 23–29)
CREAT SERPL-MCNC: 0.7 MG/DL (ref 0.5–1.4)
ERYTHROCYTE [DISTWIDTH] IN BLOOD BY AUTOMATED COUNT: 14.5 % (ref 11.5–14.5)
EST. GFR  (AFRICAN AMERICAN): >60 ML/MIN/1.73 M^2
EST. GFR  (NON AFRICAN AMERICAN): >60 ML/MIN/1.73 M^2
GLUCOSE SERPL-MCNC: 124 MG/DL (ref 70–110)
HCT VFR BLD AUTO: 44.2 % (ref 37–48.5)
HGB BLD-MCNC: 14.5 G/DL (ref 12–16)
IMM GRANULOCYTES # BLD AUTO: 0.02 K/UL (ref 0–0.04)
MCH RBC QN AUTO: 31.8 PG (ref 27–31)
MCHC RBC AUTO-ENTMCNC: 32.8 G/DL (ref 32–36)
MCV RBC AUTO: 97 FL (ref 82–98)
NEUTROPHILS # BLD AUTO: 1.8 K/UL (ref 1.8–7.7)
PLATELET # BLD AUTO: 111 K/UL (ref 150–350)
PMV BLD AUTO: 9.6 FL (ref 9.2–12.9)
POTASSIUM SERPL-SCNC: 5.4 MMOL/L (ref 3.5–5.1)
PROT SERPL-MCNC: 6.9 G/DL (ref 6–8.4)
RBC # BLD AUTO: 4.56 M/UL (ref 4–5.4)
SODIUM SERPL-SCNC: 139 MMOL/L (ref 136–145)
WBC # BLD AUTO: 3.18 K/UL (ref 3.9–12.7)

## 2019-11-08 PROCEDURE — 36415 COLL VENOUS BLD VENIPUNCTURE: CPT

## 2019-11-08 PROCEDURE — 63600175 PHARM REV CODE 636 W HCPCS: Performed by: OBSTETRICS & GYNECOLOGY

## 2019-11-08 PROCEDURE — 80053 COMPREHEN METABOLIC PANEL: CPT

## 2019-11-08 PROCEDURE — A4216 STERILE WATER/SALINE, 10 ML: HCPCS | Performed by: OBSTETRICS & GYNECOLOGY

## 2019-11-08 PROCEDURE — 96413 CHEMO IV INFUSION 1 HR: CPT

## 2019-11-08 PROCEDURE — 85027 COMPLETE CBC AUTOMATED: CPT

## 2019-11-08 PROCEDURE — 96375 TX/PRO/DX INJ NEW DRUG ADDON: CPT

## 2019-11-08 PROCEDURE — 25000003 PHARM REV CODE 250: Performed by: OBSTETRICS & GYNECOLOGY

## 2019-11-08 RX ORDER — ONDANSETRON 2 MG/ML
8 INJECTION INTRAMUSCULAR; INTRAVENOUS
Status: COMPLETED | OUTPATIENT
Start: 2019-11-08 | End: 2019-11-08

## 2019-11-08 RX ORDER — HEPARIN 100 UNIT/ML
500 SYRINGE INTRAVENOUS
Status: DISCONTINUED | OUTPATIENT
Start: 2019-11-08 | End: 2019-11-08 | Stop reason: HOSPADM

## 2019-11-08 RX ORDER — SODIUM CHLORIDE 0.9 % (FLUSH) 0.9 %
10 SYRINGE (ML) INJECTION
Status: DISCONTINUED | OUTPATIENT
Start: 2019-11-08 | End: 2019-11-08 | Stop reason: HOSPADM

## 2019-11-08 RX ADMIN — GEMCITABINE HYDROCHLORIDE 1325 MG: 200 INJECTION, SOLUTION INTRAVENOUS at 10:11

## 2019-11-08 RX ADMIN — SODIUM CHLORIDE: 0.9 INJECTION, SOLUTION INTRAVENOUS at 10:11

## 2019-11-08 RX ADMIN — Medication 10 ML: at 11:11

## 2019-11-08 RX ADMIN — ONDANSETRON 8 MG: 2 INJECTION, SOLUTION INTRAMUSCULAR; INTRAVENOUS at 10:11

## 2019-11-08 NOTE — PLAN OF CARE
0900 pt here for gemzar infusion D8C4, labs, hx, meds, allergies reviewed, pt with no complaints at this time, reclined in chair, warm blanket provided, continue to monitor

## 2019-11-08 NOTE — PLAN OF CARE
1130 pt tolerated gemzar infusion without issue, pt has no upcoming appts scheduled at this time, no distress noted upon d/c to home

## 2019-11-12 ENCOUNTER — RESEARCH ENCOUNTER (OUTPATIENT)
Dept: RESEARCH | Facility: HOSPITAL | Age: 64
End: 2019-11-12

## 2019-11-12 ENCOUNTER — TELEPHONE (OUTPATIENT)
Dept: RESEARCH | Facility: HOSPITAL | Age: 64
End: 2019-11-12

## 2019-11-12 NOTE — TELEPHONE ENCOUNTER
Tuesday, November 12, 2019  Pt Initials: LILO BERMUDEZ  Protocol: NRG-  Study #: [id number]  IRB#: 2018.028     Contacted Pt for follow-up per protocol. Pt states doing well. Pt have appointment at [facility] on 11/22. Pt will complete QOL and Follow-up Health Assessment. Pt thanked for contact and  assent to future follow-up.         11/22/19 @ 10:45am - QOL completed in office.

## 2019-11-19 ENCOUNTER — PATIENT MESSAGE (OUTPATIENT)
Dept: GYNECOLOGIC ONCOLOGY | Facility: CLINIC | Age: 64
End: 2019-11-19

## 2019-11-22 ENCOUNTER — INFUSION (OUTPATIENT)
Dept: INFUSION THERAPY | Facility: HOSPITAL | Age: 64
End: 2019-11-22
Attending: OBSTETRICS & GYNECOLOGY
Payer: COMMERCIAL

## 2019-11-22 ENCOUNTER — OFFICE VISIT (OUTPATIENT)
Dept: GYNECOLOGIC ONCOLOGY | Facility: CLINIC | Age: 64
End: 2019-11-22
Payer: COMMERCIAL

## 2019-11-22 VITALS
BODY MASS INDEX: 27.52 KG/M2 | DIASTOLIC BLOOD PRESSURE: 76 MMHG | HEART RATE: 70 BPM | SYSTOLIC BLOOD PRESSURE: 126 MMHG | WEIGHT: 175.69 LBS

## 2019-11-22 VITALS
SYSTOLIC BLOOD PRESSURE: 130 MMHG | DIASTOLIC BLOOD PRESSURE: 63 MMHG | BODY MASS INDEX: 27.57 KG/M2 | TEMPERATURE: 99 F | WEIGHT: 175.69 LBS | HEART RATE: 73 BPM | RESPIRATION RATE: 18 BRPM | HEIGHT: 67 IN

## 2019-11-22 DIAGNOSIS — D70.2 NEUTROPENIA, DRUG-INDUCED: ICD-10-CM

## 2019-11-22 DIAGNOSIS — D69.59 THROMBOCYTOPENIA DUE TO DRUGS: ICD-10-CM

## 2019-11-22 DIAGNOSIS — C56.9 OVARIAN CANCER, UNSPECIFIED LATERALITY: Primary | ICD-10-CM

## 2019-11-22 DIAGNOSIS — T50.905A THROMBOCYTOPENIA DUE TO DRUGS: ICD-10-CM

## 2019-11-22 DIAGNOSIS — C56.9 MALIGNANT NEOPLASM OF OVARY, UNSPECIFIED LATERALITY: Primary | ICD-10-CM

## 2019-11-22 DIAGNOSIS — Z51.11 ENCOUNTER FOR ANTINEOPLASTIC CHEMOTHERAPY: ICD-10-CM

## 2019-11-22 PROCEDURE — 99214 PR OFFICE/OUTPT VISIT, EST, LEVL IV, 30-39 MIN: ICD-10-PCS | Mod: S$GLB,,, | Performed by: OBSTETRICS & GYNECOLOGY

## 2019-11-22 PROCEDURE — 3008F BODY MASS INDEX DOCD: CPT | Mod: CPTII,S$GLB,, | Performed by: OBSTETRICS & GYNECOLOGY

## 2019-11-22 PROCEDURE — 96375 TX/PRO/DX INJ NEW DRUG ADDON: CPT

## 2019-11-22 PROCEDURE — 3008F PR BODY MASS INDEX (BMI) DOCUMENTED: ICD-10-PCS | Mod: CPTII,S$GLB,, | Performed by: OBSTETRICS & GYNECOLOGY

## 2019-11-22 PROCEDURE — 1101F PT FALLS ASSESS-DOCD LE1/YR: CPT | Mod: CPTII,S$GLB,, | Performed by: OBSTETRICS & GYNECOLOGY

## 2019-11-22 PROCEDURE — 99999 PR PBB SHADOW E&M-EST. PATIENT-LVL III: ICD-10-PCS | Mod: PBBFAC,,, | Performed by: OBSTETRICS & GYNECOLOGY

## 2019-11-22 PROCEDURE — 1101F PR PT FALLS ASSESS DOC 0-1 FALLS W/OUT INJ PAST YR: ICD-10-PCS | Mod: CPTII,S$GLB,, | Performed by: OBSTETRICS & GYNECOLOGY

## 2019-11-22 PROCEDURE — 99214 OFFICE O/P EST MOD 30 MIN: CPT | Mod: S$GLB,,, | Performed by: OBSTETRICS & GYNECOLOGY

## 2019-11-22 PROCEDURE — 96413 CHEMO IV INFUSION 1 HR: CPT

## 2019-11-22 PROCEDURE — 99999 PR PBB SHADOW E&M-EST. PATIENT-LVL III: CPT | Mod: PBBFAC,,, | Performed by: OBSTETRICS & GYNECOLOGY

## 2019-11-22 PROCEDURE — 63600175 PHARM REV CODE 636 W HCPCS: Performed by: OBSTETRICS & GYNECOLOGY

## 2019-11-22 RX ORDER — ONDANSETRON 2 MG/ML
8 INJECTION INTRAMUSCULAR; INTRAVENOUS
Status: CANCELLED | OUTPATIENT
Start: 2019-12-01

## 2019-11-22 RX ORDER — HEPARIN 100 UNIT/ML
500 SYRINGE INTRAVENOUS
Status: CANCELLED | OUTPATIENT
Start: 2019-11-24

## 2019-11-22 RX ORDER — SODIUM CHLORIDE 0.9 % (FLUSH) 0.9 %
10 SYRINGE (ML) INJECTION
Status: CANCELLED | OUTPATIENT
Start: 2019-12-01

## 2019-11-22 RX ORDER — ONDANSETRON 2 MG/ML
8 INJECTION INTRAMUSCULAR; INTRAVENOUS
Status: CANCELLED | OUTPATIENT
Start: 2019-11-24

## 2019-11-22 RX ORDER — SODIUM CHLORIDE 0.9 % (FLUSH) 0.9 %
10 SYRINGE (ML) INJECTION
Status: CANCELLED | OUTPATIENT
Start: 2019-11-24

## 2019-11-22 RX ORDER — HEPARIN 100 UNIT/ML
500 SYRINGE INTRAVENOUS
Status: CANCELLED | OUTPATIENT
Start: 2019-12-01

## 2019-11-22 RX ORDER — ONDANSETRON 2 MG/ML
8 INJECTION INTRAMUSCULAR; INTRAVENOUS
Status: COMPLETED | OUTPATIENT
Start: 2019-11-22 | End: 2019-11-22

## 2019-11-22 RX ADMIN — SODIUM CHLORIDE: 9 INJECTION, SOLUTION INTRAVENOUS at 10:11

## 2019-11-22 RX ADMIN — ONDANSETRON 8 MG: 2 INJECTION, SOLUTION INTRAMUSCULAR; INTRAVENOUS at 10:11

## 2019-11-22 RX ADMIN — GEMCITABINE HYDROCHLORIDE 1325 MG: 200 INJECTION, SOLUTION INTRAVENOUS at 10:11

## 2019-11-22 NOTE — PROGRESS NOTES
Subjective:       Patient ID: Mickie Don is a 64 y.o. female.    Chief Complaint: Chemotherapy    Treatment History  Xlap/Omentectomy on 11/1/16 - mass was unresectable  Stage IIIC ovarian cancer  Initiated Taxotere/Carbo on 11/18/16, now s/p 6 cycles completed 3/8/17  Xlap/ISIS/BSO/Radical mass resection > 10 cm, optimal debulking on 4/11/17  Initiated PRIMA on 8/21/17  BRCA negative  Started FORWARD1 on 11/27/17  Started Doxil/Anju as part of NRG  2/27/19, received total of 6 cycles  Gemzar monotherapy started 8/29/19    HPI     Here today for C5D1 of Gemzar.  Reports minimal toxicity.  Labs reviewed and ok for treatment.  CA-125 321.  Denies F/C, N/V, VB.      Review of Systems   Constitutional: Negative for appetite change, chills, diaphoresis, fatigue, fever and unexpected weight change.   HENT: Negative for mouth sores and tinnitus.    Respiratory: Negative for cough, chest tightness, shortness of breath and wheezing.    Cardiovascular: Negative for chest pain, palpitations and leg swelling.   Gastrointestinal: Negative for abdominal distention, abdominal pain, blood in stool, constipation, diarrhea, nausea and vomiting.   Genitourinary: Negative for difficulty urinating, dysuria, flank pain, frequency, hematuria, pelvic pain, urgency, vaginal bleeding, vaginal discharge and vaginal pain.   Musculoskeletal: Negative for arthralgias, back pain and gait problem.   Skin: Negative for color change and rash.   Neurological: Positive for numbness. Negative for dizziness, weakness and headaches.   Hematological: Negative for adenopathy. Does not bruise/bleed easily.   Psychiatric/Behavioral: Negative for confusion and sleep disturbance. The patient is not nervous/anxious.        Objective:   /76   Pulse 70   Wt 79.7 kg (175 lb 11.3 oz)   BMI 27.52 kg/m²      Physical Exam   Constitutional: She is oriented to person, place, and time. She appears well-developed and well-nourished. No distress.   HENT:    Head: Normocephalic and atraumatic.   Eyes: No scleral icterus.   Neck: Normal range of motion.   Pulmonary/Chest: Effort normal. No respiratory distress.   Abdominal: Soft. She exhibits no distension and no mass.   Tender to palpation in LLQ, along sigmoid   Genitourinary: Vagina normal. Pelvic exam was performed with patient supine. There is no rash, tenderness or lesion on the right labia. There is no rash, tenderness or lesion on the left labia. Right adnexum displays no mass. Left adnexum displays no mass.   Genitourinary Comments: Pelvic normal   Musculoskeletal: Normal range of motion. She exhibits no edema or tenderness.   Neurological: She is alert and oriented to person, place, and time.   Skin: Skin is warm and dry. No pallor.   Psychiatric: She has a normal mood and affect. Her behavior is normal. Judgment and thought content normal.       Assessment:       1. Malignant neoplasm of ovary, unspecified laterality    2. Encounter for antineoplastic chemotherapy    3. Thrombocytopenia due to drugs    4. Neutropenia, drug-induced        Plan:     Treat with C5.  Will give one more cycle and trend CA-125 as last cycle was the first where we were able to get D8 in.  RTC as scheduled

## 2019-11-22 NOTE — PLAN OF CARE
Pt tolerated Gemzar with no complications. VSS. Pt instructed to call MD with any problems. NAD. Pt discharged home independently.

## 2019-11-29 ENCOUNTER — INFUSION (OUTPATIENT)
Dept: INFUSION THERAPY | Facility: HOSPITAL | Age: 64
End: 2019-11-29
Attending: OBSTETRICS & GYNECOLOGY
Payer: COMMERCIAL

## 2019-11-29 VITALS
HEIGHT: 67 IN | WEIGHT: 175.69 LBS | BODY MASS INDEX: 27.57 KG/M2 | SYSTOLIC BLOOD PRESSURE: 131 MMHG | HEART RATE: 68 BPM | TEMPERATURE: 98 F | DIASTOLIC BLOOD PRESSURE: 65 MMHG | RESPIRATION RATE: 18 BRPM

## 2019-11-29 DIAGNOSIS — C56.9 OVARIAN CANCER, UNSPECIFIED LATERALITY: Primary | ICD-10-CM

## 2019-11-29 PROCEDURE — 63600175 PHARM REV CODE 636 W HCPCS: Performed by: OBSTETRICS & GYNECOLOGY

## 2019-11-29 PROCEDURE — 96413 CHEMO IV INFUSION 1 HR: CPT

## 2019-11-29 PROCEDURE — 96375 TX/PRO/DX INJ NEW DRUG ADDON: CPT

## 2019-11-29 RX ORDER — ONDANSETRON 2 MG/ML
8 INJECTION INTRAMUSCULAR; INTRAVENOUS
Status: COMPLETED | OUTPATIENT
Start: 2019-11-29 | End: 2019-11-29

## 2019-11-29 RX ORDER — HEPARIN 100 UNIT/ML
500 SYRINGE INTRAVENOUS
Status: DISCONTINUED | OUTPATIENT
Start: 2019-11-29 | End: 2019-11-29 | Stop reason: HOSPADM

## 2019-11-29 RX ORDER — SODIUM CHLORIDE 0.9 % (FLUSH) 0.9 %
10 SYRINGE (ML) INJECTION
Status: DISCONTINUED | OUTPATIENT
Start: 2019-11-29 | End: 2019-11-29 | Stop reason: HOSPADM

## 2019-11-29 RX ADMIN — SODIUM CHLORIDE: 0.9 INJECTION, SOLUTION INTRAVENOUS at 08:11

## 2019-11-29 RX ADMIN — ONDANSETRON 8 MG: 2 INJECTION, SOLUTION INTRAMUSCULAR; INTRAVENOUS at 08:11

## 2019-11-29 RX ADMIN — GEMCITABINE HYDROCHLORIDE 1325 MG: 200 INJECTION, SOLUTION INTRAVENOUS at 09:11

## 2019-12-09 ENCOUNTER — PATIENT MESSAGE (OUTPATIENT)
Dept: GYNECOLOGIC ONCOLOGY | Facility: CLINIC | Age: 64
End: 2019-12-09

## 2019-12-11 ENCOUNTER — TELEPHONE (OUTPATIENT)
Dept: GYNECOLOGIC ONCOLOGY | Facility: CLINIC | Age: 64
End: 2019-12-11

## 2019-12-11 NOTE — TELEPHONE ENCOUNTER
----- Message from Bre Pedraza sent at 12/11/2019  9:05 AM CST -----  Pt is calling requesting if she can come in for 8:30 am instead of 9:30 am for her appt on 12/13      Pt contact [phone]

## 2019-12-12 ENCOUNTER — LAB VISIT (OUTPATIENT)
Dept: LAB | Facility: HOSPITAL | Age: 64
End: 2019-12-12
Attending: OBSTETRICS & GYNECOLOGY
Payer: COMMERCIAL

## 2019-12-12 DIAGNOSIS — C56.9 MALIGNANT NEOPLASM OF OVARY, UNSPECIFIED LATERALITY: ICD-10-CM

## 2019-12-12 LAB
ALBUMIN SERPL BCP-MCNC: 3.9 G/DL (ref 3.5–5.2)
ALP SERPL-CCNC: 130 U/L (ref 55–135)
ALT SERPL W/O P-5'-P-CCNC: 48 U/L (ref 10–44)
ANION GAP SERPL CALC-SCNC: 9 MMOL/L (ref 8–16)
AST SERPL-CCNC: 41 U/L (ref 10–40)
BILIRUB SERPL-MCNC: 0.5 MG/DL (ref 0.1–1)
BUN SERPL-MCNC: 15 MG/DL (ref 8–23)
CALCIUM SERPL-MCNC: 9.3 MG/DL (ref 8.7–10.5)
CANCER AG125 SERPL-ACNC: 208 U/ML (ref 0–30)
CHLORIDE SERPL-SCNC: 105 MMOL/L (ref 95–110)
CO2 SERPL-SCNC: 29 MMOL/L (ref 23–29)
CREAT SERPL-MCNC: 0.7 MG/DL (ref 0.5–1.4)
ERYTHROCYTE [DISTWIDTH] IN BLOOD BY AUTOMATED COUNT: 17.3 % (ref 11.5–14.5)
EST. GFR  (AFRICAN AMERICAN): >60 ML/MIN/1.73 M^2
EST. GFR  (NON AFRICAN AMERICAN): >60 ML/MIN/1.73 M^2
GLUCOSE SERPL-MCNC: 92 MG/DL (ref 70–110)
HCT VFR BLD AUTO: 39.8 % (ref 37–48.5)
HGB BLD-MCNC: 12.8 G/DL (ref 12–16)
IMM GRANULOCYTES # BLD AUTO: 0.01 K/UL (ref 0–0.04)
MCH RBC QN AUTO: 32.6 PG (ref 27–31)
MCHC RBC AUTO-ENTMCNC: 32.2 G/DL (ref 32–36)
MCV RBC AUTO: 101 FL (ref 82–98)
NEUTROPHILS # BLD AUTO: 2.3 K/UL (ref 1.8–7.7)
PLATELET # BLD AUTO: 150 K/UL (ref 150–350)
PMV BLD AUTO: 9.1 FL (ref 9.2–12.9)
POTASSIUM SERPL-SCNC: 4.6 MMOL/L (ref 3.5–5.1)
PROT SERPL-MCNC: 7.1 G/DL (ref 6–8.4)
RBC # BLD AUTO: 3.93 M/UL (ref 4–5.4)
SODIUM SERPL-SCNC: 143 MMOL/L (ref 136–145)
WBC # BLD AUTO: 3.7 K/UL (ref 3.9–12.7)

## 2019-12-12 PROCEDURE — 80053 COMPREHEN METABOLIC PANEL: CPT

## 2019-12-12 PROCEDURE — 86304 IMMUNOASSAY TUMOR CA 125: CPT

## 2019-12-12 PROCEDURE — 85027 COMPLETE CBC AUTOMATED: CPT

## 2019-12-12 PROCEDURE — 36415 COLL VENOUS BLD VENIPUNCTURE: CPT

## 2019-12-12 RX ORDER — HEPARIN 100 UNIT/ML
500 SYRINGE INTRAVENOUS
Status: CANCELLED | OUTPATIENT
Start: 2019-12-21

## 2019-12-12 RX ORDER — ONDANSETRON 2 MG/ML
8 INJECTION INTRAMUSCULAR; INTRAVENOUS
Status: CANCELLED | OUTPATIENT
Start: 2019-12-21

## 2019-12-12 RX ORDER — SODIUM CHLORIDE 0.9 % (FLUSH) 0.9 %
10 SYRINGE (ML) INJECTION
Status: CANCELLED | OUTPATIENT
Start: 2019-12-14

## 2019-12-12 RX ORDER — SODIUM CHLORIDE 0.9 % (FLUSH) 0.9 %
10 SYRINGE (ML) INJECTION
Status: CANCELLED | OUTPATIENT
Start: 2019-12-21

## 2019-12-12 RX ORDER — HEPARIN 100 UNIT/ML
500 SYRINGE INTRAVENOUS
Status: CANCELLED | OUTPATIENT
Start: 2019-12-14

## 2019-12-12 RX ORDER — ONDANSETRON 2 MG/ML
8 INJECTION INTRAMUSCULAR; INTRAVENOUS
Status: CANCELLED | OUTPATIENT
Start: 2019-12-14

## 2019-12-13 ENCOUNTER — INFUSION (OUTPATIENT)
Dept: INFUSION THERAPY | Facility: HOSPITAL | Age: 64
End: 2019-12-13
Attending: OBSTETRICS & GYNECOLOGY
Payer: COMMERCIAL

## 2019-12-13 VITALS
BODY MASS INDEX: 28 KG/M2 | DIASTOLIC BLOOD PRESSURE: 59 MMHG | HEART RATE: 66 BPM | TEMPERATURE: 99 F | RESPIRATION RATE: 18 BRPM | SYSTOLIC BLOOD PRESSURE: 129 MMHG | WEIGHT: 178.38 LBS | OXYGEN SATURATION: 99 % | HEIGHT: 67 IN

## 2019-12-13 DIAGNOSIS — C56.9 OVARIAN CANCER, UNSPECIFIED LATERALITY: Primary | ICD-10-CM

## 2019-12-13 PROCEDURE — 96375 TX/PRO/DX INJ NEW DRUG ADDON: CPT

## 2019-12-13 PROCEDURE — 63600175 PHARM REV CODE 636 W HCPCS: Performed by: OBSTETRICS & GYNECOLOGY

## 2019-12-13 PROCEDURE — 96413 CHEMO IV INFUSION 1 HR: CPT

## 2019-12-13 RX ORDER — ONDANSETRON 2 MG/ML
8 INJECTION INTRAMUSCULAR; INTRAVENOUS
Status: COMPLETED | OUTPATIENT
Start: 2019-12-13 | End: 2019-12-13

## 2019-12-13 RX ADMIN — GEMCITABINE HYDROCHLORIDE 1325 MG: 200 INJECTION, POWDER, LYOPHILIZED, FOR SOLUTION INTRAVENOUS at 10:12

## 2019-12-13 RX ADMIN — SODIUM CHLORIDE: 9 INJECTION, SOLUTION INTRAVENOUS at 10:12

## 2019-12-13 RX ADMIN — ONDANSETRON 8 MG: 2 INJECTION, SOLUTION INTRAMUSCULAR; INTRAVENOUS at 10:12

## 2019-12-13 NOTE — PLAN OF CARE
Pt tolerated treatment without adverse effects. VSS. Verbalized understanding of RTC date. DC ambulating independently.

## 2019-12-19 ENCOUNTER — LAB VISIT (OUTPATIENT)
Dept: LAB | Facility: HOSPITAL | Age: 64
End: 2019-12-19
Attending: OBSTETRICS & GYNECOLOGY
Payer: COMMERCIAL

## 2019-12-19 DIAGNOSIS — C56.9 MALIGNANT NEOPLASM OF OVARY, UNSPECIFIED LATERALITY: ICD-10-CM

## 2019-12-20 LAB
ALBUMIN SERPL BCP-MCNC: 3.9 G/DL (ref 3.5–5.2)
ALP SERPL-CCNC: 164 U/L (ref 55–135)
ALT SERPL W/O P-5'-P-CCNC: 119 U/L (ref 10–44)
ANION GAP SERPL CALC-SCNC: 8 MMOL/L (ref 8–16)
AST SERPL-CCNC: 96 U/L (ref 10–40)
BILIRUB SERPL-MCNC: 0.6 MG/DL (ref 0.1–1)
BUN SERPL-MCNC: 15 MG/DL (ref 8–23)
CALCIUM SERPL-MCNC: 9.5 MG/DL (ref 8.7–10.5)
CANCER AG125 SERPL-ACNC: 239 U/ML (ref 0–30)
CHLORIDE SERPL-SCNC: 104 MMOL/L (ref 95–110)
CO2 SERPL-SCNC: 29 MMOL/L (ref 23–29)
CREAT SERPL-MCNC: 0.7 MG/DL (ref 0.5–1.4)
ERYTHROCYTE [DISTWIDTH] IN BLOOD BY AUTOMATED COUNT: 16 % (ref 11.5–14.5)
EST. GFR  (AFRICAN AMERICAN): >60 ML/MIN/1.73 M^2
EST. GFR  (NON AFRICAN AMERICAN): >60 ML/MIN/1.73 M^2
GLUCOSE SERPL-MCNC: 96 MG/DL (ref 70–110)
HCT VFR BLD AUTO: 39.8 % (ref 37–48.5)
HGB BLD-MCNC: 12.9 G/DL (ref 12–16)
IMM GRANULOCYTES # BLD AUTO: 0.01 K/UL (ref 0–0.04)
MCH RBC QN AUTO: 32.3 PG (ref 27–31)
MCHC RBC AUTO-ENTMCNC: 32.4 G/DL (ref 32–36)
MCV RBC AUTO: 100 FL (ref 82–98)
NEUTROPHILS # BLD AUTO: 0.9 K/UL (ref 1.8–7.7)
PLATELET # BLD AUTO: 131 K/UL (ref 150–350)
PMV BLD AUTO: 9.7 FL (ref 9.2–12.9)
POTASSIUM SERPL-SCNC: 4.1 MMOL/L (ref 3.5–5.1)
PROT SERPL-MCNC: 7.2 G/DL (ref 6–8.4)
RBC # BLD AUTO: 4 M/UL (ref 4–5.4)
SODIUM SERPL-SCNC: 141 MMOL/L (ref 136–145)
WBC # BLD AUTO: 2.08 K/UL (ref 3.9–12.7)

## 2019-12-20 PROCEDURE — 85027 COMPLETE CBC AUTOMATED: CPT

## 2019-12-20 PROCEDURE — 80053 COMPREHEN METABOLIC PANEL: CPT

## 2019-12-20 PROCEDURE — 86304 IMMUNOASSAY TUMOR CA 125: CPT

## 2019-12-20 PROCEDURE — 36415 COLL VENOUS BLD VENIPUNCTURE: CPT

## 2020-01-02 ENCOUNTER — LAB VISIT (OUTPATIENT)
Dept: LAB | Facility: HOSPITAL | Age: 65
End: 2020-01-02
Attending: OBSTETRICS & GYNECOLOGY
Payer: COMMERCIAL

## 2020-01-02 DIAGNOSIS — C56.9 MALIGNANT NEOPLASM OF OVARY, UNSPECIFIED LATERALITY: ICD-10-CM

## 2020-01-02 PROCEDURE — 85027 COMPLETE CBC AUTOMATED: CPT

## 2020-01-02 PROCEDURE — 86304 IMMUNOASSAY TUMOR CA 125: CPT

## 2020-01-02 PROCEDURE — 36415 COLL VENOUS BLD VENIPUNCTURE: CPT

## 2020-01-02 PROCEDURE — 80053 COMPREHEN METABOLIC PANEL: CPT

## 2020-01-03 ENCOUNTER — INFUSION (OUTPATIENT)
Dept: INFUSION THERAPY | Facility: HOSPITAL | Age: 65
End: 2020-01-03
Attending: OBSTETRICS & GYNECOLOGY
Payer: COMMERCIAL

## 2020-01-03 ENCOUNTER — OFFICE VISIT (OUTPATIENT)
Dept: GYNECOLOGIC ONCOLOGY | Facility: CLINIC | Age: 65
End: 2020-01-03
Payer: COMMERCIAL

## 2020-01-03 VITALS
DIASTOLIC BLOOD PRESSURE: 79 MMHG | BODY MASS INDEX: 27.69 KG/M2 | WEIGHT: 176.81 LBS | SYSTOLIC BLOOD PRESSURE: 125 MMHG | HEART RATE: 78 BPM

## 2020-01-03 VITALS
HEIGHT: 67 IN | BODY MASS INDEX: 27.75 KG/M2 | WEIGHT: 176.81 LBS | HEART RATE: 57 BPM | DIASTOLIC BLOOD PRESSURE: 66 MMHG | SYSTOLIC BLOOD PRESSURE: 122 MMHG | RESPIRATION RATE: 18 BRPM | TEMPERATURE: 98 F

## 2020-01-03 DIAGNOSIS — C56.9 MALIGNANT NEOPLASM OF OVARY, UNSPECIFIED LATERALITY: Primary | ICD-10-CM

## 2020-01-03 DIAGNOSIS — C56.9 OVARIAN CANCER, UNSPECIFIED LATERALITY: Primary | ICD-10-CM

## 2020-01-03 DIAGNOSIS — T50.905A THROMBOCYTOPENIA DUE TO DRUGS: ICD-10-CM

## 2020-01-03 DIAGNOSIS — Z51.11 ENCOUNTER FOR ANTINEOPLASTIC CHEMOTHERAPY: ICD-10-CM

## 2020-01-03 DIAGNOSIS — D69.59 THROMBOCYTOPENIA DUE TO DRUGS: ICD-10-CM

## 2020-01-03 DIAGNOSIS — D70.2 NEUTROPENIA, DRUG-INDUCED: ICD-10-CM

## 2020-01-03 LAB
ALBUMIN SERPL BCP-MCNC: 4 G/DL (ref 3.5–5.2)
ALP SERPL-CCNC: 116 U/L (ref 55–135)
ALT SERPL W/O P-5'-P-CCNC: 20 U/L (ref 10–44)
ANION GAP SERPL CALC-SCNC: 9 MMOL/L (ref 8–16)
AST SERPL-CCNC: 23 U/L (ref 10–40)
BILIRUB SERPL-MCNC: 0.5 MG/DL (ref 0.1–1)
BUN SERPL-MCNC: 12 MG/DL (ref 8–23)
CALCIUM SERPL-MCNC: 9.3 MG/DL (ref 8.7–10.5)
CANCER AG125 SERPL-ACNC: 261 U/ML (ref 0–30)
CHLORIDE SERPL-SCNC: 104 MMOL/L (ref 95–110)
CO2 SERPL-SCNC: 27 MMOL/L (ref 23–29)
CREAT SERPL-MCNC: 0.7 MG/DL (ref 0.5–1.4)
ERYTHROCYTE [DISTWIDTH] IN BLOOD BY AUTOMATED COUNT: 15.9 % (ref 11.5–14.5)
EST. GFR  (AFRICAN AMERICAN): >60 ML/MIN/1.73 M^2
EST. GFR  (NON AFRICAN AMERICAN): >60 ML/MIN/1.73 M^2
GLUCOSE SERPL-MCNC: 92 MG/DL (ref 70–110)
HCT VFR BLD AUTO: 45.8 % (ref 37–48.5)
HGB BLD-MCNC: 14.7 G/DL (ref 12–16)
IMM GRANULOCYTES # BLD AUTO: 0.02 K/UL (ref 0–0.04)
MCH RBC QN AUTO: 32.6 PG (ref 27–31)
MCHC RBC AUTO-ENTMCNC: 32.1 G/DL (ref 32–36)
MCV RBC AUTO: 102 FL (ref 82–98)
NEUTROPHILS # BLD AUTO: 3.7 K/UL (ref 1.8–7.7)
PLATELET # BLD AUTO: 144 K/UL (ref 150–350)
PMV BLD AUTO: 9.6 FL (ref 9.2–12.9)
POTASSIUM SERPL-SCNC: 4.3 MMOL/L (ref 3.5–5.1)
PROT SERPL-MCNC: 7.1 G/DL (ref 6–8.4)
RBC # BLD AUTO: 4.51 M/UL (ref 4–5.4)
SODIUM SERPL-SCNC: 140 MMOL/L (ref 136–145)
WBC # BLD AUTO: 5.04 K/UL (ref 3.9–12.7)

## 2020-01-03 PROCEDURE — 1101F PR PT FALLS ASSESS DOC 0-1 FALLS W/OUT INJ PAST YR: ICD-10-PCS | Mod: CPTII,S$GLB,, | Performed by: OBSTETRICS & GYNECOLOGY

## 2020-01-03 PROCEDURE — 1101F PT FALLS ASSESS-DOCD LE1/YR: CPT | Mod: CPTII,S$GLB,, | Performed by: OBSTETRICS & GYNECOLOGY

## 2020-01-03 PROCEDURE — 99214 PR OFFICE/OUTPT VISIT, EST, LEVL IV, 30-39 MIN: ICD-10-PCS | Mod: S$GLB,,, | Performed by: OBSTETRICS & GYNECOLOGY

## 2020-01-03 PROCEDURE — 99214 OFFICE O/P EST MOD 30 MIN: CPT | Mod: S$GLB,,, | Performed by: OBSTETRICS & GYNECOLOGY

## 2020-01-03 PROCEDURE — 99999 PR PBB SHADOW E&M-EST. PATIENT-LVL III: CPT | Mod: PBBFAC,,, | Performed by: OBSTETRICS & GYNECOLOGY

## 2020-01-03 PROCEDURE — 3008F BODY MASS INDEX DOCD: CPT | Mod: CPTII,S$GLB,, | Performed by: OBSTETRICS & GYNECOLOGY

## 2020-01-03 PROCEDURE — 99999 PR PBB SHADOW E&M-EST. PATIENT-LVL III: ICD-10-PCS | Mod: PBBFAC,,, | Performed by: OBSTETRICS & GYNECOLOGY

## 2020-01-03 PROCEDURE — 96375 TX/PRO/DX INJ NEW DRUG ADDON: CPT

## 2020-01-03 PROCEDURE — 63600175 PHARM REV CODE 636 W HCPCS: Performed by: OBSTETRICS & GYNECOLOGY

## 2020-01-03 PROCEDURE — 96413 CHEMO IV INFUSION 1 HR: CPT

## 2020-01-03 PROCEDURE — 3008F PR BODY MASS INDEX (BMI) DOCUMENTED: ICD-10-PCS | Mod: CPTII,S$GLB,, | Performed by: OBSTETRICS & GYNECOLOGY

## 2020-01-03 RX ORDER — SODIUM CHLORIDE 0.9 % (FLUSH) 0.9 %
10 SYRINGE (ML) INJECTION
Status: DISCONTINUED | OUTPATIENT
Start: 2020-01-03 | End: 2020-01-03 | Stop reason: HOSPADM

## 2020-01-03 RX ORDER — SODIUM CHLORIDE 0.9 % (FLUSH) 0.9 %
10 SYRINGE (ML) INJECTION
Status: CANCELLED | OUTPATIENT
Start: 2020-01-05

## 2020-01-03 RX ORDER — ONDANSETRON 2 MG/ML
8 INJECTION INTRAMUSCULAR; INTRAVENOUS
Status: CANCELLED | OUTPATIENT
Start: 2020-01-05

## 2020-01-03 RX ORDER — HEPARIN 100 UNIT/ML
500 SYRINGE INTRAVENOUS
Status: CANCELLED | OUTPATIENT
Start: 2020-01-05

## 2020-01-03 RX ORDER — HEPARIN 100 UNIT/ML
500 SYRINGE INTRAVENOUS
Status: DISCONTINUED | OUTPATIENT
Start: 2020-01-03 | End: 2020-01-03 | Stop reason: HOSPADM

## 2020-01-03 RX ORDER — ONDANSETRON 2 MG/ML
8 INJECTION INTRAMUSCULAR; INTRAVENOUS
Status: COMPLETED | OUTPATIENT
Start: 2020-01-03 | End: 2020-01-03

## 2020-01-03 RX ADMIN — SODIUM CHLORIDE: 9 INJECTION, SOLUTION INTRAVENOUS at 10:01

## 2020-01-03 RX ADMIN — GEMCITABINE HYDROCHLORIDE 1325 MG: 200 INJECTION, SOLUTION INTRAVENOUS at 10:01

## 2020-01-03 RX ADMIN — ONDANSETRON 8 MG: 2 INJECTION, SOLUTION INTRAMUSCULAR; INTRAVENOUS at 10:01

## 2020-01-03 NOTE — PLAN OF CARE
1148 patient completed and tolerated treatment well, pt voiced no new complaints or concerns at this time. VSS. RTC 1/10/20, pt verbalized understanding. Pt d/c home, NAD

## 2020-01-03 NOTE — PROGRESS NOTES
Subjective:       Patient ID: Mickie Don is a 64 y.o. female.    Chief Complaint: Chemotherapy    Treatment History  Xlap/Omentectomy on 11/1/16 - mass was unresectable  Stage IIIC ovarian cancer  Initiated Taxotere/Carbo on 11/18/16, now s/p 6 cycles completed 3/8/17  Xlap/ISIS/BSO/Radical mass resection > 10 cm, optimal debulking on 4/11/17  Initiated PRIMA on 8/21/17  BRCA negative  Started FORWARD1 on 11/27/17  Started Doxil/Anju as part of NRG  2/27/19, received total of 6 cycles  Gemzar monotherapy started 8/29/19    HPI     Here today for C7D1 of Gemzar.  Reports minimal toxicity.  Labs reviewed and ok for treatment.  CA-125 261.  Denies F/C, N/V, VB.      Review of Systems   Constitutional: Negative for appetite change, chills, diaphoresis, fatigue, fever and unexpected weight change.   HENT: Negative for mouth sores and tinnitus.    Respiratory: Negative for cough, chest tightness, shortness of breath and wheezing.    Cardiovascular: Negative for chest pain, palpitations and leg swelling.   Gastrointestinal: Negative for abdominal distention, abdominal pain, blood in stool, constipation, diarrhea, nausea and vomiting.   Genitourinary: Negative for difficulty urinating, dysuria, flank pain, frequency, hematuria, pelvic pain, urgency, vaginal bleeding, vaginal discharge and vaginal pain.   Musculoskeletal: Negative for arthralgias, back pain and gait problem.   Skin: Negative for color change and rash.   Neurological: Positive for numbness. Negative for dizziness, weakness and headaches.   Hematological: Negative for adenopathy. Does not bruise/bleed easily.   Psychiatric/Behavioral: Negative for confusion and sleep disturbance. The patient is not nervous/anxious.        Objective:   /79   Pulse 78   Wt 80.2 kg (176 lb 12.9 oz)   BMI 27.69 kg/m²      Physical Exam   Constitutional: She is oriented to person, place, and time. She appears well-developed and well-nourished. No distress.   HENT:    Head: Normocephalic and atraumatic.   Eyes: No scleral icterus.   Neck: Normal range of motion.   Pulmonary/Chest: Effort normal. No respiratory distress.   Abdominal: Soft. She exhibits no distension and no mass.   Tender to palpation in LLQ, along sigmoid   Genitourinary: Vagina normal. Pelvic exam was performed with patient supine. There is no rash, tenderness or lesion on the right labia. There is no rash, tenderness or lesion on the left labia. Right adnexum displays no mass. Left adnexum displays no mass.   Genitourinary Comments: Pelvic normal   Musculoskeletal: Normal range of motion. She exhibits no edema or tenderness.   Neurological: She is alert and oriented to person, place, and time.   Skin: Skin is warm and dry. No pallor.   Psychiatric: She has a normal mood and affect. Her behavior is normal. Judgment and thought content normal.       Assessment:       1. Malignant neoplasm of ovary, unspecified laterality    2. Encounter for antineoplastic chemotherapy    3. Neutropenia, drug-induced    4. Thrombocytopenia due to drugs        Plan:     Treat with C7.  Scan next Friday.  Added Neulasta to D8 so can continue to get treatment in.

## 2020-01-09 DIAGNOSIS — C56.9 OVARIAN CANCER, UNSPECIFIED LATERALITY: Primary | ICD-10-CM

## 2020-01-09 RX ORDER — SODIUM CHLORIDE 0.9 % (FLUSH) 0.9 %
10 SYRINGE (ML) INJECTION
Status: CANCELLED | OUTPATIENT
Start: 2020-01-10

## 2020-01-09 RX ORDER — HEPARIN 100 UNIT/ML
500 SYRINGE INTRAVENOUS
Status: CANCELLED | OUTPATIENT
Start: 2020-01-10

## 2020-01-09 RX ORDER — ONDANSETRON 2 MG/ML
8 INJECTION INTRAMUSCULAR; INTRAVENOUS
Status: CANCELLED | OUTPATIENT
Start: 2020-01-10

## 2020-01-10 ENCOUNTER — INFUSION (OUTPATIENT)
Dept: INFUSION THERAPY | Facility: HOSPITAL | Age: 65
End: 2020-01-10
Attending: OBSTETRICS & GYNECOLOGY
Payer: COMMERCIAL

## 2020-01-10 ENCOUNTER — HOSPITAL ENCOUNTER (OUTPATIENT)
Dept: RADIOLOGY | Facility: OTHER | Age: 65
Discharge: HOME | End: 2020-01-10
Attending: OBSTETRICS & GYNECOLOGY
Payer: COMMERCIAL

## 2020-01-10 VITALS
HEART RATE: 58 BPM | TEMPERATURE: 98 F | RESPIRATION RATE: 18 BRPM | DIASTOLIC BLOOD PRESSURE: 70 MMHG | SYSTOLIC BLOOD PRESSURE: 128 MMHG

## 2020-01-10 DIAGNOSIS — Z00.6 EXAMINATION OF PARTICIPANT IN CLINICAL TRIAL: ICD-10-CM

## 2020-01-10 DIAGNOSIS — C56.9 OVARIAN CANCER, UNSPECIFIED LATERALITY: Primary | ICD-10-CM

## 2020-01-10 DIAGNOSIS — C56.9 OVARIAN CANCER, UNSPECIFIED LATERALITY: ICD-10-CM

## 2020-01-10 PROCEDURE — 96377 APPLICATON ON-BODY INJECTOR: CPT

## 2020-01-10 PROCEDURE — 25000003 PHARM REV CODE 250: Performed by: OBSTETRICS & GYNECOLOGY

## 2020-01-10 PROCEDURE — 71260 CT CHEST ABDOMEN PELVIS WITH CONTRAST (XPD): ICD-10-PCS | Mod: 26,,, | Performed by: INTERNAL MEDICINE

## 2020-01-10 PROCEDURE — 96361 HYDRATE IV INFUSION ADD-ON: CPT

## 2020-01-10 PROCEDURE — 74177 CT ABD & PELVIS W/CONTRAST: CPT | Mod: 26,,, | Performed by: INTERNAL MEDICINE

## 2020-01-10 PROCEDURE — 96367 TX/PROPH/DG ADDL SEQ IV INF: CPT

## 2020-01-10 PROCEDURE — 63600175 PHARM REV CODE 636 W HCPCS: Performed by: OBSTETRICS & GYNECOLOGY

## 2020-01-10 PROCEDURE — A4216 STERILE WATER/SALINE, 10 ML: HCPCS | Performed by: OBSTETRICS & GYNECOLOGY

## 2020-01-10 PROCEDURE — 74177 CT ABD & PELVIS W/CONTRAST: CPT | Mod: TC

## 2020-01-10 PROCEDURE — 25500020 PHARM REV CODE 255: Performed by: OBSTETRICS & GYNECOLOGY

## 2020-01-10 PROCEDURE — 63600175 PHARM REV CODE 636 W HCPCS: Mod: JG | Performed by: OBSTETRICS & GYNECOLOGY

## 2020-01-10 PROCEDURE — 96413 CHEMO IV INFUSION 1 HR: CPT

## 2020-01-10 PROCEDURE — 71260 CT THORAX DX C+: CPT | Mod: 26,,, | Performed by: INTERNAL MEDICINE

## 2020-01-10 PROCEDURE — 74177 CT CHEST ABDOMEN PELVIS WITH CONTRAST (XPD): ICD-10-PCS | Mod: 26,,, | Performed by: INTERNAL MEDICINE

## 2020-01-10 RX ORDER — HEPARIN 100 UNIT/ML
500 SYRINGE INTRAVENOUS
Status: DISCONTINUED | OUTPATIENT
Start: 2020-01-10 | End: 2020-01-10 | Stop reason: HOSPADM

## 2020-01-10 RX ORDER — ONDANSETRON HCL IN 0.9 % NACL 8 MG/50 ML
8 INTRAVENOUS SOLUTION, PIGGYBACK (ML) INTRAVENOUS
Status: COMPLETED | OUTPATIENT
Start: 2020-01-10 | End: 2020-01-10

## 2020-01-10 RX ORDER — SODIUM CHLORIDE 0.9 % (FLUSH) 0.9 %
10 SYRINGE (ML) INJECTION
Status: DISCONTINUED | OUTPATIENT
Start: 2020-01-10 | End: 2020-01-10 | Stop reason: HOSPADM

## 2020-01-10 RX ORDER — ONDANSETRON 2 MG/ML
8 INJECTION INTRAMUSCULAR; INTRAVENOUS
Status: DISCONTINUED | OUTPATIENT
Start: 2020-01-10 | End: 2020-01-10

## 2020-01-10 RX ADMIN — SODIUM CHLORIDE: 9 INJECTION, SOLUTION INTRAVENOUS at 01:01

## 2020-01-10 RX ADMIN — Medication 10 ML: at 02:01

## 2020-01-10 RX ADMIN — ONDANSETRON 8 MG: 2 INJECTION INTRAMUSCULAR; INTRAVENOUS at 01:01

## 2020-01-10 RX ADMIN — PEGFILGRASTIM 6 MG: KIT SUBCUTANEOUS at 02:01

## 2020-01-10 RX ADMIN — IOHEXOL 1000 ML: 9 SOLUTION ORAL at 09:01

## 2020-01-10 RX ADMIN — IOHEXOL 75 ML: 350 INJECTION, SOLUTION INTRAVENOUS at 10:01

## 2020-01-10 RX ADMIN — GEMCITABINE HYDROCHLORIDE 1325 MG: 200 INJECTION, SOLUTION INTRAVENOUS at 02:01

## 2020-01-20 ENCOUNTER — PATIENT MESSAGE (OUTPATIENT)
Dept: GYNECOLOGIC ONCOLOGY | Facility: CLINIC | Age: 65
End: 2020-01-20

## 2020-01-20 ENCOUNTER — TELEPHONE (OUTPATIENT)
Dept: RESEARCH | Facility: HOSPITAL | Age: 65
End: 2020-01-20

## 2020-01-23 ENCOUNTER — LAB VISIT (OUTPATIENT)
Dept: LAB | Facility: HOSPITAL | Age: 65
End: 2020-01-23
Attending: OBSTETRICS & GYNECOLOGY
Payer: COMMERCIAL

## 2020-01-23 DIAGNOSIS — C56.9 MALIGNANT NEOPLASM OF OVARY, UNSPECIFIED LATERALITY: ICD-10-CM

## 2020-01-23 LAB
ALBUMIN SERPL BCP-MCNC: 3.5 G/DL (ref 3.5–5.2)
ALP SERPL-CCNC: 157 U/L (ref 55–135)
ALT SERPL W/O P-5'-P-CCNC: 19 U/L (ref 10–44)
ANION GAP SERPL CALC-SCNC: 11 MMOL/L (ref 8–16)
AST SERPL-CCNC: 23 U/L (ref 10–40)
BILIRUB SERPL-MCNC: 0.4 MG/DL (ref 0.1–1)
BUN SERPL-MCNC: 12 MG/DL (ref 8–23)
CALCIUM SERPL-MCNC: 9 MG/DL (ref 8.7–10.5)
CANCER AG125 SERPL-ACNC: 237 U/ML (ref 0–30)
CHLORIDE SERPL-SCNC: 105 MMOL/L (ref 95–110)
CO2 SERPL-SCNC: 27 MMOL/L (ref 23–29)
CREAT SERPL-MCNC: 0.7 MG/DL (ref 0.5–1.4)
ERYTHROCYTE [DISTWIDTH] IN BLOOD BY AUTOMATED COUNT: 16.4 % (ref 11.5–14.5)
EST. GFR  (AFRICAN AMERICAN): >60 ML/MIN/1.73 M^2
EST. GFR  (NON AFRICAN AMERICAN): >60 ML/MIN/1.73 M^2
GLUCOSE SERPL-MCNC: 104 MG/DL (ref 70–110)
HCT VFR BLD AUTO: 37.5 % (ref 37–48.5)
HGB BLD-MCNC: 12.4 G/DL (ref 12–16)
IMM GRANULOCYTES # BLD AUTO: 0.05 K/UL (ref 0–0.04)
MCH RBC QN AUTO: 32.8 PG (ref 27–31)
MCHC RBC AUTO-ENTMCNC: 33.1 G/DL (ref 32–36)
MCV RBC AUTO: 99 FL (ref 82–98)
NEUTROPHILS # BLD AUTO: 8.2 K/UL (ref 1.8–7.7)
PLATELET # BLD AUTO: 182 K/UL (ref 150–350)
PMV BLD AUTO: 9.3 FL (ref 9.2–12.9)
POTASSIUM SERPL-SCNC: 4.2 MMOL/L (ref 3.5–5.1)
PROT SERPL-MCNC: 7 G/DL (ref 6–8.4)
RBC # BLD AUTO: 3.78 M/UL (ref 4–5.4)
SODIUM SERPL-SCNC: 143 MMOL/L (ref 136–145)
WBC # BLD AUTO: 9.66 K/UL (ref 3.9–12.7)

## 2020-01-23 PROCEDURE — 85027 COMPLETE CBC AUTOMATED: CPT

## 2020-01-23 PROCEDURE — 80053 COMPREHEN METABOLIC PANEL: CPT

## 2020-01-23 PROCEDURE — 86304 IMMUNOASSAY TUMOR CA 125: CPT

## 2020-01-23 PROCEDURE — 36415 COLL VENOUS BLD VENIPUNCTURE: CPT

## 2020-01-23 RX ORDER — HEPARIN 100 UNIT/ML
500 SYRINGE INTRAVENOUS
Status: CANCELLED | OUTPATIENT
Start: 2020-01-25

## 2020-01-23 RX ORDER — SODIUM CHLORIDE 0.9 % (FLUSH) 0.9 %
10 SYRINGE (ML) INJECTION
Status: CANCELLED | OUTPATIENT
Start: 2020-01-25

## 2020-01-23 RX ORDER — ONDANSETRON 2 MG/ML
8 INJECTION INTRAMUSCULAR; INTRAVENOUS
Status: CANCELLED | OUTPATIENT
Start: 2020-01-25

## 2020-01-24 ENCOUNTER — PATIENT MESSAGE (OUTPATIENT)
Dept: GYNECOLOGIC ONCOLOGY | Facility: CLINIC | Age: 65
End: 2020-01-24

## 2020-01-24 ENCOUNTER — INFUSION (OUTPATIENT)
Dept: INFUSION THERAPY | Facility: HOSPITAL | Age: 65
End: 2020-01-24
Attending: OBSTETRICS & GYNECOLOGY
Payer: COMMERCIAL

## 2020-01-24 VITALS
HEIGHT: 67 IN | HEART RATE: 60 BPM | RESPIRATION RATE: 18 BRPM | BODY MASS INDEX: 27.2 KG/M2 | SYSTOLIC BLOOD PRESSURE: 125 MMHG | TEMPERATURE: 98 F | OXYGEN SATURATION: 99 % | WEIGHT: 173.31 LBS | DIASTOLIC BLOOD PRESSURE: 72 MMHG

## 2020-01-24 DIAGNOSIS — C56.9 OVARIAN CANCER, UNSPECIFIED LATERALITY: Primary | ICD-10-CM

## 2020-01-24 PROCEDURE — 96375 TX/PRO/DX INJ NEW DRUG ADDON: CPT

## 2020-01-24 PROCEDURE — 96413 CHEMO IV INFUSION 1 HR: CPT

## 2020-01-24 PROCEDURE — 63600175 PHARM REV CODE 636 W HCPCS: Performed by: OBSTETRICS & GYNECOLOGY

## 2020-01-24 RX ORDER — SODIUM CHLORIDE 0.9 % (FLUSH) 0.9 %
10 SYRINGE (ML) INJECTION
Status: DISCONTINUED | OUTPATIENT
Start: 2020-01-24 | End: 2020-01-24 | Stop reason: HOSPADM

## 2020-01-24 RX ORDER — ONDANSETRON 2 MG/ML
8 INJECTION INTRAMUSCULAR; INTRAVENOUS
Status: COMPLETED | OUTPATIENT
Start: 2020-01-24 | End: 2020-01-24

## 2020-01-24 RX ORDER — HEPARIN 100 UNIT/ML
500 SYRINGE INTRAVENOUS
Status: DISCONTINUED | OUTPATIENT
Start: 2020-01-24 | End: 2020-01-24 | Stop reason: HOSPADM

## 2020-01-24 RX ADMIN — ONDANSETRON 8 MG: 2 INJECTION, SOLUTION INTRAMUSCULAR; INTRAVENOUS at 08:01

## 2020-01-24 RX ADMIN — SODIUM CHLORIDE: 0.9 INJECTION, SOLUTION INTRAVENOUS at 08:01

## 2020-01-24 RX ADMIN — GEMCITABINE HYDROCHLORIDE 1325 MG: 200 INJECTION, SOLUTION INTRAVENOUS at 09:01

## 2020-01-24 NOTE — PLAN OF CARE
Pt admitted for C8D1 Gemzar. Labs reviewed and side effects and self care tips discussed. Ongoing fatigue and continued neuropathy addressed. Pt received treatment, tolerated well. Plan of care reviewed and Pt instructed to contact MD for further concerns. Pt discharged @  10:15

## 2020-01-31 ENCOUNTER — INFUSION (OUTPATIENT)
Dept: INFUSION THERAPY | Facility: HOSPITAL | Age: 65
End: 2020-01-31
Attending: OBSTETRICS & GYNECOLOGY
Payer: COMMERCIAL

## 2020-01-31 ENCOUNTER — PATIENT MESSAGE (OUTPATIENT)
Dept: GYNECOLOGIC ONCOLOGY | Facility: CLINIC | Age: 65
End: 2020-01-31

## 2020-01-31 VITALS
RESPIRATION RATE: 18 BRPM | BODY MASS INDEX: 27.27 KG/M2 | HEART RATE: 63 BPM | TEMPERATURE: 98 F | DIASTOLIC BLOOD PRESSURE: 56 MMHG | HEIGHT: 67 IN | WEIGHT: 173.75 LBS | SYSTOLIC BLOOD PRESSURE: 110 MMHG

## 2020-01-31 DIAGNOSIS — C56.9 OVARIAN CANCER, UNSPECIFIED LATERALITY: Primary | ICD-10-CM

## 2020-01-31 PROCEDURE — 96367 TX/PROPH/DG ADDL SEQ IV INF: CPT

## 2020-01-31 PROCEDURE — A4216 STERILE WATER/SALINE, 10 ML: HCPCS | Performed by: OBSTETRICS & GYNECOLOGY

## 2020-01-31 PROCEDURE — 96377 APPLICATON ON-BODY INJECTOR: CPT

## 2020-01-31 PROCEDURE — 25000003 PHARM REV CODE 250: Performed by: OBSTETRICS & GYNECOLOGY

## 2020-01-31 PROCEDURE — 96413 CHEMO IV INFUSION 1 HR: CPT

## 2020-01-31 PROCEDURE — 63600175 PHARM REV CODE 636 W HCPCS: Performed by: OBSTETRICS & GYNECOLOGY

## 2020-01-31 RX ORDER — ONDANSETRON 2 MG/ML
8 INJECTION INTRAMUSCULAR; INTRAVENOUS
Status: DISCONTINUED | OUTPATIENT
Start: 2020-01-31 | End: 2020-01-31

## 2020-01-31 RX ORDER — ONDANSETRON HCL IN 0.9 % NACL 8 MG/50 ML
8 INTRAVENOUS SOLUTION, PIGGYBACK (ML) INTRAVENOUS
Status: COMPLETED | OUTPATIENT
Start: 2020-01-31 | End: 2020-01-31

## 2020-01-31 RX ORDER — HEPARIN 100 UNIT/ML
500 SYRINGE INTRAVENOUS
Status: DISCONTINUED | OUTPATIENT
Start: 2020-01-31 | End: 2020-01-31 | Stop reason: HOSPADM

## 2020-01-31 RX ORDER — HEPARIN 100 UNIT/ML
500 SYRINGE INTRAVENOUS
Status: CANCELLED | OUTPATIENT
Start: 2020-02-01

## 2020-01-31 RX ORDER — SODIUM CHLORIDE 0.9 % (FLUSH) 0.9 %
10 SYRINGE (ML) INJECTION
Status: CANCELLED | OUTPATIENT
Start: 2020-02-01

## 2020-01-31 RX ORDER — ONDANSETRON 2 MG/ML
8 INJECTION INTRAMUSCULAR; INTRAVENOUS
Status: CANCELLED | OUTPATIENT
Start: 2020-02-01

## 2020-01-31 RX ORDER — SODIUM CHLORIDE 0.9 % (FLUSH) 0.9 %
10 SYRINGE (ML) INJECTION
Status: DISCONTINUED | OUTPATIENT
Start: 2020-01-31 | End: 2020-01-31 | Stop reason: HOSPADM

## 2020-01-31 RX ADMIN — GEMCITABINE HYDROCHLORIDE 1325 MG: 200 INJECTION, SOLUTION INTRAVENOUS at 03:01

## 2020-01-31 RX ADMIN — SODIUM CHLORIDE: 9 INJECTION, SOLUTION INTRAVENOUS at 02:01

## 2020-01-31 RX ADMIN — PEGFILGRASTIM 6 MG: KIT SUBCUTANEOUS at 04:01

## 2020-01-31 RX ADMIN — Medication 10 ML: at 04:01

## 2020-01-31 RX ADMIN — ONDANSETRON 8 MG: 2 INJECTION, SOLUTION INTRAMUSCULAR; INTRAVENOUS at 02:01

## 2020-02-14 ENCOUNTER — OFFICE VISIT (OUTPATIENT)
Dept: GYNECOLOGIC ONCOLOGY | Facility: CLINIC | Age: 65
End: 2020-02-14
Payer: COMMERCIAL

## 2020-02-14 ENCOUNTER — INFUSION (OUTPATIENT)
Dept: INFUSION THERAPY | Facility: HOSPITAL | Age: 65
End: 2020-02-14
Attending: OBSTETRICS & GYNECOLOGY
Payer: COMMERCIAL

## 2020-02-14 VITALS
SYSTOLIC BLOOD PRESSURE: 120 MMHG | WEIGHT: 178 LBS | HEART RATE: 65 BPM | DIASTOLIC BLOOD PRESSURE: 56 MMHG | BODY MASS INDEX: 27.88 KG/M2

## 2020-02-14 VITALS
RESPIRATION RATE: 18 BRPM | DIASTOLIC BLOOD PRESSURE: 59 MMHG | WEIGHT: 178 LBS | SYSTOLIC BLOOD PRESSURE: 110 MMHG | HEIGHT: 67 IN | HEART RATE: 62 BPM | BODY MASS INDEX: 27.94 KG/M2

## 2020-02-14 DIAGNOSIS — D69.59 THROMBOCYTOPENIA DUE TO DRUGS: ICD-10-CM

## 2020-02-14 DIAGNOSIS — D70.2 NEUTROPENIA, DRUG-INDUCED: ICD-10-CM

## 2020-02-14 DIAGNOSIS — C56.9 OVARIAN CANCER, UNSPECIFIED LATERALITY: Primary | ICD-10-CM

## 2020-02-14 DIAGNOSIS — T50.905A THROMBOCYTOPENIA DUE TO DRUGS: ICD-10-CM

## 2020-02-14 DIAGNOSIS — Z51.11 ENCOUNTER FOR ANTINEOPLASTIC CHEMOTHERAPY: ICD-10-CM

## 2020-02-14 PROCEDURE — 1101F PR PT FALLS ASSESS DOC 0-1 FALLS W/OUT INJ PAST YR: ICD-10-PCS | Mod: CPTII,S$GLB,, | Performed by: OBSTETRICS & GYNECOLOGY

## 2020-02-14 PROCEDURE — 25000003 PHARM REV CODE 250: Performed by: OBSTETRICS & GYNECOLOGY

## 2020-02-14 PROCEDURE — 99999 PR PBB SHADOW E&M-EST. PATIENT-LVL III: ICD-10-PCS | Mod: PBBFAC,,, | Performed by: OBSTETRICS & GYNECOLOGY

## 2020-02-14 PROCEDURE — 3008F BODY MASS INDEX DOCD: CPT | Mod: CPTII,S$GLB,, | Performed by: OBSTETRICS & GYNECOLOGY

## 2020-02-14 PROCEDURE — 99999 PR PBB SHADOW E&M-EST. PATIENT-LVL III: CPT | Mod: PBBFAC,,, | Performed by: OBSTETRICS & GYNECOLOGY

## 2020-02-14 PROCEDURE — 63600175 PHARM REV CODE 636 W HCPCS: Performed by: OBSTETRICS & GYNECOLOGY

## 2020-02-14 PROCEDURE — 96375 TX/PRO/DX INJ NEW DRUG ADDON: CPT

## 2020-02-14 PROCEDURE — A4216 STERILE WATER/SALINE, 10 ML: HCPCS | Performed by: OBSTETRICS & GYNECOLOGY

## 2020-02-14 PROCEDURE — 99214 OFFICE O/P EST MOD 30 MIN: CPT | Mod: S$GLB,,, | Performed by: OBSTETRICS & GYNECOLOGY

## 2020-02-14 PROCEDURE — 99214 PR OFFICE/OUTPT VISIT, EST, LEVL IV, 30-39 MIN: ICD-10-PCS | Mod: S$GLB,,, | Performed by: OBSTETRICS & GYNECOLOGY

## 2020-02-14 PROCEDURE — 96413 CHEMO IV INFUSION 1 HR: CPT

## 2020-02-14 PROCEDURE — 3008F PR BODY MASS INDEX (BMI) DOCUMENTED: ICD-10-PCS | Mod: CPTII,S$GLB,, | Performed by: OBSTETRICS & GYNECOLOGY

## 2020-02-14 PROCEDURE — 1101F PT FALLS ASSESS-DOCD LE1/YR: CPT | Mod: CPTII,S$GLB,, | Performed by: OBSTETRICS & GYNECOLOGY

## 2020-02-14 RX ORDER — HEPARIN 100 UNIT/ML
500 SYRINGE INTRAVENOUS
Status: CANCELLED | OUTPATIENT
Start: 2020-02-23

## 2020-02-14 RX ORDER — SODIUM CHLORIDE 0.9 % (FLUSH) 0.9 %
10 SYRINGE (ML) INJECTION
Status: CANCELLED | OUTPATIENT
Start: 2020-02-16

## 2020-02-14 RX ORDER — HEPARIN 100 UNIT/ML
500 SYRINGE INTRAVENOUS
Status: DISCONTINUED | OUTPATIENT
Start: 2020-02-14 | End: 2020-02-14 | Stop reason: HOSPADM

## 2020-02-14 RX ORDER — SODIUM CHLORIDE 0.9 % (FLUSH) 0.9 %
10 SYRINGE (ML) INJECTION
Status: DISCONTINUED | OUTPATIENT
Start: 2020-02-14 | End: 2020-02-14 | Stop reason: HOSPADM

## 2020-02-14 RX ORDER — ONDANSETRON 2 MG/ML
8 INJECTION INTRAMUSCULAR; INTRAVENOUS
Status: COMPLETED | OUTPATIENT
Start: 2020-02-14 | End: 2020-02-14

## 2020-02-14 RX ORDER — SODIUM CHLORIDE 0.9 % (FLUSH) 0.9 %
10 SYRINGE (ML) INJECTION
Status: CANCELLED | OUTPATIENT
Start: 2020-02-23

## 2020-02-14 RX ORDER — ONDANSETRON 2 MG/ML
8 INJECTION INTRAMUSCULAR; INTRAVENOUS
Status: CANCELLED | OUTPATIENT
Start: 2020-02-16

## 2020-02-14 RX ORDER — HEPARIN 100 UNIT/ML
500 SYRINGE INTRAVENOUS
Status: CANCELLED | OUTPATIENT
Start: 2020-02-16

## 2020-02-14 RX ORDER — ONDANSETRON 2 MG/ML
8 INJECTION INTRAMUSCULAR; INTRAVENOUS
Status: CANCELLED | OUTPATIENT
Start: 2020-02-23

## 2020-02-14 RX ADMIN — Medication 10 ML: at 10:02

## 2020-02-14 RX ADMIN — ONDANSETRON 8 MG: 2 INJECTION, SOLUTION INTRAMUSCULAR; INTRAVENOUS at 09:02

## 2020-02-14 RX ADMIN — SODIUM CHLORIDE: 0.9 INJECTION, SOLUTION INTRAVENOUS at 09:02

## 2020-02-14 RX ADMIN — GEMCITABINE HYDROCHLORIDE 1325 MG: 1 INJECTION, POWDER, LYOPHILIZED, FOR SOLUTION INTRAVENOUS at 09:02

## 2020-02-14 NOTE — PROGRESS NOTES
Subjective:       Patient ID: Mickie Don is a 64 y.o. female.    Chief Complaint: Chemotherapy (gem d/1)    Treatment History  Xlap/Omentectomy on 11/1/16 - mass was unresectable  Stage IIIC ovarian cancer  Initiated Taxotere/Carbo on 11/18/16, now s/p 6 cycles completed 3/8/17  Xlap/ISIS/BSO/Radical mass resection > 10 cm, optimal debulking on 4/11/17  Initiated PRIMA on 8/21/17  BRCA negative  Started FORWARD1 on 11/27/17  Started Doxil/Anju as part of NRG  2/27/19, received total of 6 cycles  Gemzar monotherapy started 8/29/19    HPI     Here today for C9D1 of Gemzar.  Reports minimal toxicity.  Labs reviewed and ok for treatment.  CA-125 pending today, but overall stable at 248.  Denies F/C, N/V, VB.      Review of Systems   Constitutional: Negative for appetite change, chills, diaphoresis, fatigue, fever and unexpected weight change.   HENT: Negative for mouth sores and tinnitus.    Respiratory: Negative for cough, chest tightness, shortness of breath and wheezing.    Cardiovascular: Negative for chest pain, palpitations and leg swelling.   Gastrointestinal: Negative for abdominal distention, abdominal pain, blood in stool, constipation, diarrhea, nausea and vomiting.   Genitourinary: Negative for difficulty urinating, dysuria, flank pain, frequency, hematuria, pelvic pain, urgency, vaginal bleeding, vaginal discharge and vaginal pain.   Musculoskeletal: Negative for arthralgias, back pain and gait problem.   Skin: Negative for color change and rash.   Neurological: Positive for numbness. Negative for dizziness, weakness and headaches.   Hematological: Negative for adenopathy. Does not bruise/bleed easily.   Psychiatric/Behavioral: Negative for confusion and sleep disturbance. The patient is not nervous/anxious.        Objective:   BP (!) 120/56   Pulse 65   Wt 80.7 kg (178 lb)   BMI 27.88 kg/m²      Physical Exam   Constitutional: She is oriented to person, place, and time. She appears well-developed  and well-nourished. No distress.   HENT:   Head: Normocephalic and atraumatic.   Eyes: No scleral icterus.   Neck: Normal range of motion.   Pulmonary/Chest: Effort normal. No respiratory distress.   Abdominal: Soft. She exhibits no distension and no mass.   Tender to palpation in LLQ, along sigmoid   Genitourinary: Vagina normal. Pelvic exam was performed with patient supine. There is no rash, tenderness or lesion on the right labia. There is no rash, tenderness or lesion on the left labia. Right adnexum displays no mass. Left adnexum displays no mass.   Genitourinary Comments: Pelvic normal   Musculoskeletal: Normal range of motion. She exhibits no edema or tenderness.   Neurological: She is alert and oriented to person, place, and time.   Skin: Skin is warm and dry. No pallor.   Psychiatric: She has a normal mood and affect. Her behavior is normal. Judgment and thought content normal.       Assessment:       1. Ovarian cancer, unspecified laterality    2. Encounter for antineoplastic chemotherapy    3. Thrombocytopenia due to drugs    4. Neutropenia, drug-induced        Plan:     Treat with C9.  RTC as scheduled

## 2020-02-14 NOTE — PLAN OF CARE
Pt tolerated Gemzar without complications. VSS. No s/s of reaction. Pt refused OBI neulasta (stated she only gets it during day 8). Instructed to contact MD with any questions. PIV removed and AVS given to patient.

## 2020-02-20 ENCOUNTER — PATIENT MESSAGE (OUTPATIENT)
Dept: GYNECOLOGIC ONCOLOGY | Facility: CLINIC | Age: 65
End: 2020-02-20

## 2020-02-21 ENCOUNTER — INFUSION (OUTPATIENT)
Dept: INFUSION THERAPY | Facility: HOSPITAL | Age: 65
End: 2020-02-21
Attending: OBSTETRICS & GYNECOLOGY
Payer: COMMERCIAL

## 2020-02-21 VITALS
HEART RATE: 67 BPM | SYSTOLIC BLOOD PRESSURE: 123 MMHG | DIASTOLIC BLOOD PRESSURE: 69 MMHG | RESPIRATION RATE: 18 BRPM | TEMPERATURE: 99 F

## 2020-02-21 DIAGNOSIS — C56.9 OVARIAN CANCER, UNSPECIFIED LATERALITY: Primary | ICD-10-CM

## 2020-02-21 DIAGNOSIS — R11.0 NAUSEA: ICD-10-CM

## 2020-02-21 PROCEDURE — 63600175 PHARM REV CODE 636 W HCPCS: Performed by: OBSTETRICS & GYNECOLOGY

## 2020-02-21 PROCEDURE — 96375 TX/PRO/DX INJ NEW DRUG ADDON: CPT

## 2020-02-21 PROCEDURE — 96377 APPLICATON ON-BODY INJECTOR: CPT

## 2020-02-21 PROCEDURE — 96413 CHEMO IV INFUSION 1 HR: CPT

## 2020-02-21 RX ORDER — SODIUM CHLORIDE 0.9 % (FLUSH) 0.9 %
10 SYRINGE (ML) INJECTION
Status: DISCONTINUED | OUTPATIENT
Start: 2020-02-21 | End: 2020-02-21 | Stop reason: HOSPADM

## 2020-02-21 RX ORDER — ONDANSETRON 2 MG/ML
8 INJECTION INTRAMUSCULAR; INTRAVENOUS
Status: COMPLETED | OUTPATIENT
Start: 2020-02-21 | End: 2020-02-21

## 2020-02-21 RX ORDER — ONDANSETRON 8 MG/1
8 TABLET, ORALLY DISINTEGRATING ORAL EVERY 6 HOURS PRN
Qty: 30 TABLET | Refills: 2 | Status: SHIPPED | OUTPATIENT
Start: 2020-02-21 | End: 2020-10-12

## 2020-02-21 RX ADMIN — SODIUM CHLORIDE: 0.9 INJECTION, SOLUTION INTRAVENOUS at 08:02

## 2020-02-21 RX ADMIN — ONDANSETRON 8 MG: 2 INJECTION, SOLUTION INTRAMUSCULAR; INTRAVENOUS at 08:02

## 2020-02-21 RX ADMIN — GEMCITABINE HYDROCHLORIDE 1325 MG: 1 INJECTION, POWDER, LYOPHILIZED, FOR SOLUTION INTRAVENOUS at 10:02

## 2020-02-21 RX ADMIN — PEGFILGRASTIM 6 MG: KIT SUBCUTANEOUS at 10:02

## 2020-02-21 NOTE — PLAN OF CARE
C9 gemzar tolerated well, no adverse reactions present during treatment. PIV flushed and removed cath tip intact site covered with gauze and co wrap. neulasta obi applied to right upper arm. Instructed to remove at approximately 2pm on 11/22/20. Avs given pt reminded to p/u zofran from local pharmacy. Leaves clinic ambulatory no distress

## 2020-02-21 NOTE — PLAN OF CARE
Problem: Adult Inpatient Plan of Care  Goal: Optimal Comfort and Wellbeing  Intervention: Provide Person-Centered Care  Flowsheets (Taken 2/21/2020 8525)  Trust Relationship/Rapport: choices provided; care explained; emotional support provided; empathic listening provided; questions answered; questions encouraged; reassurance provided; thoughts/feelings acknowledged

## 2020-02-28 ENCOUNTER — PATIENT MESSAGE (OUTPATIENT)
Dept: GYNECOLOGIC ONCOLOGY | Facility: CLINIC | Age: 65
End: 2020-02-28

## 2020-02-29 NOTE — TELEPHONE ENCOUNTER
Monday, January 20, 2020  Pt Initials: LILO BERMUDEZ  Protocol: NRG-  Study #: [id number]  IRB#: 2018.028     Contacted Pt for follow-up per protocol. Pt states doing well. Pt have appointment at [facility] this week. Pt will complete QOL and Follow-up Health Assessment. Pt thanked for contact and  assent to future follow-up.   
Prior Hospital/ED Visits

## 2020-03-02 ENCOUNTER — PATIENT MESSAGE (OUTPATIENT)
Dept: GYNECOLOGIC ONCOLOGY | Facility: CLINIC | Age: 65
End: 2020-03-02

## 2020-03-05 ENCOUNTER — RESEARCH ENCOUNTER (OUTPATIENT)
Dept: RESEARCH | Facility: HOSPITAL | Age: 65
End: 2020-03-05

## 2020-03-05 ENCOUNTER — LAB VISIT (OUTPATIENT)
Dept: LAB | Facility: HOSPITAL | Age: 65
End: 2020-03-05
Attending: OBSTETRICS & GYNECOLOGY
Payer: COMMERCIAL

## 2020-03-05 ENCOUNTER — INFUSION (OUTPATIENT)
Dept: INFUSION THERAPY | Facility: HOSPITAL | Age: 65
End: 2020-03-05
Attending: OBSTETRICS & GYNECOLOGY
Payer: COMMERCIAL

## 2020-03-05 VITALS
RESPIRATION RATE: 18 BRPM | TEMPERATURE: 99 F | SYSTOLIC BLOOD PRESSURE: 113 MMHG | HEART RATE: 72 BPM | DIASTOLIC BLOOD PRESSURE: 54 MMHG

## 2020-03-05 DIAGNOSIS — C56.9 OVARIAN CANCER, UNSPECIFIED LATERALITY: Primary | ICD-10-CM

## 2020-03-05 DIAGNOSIS — C56.9 MALIGNANT NEOPLASM OF OVARY, UNSPECIFIED LATERALITY: ICD-10-CM

## 2020-03-05 LAB
ALBUMIN SERPL BCP-MCNC: 3.7 G/DL (ref 3.5–5.2)
ALP SERPL-CCNC: 153 U/L (ref 55–135)
ALT SERPL W/O P-5'-P-CCNC: 17 U/L (ref 10–44)
ANION GAP SERPL CALC-SCNC: 10 MMOL/L (ref 8–16)
AST SERPL-CCNC: 21 U/L (ref 10–40)
BILIRUB SERPL-MCNC: 0.4 MG/DL (ref 0.1–1)
BUN SERPL-MCNC: 13 MG/DL (ref 8–23)
CALCIUM SERPL-MCNC: 9.3 MG/DL (ref 8.7–10.5)
CANCER AG125 SERPL-ACNC: 157 U/ML (ref 0–30)
CHLORIDE SERPL-SCNC: 104 MMOL/L (ref 95–110)
CO2 SERPL-SCNC: 26 MMOL/L (ref 23–29)
CREAT SERPL-MCNC: 0.7 MG/DL (ref 0.5–1.4)
ERYTHROCYTE [DISTWIDTH] IN BLOOD BY AUTOMATED COUNT: 18.6 % (ref 11.5–14.5)
EST. GFR  (AFRICAN AMERICAN): >60 ML/MIN/1.73 M^2
EST. GFR  (NON AFRICAN AMERICAN): >60 ML/MIN/1.73 M^2
GLUCOSE SERPL-MCNC: 98 MG/DL (ref 70–110)
HCT VFR BLD AUTO: 38.3 % (ref 37–48.5)
HGB BLD-MCNC: 12.3 G/DL (ref 12–16)
IMM GRANULOCYTES # BLD AUTO: 0.12 K/UL (ref 0–0.04)
MCH RBC QN AUTO: 33.4 PG (ref 27–31)
MCHC RBC AUTO-ENTMCNC: 32.1 G/DL (ref 32–36)
MCV RBC AUTO: 104 FL (ref 82–98)
NEUTROPHILS # BLD AUTO: 11.1 K/UL (ref 1.8–7.7)
PLATELET # BLD AUTO: 180 K/UL (ref 150–350)
PMV BLD AUTO: 10.1 FL (ref 9.2–12.9)
POTASSIUM SERPL-SCNC: 4.4 MMOL/L (ref 3.5–5.1)
PROT SERPL-MCNC: 7 G/DL (ref 6–8.4)
RBC # BLD AUTO: 3.68 M/UL (ref 4–5.4)
SODIUM SERPL-SCNC: 140 MMOL/L (ref 136–145)
WBC # BLD AUTO: 13.32 K/UL (ref 3.9–12.7)

## 2020-03-05 PROCEDURE — 96375 TX/PRO/DX INJ NEW DRUG ADDON: CPT

## 2020-03-05 PROCEDURE — 63600175 PHARM REV CODE 636 W HCPCS: Performed by: OBSTETRICS & GYNECOLOGY

## 2020-03-05 PROCEDURE — 80053 COMPREHEN METABOLIC PANEL: CPT

## 2020-03-05 PROCEDURE — 86304 IMMUNOASSAY TUMOR CA 125: CPT

## 2020-03-05 PROCEDURE — 36415 COLL VENOUS BLD VENIPUNCTURE: CPT

## 2020-03-05 PROCEDURE — 85027 COMPLETE CBC AUTOMATED: CPT

## 2020-03-05 PROCEDURE — 96413 CHEMO IV INFUSION 1 HR: CPT

## 2020-03-05 RX ORDER — ONDANSETRON 2 MG/ML
8 INJECTION INTRAMUSCULAR; INTRAVENOUS
Status: COMPLETED | OUTPATIENT
Start: 2020-03-05 | End: 2020-03-05

## 2020-03-05 RX ORDER — SODIUM CHLORIDE 0.9 % (FLUSH) 0.9 %
10 SYRINGE (ML) INJECTION
Status: DISCONTINUED | OUTPATIENT
Start: 2020-03-05 | End: 2020-03-05 | Stop reason: HOSPADM

## 2020-03-05 RX ADMIN — GEMCITABINE HYDROCHLORIDE 1325 MG: 200 INJECTION, SOLUTION INTRAVENOUS at 04:03

## 2020-03-05 RX ADMIN — ONDANSETRON 8 MG: 2 INJECTION, SOLUTION INTRAMUSCULAR; INTRAVENOUS at 03:03

## 2020-03-05 RX ADMIN — SODIUM CHLORIDE: 9 INJECTION, SOLUTION INTRAVENOUS at 03:03

## 2020-03-05 NOTE — PROGRESS NOTES
Thursday, March 5th, 2020  Pt Initials: LILO BERMUDEZ  Protocol: NRG-  Study #: [id number]  IRB#: [id number]     Contacted Pt for follow-up per protocol. Pt states doing well. Pt have appointment at [facility] today and next week. Pt preferred visit today. Pt is accompany by her Son, Ramin. Pt all smiles. Pt completed QOL and Follow-up Health Assessment. Pt thanked for contact and assent to future follow-up.

## 2020-03-05 NOTE — PLAN OF CARE
Problem: Adult Inpatient Plan of Care  Goal: Optimal Comfort and Wellbeing  Intervention: Provide Person-Centered Care  Flowsheets (Taken 3/5/2020 2961)  Trust Relationship/Rapport: reassurance provided; care explained; choices provided; emotional support provided; empathic listening provided; questions answered; questions encouraged

## 2020-03-05 NOTE — PLAN OF CARE
Pt tolerated Gemzar today. NAD. PIV flushed and removed. declined AVS. Uses my [facility]. Discharged home. Ambulated independently with family.

## 2020-03-10 ENCOUNTER — PATIENT MESSAGE (OUTPATIENT)
Dept: GYNECOLOGIC ONCOLOGY | Facility: CLINIC | Age: 65
End: 2020-03-10

## 2020-03-13 ENCOUNTER — INFUSION (OUTPATIENT)
Dept: INFUSION THERAPY | Facility: HOSPITAL | Age: 65
End: 2020-03-13
Attending: OBSTETRICS & GYNECOLOGY
Payer: COMMERCIAL

## 2020-03-13 VITALS
RESPIRATION RATE: 18 BRPM | HEIGHT: 67 IN | WEIGHT: 173.31 LBS | OXYGEN SATURATION: 99 % | DIASTOLIC BLOOD PRESSURE: 56 MMHG | SYSTOLIC BLOOD PRESSURE: 119 MMHG | TEMPERATURE: 98 F | BODY MASS INDEX: 27.2 KG/M2 | HEART RATE: 79 BPM

## 2020-03-13 DIAGNOSIS — C56.9 OVARIAN CANCER, UNSPECIFIED LATERALITY: Primary | ICD-10-CM

## 2020-03-13 PROCEDURE — 96413 CHEMO IV INFUSION 1 HR: CPT

## 2020-03-13 PROCEDURE — 96377 APPLICATON ON-BODY INJECTOR: CPT

## 2020-03-13 PROCEDURE — 63600175 PHARM REV CODE 636 W HCPCS: Mod: JG | Performed by: OBSTETRICS & GYNECOLOGY

## 2020-03-13 PROCEDURE — 96375 TX/PRO/DX INJ NEW DRUG ADDON: CPT

## 2020-03-13 RX ORDER — HEPARIN 100 UNIT/ML
500 SYRINGE INTRAVENOUS
Status: CANCELLED | OUTPATIENT
Start: 2020-03-13

## 2020-03-13 RX ORDER — SODIUM CHLORIDE 0.9 % (FLUSH) 0.9 %
10 SYRINGE (ML) INJECTION
Status: CANCELLED | OUTPATIENT
Start: 2020-03-13

## 2020-03-13 RX ORDER — ONDANSETRON 2 MG/ML
8 INJECTION INTRAMUSCULAR; INTRAVENOUS
Status: COMPLETED | OUTPATIENT
Start: 2020-03-13 | End: 2020-03-13

## 2020-03-13 RX ORDER — ONDANSETRON 2 MG/ML
8 INJECTION INTRAMUSCULAR; INTRAVENOUS
Status: CANCELLED | OUTPATIENT
Start: 2020-03-13

## 2020-03-13 RX ADMIN — PEGFILGRASTIM 6 MG: KIT SUBCUTANEOUS at 10:03

## 2020-03-13 RX ADMIN — SODIUM CHLORIDE: 0.9 INJECTION, SOLUTION INTRAVENOUS at 09:03

## 2020-03-13 RX ADMIN — GEMCITABINE HYDROCHLORIDE 1325 MG: 200 INJECTION, SOLUTION INTRAVENOUS at 09:03

## 2020-03-13 RX ADMIN — ONDANSETRON 8 MG: 2 INJECTION, SOLUTION INTRAMUSCULAR; INTRAVENOUS at 09:03

## 2020-03-13 NOTE — PLAN OF CARE
Pt tolerated C10D8 without adverse effects. VSS. Verbalized understanding of RTC date. DC home ambulating independently.

## 2020-03-30 ENCOUNTER — INFUSION (OUTPATIENT)
Dept: INFUSION THERAPY | Facility: HOSPITAL | Age: 65
End: 2020-03-30
Attending: OBSTETRICS & GYNECOLOGY
Payer: COMMERCIAL

## 2020-03-30 ENCOUNTER — LAB VISIT (OUTPATIENT)
Dept: LAB | Facility: HOSPITAL | Age: 65
End: 2020-03-30
Attending: OBSTETRICS & GYNECOLOGY
Payer: COMMERCIAL

## 2020-03-30 VITALS
HEART RATE: 63 BPM | DIASTOLIC BLOOD PRESSURE: 54 MMHG | TEMPERATURE: 98 F | RESPIRATION RATE: 18 BRPM | SYSTOLIC BLOOD PRESSURE: 116 MMHG

## 2020-03-30 DIAGNOSIS — C56.9 OVARIAN CANCER, UNSPECIFIED LATERALITY: Primary | ICD-10-CM

## 2020-03-30 DIAGNOSIS — C56.9 MALIGNANT NEOPLASM OF OVARY, UNSPECIFIED LATERALITY: ICD-10-CM

## 2020-03-30 LAB
ALBUMIN SERPL BCP-MCNC: 3.4 G/DL (ref 3.5–5.2)
ALP SERPL-CCNC: 104 U/L (ref 55–135)
ALT SERPL W/O P-5'-P-CCNC: 13 U/L (ref 10–44)
ANION GAP SERPL CALC-SCNC: 6 MMOL/L (ref 8–16)
AST SERPL-CCNC: 21 U/L (ref 10–40)
BILIRUB SERPL-MCNC: 0.4 MG/DL (ref 0.1–1)
BUN SERPL-MCNC: 9 MG/DL (ref 8–23)
CALCIUM SERPL-MCNC: 8.7 MG/DL (ref 8.7–10.5)
CANCER AG125 SERPL-ACNC: 174 U/ML (ref 0–30)
CHLORIDE SERPL-SCNC: 106 MMOL/L (ref 95–110)
CO2 SERPL-SCNC: 28 MMOL/L (ref 23–29)
CREAT SERPL-MCNC: 0.7 MG/DL (ref 0.5–1.4)
ERYTHROCYTE [DISTWIDTH] IN BLOOD BY AUTOMATED COUNT: 17.9 % (ref 11.5–14.5)
EST. GFR  (AFRICAN AMERICAN): >60 ML/MIN/1.73 M^2
EST. GFR  (NON AFRICAN AMERICAN): >60 ML/MIN/1.73 M^2
GLUCOSE SERPL-MCNC: 99 MG/DL (ref 70–110)
HCT VFR BLD AUTO: 39.7 % (ref 37–48.5)
HGB BLD-MCNC: 12.9 G/DL (ref 12–16)
IMM GRANULOCYTES # BLD AUTO: 0.02 K/UL (ref 0–0.04)
MCH RBC QN AUTO: 33.8 PG (ref 27–31)
MCHC RBC AUTO-ENTMCNC: 32.5 G/DL (ref 32–36)
MCV RBC AUTO: 104 FL (ref 82–98)
NEUTROPHILS # BLD AUTO: 4 K/UL (ref 1.8–7.7)
PLATELET # BLD AUTO: 187 K/UL (ref 150–350)
PMV BLD AUTO: 9.4 FL (ref 9.2–12.9)
POTASSIUM SERPL-SCNC: 4.5 MMOL/L (ref 3.5–5.1)
PROT SERPL-MCNC: 6.6 G/DL (ref 6–8.4)
RBC # BLD AUTO: 3.82 M/UL (ref 4–5.4)
SODIUM SERPL-SCNC: 140 MMOL/L (ref 136–145)
WBC # BLD AUTO: 6.88 K/UL (ref 3.9–12.7)

## 2020-03-30 PROCEDURE — 96375 TX/PRO/DX INJ NEW DRUG ADDON: CPT

## 2020-03-30 PROCEDURE — 96413 CHEMO IV INFUSION 1 HR: CPT

## 2020-03-30 PROCEDURE — 86304 IMMUNOASSAY TUMOR CA 125: CPT

## 2020-03-30 PROCEDURE — 80053 COMPREHEN METABOLIC PANEL: CPT

## 2020-03-30 PROCEDURE — 36415 COLL VENOUS BLD VENIPUNCTURE: CPT

## 2020-03-30 PROCEDURE — 85027 COMPLETE CBC AUTOMATED: CPT

## 2020-03-30 PROCEDURE — 63600175 PHARM REV CODE 636 W HCPCS: Performed by: OBSTETRICS & GYNECOLOGY

## 2020-03-30 RX ORDER — ONDANSETRON 2 MG/ML
8 INJECTION INTRAMUSCULAR; INTRAVENOUS
Status: CANCELLED | OUTPATIENT
Start: 2020-03-30

## 2020-03-30 RX ORDER — HEPARIN 100 UNIT/ML
500 SYRINGE INTRAVENOUS
Status: CANCELLED | OUTPATIENT
Start: 2020-03-30

## 2020-03-30 RX ORDER — SODIUM CHLORIDE 0.9 % (FLUSH) 0.9 %
10 SYRINGE (ML) INJECTION
Status: DISCONTINUED | OUTPATIENT
Start: 2020-03-30 | End: 2020-03-30 | Stop reason: HOSPADM

## 2020-03-30 RX ORDER — ONDANSETRON 2 MG/ML
8 INJECTION INTRAMUSCULAR; INTRAVENOUS
Status: COMPLETED | OUTPATIENT
Start: 2020-03-30 | End: 2020-03-30

## 2020-03-30 RX ORDER — SODIUM CHLORIDE 0.9 % (FLUSH) 0.9 %
10 SYRINGE (ML) INJECTION
Status: CANCELLED | OUTPATIENT
Start: 2020-03-30

## 2020-03-30 RX ADMIN — ONDANSETRON 8 MG: 2 INJECTION, SOLUTION INTRAMUSCULAR; INTRAVENOUS at 09:03

## 2020-03-30 RX ADMIN — GEMCITABINE HYDROCHLORIDE 1325 MG: 1 INJECTION, POWDER, LYOPHILIZED, FOR SOLUTION INTRAVENOUS at 09:03

## 2020-03-30 RX ADMIN — SODIUM CHLORIDE: 9 INJECTION, SOLUTION INTRAVENOUS at 09:03

## 2020-03-30 NOTE — PLAN OF CARE
Pt tolerated Gemzar today. NAD. PIV flushed and removed. declined AVS. Uses my Ochnser. Discharged home. Ambulated independently.

## 2020-04-06 ENCOUNTER — PATIENT MESSAGE (OUTPATIENT)
Dept: GYNECOLOGIC ONCOLOGY | Facility: CLINIC | Age: 65
End: 2020-04-06

## 2020-04-06 ENCOUNTER — INFUSION (OUTPATIENT)
Dept: INFUSION THERAPY | Facility: HOSPITAL | Age: 65
End: 2020-04-06
Attending: OBSTETRICS & GYNECOLOGY
Payer: COMMERCIAL

## 2020-04-06 ENCOUNTER — LAB VISIT (OUTPATIENT)
Dept: LAB | Facility: HOSPITAL | Age: 65
End: 2020-04-06
Attending: OBSTETRICS & GYNECOLOGY
Payer: COMMERCIAL

## 2020-04-06 VITALS
HEIGHT: 67 IN | BODY MASS INDEX: 26.33 KG/M2 | RESPIRATION RATE: 18 BRPM | DIASTOLIC BLOOD PRESSURE: 64 MMHG | HEART RATE: 62 BPM | SYSTOLIC BLOOD PRESSURE: 133 MMHG | WEIGHT: 167.75 LBS | TEMPERATURE: 99 F

## 2020-04-06 DIAGNOSIS — C56.9 OVARIAN CANCER, UNSPECIFIED LATERALITY: Primary | ICD-10-CM

## 2020-04-06 DIAGNOSIS — C56.9 MALIGNANT NEOPLASM OF OVARY, UNSPECIFIED LATERALITY: ICD-10-CM

## 2020-04-06 LAB
ALBUMIN SERPL BCP-MCNC: 3.3 G/DL (ref 3.5–5.2)
ALP SERPL-CCNC: 93 U/L (ref 55–135)
ALT SERPL W/O P-5'-P-CCNC: 23 U/L (ref 10–44)
ANION GAP SERPL CALC-SCNC: 12 MMOL/L (ref 8–16)
AST SERPL-CCNC: 30 U/L (ref 10–40)
BILIRUB SERPL-MCNC: 0.5 MG/DL (ref 0.1–1)
BUN SERPL-MCNC: 10 MG/DL (ref 8–23)
CALCIUM SERPL-MCNC: 9.3 MG/DL (ref 8.7–10.5)
CANCER AG125 SERPL-ACNC: 233 U/ML (ref 0–30)
CHLORIDE SERPL-SCNC: 105 MMOL/L (ref 95–110)
CO2 SERPL-SCNC: 25 MMOL/L (ref 23–29)
CREAT SERPL-MCNC: 0.8 MG/DL (ref 0.5–1.4)
ERYTHROCYTE [DISTWIDTH] IN BLOOD BY AUTOMATED COUNT: 16.4 % (ref 11.5–14.5)
EST. GFR  (AFRICAN AMERICAN): >60 ML/MIN/1.73 M^2
EST. GFR  (NON AFRICAN AMERICAN): >60 ML/MIN/1.73 M^2
GLUCOSE SERPL-MCNC: 125 MG/DL (ref 70–110)
HCT VFR BLD AUTO: 38.3 % (ref 37–48.5)
HGB BLD-MCNC: 12 G/DL (ref 12–16)
IMM GRANULOCYTES # BLD AUTO: 0.01 K/UL (ref 0–0.04)
MCH RBC QN AUTO: 34 PG (ref 27–31)
MCHC RBC AUTO-ENTMCNC: 31.3 G/DL (ref 32–36)
MCV RBC AUTO: 109 FL (ref 82–98)
NEUTROPHILS # BLD AUTO: 1.2 K/UL (ref 1.8–7.7)
PLATELET # BLD AUTO: 114 K/UL (ref 150–350)
PMV BLD AUTO: 10.2 FL (ref 9.2–12.9)
POTASSIUM SERPL-SCNC: 4.7 MMOL/L (ref 3.5–5.1)
PROT SERPL-MCNC: 6.6 G/DL (ref 6–8.4)
RBC # BLD AUTO: 3.53 M/UL (ref 4–5.4)
SODIUM SERPL-SCNC: 142 MMOL/L (ref 136–145)
WBC # BLD AUTO: 2.12 K/UL (ref 3.9–12.7)

## 2020-04-06 PROCEDURE — 96377 APPLICATON ON-BODY INJECTOR: CPT

## 2020-04-06 PROCEDURE — 86304 IMMUNOASSAY TUMOR CA 125: CPT

## 2020-04-06 PROCEDURE — 96413 CHEMO IV INFUSION 1 HR: CPT

## 2020-04-06 PROCEDURE — 85027 COMPLETE CBC AUTOMATED: CPT

## 2020-04-06 PROCEDURE — 36415 COLL VENOUS BLD VENIPUNCTURE: CPT

## 2020-04-06 PROCEDURE — 80053 COMPREHEN METABOLIC PANEL: CPT

## 2020-04-06 PROCEDURE — 63600175 PHARM REV CODE 636 W HCPCS: Performed by: OBSTETRICS & GYNECOLOGY

## 2020-04-06 PROCEDURE — 96375 TX/PRO/DX INJ NEW DRUG ADDON: CPT

## 2020-04-06 PROCEDURE — 25000003 PHARM REV CODE 250: Performed by: OBSTETRICS & GYNECOLOGY

## 2020-04-06 RX ORDER — HEPARIN 100 UNIT/ML
500 SYRINGE INTRAVENOUS
Status: CANCELLED | OUTPATIENT
Start: 2020-04-06

## 2020-04-06 RX ORDER — ONDANSETRON 2 MG/ML
8 INJECTION INTRAMUSCULAR; INTRAVENOUS
Status: CANCELLED | OUTPATIENT
Start: 2020-04-06

## 2020-04-06 RX ORDER — ONDANSETRON 2 MG/ML
8 INJECTION INTRAMUSCULAR; INTRAVENOUS
Status: COMPLETED | OUTPATIENT
Start: 2020-04-06 | End: 2020-04-06

## 2020-04-06 RX ORDER — SODIUM CHLORIDE 0.9 % (FLUSH) 0.9 %
10 SYRINGE (ML) INJECTION
Status: CANCELLED | OUTPATIENT
Start: 2020-04-06

## 2020-04-06 RX ORDER — SODIUM CHLORIDE 0.9 % (FLUSH) 0.9 %
10 SYRINGE (ML) INJECTION
Status: DISCONTINUED | OUTPATIENT
Start: 2020-04-06 | End: 2020-04-06 | Stop reason: HOSPADM

## 2020-04-06 RX ORDER — HEPARIN 100 UNIT/ML
500 SYRINGE INTRAVENOUS
Status: DISCONTINUED | OUTPATIENT
Start: 2020-04-06 | End: 2020-04-06 | Stop reason: HOSPADM

## 2020-04-06 RX ADMIN — PEGFILGRASTIM 6 MG: KIT SUBCUTANEOUS at 11:04

## 2020-04-06 RX ADMIN — GEMCITABINE HYDROCHLORIDE 1325 MG: 200 INJECTION, SOLUTION INTRAVENOUS at 11:04

## 2020-04-06 RX ADMIN — ONDANSETRON 8 MG: 2 INJECTION, SOLUTION INTRAMUSCULAR; INTRAVENOUS at 10:04

## 2020-04-06 RX ADMIN — SODIUM CHLORIDE: 9 INJECTION, SOLUTION INTRAVENOUS at 09:04

## 2020-04-06 NOTE — NURSING
"0909 patient here for gemzar and neulasta OBI. Treatment plan reviewed and discussed. Waiting on CMP to result before starting treatment. Patient aware, NAD noted, pt resting in chair.     0935 called lab to check status on CMP results. Lab stated "10 more mins"  "

## 2020-04-06 NOTE — PLAN OF CARE
1152 patient completed and tolerated gemzar. Neulasta OBI applied to RUE. Green light and fill line present. Reinforced education on care and monitoring of OBI and when to remove, pt verbalized understanding. NAD noted. Pt d/c home

## 2020-04-17 DIAGNOSIS — C56.9 OVARIAN CANCER, UNSPECIFIED LATERALITY: Primary | ICD-10-CM

## 2020-04-19 ENCOUNTER — LAB VISIT (OUTPATIENT)
Dept: LAB | Facility: HOSPITAL | Age: 65
End: 2020-04-19
Attending: OBSTETRICS & GYNECOLOGY
Payer: COMMERCIAL

## 2020-04-19 DIAGNOSIS — C56.9 OVARIAN CANCER, UNSPECIFIED LATERALITY: ICD-10-CM

## 2020-04-19 LAB
ALBUMIN SERPL BCP-MCNC: 3.5 G/DL (ref 3.5–5.2)
ALP SERPL-CCNC: 144 U/L (ref 55–135)
ALT SERPL W/O P-5'-P-CCNC: 15 U/L (ref 10–44)
ANION GAP SERPL CALC-SCNC: 10 MMOL/L (ref 8–16)
AST SERPL-CCNC: 19 U/L (ref 10–40)
BILIRUB SERPL-MCNC: 0.4 MG/DL (ref 0.1–1)
BUN SERPL-MCNC: 16 MG/DL (ref 8–23)
CALCIUM SERPL-MCNC: 9.2 MG/DL (ref 8.7–10.5)
CHLORIDE SERPL-SCNC: 103 MMOL/L (ref 95–110)
CO2 SERPL-SCNC: 28 MMOL/L (ref 23–29)
CREAT SERPL-MCNC: 0.7 MG/DL (ref 0.5–1.4)
ERYTHROCYTE [DISTWIDTH] IN BLOOD BY AUTOMATED COUNT: 17.1 % (ref 11.5–14.5)
EST. GFR  (AFRICAN AMERICAN): >60 ML/MIN/1.73 M^2
EST. GFR  (NON AFRICAN AMERICAN): >60 ML/MIN/1.73 M^2
GLUCOSE SERPL-MCNC: 101 MG/DL (ref 70–110)
HCT VFR BLD AUTO: 37.8 % (ref 37–48.5)
HGB BLD-MCNC: 12.4 G/DL (ref 12–16)
IMM GRANULOCYTES # BLD AUTO: 0.03 K/UL (ref 0–0.04)
MCH RBC QN AUTO: 34.3 PG (ref 27–31)
MCHC RBC AUTO-ENTMCNC: 32.8 G/DL (ref 32–36)
MCV RBC AUTO: 104 FL (ref 82–98)
NEUTROPHILS # BLD AUTO: 6.7 K/UL (ref 1.8–7.7)
PLATELET # BLD AUTO: 167 K/UL (ref 150–350)
PMV BLD AUTO: 9.9 FL (ref 9.2–12.9)
POTASSIUM SERPL-SCNC: 4.2 MMOL/L (ref 3.5–5.1)
PROT SERPL-MCNC: 6.7 G/DL (ref 6–8.4)
RBC # BLD AUTO: 3.62 M/UL (ref 4–5.4)
SODIUM SERPL-SCNC: 141 MMOL/L (ref 136–145)
WBC # BLD AUTO: 8.03 K/UL (ref 3.9–12.7)

## 2020-04-19 PROCEDURE — 80053 COMPREHEN METABOLIC PANEL: CPT

## 2020-04-19 PROCEDURE — 86304 IMMUNOASSAY TUMOR CA 125: CPT

## 2020-04-19 PROCEDURE — 85027 COMPLETE CBC AUTOMATED: CPT

## 2020-04-19 PROCEDURE — 36415 COLL VENOUS BLD VENIPUNCTURE: CPT

## 2020-04-20 ENCOUNTER — INFUSION (OUTPATIENT)
Dept: INFUSION THERAPY | Facility: HOSPITAL | Age: 65
End: 2020-04-20
Attending: OBSTETRICS & GYNECOLOGY
Payer: COMMERCIAL

## 2020-04-20 VITALS
DIASTOLIC BLOOD PRESSURE: 59 MMHG | SYSTOLIC BLOOD PRESSURE: 118 MMHG | WEIGHT: 165.13 LBS | HEIGHT: 67 IN | RESPIRATION RATE: 18 BRPM | TEMPERATURE: 98 F | HEART RATE: 58 BPM | BODY MASS INDEX: 25.92 KG/M2

## 2020-04-20 DIAGNOSIS — C56.9 OVARIAN CANCER, UNSPECIFIED LATERALITY: Primary | ICD-10-CM

## 2020-04-20 LAB — CANCER AG125 SERPL-ACNC: 188 U/ML (ref 0–30)

## 2020-04-20 PROCEDURE — 63600175 PHARM REV CODE 636 W HCPCS: Performed by: OBSTETRICS & GYNECOLOGY

## 2020-04-20 PROCEDURE — 96375 TX/PRO/DX INJ NEW DRUG ADDON: CPT

## 2020-04-20 PROCEDURE — 25000003 PHARM REV CODE 250: Performed by: OBSTETRICS & GYNECOLOGY

## 2020-04-20 PROCEDURE — 96413 CHEMO IV INFUSION 1 HR: CPT

## 2020-04-20 RX ORDER — HEPARIN 100 UNIT/ML
500 SYRINGE INTRAVENOUS
Status: CANCELLED | OUTPATIENT
Start: 2020-04-20

## 2020-04-20 RX ORDER — ONDANSETRON 2 MG/ML
8 INJECTION INTRAMUSCULAR; INTRAVENOUS
Status: COMPLETED | OUTPATIENT
Start: 2020-04-20 | End: 2020-04-20

## 2020-04-20 RX ORDER — SODIUM CHLORIDE 0.9 % (FLUSH) 0.9 %
10 SYRINGE (ML) INJECTION
Status: CANCELLED | OUTPATIENT
Start: 2020-04-20

## 2020-04-20 RX ORDER — HEPARIN 100 UNIT/ML
500 SYRINGE INTRAVENOUS
Status: DISCONTINUED | OUTPATIENT
Start: 2020-04-20 | End: 2020-04-20 | Stop reason: HOSPADM

## 2020-04-20 RX ORDER — SODIUM CHLORIDE 0.9 % (FLUSH) 0.9 %
10 SYRINGE (ML) INJECTION
Status: DISCONTINUED | OUTPATIENT
Start: 2020-04-20 | End: 2020-04-20 | Stop reason: HOSPADM

## 2020-04-20 RX ORDER — ONDANSETRON 2 MG/ML
8 INJECTION INTRAMUSCULAR; INTRAVENOUS
Status: CANCELLED | OUTPATIENT
Start: 2020-04-20

## 2020-04-20 RX ADMIN — SODIUM CHLORIDE: 0.9 INJECTION, SOLUTION INTRAVENOUS at 09:04

## 2020-04-20 RX ADMIN — ONDANSETRON 8 MG: 2 INJECTION, SOLUTION INTRAMUSCULAR; INTRAVENOUS at 09:04

## 2020-04-20 RX ADMIN — GEMCITABINE 1325 MG: 38 INJECTION INTRAVENOUS at 09:04

## 2020-04-20 NOTE — NURSING
0820  Pt here for Gemzar infusion, no new complaints or concerns at present; discussed treatment plan for today, all questions answered and pt agrees to proceed

## 2020-04-20 NOTE — PLAN OF CARE
1013  Infusion completed, pt tolerated well; pt instructed to remain well hydrated, reviewed handwashing, infection prevention; discussed when to contact MD, when to report to ER; pt instructed to discuss possible PAC with MD Tran next visit; AVS declined, pt verbalized understanding of all discussed and when to report next

## 2020-04-21 DIAGNOSIS — Z01.84 ANTIBODY RESPONSE EXAMINATION: ICD-10-CM

## 2020-04-22 ENCOUNTER — TELEPHONE (OUTPATIENT)
Dept: GYNECOLOGIC ONCOLOGY | Facility: CLINIC | Age: 65
End: 2020-04-22

## 2020-04-22 DIAGNOSIS — Z51.11 ENCOUNTER FOR ANTINEOPLASTIC CHEMOTHERAPY: Primary | ICD-10-CM

## 2020-04-22 DIAGNOSIS — Z13.9 SCREENING FOR CONDITION: ICD-10-CM

## 2020-04-22 NOTE — TELEPHONE ENCOUNTER
04/22/2020      Phoned the patient.      Discussed the following with the patient:  In an effort to protect our immunocompromised patients from potential exposure to COVID-19, [facility] will now require all patients receiving an infusion, an injection, and/or radiation therapy to be tested for COVID-19 prior to their appointment.  All patients currently under treatment will be tested immediately, and patients initiating new treatment cycles or with one-time appointments (injections, transfusions, etc.) must be tested within 72 hours of their appointment.      Symptom Patient's Response   Fever YES/NO: no    Cough YES/NO: no    Shortness of breath  YES/NO: no    Difficulty breathing YES/NO: no   GI symptoms such as diarrhea or nausea YES/NO: no    Loss of taste YES/NO: no    Loss of smell YES/NO: no      Other Screening Patient's Response   Has the patient previously undergone COVID-19 testing? YES/NO: No      If yes to the question directly above, what was the result? not applicable    Has the patient been in close contact with someone who has undergone COVID-19 testing? YES/NO: No     If yes to the question directly above, what was the result? not applicable       The patient's 24-hour COVID-19 test was ordered and scheduled.  Reviewed the appointment date, time, and location with the patient.      Advised the patient that she can expect the results to take approximately 24 hours and that someone will reach out to her regarding her results.  Advised the patient that her treatment appointment will be rescheduled if she tests positive for COVID-19.      Questions were answered to the patient's satisfaction, and the patient verbalized understanding of information and agreement with the plan.       The above was completed in accordance with instructions and guidelines set forth by [facility].     Signed,    Jennifer Stevens RN     Date:  04/22/2020

## 2020-04-24 ENCOUNTER — LAB VISIT (OUTPATIENT)
Dept: INTERNAL MEDICINE | Facility: CLINIC | Age: 65
End: 2020-04-24
Payer: COMMERCIAL

## 2020-04-24 DIAGNOSIS — Z13.9 SCREENING FOR CONDITION: ICD-10-CM

## 2020-04-24 DIAGNOSIS — Z51.11 ENCOUNTER FOR ANTINEOPLASTIC CHEMOTHERAPY: ICD-10-CM

## 2020-04-24 LAB — SARS-COV-2 RNA RESP QL NAA+PROBE: NOT DETECTED

## 2020-04-24 PROCEDURE — U0002 COVID-19 LAB TEST NON-CDC: HCPCS

## 2020-04-27 ENCOUNTER — INFUSION (OUTPATIENT)
Dept: INFUSION THERAPY | Facility: HOSPITAL | Age: 65
End: 2020-04-27
Attending: OBSTETRICS & GYNECOLOGY
Payer: COMMERCIAL

## 2020-04-27 VITALS
HEART RATE: 68 BPM | RESPIRATION RATE: 18 BRPM | TEMPERATURE: 98 F | SYSTOLIC BLOOD PRESSURE: 124 MMHG | HEIGHT: 67 IN | BODY MASS INDEX: 26.53 KG/M2 | DIASTOLIC BLOOD PRESSURE: 62 MMHG | WEIGHT: 169.06 LBS

## 2020-04-27 DIAGNOSIS — C56.9 OVARIAN CANCER, UNSPECIFIED LATERALITY: Primary | ICD-10-CM

## 2020-04-27 PROCEDURE — 96377 APPLICATON ON-BODY INJECTOR: CPT

## 2020-04-27 PROCEDURE — 25000003 PHARM REV CODE 250: Performed by: OBSTETRICS & GYNECOLOGY

## 2020-04-27 PROCEDURE — 96375 TX/PRO/DX INJ NEW DRUG ADDON: CPT

## 2020-04-27 PROCEDURE — 96413 CHEMO IV INFUSION 1 HR: CPT

## 2020-04-27 PROCEDURE — 63600175 PHARM REV CODE 636 W HCPCS: Performed by: OBSTETRICS & GYNECOLOGY

## 2020-04-27 RX ORDER — ONDANSETRON 2 MG/ML
8 INJECTION INTRAMUSCULAR; INTRAVENOUS
Status: COMPLETED | OUTPATIENT
Start: 2020-04-27 | End: 2020-04-27

## 2020-04-27 RX ORDER — HEPARIN 100 UNIT/ML
500 SYRINGE INTRAVENOUS
Status: CANCELLED | OUTPATIENT
Start: 2020-04-27

## 2020-04-27 RX ORDER — ONDANSETRON 2 MG/ML
8 INJECTION INTRAMUSCULAR; INTRAVENOUS
Status: CANCELLED | OUTPATIENT
Start: 2020-04-27

## 2020-04-27 RX ORDER — SODIUM CHLORIDE 0.9 % (FLUSH) 0.9 %
10 SYRINGE (ML) INJECTION
Status: CANCELLED | OUTPATIENT
Start: 2020-04-27

## 2020-04-27 RX ADMIN — ONDANSETRON 8 MG: 2 INJECTION, SOLUTION INTRAMUSCULAR; INTRAVENOUS at 08:04

## 2020-04-27 RX ADMIN — SODIUM CHLORIDE: 9 INJECTION, SOLUTION INTRAVENOUS at 08:04

## 2020-04-27 RX ADMIN — GEMCITABINE HYDROCHLORIDE 1325 MG: 200 INJECTION, SOLUTION INTRAVENOUS at 09:04

## 2020-04-27 RX ADMIN — PEGFILGRASTIM 6 MG: KIT SUBCUTANEOUS at 10:04

## 2020-04-27 NOTE — PLAN OF CARE
Pt tolerated Gemzar with no complications. VSS. Pt instructed to call MD with any problems. Neulasta OBI in place with green flashing light. NAD. Pt discharged home independently.

## 2020-05-05 ENCOUNTER — PATIENT MESSAGE (OUTPATIENT)
Dept: GYNECOLOGIC ONCOLOGY | Facility: CLINIC | Age: 65
End: 2020-05-05

## 2020-05-06 DIAGNOSIS — Z13.9 SCREENING FOR CONDITION: Primary | ICD-10-CM

## 2020-05-06 NOTE — PROGRESS NOTES
05/06/2020      In an effort to protect our immunocompromised patients from potential exposure to COVID-19, [facility] will now require all patients receiving an infusion, an injection, and/or radiation therapy to be tested for COVID-19 prior to their appointment.  All patients currently under treatment will be tested immediately, and patients initiating new treatment cycles or with one-time appointments (injections, transfusions, etc.) must be tested within 72 hours of their appointment.     Placed COVID-19 test order for patient.  A member of our team is to contact the patient in the near future to explain this process and the rationale behind it, to ask the COVID-19 screening questions, and to get the patient scheduled for their COVID-19 test.     The above was completed in accordance with instructions and guidelines set forth by [facility].     Signed,    Jelani Hodgson, DANIEL     Date:  05/06/2020

## 2020-05-08 ENCOUNTER — OFFICE VISIT (OUTPATIENT)
Dept: GYNECOLOGIC ONCOLOGY | Facility: CLINIC | Age: 65
End: 2020-05-08
Payer: COMMERCIAL

## 2020-05-08 ENCOUNTER — LAB VISIT (OUTPATIENT)
Dept: LAB | Facility: HOSPITAL | Age: 65
End: 2020-05-08
Attending: OBSTETRICS & GYNECOLOGY
Payer: COMMERCIAL

## 2020-05-08 ENCOUNTER — LAB VISIT (OUTPATIENT)
Dept: INFUSION THERAPY | Facility: HOSPITAL | Age: 65
End: 2020-05-08
Attending: NURSE PRACTITIONER
Payer: COMMERCIAL

## 2020-05-08 VITALS
WEIGHT: 166.25 LBS | BODY MASS INDEX: 26.09 KG/M2 | HEIGHT: 67 IN | HEART RATE: 84 BPM | DIASTOLIC BLOOD PRESSURE: 71 MMHG | SYSTOLIC BLOOD PRESSURE: 137 MMHG

## 2020-05-08 DIAGNOSIS — D49.59 OVARIAN NEOPLASM: ICD-10-CM

## 2020-05-08 DIAGNOSIS — J90 PLEURAL EFFUSION: ICD-10-CM

## 2020-05-08 DIAGNOSIS — Z51.11 ENCOUNTER FOR ANTINEOPLASTIC CHEMOTHERAPY: ICD-10-CM

## 2020-05-08 DIAGNOSIS — Z13.9 SCREENING FOR CONDITION: ICD-10-CM

## 2020-05-08 DIAGNOSIS — D70.2 NEUTROPENIA, DRUG-INDUCED: ICD-10-CM

## 2020-05-08 DIAGNOSIS — C56.9 MALIGNANT NEOPLASM OF OVARY, UNSPECIFIED LATERALITY: Primary | ICD-10-CM

## 2020-05-08 DIAGNOSIS — T45.1X5A NEUROPATHY DUE TO CHEMOTHERAPEUTIC DRUG: ICD-10-CM

## 2020-05-08 DIAGNOSIS — G62.0 NEUROPATHY DUE TO CHEMOTHERAPEUTIC DRUG: ICD-10-CM

## 2020-05-08 DIAGNOSIS — C56.9 OVARIAN CANCER, UNSPECIFIED LATERALITY: ICD-10-CM

## 2020-05-08 LAB
ALBUMIN SERPL BCP-MCNC: 3.6 G/DL (ref 3.5–5.2)
ALP SERPL-CCNC: 173 U/L (ref 55–135)
ALT SERPL W/O P-5'-P-CCNC: 16 U/L (ref 10–44)
ANION GAP SERPL CALC-SCNC: 11 MMOL/L (ref 8–16)
AST SERPL-CCNC: 19 U/L (ref 10–40)
BILIRUB SERPL-MCNC: 0.4 MG/DL (ref 0.1–1)
BUN SERPL-MCNC: 18 MG/DL (ref 8–23)
CALCIUM SERPL-MCNC: 9 MG/DL (ref 8.7–10.5)
CANCER AG125 SERPL-ACNC: 203 U/ML (ref 0–30)
CHLORIDE SERPL-SCNC: 102 MMOL/L (ref 95–110)
CO2 SERPL-SCNC: 25 MMOL/L (ref 23–29)
CREAT SERPL-MCNC: 0.7 MG/DL (ref 0.5–1.4)
ERYTHROCYTE [DISTWIDTH] IN BLOOD BY AUTOMATED COUNT: 16.9 % (ref 11.5–14.5)
EST. GFR  (AFRICAN AMERICAN): >60 ML/MIN/1.73 M^2
EST. GFR  (NON AFRICAN AMERICAN): >60 ML/MIN/1.73 M^2
GLUCOSE SERPL-MCNC: 120 MG/DL (ref 70–110)
HCT VFR BLD AUTO: 39.5 % (ref 37–48.5)
HGB BLD-MCNC: 12.6 G/DL (ref 12–16)
IMM GRANULOCYTES # BLD AUTO: 0.2 K/UL (ref 0–0.04)
MCH RBC QN AUTO: 34 PG (ref 27–31)
MCHC RBC AUTO-ENTMCNC: 31.9 G/DL (ref 32–36)
MCV RBC AUTO: 107 FL (ref 82–98)
NEUTROPHILS # BLD AUTO: 12.3 K/UL (ref 1.8–7.7)
PLATELET # BLD AUTO: 102 K/UL (ref 150–350)
PMV BLD AUTO: 10.5 FL (ref 9.2–12.9)
POTASSIUM SERPL-SCNC: 4.5 MMOL/L (ref 3.5–5.1)
PROT SERPL-MCNC: 7 G/DL (ref 6–8.4)
RBC # BLD AUTO: 3.71 M/UL (ref 4–5.4)
SODIUM SERPL-SCNC: 138 MMOL/L (ref 136–145)
WBC # BLD AUTO: 14.19 K/UL (ref 3.9–12.7)

## 2020-05-08 PROCEDURE — 99214 OFFICE O/P EST MOD 30 MIN: CPT | Mod: S$GLB,,, | Performed by: OBSTETRICS & GYNECOLOGY

## 2020-05-08 PROCEDURE — U0002 COVID-19 LAB TEST NON-CDC: HCPCS

## 2020-05-08 PROCEDURE — 3008F PR BODY MASS INDEX (BMI) DOCUMENTED: ICD-10-PCS | Mod: CPTII,S$GLB,, | Performed by: OBSTETRICS & GYNECOLOGY

## 2020-05-08 PROCEDURE — 1101F PT FALLS ASSESS-DOCD LE1/YR: CPT | Mod: CPTII,S$GLB,, | Performed by: OBSTETRICS & GYNECOLOGY

## 2020-05-08 PROCEDURE — 99999 PR PBB SHADOW E&M-EST. PATIENT-LVL III: CPT | Mod: PBBFAC,,, | Performed by: OBSTETRICS & GYNECOLOGY

## 2020-05-08 PROCEDURE — 36415 COLL VENOUS BLD VENIPUNCTURE: CPT

## 2020-05-08 PROCEDURE — 86304 IMMUNOASSAY TUMOR CA 125: CPT

## 2020-05-08 PROCEDURE — 99999 PR PBB SHADOW E&M-EST. PATIENT-LVL III: ICD-10-PCS | Mod: PBBFAC,,, | Performed by: OBSTETRICS & GYNECOLOGY

## 2020-05-08 PROCEDURE — 85027 COMPLETE CBC AUTOMATED: CPT

## 2020-05-08 PROCEDURE — 80053 COMPREHEN METABOLIC PANEL: CPT

## 2020-05-08 PROCEDURE — 1101F PR PT FALLS ASSESS DOC 0-1 FALLS W/OUT INJ PAST YR: ICD-10-PCS | Mod: CPTII,S$GLB,, | Performed by: OBSTETRICS & GYNECOLOGY

## 2020-05-08 PROCEDURE — 3008F BODY MASS INDEX DOCD: CPT | Mod: CPTII,S$GLB,, | Performed by: OBSTETRICS & GYNECOLOGY

## 2020-05-08 PROCEDURE — 99214 PR OFFICE/OUTPT VISIT, EST, LEVL IV, 30-39 MIN: ICD-10-PCS | Mod: S$GLB,,, | Performed by: OBSTETRICS & GYNECOLOGY

## 2020-05-08 RX ORDER — HEPARIN 100 UNIT/ML
500 SYRINGE INTRAVENOUS
Status: CANCELLED | OUTPATIENT
Start: 2020-05-11

## 2020-05-08 RX ORDER — ONDANSETRON 2 MG/ML
8 INJECTION INTRAMUSCULAR; INTRAVENOUS
Status: CANCELLED | OUTPATIENT
Start: 2020-05-11

## 2020-05-08 RX ORDER — SODIUM CHLORIDE 0.9 % (FLUSH) 0.9 %
10 SYRINGE (ML) INJECTION
Status: CANCELLED | OUTPATIENT
Start: 2020-05-11

## 2020-05-08 NOTE — PROGRESS NOTES
"Subjective:       Patient ID: Mickie Don is a 64 y.o. female.    Chief Complaint: Chemotherapy    Treatment History  Xlap/Omentectomy on 11/1/16 - mass was unresectable  Stage IIIC ovarian cancer  Initiated Taxotere/Carbo on 11/18/16, now s/p 6 cycles completed 3/8/17  Xlap/ISIS/BSO/Radical mass resection > 10 cm, optimal debulking on 4/11/17  Initiated PRIMA on 8/21/17  BRCA negative  Started FORWARD1 on 11/27/17  Started Doxil/Anju as part of NRG  2/27/19, received total of 6 cycles  Gemzar monotherapy started 8/29/19    HPI     Here today for C13D1 of Gemzar.  Reports minimal toxicity.  Labs reviewed and ok for treatment.  CA-125 pending, but stable and overall down in high 100's.  Denies F/C, N/V, VB.  Last imaging was in January.    Review of Systems   Constitutional: Negative for appetite change, chills, diaphoresis, fatigue, fever and unexpected weight change.   HENT: Negative for mouth sores and tinnitus.    Respiratory: Negative for cough, chest tightness, shortness of breath and wheezing.    Cardiovascular: Negative for chest pain, palpitations and leg swelling.   Gastrointestinal: Negative for abdominal distention, abdominal pain, blood in stool, constipation, diarrhea, nausea and vomiting.   Genitourinary: Negative for difficulty urinating, dysuria, flank pain, frequency, hematuria, pelvic pain, urgency, vaginal bleeding, vaginal discharge and vaginal pain.   Musculoskeletal: Negative for arthralgias, back pain and gait problem.   Skin: Negative for color change and rash.   Neurological: Positive for numbness. Negative for dizziness, weakness and headaches.   Hematological: Negative for adenopathy. Does not bruise/bleed easily.   Psychiatric/Behavioral: Negative for confusion and sleep disturbance. The patient is not nervous/anxious.        Objective:   /71   Pulse 84   Ht 5' 7" (1.702 m)   Wt 75.4 kg (166 lb 3.6 oz)   BMI 26.03 kg/m²      Physical Exam   Constitutional: She is oriented " to person, place, and time. She appears well-developed and well-nourished. No distress.   HENT:   Head: Normocephalic and atraumatic.   Eyes: No scleral icterus.   Neck: Normal range of motion.   Pulmonary/Chest: Effort normal. No respiratory distress.   Abdominal: Soft. She exhibits no distension and no mass.   Tender to palpation in LLQ, along sigmoid   Genitourinary: Vagina normal. Pelvic exam was performed with patient supine. There is no rash, tenderness or lesion on the right labia. There is no rash, tenderness or lesion on the left labia. Right adnexum displays no mass. Left adnexum displays no mass.   Genitourinary Comments: Pelvic normal   Musculoskeletal: Normal range of motion. She exhibits no edema or tenderness.   Neurological: She is alert and oriented to person, place, and time.   Skin: Skin is warm and dry. No pallor.   Psychiatric: She has a normal mood and affect. Her behavior is normal. Judgment and thought content normal.       Assessment:       1. Malignant neoplasm of ovary, unspecified laterality    2. Encounter for antineoplastic chemotherapy    3. Neutropenia, drug-induced    4. Neuropathy due to chemotherapeutic drug        Plan:     Treat with C13.  RTC as scheduled.  Will get CT prior to next cycle.

## 2020-05-09 LAB — SARS-COV-2 RNA RESP QL NAA+PROBE: NOT DETECTED

## 2020-05-11 ENCOUNTER — INFUSION (OUTPATIENT)
Dept: INFUSION THERAPY | Facility: HOSPITAL | Age: 65
End: 2020-05-11
Attending: OBSTETRICS & GYNECOLOGY
Payer: COMMERCIAL

## 2020-05-11 VITALS
WEIGHT: 166.25 LBS | TEMPERATURE: 98 F | HEART RATE: 61 BPM | RESPIRATION RATE: 18 BRPM | SYSTOLIC BLOOD PRESSURE: 124 MMHG | HEIGHT: 67 IN | DIASTOLIC BLOOD PRESSURE: 57 MMHG | BODY MASS INDEX: 26.09 KG/M2

## 2020-05-11 DIAGNOSIS — C56.9 OVARIAN CANCER, UNSPECIFIED LATERALITY: Primary | ICD-10-CM

## 2020-05-11 PROCEDURE — 96413 CHEMO IV INFUSION 1 HR: CPT

## 2020-05-11 PROCEDURE — 25000003 PHARM REV CODE 250: Performed by: OBSTETRICS & GYNECOLOGY

## 2020-05-11 PROCEDURE — 63600175 PHARM REV CODE 636 W HCPCS: Performed by: OBSTETRICS & GYNECOLOGY

## 2020-05-11 PROCEDURE — 96375 TX/PRO/DX INJ NEW DRUG ADDON: CPT

## 2020-05-11 RX ORDER — SODIUM CHLORIDE 0.9 % (FLUSH) 0.9 %
10 SYRINGE (ML) INJECTION
Status: DISCONTINUED | OUTPATIENT
Start: 2020-05-11 | End: 2020-05-11 | Stop reason: HOSPADM

## 2020-05-11 RX ORDER — ONDANSETRON 2 MG/ML
8 INJECTION INTRAMUSCULAR; INTRAVENOUS
Status: COMPLETED | OUTPATIENT
Start: 2020-05-11 | End: 2020-05-11

## 2020-05-11 RX ORDER — HEPARIN 100 UNIT/ML
500 SYRINGE INTRAVENOUS
Status: DISCONTINUED | OUTPATIENT
Start: 2020-05-11 | End: 2020-05-11 | Stop reason: HOSPADM

## 2020-05-11 RX ADMIN — SODIUM CHLORIDE: 0.9 INJECTION, SOLUTION INTRAVENOUS at 08:05

## 2020-05-11 RX ADMIN — GEMCITABINE HYDROCHLORIDE 1325 MG: 1 INJECTION, POWDER, LYOPHILIZED, FOR SOLUTION INTRAVENOUS at 08:05

## 2020-05-11 RX ADMIN — ONDANSETRON 8 MG: 2 INJECTION, SOLUTION INTRAMUSCULAR; INTRAVENOUS at 08:05

## 2020-05-11 NOTE — PLAN OF CARE
0995  Infusion completed, pt tolerated well; pt instructed to remain well hydrated; discussed when to contact MD, when to report to ER; reviewed hand hygiene, infection prevention; AVS declined, pt verbalized understanding of all discussed and when to report next

## 2020-05-11 NOTE — PROGRESS NOTES
Dr. Tran/staff-  The patient's COVID test came back negative, and she can therefore proceed with her appointments as scheduled unless otherwise contraindicated.  Please make sure the patient is aware.  -Jelani

## 2020-05-18 ENCOUNTER — INFUSION (OUTPATIENT)
Dept: INFUSION THERAPY | Facility: HOSPITAL | Age: 65
End: 2020-05-18
Attending: OBSTETRICS & GYNECOLOGY
Payer: COMMERCIAL

## 2020-05-18 VITALS
WEIGHT: 168.88 LBS | DIASTOLIC BLOOD PRESSURE: 62 MMHG | BODY MASS INDEX: 26.51 KG/M2 | RESPIRATION RATE: 16 BRPM | HEIGHT: 67 IN | TEMPERATURE: 98 F | HEART RATE: 59 BPM | SYSTOLIC BLOOD PRESSURE: 132 MMHG

## 2020-05-18 DIAGNOSIS — C56.9 MALIGNANT NEOPLASM OF OVARY, UNSPECIFIED LATERALITY: Primary | ICD-10-CM

## 2020-05-18 DIAGNOSIS — C56.9 OVARIAN CANCER, UNSPECIFIED LATERALITY: Primary | ICD-10-CM

## 2020-05-18 PROCEDURE — 63600175 PHARM REV CODE 636 W HCPCS: Mod: JG | Performed by: OBSTETRICS & GYNECOLOGY

## 2020-05-18 PROCEDURE — 25000003 PHARM REV CODE 250: Performed by: OBSTETRICS & GYNECOLOGY

## 2020-05-18 PROCEDURE — 96377 APPLICATON ON-BODY INJECTOR: CPT

## 2020-05-18 PROCEDURE — 96413 CHEMO IV INFUSION 1 HR: CPT

## 2020-05-18 PROCEDURE — 96375 TX/PRO/DX INJ NEW DRUG ADDON: CPT

## 2020-05-18 RX ORDER — ONDANSETRON 2 MG/ML
8 INJECTION INTRAMUSCULAR; INTRAVENOUS
Status: CANCELLED | OUTPATIENT
Start: 2020-05-18

## 2020-05-18 RX ORDER — ONDANSETRON 2 MG/ML
8 INJECTION INTRAMUSCULAR; INTRAVENOUS
Status: COMPLETED | OUTPATIENT
Start: 2020-05-18 | End: 2020-05-18

## 2020-05-18 RX ORDER — SODIUM CHLORIDE 0.9 % (FLUSH) 0.9 %
10 SYRINGE (ML) INJECTION
Status: CANCELLED | OUTPATIENT
Start: 2020-05-18

## 2020-05-18 RX ORDER — HEPARIN 100 UNIT/ML
500 SYRINGE INTRAVENOUS
Status: CANCELLED | OUTPATIENT
Start: 2020-05-18

## 2020-05-18 RX ORDER — HEPARIN 100 UNIT/ML
500 SYRINGE INTRAVENOUS
Status: DISCONTINUED | OUTPATIENT
Start: 2020-05-18 | End: 2020-05-18 | Stop reason: HOSPADM

## 2020-05-18 RX ORDER — SODIUM CHLORIDE 0.9 % (FLUSH) 0.9 %
10 SYRINGE (ML) INJECTION
Status: DISCONTINUED | OUTPATIENT
Start: 2020-05-18 | End: 2020-05-18 | Stop reason: HOSPADM

## 2020-05-18 RX ADMIN — GEMCITABINE HYDROCHLORIDE 1300 MG: 1 INJECTION, POWDER, LYOPHILIZED, FOR SOLUTION INTRAVENOUS at 09:05

## 2020-05-18 RX ADMIN — ONDANSETRON 8 MG: 2 INJECTION, SOLUTION INTRAMUSCULAR; INTRAVENOUS at 08:05

## 2020-05-18 RX ADMIN — SODIUM CHLORIDE: 0.9 INJECTION, SOLUTION INTRAVENOUS at 08:05

## 2020-05-18 RX ADMIN — PEGFILGRASTIM 6 MG: KIT SUBCUTANEOUS at 09:05

## 2020-05-18 NOTE — PLAN OF CARE
Patient tolerated Gemzar infusion without complications. Labs reviewed prior to treatment - ANC=1.1 (MD reviewed). Patient aware to follow neutropenic precautions. VS stable. PIV removed with catheter tip intact prior to discharge. Neulasta OBI placed to left arm - green light visualized; removal time provided. AVS declined. Discharged home.

## 2020-05-21 DIAGNOSIS — Z13.9 SCREENING FOR CONDITION: Primary | ICD-10-CM

## 2020-05-21 DIAGNOSIS — Z51.11 ENCOUNTER FOR ANTINEOPLASTIC CHEMOTHERAPY: ICD-10-CM

## 2020-05-25 ENCOUNTER — HOSPITAL ENCOUNTER (OUTPATIENT)
Dept: RADIOLOGY | Facility: OTHER | Age: 65
Discharge: HOME | End: 2020-05-25
Attending: OBSTETRICS & GYNECOLOGY
Payer: COMMERCIAL

## 2020-05-25 DIAGNOSIS — J90 PLEURAL EFFUSION: ICD-10-CM

## 2020-05-25 PROCEDURE — 71260 CT THORAX DX C+: CPT | Mod: 26,,, | Performed by: RADIOLOGY

## 2020-05-25 PROCEDURE — 74177 CT CHEST ABDOMEN PELVIS WITH CONTRAST (XPD): ICD-10-PCS | Mod: 26,,, | Performed by: RADIOLOGY

## 2020-05-25 PROCEDURE — 71260 CT CHEST ABDOMEN PELVIS WITH CONTRAST (XPD): ICD-10-PCS | Mod: 26,,, | Performed by: RADIOLOGY

## 2020-05-25 PROCEDURE — 74177 CT ABD & PELVIS W/CONTRAST: CPT | Mod: 26,,, | Performed by: RADIOLOGY

## 2020-05-25 PROCEDURE — 25500020 PHARM REV CODE 255: Performed by: OBSTETRICS & GYNECOLOGY

## 2020-05-25 PROCEDURE — 74177 CT ABD & PELVIS W/CONTRAST: CPT | Mod: TC

## 2020-05-25 RX ADMIN — IOHEXOL 75 ML: 350 INJECTION, SOLUTION INTRAVENOUS at 08:05

## 2020-05-25 RX ADMIN — IOHEXOL 1000 ML: 9 SOLUTION ORAL at 07:05

## 2020-05-26 ENCOUNTER — PATIENT MESSAGE (OUTPATIENT)
Dept: GYNECOLOGIC ONCOLOGY | Facility: CLINIC | Age: 65
End: 2020-05-26

## 2020-05-29 ENCOUNTER — LAB VISIT (OUTPATIENT)
Dept: INTERNAL MEDICINE | Facility: CLINIC | Age: 65
End: 2020-05-29
Payer: COMMERCIAL

## 2020-05-29 DIAGNOSIS — Z51.11 ENCOUNTER FOR ANTINEOPLASTIC CHEMOTHERAPY: ICD-10-CM

## 2020-05-29 DIAGNOSIS — Z13.9 SCREENING FOR CONDITION: ICD-10-CM

## 2020-05-29 LAB — SARS-COV-2 RNA RESP QL NAA+PROBE: NOT DETECTED

## 2020-05-29 PROCEDURE — U0003 INFECTIOUS AGENT DETECTION BY NUCLEIC ACID (DNA OR RNA); SEVERE ACUTE RESPIRATORY SYNDROME CORONAVIRUS 2 (SARS-COV-2) (CORONAVIRUS DISEASE [COVID-19]), AMPLIFIED PROBE TECHNIQUE, MAKING USE OF HIGH THROUGHPUT TECHNOLOGIES AS DESCRIBED BY CMS-2020-01-R: HCPCS

## 2020-06-01 ENCOUNTER — INFUSION (OUTPATIENT)
Dept: INFUSION THERAPY | Facility: HOSPITAL | Age: 65
End: 2020-06-01
Attending: OBSTETRICS & GYNECOLOGY
Payer: COMMERCIAL

## 2020-06-01 VITALS
WEIGHT: 169.56 LBS | HEART RATE: 65 BPM | BODY MASS INDEX: 26.61 KG/M2 | OXYGEN SATURATION: 100 % | RESPIRATION RATE: 18 BRPM | TEMPERATURE: 98 F | SYSTOLIC BLOOD PRESSURE: 137 MMHG | HEIGHT: 67 IN | DIASTOLIC BLOOD PRESSURE: 65 MMHG

## 2020-06-01 DIAGNOSIS — C56.9 OVARIAN CANCER, UNSPECIFIED LATERALITY: Primary | ICD-10-CM

## 2020-06-01 PROCEDURE — 96375 TX/PRO/DX INJ NEW DRUG ADDON: CPT

## 2020-06-01 PROCEDURE — 25000003 PHARM REV CODE 250: Performed by: OBSTETRICS & GYNECOLOGY

## 2020-06-01 PROCEDURE — 96413 CHEMO IV INFUSION 1 HR: CPT

## 2020-06-01 PROCEDURE — 63600175 PHARM REV CODE 636 W HCPCS: Performed by: OBSTETRICS & GYNECOLOGY

## 2020-06-01 RX ORDER — HEPARIN 100 UNIT/ML
500 SYRINGE INTRAVENOUS
Status: DISCONTINUED | OUTPATIENT
Start: 2020-06-01 | End: 2020-06-01 | Stop reason: HOSPADM

## 2020-06-01 RX ORDER — ONDANSETRON 2 MG/ML
8 INJECTION INTRAMUSCULAR; INTRAVENOUS
Status: COMPLETED | OUTPATIENT
Start: 2020-06-01 | End: 2020-06-01

## 2020-06-01 RX ORDER — ONDANSETRON 2 MG/ML
8 INJECTION INTRAMUSCULAR; INTRAVENOUS
Status: CANCELLED | OUTPATIENT
Start: 2020-06-09

## 2020-06-01 RX ORDER — ONDANSETRON 2 MG/ML
8 INJECTION INTRAMUSCULAR; INTRAVENOUS
Status: CANCELLED | OUTPATIENT
Start: 2020-06-02

## 2020-06-01 RX ORDER — SODIUM CHLORIDE 0.9 % (FLUSH) 0.9 %
10 SYRINGE (ML) INJECTION
Status: CANCELLED | OUTPATIENT
Start: 2020-06-09

## 2020-06-01 RX ORDER — HEPARIN 100 UNIT/ML
500 SYRINGE INTRAVENOUS
Status: CANCELLED | OUTPATIENT
Start: 2020-06-02

## 2020-06-01 RX ORDER — SODIUM CHLORIDE 0.9 % (FLUSH) 0.9 %
10 SYRINGE (ML) INJECTION
Status: CANCELLED | OUTPATIENT
Start: 2020-06-02

## 2020-06-01 RX ORDER — SODIUM CHLORIDE 0.9 % (FLUSH) 0.9 %
10 SYRINGE (ML) INJECTION
Status: DISCONTINUED | OUTPATIENT
Start: 2020-06-01 | End: 2020-06-01 | Stop reason: HOSPADM

## 2020-06-01 RX ORDER — HEPARIN 100 UNIT/ML
500 SYRINGE INTRAVENOUS
Status: CANCELLED | OUTPATIENT
Start: 2020-06-09

## 2020-06-01 RX ADMIN — SODIUM CHLORIDE: 9 INJECTION, SOLUTION INTRAVENOUS at 08:06

## 2020-06-01 RX ADMIN — ONDANSETRON 8 MG: 2 INJECTION, SOLUTION INTRAMUSCULAR; INTRAVENOUS at 08:06

## 2020-06-01 RX ADMIN — GEMCITABINE HYDROCHLORIDE 1325 MG: 200 INJECTION, SOLUTION INTRAVENOUS at 08:06

## 2020-06-01 NOTE — PLAN OF CARE
Pt received Gemzar today and tolerated well, without complications. VSS throughout infusion. Educated patient about Gemzar (indications, side effects, possible reactions, chemotherapy precautions) and verbalized understanding. PIV positive for blood return, saline locked and removed prior to DC, catheter tip intact. Pt DC with no distress noted, ambulated off of unit w/o event, pleased.

## 2020-06-05 DIAGNOSIS — C56.9 MALIGNANT NEOPLASM OF OVARY, UNSPECIFIED LATERALITY: ICD-10-CM

## 2020-06-05 DIAGNOSIS — Z13.9 SCREENING FOR CONDITION: Primary | ICD-10-CM

## 2020-06-08 ENCOUNTER — INFUSION (OUTPATIENT)
Dept: INFUSION THERAPY | Facility: HOSPITAL | Age: 65
End: 2020-06-08
Attending: OBSTETRICS & GYNECOLOGY
Payer: COMMERCIAL

## 2020-06-08 VITALS
RESPIRATION RATE: 18 BRPM | BODY MASS INDEX: 26.53 KG/M2 | SYSTOLIC BLOOD PRESSURE: 125 MMHG | TEMPERATURE: 98 F | WEIGHT: 169.06 LBS | HEIGHT: 67 IN | DIASTOLIC BLOOD PRESSURE: 60 MMHG | HEART RATE: 57 BPM

## 2020-06-08 DIAGNOSIS — C56.9 OVARIAN CANCER, UNSPECIFIED LATERALITY: Primary | ICD-10-CM

## 2020-06-08 PROCEDURE — 63600175 PHARM REV CODE 636 W HCPCS: Mod: JG | Performed by: OBSTETRICS & GYNECOLOGY

## 2020-06-08 PROCEDURE — 96377 APPLICATON ON-BODY INJECTOR: CPT

## 2020-06-08 PROCEDURE — 25000003 PHARM REV CODE 250: Performed by: OBSTETRICS & GYNECOLOGY

## 2020-06-08 PROCEDURE — 96413 CHEMO IV INFUSION 1 HR: CPT

## 2020-06-08 RX ORDER — HEPARIN 100 UNIT/ML
500 SYRINGE INTRAVENOUS
Status: DISCONTINUED | OUTPATIENT
Start: 2020-06-08 | End: 2020-06-08 | Stop reason: HOSPADM

## 2020-06-08 RX ORDER — SODIUM CHLORIDE 0.9 % (FLUSH) 0.9 %
10 SYRINGE (ML) INJECTION
Status: DISCONTINUED | OUTPATIENT
Start: 2020-06-08 | End: 2020-06-08 | Stop reason: HOSPADM

## 2020-06-08 RX ORDER — ONDANSETRON 2 MG/ML
8 INJECTION INTRAMUSCULAR; INTRAVENOUS
Status: COMPLETED | OUTPATIENT
Start: 2020-06-08 | End: 2020-06-08

## 2020-06-08 RX ADMIN — PEGFILGRASTIM 6 MG: KIT SUBCUTANEOUS at 10:06

## 2020-06-08 RX ADMIN — SODIUM CHLORIDE: 9 INJECTION, SOLUTION INTRAVENOUS at 09:06

## 2020-06-08 RX ADMIN — GEMCITABINE HYDROCHLORIDE 1000 MG: 200 INJECTION, POWDER, LYOPHILIZED, FOR SOLUTION INTRAVENOUS at 09:06

## 2020-06-08 RX ADMIN — ONDANSETRON 8 MG: 2 INJECTION, SOLUTION INTRAMUSCULAR; INTRAVENOUS at 09:06

## 2020-06-17 ENCOUNTER — HOSPITAL ENCOUNTER (OUTPATIENT)
Dept: RADIOLOGY | Facility: HOSPITAL | Age: 65
Discharge: HOME | End: 2020-06-17
Attending: OBSTETRICS & GYNECOLOGY
Payer: COMMERCIAL

## 2020-06-17 DIAGNOSIS — Z13.9 SCREENING FOR CONDITION: ICD-10-CM

## 2020-06-17 PROCEDURE — 77063 BREAST TOMOSYNTHESIS BI: CPT | Mod: 26,,, | Performed by: RADIOLOGY

## 2020-06-17 PROCEDURE — 77067 SCR MAMMO BI INCL CAD: CPT | Mod: TC,PO

## 2020-06-17 PROCEDURE — 77067 MAMMO DIGITAL SCREENING BILAT WITH TOMOSYNTHESIS_CAD: ICD-10-PCS | Mod: 26,,, | Performed by: RADIOLOGY

## 2020-06-17 PROCEDURE — 77067 SCR MAMMO BI INCL CAD: CPT | Mod: 26,,, | Performed by: RADIOLOGY

## 2020-06-17 PROCEDURE — 77063 MAMMO DIGITAL SCREENING BILAT WITH TOMOSYNTHESIS_CAD: ICD-10-PCS | Mod: 26,,, | Performed by: RADIOLOGY

## 2020-06-22 ENCOUNTER — INFUSION (OUTPATIENT)
Dept: INFUSION THERAPY | Facility: HOSPITAL | Age: 65
End: 2020-06-22
Attending: OBSTETRICS & GYNECOLOGY
Payer: COMMERCIAL

## 2020-06-22 VITALS
BODY MASS INDEX: 26.64 KG/M2 | OXYGEN SATURATION: 99 % | RESPIRATION RATE: 20 BRPM | TEMPERATURE: 98 F | SYSTOLIC BLOOD PRESSURE: 127 MMHG | WEIGHT: 169.75 LBS | HEART RATE: 67 BPM | DIASTOLIC BLOOD PRESSURE: 58 MMHG | HEIGHT: 67 IN

## 2020-06-22 DIAGNOSIS — C56.9 OVARIAN CANCER, UNSPECIFIED LATERALITY: Primary | ICD-10-CM

## 2020-06-22 PROCEDURE — 25000003 PHARM REV CODE 250: Performed by: OBSTETRICS & GYNECOLOGY

## 2020-06-22 PROCEDURE — 96375 TX/PRO/DX INJ NEW DRUG ADDON: CPT

## 2020-06-22 PROCEDURE — 96413 CHEMO IV INFUSION 1 HR: CPT

## 2020-06-22 PROCEDURE — 63600175 PHARM REV CODE 636 W HCPCS: Performed by: OBSTETRICS & GYNECOLOGY

## 2020-06-22 PROCEDURE — A4216 STERILE WATER/SALINE, 10 ML: HCPCS | Performed by: OBSTETRICS & GYNECOLOGY

## 2020-06-22 RX ORDER — HEPARIN 100 UNIT/ML
500 SYRINGE INTRAVENOUS
Status: CANCELLED | OUTPATIENT
Start: 2020-06-24

## 2020-06-22 RX ORDER — ONDANSETRON 2 MG/ML
8 INJECTION INTRAMUSCULAR; INTRAVENOUS
Status: COMPLETED | OUTPATIENT
Start: 2020-06-22 | End: 2020-06-22

## 2020-06-22 RX ORDER — HEPARIN 100 UNIT/ML
500 SYRINGE INTRAVENOUS
Status: DISCONTINUED | OUTPATIENT
Start: 2020-06-22 | End: 2020-06-22 | Stop reason: HOSPADM

## 2020-06-22 RX ORDER — SODIUM CHLORIDE 0.9 % (FLUSH) 0.9 %
10 SYRINGE (ML) INJECTION
Status: CANCELLED | OUTPATIENT
Start: 2020-06-24

## 2020-06-22 RX ORDER — ONDANSETRON 2 MG/ML
8 INJECTION INTRAMUSCULAR; INTRAVENOUS
Status: CANCELLED | OUTPATIENT
Start: 2020-06-24

## 2020-06-22 RX ORDER — SODIUM CHLORIDE 0.9 % (FLUSH) 0.9 %
10 SYRINGE (ML) INJECTION
Status: DISCONTINUED | OUTPATIENT
Start: 2020-06-22 | End: 2020-06-22 | Stop reason: HOSPADM

## 2020-06-22 RX ADMIN — ONDANSETRON 8 MG: 2 INJECTION, SOLUTION INTRAMUSCULAR; INTRAVENOUS at 09:06

## 2020-06-22 RX ADMIN — GEMCITABINE HYDROCHLORIDE 1325 MG: 200 INJECTION, SOLUTION INTRAVENOUS at 10:06

## 2020-06-22 RX ADMIN — SODIUM CHLORIDE: 9 INJECTION, SOLUTION INTRAVENOUS at 09:06

## 2020-06-22 RX ADMIN — Medication 10 ML: at 10:06

## 2020-06-22 NOTE — NURSING
Tolerated treatment well.  Advised to call MD for any problems or concerns.  AVS declined.  RTC as scheduled.  NAD noted upon discharge off unit.

## 2020-06-22 NOTE — PLAN OF CARE
Arrived for c15d1 Gemzar.  No new complaints voiced.  Labs/Tx plan reviewed.  POC reviewed with pt in agreement.   Premed/Tx given and tolerated as ordered.

## 2020-06-27 ENCOUNTER — LAB VISIT (OUTPATIENT)
Dept: LAB | Facility: HOSPITAL | Age: 65
End: 2020-06-27
Attending: OBSTETRICS & GYNECOLOGY
Payer: COMMERCIAL

## 2020-06-27 DIAGNOSIS — C56.9 OVARIAN CANCER, UNSPECIFIED LATERALITY: ICD-10-CM

## 2020-06-27 DIAGNOSIS — Z51.11 ENCOUNTER FOR ANTINEOPLASTIC CHEMOTHERAPY: ICD-10-CM

## 2020-06-27 DIAGNOSIS — Z13.9 SCREENING FOR CONDITION: ICD-10-CM

## 2020-06-29 ENCOUNTER — INFUSION (OUTPATIENT)
Dept: INFUSION THERAPY | Facility: HOSPITAL | Age: 65
End: 2020-06-29
Attending: OBSTETRICS & GYNECOLOGY
Payer: COMMERCIAL

## 2020-06-29 VITALS
RESPIRATION RATE: 18 BRPM | HEART RATE: 69 BPM | BODY MASS INDEX: 26.71 KG/M2 | DIASTOLIC BLOOD PRESSURE: 61 MMHG | TEMPERATURE: 97 F | HEIGHT: 67 IN | SYSTOLIC BLOOD PRESSURE: 125 MMHG | WEIGHT: 170.19 LBS

## 2020-06-29 DIAGNOSIS — C56.9 OVARIAN CANCER, UNSPECIFIED LATERALITY: Primary | ICD-10-CM

## 2020-06-29 LAB
ALBUMIN SERPL BCP-MCNC: 3.2 G/DL (ref 3.5–5.2)
ALP SERPL-CCNC: 121 U/L (ref 55–135)
ALT SERPL W/O P-5'-P-CCNC: 22 U/L (ref 10–44)
ANION GAP SERPL CALC-SCNC: 5 MMOL/L (ref 8–16)
AST SERPL-CCNC: 30 U/L (ref 10–40)
BILIRUB SERPL-MCNC: 0.5 MG/DL (ref 0.1–1)
BUN SERPL-MCNC: 9 MG/DL (ref 8–23)
CALCIUM SERPL-MCNC: 8.7 MG/DL (ref 8.7–10.5)
CANCER AG125 SERPL-ACNC: 311 U/ML (ref 0–30)
CHLORIDE SERPL-SCNC: 106 MMOL/L (ref 95–110)
CO2 SERPL-SCNC: 30 MMOL/L (ref 23–29)
CREAT SERPL-MCNC: 0.7 MG/DL (ref 0.5–1.4)
ERYTHROCYTE [DISTWIDTH] IN BLOOD BY AUTOMATED COUNT: 15.1 % (ref 11.5–14.5)
EST. GFR  (AFRICAN AMERICAN): >60 ML/MIN/1.73 M^2
EST. GFR  (NON AFRICAN AMERICAN): >60 ML/MIN/1.73 M^2
GLUCOSE SERPL-MCNC: 106 MG/DL (ref 70–110)
HCT VFR BLD AUTO: 39.5 % (ref 37–48.5)
HGB BLD-MCNC: 12.6 G/DL (ref 12–16)
IMM GRANULOCYTES # BLD AUTO: 0.01 K/UL (ref 0–0.04)
MCH RBC QN AUTO: 33.2 PG (ref 27–31)
MCHC RBC AUTO-ENTMCNC: 31.9 G/DL (ref 32–36)
MCV RBC AUTO: 104 FL (ref 82–98)
NEUTROPHILS # BLD AUTO: 1.6 K/UL (ref 1.8–7.7)
PLATELET # BLD AUTO: 182 K/UL (ref 150–350)
PMV BLD AUTO: 9.3 FL (ref 9.2–12.9)
POTASSIUM SERPL-SCNC: 5.3 MMOL/L (ref 3.5–5.1)
PROT SERPL-MCNC: 6.2 G/DL (ref 6–8.4)
RBC # BLD AUTO: 3.79 M/UL (ref 4–5.4)
SODIUM SERPL-SCNC: 141 MMOL/L (ref 136–145)
WBC # BLD AUTO: 2.36 K/UL (ref 3.9–12.7)

## 2020-06-29 PROCEDURE — 25000003 PHARM REV CODE 250: Performed by: OBSTETRICS & GYNECOLOGY

## 2020-06-29 PROCEDURE — 80053 COMPREHEN METABOLIC PANEL: CPT

## 2020-06-29 PROCEDURE — 96375 TX/PRO/DX INJ NEW DRUG ADDON: CPT

## 2020-06-29 PROCEDURE — 36415 COLL VENOUS BLD VENIPUNCTURE: CPT

## 2020-06-29 PROCEDURE — 96377 APPLICATON ON-BODY INJECTOR: CPT

## 2020-06-29 PROCEDURE — 86304 IMMUNOASSAY TUMOR CA 125: CPT

## 2020-06-29 PROCEDURE — 96413 CHEMO IV INFUSION 1 HR: CPT

## 2020-06-29 PROCEDURE — 63600175 PHARM REV CODE 636 W HCPCS: Mod: JG | Performed by: OBSTETRICS & GYNECOLOGY

## 2020-06-29 PROCEDURE — 85027 COMPLETE CBC AUTOMATED: CPT

## 2020-06-29 RX ORDER — HEPARIN 100 UNIT/ML
500 SYRINGE INTRAVENOUS
Status: CANCELLED | OUTPATIENT
Start: 2020-07-01

## 2020-06-29 RX ORDER — ONDANSETRON 2 MG/ML
8 INJECTION INTRAMUSCULAR; INTRAVENOUS
Status: CANCELLED | OUTPATIENT
Start: 2020-07-01

## 2020-06-29 RX ORDER — ONDANSETRON 2 MG/ML
8 INJECTION INTRAMUSCULAR; INTRAVENOUS
Status: COMPLETED | OUTPATIENT
Start: 2020-06-29 | End: 2020-06-29

## 2020-06-29 RX ORDER — SODIUM CHLORIDE 0.9 % (FLUSH) 0.9 %
10 SYRINGE (ML) INJECTION
Status: CANCELLED | OUTPATIENT
Start: 2020-07-01

## 2020-06-29 RX ADMIN — ONDANSETRON 8 MG: 2 INJECTION, SOLUTION INTRAMUSCULAR; INTRAVENOUS at 09:06

## 2020-06-29 RX ADMIN — SODIUM CHLORIDE 1000 MG: 9 INJECTION, SOLUTION INTRAVENOUS at 11:06

## 2020-06-29 RX ADMIN — PEGFILGRASTIM 6 MG: KIT SUBCUTANEOUS at 11:06

## 2020-06-29 RX ADMIN — SODIUM CHLORIDE: 9 INJECTION, SOLUTION INTRAVENOUS at 10:06

## 2020-07-08 ENCOUNTER — HOSPITAL ENCOUNTER (EMERGENCY)
Facility: HOSPITAL | Age: 65
Discharge: HOME | End: 2020-07-08
Attending: EMERGENCY MEDICINE
Payer: COMMERCIAL

## 2020-07-08 VITALS
SYSTOLIC BLOOD PRESSURE: 138 MMHG | TEMPERATURE: 99 F | DIASTOLIC BLOOD PRESSURE: 67 MMHG | RESPIRATION RATE: 16 BRPM | OXYGEN SATURATION: 100 % | HEART RATE: 74 BPM | HEIGHT: 67 IN | WEIGHT: 160 LBS | BODY MASS INDEX: 25.11 KG/M2

## 2020-07-08 DIAGNOSIS — M79.606 LEG PAIN: ICD-10-CM

## 2020-07-08 DIAGNOSIS — L03.115 CELLULITIS OF RIGHT LOWER EXTREMITY: Primary | ICD-10-CM

## 2020-07-08 LAB
ALBUMIN SERPL BCP-MCNC: 3.7 G/DL (ref 3.5–5.2)
ALP SERPL-CCNC: 228 U/L (ref 55–135)
ALT SERPL W/O P-5'-P-CCNC: 27 U/L (ref 10–44)
ANION GAP SERPL CALC-SCNC: 8 MMOL/L (ref 8–16)
AST SERPL-CCNC: 30 U/L (ref 10–40)
BASOPHILS # BLD AUTO: 0.08 K/UL (ref 0–0.2)
BASOPHILS NFR BLD: 0.3 % (ref 0–1.9)
BILIRUB SERPL-MCNC: 0.4 MG/DL (ref 0.1–1)
BUN SERPL-MCNC: 14 MG/DL (ref 8–23)
CALCIUM SERPL-MCNC: 9 MG/DL (ref 8.7–10.5)
CHLORIDE SERPL-SCNC: 103 MMOL/L (ref 95–110)
CO2 SERPL-SCNC: 29 MMOL/L (ref 23–29)
CREAT SERPL-MCNC: 0.7 MG/DL (ref 0.5–1.4)
D DIMER PPP IA.FEU-MCNC: 1.85 MG/L FEU
DIFFERENTIAL METHOD: ABNORMAL
EOSINOPHIL # BLD AUTO: 0.1 K/UL (ref 0–0.5)
EOSINOPHIL NFR BLD: 0.2 % (ref 0–8)
ERYTHROCYTE [DISTWIDTH] IN BLOOD BY AUTOMATED COUNT: 17.1 % (ref 11.5–14.5)
EST. GFR  (AFRICAN AMERICAN): >60 ML/MIN/1.73 M^2
EST. GFR  (NON AFRICAN AMERICAN): >60 ML/MIN/1.73 M^2
GLUCOSE SERPL-MCNC: 100 MG/DL (ref 70–110)
HCT VFR BLD AUTO: 38.8 % (ref 37–48.5)
HGB BLD-MCNC: 12.5 G/DL (ref 12–16)
IMM GRANULOCYTES # BLD AUTO: 1.17 K/UL (ref 0–0.04)
IMM GRANULOCYTES NFR BLD AUTO: 4.8 % (ref 0–0.5)
LYMPHOCYTES # BLD AUTO: 0.9 K/UL (ref 1–4.8)
LYMPHOCYTES NFR BLD: 3.7 % (ref 18–48)
MCH RBC QN AUTO: 34.2 PG (ref 27–31)
MCHC RBC AUTO-ENTMCNC: 32.2 G/DL (ref 32–36)
MCV RBC AUTO: 106 FL (ref 82–98)
MONOCYTES # BLD AUTO: 1.2 K/UL (ref 0.3–1)
MONOCYTES NFR BLD: 4.8 % (ref 4–15)
NEUTROPHILS # BLD AUTO: 21.2 K/UL (ref 1.8–7.7)
NEUTROPHILS NFR BLD: 86.2 % (ref 38–73)
NRBC BLD-RTO: 0 /100 WBC
PLATELET # BLD AUTO: 90 K/UL (ref 150–350)
PMV BLD AUTO: 9.6 FL (ref 9.2–12.9)
POTASSIUM SERPL-SCNC: 5.2 MMOL/L (ref 3.5–5.1)
PROT SERPL-MCNC: 6.7 G/DL (ref 6–8.4)
RBC # BLD AUTO: 3.65 M/UL (ref 4–5.4)
SODIUM SERPL-SCNC: 140 MMOL/L (ref 136–145)
TOXIC GRANULES BLD QL SMEAR: PRESENT
WBC # BLD AUTO: 24.58 K/UL (ref 3.9–12.7)

## 2020-07-08 PROCEDURE — 85379 FIBRIN DEGRADATION QUANT: CPT

## 2020-07-08 PROCEDURE — 99285 EMERGENCY DEPT VISIT HI MDM: CPT | Mod: 25

## 2020-07-08 PROCEDURE — 80053 COMPREHEN METABOLIC PANEL: CPT

## 2020-07-08 PROCEDURE — 85025 COMPLETE CBC W/AUTO DIFF WBC: CPT

## 2020-07-08 RX ORDER — CLINDAMYCIN HYDROCHLORIDE 150 MG/1
300 CAPSULE ORAL 4 TIMES DAILY
Qty: 56 CAPSULE | Refills: 0 | Status: SHIPPED | OUTPATIENT
Start: 2020-07-08 | End: 2020-07-15

## 2020-07-08 NOTE — ED NOTES
Found pt sitting on stretcher with complaints of right leg pain mid medial tibia.  + lump with pain noted.  In no acute distress.  A&O x 4.  Pt denies chest pains, sob, or cough.

## 2020-07-08 NOTE — ED PROVIDER NOTES
"Encounter Date: 7/8/2020    SCRIBE #1 NOTE: I, Reggie Alves, am scribing for, and in the presence of,  Audrey Powers PA-C. I have scribed the following portions of the note - Other sections scribed: HPI, ROS, PE.       History     Chief Complaint   Patient presents with    Leg Pain     pt states "I think I have a blood clot"; pt reports bumping her RIGHT shin 3 weeks ago and had a bruise/bump but over the past few days it has become warm & red     This 64 y.o F with a hx of Ovarian cancer presents to the ED c/o right shin pain with associated redness and warmth to the touch since yesterday. The pt does report hitting her right shin on the tub, resulting in "brown" bruising x3 weeks ago. She states her initial bruising and pain secondary to the trauma resolved prior to her current symptoms. The pt is currently receiving chemotherapy for ovarian cancer. She did wear compression stocking while at work last night with no improvement. She does note a 1.5 hour plane ride to [place], [place] x2 weeks ago and an 8hr drive back to [place] shortly after. She denies a hx of DVT, but states her mother and sisters had several DVT's in there lower extremities. She denies fever, chills, diaphoresis, nausea, emesis, weakness, numbness, headache, chest pain, SOB, dizziness, light-headedness, worsening cough, rash and any other associated symptoms.     The history is provided by the patient.     Review of patient's allergies indicates:  No Known Allergies  Past Medical History:   Diagnosis Date    Cancer     ovarian    Dry eyes 11/29/2017    Neuropathy due to chemotherapeutic drug 1/10/2018    Open angle with borderline findings and low glaucoma risk in both eyes 6/20/2013    Ovarian cancer      Past Surgical History:   Procedure Laterality Date    BREAST BIOPSY      BUNIONECTOMY Left     CATARACT EXTRACTION W/  INTRAOCULAR LENS IMPLANT Right 04/04/2019        CATARACT EXTRACTION W/  INTRAOCULAR LENS IMPLANT " Left 5/7/2019        CHOLECYSTECTOMY  1992    HYSTERECTOMY      left leg surgery  2005    achilles tendon    OOPHORECTOMY      PHACOEMULSIFICATION OF CATARACT Right 4/4/2019    Procedure: PHACOEMULSIFICATION, CATARACT;  Surgeon: Karen Butt MD;  Location: [facility] OR [address] [place] [address];  Service: Ophthalmology;  Laterality: Right;    AK REMOVAL OF OVARY/TUBE(S)      right leg surgery  2006    broken, including ankle     Family History   Problem Relation Age of Onset    Cancer Father         ? liver ca    Diabetes Father     Cataracts Mother     Breast cancer Maternal Aunt     Cancer Son         sarcoma    Melanoma Sister     Colon cancer Neg Hx     Ovarian cancer Neg Hx     Amblyopia Neg Hx     Blindness Neg Hx     Glaucoma Neg Hx     Hypertension Neg Hx     Macular degeneration Neg Hx     Retinal detachment Neg Hx     Strabismus Neg Hx     Stroke Neg Hx     Thyroid disease Neg Hx      Social History     Tobacco Use    Smoking status: Never Smoker    Smokeless tobacco: Never Used   Substance Use Topics    Alcohol use: Yes     Alcohol/week: 3.0 standard drinks     Types: 1 Glasses of wine, 2 Shots of liquor per week     Frequency: Monthly or less     Drinks per session: 1 or 2     Binge frequency: Never     Comment: Socially    Drug use: No     Review of Systems   Constitutional: Negative for chills, diaphoresis and fever.   HENT: Negative for congestion and rhinorrhea.    Eyes: Negative for redness.   Respiratory: Negative for cough and shortness of breath.    Cardiovascular: Negative for chest pain.   Gastrointestinal: Negative for abdominal pain, diarrhea, nausea and vomiting.   Genitourinary: Negative for difficulty urinating and dysuria.   Musculoskeletal: Negative for back pain and neck pain.        (+) right shin pain     Skin: Positive for color change (redness to right shin). Negative for rash.        (+) R shin is warm to the touch    Neurological: Negative for syncope,  weakness, light-headedness and numbness.   Psychiatric/Behavioral: The patient is not nervous/anxious.        Physical Exam     Initial Vitals [07/08/20 0632]   BP Pulse Resp Temp SpO2   133/67 76 17 98.4 °F (36.9 °C) 98 %      MAP       --         Physical Exam    Nursing note and vitals reviewed.  Constitutional: She is not diaphoretic. No distress.   HENT:   Head: Normocephalic and atraumatic.   Mouth/Throat: Oropharynx is clear and moist.   Eyes: EOM are normal. Pupils are equal, round, and reactive to light. No scleral icterus.   Neck: Normal range of motion. Neck supple. No JVD present.   Cardiovascular: Normal rate, regular rhythm and intact distal pulses.   Pulses:       Dorsalis pedis pulses are 2+ on the right side and 2+ on the left side.   Pulmonary/Chest: Breath sounds normal. No respiratory distress.   Abdominal: Soft. Bowel sounds are normal. She exhibits no distension. There is no abdominal tenderness.   Musculoskeletal: Normal range of motion.      Comments: Varicose veins in the BLE. 4cm area of mild erythema to the right lower leg with tenderness to palpation over the anterior shin and right lateral malleolus.     Neurological: She is alert. She has normal strength. No cranial nerve deficit or sensory deficit. GCS score is 15. GCS eye subscore is 4. GCS verbal subscore is 5. GCS motor subscore is 6.   Sensation intact.   Skin: Skin is warm and dry.   Good capillary refill.    Psychiatric: She has a normal mood and affect.         ED Course   Procedures  Labs Reviewed   CBC W/ AUTO DIFFERENTIAL - Abnormal; Notable for the following components:       Result Value    WBC 24.58 (*)     RBC 3.65 (*)     Mean Corpuscular Volume 106 (*)     Mean Corpuscular Hemoglobin 34.2 (*)     RDW 17.1 (*)     Platelets 90 (*)     Immature Granulocytes 4.8 (*)     Gran # (ANC) 21.2 (*)     Immature Grans (Abs) 1.17 (*)     Lymph # 0.9 (*)     Mono # 1.2 (*)     Gran% 86.2 (*)     Lymph% 3.7 (*)     All other  components within normal limits   COMPREHENSIVE METABOLIC PANEL - Abnormal; Notable for the following components:    Potassium 5.2 (*)     Alkaline Phosphatase 228 (*)     All other components within normal limits   D DIMER, QUANTITATIVE - Abnormal; Notable for the following components:    D-Dimer 1.85 (*)     All other components within normal limits          Imaging Results          X-Ray Tibia Fibula 2 View Right (Final result)  Result time 07/08/20 08:22:16    Final result by Cesar Bryson Jr., MD (07/08/20 08:22:16)                 Impression:      No acute abnormality    Osteoarthritis of the ankle joint    Postsurgical and posttraumatic changes of the distal tibia and fibula.      Electronically signed by: Cesar Ballesteros Jr  Date:    07/08/2020  Time:    08:22             Narrative:    EXAMINATION:  XR TIBIA FIBULA 2 VIEW RIGHT    CLINICAL HISTORY:  Pain in leg, unspecified    TECHNIQUE:  XR TIBIA FIBULA 2 VIEW RIGHT    COMPARISON:  None    FINDINGS:  There is some tibiotalar joint space narrowing, subchondral sclerosis and marginal osteophytosis compatible with osteoarthritis which may be posttraumatic in nature.  Patient is status post ORIF distal fibular diaphyseal and medial malleolar fracture.  Hardware is intact.  The remainder of the tibia and fibula in the visualized bones of the hindfoot have a benign appearance.                               US Lower Extremity Veins Right (Final result)  Result time 07/08/20 08:19:29    Final result by Cesar Bryson Jr., MD (07/08/20 08:19:29)                 Impression:      No evidence of deep venous thrombosis in the right lower extremity.      Electronically signed by: Cesar Ballesteros Jr  Date:    07/08/2020  Time:    08:19             Narrative:    EXAMINATION:  US LOWER EXTREMITY VEINS RIGHT    CLINICAL HISTORY:  Pain in leg, unspecified    TECHNIQUE:  Duplex and color flow Doppler evaluation and graded compression of the right lower extremity  veins was performed.    COMPARISON:  None    FINDINGS:  Right thigh veins: The common femoral, femoral, popliteal, upper greater saphenous, and deep femoral veins are patent and free of thrombus. The veins are normally compressible and have normal phasic flow and augmentation response.    Right calf veins: The visualized calf veins are patent.    Contralateral CFV: The contralateral (left) common femoral vein is patent and free of thrombus.    Miscellaneous: None                                 Medical Decision Making:   ED Management:  64-year-old female history of ovarian cancer on chemotherapy, received Gemzar 10 days ago, Neulasta 8 days ago presenting for R leg pain, redness, warmth after trauma 3 weeks ago. She endorses recent travel.  Denies fever, chills.     Exam above. CBC with leukocytosis 24. Review of previous results usually 2, highest was 14, 2 months ago.     Discussed pt with Dr. Whitman, patient's oncologist who states leukocytosis likely due to receiving neulasta.   Low suspicion for sepsis as etiology of leukocytosis at this time.     D Dimer 1.84. US negative for DVT. She denies CP or SOB. Will have her follow up with primary care for repeat ultrasound in 5-7 days.     Will discharge with Clindamycin for possibly early cellulitis.   CMP with hyperkalemia, will have pt follow up with primary care for further evaluation and management.     Primary care follow up in 2 days. Return to ER for worsening symptoms, fever, chills, worsening pain, redness, swelling or as needed.     Discussed with Dr. Eduardo who also evaluated pt face to face  agrees with assessment and plan            Scribe Attestation:   Scribe #1: I performed the above scribed service and the documentation accurately describes the services I performed. I attest to the accuracy of the note.                          Clinical Impression:       ICD-10-CM ICD-9-CM   1. Cellulitis of right lower extremity  L03.115 682.6   2. Leg pain   M79.606 729.5             ED Disposition Condition    Discharge Stable        ED Prescriptions     Medication Sig Dispense Start Date End Date Auth. Provider    clindamycin (CLEOCIN) 150 MG capsule Take 2 capsules (300 mg total) by mouth 4 (four) times daily. for 7 days 56 capsule 7/8/2020 7/15/2020 Audrey Powers PA-C        Follow-up Information     Follow up With Specialties Details Why Contact Info    Elle Ma MD Internal Medicine Schedule an appointment as soon as possible for a visit in 2 days for [facility] [address] [facility] [address]  [phone]      [facility] Emergency Medicine Go to  As needed, If symptoms worsen [address]  [facility] [address] [phone]                        I, Audrey Powres PA-C personally performed the services described in this documentation. All medical record entries made by the scribe were at my direction and in my presence. I have reviewed the chart and agree that the record reflects my personal performance and is accurate and complete.               Audrey Powers PA-C  07/08/20 1000

## 2020-07-08 NOTE — DISCHARGE INSTRUCTIONS
Take Clindamycin as prescribed for pain and swelling. Follow up with primary care in 2 days. Return to ER for worsening symptoms, chest pain, shortness of breath or as needed

## 2020-07-12 ENCOUNTER — LAB VISIT (OUTPATIENT)
Dept: LAB | Facility: HOSPITAL | Age: 65
End: 2020-07-12
Attending: OBSTETRICS & GYNECOLOGY
Payer: COMMERCIAL

## 2020-07-12 DIAGNOSIS — C56.9 OVARIAN CANCER, UNSPECIFIED LATERALITY: ICD-10-CM

## 2020-07-12 LAB
ALBUMIN SERPL BCP-MCNC: 3.4 G/DL (ref 3.5–5.2)
ALP SERPL-CCNC: 198 U/L (ref 55–135)
ALT SERPL W/O P-5'-P-CCNC: 19 U/L (ref 10–44)
ANION GAP SERPL CALC-SCNC: 7 MMOL/L (ref 8–16)
AST SERPL-CCNC: 27 U/L (ref 10–40)
BILIRUB SERPL-MCNC: 0.4 MG/DL (ref 0.1–1)
BUN SERPL-MCNC: 17 MG/DL (ref 8–23)
CALCIUM SERPL-MCNC: 8.9 MG/DL (ref 8.7–10.5)
CHLORIDE SERPL-SCNC: 107 MMOL/L (ref 95–110)
CO2 SERPL-SCNC: 27 MMOL/L (ref 23–29)
CREAT SERPL-MCNC: 0.7 MG/DL (ref 0.5–1.4)
ERYTHROCYTE [DISTWIDTH] IN BLOOD BY AUTOMATED COUNT: 16.2 % (ref 11.5–14.5)
EST. GFR  (AFRICAN AMERICAN): >60 ML/MIN/1.73 M^2
EST. GFR  (NON AFRICAN AMERICAN): >60 ML/MIN/1.73 M^2
GLUCOSE SERPL-MCNC: 87 MG/DL (ref 70–110)
HCT VFR BLD AUTO: 37.9 % (ref 37–48.5)
HGB BLD-MCNC: 12.3 G/DL (ref 12–16)
IMM GRANULOCYTES # BLD AUTO: 0.05 K/UL (ref 0–0.04)
MCH RBC QN AUTO: 34.2 PG (ref 27–31)
MCHC RBC AUTO-ENTMCNC: 32.5 G/DL (ref 32–36)
MCV RBC AUTO: 105 FL (ref 82–98)
NEUTROPHILS # BLD AUTO: 7.8 K/UL (ref 1.8–7.7)
PLATELET # BLD AUTO: 184 K/UL (ref 150–350)
PMV BLD AUTO: 10.3 FL (ref 9.2–12.9)
POTASSIUM SERPL-SCNC: 4.5 MMOL/L (ref 3.5–5.1)
PROT SERPL-MCNC: 6.7 G/DL (ref 6–8.4)
RBC # BLD AUTO: 3.6 M/UL (ref 4–5.4)
SODIUM SERPL-SCNC: 141 MMOL/L (ref 136–145)
WBC # BLD AUTO: 9.11 K/UL (ref 3.9–12.7)

## 2020-07-12 PROCEDURE — 86304 IMMUNOASSAY TUMOR CA 125: CPT

## 2020-07-12 PROCEDURE — 36415 COLL VENOUS BLD VENIPUNCTURE: CPT

## 2020-07-12 PROCEDURE — 80053 COMPREHEN METABOLIC PANEL: CPT

## 2020-07-12 PROCEDURE — 85027 COMPLETE CBC AUTOMATED: CPT

## 2020-07-13 ENCOUNTER — INFUSION (OUTPATIENT)
Dept: INFUSION THERAPY | Facility: HOSPITAL | Age: 65
End: 2020-07-13
Attending: OBSTETRICS & GYNECOLOGY
Payer: COMMERCIAL

## 2020-07-13 ENCOUNTER — TELEPHONE (OUTPATIENT)
Dept: GYNECOLOGIC ONCOLOGY | Facility: CLINIC | Age: 65
End: 2020-07-13

## 2020-07-13 VITALS
WEIGHT: 167.31 LBS | TEMPERATURE: 98 F | HEIGHT: 67 IN | SYSTOLIC BLOOD PRESSURE: 109 MMHG | RESPIRATION RATE: 18 BRPM | HEART RATE: 72 BPM | BODY MASS INDEX: 26.26 KG/M2 | DIASTOLIC BLOOD PRESSURE: 70 MMHG | OXYGEN SATURATION: 99 %

## 2020-07-13 DIAGNOSIS — C56.9 OVARIAN CANCER, UNSPECIFIED LATERALITY: Primary | ICD-10-CM

## 2020-07-13 DIAGNOSIS — C56.9 MALIGNANT NEOPLASM OF OVARY, UNSPECIFIED LATERALITY: ICD-10-CM

## 2020-07-13 DIAGNOSIS — Z01.818 PRE-OP TESTING: ICD-10-CM

## 2020-07-13 DIAGNOSIS — J90 PLEURAL EFFUSION, NOT ELSEWHERE CLASSIFIED: ICD-10-CM

## 2020-07-13 LAB — CANCER AG125 SERPL-ACNC: 262 U/ML (ref 0–30)

## 2020-07-13 PROCEDURE — 96375 TX/PRO/DX INJ NEW DRUG ADDON: CPT

## 2020-07-13 PROCEDURE — 96413 CHEMO IV INFUSION 1 HR: CPT

## 2020-07-13 PROCEDURE — 63600175 PHARM REV CODE 636 W HCPCS: Performed by: OBSTETRICS & GYNECOLOGY

## 2020-07-13 PROCEDURE — 25000003 PHARM REV CODE 250: Performed by: OBSTETRICS & GYNECOLOGY

## 2020-07-13 RX ORDER — HEPARIN 100 UNIT/ML
500 SYRINGE INTRAVENOUS
Status: DISCONTINUED | OUTPATIENT
Start: 2020-07-13 | End: 2020-07-13 | Stop reason: HOSPADM

## 2020-07-13 RX ORDER — HEPARIN 100 UNIT/ML
500 SYRINGE INTRAVENOUS
Status: CANCELLED | OUTPATIENT
Start: 2020-07-17

## 2020-07-13 RX ORDER — SODIUM CHLORIDE 0.9 % (FLUSH) 0.9 %
10 SYRINGE (ML) INJECTION
Status: DISCONTINUED | OUTPATIENT
Start: 2020-07-13 | End: 2020-07-13 | Stop reason: HOSPADM

## 2020-07-13 RX ORDER — SODIUM CHLORIDE 0.9 % (FLUSH) 0.9 %
10 SYRINGE (ML) INJECTION
Status: CANCELLED | OUTPATIENT
Start: 2020-07-17

## 2020-07-13 RX ORDER — ONDANSETRON 2 MG/ML
8 INJECTION INTRAMUSCULAR; INTRAVENOUS
Status: COMPLETED | OUTPATIENT
Start: 2020-07-13 | End: 2020-07-13

## 2020-07-13 RX ORDER — ONDANSETRON 2 MG/ML
8 INJECTION INTRAMUSCULAR; INTRAVENOUS
Status: CANCELLED | OUTPATIENT
Start: 2020-07-17

## 2020-07-13 RX ADMIN — ONDANSETRON 8 MG: 2 INJECTION, SOLUTION INTRAMUSCULAR; INTRAVENOUS at 10:07

## 2020-07-13 RX ADMIN — GEMCITABINE HYDROCHLORIDE 1325 MG: 1 INJECTION INTRAVENOUS at 10:07

## 2020-07-13 RX ADMIN — SODIUM CHLORIDE: 0.9 INJECTION, SOLUTION INTRAVENOUS at 09:07

## 2020-07-13 NOTE — PLAN OF CARE
Pt ambulatory to clinic alone for Gemzar infusion. PIV started without difficulty. Pt reports have port placed soon due to poor vasculature. Pt tolerated Gemzar infusion well. PIV d/c'd with catheter intact. Ambulatory from clinic in [facility].

## 2020-07-16 ENCOUNTER — TELEPHONE (OUTPATIENT)
Dept: INTERNAL MEDICINE | Facility: CLINIC | Age: 65
End: 2020-07-16

## 2020-07-16 DIAGNOSIS — R60.9 EDEMA, UNSPECIFIED TYPE: Primary | ICD-10-CM

## 2020-07-17 ENCOUNTER — HOSPITAL ENCOUNTER (OUTPATIENT)
Dept: RADIOLOGY | Facility: OTHER | Age: 65
Discharge: HOME | End: 2020-07-17
Attending: INTERNAL MEDICINE
Payer: COMMERCIAL

## 2020-07-17 ENCOUNTER — OFFICE VISIT (OUTPATIENT)
Dept: INTERNAL MEDICINE | Facility: CLINIC | Age: 65
End: 2020-07-17
Payer: COMMERCIAL

## 2020-07-17 VITALS
DIASTOLIC BLOOD PRESSURE: 68 MMHG | BODY MASS INDEX: 26.4 KG/M2 | HEIGHT: 67 IN | SYSTOLIC BLOOD PRESSURE: 110 MMHG | WEIGHT: 168.19 LBS | OXYGEN SATURATION: 99 % | HEART RATE: 70 BPM

## 2020-07-17 DIAGNOSIS — C56.9 MALIGNANT NEOPLASM OF OVARY, UNSPECIFIED LATERALITY: ICD-10-CM

## 2020-07-17 DIAGNOSIS — R60.9 EDEMA, UNSPECIFIED TYPE: ICD-10-CM

## 2020-07-17 DIAGNOSIS — S80.11XD TRAUMATIC HEMATOMA OF RIGHT LOWER LEG, SUBSEQUENT ENCOUNTER: Primary | ICD-10-CM

## 2020-07-17 PROCEDURE — 99214 OFFICE O/P EST MOD 30 MIN: CPT | Mod: S$GLB,,, | Performed by: INTERNAL MEDICINE

## 2020-07-17 PROCEDURE — 93970 EXTREMITY STUDY: CPT | Mod: 26,,, | Performed by: RADIOLOGY

## 2020-07-17 PROCEDURE — 3008F BODY MASS INDEX DOCD: CPT | Mod: CPTII,S$GLB,, | Performed by: INTERNAL MEDICINE

## 2020-07-17 PROCEDURE — 99999 PR PBB SHADOW E&M-EST. PATIENT-LVL III: CPT | Mod: PBBFAC,,, | Performed by: INTERNAL MEDICINE

## 2020-07-17 PROCEDURE — 3008F PR BODY MASS INDEX (BMI) DOCUMENTED: ICD-10-PCS | Mod: CPTII,S$GLB,, | Performed by: INTERNAL MEDICINE

## 2020-07-17 PROCEDURE — 1101F PT FALLS ASSESS-DOCD LE1/YR: CPT | Mod: CPTII,S$GLB,, | Performed by: INTERNAL MEDICINE

## 2020-07-17 PROCEDURE — 93970 US LOWER EXTREMITY VEINS BILATERAL: ICD-10-PCS | Mod: 26,,, | Performed by: RADIOLOGY

## 2020-07-17 PROCEDURE — 93970 EXTREMITY STUDY: CPT | Mod: TC

## 2020-07-17 PROCEDURE — 1101F PR PT FALLS ASSESS DOC 0-1 FALLS W/OUT INJ PAST YR: ICD-10-PCS | Mod: CPTII,S$GLB,, | Performed by: INTERNAL MEDICINE

## 2020-07-17 PROCEDURE — 99999 PR PBB SHADOW E&M-EST. PATIENT-LVL III: ICD-10-PCS | Mod: PBBFAC,,, | Performed by: INTERNAL MEDICINE

## 2020-07-17 PROCEDURE — 99214 PR OFFICE/OUTPT VISIT, EST, LEVL IV, 30-39 MIN: ICD-10-PCS | Mod: S$GLB,,, | Performed by: INTERNAL MEDICINE

## 2020-07-17 RX ORDER — DOXYCYCLINE HYCLATE 100 MG
100 TABLET ORAL 2 TIMES DAILY
Qty: 20 TABLET | Refills: 0 | Status: SHIPPED | OUTPATIENT
Start: 2020-07-17 | End: 2020-07-27

## 2020-07-17 NOTE — TELEPHONE ENCOUNTER
Pt is scheduled for 4 pm today for possible blood clots in her legs.    I have booked her for a venous ultrasound of the legs at [facility] for 2:15 today. She is to go to [facility] to get the ultrasound before she sees me at 4 pm.

## 2020-07-17 NOTE — PROGRESS NOTES
CHIEF COMPLAINT: knot on right lower leg    HISTORY OF PRESENT ILLNESS:   This is a 64 year old female who presents today for an urgent care visit due to above    About 2-3 weeks ago,she slipped getting out of the tub. Her left leg slipped and she hit her right chin on the tub. She had a bruse in the area.  She started to have a warm knot on the right lower extremity that has become painful. She presented to the ed on 20 - venous ultrasound was negative. She was discharged on clindamycin which she has completed.  She feels that maybe the area is slightly less red. It is still warm and tender.      SHe has stage 3 ovarian cancer diagnosed 2016. She is followed by GYN oncology and is in a clinical trial.  levels have risen in the last several months.   She is getting Gemzar infusion weekly x2 weeks then skip a week.  Ca125 has been bouncing around in the 200's range which has been higher for the last year. She is considering getting a port placed.       . She denies hearing loss, sinus congestion, sore throat, chest pain, shortness of breath, nausea, vomiting, constipation, diarrhea, heartburn, melena, bright red blood per rectum, dysuria, hematuria, joint or muscle pain, rashes, headaches, polydipsia, polyuria, hot or cold intolerance        PAST MEDICAL HISTORY:  1. Ovarian cancer - stage 3 diagnosed 2016  2. Endochondroma of the right shoulder - stable - saw Dr Cain 5/10 - no shoulder pain  3. Fracture left 4th and 5th metatarsal   4. Shingles 2014 on right lower back    PAST SURGICAL HISTORY:   1. Left Achilles tendon severance in . (two surgeries)   2. S/P-ORIF of the right ankle .   3. Cholecystectomy.   4. Bunion surgery   5. Xlap/ISIS/BSO/Radical mass resection > 10 cm, optimal debulking on 17    SOCIAL HISTORY:   No tobacco use. Drinks alcohol once a month. She is , has four children. 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     FAMILY HISTORY:   Mother  at  "age 89 of heart failure. Father  age 90; had liver cancer 3 months before his death. Has siblings; one brother had prostate cancer. Her son  in 2012 of metastatic spindle cell carcinoma      SCREENING:   Mammogram     Colonoscopy 2014 normal due     PHYSICAL EXAMINATIONR    /68   Pulse 70   Ht 5' 7" (1.702 m)   Wt 76.3 kg (168 lb 3.4 oz)   SpO2 99%   BMI 26.35 kg/m²     General: Is alert, oriented, in no apparent distress. Affect is within normal limits.   HEENT: Conjunctivae are anicteric. PERRL. TM's are clear. Oropharynx is clear.   Neck: Supple. No cervical lymphadenopathy. No thyromegaly. Respiratory effort is normal.   Lungs: Clear to auscultation bilaterally.   Heart: Regular rate and rhythm, no murmur, rub or gallop. No lower   extremity edema.   3x 3 cm area of tenderness on the right lower leg.     Venous ultrsound negative for DVT today       ASSESSMENT AND PLAN:   1. Hematoma - of the right lower extremity - possibly becoming infected. Doxycycline 100 mg twice daily for 10 days.   2.  Ovarian cancer- follow up with Dr Tran    She is to let me know if no improvement or worsen.     "

## 2020-07-20 ENCOUNTER — INFUSION (OUTPATIENT)
Dept: INFUSION THERAPY | Facility: HOSPITAL | Age: 65
End: 2020-07-20
Attending: OBSTETRICS & GYNECOLOGY
Payer: COMMERCIAL

## 2020-07-20 VITALS
HEART RATE: 64 BPM | HEIGHT: 67 IN | RESPIRATION RATE: 18 BRPM | SYSTOLIC BLOOD PRESSURE: 114 MMHG | BODY MASS INDEX: 26.4 KG/M2 | WEIGHT: 168.19 LBS | DIASTOLIC BLOOD PRESSURE: 58 MMHG | OXYGEN SATURATION: 98 % | TEMPERATURE: 98 F

## 2020-07-20 DIAGNOSIS — C56.9 OVARIAN CANCER, UNSPECIFIED LATERALITY: Primary | ICD-10-CM

## 2020-07-20 PROCEDURE — 96377 APPLICATON ON-BODY INJECTOR: CPT

## 2020-07-20 PROCEDURE — 63600175 PHARM REV CODE 636 W HCPCS: Performed by: OBSTETRICS & GYNECOLOGY

## 2020-07-20 PROCEDURE — 25000003 PHARM REV CODE 250: Performed by: OBSTETRICS & GYNECOLOGY

## 2020-07-20 PROCEDURE — 96413 CHEMO IV INFUSION 1 HR: CPT

## 2020-07-20 PROCEDURE — 96375 TX/PRO/DX INJ NEW DRUG ADDON: CPT

## 2020-07-20 RX ORDER — SODIUM CHLORIDE 0.9 % (FLUSH) 0.9 %
10 SYRINGE (ML) INJECTION
Status: CANCELLED | OUTPATIENT
Start: 2020-07-24

## 2020-07-20 RX ORDER — ONDANSETRON 2 MG/ML
8 INJECTION INTRAMUSCULAR; INTRAVENOUS
Status: COMPLETED | OUTPATIENT
Start: 2020-07-20 | End: 2020-07-20

## 2020-07-20 RX ORDER — HEPARIN 100 UNIT/ML
500 SYRINGE INTRAVENOUS
Status: CANCELLED | OUTPATIENT
Start: 2020-07-24

## 2020-07-20 RX ORDER — ONDANSETRON 2 MG/ML
8 INJECTION INTRAMUSCULAR; INTRAVENOUS
Status: CANCELLED | OUTPATIENT
Start: 2020-07-24

## 2020-07-20 RX ORDER — SODIUM CHLORIDE 0.9 % (FLUSH) 0.9 %
10 SYRINGE (ML) INJECTION
Status: DISCONTINUED | OUTPATIENT
Start: 2020-07-20 | End: 2020-07-20 | Stop reason: HOSPADM

## 2020-07-20 RX ORDER — HEPARIN 100 UNIT/ML
500 SYRINGE INTRAVENOUS
Status: DISCONTINUED | OUTPATIENT
Start: 2020-07-20 | End: 2020-07-20 | Stop reason: HOSPADM

## 2020-07-20 RX ADMIN — GEMCITABINE HYDROCHLORIDE 1000 MG: 1 INJECTION INTRAVENOUS at 09:07

## 2020-07-20 RX ADMIN — PEGFILGRASTIM 6 MG: KIT SUBCUTANEOUS at 10:07

## 2020-07-20 RX ADMIN — SODIUM CHLORIDE: 9 INJECTION, SOLUTION INTRAVENOUS at 09:07

## 2020-07-20 RX ADMIN — ONDANSETRON 8 MG: 2 INJECTION, SOLUTION INTRAMUSCULAR; INTRAVENOUS at 09:07

## 2020-07-20 NOTE — PLAN OF CARE
Pt got Gemzar today and tolerated well, with no complications or s/s of adverse reaction. VS stable throughout treatment. Pt experienced N/V at beginning of appointment today (before Gemzar infusion) which was relieved by Zofran IV. Neulasta OBI placed to posterior right arm, green light blinking at time of discharge. Pt instructed to remove device at 2:20 pm tomorrow 7/21/20. Left ac PIV positive for blood return, flushed with normal saline and removed prior to discharge. Catheter tip intact. Pt instructed to notify Dr. Tran's office if concerns arise. RTC 7/23/20, pt verbalized understanding. Pt discharged with no distress noted, ambulating independently. AVS printed and given to pt.

## 2020-07-23 ENCOUNTER — HOSPITAL ENCOUNTER (OUTPATIENT)
Facility: OTHER | Age: 65
Discharge: HOME | End: 2020-07-23
Attending: RADIOLOGY | Admitting: RADIOLOGY
Payer: COMMERCIAL

## 2020-07-23 VITALS
HEART RATE: 88 BPM | TEMPERATURE: 97 F | SYSTOLIC BLOOD PRESSURE: 104 MMHG | RESPIRATION RATE: 18 BRPM | DIASTOLIC BLOOD PRESSURE: 54 MMHG | OXYGEN SATURATION: 96 %

## 2020-07-23 DIAGNOSIS — C56.9 OVARIAN CANCER: ICD-10-CM

## 2020-07-23 DIAGNOSIS — C56.9 MALIGNANT NEOPLASM OF OVARY, UNSPECIFIED LATERALITY: ICD-10-CM

## 2020-07-23 DIAGNOSIS — C56.9: ICD-10-CM

## 2020-07-23 DIAGNOSIS — Z51.11 ENCOUNTER FOR ANTINEOPLASTIC CHEMOTHERAPY: ICD-10-CM

## 2020-07-23 LAB — SARS-COV-2 RNA RESP QL NAA+PROBE: NOT DETECTED

## 2020-07-23 PROCEDURE — C1894 INTRO/SHEATH, NON-LASER: HCPCS | Performed by: RADIOLOGY

## 2020-07-23 PROCEDURE — C1788 PORT, INDWELLING, IMP: HCPCS | Performed by: RADIOLOGY

## 2020-07-23 PROCEDURE — 99152 MOD SED SAME PHYS/QHP 5/>YRS: CPT | Performed by: RADIOLOGY

## 2020-07-23 PROCEDURE — C1769 GUIDE WIRE: HCPCS | Performed by: RADIOLOGY

## 2020-07-23 PROCEDURE — 36561 INSERT TUNNELED CV CATH: CPT

## 2020-07-23 PROCEDURE — 25000003 PHARM REV CODE 250: Performed by: RADIOLOGY

## 2020-07-23 PROCEDURE — 63600175 PHARM REV CODE 636 W HCPCS: Performed by: RADIOLOGY

## 2020-07-23 DEVICE — PORT POWER CLEAR VIEW: Type: IMPLANTABLE DEVICE | Site: NECK | Status: FUNCTIONAL

## 2020-07-23 RX ORDER — CEFAZOLIN SODIUM 1 G/3ML
INJECTION, POWDER, FOR SOLUTION INTRAMUSCULAR; INTRAVENOUS
Status: DISCONTINUED | OUTPATIENT
Start: 2020-07-23 | End: 2020-07-23 | Stop reason: HOSPADM

## 2020-07-23 RX ORDER — FENTANYL CITRATE 50 UG/ML
INJECTION, SOLUTION INTRAMUSCULAR; INTRAVENOUS
Status: DISCONTINUED | OUTPATIENT
Start: 2020-07-23 | End: 2020-07-23 | Stop reason: HOSPADM

## 2020-07-23 RX ORDER — MIDAZOLAM HYDROCHLORIDE 1 MG/ML
INJECTION, SOLUTION INTRAMUSCULAR; INTRAVENOUS
Status: DISCONTINUED | OUTPATIENT
Start: 2020-07-23 | End: 2020-07-23 | Stop reason: HOSPADM

## 2020-07-23 RX ORDER — HEPARIN SODIUM 1000 [USP'U]/ML
INJECTION, SOLUTION INTRAVENOUS; SUBCUTANEOUS
Status: DISCONTINUED | OUTPATIENT
Start: 2020-07-23 | End: 2020-07-23 | Stop reason: HOSPADM

## 2020-07-23 RX ORDER — ONDANSETRON 2 MG/ML
4 INJECTION INTRAMUSCULAR; INTRAVENOUS
Status: COMPLETED | OUTPATIENT
Start: 2020-07-23 | End: 2020-07-23

## 2020-07-23 RX ORDER — LIDOCAINE HYDROCHLORIDE 10 MG/ML
INJECTION INFILTRATION; PERINEURAL
Status: DISCONTINUED | OUTPATIENT
Start: 2020-07-23 | End: 2020-07-23 | Stop reason: HOSPADM

## 2020-07-23 RX ORDER — HEPARIN SOD,PORCINE/0.9 % NACL 1000/500ML
INTRAVENOUS SOLUTION INTRAVENOUS
Status: DISCONTINUED | OUTPATIENT
Start: 2020-07-23 | End: 2020-07-23 | Stop reason: HOSPADM

## 2020-07-23 RX ADMIN — ONDANSETRON 4 MG: 2 INJECTION INTRAMUSCULAR; INTRAVENOUS at 12:07

## 2020-07-23 NOTE — DISCHARGE SUMMARY
Radiology Discharge Summary      Hospital Course: No complications    Admit Date: 7/23/2020  Discharge Date: 07/23/2020     Instructions Given to Patient: Yes  Diet: Resume prior diet  Activity: activity as tolerated and no driving for today    Description of Condition on Discharge: Stable  Vital Signs (Most Recent): Temp: 97.1 °F (36.2 °C) (07/23/20 1212)  Pulse: 97 (07/23/20 1355)  Resp: 14 (07/23/20 1355)  BP: (!) 104/55 (07/23/20 1355)  SpO2: 95 % (07/23/20 1355)    Discharge Disposition: Home    Discharge Diagnosis: ovarian cancer     Follow-up: per gyn onc    @SIG@

## 2020-07-23 NOTE — H&P
Consult/H&P Note  Interventional Radiology    Consult Requested By: oncology    Reason for Consult: port placement    SUBJECTIVE:     Chief Complaint: ovarian cancer    History of Present Illness: 63 yo F with ovarian cancer, needs port placement for chemo.    Past Medical History:   Diagnosis Date    Cancer     ovarian    Dry eyes 11/29/2017    Neuropathy due to chemotherapeutic drug 1/10/2018    Open angle with borderline findings and low glaucoma risk in both eyes 6/20/2013    Ovarian cancer      Past Surgical History:   Procedure Laterality Date    BREAST BIOPSY      BUNIONECTOMY Left     CATARACT EXTRACTION W/  INTRAOCULAR LENS IMPLANT Right 04/04/2019        CATARACT EXTRACTION W/  INTRAOCULAR LENS IMPLANT Left 5/7/2019        CHOLECYSTECTOMY  1992    HYSTERECTOMY      left leg surgery  2005    achilles tendon    OOPHORECTOMY      PHACOEMULSIFICATION OF CATARACT Right 4/4/2019    Procedure: PHACOEMULSIFICATION, CATARACT;  Surgeon: Karen Butt MD;  Location: [facility] OR [address] [place] [address];  Service: Ophthalmology;  Laterality: Right;    IN REMOVAL OF OVARY/TUBE(S)      right leg surgery  2006    broken, including ankle     Family History   Problem Relation Age of Onset    Cancer Father         ? liver ca    Diabetes Father     Cataracts Mother     Breast cancer Maternal Aunt     Cancer Son         sarcoma    Melanoma Sister     Colon cancer Neg Hx     Ovarian cancer Neg Hx     Amblyopia Neg Hx     Blindness Neg Hx     Glaucoma Neg Hx     Hypertension Neg Hx     Macular degeneration Neg Hx     Retinal detachment Neg Hx     Strabismus Neg Hx     Stroke Neg Hx     Thyroid disease Neg Hx      Social History     Tobacco Use    Smoking status: Never Smoker    Smokeless tobacco: Never Used   Substance Use Topics    Alcohol use: Yes     Alcohol/week: 3.0 standard drinks     Types: 1 Glasses of wine, 2 Shots of liquor per week     Frequency: Monthly or less     Drinks per  session: 1 or 2     Binge frequency: Never     Comment: Socially    Drug use: No       Review of Systems:  Constitutional/General:No fever, chills, change in appetite or weight loss.  Hematological/Immuno: no known coagulopathies  Respiratory: no shortness of breath  Cardiovascular: no chest pain  Gastrointestinal: no abdominal pain  Genito-Urinary: no dysuria  Musculoskeletal: negative  Skin: Negative for rash, itching, pigmentation changes, nail or hair changes.  Neurological: no TIA or stroke symptoms  Psychiatric: normal mood/affect, good insight/judgement      OBJECTIVE:     Vital Signs Range (Last 24H):  Temp:  [97.1 °F (36.2 °C)]   Pulse:  [86]   Resp:  [18]   BP: (117)/(65)   SpO2:  [98 %]     Physical Exam:  General- Patient alert and oriented x3 in NAD  ENT- PERRLA,  Neck- No masses  CV- Regular rate and rhythm  Resp-  No increased WOB  GI- Non tender/non-distended  Extrem- No cyanosis, clubbing, edema.   Derm- No rashes, masses, or lesions noted  Neuro-  No focal deficits noted.     Physical Exam  There is no height or weight on file to calculate BMI.    Scheduled Meds:   Continuous Infusions:   PRN Meds:    Allergies: Review of patient's allergies indicates:  No Known Allergies    Labs:  No results for input(s): INR in the last 168 hours.    Invalid input(s):  PT,  PTT    Recent Labs   Lab 07/20/20  0515   WBC 2.69*   HGB 10.9*   HCT 34.7*   *         Recent Labs   Lab 07/20/20  0515   GLU 93      K 4.0      CO2 29   BUN 14   CREATININE 0.8   CALCIUM 8.5*   ALT 32   AST 38   ALBUMIN 3.4*   BILITOT 0.4       Vitals (Most Recent):  Temp: 97.1 °F (36.2 °C) (07/23/20 1212)  Pulse: 86 (07/23/20 1212)  Resp: 18 (07/23/20 1212)  BP: 117/65 (07/23/20 1212)  SpO2: 98 % (07/23/20 1212)    ASA: 2  Mallampati: 2    Consent obtained    ASSESSMENT/PLAN:     R chest port placement.  Moderate sedation.    Active Hospital Problems    Diagnosis  POA    Ovarian cancer - IIIC [C56.9]  Yes       Resolved Hospital Problems   No resolved problems to display.           George Tapia MD

## 2020-07-23 NOTE — PLAN OF CARE
Patient states that ehr daughter doesn't get off work until 5pm.  She will try to call james to be picked up earlier if daughter can get off work.  Patient is ready for discharge but states that she would like to sleep because she worked last night.

## 2020-07-23 NOTE — PROCEDURES
Radiology Post-Procedure Note    Pre Op Diagnosis: ovarian cancer  Post Op Diagnosis: Same    Procedure: port plaement    Procedure performed by: George Tapia MD    Written Informed Consent Obtained: Yes  Specimen Removed: NO  Estimated Blood Loss: Minimal    Findings:   Successful R chest port placement    Patient tolerated procedure well.    @SIG@

## 2020-07-29 ENCOUNTER — HOSPITAL ENCOUNTER (OUTPATIENT)
Dept: RADIOLOGY | Facility: OTHER | Age: 65
Discharge: HOME | End: 2020-07-29
Attending: INTERNAL MEDICINE
Payer: COMMERCIAL

## 2020-07-29 ENCOUNTER — HOSPITAL ENCOUNTER (OUTPATIENT)
Dept: RADIOLOGY | Facility: OTHER | Age: 65
Discharge: HOME | End: 2020-07-29
Attending: OBSTETRICS & GYNECOLOGY
Payer: COMMERCIAL

## 2020-07-29 DIAGNOSIS — M79.661 PAIN OF RIGHT LOWER LEG: ICD-10-CM

## 2020-07-29 DIAGNOSIS — C56.9 MALIGNANT NEOPLASM OF OVARY, UNSPECIFIED LATERALITY: ICD-10-CM

## 2020-07-29 DIAGNOSIS — M79.89 OTHER SPECIFIED SOFT TISSUE DISORDERS: ICD-10-CM

## 2020-07-29 PROCEDURE — 74177 CT CHEST ABDOMEN PELVIS WITH CONTRAST (XPD): ICD-10-PCS | Mod: 26,,, | Performed by: RADIOLOGY

## 2020-07-29 PROCEDURE — 74177 CT ABD & PELVIS W/CONTRAST: CPT | Mod: 26,,, | Performed by: RADIOLOGY

## 2020-07-29 PROCEDURE — 25500020 PHARM REV CODE 255: Performed by: OBSTETRICS & GYNECOLOGY

## 2020-07-29 PROCEDURE — A9698 NON-RAD CONTRAST MATERIALNOC: HCPCS | Performed by: OBSTETRICS & GYNECOLOGY

## 2020-07-29 PROCEDURE — 71260 CT CHEST ABDOMEN PELVIS WITH CONTRAST (XPD): ICD-10-PCS | Mod: 26,,, | Performed by: RADIOLOGY

## 2020-07-29 PROCEDURE — 73701 CT LOWER EXTREMITY W/DYE: CPT | Mod: 26,RT,, | Performed by: RADIOLOGY

## 2020-07-29 PROCEDURE — 73701 CT LEG (TIBIA-FIBULA) WITH CONTRAST RIGHT: ICD-10-PCS | Mod: 26,RT,, | Performed by: RADIOLOGY

## 2020-07-29 PROCEDURE — 73701 CT LOWER EXTREMITY W/DYE: CPT | Mod: TC,RT

## 2020-07-29 PROCEDURE — 71260 CT THORAX DX C+: CPT | Mod: 26,,, | Performed by: RADIOLOGY

## 2020-07-29 PROCEDURE — 74177 CT ABD & PELVIS W/CONTRAST: CPT | Mod: TC

## 2020-07-29 RX ADMIN — IOHEXOL 75 ML: 350 INJECTION, SOLUTION INTRAVENOUS at 11:07

## 2020-07-29 RX ADMIN — IOHEXOL 1000 ML: 9 SOLUTION ORAL at 09:07

## 2020-07-31 ENCOUNTER — OFFICE VISIT (OUTPATIENT)
Dept: GYNECOLOGIC ONCOLOGY | Facility: CLINIC | Age: 65
End: 2020-07-31
Payer: COMMERCIAL

## 2020-07-31 VITALS — WEIGHT: 163 LBS | DIASTOLIC BLOOD PRESSURE: 60 MMHG | SYSTOLIC BLOOD PRESSURE: 131 MMHG | BODY MASS INDEX: 25.53 KG/M2

## 2020-07-31 DIAGNOSIS — D49.59 OVARIAN NEOPLASM: ICD-10-CM

## 2020-07-31 PROCEDURE — 99214 PR OFFICE/OUTPT VISIT, EST, LEVL IV, 30-39 MIN: ICD-10-PCS | Mod: S$GLB,,, | Performed by: OBSTETRICS & GYNECOLOGY

## 2020-07-31 PROCEDURE — 1126F AMNT PAIN NOTED NONE PRSNT: CPT | Mod: S$GLB,,, | Performed by: OBSTETRICS & GYNECOLOGY

## 2020-07-31 PROCEDURE — 1159F PR MEDICATION LIST DOCUMENTED IN MEDICAL RECORD: ICD-10-PCS | Mod: S$GLB,,, | Performed by: OBSTETRICS & GYNECOLOGY

## 2020-07-31 PROCEDURE — 99214 OFFICE O/P EST MOD 30 MIN: CPT | Mod: S$GLB,,, | Performed by: OBSTETRICS & GYNECOLOGY

## 2020-07-31 PROCEDURE — 3008F BODY MASS INDEX DOCD: CPT | Mod: CPTII,S$GLB,, | Performed by: OBSTETRICS & GYNECOLOGY

## 2020-07-31 PROCEDURE — 99999 PR PBB SHADOW E&M-EST. PATIENT-LVL III: ICD-10-PCS | Mod: PBBFAC,,, | Performed by: OBSTETRICS & GYNECOLOGY

## 2020-07-31 PROCEDURE — 1101F PR PT FALLS ASSESS DOC 0-1 FALLS W/OUT INJ PAST YR: ICD-10-PCS | Mod: CPTII,S$GLB,, | Performed by: OBSTETRICS & GYNECOLOGY

## 2020-07-31 PROCEDURE — 3008F PR BODY MASS INDEX (BMI) DOCUMENTED: ICD-10-PCS | Mod: CPTII,S$GLB,, | Performed by: OBSTETRICS & GYNECOLOGY

## 2020-07-31 PROCEDURE — 1126F PR PAIN SEVERITY QUANTIFIED, NO PAIN PRESENT: ICD-10-PCS | Mod: S$GLB,,, | Performed by: OBSTETRICS & GYNECOLOGY

## 2020-07-31 PROCEDURE — 99999 PR PBB SHADOW E&M-EST. PATIENT-LVL III: CPT | Mod: PBBFAC,,, | Performed by: OBSTETRICS & GYNECOLOGY

## 2020-07-31 PROCEDURE — 1159F MED LIST DOCD IN RCRD: CPT | Mod: S$GLB,,, | Performed by: OBSTETRICS & GYNECOLOGY

## 2020-07-31 PROCEDURE — 1101F PT FALLS ASSESS-DOCD LE1/YR: CPT | Mod: CPTII,S$GLB,, | Performed by: OBSTETRICS & GYNECOLOGY

## 2020-07-31 NOTE — PROGRESS NOTES
Subjective:       Patient ID: Mickie Don is a 65 y.o. female.    Chief Complaint: Chemotherapy (F/u Scans)    Treatment History  Xlap/Omentectomy on 11/1/16 - mass was unresectable  Stage IIIC ovarian cancer  Initiated Taxotere/Carbo on 11/18/16, now s/p 6 cycles completed 3/8/17  Xlap/ISIS/BSO/Radical mass resection > 10 cm, optimal debulking on 4/11/17  Initiated PRIMA on 8/21/17  BRCA negative  Started FORWARD1 on 11/27/17  Started Doxil/Anju as part of NRG  2/27/19, received total of 6 cycles  Gemzar monotherapy started 8/29/19    HPI     Here today for continued f/u.  Has been on Gemzar with overall stable disease.  CT A/P on 7/29/2020 confirmed this as well.  No new disease was seen except for slight increase in size of her pleural effusions.  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    Review of Systems   Constitutional: Negative for appetite change, chills, diaphoresis, fatigue, fever and unexpected weight change.   HENT: Negative for mouth sores and tinnitus.    Respiratory: Negative for cough, chest tightness, shortness of breath and wheezing.    Cardiovascular: Negative for chest pain, palpitations and leg swelling.   Gastrointestinal: Negative for abdominal distention, abdominal pain, blood in stool, constipation, diarrhea, nausea and vomiting.   Genitourinary: Negative for difficulty urinating, dysuria, flank pain, frequency, hematuria, pelvic pain, urgency, vaginal bleeding, vaginal discharge and vaginal pain.   Musculoskeletal: Negative for arthralgias, back pain and gait problem.   Skin: Negative for color change and rash.   Neurological: Positive for numbness. Negative for dizziness, weakness and headaches.   Hematological: Negative for adenopathy. Does not bruise/bleed easily.   Psychiatric/Behavioral: Negative for confusion and sleep disturbance. The patient is not  nervous/anxious.        Objective:   /60   Wt 73.9 kg (163 lb)   BMI 25.53 kg/m²      Physical Exam  Constitutional:       General: She is not in acute distress.     Appearance: She is well-developed.   HENT:      Head: Normocephalic and atraumatic.   Eyes:      General: No scleral icterus.  Neck:      Musculoskeletal: Normal range of motion.   Pulmonary:      Effort: Pulmonary effort is normal. No respiratory distress.   Abdominal:      General: There is no distension.      Palpations: Abdomen is soft. There is no mass.      Comments: Tender to palpation in LLQ, along sigmoid   Genitourinary:     Exam position: Supine.      Labia:         Right: No rash, tenderness or lesion.         Left: No rash, tenderness or lesion.       Vagina: Normal.      Adnexa:         Right: No mass.          Left: No mass.        Comments: Pelvic normal  Musculoskeletal: Normal range of motion.         General: No tenderness.   Skin:     General: Skin is warm and dry.      Coloration: Skin is not pale.   Neurological:      Mental Status: She is alert and oriented to person, place, and time.   Psychiatric:         Behavior: Behavior normal.         Thought Content: Thought content normal.         Judgment: Judgment normal.         Assessment:       No diagnosis found.    Plan:     Will get PET to r/o other bony mets.  If not covered then consider bone scan.  Hold Gemzar next week.  If isolated to tibia then will radiate.

## 2020-08-04 ENCOUNTER — HOSPITAL ENCOUNTER (OUTPATIENT)
Dept: RADIOLOGY | Facility: HOSPITAL | Age: 65
Discharge: HOME | End: 2020-08-04
Attending: OBSTETRICS & GYNECOLOGY
Payer: COMMERCIAL

## 2020-08-04 DIAGNOSIS — D49.59 OVARIAN NEOPLASM: ICD-10-CM

## 2020-08-04 LAB — POCT GLUCOSE: 84 MG/DL (ref 70–110)

## 2020-08-04 PROCEDURE — 78815 NM PET CT ROUTINE: ICD-10-PCS | Mod: 26,PS,, | Performed by: RADIOLOGY

## 2020-08-04 PROCEDURE — 78815 PET IMAGE W/CT SKULL-THIGH: CPT | Mod: TC

## 2020-08-04 PROCEDURE — 78815 PET IMAGE W/CT SKULL-THIGH: CPT | Mod: 26,PS,, | Performed by: RADIOLOGY

## 2020-08-04 PROCEDURE — A9552 F18 FDG: HCPCS

## 2020-08-07 ENCOUNTER — TELEPHONE (OUTPATIENT)
Dept: GYNECOLOGIC ONCOLOGY | Facility: CLINIC | Age: 65
End: 2020-08-07

## 2020-08-07 NOTE — TELEPHONE ENCOUNTER
Called to discuss PET results with her.  She does not have bony lesions but does have new diffuse metastatic bony disease.  She will need a change of regimen so we discussed topotecan weekly.  She is on board with this plan.  Will get her in next week for labs and consents.

## 2020-08-10 ENCOUNTER — TELEPHONE (OUTPATIENT)
Dept: GYNECOLOGIC ONCOLOGY | Facility: CLINIC | Age: 65
End: 2020-08-10

## 2020-08-10 ENCOUNTER — CLINICAL SUPPORT (OUTPATIENT)
Dept: GYNECOLOGIC ONCOLOGY | Facility: CLINIC | Age: 65
End: 2020-08-10
Payer: COMMERCIAL

## 2020-08-10 PROCEDURE — 99999 PR PBB SHADOW E&M-EST. PATIENT-LVL I: CPT | Mod: PBBFAC,,,

## 2020-08-10 PROCEDURE — 99999 PR PBB SHADOW E&M-EST. PATIENT-LVL I: ICD-10-PCS | Mod: PBBFAC,,,

## 2020-08-10 NOTE — TELEPHONE ENCOUNTER
----- Message from Esa Butts RN sent at 8/7/2020  4:19 PM CDT -----  Good afternoon,    Would you get this patient scheduled for a visit with Dr. Tran next week to sign consents for chemo?    J'me

## 2020-08-10 NOTE — TELEPHONE ENCOUNTER
Spoke with our patient about her schedule appointment in gyn oncology she agreed she voiced understanding of the date, time and location. All questions answered . MA/DANYELLE /Preceptor José BECKHAM

## 2020-08-10 NOTE — PROGRESS NOTES
Patient here to sign consents for Topotecan. Patient will be scheduled once chemotherapy plan approved by insurance. Verbalized understanding. All questions answered. Consents signed.

## 2020-08-14 ENCOUNTER — INFUSION (OUTPATIENT)
Dept: INFUSION THERAPY | Facility: HOSPITAL | Age: 65
End: 2020-08-14
Attending: EMERGENCY MEDICINE
Payer: COMMERCIAL

## 2020-08-14 VITALS
TEMPERATURE: 98 F | RESPIRATION RATE: 18 BRPM | HEIGHT: 67 IN | HEART RATE: 71 BPM | SYSTOLIC BLOOD PRESSURE: 135 MMHG | BODY MASS INDEX: 25.6 KG/M2 | WEIGHT: 163.13 LBS | DIASTOLIC BLOOD PRESSURE: 63 MMHG | OXYGEN SATURATION: 99 %

## 2020-08-14 DIAGNOSIS — C56.9 OVARIAN CANCER, UNSPECIFIED LATERALITY: Primary | ICD-10-CM

## 2020-08-14 PROCEDURE — 63600175 PHARM REV CODE 636 W HCPCS: Performed by: OBSTETRICS & GYNECOLOGY

## 2020-08-14 PROCEDURE — A4216 STERILE WATER/SALINE, 10 ML: HCPCS | Performed by: OBSTETRICS & GYNECOLOGY

## 2020-08-14 PROCEDURE — 96413 CHEMO IV INFUSION 1 HR: CPT

## 2020-08-14 PROCEDURE — 25000003 PHARM REV CODE 250: Performed by: OBSTETRICS & GYNECOLOGY

## 2020-08-14 PROCEDURE — 96367 TX/PROPH/DG ADDL SEQ IV INF: CPT

## 2020-08-14 RX ORDER — HEPARIN 100 UNIT/ML
500 SYRINGE INTRAVENOUS
Status: CANCELLED | OUTPATIENT
Start: 2020-08-14

## 2020-08-14 RX ORDER — HEPARIN 100 UNIT/ML
500 SYRINGE INTRAVENOUS
Status: DISCONTINUED | OUTPATIENT
Start: 2020-08-14 | End: 2020-08-14 | Stop reason: HOSPADM

## 2020-08-14 RX ORDER — SODIUM CHLORIDE 0.9 % (FLUSH) 0.9 %
10 SYRINGE (ML) INJECTION
Status: DISCONTINUED | OUTPATIENT
Start: 2020-08-14 | End: 2020-08-14 | Stop reason: HOSPADM

## 2020-08-14 RX ORDER — SODIUM CHLORIDE 0.9 % (FLUSH) 0.9 %
10 SYRINGE (ML) INJECTION
Status: CANCELLED | OUTPATIENT
Start: 2020-08-14

## 2020-08-14 RX ADMIN — HEPARIN 500 UNITS: 100 SYRINGE at 10:08

## 2020-08-14 RX ADMIN — SODIUM CHLORIDE 7.5 MG: 9 INJECTION, SOLUTION INTRAVENOUS at 10:08

## 2020-08-14 RX ADMIN — Medication 10 ML: at 10:08

## 2020-08-14 RX ADMIN — DEXAMETHASONE SODIUM PHOSPHATE: 4 INJECTION, SOLUTION INTRA-ARTICULAR; INTRALESIONAL; INTRAMUSCULAR; INTRAVENOUS; SOFT TISSUE at 09:08

## 2020-08-14 NOTE — PLAN OF CARE
Pt received Topotecan today and tolerated well, without complications. Educated patient about Topotecan (indications, side effects, possible reactions, chemotherapy precautions) and verbalized understanding.  VSS. CW port positive for blood return, saline flushed, Heparin flush instilled to dwell and de accessed prior to DC. Pt DC with no distress noted, ambulated off unit w/o event, pleased.

## 2020-08-21 ENCOUNTER — INFUSION (OUTPATIENT)
Dept: INFUSION THERAPY | Facility: HOSPITAL | Age: 65
End: 2020-08-21
Attending: OBSTETRICS & GYNECOLOGY
Payer: COMMERCIAL

## 2020-08-21 VITALS
BODY MASS INDEX: 26.02 KG/M2 | DIASTOLIC BLOOD PRESSURE: 66 MMHG | SYSTOLIC BLOOD PRESSURE: 137 MMHG | WEIGHT: 165.81 LBS | HEIGHT: 67 IN | HEART RATE: 70 BPM | TEMPERATURE: 99 F | RESPIRATION RATE: 18 BRPM

## 2020-08-21 DIAGNOSIS — C56.9 OVARIAN CANCER, UNSPECIFIED LATERALITY: Primary | ICD-10-CM

## 2020-08-21 PROCEDURE — 96367 TX/PROPH/DG ADDL SEQ IV INF: CPT

## 2020-08-21 PROCEDURE — 25000003 PHARM REV CODE 250: Performed by: OBSTETRICS & GYNECOLOGY

## 2020-08-21 PROCEDURE — 96413 CHEMO IV INFUSION 1 HR: CPT

## 2020-08-21 PROCEDURE — 63600175 PHARM REV CODE 636 W HCPCS: Performed by: OBSTETRICS & GYNECOLOGY

## 2020-08-21 PROCEDURE — A4216 STERILE WATER/SALINE, 10 ML: HCPCS | Performed by: OBSTETRICS & GYNECOLOGY

## 2020-08-21 RX ORDER — SODIUM CHLORIDE 0.9 % (FLUSH) 0.9 %
10 SYRINGE (ML) INJECTION
Status: CANCELLED | OUTPATIENT
Start: 2020-08-21

## 2020-08-21 RX ORDER — HEPARIN 100 UNIT/ML
500 SYRINGE INTRAVENOUS
Status: DISCONTINUED | OUTPATIENT
Start: 2020-08-21 | End: 2020-08-21 | Stop reason: HOSPADM

## 2020-08-21 RX ORDER — SODIUM CHLORIDE 0.9 % (FLUSH) 0.9 %
10 SYRINGE (ML) INJECTION
Status: DISCONTINUED | OUTPATIENT
Start: 2020-08-21 | End: 2020-08-21 | Stop reason: HOSPADM

## 2020-08-21 RX ORDER — HEPARIN 100 UNIT/ML
500 SYRINGE INTRAVENOUS
Status: CANCELLED | OUTPATIENT
Start: 2020-08-21

## 2020-08-21 RX ADMIN — SODIUM CHLORIDE 7.5 MG: 9 INJECTION, SOLUTION INTRAVENOUS at 10:08

## 2020-08-21 RX ADMIN — DEXAMETHASONE SODIUM PHOSPHATE: 4 INJECTION, SOLUTION INTRA-ARTICULAR; INTRALESIONAL; INTRAMUSCULAR; INTRAVENOUS; SOFT TISSUE at 10:08

## 2020-08-21 RX ADMIN — Medication 10 ML: at 11:08

## 2020-08-21 RX ADMIN — HEPARIN 500 UNITS: 100 SYRINGE at 11:08

## 2020-08-21 NOTE — PLAN OF CARE
1115 pt tolerated topotecan infusion without issue, pt has no upcoming appts scheduled at this time, no distress noted upon d/c to home

## 2020-08-21 NOTE — PLAN OF CARE
0930 pt here for topotecan infusion D8C1, labs, hx, meds, allergies reviewed, pt with c/o constipation taking miralax which works, pt reclined in chair, continue to hayley

## 2020-09-04 ENCOUNTER — INFUSION (OUTPATIENT)
Dept: INFUSION THERAPY | Facility: HOSPITAL | Age: 65
End: 2020-09-04
Attending: OBSTETRICS & GYNECOLOGY
Payer: COMMERCIAL

## 2020-09-04 VITALS
HEART RATE: 76 BPM | RESPIRATION RATE: 18 BRPM | BODY MASS INDEX: 26.47 KG/M2 | TEMPERATURE: 98 F | SYSTOLIC BLOOD PRESSURE: 136 MMHG | DIASTOLIC BLOOD PRESSURE: 61 MMHG | WEIGHT: 168.63 LBS | HEIGHT: 67 IN

## 2020-09-04 DIAGNOSIS — C56.9 OVARIAN CANCER, UNSPECIFIED LATERALITY: Primary | ICD-10-CM

## 2020-09-04 PROCEDURE — 63600175 PHARM REV CODE 636 W HCPCS: Performed by: OBSTETRICS & GYNECOLOGY

## 2020-09-04 PROCEDURE — 96367 TX/PROPH/DG ADDL SEQ IV INF: CPT

## 2020-09-04 PROCEDURE — 96413 CHEMO IV INFUSION 1 HR: CPT

## 2020-09-04 PROCEDURE — A4216 STERILE WATER/SALINE, 10 ML: HCPCS | Performed by: OBSTETRICS & GYNECOLOGY

## 2020-09-04 PROCEDURE — 25000003 PHARM REV CODE 250: Performed by: OBSTETRICS & GYNECOLOGY

## 2020-09-04 RX ORDER — HEPARIN 100 UNIT/ML
500 SYRINGE INTRAVENOUS
Status: CANCELLED | OUTPATIENT
Start: 2020-09-04

## 2020-09-04 RX ORDER — HEPARIN 100 UNIT/ML
500 SYRINGE INTRAVENOUS
Status: DISCONTINUED | OUTPATIENT
Start: 2020-09-04 | End: 2020-09-04 | Stop reason: HOSPADM

## 2020-09-04 RX ORDER — SODIUM CHLORIDE 0.9 % (FLUSH) 0.9 %
10 SYRINGE (ML) INJECTION
Status: DISCONTINUED | OUTPATIENT
Start: 2020-09-04 | End: 2020-09-04 | Stop reason: HOSPADM

## 2020-09-04 RX ORDER — SODIUM CHLORIDE 0.9 % (FLUSH) 0.9 %
10 SYRINGE (ML) INJECTION
Status: CANCELLED | OUTPATIENT
Start: 2020-09-04

## 2020-09-04 RX ADMIN — TOPOTECAN HYDROCHLORIDE 7.5 MG: 4 INJECTION, POWDER, LYOPHILIZED, FOR SOLUTION INTRAVENOUS at 09:09

## 2020-09-04 RX ADMIN — Medication 10 ML: at 10:09

## 2020-09-04 RX ADMIN — HEPARIN 500 UNITS: 100 SYRINGE at 10:09

## 2020-09-04 RX ADMIN — DEXAMETHASONE SODIUM PHOSPHATE: 4 INJECTION, SOLUTION INTRA-ARTICULAR; INTRALESIONAL; INTRAMUSCULAR; INTRAVENOUS; SOFT TISSUE at 08:09

## 2020-09-04 NOTE — PLAN OF CARE
0830 pt here for Topotecan infusion D15C1, labs, hx, meds, allergies reviewed, pt with no complaints at this time, reclined in chair, continue to monitor

## 2020-09-04 NOTE — PLAN OF CARE
1025 pt tolerated topotecan infusion without issue, pt has no upcoming appts at this time, no distress noted upon d/c to home

## 2020-09-18 ENCOUNTER — INFUSION (OUTPATIENT)
Dept: INFUSION THERAPY | Facility: HOSPITAL | Age: 65
End: 2020-09-18
Attending: OBSTETRICS & GYNECOLOGY
Payer: COMMERCIAL

## 2020-09-18 ENCOUNTER — RESEARCH ENCOUNTER (OUTPATIENT)
Dept: RESEARCH | Facility: HOSPITAL | Age: 65
End: 2020-09-18

## 2020-09-18 VITALS
WEIGHT: 168.88 LBS | HEART RATE: 71 BPM | HEIGHT: 67 IN | RESPIRATION RATE: 18 BRPM | DIASTOLIC BLOOD PRESSURE: 61 MMHG | TEMPERATURE: 99 F | SYSTOLIC BLOOD PRESSURE: 126 MMHG | BODY MASS INDEX: 26.51 KG/M2

## 2020-09-18 DIAGNOSIS — C56.9 OVARIAN CANCER, UNSPECIFIED LATERALITY: Primary | ICD-10-CM

## 2020-09-18 PROCEDURE — 63600175 PHARM REV CODE 636 W HCPCS: Performed by: OBSTETRICS & GYNECOLOGY

## 2020-09-18 PROCEDURE — 96413 CHEMO IV INFUSION 1 HR: CPT

## 2020-09-18 PROCEDURE — 25000003 PHARM REV CODE 250: Performed by: OBSTETRICS & GYNECOLOGY

## 2020-09-18 PROCEDURE — 96367 TX/PROPH/DG ADDL SEQ IV INF: CPT

## 2020-09-18 RX ORDER — HEPARIN 100 UNIT/ML
500 SYRINGE INTRAVENOUS
Status: DISCONTINUED | OUTPATIENT
Start: 2020-09-18 | End: 2020-09-18 | Stop reason: HOSPADM

## 2020-09-18 RX ORDER — SODIUM CHLORIDE 0.9 % (FLUSH) 0.9 %
10 SYRINGE (ML) INJECTION
Status: DISCONTINUED | OUTPATIENT
Start: 2020-09-18 | End: 2020-09-18 | Stop reason: HOSPADM

## 2020-09-18 RX ORDER — SODIUM CHLORIDE 0.9 % (FLUSH) 0.9 %
10 SYRINGE (ML) INJECTION
Status: CANCELLED | OUTPATIENT
Start: 2020-09-18

## 2020-09-18 RX ORDER — HEPARIN 100 UNIT/ML
500 SYRINGE INTRAVENOUS
Status: CANCELLED | OUTPATIENT
Start: 2020-09-18

## 2020-09-18 RX ADMIN — DEXAMETHASONE SODIUM PHOSPHATE: 4 INJECTION, SOLUTION INTRA-ARTICULAR; INTRALESIONAL; INTRAMUSCULAR; INTRAVENOUS; SOFT TISSUE at 08:09

## 2020-09-18 RX ADMIN — SODIUM CHLORIDE 7.5 MG: 9 INJECTION, SOLUTION INTRAVENOUS at 09:09

## 2020-09-18 RX ADMIN — HEPARIN 500 UNITS: 100 SYRINGE at 09:09

## 2020-09-18 NOTE — PROGRESS NOTES
Friday, September 18, 2020  Pt. Initial: LILO VAZ  Protocol: NRG-  Study #: [id number]  IRB #: [id number]      Meet with Pt at doc visit today for follow-up per Protocol. Pt pleasant and doing well.  Pt completed 84 weeks QOL. Mailed out via [employer]. Tracking label on file.   Pt thanked for follow-up and assents to future contacts.

## 2020-09-18 NOTE — PLAN OF CARE
Pt received Topotecan; tolerated well. VSS and NAD. Pt instructed to call MD with any concerns. Pt discharged home independently.      Problem: Anemia (Chemotherapy Effects)  Goal: Anemia Symptom Improvement  Outcome: Ongoing, Progressing     Problem: Urinary Bleeding Risk or Actual (Chemotherapy Effects)  Goal: Absence of Hematuria  Outcome: Ongoing, Progressing     Problem: Neurotoxicity (Chemotherapy Effects)  Goal: Neurotoxicity Symptom Control  Outcome: Ongoing, Progressing

## 2020-09-23 ENCOUNTER — PATIENT MESSAGE (OUTPATIENT)
Dept: GYNECOLOGIC ONCOLOGY | Facility: CLINIC | Age: 65
End: 2020-09-23

## 2020-09-25 ENCOUNTER — PATIENT MESSAGE (OUTPATIENT)
Dept: GYNECOLOGIC ONCOLOGY | Facility: CLINIC | Age: 65
End: 2020-09-25

## 2020-09-25 ENCOUNTER — INFUSION (OUTPATIENT)
Dept: INFUSION THERAPY | Facility: HOSPITAL | Age: 65
End: 2020-09-25
Attending: OBSTETRICS & GYNECOLOGY
Payer: COMMERCIAL

## 2020-09-25 ENCOUNTER — LAB VISIT (OUTPATIENT)
Dept: LAB | Facility: HOSPITAL | Age: 65
End: 2020-09-25
Attending: OBSTETRICS & GYNECOLOGY
Payer: COMMERCIAL

## 2020-09-25 VITALS
SYSTOLIC BLOOD PRESSURE: 132 MMHG | OXYGEN SATURATION: 98 % | DIASTOLIC BLOOD PRESSURE: 62 MMHG | TEMPERATURE: 98 F | RESPIRATION RATE: 17 BRPM | HEART RATE: 70 BPM

## 2020-09-25 DIAGNOSIS — C56.9 OVARIAN CANCER, UNSPECIFIED LATERALITY: Primary | ICD-10-CM

## 2020-09-25 DIAGNOSIS — C56.9 OVARIAN CANCER, UNSPECIFIED LATERALITY: ICD-10-CM

## 2020-09-25 LAB
ALBUMIN SERPL BCP-MCNC: 3.7 G/DL (ref 3.5–5.2)
ALP SERPL-CCNC: 220 U/L (ref 55–135)
ALT SERPL W/O P-5'-P-CCNC: 30 U/L (ref 10–44)
ANION GAP SERPL CALC-SCNC: 9 MMOL/L (ref 8–16)
AST SERPL-CCNC: 36 U/L (ref 10–40)
BILIRUB SERPL-MCNC: 0.5 MG/DL (ref 0.1–1)
BUN SERPL-MCNC: 15 MG/DL (ref 8–23)
CALCIUM SERPL-MCNC: 9.4 MG/DL (ref 8.7–10.5)
CANCER AG125 SERPL-ACNC: 857 U/ML (ref 0–30)
CHLORIDE SERPL-SCNC: 104 MMOL/L (ref 95–110)
CO2 SERPL-SCNC: 26 MMOL/L (ref 23–29)
CREAT SERPL-MCNC: 0.8 MG/DL (ref 0.5–1.4)
ERYTHROCYTE [DISTWIDTH] IN BLOOD BY AUTOMATED COUNT: 13.2 % (ref 11.5–14.5)
EST. GFR  (AFRICAN AMERICAN): >60 ML/MIN/1.73 M^2
EST. GFR  (NON AFRICAN AMERICAN): >60 ML/MIN/1.73 M^2
GLUCOSE SERPL-MCNC: 108 MG/DL (ref 70–110)
HCT VFR BLD AUTO: 40.5 % (ref 37–48.5)
HGB BLD-MCNC: 13 G/DL (ref 12–16)
IMM GRANULOCYTES # BLD AUTO: 0.01 K/UL (ref 0–0.04)
MCH RBC QN AUTO: 30.4 PG (ref 27–31)
MCHC RBC AUTO-ENTMCNC: 32.1 G/DL (ref 32–36)
MCV RBC AUTO: 95 FL (ref 82–98)
NEUTROPHILS # BLD AUTO: 2.3 K/UL (ref 1.8–7.7)
PLATELET # BLD AUTO: 165 K/UL (ref 150–350)
PMV BLD AUTO: 10.3 FL (ref 9.2–12.9)
POTASSIUM SERPL-SCNC: 5 MMOL/L (ref 3.5–5.1)
PROT SERPL-MCNC: 7.1 G/DL (ref 6–8.4)
RBC # BLD AUTO: 4.27 M/UL (ref 4–5.4)
SODIUM SERPL-SCNC: 139 MMOL/L (ref 136–145)
WBC # BLD AUTO: 3.62 K/UL (ref 3.9–12.7)

## 2020-09-25 PROCEDURE — 86304 IMMUNOASSAY TUMOR CA 125: CPT

## 2020-09-25 PROCEDURE — 85027 COMPLETE CBC AUTOMATED: CPT

## 2020-09-25 PROCEDURE — 80053 COMPREHEN METABOLIC PANEL: CPT

## 2020-09-25 PROCEDURE — 25000003 PHARM REV CODE 250: Performed by: OBSTETRICS & GYNECOLOGY

## 2020-09-25 PROCEDURE — 36415 COLL VENOUS BLD VENIPUNCTURE: CPT

## 2020-09-25 PROCEDURE — 96367 TX/PROPH/DG ADDL SEQ IV INF: CPT

## 2020-09-25 PROCEDURE — 96413 CHEMO IV INFUSION 1 HR: CPT

## 2020-09-25 PROCEDURE — 63600175 PHARM REV CODE 636 W HCPCS: Performed by: OBSTETRICS & GYNECOLOGY

## 2020-09-25 RX ORDER — SODIUM CHLORIDE 0.9 % (FLUSH) 0.9 %
10 SYRINGE (ML) INJECTION
Status: DISCONTINUED | OUTPATIENT
Start: 2020-09-25 | End: 2020-09-25 | Stop reason: HOSPADM

## 2020-09-25 RX ORDER — SODIUM CHLORIDE 0.9 % (FLUSH) 0.9 %
10 SYRINGE (ML) INJECTION
Status: CANCELLED | OUTPATIENT
Start: 2020-09-25

## 2020-09-25 RX ORDER — HEPARIN 100 UNIT/ML
500 SYRINGE INTRAVENOUS
Status: CANCELLED | OUTPATIENT
Start: 2020-09-25

## 2020-09-25 RX ORDER — HEPARIN 100 UNIT/ML
500 SYRINGE INTRAVENOUS
Status: DISCONTINUED | OUTPATIENT
Start: 2020-09-25 | End: 2020-09-25 | Stop reason: HOSPADM

## 2020-09-25 RX ADMIN — HEPARIN 500 UNITS: 100 SYRINGE at 09:09

## 2020-09-25 RX ADMIN — DEXAMETHASONE SODIUM PHOSPHATE: 4 INJECTION, SOLUTION INTRA-ARTICULAR; INTRALESIONAL; INTRAMUSCULAR; INTRAVENOUS; SOFT TISSUE at 08:09

## 2020-09-25 RX ADMIN — TOPOTECAN 7.5 MG: 1 INJECTION, SOLUTION, CONCENTRATE INTRAVENOUS at 09:09

## 2020-09-25 NOTE — PLAN OF CARE
"Pt ambulatory to clinic for Topotecan infusion. Pt complaint of constipation but increased her Miralax dose and had a BM yesterday "diarrhea" Reports decreased in appetite due to "feeling full" quickly. Port accessed without difficulty, good blood return. Pt tolerated treatment well. Port deaccessed after flushing with NS and heparin. Bandaid applied. Ambulatory from clinic in [place].   "

## 2020-09-28 ENCOUNTER — PATIENT MESSAGE (OUTPATIENT)
Dept: GYNECOLOGIC ONCOLOGY | Facility: CLINIC | Age: 65
End: 2020-09-28

## 2020-10-02 ENCOUNTER — LAB VISIT (OUTPATIENT)
Dept: LAB | Facility: HOSPITAL | Age: 65
End: 2020-10-02
Attending: INTERNAL MEDICINE
Payer: COMMERCIAL

## 2020-10-02 ENCOUNTER — INFUSION (OUTPATIENT)
Dept: INFUSION THERAPY | Facility: HOSPITAL | Age: 65
End: 2020-10-02
Attending: OBSTETRICS & GYNECOLOGY
Payer: COMMERCIAL

## 2020-10-02 VITALS
HEART RATE: 72 BPM | SYSTOLIC BLOOD PRESSURE: 124 MMHG | DIASTOLIC BLOOD PRESSURE: 60 MMHG | RESPIRATION RATE: 18 BRPM | TEMPERATURE: 98 F

## 2020-10-02 DIAGNOSIS — C56.9 OVARIAN CANCER, UNSPECIFIED LATERALITY: ICD-10-CM

## 2020-10-02 DIAGNOSIS — C56.9 OVARIAN CANCER, UNSPECIFIED LATERALITY: Primary | ICD-10-CM

## 2020-10-02 LAB
ALBUMIN SERPL BCP-MCNC: 3 G/DL (ref 3.5–5.2)
ALP SERPL-CCNC: 237 U/L (ref 55–135)
ALT SERPL W/O P-5'-P-CCNC: 37 U/L (ref 10–44)
ANION GAP SERPL CALC-SCNC: 5 MMOL/L (ref 8–16)
AST SERPL-CCNC: 40 U/L (ref 10–40)
BILIRUB SERPL-MCNC: 0.4 MG/DL (ref 0.1–1)
BUN SERPL-MCNC: 13 MG/DL (ref 8–23)
CALCIUM SERPL-MCNC: 8.5 MG/DL (ref 8.7–10.5)
CANCER AG125 SERPL-ACNC: 931 U/ML (ref 0–30)
CHLORIDE SERPL-SCNC: 104 MMOL/L (ref 95–110)
CO2 SERPL-SCNC: 28 MMOL/L (ref 23–29)
CREAT SERPL-MCNC: 0.7 MG/DL (ref 0.5–1.4)
ERYTHROCYTE [DISTWIDTH] IN BLOOD BY AUTOMATED COUNT: 13.3 % (ref 11.5–14.5)
EST. GFR  (AFRICAN AMERICAN): >60 ML/MIN/1.73 M^2
EST. GFR  (NON AFRICAN AMERICAN): >60 ML/MIN/1.73 M^2
GLUCOSE SERPL-MCNC: 101 MG/DL (ref 70–110)
HCT VFR BLD AUTO: 37.8 % (ref 37–48.5)
HGB BLD-MCNC: 12.3 G/DL (ref 12–16)
IMM GRANULOCYTES # BLD AUTO: 0.01 K/UL (ref 0–0.04)
MCH RBC QN AUTO: 30 PG (ref 27–31)
MCHC RBC AUTO-ENTMCNC: 32.5 G/DL (ref 32–36)
MCV RBC AUTO: 92 FL (ref 82–98)
NEUTROPHILS # BLD AUTO: 2.1 K/UL (ref 1.8–7.7)
PLATELET # BLD AUTO: 84 K/UL (ref 150–350)
PMV BLD AUTO: 11.3 FL (ref 9.2–12.9)
POTASSIUM SERPL-SCNC: 4.5 MMOL/L (ref 3.5–5.1)
PROT SERPL-MCNC: 6.2 G/DL (ref 6–8.4)
RBC # BLD AUTO: 4.1 M/UL (ref 4–5.4)
SODIUM SERPL-SCNC: 137 MMOL/L (ref 136–145)
WBC # BLD AUTO: 2.99 K/UL (ref 3.9–12.7)

## 2020-10-02 PROCEDURE — 25000003 PHARM REV CODE 250: Performed by: OBSTETRICS & GYNECOLOGY

## 2020-10-02 PROCEDURE — 96413 CHEMO IV INFUSION 1 HR: CPT

## 2020-10-02 PROCEDURE — 85027 COMPLETE CBC AUTOMATED: CPT

## 2020-10-02 PROCEDURE — 86304 IMMUNOASSAY TUMOR CA 125: CPT

## 2020-10-02 PROCEDURE — 80053 COMPREHEN METABOLIC PANEL: CPT

## 2020-10-02 PROCEDURE — 96367 TX/PROPH/DG ADDL SEQ IV INF: CPT

## 2020-10-02 PROCEDURE — 36415 COLL VENOUS BLD VENIPUNCTURE: CPT

## 2020-10-02 PROCEDURE — 63600175 PHARM REV CODE 636 W HCPCS: Performed by: OBSTETRICS & GYNECOLOGY

## 2020-10-02 RX ORDER — HEPARIN 100 UNIT/ML
500 SYRINGE INTRAVENOUS
Status: CANCELLED | OUTPATIENT
Start: 2020-10-02

## 2020-10-02 RX ORDER — SODIUM CHLORIDE 0.9 % (FLUSH) 0.9 %
10 SYRINGE (ML) INJECTION
Status: DISCONTINUED | OUTPATIENT
Start: 2020-10-02 | End: 2020-10-02 | Stop reason: HOSPADM

## 2020-10-02 RX ORDER — SODIUM CHLORIDE 0.9 % (FLUSH) 0.9 %
10 SYRINGE (ML) INJECTION
Status: CANCELLED | OUTPATIENT
Start: 2020-10-02

## 2020-10-02 RX ORDER — HEPARIN 100 UNIT/ML
500 SYRINGE INTRAVENOUS
Status: DISCONTINUED | OUTPATIENT
Start: 2020-10-02 | End: 2020-10-02 | Stop reason: HOSPADM

## 2020-10-02 RX ADMIN — DEXAMETHASONE SODIUM PHOSPHATE: 4 INJECTION, SOLUTION INTRA-ARTICULAR; INTRALESIONAL; INTRAMUSCULAR; INTRAVENOUS; SOFT TISSUE at 08:10

## 2020-10-02 RX ADMIN — TOPOTECAN HYDROCHLORIDE 7.5 MG: 4 INJECTION, POWDER, LYOPHILIZED, FOR SOLUTION INTRAVENOUS at 09:10

## 2020-10-09 ENCOUNTER — TELEPHONE (OUTPATIENT)
Dept: GYNECOLOGIC ONCOLOGY | Facility: CLINIC | Age: 65
End: 2020-10-09

## 2020-10-09 NOTE — TELEPHONE ENCOUNTER
----- Message from Yvonne Smith sent at 10/9/2020  8:12 AM CDT -----  Who Called: REGINALDO DIEZ     What is the reqeust in detail: Would like to speak to staff in regards to wanting to come in sooner than 10/16 due to feeling as if the chemotherapy isn't working due to her  keep elevating. Please advise.     Can the clinic reply by [facility]?: Yes    Best Call Back Number: [phone]

## 2020-10-12 ENCOUNTER — TELEPHONE (OUTPATIENT)
Dept: GYNECOLOGIC ONCOLOGY | Facility: CLINIC | Age: 65
End: 2020-10-12

## 2020-10-12 ENCOUNTER — OFFICE VISIT (OUTPATIENT)
Dept: GYNECOLOGIC ONCOLOGY | Facility: CLINIC | Age: 65
End: 2020-10-12
Payer: COMMERCIAL

## 2020-10-12 VITALS
BODY MASS INDEX: 25.66 KG/M2 | WEIGHT: 163.81 LBS | SYSTOLIC BLOOD PRESSURE: 129 MMHG | HEART RATE: 81 BPM | DIASTOLIC BLOOD PRESSURE: 59 MMHG

## 2020-10-12 DIAGNOSIS — C56.9 MALIGNANT NEOPLASM OF OVARY, UNSPECIFIED LATERALITY: Primary | ICD-10-CM

## 2020-10-12 DIAGNOSIS — T50.905A THROMBOCYTOPENIA DUE TO DRUGS: ICD-10-CM

## 2020-10-12 DIAGNOSIS — D70.2 NEUTROPENIA, DRUG-INDUCED: ICD-10-CM

## 2020-10-12 DIAGNOSIS — Z51.11 ENCOUNTER FOR ANTINEOPLASTIC CHEMOTHERAPY: ICD-10-CM

## 2020-10-12 DIAGNOSIS — T45.1X5A NEUROPATHY DUE TO CHEMOTHERAPEUTIC DRUG: ICD-10-CM

## 2020-10-12 DIAGNOSIS — D69.59 THROMBOCYTOPENIA DUE TO DRUGS: ICD-10-CM

## 2020-10-12 DIAGNOSIS — G62.0 NEUROPATHY DUE TO CHEMOTHERAPEUTIC DRUG: ICD-10-CM

## 2020-10-12 PROCEDURE — 1159F PR MEDICATION LIST DOCUMENTED IN MEDICAL RECORD: ICD-10-PCS | Mod: S$GLB,,, | Performed by: OBSTETRICS & GYNECOLOGY

## 2020-10-12 PROCEDURE — 3008F BODY MASS INDEX DOCD: CPT | Mod: CPTII,S$GLB,, | Performed by: OBSTETRICS & GYNECOLOGY

## 2020-10-12 PROCEDURE — 1101F PR PT FALLS ASSESS DOC 0-1 FALLS W/OUT INJ PAST YR: ICD-10-PCS | Mod: CPTII,S$GLB,, | Performed by: OBSTETRICS & GYNECOLOGY

## 2020-10-12 PROCEDURE — 3008F PR BODY MASS INDEX (BMI) DOCUMENTED: ICD-10-PCS | Mod: CPTII,S$GLB,, | Performed by: OBSTETRICS & GYNECOLOGY

## 2020-10-12 PROCEDURE — 99999 PR PBB SHADOW E&M-EST. PATIENT-LVL III: CPT | Mod: PBBFAC,,, | Performed by: OBSTETRICS & GYNECOLOGY

## 2020-10-12 PROCEDURE — 99999 PR PBB SHADOW E&M-EST. PATIENT-LVL III: ICD-10-PCS | Mod: PBBFAC,,, | Performed by: OBSTETRICS & GYNECOLOGY

## 2020-10-12 PROCEDURE — 1159F MED LIST DOCD IN RCRD: CPT | Mod: S$GLB,,, | Performed by: OBSTETRICS & GYNECOLOGY

## 2020-10-12 PROCEDURE — 1125F PR PAIN SEVERITY QUANTIFIED, PAIN PRESENT: ICD-10-PCS | Mod: S$GLB,,, | Performed by: OBSTETRICS & GYNECOLOGY

## 2020-10-12 PROCEDURE — 1125F AMNT PAIN NOTED PAIN PRSNT: CPT | Mod: S$GLB,,, | Performed by: OBSTETRICS & GYNECOLOGY

## 2020-10-12 PROCEDURE — 99214 PR OFFICE/OUTPT VISIT, EST, LEVL IV, 30-39 MIN: ICD-10-PCS | Mod: S$GLB,,, | Performed by: OBSTETRICS & GYNECOLOGY

## 2020-10-12 PROCEDURE — 99214 OFFICE O/P EST MOD 30 MIN: CPT | Mod: S$GLB,,, | Performed by: OBSTETRICS & GYNECOLOGY

## 2020-10-12 PROCEDURE — 1101F PT FALLS ASSESS-DOCD LE1/YR: CPT | Mod: CPTII,S$GLB,, | Performed by: OBSTETRICS & GYNECOLOGY

## 2020-10-12 RX ORDER — HYDROCODONE BITARTRATE AND ACETAMINOPHEN 5; 325 MG/1; MG/1
1 TABLET ORAL EVERY 6 HOURS PRN
Qty: 40 TABLET | Refills: 0 | Status: ON HOLD | OUTPATIENT
Start: 2020-10-12 | End: 2020-11-24 | Stop reason: HOSPADM

## 2020-10-12 NOTE — PROGRESS NOTES
Subjective:       Patient ID: Mickie Don is a 65 y.o. female.    Chief Complaint: Chemotherapy    Treatment History  Xlap/Omentectomy on 11/1/16 - mass was unresectable  Stage IIIC ovarian cancer  Initiated Taxotere/Carbo on 11/18/16, now s/p 6 cycles completed 3/8/17  Xlap/ISIS/BSO/Radical mass resection > 10 cm, optimal debulking on 4/11/17  Initiated PRIMA on 8/21/17  BRCA negative  Started FORWARD1 on 11/27/17  Started Doxil/Anju as part of NRG  2/27/19, received total of 6 cycles  Gemzar monotherapy started 8/29/19  Topotecan started 8/14/2020    HPI     Here today for consideration of C3D1 topotecan monotherapy.  CA-125 has risen with first 2 cycles.  Other labs have been ok for treatment.  Pending today.    Review of Systems   Constitutional: Negative for appetite change, chills, diaphoresis, fatigue, fever and unexpected weight change.   HENT: Negative for mouth sores and tinnitus.    Respiratory: Negative for cough, chest tightness, shortness of breath and wheezing.    Cardiovascular: Negative for chest pain, palpitations and leg swelling.   Gastrointestinal: Negative for abdominal distention, abdominal pain, blood in stool, constipation, diarrhea, nausea and vomiting.   Genitourinary: Negative for difficulty urinating, dysuria, flank pain, frequency, hematuria, pelvic pain, urgency, vaginal bleeding, vaginal discharge and vaginal pain.   Musculoskeletal: Negative for arthralgias, back pain and gait problem.   Skin: Negative for color change and rash.   Neurological: Positive for numbness. Negative for dizziness, weakness and headaches.   Hematological: Negative for adenopathy. Does not bruise/bleed easily.   Psychiatric/Behavioral: Negative for confusion and sleep disturbance. The patient is not nervous/anxious.        Objective:   BP (!) 129/59   Pulse 81   Wt 74.3 kg (163 lb 12.8 oz)   BMI 25.66 kg/m²      Physical Exam  Constitutional:       General: She is not in acute distress.      Appearance: She is well-developed.   HENT:      Head: Normocephalic and atraumatic.   Eyes:      General: No scleral icterus.  Neck:      Musculoskeletal: Normal range of motion.   Pulmonary:      Effort: Pulmonary effort is normal. No respiratory distress.   Abdominal:      General: There is no distension.      Palpations: Abdomen is soft. There is no mass.      Comments: Tender to palpation in LLQ, along sigmoid   Genitourinary:     Exam position: Supine.      Labia:         Right: No rash, tenderness or lesion.         Left: No rash, tenderness or lesion.       Vagina: Normal.      Adnexa:         Right: No mass.          Left: No mass.        Comments: Pelvic normal  Musculoskeletal: Normal range of motion.         General: No tenderness.   Skin:     General: Skin is warm and dry.      Coloration: Skin is not pale.   Neurological:      Mental Status: She is alert and oriented to person, place, and time.   Psychiatric:         Behavior: Behavior normal.         Thought Content: Thought content normal.         Judgment: Judgment normal.         Assessment:       1. Malignant neoplasm of ovary, unspecified laterality    2. Encounter for antineoplastic chemotherapy    3. Neutropenia, drug-induced    4. Thrombocytopenia due to drugs    5. Neuropathy due to chemotherapeutic drug        Plan:     CA-125 has risen with first 2 treatments, so will scan prior to C3 on Friday.  Also requests pain med Rx.  If progression noted on scan, then will switch to weekly Taxol.

## 2020-10-12 NOTE — TELEPHONE ENCOUNTER
----- Message from Rachelle Pizarro sent at 10/12/2020 10:45 AM CDT -----  Contact: Arlin with [facility]  Name of Who is Calling:Arlin with [facility]       What is the request in detail: Arlin states prescription for HYDROcodone-acetaminophen (NORCO) 5-325 mg per tablet needs to say medically necessary for greater than 7 days.please call       Can the clinic reply by [facility]: no      What Number to Call Back if not in [facility]: [phone]

## 2020-10-13 ENCOUNTER — HOSPITAL ENCOUNTER (OUTPATIENT)
Dept: RADIOLOGY | Facility: OTHER | Age: 65
Discharge: HOME | End: 2020-10-13
Attending: OBSTETRICS & GYNECOLOGY
Payer: COMMERCIAL

## 2020-10-13 ENCOUNTER — INFUSION (OUTPATIENT)
Dept: INFUSION THERAPY | Facility: OTHER | Age: 65
End: 2020-10-13
Attending: OBSTETRICS & GYNECOLOGY
Payer: COMMERCIAL

## 2020-10-13 DIAGNOSIS — Z51.11 ENCOUNTER FOR ANTINEOPLASTIC CHEMOTHERAPY: Primary | ICD-10-CM

## 2020-10-13 DIAGNOSIS — C56.9 MALIGNANT NEOPLASM OF OVARY, UNSPECIFIED LATERALITY: ICD-10-CM

## 2020-10-13 PROCEDURE — 71260 CT THORAX DX C+: CPT | Mod: 26,,, | Performed by: RADIOLOGY

## 2020-10-13 PROCEDURE — 25500020 PHARM REV CODE 255: Performed by: OBSTETRICS & GYNECOLOGY

## 2020-10-13 PROCEDURE — 71260 CT CHEST ABDOMEN PELVIS WITH CONTRAST (XPD): ICD-10-PCS | Mod: 26,,, | Performed by: RADIOLOGY

## 2020-10-13 PROCEDURE — A9698 NON-RAD CONTRAST MATERIALNOC: HCPCS | Performed by: OBSTETRICS & GYNECOLOGY

## 2020-10-13 PROCEDURE — 25000003 PHARM REV CODE 250: Performed by: OBSTETRICS & GYNECOLOGY

## 2020-10-13 PROCEDURE — 74177 CT ABD & PELVIS W/CONTRAST: CPT | Mod: TC

## 2020-10-13 PROCEDURE — 63600175 PHARM REV CODE 636 W HCPCS: Performed by: OBSTETRICS & GYNECOLOGY

## 2020-10-13 PROCEDURE — A4216 STERILE WATER/SALINE, 10 ML: HCPCS | Performed by: OBSTETRICS & GYNECOLOGY

## 2020-10-13 PROCEDURE — 74177 CT ABD & PELVIS W/CONTRAST: CPT | Mod: 26,,, | Performed by: RADIOLOGY

## 2020-10-13 PROCEDURE — 96523 IRRIG DRUG DELIVERY DEVICE: CPT

## 2020-10-13 PROCEDURE — 74177 CT CHEST ABDOMEN PELVIS WITH CONTRAST (XPD): ICD-10-PCS | Mod: 26,,, | Performed by: RADIOLOGY

## 2020-10-13 RX ORDER — SODIUM CHLORIDE 0.9 % (FLUSH) 0.9 %
10 SYRINGE (ML) INJECTION
Status: CANCELLED | OUTPATIENT
Start: 2020-10-13

## 2020-10-13 RX ORDER — SODIUM CHLORIDE 0.9 % (FLUSH) 0.9 %
10 SYRINGE (ML) INJECTION
Status: DISCONTINUED | OUTPATIENT
Start: 2020-10-13 | End: 2020-10-13 | Stop reason: HOSPADM

## 2020-10-13 RX ORDER — HEPARIN 100 UNIT/ML
500 SYRINGE INTRAVENOUS
Status: DISCONTINUED | OUTPATIENT
Start: 2020-10-13 | End: 2020-10-13 | Stop reason: HOSPADM

## 2020-10-13 RX ORDER — HEPARIN 100 UNIT/ML
500 SYRINGE INTRAVENOUS
Status: CANCELLED | OUTPATIENT
Start: 2020-10-13

## 2020-10-13 RX ADMIN — IOHEXOL 1000 ML: 9 SOLUTION ORAL at 08:10

## 2020-10-13 RX ADMIN — Medication 10 ML: at 10:10

## 2020-10-13 RX ADMIN — HEPARIN 500 UNITS: 100 SYRINGE at 10:10

## 2020-10-13 RX ADMIN — IOHEXOL 75 ML: 350 INJECTION, SOLUTION INTRAVENOUS at 10:10

## 2020-10-13 NOTE — PLAN OF CARE
Right chest powerport accessed for CT scan. Flushes and blood return noted. D/C'd home with self.

## 2020-10-14 ENCOUNTER — PATIENT MESSAGE (OUTPATIENT)
Dept: GYNECOLOGIC ONCOLOGY | Facility: CLINIC | Age: 65
End: 2020-10-14

## 2020-10-16 ENCOUNTER — INFUSION (OUTPATIENT)
Dept: INFUSION THERAPY | Facility: HOSPITAL | Age: 65
End: 2020-10-16
Attending: OBSTETRICS & GYNECOLOGY
Payer: COMMERCIAL

## 2020-10-16 ENCOUNTER — OFFICE VISIT (OUTPATIENT)
Dept: GYNECOLOGIC ONCOLOGY | Facility: CLINIC | Age: 65
End: 2020-10-16
Payer: COMMERCIAL

## 2020-10-16 VITALS
SYSTOLIC BLOOD PRESSURE: 147 MMHG | RESPIRATION RATE: 17 BRPM | WEIGHT: 168.44 LBS | OXYGEN SATURATION: 100 % | HEIGHT: 67 IN | BODY MASS INDEX: 26.44 KG/M2 | HEART RATE: 66 BPM | DIASTOLIC BLOOD PRESSURE: 78 MMHG | TEMPERATURE: 98 F

## 2020-10-16 VITALS
SYSTOLIC BLOOD PRESSURE: 123 MMHG | BODY MASS INDEX: 26.38 KG/M2 | DIASTOLIC BLOOD PRESSURE: 58 MMHG | WEIGHT: 168.44 LBS | HEART RATE: 76 BPM

## 2020-10-16 DIAGNOSIS — C56.9 MALIGNANT NEOPLASM OF OVARY, UNSPECIFIED LATERALITY: Primary | ICD-10-CM

## 2020-10-16 DIAGNOSIS — C56.9 OVARIAN CANCER, UNSPECIFIED LATERALITY: Primary | ICD-10-CM

## 2020-10-16 DIAGNOSIS — D69.59 THROMBOCYTOPENIA DUE TO DRUGS: ICD-10-CM

## 2020-10-16 DIAGNOSIS — Z51.11 ENCOUNTER FOR ANTINEOPLASTIC CHEMOTHERAPY: ICD-10-CM

## 2020-10-16 DIAGNOSIS — D70.2 NEUTROPENIA, DRUG-INDUCED: ICD-10-CM

## 2020-10-16 DIAGNOSIS — T50.905A THROMBOCYTOPENIA DUE TO DRUGS: ICD-10-CM

## 2020-10-16 PROCEDURE — 96367 TX/PROPH/DG ADDL SEQ IV INF: CPT

## 2020-10-16 PROCEDURE — 99214 PR OFFICE/OUTPT VISIT, EST, LEVL IV, 30-39 MIN: ICD-10-PCS | Mod: S$GLB,,, | Performed by: OBSTETRICS & GYNECOLOGY

## 2020-10-16 PROCEDURE — 96375 TX/PRO/DX INJ NEW DRUG ADDON: CPT

## 2020-10-16 PROCEDURE — 1126F AMNT PAIN NOTED NONE PRSNT: CPT | Mod: S$GLB,,, | Performed by: OBSTETRICS & GYNECOLOGY

## 2020-10-16 PROCEDURE — 3008F BODY MASS INDEX DOCD: CPT | Mod: CPTII,S$GLB,, | Performed by: OBSTETRICS & GYNECOLOGY

## 2020-10-16 PROCEDURE — 96413 CHEMO IV INFUSION 1 HR: CPT

## 2020-10-16 PROCEDURE — 1159F PR MEDICATION LIST DOCUMENTED IN MEDICAL RECORD: ICD-10-PCS | Mod: S$GLB,,, | Performed by: OBSTETRICS & GYNECOLOGY

## 2020-10-16 PROCEDURE — 1101F PR PT FALLS ASSESS DOC 0-1 FALLS W/OUT INJ PAST YR: ICD-10-PCS | Mod: CPTII,S$GLB,, | Performed by: OBSTETRICS & GYNECOLOGY

## 2020-10-16 PROCEDURE — 1159F MED LIST DOCD IN RCRD: CPT | Mod: S$GLB,,, | Performed by: OBSTETRICS & GYNECOLOGY

## 2020-10-16 PROCEDURE — 63600175 PHARM REV CODE 636 W HCPCS: Performed by: OBSTETRICS & GYNECOLOGY

## 2020-10-16 PROCEDURE — 1101F PT FALLS ASSESS-DOCD LE1/YR: CPT | Mod: CPTII,S$GLB,, | Performed by: OBSTETRICS & GYNECOLOGY

## 2020-10-16 PROCEDURE — 99999 PR PBB SHADOW E&M-EST. PATIENT-LVL III: CPT | Mod: PBBFAC,,, | Performed by: OBSTETRICS & GYNECOLOGY

## 2020-10-16 PROCEDURE — 25000003 PHARM REV CODE 250: Performed by: OBSTETRICS & GYNECOLOGY

## 2020-10-16 PROCEDURE — 99999 PR PBB SHADOW E&M-EST. PATIENT-LVL III: ICD-10-PCS | Mod: PBBFAC,,, | Performed by: OBSTETRICS & GYNECOLOGY

## 2020-10-16 PROCEDURE — 99214 OFFICE O/P EST MOD 30 MIN: CPT | Mod: S$GLB,,, | Performed by: OBSTETRICS & GYNECOLOGY

## 2020-10-16 PROCEDURE — 96365 THER/PROPH/DIAG IV INF INIT: CPT

## 2020-10-16 PROCEDURE — 1126F PR PAIN SEVERITY QUANTIFIED, NO PAIN PRESENT: ICD-10-PCS | Mod: S$GLB,,, | Performed by: OBSTETRICS & GYNECOLOGY

## 2020-10-16 PROCEDURE — 3008F PR BODY MASS INDEX (BMI) DOCUMENTED: ICD-10-PCS | Mod: CPTII,S$GLB,, | Performed by: OBSTETRICS & GYNECOLOGY

## 2020-10-16 RX ORDER — FAMOTIDINE 10 MG/ML
20 INJECTION INTRAVENOUS
Status: COMPLETED | OUTPATIENT
Start: 2020-10-16 | End: 2020-10-16

## 2020-10-16 RX ORDER — HEPARIN 100 UNIT/ML
500 SYRINGE INTRAVENOUS
Status: DISCONTINUED | OUTPATIENT
Start: 2020-10-16 | End: 2020-10-16 | Stop reason: HOSPADM

## 2020-10-16 RX ORDER — SODIUM CHLORIDE 0.9 % (FLUSH) 0.9 %
10 SYRINGE (ML) INJECTION
Status: DISCONTINUED | OUTPATIENT
Start: 2020-10-16 | End: 2020-10-16 | Stop reason: HOSPADM

## 2020-10-16 RX ORDER — DIPHENHYDRAMINE HYDROCHLORIDE 50 MG/ML
50 INJECTION INTRAMUSCULAR; INTRAVENOUS ONCE AS NEEDED
Status: CANCELLED | OUTPATIENT
Start: 2020-10-16

## 2020-10-16 RX ORDER — HEPARIN 100 UNIT/ML
500 SYRINGE INTRAVENOUS
Status: CANCELLED | OUTPATIENT
Start: 2020-10-16

## 2020-10-16 RX ORDER — DIPHENHYDRAMINE HYDROCHLORIDE 50 MG/ML
50 INJECTION INTRAMUSCULAR; INTRAVENOUS ONCE AS NEEDED
Status: DISCONTINUED | OUTPATIENT
Start: 2020-10-16 | End: 2020-10-16 | Stop reason: HOSPADM

## 2020-10-16 RX ORDER — EPINEPHRINE 0.3 MG/.3ML
0.3 INJECTION SUBCUTANEOUS ONCE AS NEEDED
Status: DISCONTINUED | OUTPATIENT
Start: 2020-10-16 | End: 2020-10-16 | Stop reason: HOSPADM

## 2020-10-16 RX ORDER — EPINEPHRINE 0.3 MG/.3ML
0.3 INJECTION SUBCUTANEOUS ONCE AS NEEDED
Status: CANCELLED | OUTPATIENT
Start: 2020-10-16

## 2020-10-16 RX ORDER — SODIUM CHLORIDE 0.9 % (FLUSH) 0.9 %
10 SYRINGE (ML) INJECTION
Status: CANCELLED | OUTPATIENT
Start: 2020-10-16

## 2020-10-16 RX ORDER — FAMOTIDINE 10 MG/ML
20 INJECTION INTRAVENOUS
Status: CANCELLED | OUTPATIENT
Start: 2020-10-16

## 2020-10-16 RX ADMIN — FAMOTIDINE 20 MG: 10 INJECTION INTRAVENOUS at 09:10

## 2020-10-16 RX ADMIN — DIPHENHYDRAMINE HYDROCHLORIDE 50 MG: 50 INJECTION INTRAMUSCULAR; INTRAVENOUS at 10:10

## 2020-10-16 RX ADMIN — DEXAMETHASONE SODIUM PHOSPHATE: 4 INJECTION, SOLUTION INTRA-ARTICULAR; INTRALESIONAL; INTRAMUSCULAR; INTRAVENOUS; SOFT TISSUE at 09:10

## 2020-10-16 RX ADMIN — HEPARIN SODIUM (PORCINE) LOCK FLUSH IV SOLN 100 UNIT/ML 500 UNITS: 100 SOLUTION at 11:10

## 2020-10-16 RX ADMIN — PACLITAXEL 114 MG: 6 INJECTION, SOLUTION INTRAVENOUS at 10:10

## 2020-10-16 NOTE — PLAN OF CARE
Pt ambulatory to clinic alone for C1D1 Taxol. Switching over from Topotecan due to CEA increasing and progression of disease. Pt denies any sig complaints. Port accessed without difficulty. Good blood return. Pt tolerated Taxol infusion well. Port deaccessed after flushing. Bandaid applied. Ambulatory from clinic in [facility].

## 2020-10-16 NOTE — PROGRESS NOTES
Subjective:       Patient ID: Mickie Don is a 65 y.o. female.    Chief Complaint: Chemotherapy    Treatment History  Xlap/Omentectomy on 11/1/16 - mass was unresectable  Stage IIIC ovarian cancer  Initiated Taxotere/Carbo on 11/18/16, now s/p 6 cycles completed 3/8/17  Xlap/ISIS/BSO/Radical mass resection > 10 cm, optimal debulking on 4/11/17  Initiated PRIMA on 8/21/17  BRCA negative  Started FORWARD1 on 11/27/17  Started Doxil/Anju as part of NRG  2/27/19, received total of 6 cycles  Gemzar monotherapy started 8/29/19  Topotecan started 8/14/2020  Taxol monotherapy started 10/16/2020    HPI   Here today to discuss scans and sign consents for switch to Taxol.  Scan showed new liver mets, but otherwise not much detrimental change.  Feels fine.  Labs reviewed and ok for treatment.      Review of Systems   Constitutional: Negative for appetite change, chills, diaphoresis, fatigue, fever and unexpected weight change.   HENT: Negative for mouth sores and tinnitus.    Respiratory: Negative for cough, chest tightness, shortness of breath and wheezing.    Cardiovascular: Negative for chest pain, palpitations and leg swelling.   Gastrointestinal: Negative for abdominal distention, abdominal pain, blood in stool, constipation, diarrhea, nausea and vomiting.   Genitourinary: Negative for difficulty urinating, dysuria, flank pain, frequency, hematuria, pelvic pain, urgency, vaginal bleeding, vaginal discharge and vaginal pain.   Musculoskeletal: Negative for arthralgias, back pain and gait problem.   Skin: Negative for color change and rash.   Neurological: Positive for numbness. Negative for dizziness, weakness and headaches.   Hematological: Negative for adenopathy. Does not bruise/bleed easily.   Psychiatric/Behavioral: Negative for confusion and sleep disturbance. The patient is not nervous/anxious.        Objective:   BP (!) 123/58   Pulse 76   Wt 76.4 kg (168 lb 6.9 oz)   BMI 26.38 kg/m²      Physical  Exam  Constitutional:       General: She is not in acute distress.     Appearance: She is well-developed.   HENT:      Head: Normocephalic and atraumatic.   Eyes:      General: No scleral icterus.  Neck:      Musculoskeletal: Normal range of motion.   Pulmonary:      Effort: Pulmonary effort is normal. No respiratory distress.   Abdominal:      General: There is no distension.      Palpations: Abdomen is soft. There is no mass.      Comments: Tender to palpation in LLQ, along sigmoid   Genitourinary:     Exam position: Supine.      Labia:         Right: No rash, tenderness or lesion.         Left: No rash, tenderness or lesion.       Vagina: Normal.      Adnexa:         Right: No mass.          Left: No mass.        Comments: Pelvic normal  Musculoskeletal: Normal range of motion.         General: No tenderness.   Skin:     General: Skin is warm and dry.      Coloration: Skin is not pale.   Neurological:      Mental Status: She is alert and oriented to person, place, and time.   Psychiatric:         Behavior: Behavior normal.         Thought Content: Thought content normal.         Judgment: Judgment normal.         Assessment:       1. Malignant neoplasm of ovary, unspecified laterality    2. Encounter for antineoplastic chemotherapy    3. Neutropenia, drug-induced    4. Thrombocytopenia due to drugs        Plan:     Treat with C1D1 weekly Taxol.  Consents signed.  RTC as scheduled.

## 2020-10-23 ENCOUNTER — INFUSION (OUTPATIENT)
Dept: INFUSION THERAPY | Facility: HOSPITAL | Age: 65
End: 2020-10-23
Attending: OBSTETRICS & GYNECOLOGY
Payer: COMMERCIAL

## 2020-10-23 ENCOUNTER — LAB VISIT (OUTPATIENT)
Dept: LAB | Facility: HOSPITAL | Age: 65
End: 2020-10-23
Attending: INTERNAL MEDICINE
Payer: COMMERCIAL

## 2020-10-23 VITALS
BODY MASS INDEX: 26.3 KG/M2 | HEIGHT: 67 IN | HEART RATE: 67 BPM | TEMPERATURE: 98 F | RESPIRATION RATE: 18 BRPM | WEIGHT: 167.56 LBS | DIASTOLIC BLOOD PRESSURE: 73 MMHG | SYSTOLIC BLOOD PRESSURE: 135 MMHG

## 2020-10-23 DIAGNOSIS — C56.9 OVARIAN CANCER, UNSPECIFIED LATERALITY: Primary | ICD-10-CM

## 2020-10-23 DIAGNOSIS — C56.9 OVARIAN CANCER, UNSPECIFIED LATERALITY: ICD-10-CM

## 2020-10-23 LAB
ALBUMIN SERPL BCP-MCNC: 3.3 G/DL (ref 3.5–5.2)
ALP SERPL-CCNC: 191 U/L (ref 55–135)
ALT SERPL W/O P-5'-P-CCNC: 30 U/L (ref 10–44)
ANION GAP SERPL CALC-SCNC: 5 MMOL/L (ref 8–16)
AST SERPL-CCNC: 37 U/L (ref 10–40)
BILIRUB SERPL-MCNC: 0.4 MG/DL (ref 0.1–1)
BUN SERPL-MCNC: 13 MG/DL (ref 8–23)
CALCIUM SERPL-MCNC: 9.1 MG/DL (ref 8.7–10.5)
CANCER AG125 SERPL-ACNC: 1314 U/ML (ref 0–30)
CHLORIDE SERPL-SCNC: 105 MMOL/L (ref 95–110)
CO2 SERPL-SCNC: 28 MMOL/L (ref 23–29)
CREAT SERPL-MCNC: 0.7 MG/DL (ref 0.5–1.4)
ERYTHROCYTE [DISTWIDTH] IN BLOOD BY AUTOMATED COUNT: 15.4 % (ref 11.5–14.5)
EST. GFR  (AFRICAN AMERICAN): >60 ML/MIN/1.73 M^2
EST. GFR  (NON AFRICAN AMERICAN): >60 ML/MIN/1.73 M^2
GLUCOSE SERPL-MCNC: 133 MG/DL (ref 70–110)
HCT VFR BLD AUTO: 39.1 % (ref 37–48.5)
HGB BLD-MCNC: 12.5 G/DL (ref 12–16)
IMM GRANULOCYTES # BLD AUTO: 0.02 K/UL (ref 0–0.04)
MCH RBC QN AUTO: 29.7 PG (ref 27–31)
MCHC RBC AUTO-ENTMCNC: 32 G/DL (ref 32–36)
MCV RBC AUTO: 93 FL (ref 82–98)
NEUTROPHILS # BLD AUTO: 2.3 K/UL (ref 1.8–7.7)
PLATELET # BLD AUTO: 255 K/UL (ref 150–350)
PMV BLD AUTO: 10.5 FL (ref 9.2–12.9)
POTASSIUM SERPL-SCNC: 5 MMOL/L (ref 3.5–5.1)
PROT SERPL-MCNC: 6.6 G/DL (ref 6–8.4)
RBC # BLD AUTO: 4.21 M/UL (ref 4–5.4)
SODIUM SERPL-SCNC: 138 MMOL/L (ref 136–145)
WBC # BLD AUTO: 3.43 K/UL (ref 3.9–12.7)

## 2020-10-23 PROCEDURE — 36415 COLL VENOUS BLD VENIPUNCTURE: CPT

## 2020-10-23 PROCEDURE — 80053 COMPREHEN METABOLIC PANEL: CPT

## 2020-10-23 PROCEDURE — 96375 TX/PRO/DX INJ NEW DRUG ADDON: CPT

## 2020-10-23 PROCEDURE — 63600175 PHARM REV CODE 636 W HCPCS: Performed by: OBSTETRICS & GYNECOLOGY

## 2020-10-23 PROCEDURE — 25000003 PHARM REV CODE 250: Performed by: OBSTETRICS & GYNECOLOGY

## 2020-10-23 PROCEDURE — 86304 IMMUNOASSAY TUMOR CA 125: CPT

## 2020-10-23 PROCEDURE — 96367 TX/PROPH/DG ADDL SEQ IV INF: CPT

## 2020-10-23 PROCEDURE — 85027 COMPLETE CBC AUTOMATED: CPT

## 2020-10-23 PROCEDURE — 96413 CHEMO IV INFUSION 1 HR: CPT

## 2020-10-23 RX ORDER — HEPARIN 100 UNIT/ML
500 SYRINGE INTRAVENOUS
Status: CANCELLED | OUTPATIENT
Start: 2020-10-23

## 2020-10-23 RX ORDER — FAMOTIDINE 10 MG/ML
20 INJECTION INTRAVENOUS
Status: COMPLETED | OUTPATIENT
Start: 2020-10-23 | End: 2020-10-23

## 2020-10-23 RX ORDER — SODIUM CHLORIDE 0.9 % (FLUSH) 0.9 %
10 SYRINGE (ML) INJECTION
Status: DISCONTINUED | OUTPATIENT
Start: 2020-10-23 | End: 2020-10-23 | Stop reason: HOSPADM

## 2020-10-23 RX ORDER — SODIUM CHLORIDE 0.9 % (FLUSH) 0.9 %
10 SYRINGE (ML) INJECTION
Status: CANCELLED | OUTPATIENT
Start: 2020-10-23

## 2020-10-23 RX ORDER — EPINEPHRINE 0.3 MG/.3ML
0.3 INJECTION SUBCUTANEOUS ONCE AS NEEDED
Status: CANCELLED | OUTPATIENT
Start: 2020-10-23

## 2020-10-23 RX ORDER — EPINEPHRINE 0.3 MG/.3ML
0.3 INJECTION SUBCUTANEOUS ONCE AS NEEDED
Status: DISCONTINUED | OUTPATIENT
Start: 2020-10-23 | End: 2020-10-23 | Stop reason: HOSPADM

## 2020-10-23 RX ORDER — DIPHENHYDRAMINE HYDROCHLORIDE 50 MG/ML
50 INJECTION INTRAMUSCULAR; INTRAVENOUS ONCE AS NEEDED
Status: DISCONTINUED | OUTPATIENT
Start: 2020-10-23 | End: 2020-10-23 | Stop reason: HOSPADM

## 2020-10-23 RX ORDER — DIPHENHYDRAMINE HYDROCHLORIDE 50 MG/ML
50 INJECTION INTRAMUSCULAR; INTRAVENOUS ONCE AS NEEDED
Status: CANCELLED | OUTPATIENT
Start: 2020-10-23

## 2020-10-23 RX ORDER — HEPARIN 100 UNIT/ML
500 SYRINGE INTRAVENOUS
Status: DISCONTINUED | OUTPATIENT
Start: 2020-10-23 | End: 2020-10-23 | Stop reason: HOSPADM

## 2020-10-23 RX ORDER — FAMOTIDINE 10 MG/ML
20 INJECTION INTRAVENOUS
Status: CANCELLED | OUTPATIENT
Start: 2020-10-23

## 2020-10-23 RX ADMIN — DIPHENHYDRAMINE HYDROCHLORIDE 50 MG: 50 INJECTION INTRAMUSCULAR; INTRAVENOUS at 09:10

## 2020-10-23 RX ADMIN — HEPARIN 500 UNITS: 100 SYRINGE at 10:10

## 2020-10-23 RX ADMIN — PACLITAXEL 114 MG: 6 INJECTION, SOLUTION INTRAVENOUS at 09:10

## 2020-10-23 RX ADMIN — FAMOTIDINE 20 MG: 10 INJECTION INTRAVENOUS at 09:10

## 2020-10-23 RX ADMIN — DEXAMETHASONE SODIUM PHOSPHATE: 4 INJECTION, SOLUTION INTRA-ARTICULAR; INTRALESIONAL; INTRAMUSCULAR; INTRAVENOUS; SOFT TISSUE at 09:10

## 2020-10-23 NOTE — PLAN OF CARE
Pt received Taxol; tolerated well. VSS and NAD. Pt instructed to call MD with any concerns. Pt discharged home independently.       Problem: Neurotoxicity (Chemotherapy Effects)  Goal: Neurotoxicity Symptom Control  Outcome: Ongoing, Progressing     Problem: Anemia (Chemotherapy Effects)  Goal: Anemia Symptom Improvement  Outcome: Ongoing, Progressing     Problem: Neutropenia (Chemotherapy Effects)  Goal: Absence of Infection  Outcome: Ongoing, Progressing

## 2020-10-30 ENCOUNTER — LAB VISIT (OUTPATIENT)
Dept: LAB | Facility: HOSPITAL | Age: 65
End: 2020-10-30
Attending: INTERNAL MEDICINE
Payer: COMMERCIAL

## 2020-10-30 ENCOUNTER — INFUSION (OUTPATIENT)
Dept: INFUSION THERAPY | Facility: HOSPITAL | Age: 65
End: 2020-10-30
Attending: OBSTETRICS & GYNECOLOGY
Payer: COMMERCIAL

## 2020-10-30 ENCOUNTER — PATIENT MESSAGE (OUTPATIENT)
Dept: GYNECOLOGIC ONCOLOGY | Facility: CLINIC | Age: 65
End: 2020-10-30

## 2020-10-30 VITALS
DIASTOLIC BLOOD PRESSURE: 70 MMHG | HEIGHT: 67 IN | SYSTOLIC BLOOD PRESSURE: 132 MMHG | HEART RATE: 72 BPM | WEIGHT: 168.44 LBS | BODY MASS INDEX: 26.44 KG/M2

## 2020-10-30 DIAGNOSIS — C56.9 OVARIAN CANCER, UNSPECIFIED LATERALITY: Primary | ICD-10-CM

## 2020-10-30 DIAGNOSIS — C56.9 OVARIAN CANCER, UNSPECIFIED LATERALITY: ICD-10-CM

## 2020-10-30 LAB
ALBUMIN SERPL BCP-MCNC: 3 G/DL (ref 3.5–5.2)
ALP SERPL-CCNC: 217 U/L (ref 55–135)
ALT SERPL W/O P-5'-P-CCNC: 30 U/L (ref 10–44)
ANION GAP SERPL CALC-SCNC: 8 MMOL/L (ref 8–16)
AST SERPL-CCNC: 42 U/L (ref 10–40)
BILIRUB SERPL-MCNC: 0.6 MG/DL (ref 0.1–1)
BUN SERPL-MCNC: 9 MG/DL (ref 8–23)
CALCIUM SERPL-MCNC: 8.7 MG/DL (ref 8.7–10.5)
CANCER AG125 SERPL-ACNC: 1536 U/ML (ref 0–30)
CHLORIDE SERPL-SCNC: 103 MMOL/L (ref 95–110)
CO2 SERPL-SCNC: 27 MMOL/L (ref 23–29)
CREAT SERPL-MCNC: 0.7 MG/DL (ref 0.5–1.4)
ERYTHROCYTE [DISTWIDTH] IN BLOOD BY AUTOMATED COUNT: 15.9 % (ref 11.5–14.5)
EST. GFR  (AFRICAN AMERICAN): >60 ML/MIN/1.73 M^2
EST. GFR  (NON AFRICAN AMERICAN): >60 ML/MIN/1.73 M^2
GLUCOSE SERPL-MCNC: 109 MG/DL (ref 70–110)
HCT VFR BLD AUTO: 41.2 % (ref 37–48.5)
HGB BLD-MCNC: 13.3 G/DL (ref 12–16)
IMM GRANULOCYTES # BLD AUTO: 0.04 K/UL (ref 0–0.04)
MCH RBC QN AUTO: 29.8 PG (ref 27–31)
MCHC RBC AUTO-ENTMCNC: 32.3 G/DL (ref 32–36)
MCV RBC AUTO: 92 FL (ref 82–98)
NEUTROPHILS # BLD AUTO: 3.6 K/UL (ref 1.8–7.7)
PLATELET # BLD AUTO: 214 K/UL (ref 150–350)
PMV BLD AUTO: 10.5 FL (ref 9.2–12.9)
POTASSIUM SERPL-SCNC: 4.5 MMOL/L (ref 3.5–5.1)
PROT SERPL-MCNC: 6.2 G/DL (ref 6–8.4)
RBC # BLD AUTO: 4.46 M/UL (ref 4–5.4)
SODIUM SERPL-SCNC: 138 MMOL/L (ref 136–145)
WBC # BLD AUTO: 4.93 K/UL (ref 3.9–12.7)

## 2020-10-30 PROCEDURE — 36415 COLL VENOUS BLD VENIPUNCTURE: CPT

## 2020-10-30 PROCEDURE — 96367 TX/PROPH/DG ADDL SEQ IV INF: CPT

## 2020-10-30 PROCEDURE — 85027 COMPLETE CBC AUTOMATED: CPT

## 2020-10-30 PROCEDURE — 96375 TX/PRO/DX INJ NEW DRUG ADDON: CPT

## 2020-10-30 PROCEDURE — 25000003 PHARM REV CODE 250: Performed by: OBSTETRICS & GYNECOLOGY

## 2020-10-30 PROCEDURE — 86304 IMMUNOASSAY TUMOR CA 125: CPT

## 2020-10-30 PROCEDURE — 63600175 PHARM REV CODE 636 W HCPCS: Performed by: OBSTETRICS & GYNECOLOGY

## 2020-10-30 PROCEDURE — 80053 COMPREHEN METABOLIC PANEL: CPT

## 2020-10-30 PROCEDURE — 96413 CHEMO IV INFUSION 1 HR: CPT

## 2020-10-30 RX ORDER — FAMOTIDINE 10 MG/ML
20 INJECTION INTRAVENOUS
Status: COMPLETED | OUTPATIENT
Start: 2020-10-30 | End: 2020-10-30

## 2020-10-30 RX ORDER — EPINEPHRINE 0.3 MG/.3ML
0.3 INJECTION SUBCUTANEOUS ONCE AS NEEDED
Status: CANCELLED | OUTPATIENT
Start: 2020-10-30

## 2020-10-30 RX ORDER — HEPARIN 100 UNIT/ML
500 SYRINGE INTRAVENOUS
Status: DISCONTINUED | OUTPATIENT
Start: 2020-10-30 | End: 2020-10-30 | Stop reason: HOSPADM

## 2020-10-30 RX ORDER — SODIUM CHLORIDE 0.9 % (FLUSH) 0.9 %
10 SYRINGE (ML) INJECTION
Status: CANCELLED | OUTPATIENT
Start: 2020-10-30

## 2020-10-30 RX ORDER — DIPHENHYDRAMINE HYDROCHLORIDE 50 MG/ML
50 INJECTION INTRAMUSCULAR; INTRAVENOUS ONCE AS NEEDED
Status: DISCONTINUED | OUTPATIENT
Start: 2020-10-30 | End: 2020-10-30 | Stop reason: HOSPADM

## 2020-10-30 RX ORDER — EPINEPHRINE 0.3 MG/.3ML
0.3 INJECTION SUBCUTANEOUS ONCE AS NEEDED
Status: DISCONTINUED | OUTPATIENT
Start: 2020-10-30 | End: 2020-10-30 | Stop reason: HOSPADM

## 2020-10-30 RX ORDER — FAMOTIDINE 10 MG/ML
20 INJECTION INTRAVENOUS
Status: CANCELLED | OUTPATIENT
Start: 2020-10-30

## 2020-10-30 RX ORDER — SODIUM CHLORIDE 0.9 % (FLUSH) 0.9 %
10 SYRINGE (ML) INJECTION
Status: DISCONTINUED | OUTPATIENT
Start: 2020-10-30 | End: 2020-10-30 | Stop reason: HOSPADM

## 2020-10-30 RX ORDER — DIPHENHYDRAMINE HYDROCHLORIDE 50 MG/ML
50 INJECTION INTRAMUSCULAR; INTRAVENOUS ONCE AS NEEDED
Status: CANCELLED | OUTPATIENT
Start: 2020-10-30

## 2020-10-30 RX ORDER — HEPARIN 100 UNIT/ML
500 SYRINGE INTRAVENOUS
Status: CANCELLED | OUTPATIENT
Start: 2020-10-30

## 2020-10-30 RX ADMIN — HEPARIN 500 UNITS: 100 SYRINGE at 11:10

## 2020-10-30 RX ADMIN — DIPHENHYDRAMINE HYDROCHLORIDE 50 MG: 50 INJECTION, SOLUTION INTRAMUSCULAR; INTRAVENOUS at 09:10

## 2020-10-30 RX ADMIN — DEXAMETHASONE SODIUM PHOSPHATE: 4 INJECTION, SOLUTION INTRA-ARTICULAR; INTRALESIONAL; INTRAMUSCULAR; INTRAVENOUS; SOFT TISSUE at 09:10

## 2020-10-30 RX ADMIN — PACLITAXEL 114 MG: 6 INJECTION, SOLUTION INTRAVENOUS at 10:10

## 2020-10-30 RX ADMIN — FAMOTIDINE 20 MG: 10 INJECTION INTRAVENOUS at 09:10

## 2020-10-30 NOTE — PLAN OF CARE
Problem: Adult Inpatient Plan of Care  Goal: Optimal Comfort and Wellbeing  Intervention: Provide Person-Centered Care  Flowsheets (Taken 3/30/2020 1051)  Trust Relationship/Rapport: care explained; choices provided; thoughts/feelings acknowledged; emotional support provided; empathic listening provided; questions answered; questions encouraged; reassurance provided      Pt stable, afebrile, no acute distress. Wound vac in place at left chest w/ suction at -100, site CDI. RLE incision CDI, staples intact, ace bandage changed. Neurovascular checks WDL. Bedtime glucose check 170; insulin dose calculated and administered by pt's mother. 2 AM check 67; pt drank 4 oz orange juice and glucose improved to 94. All scheduled meds per order. PRN Tylenol x1. Tele and pulse ox maintained, no alarms noted. Labs to be drawn this morning. POC reviewed with pt and mother, who both verbalized understanding. Both very anxious for discharge. Safety maintained. Will cont to monitor.

## 2020-10-30 NOTE — PLAN OF CARE
Pt received Taxol; tolerated well. VSS and NAD. Pt instructed to call MD with any concerns. Pt discharged home independently.       Problem: Anemia (Chemotherapy Effects)  Goal: Anemia Symptom Improvement  Outcome: Ongoing, Progressing     Problem: Neurotoxicity (Chemotherapy Effects)  Goal: Neurotoxicity Symptom Control  Outcome: Ongoing, Progressing     Problem: Neutropenia (Chemotherapy Effects)  Goal: Absence of Infection  Outcome: Ongoing, Progressing

## 2020-11-12 ENCOUNTER — LAB VISIT (OUTPATIENT)
Dept: LAB | Facility: HOSPITAL | Age: 65
End: 2020-11-12
Attending: INTERNAL MEDICINE
Payer: COMMERCIAL

## 2020-11-12 DIAGNOSIS — C56.9 OVARIAN CANCER, UNSPECIFIED LATERALITY: ICD-10-CM

## 2020-11-12 LAB
ALBUMIN SERPL BCP-MCNC: 3 G/DL (ref 3.5–5.2)
ALP SERPL-CCNC: 287 U/L (ref 55–135)
ALT SERPL W/O P-5'-P-CCNC: 30 U/L (ref 10–44)
ANION GAP SERPL CALC-SCNC: 12 MMOL/L (ref 8–16)
AST SERPL-CCNC: 53 U/L (ref 10–40)
BILIRUB SERPL-MCNC: 1.4 MG/DL (ref 0.1–1)
BUN SERPL-MCNC: 17 MG/DL (ref 8–23)
CALCIUM SERPL-MCNC: 9 MG/DL (ref 8.7–10.5)
CANCER AG125 SERPL-ACNC: 2002 U/ML (ref 0–30)
CHLORIDE SERPL-SCNC: 100 MMOL/L (ref 95–110)
CO2 SERPL-SCNC: 27 MMOL/L (ref 23–29)
CREAT SERPL-MCNC: 0.7 MG/DL (ref 0.5–1.4)
ERYTHROCYTE [DISTWIDTH] IN BLOOD BY AUTOMATED COUNT: 16.6 % (ref 11.5–14.5)
EST. GFR  (AFRICAN AMERICAN): >60 ML/MIN/1.73 M^2
EST. GFR  (NON AFRICAN AMERICAN): >60 ML/MIN/1.73 M^2
GLUCOSE SERPL-MCNC: 94 MG/DL (ref 70–110)
HCT VFR BLD AUTO: 40.9 % (ref 37–48.5)
HGB BLD-MCNC: 13.1 G/DL (ref 12–16)
IMM GRANULOCYTES # BLD AUTO: 0.02 K/UL (ref 0–0.04)
MCH RBC QN AUTO: 29.1 PG (ref 27–31)
MCHC RBC AUTO-ENTMCNC: 32 G/DL (ref 32–36)
MCV RBC AUTO: 91 FL (ref 82–98)
NEUTROPHILS # BLD AUTO: 4.6 K/UL (ref 1.8–7.7)
PLATELET # BLD AUTO: 262 K/UL (ref 150–350)
PMV BLD AUTO: 9.2 FL (ref 9.2–12.9)
POTASSIUM SERPL-SCNC: 4.1 MMOL/L (ref 3.5–5.1)
PROT SERPL-MCNC: 6.3 G/DL (ref 6–8.4)
RBC # BLD AUTO: 4.5 M/UL (ref 4–5.4)
SODIUM SERPL-SCNC: 139 MMOL/L (ref 136–145)
WBC # BLD AUTO: 5.86 K/UL (ref 3.9–12.7)

## 2020-11-12 PROCEDURE — 86304 IMMUNOASSAY TUMOR CA 125: CPT

## 2020-11-12 PROCEDURE — 85027 COMPLETE CBC AUTOMATED: CPT

## 2020-11-12 PROCEDURE — 80053 COMPREHEN METABOLIC PANEL: CPT

## 2020-11-12 PROCEDURE — 36415 COLL VENOUS BLD VENIPUNCTURE: CPT

## 2020-11-13 ENCOUNTER — PATIENT MESSAGE (OUTPATIENT)
Dept: GYNECOLOGIC ONCOLOGY | Facility: CLINIC | Age: 65
End: 2020-11-13

## 2020-11-13 ENCOUNTER — INFUSION (OUTPATIENT)
Dept: INFUSION THERAPY | Facility: HOSPITAL | Age: 65
End: 2020-11-13
Attending: OBSTETRICS & GYNECOLOGY
Payer: COMMERCIAL

## 2020-11-13 ENCOUNTER — OFFICE VISIT (OUTPATIENT)
Dept: GYNECOLOGIC ONCOLOGY | Facility: CLINIC | Age: 65
End: 2020-11-13
Payer: COMMERCIAL

## 2020-11-13 ENCOUNTER — TELEPHONE (OUTPATIENT)
Dept: CARDIOLOGY | Facility: OTHER | Age: 65
End: 2020-11-13

## 2020-11-13 VITALS
WEIGHT: 164.44 LBS | HEART RATE: 60 BPM | DIASTOLIC BLOOD PRESSURE: 66 MMHG | SYSTOLIC BLOOD PRESSURE: 127 MMHG | RESPIRATION RATE: 18 BRPM | BODY MASS INDEX: 25.81 KG/M2 | HEIGHT: 67 IN | TEMPERATURE: 98 F

## 2020-11-13 VITALS
DIASTOLIC BLOOD PRESSURE: 62 MMHG | BODY MASS INDEX: 25.76 KG/M2 | WEIGHT: 164.44 LBS | SYSTOLIC BLOOD PRESSURE: 133 MMHG | HEART RATE: 101 BPM

## 2020-11-13 DIAGNOSIS — C56.9 OVARIAN CANCER, UNSPECIFIED LATERALITY: Primary | ICD-10-CM

## 2020-11-13 DIAGNOSIS — D70.2 NEUTROPENIA, DRUG-INDUCED: ICD-10-CM

## 2020-11-13 DIAGNOSIS — Z51.11 ENCOUNTER FOR ANTINEOPLASTIC CHEMOTHERAPY: ICD-10-CM

## 2020-11-13 DIAGNOSIS — G62.0 NEUROPATHY DUE TO CHEMOTHERAPEUTIC DRUG: ICD-10-CM

## 2020-11-13 DIAGNOSIS — T45.1X5A NEUROPATHY DUE TO CHEMOTHERAPEUTIC DRUG: ICD-10-CM

## 2020-11-13 LAB
BACTERIA #/AREA URNS HPF: ABNORMAL /HPF
BILIRUB UR QL STRIP: ABNORMAL
CLARITY UR REFRACT.AUTO: CLEAR
COLOR UR AUTO: YELLOW
GLUCOSE UR QL STRIP: ABNORMAL
HGB UR QL STRIP: ABNORMAL
HYALINE CASTS #/AREA URNS LPF: 0 /LPF
KETONES UR QL STRIP: ABNORMAL
LEUKOCYTE ESTERASE UR QL STRIP: ABNORMAL
MICROSCOPIC COMMENT: ABNORMAL
NITRITE UR QL STRIP: NEGATIVE
PH UR STRIP: 7 [PH] (ref 5–8)
PROT UR QL STRIP: ABNORMAL
RBC #/AREA URNS HPF: 2 /HPF (ref 0–4)
SP GR UR STRIP: 1.02 (ref 1–1.03)
SQUAMOUS #/AREA URNS HPF: 6 /HPF
URN SPEC COLLECT METH UR: ABNORMAL
WBC #/AREA URNS HPF: 11 /HPF (ref 0–5)

## 2020-11-13 PROCEDURE — 3008F PR BODY MASS INDEX (BMI) DOCUMENTED: ICD-10-PCS | Mod: CPTII,S$GLB,, | Performed by: OBSTETRICS & GYNECOLOGY

## 2020-11-13 PROCEDURE — 1125F PR PAIN SEVERITY QUANTIFIED, PAIN PRESENT: ICD-10-PCS | Mod: S$GLB,,, | Performed by: OBSTETRICS & GYNECOLOGY

## 2020-11-13 PROCEDURE — 3288F FALL RISK ASSESSMENT DOCD: CPT | Mod: CPTII,S$GLB,, | Performed by: OBSTETRICS & GYNECOLOGY

## 2020-11-13 PROCEDURE — 96367 TX/PROPH/DG ADDL SEQ IV INF: CPT

## 2020-11-13 PROCEDURE — 1125F AMNT PAIN NOTED PAIN PRSNT: CPT | Mod: S$GLB,,, | Performed by: OBSTETRICS & GYNECOLOGY

## 2020-11-13 PROCEDURE — 1101F PT FALLS ASSESS-DOCD LE1/YR: CPT | Mod: CPTII,S$GLB,, | Performed by: OBSTETRICS & GYNECOLOGY

## 2020-11-13 PROCEDURE — 3288F PR FALLS RISK ASSESSMENT DOCUMENTED: ICD-10-PCS | Mod: CPTII,S$GLB,, | Performed by: OBSTETRICS & GYNECOLOGY

## 2020-11-13 PROCEDURE — A4216 STERILE WATER/SALINE, 10 ML: HCPCS | Performed by: OBSTETRICS & GYNECOLOGY

## 2020-11-13 PROCEDURE — 25000003 PHARM REV CODE 250: Performed by: OBSTETRICS & GYNECOLOGY

## 2020-11-13 PROCEDURE — 1159F PR MEDICATION LIST DOCUMENTED IN MEDICAL RECORD: ICD-10-PCS | Mod: S$GLB,,, | Performed by: OBSTETRICS & GYNECOLOGY

## 2020-11-13 PROCEDURE — 3008F BODY MASS INDEX DOCD: CPT | Mod: CPTII,S$GLB,, | Performed by: OBSTETRICS & GYNECOLOGY

## 2020-11-13 PROCEDURE — 87086 URINE CULTURE/COLONY COUNT: CPT

## 2020-11-13 PROCEDURE — 96375 TX/PRO/DX INJ NEW DRUG ADDON: CPT

## 2020-11-13 PROCEDURE — 99999 PR PBB SHADOW E&M-EST. PATIENT-LVL III: CPT | Mod: PBBFAC,,, | Performed by: OBSTETRICS & GYNECOLOGY

## 2020-11-13 PROCEDURE — 63600175 PHARM REV CODE 636 W HCPCS: Performed by: OBSTETRICS & GYNECOLOGY

## 2020-11-13 PROCEDURE — 99999 PR PBB SHADOW E&M-EST. PATIENT-LVL III: ICD-10-PCS | Mod: PBBFAC,,, | Performed by: OBSTETRICS & GYNECOLOGY

## 2020-11-13 PROCEDURE — 81000 URINALYSIS NONAUTO W/SCOPE: CPT

## 2020-11-13 PROCEDURE — 99214 OFFICE O/P EST MOD 30 MIN: CPT | Mod: S$GLB,,, | Performed by: OBSTETRICS & GYNECOLOGY

## 2020-11-13 PROCEDURE — 1101F PR PT FALLS ASSESS DOC 0-1 FALLS W/OUT INJ PAST YR: ICD-10-PCS | Mod: CPTII,S$GLB,, | Performed by: OBSTETRICS & GYNECOLOGY

## 2020-11-13 PROCEDURE — 99214 PR OFFICE/OUTPT VISIT, EST, LEVL IV, 30-39 MIN: ICD-10-PCS | Mod: S$GLB,,, | Performed by: OBSTETRICS & GYNECOLOGY

## 2020-11-13 PROCEDURE — 1159F MED LIST DOCD IN RCRD: CPT | Mod: S$GLB,,, | Performed by: OBSTETRICS & GYNECOLOGY

## 2020-11-13 PROCEDURE — 96413 CHEMO IV INFUSION 1 HR: CPT

## 2020-11-13 RX ORDER — SODIUM CHLORIDE 0.9 % (FLUSH) 0.9 %
10 SYRINGE (ML) INJECTION
Status: DISCONTINUED | OUTPATIENT
Start: 2020-11-13 | End: 2020-11-13 | Stop reason: HOSPADM

## 2020-11-13 RX ORDER — FAMOTIDINE 10 MG/ML
20 INJECTION INTRAVENOUS
Status: COMPLETED | OUTPATIENT
Start: 2020-11-13 | End: 2020-11-13

## 2020-11-13 RX ORDER — HEPARIN 100 UNIT/ML
500 SYRINGE INTRAVENOUS
Status: CANCELLED | OUTPATIENT
Start: 2020-11-13

## 2020-11-13 RX ORDER — SODIUM CHLORIDE 0.9 % (FLUSH) 0.9 %
10 SYRINGE (ML) INJECTION
Status: CANCELLED | OUTPATIENT
Start: 2020-11-13

## 2020-11-13 RX ORDER — EPINEPHRINE 0.3 MG/.3ML
0.3 INJECTION SUBCUTANEOUS ONCE AS NEEDED
Status: DISCONTINUED | OUTPATIENT
Start: 2020-11-13 | End: 2020-11-13 | Stop reason: HOSPADM

## 2020-11-13 RX ORDER — FAMOTIDINE 10 MG/ML
20 INJECTION INTRAVENOUS
Status: CANCELLED | OUTPATIENT
Start: 2020-11-13

## 2020-11-13 RX ORDER — EPINEPHRINE 0.3 MG/.3ML
0.3 INJECTION SUBCUTANEOUS ONCE AS NEEDED
Status: CANCELLED | OUTPATIENT
Start: 2020-11-13

## 2020-11-13 RX ORDER — HEPARIN 100 UNIT/ML
500 SYRINGE INTRAVENOUS
Status: DISCONTINUED | OUTPATIENT
Start: 2020-11-13 | End: 2020-11-13 | Stop reason: HOSPADM

## 2020-11-13 RX ORDER — DIPHENHYDRAMINE HYDROCHLORIDE 50 MG/ML
50 INJECTION INTRAMUSCULAR; INTRAVENOUS ONCE AS NEEDED
Status: CANCELLED | OUTPATIENT
Start: 2020-11-13

## 2020-11-13 RX ORDER — DIPHENHYDRAMINE HYDROCHLORIDE 50 MG/ML
50 INJECTION INTRAMUSCULAR; INTRAVENOUS ONCE AS NEEDED
Status: DISCONTINUED | OUTPATIENT
Start: 2020-11-13 | End: 2020-11-13 | Stop reason: HOSPADM

## 2020-11-13 RX ADMIN — SODIUM CHLORIDE 1000 ML: 0.9 INJECTION, SOLUTION INTRAVENOUS at 10:11

## 2020-11-13 RX ADMIN — DIPHENHYDRAMINE HYDROCHLORIDE 50 MG: 50 INJECTION, SOLUTION INTRAMUSCULAR; INTRAVENOUS at 10:11

## 2020-11-13 RX ADMIN — HEPARIN 500 UNITS: 100 SYRINGE at 11:11

## 2020-11-13 RX ADMIN — Medication 10 ML: at 11:11

## 2020-11-13 RX ADMIN — FAMOTIDINE 20 MG: 10 INJECTION INTRAVENOUS at 10:11

## 2020-11-13 RX ADMIN — DEXAMETHASONE SODIUM PHOSPHATE: 4 INJECTION, SOLUTION INTRA-ARTICULAR; INTRALESIONAL; INTRAMUSCULAR; INTRAVENOUS; SOFT TISSUE at 10:11

## 2020-11-13 RX ADMIN — PACLITAXEL 114 MG: 6 INJECTION, SOLUTION, CONCENTRATE INTRAVENOUS at 10:11

## 2020-11-13 NOTE — PLAN OF CARE
0941-Labs , hx, and medications reviewed, patient was seen by MD prior to arrival. Assessment completed. Discussed plan of care with patient. Patient in agreement. Chair reclined and warm blanket and snack offered.

## 2020-11-13 NOTE — PLAN OF CARE
1158-Patient tolerated treatment well. Discharged without complaints or S/S of adverse event.  Instructed to call provider for any questions or concerns.

## 2020-11-13 NOTE — PROGRESS NOTES
Subjective:       Patient ID: Mickie Don is a 65 y.o. female.    Chief Complaint: Chemotherapy    Treatment History  Xlap/Omentectomy on 11/1/16 - mass was unresectable  Stage IIIC ovarian cancer  Initiated Taxotere/Carbo on 11/18/16, now s/p 6 cycles completed 3/8/17  Xlap/ISSI/BSO/Radical mass resection > 10 cm, optimal debulking on 4/11/17  Initiated PRIMA on 8/21/17  BRCA negative  Started FORWARD1 on 11/27/17  Started Doxil/Anju as part of NRG  2/27/19, received total of 6 cycles  Gemzar monotherapy started 8/29/19  Topotecan started 8/14/2020  Taxol monotherapy started 10/16/2020    HPI     Here today for consideration of C2D1 of treatment.  Labs reviewed and ok for treatment.  CA-125 has increased to above 2000.  Having increased distension and feels like ascites has returned.    Review of Systems   Constitutional: Negative for appetite change, chills, diaphoresis, fatigue, fever and unexpected weight change.   HENT: Negative for mouth sores and tinnitus.    Respiratory: Negative for cough, chest tightness, shortness of breath and wheezing.    Cardiovascular: Negative for chest pain, palpitations and leg swelling.   Gastrointestinal: Negative for abdominal distention, abdominal pain, blood in stool, constipation, diarrhea, nausea and vomiting.   Genitourinary: Negative for difficulty urinating, dysuria, flank pain, frequency, hematuria, pelvic pain, urgency, vaginal bleeding, vaginal discharge and vaginal pain.   Musculoskeletal: Negative for arthralgias, back pain and gait problem.   Skin: Negative for color change and rash.   Neurological: Positive for numbness. Negative for dizziness, weakness and headaches.   Hematological: Negative for adenopathy. Does not bruise/bleed easily.   Psychiatric/Behavioral: Negative for confusion and sleep disturbance. The patient is not nervous/anxious.        Objective:   /62   Pulse 101   Wt 74.6 kg (164 lb 7.4 oz)   BMI 25.76 kg/m²      Physical  Exam  Constitutional:       General: She is not in acute distress.     Appearance: She is well-developed.   HENT:      Head: Normocephalic and atraumatic.   Eyes:      General: No scleral icterus.  Neck:      Musculoskeletal: Normal range of motion.   Pulmonary:      Effort: Pulmonary effort is normal. No respiratory distress.   Abdominal:      General: There is no distension.      Palpations: Abdomen is soft. There is no mass.      Comments: Ascites noted   Genitourinary:     Exam position: Supine.      Labia:         Right: No rash, tenderness or lesion.         Left: No rash, tenderness or lesion.       Vagina: Normal.      Adnexa:         Right: No mass.          Left: No mass.        Comments: Pelvic normal  Musculoskeletal: Normal range of motion.         General: No tenderness.   Skin:     General: Skin is warm and dry.      Coloration: Skin is not pale.   Neurological:      Mental Status: She is alert and oriented to person, place, and time.   Psychiatric:         Behavior: Behavior normal.         Thought Content: Thought content normal.         Judgment: Judgment normal.         Assessment:       1. Ovarian cancer, unspecified laterality    2. Encounter for antineoplastic chemotherapy    3. Neutropenia, drug-induced    4. Neuropathy due to chemotherapeutic drug        Plan:     Treat with C2D1 weekly Taxol.  1L NS bolus.  Attempt to perform paracentsis today.  RTC as scehduled

## 2020-11-15 LAB — BACTERIA UR CULT: NO GROWTH

## 2020-11-16 ENCOUNTER — HOSPITAL ENCOUNTER (INPATIENT)
Facility: OTHER | Age: 65
LOS: 5 days | Discharge: HOME | DRG: 755 | End: 2020-11-24
Attending: RADIOLOGY | Admitting: OBSTETRICS & GYNECOLOGY
Payer: COMMERCIAL

## 2020-11-16 ENCOUNTER — PATIENT MESSAGE (OUTPATIENT)
Dept: GYNECOLOGIC ONCOLOGY | Facility: CLINIC | Age: 65
End: 2020-11-16

## 2020-11-16 DIAGNOSIS — R18.8 ASCITES: ICD-10-CM

## 2020-11-16 DIAGNOSIS — J90 PLEURAL EFFUSION, BILATERAL: ICD-10-CM

## 2020-11-16 DIAGNOSIS — C56.9 OVARIAN CANCER, UNSPECIFIED LATERALITY: ICD-10-CM

## 2020-11-16 DIAGNOSIS — I82.A12 ACUTE DEEP VEIN THROMBOSIS (DVT) OF AXILLARY VEIN OF LEFT UPPER EXTREMITY: ICD-10-CM

## 2020-11-16 DIAGNOSIS — R07.9 CHEST PAIN: ICD-10-CM

## 2020-11-16 DIAGNOSIS — C56.3 MALIGNANT NEOPLASM OF BOTH OVARIES: ICD-10-CM

## 2020-11-16 DIAGNOSIS — K56.609 SMALL BOWEL OBSTRUCTION: ICD-10-CM

## 2020-11-16 DIAGNOSIS — R11.2 INTRACTABLE NAUSEA AND VOMITING: ICD-10-CM

## 2020-11-16 DIAGNOSIS — C56.9 OVARIAN CANCER: ICD-10-CM

## 2020-11-16 DIAGNOSIS — C56.9 MALIGNANT NEOPLASM OF OVARY, UNSPECIFIED LATERALITY: Primary | ICD-10-CM

## 2020-11-16 DIAGNOSIS — J90 PLEURAL EFFUSION: ICD-10-CM

## 2020-11-16 DIAGNOSIS — R18.0 MALIGNANT ASCITES: ICD-10-CM

## 2020-11-16 DIAGNOSIS — I82.622 ARM DVT (DEEP VENOUS THROMBOEMBOLISM), ACUTE, LEFT: ICD-10-CM

## 2020-11-16 LAB
ALBUMIN SERPL BCP-MCNC: 2.4 G/DL (ref 3.5–5.2)
ALP SERPL-CCNC: 236 U/L (ref 55–135)
ALT SERPL W/O P-5'-P-CCNC: 32 U/L (ref 10–44)
ANION GAP SERPL CALC-SCNC: 9 MMOL/L (ref 8–16)
APTT BLDCRRT: 31.6 SEC (ref 21–32)
AST SERPL-CCNC: 48 U/L (ref 10–40)
BASOPHILS # BLD AUTO: 0.03 K/UL (ref 0–0.2)
BASOPHILS NFR BLD: 0.6 % (ref 0–1.9)
BILIRUB SERPL-MCNC: 1.4 MG/DL (ref 0.1–1)
BUN SERPL-MCNC: 14 MG/DL (ref 8–23)
CALCIUM SERPL-MCNC: 8.5 MG/DL (ref 8.7–10.5)
CHLORIDE SERPL-SCNC: 102 MMOL/L (ref 95–110)
CO2 SERPL-SCNC: 28 MMOL/L (ref 23–29)
CREAT SERPL-MCNC: 0.6 MG/DL (ref 0.5–1.4)
DIFFERENTIAL METHOD: ABNORMAL
EOSINOPHIL # BLD AUTO: 0 K/UL (ref 0–0.5)
EOSINOPHIL NFR BLD: 0.2 % (ref 0–8)
ERYTHROCYTE [DISTWIDTH] IN BLOOD BY AUTOMATED COUNT: 17 % (ref 11.5–14.5)
EST. GFR  (AFRICAN AMERICAN): >60 ML/MIN/1.73 M^2
EST. GFR  (NON AFRICAN AMERICAN): >60 ML/MIN/1.73 M^2
GLUCOSE SERPL-MCNC: 141 MG/DL (ref 70–110)
HCT VFR BLD AUTO: 44.1 % (ref 37–48.5)
HGB BLD-MCNC: 14 G/DL (ref 12–16)
IMM GRANULOCYTES # BLD AUTO: 0.04 K/UL (ref 0–0.04)
IMM GRANULOCYTES NFR BLD AUTO: 0.8 % (ref 0–0.5)
INR PPP: 1.1 (ref 0.8–1.2)
LYMPHOCYTES # BLD AUTO: 0.4 K/UL (ref 1–4.8)
LYMPHOCYTES NFR BLD: 7 % (ref 18–48)
MAGNESIUM SERPL-MCNC: 1.6 MG/DL (ref 1.6–2.6)
MCH RBC QN AUTO: 28.9 PG (ref 27–31)
MCHC RBC AUTO-ENTMCNC: 31.7 G/DL (ref 32–36)
MCV RBC AUTO: 91 FL (ref 82–98)
MONOCYTES # BLD AUTO: 0.2 K/UL (ref 0.3–1)
MONOCYTES NFR BLD: 4 % (ref 4–15)
NEUTROPHILS # BLD AUTO: 4.6 K/UL (ref 1.8–7.7)
NEUTROPHILS NFR BLD: 87.4 % (ref 38–73)
NRBC BLD-RTO: 0 /100 WBC
PHOSPHATE SERPL-MCNC: 3.9 MG/DL (ref 2.7–4.5)
PLATELET # BLD AUTO: 197 K/UL (ref 150–350)
PMV BLD AUTO: 11.5 FL (ref 9.2–12.9)
POTASSIUM SERPL-SCNC: 4.7 MMOL/L (ref 3.5–5.1)
PROT SERPL-MCNC: 5.6 G/DL (ref 6–8.4)
PROTHROMBIN TIME: 11.4 SEC (ref 9–12.5)
RBC # BLD AUTO: 4.85 M/UL (ref 4–5.4)
SARS-COV-2 RDRP RESP QL NAA+PROBE: NEGATIVE
SODIUM SERPL-SCNC: 139 MMOL/L (ref 136–145)
WBC # BLD AUTO: 5.29 K/UL (ref 3.9–12.7)

## 2020-11-16 PROCEDURE — 25500020 PHARM REV CODE 255: Performed by: INTERNAL MEDICINE

## 2020-11-16 PROCEDURE — 84100 ASSAY OF PHOSPHORUS: CPT

## 2020-11-16 PROCEDURE — 63600175 PHARM REV CODE 636 W HCPCS: Performed by: PHYSICIAN ASSISTANT

## 2020-11-16 PROCEDURE — G0378 HOSPITAL OBSERVATION PER HR: HCPCS

## 2020-11-16 PROCEDURE — 94761 N-INVAS EAR/PLS OXIMETRY MLT: CPT

## 2020-11-16 PROCEDURE — 83735 ASSAY OF MAGNESIUM: CPT

## 2020-11-16 PROCEDURE — 85025 COMPLETE CBC W/AUTO DIFF WBC: CPT

## 2020-11-16 PROCEDURE — 25000003 PHARM REV CODE 250: Performed by: INTERNAL MEDICINE

## 2020-11-16 PROCEDURE — 82248 BILIRUBIN DIRECT: CPT

## 2020-11-16 PROCEDURE — C1894 INTRO/SHEATH, NON-LASER: HCPCS | Performed by: RADIOLOGY

## 2020-11-16 PROCEDURE — U0002 COVID-19 LAB TEST NON-CDC: HCPCS

## 2020-11-16 PROCEDURE — 96372 THER/PROPH/DIAG INJ SC/IM: CPT | Mod: 59 | Performed by: OBSTETRICS & GYNECOLOGY

## 2020-11-16 PROCEDURE — 36415 COLL VENOUS BLD VENIPUNCTURE: CPT

## 2020-11-16 PROCEDURE — 80053 COMPREHEN METABOLIC PANEL: CPT

## 2020-11-16 PROCEDURE — 99220 PR INITIAL OBSERVATION CARE,LEVL III: ICD-10-PCS | Mod: ,,, | Performed by: PHYSICIAN ASSISTANT

## 2020-11-16 PROCEDURE — 85730 THROMBOPLASTIN TIME PARTIAL: CPT

## 2020-11-16 PROCEDURE — 85610 PROTHROMBIN TIME: CPT

## 2020-11-16 PROCEDURE — 99220 PR INITIAL OBSERVATION CARE,LEVL III: CPT | Mod: ,,, | Performed by: PHYSICIAN ASSISTANT

## 2020-11-16 RX ORDER — HYDROCODONE BITARTRATE AND ACETAMINOPHEN 5; 325 MG/1; MG/1
1 TABLET ORAL EVERY 6 HOURS PRN
Status: DISCONTINUED | OUTPATIENT
Start: 2020-11-16 | End: 2020-11-24 | Stop reason: HOSPADM

## 2020-11-16 RX ORDER — ONDANSETRON 2 MG/ML
4 INJECTION INTRAMUSCULAR; INTRAVENOUS EVERY 8 HOURS PRN
Status: DISCONTINUED | OUTPATIENT
Start: 2020-11-16 | End: 2020-11-24 | Stop reason: HOSPADM

## 2020-11-16 RX ORDER — MORPHINE SULFATE 4 MG/ML
4 INJECTION, SOLUTION INTRAMUSCULAR; INTRAVENOUS EVERY 4 HOURS PRN
Status: DISCONTINUED | OUTPATIENT
Start: 2020-11-16 | End: 2020-11-24 | Stop reason: HOSPADM

## 2020-11-16 RX ORDER — GLUCAGON 1 MG
1 KIT INJECTION
Status: DISCONTINUED | OUTPATIENT
Start: 2020-11-16 | End: 2020-11-24 | Stop reason: HOSPADM

## 2020-11-16 RX ORDER — IBUPROFEN 200 MG
16 TABLET ORAL
Status: DISCONTINUED | OUTPATIENT
Start: 2020-11-16 | End: 2020-11-24 | Stop reason: HOSPADM

## 2020-11-16 RX ORDER — TALC
6 POWDER (GRAM) TOPICAL NIGHTLY PRN
Status: DISCONTINUED | OUTPATIENT
Start: 2020-11-16 | End: 2020-11-24 | Stop reason: HOSPADM

## 2020-11-16 RX ORDER — IBUPROFEN 200 MG
24 TABLET ORAL
Status: DISCONTINUED | OUTPATIENT
Start: 2020-11-16 | End: 2020-11-24 | Stop reason: HOSPADM

## 2020-11-16 RX ORDER — SODIUM CHLORIDE 0.9 % (FLUSH) 0.9 %
10 SYRINGE (ML) INJECTION
Status: DISCONTINUED | OUTPATIENT
Start: 2020-11-16 | End: 2020-11-24 | Stop reason: HOSPADM

## 2020-11-16 RX ORDER — ACETAMINOPHEN 325 MG/1
650 TABLET ORAL EVERY 8 HOURS PRN
Status: DISCONTINUED | OUTPATIENT
Start: 2020-11-16 | End: 2020-11-17

## 2020-11-16 RX ORDER — ACETAMINOPHEN 325 MG/1
650 TABLET ORAL EVERY 4 HOURS PRN
Status: DISCONTINUED | OUTPATIENT
Start: 2020-11-16 | End: 2020-11-17

## 2020-11-16 RX ORDER — ENOXAPARIN SODIUM 100 MG/ML
1 INJECTION SUBCUTANEOUS
Status: DISCONTINUED | OUTPATIENT
Start: 2020-11-16 | End: 2020-11-17

## 2020-11-16 RX ADMIN — ENOXAPARIN SODIUM 80 MG: 100 INJECTION SUBCUTANEOUS at 06:11

## 2020-11-16 RX ADMIN — HYDROCODONE BITARTRATE AND ACETAMINOPHEN 1 TABLET: 5; 325 TABLET ORAL at 06:11

## 2020-11-16 RX ADMIN — IOHEXOL 100 ML: 350 INJECTION, SOLUTION INTRAVENOUS at 10:11

## 2020-11-16 NOTE — OR NURSING
Patient up to bathroom to void . Patient has blood tinged urine. She saw Dr.Jacob Tran about theblood in urine they did a UA. UA shows blood in urine but no bacteria in urine. They discuss with Dr. Tran in the morning. Patient has been in cintact with Esa at Dr. Tran office. Patient also has a swollen left arm that is discolored slightly red and firm . She does have 2+ radial pulse. When she came into pre-op blood pressure was taken on that side. Notified Dr. Tapia new orders we are going to get ultrasound on left arm.Ultrasound tech in room now getting the ultrasound.

## 2020-11-16 NOTE — H&P
Consult/H&P Note  Interventional Radiology    Consult Requested By: gyn onc    Reason for Consult: ascites    SUBJECTIVE:     Chief Complaint: distension    History of Present Illness: 66 yo F with ovarian cancer and ascites.    Past Medical History:   Diagnosis Date    Cancer     ovarian    Dry eyes 11/29/2017    Neuropathy due to chemotherapeutic drug 1/10/2018    Open angle with borderline findings and low glaucoma risk in both eyes 6/20/2013    Ovarian cancer      Past Surgical History:   Procedure Laterality Date    BREAST BIOPSY      BUNIONECTOMY Left     CATARACT EXTRACTION W/  INTRAOCULAR LENS IMPLANT Right 04/04/2019        CATARACT EXTRACTION W/  INTRAOCULAR LENS IMPLANT Left 5/7/2019        CHOLECYSTECTOMY  1992    HYSTERECTOMY      INSERTION OF TUNNELED CENTRAL VENOUS CATHETER (CVC) WITH SUBCUTANEOUS PORT Right 7/23/2020    Procedure: INSERTION, PORT-A-CATH;  Surgeon: George Tapia MD;  Location: [facility] CATH LAB;  Service: Interventional Radiology;  Laterality: Right;    left leg surgery  2005    achilles tendon    OOPHORECTOMY      PHACOEMULSIFICATION OF CATARACT Right 4/4/2019    Procedure: PHACOEMULSIFICATION, CATARACT;  Surgeon: Karen Butt MD;  Location: [address];  Service: Ophthalmology;  Laterality: Right;    RI REMOVAL OF OVARY/TUBE(S)      right leg surgery  2006    broken, including ankle     Family History   Problem Relation Age of Onset    Cancer Father         ? liver ca    Diabetes Father     Cataracts Mother     Breast cancer Maternal Aunt     Cancer Son         sarcoma    Melanoma Sister     Colon cancer Neg Hx     Ovarian cancer Neg Hx     Amblyopia Neg Hx     Blindness Neg Hx     Glaucoma Neg Hx     Hypertension Neg Hx     Macular degeneration Neg Hx     Retinal detachment Neg Hx     Strabismus Neg Hx     Stroke Neg Hx     Thyroid disease Neg Hx      Social History     Tobacco Use    Smoking status: Never Smoker     Smokeless tobacco: Never Used   Substance Use Topics    Alcohol use: Yes     Alcohol/week: 3.0 standard drinks     Types: 1 Glasses of wine, 2 Shots of liquor per week     Frequency: Monthly or less     Drinks per session: 1 or 2     Binge frequency: Never     Comment: Socially    Drug use: No       Review of Systems:  Constitutional/General:No fever, chills, change in appetite or weight loss.  Hematological/Immuno: no known coagulopathies  Respiratory: no shortness of breath  Cardiovascular: no chest pain  Gastrointestinal: positive for - abdominal pain and gas/bloating  Genito-Urinary: no dysuria, trouble voiding, or hematuria  Musculoskeletal: negative  Skin: Negative for rash, itching, pigmentation changes, nail or hair changes.  Neurological: no TIA or stroke symptoms  Psychiatric: normal mood/affect, good insight/judgement      OBJECTIVE:     Vital Signs Range (Last 24H):  Temp:  [97.5 °F (36.4 °C)]   Pulse:  [114]   Resp:  [18]   BP: (155)/(75)   SpO2:  [95 %]     Physical Exam:  General- Patient alert and oriented x3 in NAD  ENT- PERRLA,  Neck- No masses  CV- Regular rate and rhythm  Resp-  No increased WOB  GI- Non tender/+ distended  Extrem- No cyanosis, clubbing, edema.   Derm- No rashes, masses, or lesions noted  Neuro-  No focal deficits noted.     Physical Exam  Body mass index is 26.63 kg/m².    Scheduled Meds:   Continuous Infusions:   PRN Meds:    Allergies: Review of patient's allergies indicates:  No Known Allergies    Labs:  No results for input(s): INR in the last 168 hours.    Invalid input(s):  PT,  PTT    Recent Labs   Lab 11/12/20  0328   WBC 5.86   HGB 13.1   HCT 40.9   MCV 91         Recent Labs   Lab 11/12/20  0328   GLU 94      K 4.1      CO2 27   BUN 17   CREATININE 0.7   CALCIUM 9.0   ALT 30   AST 53*   ALBUMIN 3.0*   BILITOT 1.4*       Vitals (Most Recent):  Temp: 97.5 °F (36.4 °C) (11/16/20 1242)  Pulse: (!) 114 (11/16/20 1242)  Resp: 18 (11/16/20 1242)  BP: (!)  155/75 (11/16/20 1242)  SpO2: 95 % (11/16/20 1242)    ASA: 3  Mallampati: 2    Consent obtained    ASSESSMENT/PLAN:     Paracentesis.  Local anesthesia.    Active Hospital Problems    Diagnosis  POA    Ascites [R18.8]  Yes      Resolved Hospital Problems   No resolved problems to display.           George Tapia MD

## 2020-11-16 NOTE — DISCHARGE SUMMARY
Radiology Discharge Summary      Hospital Course: No complications    Admit Date: 11/16/2020  Discharge Date: 11/16/2020     Instructions Given to Patient: Yes  Diet: Resume prior diet  Activity: activity as tolerated    Description of Condition on Discharge: Stable  Vital Signs (Most Recent): Temp: 97.5 °F (36.4 °C) (11/16/20 1242)  Pulse: 90 (11/16/20 1440)  Resp: 16 (11/16/20 1440)  BP: 128/72 (11/16/20 1440)  SpO2: 96 % (11/16/20 1440)    Discharge Disposition: Home    Discharge Diagnosis: ascites     Follow-up: paracentesis prn patient comfort      @SIG@

## 2020-11-16 NOTE — OR NURSING
DR. Tapia consulted Hospitalist to treat blood clot to left arm. Patient will be admitted and transferred to [facility].

## 2020-11-16 NOTE — PROGRESS NOTES
In recovery, she was noted to have some new LUE swelling which started following inflation of the blood pressure cuff in that arm.  She also endorses L neck discomfort that has been going on for a few weeks when questioned.  A STAT LUE US was ordered and she has DVT throughout L IJ, subclavian, axillary, and brachial veins.    We have contacted internal medicine for observation and anticoagulation management.

## 2020-11-16 NOTE — PROCEDURES
Radiology Post-Procedure Note    Pre Op Diagnosis: ascites  Post Op Diagnosis: Same    Procedure: paracentesis    Procedure performed by: George Tapia MD    Written Informed Consent Obtained: Yes  Specimen Removed: YES 2.5  L serous ascites  Estimated Blood Loss: Minimal    Findings:   Successful paracentesis.    Patient tolerated procedure well.    @SIG@

## 2020-11-17 PROBLEM — Z00.6 PATIENT IN CANCER RELATED RESEARCH STUDY: Status: RESOLVED | Noted: 2018-04-10 | Resolved: 2020-11-17

## 2020-11-17 PROBLEM — R31.9 HEMATURIA: Status: RESOLVED | Noted: 2020-11-17 | Resolved: 2020-11-17

## 2020-11-17 PROBLEM — R31.9 HEMATURIA: Status: ACTIVE | Noted: 2020-11-17

## 2020-11-17 PROBLEM — J90 PLEURAL EFFUSION, BILATERAL: Status: ACTIVE | Noted: 2020-11-17

## 2020-11-17 PROBLEM — R18.8 ASCITES: Status: RESOLVED | Noted: 2020-11-16 | Resolved: 2020-11-17

## 2020-11-17 LAB
ALBUMIN SERPL BCP-MCNC: 2.2 G/DL (ref 3.5–5.2)
ALP SERPL-CCNC: 208 U/L (ref 55–135)
ALT SERPL W/O P-5'-P-CCNC: 31 U/L (ref 10–44)
ANION GAP SERPL CALC-SCNC: 9 MMOL/L (ref 8–16)
AST SERPL-CCNC: 40 U/L (ref 10–40)
BASOPHILS # BLD AUTO: 0.05 K/UL (ref 0–0.2)
BASOPHILS NFR BLD: 0.9 % (ref 0–1.9)
BILIRUB DIRECT SERPL-MCNC: 0.7 MG/DL (ref 0.1–0.3)
BILIRUB SERPL-MCNC: 1.5 MG/DL (ref 0.1–1)
BILIRUB UR QL STRIP: ABNORMAL
BUN SERPL-MCNC: 12 MG/DL (ref 8–23)
CALCIUM SERPL-MCNC: 8.3 MG/DL (ref 8.7–10.5)
CHLORIDE SERPL-SCNC: 101 MMOL/L (ref 95–110)
CLARITY UR: CLEAR
CO2 SERPL-SCNC: 27 MMOL/L (ref 23–29)
COLOR UR: ABNORMAL
CREAT SERPL-MCNC: 0.6 MG/DL (ref 0.5–1.4)
DIFFERENTIAL METHOD: ABNORMAL
EOSINOPHIL # BLD AUTO: 0 K/UL (ref 0–0.5)
EOSINOPHIL NFR BLD: 0.4 % (ref 0–8)
ERYTHROCYTE [DISTWIDTH] IN BLOOD BY AUTOMATED COUNT: 17 % (ref 11.5–14.5)
EST. GFR  (AFRICAN AMERICAN): >60 ML/MIN/1.73 M^2
EST. GFR  (NON AFRICAN AMERICAN): >60 ML/MIN/1.73 M^2
GLUCOSE SERPL-MCNC: 114 MG/DL (ref 70–110)
GLUCOSE UR QL STRIP: NEGATIVE
HCT VFR BLD AUTO: 45 % (ref 37–48.5)
HGB BLD-MCNC: 14.3 G/DL (ref 12–16)
HGB UR QL STRIP: ABNORMAL
IMM GRANULOCYTES # BLD AUTO: 0.01 K/UL (ref 0–0.04)
IMM GRANULOCYTES NFR BLD AUTO: 0.2 % (ref 0–0.5)
KETONES UR QL STRIP: ABNORMAL
LEUKOCYTE ESTERASE UR QL STRIP: NEGATIVE
LYMPHOCYTES # BLD AUTO: 0.5 K/UL (ref 1–4.8)
LYMPHOCYTES NFR BLD: 9.8 % (ref 18–48)
MAGNESIUM SERPL-MCNC: 1.5 MG/DL (ref 1.6–2.6)
MCH RBC QN AUTO: 28.7 PG (ref 27–31)
MCHC RBC AUTO-ENTMCNC: 31.8 G/DL (ref 32–36)
MCV RBC AUTO: 90 FL (ref 82–98)
MONOCYTES # BLD AUTO: 0.3 K/UL (ref 0.3–1)
MONOCYTES NFR BLD: 5 % (ref 4–15)
NEUTROPHILS # BLD AUTO: 4.5 K/UL (ref 1.8–7.7)
NEUTROPHILS NFR BLD: 83.7 % (ref 38–73)
NITRITE UR QL STRIP: NEGATIVE
NRBC BLD-RTO: 0 /100 WBC
PH UR STRIP: 7 [PH] (ref 5–8)
PHOSPHATE SERPL-MCNC: 3.6 MG/DL (ref 2.7–4.5)
PLATELET # BLD AUTO: 193 K/UL (ref 150–350)
PMV BLD AUTO: 11.1 FL (ref 9.2–12.9)
POTASSIUM SERPL-SCNC: 4.4 MMOL/L (ref 3.5–5.1)
PROCALCITONIN SERPL IA-MCNC: 0.13 NG/ML
PROT SERPL-MCNC: 5.2 G/DL (ref 6–8.4)
PROT UR QL STRIP: ABNORMAL
RBC # BLD AUTO: 4.99 M/UL (ref 4–5.4)
SODIUM SERPL-SCNC: 137 MMOL/L (ref 136–145)
SP GR UR STRIP: <=1.005 (ref 1–1.03)
URN SPEC COLLECT METH UR: ABNORMAL
UROBILINOGEN UR STRIP-ACNC: 1 EU/DL
WBC # BLD AUTO: 5.4 K/UL (ref 3.9–12.7)

## 2020-11-17 PROCEDURE — G0378 HOSPITAL OBSERVATION PER HR: HCPCS

## 2020-11-17 PROCEDURE — 80053 COMPREHEN METABOLIC PANEL: CPT

## 2020-11-17 PROCEDURE — 85025 COMPLETE CBC W/AUTO DIFF WBC: CPT

## 2020-11-17 PROCEDURE — 96374 THER/PROPH/DIAG INJ IV PUSH: CPT | Performed by: OBSTETRICS & GYNECOLOGY

## 2020-11-17 PROCEDURE — 63600175 PHARM REV CODE 636 W HCPCS: Performed by: INTERNAL MEDICINE

## 2020-11-17 PROCEDURE — 83735 ASSAY OF MAGNESIUM: CPT

## 2020-11-17 PROCEDURE — 84100 ASSAY OF PHOSPHORUS: CPT

## 2020-11-17 PROCEDURE — 96372 THER/PROPH/DIAG INJ SC/IM: CPT | Mod: 59 | Performed by: OBSTETRICS & GYNECOLOGY

## 2020-11-17 PROCEDURE — 63600175 PHARM REV CODE 636 W HCPCS: Performed by: PHYSICIAN ASSISTANT

## 2020-11-17 PROCEDURE — 99226 PR SUBSEQUENT OBSERVATION CARE,LEVEL III: CPT | Mod: ,,, | Performed by: NURSE PRACTITIONER

## 2020-11-17 PROCEDURE — 25000003 PHARM REV CODE 250: Performed by: INTERNAL MEDICINE

## 2020-11-17 PROCEDURE — 63600175 PHARM REV CODE 636 W HCPCS: Performed by: NURSE PRACTITIONER

## 2020-11-17 PROCEDURE — 25000003 PHARM REV CODE 250: Performed by: NURSE PRACTITIONER

## 2020-11-17 PROCEDURE — 99226 PR SUBSEQUENT OBSERVATION CARE,LEVEL III: ICD-10-PCS | Mod: ,,, | Performed by: NURSE PRACTITIONER

## 2020-11-17 PROCEDURE — 36415 COLL VENOUS BLD VENIPUNCTURE: CPT

## 2020-11-17 PROCEDURE — 84145 PROCALCITONIN (PCT): CPT

## 2020-11-17 PROCEDURE — 81003 URINALYSIS AUTO W/O SCOPE: CPT

## 2020-11-17 RX ORDER — POLYETHYLENE GLYCOL 3350 17 G/17G
17 POWDER, FOR SOLUTION ORAL ONCE
Status: COMPLETED | OUTPATIENT
Start: 2020-11-18 | End: 2020-11-18

## 2020-11-17 RX ORDER — ACETAMINOPHEN 325 MG/1
650 TABLET ORAL EVERY 4 HOURS PRN
Status: DISCONTINUED | OUTPATIENT
Start: 2020-11-17 | End: 2020-11-24 | Stop reason: HOSPADM

## 2020-11-17 RX ORDER — ENOXAPARIN SODIUM 100 MG/ML
1 INJECTION SUBCUTANEOUS
Status: DISCONTINUED | OUTPATIENT
Start: 2020-11-17 | End: 2020-11-18

## 2020-11-17 RX ADMIN — ONDANSETRON 4 MG: 2 INJECTION INTRAMUSCULAR; INTRAVENOUS at 05:11

## 2020-11-17 RX ADMIN — DICYCLOMINE HYDROCHLORIDE: 10 SOLUTION ORAL at 01:11

## 2020-11-17 RX ADMIN — HYDROCODONE BITARTRATE AND ACETAMINOPHEN 1 TABLET: 5; 325 TABLET ORAL at 05:11

## 2020-11-17 RX ADMIN — HYDROCODONE BITARTRATE AND ACETAMINOPHEN 1 TABLET: 5; 325 TABLET ORAL at 04:11

## 2020-11-17 RX ADMIN — ENOXAPARIN SODIUM 80 MG: 100 INJECTION SUBCUTANEOUS at 07:11

## 2020-11-17 RX ADMIN — HYDROCODONE BITARTRATE AND ACETAMINOPHEN 1 TABLET: 5; 325 TABLET ORAL at 11:11

## 2020-11-17 NOTE — ASSESSMENT & PLAN NOTE
- patient states she has scant amount of blood with wiping after urination 2-3 x /week x 3 weeks   - no blood noted today   - recently had dysuria, now resolved per patient report   - reviewed UA  - repeat UA pending

## 2020-11-17 NOTE — PLAN OF CARE
SW met with pt at bedside to complete discharge assessment, verified PCP and uses [email or web address] on [address] and would like bedside delivery.  Pt lives alone but sonRamin will be in town tomorrow from [place] and will be available to assist pt, as needed.  DaughterShirley will provide transportation home.  No needs identified at this time.     11/17/20 1312   Discharge Assessment   Assessment Type Discharge Planning Assessment   Confirmed/corrected address and phone number on facesheet? Yes   Assessment information obtained from? Patient   Communicated expected length of stay with patient/caregiver no   Prior to hospitilization cognitive status: Alert/Oriented   Prior to hospitalization functional status: Independent   Current cognitive status: Alert/Oriented   Lives With alone   Able to Return to Prior Arrangements yes   Is patient able to care for self after discharge? Unable to determine at this time (comments)   Readmission Within the Last 30 Days no previous admission in last 30 days   Patient currently being followed by outpatient case management? No   Patient currently receives any other outside agency services? No   Equipment Currently Used at Home none   Do you have any problems affording any of your prescribed medications? No   Is the patient taking medications as prescribed? yes   Does the patient have transportation home? Yes   Transportation Anticipated family or friend will provide   Does the patient receive services at the [facility]? No   Discharge Plan A Home   DME Needed Upon Discharge  none   Patient/Family in Agreement with Plan yes

## 2020-11-17 NOTE — SUBJECTIVE & OBJECTIVE
Interval History: Mild ascites with nausea/vomiting, mild jaundice.  Case discussed with Gyn/Onc and consult for evaluation. US left UE with thrombus involving the left IJ extending to the left axillary vein and thrombus within one of the paired brachial veins.  Lovenox 1mg/kg last dose (0551 today) and plan to transition to apixaban.     Review of Systems   Constitutional: Positive for fatigue. Negative for chills and fever.   HENT: Negative for congestion and trouble swallowing.    Eyes: Negative for photophobia and visual disturbance.   Respiratory: Negative for cough and shortness of breath.    Cardiovascular: Negative for chest pain and leg swelling.   Gastrointestinal: Positive for abdominal distention, abdominal pain, nausea and vomiting.   Genitourinary: Negative for dysuria and hematuria.   Musculoskeletal: Positive for neck pain (mild left sided). Negative for arthralgias and myalgias.   Skin: Negative for pallor and wound.   Neurological: Negative for weakness (generalized) and headaches.   Hematological: Does not bruise/bleed easily.   Psychiatric/Behavioral: Negative for confusion. The patient is not nervous/anxious.      Objective:     Vital Signs (Most Recent):  Temp: 97.6 °F (36.4 °C) (11/17/20 1624)  Pulse: 102 (11/17/20 1624)  Resp: 17 (11/17/20 1704)  BP: 131/74 (11/17/20 1624)  SpO2: 95 % (11/17/20 1624) Vital Signs (24h Range):  Temp:  [97.3 °F (36.3 °C)-98.2 °F (36.8 °C)] 97.6 °F (36.4 °C)  Pulse:  [] 102  Resp:  [16-20] 17  SpO2:  [93 %-96 %] 95 %  BP: (125-135)/(60-74) 131/74     Weight: 77.1 kg (170 lb)  Body mass index is 26.63 kg/m².    Intake/Output Summary (Last 24 hours) at 11/17/2020 1738  Last data filed at 11/17/2020 0700  Gross per 24 hour   Intake no documentation   Output 425 ml   Net -425 ml      Physical Exam  Vitals signs and nursing note reviewed.   Constitutional:       General: She is not in acute distress.     Appearance: Normal appearance. She is well-developed and  normal weight. She is ill-appearing.   HENT:      Head: Normocephalic and atraumatic.      Nose: Nose normal. No congestion.      Mouth/Throat:      Mouth: Mucous membranes are moist.      Pharynx: Oropharynx is clear.   Eyes:      Conjunctiva/sclera: Conjunctivae normal.      Pupils: Pupils are equal, round, and reactive to light.   Neck:      Musculoskeletal: Normal range of motion and neck supple.      Comments: Mild swellling left neck  Cardiovascular:      Rate and Rhythm: Regular rhythm. Tachycardia present.      Pulses:           Radial pulses are 2+ on the right side and 1+ on the left side.        Dorsalis pedis pulses are 2+ on the left side.      Heart sounds: Normal heart sounds.   Pulmonary:      Effort: Pulmonary effort is normal.      Breath sounds: Examination of the right-lower field reveals decreased breath sounds. Examination of the left-lower field reveals decreased breath sounds. Decreased breath sounds present.   Abdominal:      General: Bowel sounds are normal.      Palpations: Abdomen is soft.      Tenderness: There is no abdominal tenderness.   Skin:     General: Skin is warm and dry.      Coloration: Skin is jaundiced (mild). Skin is not pale.      Findings: No erythema or rash.   Neurological:      General: No focal deficit present.      Mental Status: She is alert and oriented to person, place, and time. Mental status is at baseline.   Psychiatric:         Attention and Perception: Attention normal.         Mood and Affect: Mood normal.         Speech: Speech normal.         Thought Content: Thought content normal.         Judgment: Judgment normal.         Significant Labs:   CBC:   Recent Labs   Lab 11/16/20 1903 11/17/20  0432   WBC 5.29 5.40   HGB 14.0 14.3   HCT 44.1 45.0    193     CMP:   Recent Labs   Lab 11/16/20 1903 11/17/20  0432    137   K 4.7 4.4    101   CO2 28 27   * 114*   BUN 14 12   CREATININE 0.6 0.6   CALCIUM 8.5* 8.3*   PROT 5.6* 5.2*    ALBUMIN 2.4* 2.2*   BILITOT 1.4* 1.5*   ALKPHOS 236* 208*   AST 48* 40   ALT 32 31   ANIONGAP 9 9   EGFRNONAA >60 >60     Coagulation:   Recent Labs   Lab 11/16/20  1903   INR 1.1   APTT 31.6     Urine Studies:   Recent Labs   Lab 11/17/20  0633   COLORU Emily   APPEARANCEUA Clear   PHUR 7.0   SPECGRAV <=1.005*   PROTEINUA Trace*   GLUCUA Negative   KETONESU Trace*   BILIRUBINUA 1+*   OCCULTUA Trace*   NITRITE Negative   UROBILINOGEN 1.0   LEUKOCYTESUR Negative     All pertinent labs within the past 24 hours have been reviewed.    Significant Imaging: I have reviewed all pertinent imaging results/findings within the past 24 hours.     CTA Chest Non Coronary  Narrative: EXAMINATION:  CTA CHEST NON CORONARY    CLINICAL HISTORY:  Evaluate for pulmonary embolism;    TECHNIQUE:  Low dose axial images, sagittal and coronal reformations were obtained from the thoracic inlet to the lung bases following the IV administration of 100 mL of Omnipaque 350.  Contrast timing was optimized to evaluate the pulmonary arteries.  MIP images were performed.    COMPARISON:  CT of the chest, 10/13/2020    FINDINGS:  Pleural effusions have significantly increased in size now measuring 9 cm in thickness on the right and 8 cm in thickness on the left in the lung bases.  Adjacent atelectasis in the lower lobes and in the lingula and middle lobe are noted with some degree of consolidation especially in the middle lobe.  The aerated portions of the lungs appear clear.    The heart, pericardium and great vessels enhance normally with no evidence of aortic aneurysm or dissection in 3 vessel aortic arch or pulmonary artery filling defect in the major trunk or branch vessels.    Limited images of the upper abdomen demonstrate no new abnormality.  Bony structures are intact.  Port-A-Cath on the right remains.  Impression: No evidence of pulmonary artery embolus.    No evidence of aneurysm or dissection.    Significant increase in size of bilateral  effusions with adjacent atelectasis and consolidation in the middle lobe and lingula.    Electronically signed by: Cesar Neri  Date:    11/17/2020  Time:    01:13

## 2020-11-17 NOTE — ASSESSMENT & PLAN NOTE
- Admitted from Interventional Radiology after paracentesis; noted to have left neck discomfort and arm swelling  - Ultrasound left upper extremity with thromboses noted to left IJ, subclavian, axillary and 1 branch brachial veins  - CTA chest without evidence of pulmonary embolism  - Continue treatment dose Lovenox 1mg/kg twice daily   - Suspect hypercoagulable due to active cancer  - Will discuss plan for anticoagulation with Gyn/Onc; oral vs lovenox

## 2020-11-17 NOTE — PROGRESS NOTES
Patient arrived on unit, VSS, oriented patient to room and call light. Limb alert bracelet placed to LUE.

## 2020-11-17 NOTE — H&P
"[facility] Hospital Medicine  History & Physical    Patient Name: Mickie Don  MRN: [id number]  Admission Date: 11/16/2020  Attending Physician: Giovani Rose MD   Primary Care Provider: Elle Ma MD         Patient information was obtained from patient, past medical records and ER records.     Subjective:     Principal Problem:Arm DVT (deep venous thromboembolism), acute, left    Chief Complaint: No chief complaint on file.       HPI: Ms. Mickie Don is a 65 y.o. female, with PMH of Ovarian cancer, who presented to [facility] on 11/16/20 for paracentesis of abdominal ascites, and was noted to have LUE swelling. An US was preformed and showed "Thrombus involving the left internal jugular vein extending to the left axillary vein.  Additional thrombus is noted within 1 of the paired brachial veins."   She was placed on OBS.     Past Medical History:   Diagnosis Date    Cancer     ovarian    Dry eyes 11/29/2017    Neuropathy due to chemotherapeutic drug 1/10/2018    Open angle with borderline findings and low glaucoma risk in both eyes 6/20/2013    Ovarian cancer        Past Surgical History:   Procedure Laterality Date    BREAST BIOPSY      BUNIONECTOMY Left     CATARACT EXTRACTION W/  INTRAOCULAR LENS IMPLANT Right 04/04/2019        CATARACT EXTRACTION W/  INTRAOCULAR LENS IMPLANT Left 5/7/2019        CHOLECYSTECTOMY  1992    HYSTERECTOMY      INSERTION OF TUNNELED CENTRAL VENOUS CATHETER (CVC) WITH SUBCUTANEOUS PORT Right 7/23/2020    Procedure: INSERTION, PORT-A-CATH;  Surgeon: George Tapia MD;  Location: [facility] CATH LAB;  Service: Interventional Radiology;  Laterality: Right;    left leg surgery  2005    achilles tendon    OOPHORECTOMY      PHACOEMULSIFICATION OF CATARACT Right 4/4/2019    Procedure: PHACOEMULSIFICATION, CATARACT;  Surgeon: Karen Butt MD;  Location: [facility] OR [address] [place] [address];  Service: Ophthalmology;  Laterality: Right;    [place] " REMOVAL OF OVARY/TUBE(S)      right leg surgery  2006    broken, including ankle       Review of patient's allergies indicates:  No Known Allergies    No current facility-administered medications on file prior to encounter.      Current Outpatient Medications on File Prior to Encounter   Medication Sig    HYDROcodone-acetaminophen (NORCO) 5-325 mg per tablet Take 1 tablet by mouth every 6 (six) hours as needed.     Family History     Problem Relation (Age of Onset)    Breast cancer Maternal Aunt    Cancer Father, Son    Cataracts Mother    Deep vein thrombosis Mother, Sister, Sister    Diabetes Father    Factor V Leiden deficiency Brother, Daughter    Melanoma Sister    No Known Problems Daughter, Son, Sister, Brother, Brother        Tobacco Use    Smoking status: Never Smoker    Smokeless tobacco: Never Used   Substance and Sexual Activity    Alcohol use: Yes     Alcohol/week: 3.0 standard drinks     Types: 1 Glasses of wine, 2 Shots of liquor per week     Frequency: Monthly or less     Drinks per session: 1 or 2     Binge frequency: Never     Comment: Socially    Drug use: No    Sexual activity: Never     Partners: Male     Birth control/protection: Post-menopausal     Review of Systems   Constitutional: Positive for fatigue. Negative for chills, diaphoresis and fever.   HENT: Negative for congestion, ear pain, postnasal drip, rhinorrhea, sinus pressure, sore throat and trouble swallowing.    Respiratory: Negative for cough, shortness of breath and wheezing.    Cardiovascular: Negative for chest pain, palpitations and leg swelling.   Gastrointestinal: Positive for abdominal distention (improved after paracentesis), abdominal pain (periumbilical) and constipation. Negative for diarrhea, nausea and vomiting.   Genitourinary: Positive for hematuria (small amount with wiping). Negative for dysuria, flank pain, frequency, urgency, vaginal bleeding, vaginal discharge and vaginal pain.   Musculoskeletal: Positive  for neck pain (x1-2 days left side of neck ). Negative for arthralgias, back pain, gait problem, joint swelling, myalgias and neck stiffness.   Skin: Negative for color change, pallor and rash.   Neurological: Positive for weakness (generalized). Negative for dizziness, syncope, light-headedness and headaches.   Psychiatric/Behavioral: Negative for confusion and decreased concentration.     Objective:     Vital Signs (Most Recent):  Temp: 97.8 °F (36.6 °C) (11/17/20 0010)  Pulse: 92 (11/17/20 0010)  Resp: 18 (11/17/20 0010)  BP: 132/64 (11/17/20 0010)  SpO2: 96 % (11/17/20 0010) Vital Signs (24h Range):  Temp:  [97.3 °F (36.3 °C)-97.8 °F (36.6 °C)] 97.8 °F (36.6 °C)  Pulse:  [] 92  Resp:  [16-23] 18  SpO2:  [94 %-98 %] 96 %  BP: (125-155)/(60-79) 132/64     Weight: 77.1 kg (170 lb)  Body mass index is 26.63 kg/m².    Physical Exam  Vitals signs and nursing note reviewed.   Constitutional:       General: She is not in acute distress.     Appearance: Normal appearance. She is well-developed and normal weight. She is not ill-appearing or diaphoretic.   HENT:      Head: Normocephalic and atraumatic.   Eyes:      General: No scleral icterus.     Conjunctiva/sclera: Conjunctivae normal.      Pupils: Pupils are equal, round, and reactive to light.   Neck:      Musculoskeletal: Normal range of motion and neck supple.      Trachea: No tracheal deviation.   Cardiovascular:      Rate and Rhythm: Normal rate and regular rhythm.      Heart sounds: Normal heart sounds. No murmur. No gallop.    Pulmonary:      Effort: Pulmonary effort is normal. No respiratory distress.      Breath sounds: Normal breath sounds. No stridor. No wheezing or rales.   Abdominal:      General: Bowel sounds are normal. There is no distension.      Palpations: Abdomen is soft. There is no mass.      Tenderness: There is no abdominal tenderness. There is no guarding.   Skin:     General: Skin is warm and dry.      Coloration: Skin is not pale.       Findings: No erythema or rash.   Neurological:      General: No focal deficit present.      Mental Status: She is alert and oriented to person, place, and time.      Cranial Nerves: No cranial nerve deficit.      Motor: No abnormal muscle tone.   Psychiatric:         Mood and Affect: Mood normal.         Behavior: Behavior normal.         Thought Content: Thought content normal.         Judgment: Judgment normal.           CRANIAL NERVES     CN III, IV, VI   Pupils are equal, round, and reactive to light.       Significant Labs:   BMP:   Recent Labs   Lab 11/16/20  1903   *      K 4.7      CO2 28   BUN 14   CREATININE 0.6   CALCIUM 8.5*   MG 1.6     CBC:   Recent Labs   Lab 11/16/20 1903   WBC 5.29   HGB 14.0   HCT 44.1        CMP:   Recent Labs   Lab 11/16/20 1903      K 4.7      CO2 28   *   BUN 14   CREATININE 0.6   CALCIUM 8.5*   PROT 5.6*   ALBUMIN 2.4*   BILITOT 1.4*   ALKPHOS 236*   AST 48*   ALT 32   ANIONGAP 9   EGFRNONAA >60     Urine Culture: No results for input(s): LABURIN in the last 48 hours.  Urine Studies: No results for input(s): COLORU, APPEARANCEUA, PHUR, SPECGRAV, PROTEINUA, GLUCUA, KETONESU, BILIRUBINUA, OCCULTUA, NITRITE, UROBILINOGEN, LEUKOCYTESUR, RBCUA, WBCUA, BACTERIA, SQUAMEPITHEL, HYALINECASTS in the last 48 hours.    Invalid input(s): WRIGHTSUR  All pertinent labs within the past 24 hours have been reviewed.    Significant Imaging: I have reviewed all pertinent imaging results/findings within the past 24 hours.      CTA Chest Non Coronary  Order: [id number]  Status:  Final result   Visible to patient:  No (not released) Next appt:  11/20/2020 at 07:00 AM in Lab (LAB, [facility])  Details    Reading Physician Reading Date Result Priority   Cesar Neri MD  [phone] 11/17/2020 Routine      Narrative & Impression     EXAMINATION:  CTA CHEST NON CORONARY     CLINICAL HISTORY:  Evaluate for pulmonary embolism;     TECHNIQUE:  Low dose  axial images, sagittal and coronal reformations were obtained from the thoracic inlet to the lung bases following the IV administration of 100 mL of Omnipaque 350.  Contrast timing was optimized to evaluate the pulmonary arteries.  MIP images were performed.     COMPARISON:  CT of the chest, 10/13/2020     FINDINGS:  Pleural effusions have significantly increased in size now measuring 9 cm in thickness on the right and 8 cm in thickness on the left in the lung bases.  Adjacent atelectasis in the lower lobes and in the lingula and middle lobe are noted with some degree of consolidation especially in the middle lobe.  The aerated portions of the lungs appear clear.     The heart, pericardium and great vessels enhance normally with no evidence of aortic aneurysm or dissection in 3 vessel aortic arch or pulmonary artery filling defect in the major trunk or branch vessels.     Limited images of the upper abdomen demonstrate no new abnormality.  Bony structures are intact.  Port-A-Cath on the right remains.     Impression:     No evidence of pulmonary artery embolus.     No evidence of aneurysm or dissection.     Significant increase in size of bilateral effusions with adjacent atelectasis and consolidation in the middle lobe and lingula.        Electronically signed by: Cesar Neri  Date:                                            11/17/2020  Time:                                           01:13           US Lower Extremity Veins Bilateral  Order: [id number]  Status:  Final result   Visible to patient:  No (not released) Next appt:  11/20/2020 at 07:00 AM in Lab (LAB, [facility])  Details    Reading Physician Reading Date Result Priority   Erika David MD  [phone]  [phone] 11/16/2020 Routine      Narrative & Impression     EXAMINATION:  US LOWER EXTREMITY VEINS BILATERAL     CLINICAL HISTORY:  Evaluate for DVT;     TECHNIQUE:  Duplex and color flow Doppler and dynamic compression was performed of the  bilateral lower extremity veins was performed.     COMPARISON:  Lower extremity ultrasound 07/17/2020     FINDINGS:  Right thigh veins: The common femoral, femoral, popliteal, upper greater saphenous, and deep femoral veins are patent and free of thrombus. The veins are normally compressible and have normal phasic flow and augmentation response.     Right calf veins: The visualized calf veins are patent.     Left thigh veins: The common femoral, femoral, popliteal, upper greater saphenous, and deep femoral veins are patent and free of thrombus. The veins are normally compressible and have normal phasic flow and augmentation response.     Left calf veins: The visualized calf veins are patent.     Miscellaneous: None     Impression:     No evidence of deep venous thrombosis in either lower extremity.        Electronically signed by: Erika David MD  Date:                                            11/16/2020  Time:                                           21:44           US Upper Extremity Veins Left  Order: [id number]  Status:  Final result   Visible to patient:  No (not released) Next appt:  11/20/2020 at 07:00 AM in Lab (LAB, [facility]) Dx:  Malignant neoplasm of ovary, unspecif...  Details    Reading Physician Reading Date Result Priority   Micheal Fair MD  [phone] 11/16/2020 STAT      Narrative & Impression     EXAMINATION:  US UPPER EXTREMITY VEINS LEFT     CLINICAL HISTORY:  L arm swelling; Malignant neoplasm of unspecified ovary     TECHNIQUE:  Duplex and color flow Doppler evaluation and dynamic compression was performed of the left upper extremity veins.     COMPARISON:  None     FINDINGS:  Central veins: There is thrombus within the left internal jugular vein, extending to the left axillary vein.  There is thrombus within 1 of the paired brachial veins.  The basilic vein and cephalic vein are patent.  The contralateral right internal jugular vein and right subclavian vein are patent.    "  Impression:     This report was flagged in Epic as abnormal.     Thrombus involving the left internal jugular vein extending to the left axillary vein.  Additional thrombus is noted within 1 of the paired brachial veins.        Electronically signed by: Micheal Fair MD  Date:                                            11/16/2020  Time:                                           17:00               Assessment/Plan:     * Arm DVT (deep venous thromboembolism), acute, left  - Ms. Mickie Don presents with left arm swelling   - labs for renal function pending, but Cr baseline appears to be 0.7  - BID weight based Lovenox ordered   - Assess CTA Chest for PE, and US b/l LE for LE DVT  - coag studies also pending       Pleural effusion, bilateral  - CTA did note slight increase in size with "some degree of consolidation especially in the middle lobe."   - patient denies fever, chills, sweats, cough, wheeze, SOB   - procalcitonin pending       Ascites  - s/p paracentesis with IR today       Ovarian cancer - IIIC  - follows w/ Heme/Onc  - per last notes:   "Treatment History  Xlap/Omentectomy on 11/1/16 - mass was unresectable  Stage IIIC ovarian cancer  Initiated Taxotere/Carbo on 11/18/16, now s/p 6 cycles completed 3/8/17  Xlap/ISIS/BSO/Radical mass resection > 10 cm, optimal debulking on 4/11/17  Initiated PRIMA on 8/21/17  BRCA negative  Started FORWARD1 on 11/27/17  Started Doxil/Anju as part of NRG  2/27/19, received total of 6 cycles  Gemzar monotherapy started 8/29/19  Topotecan started 8/14/2020  Taxol monotherapy started 10/16/2020"    Hematuria  - patient states she has scant amount of blood with wiping after urination 2-3 x /week x 3 weeks   - no blood noted today   - recently had dysuria, now resolved per patient report   - reviewed UA  - repeat UA pending     VTE Risk Mitigation (From admission, onward)         Ordered     enoxaparin injection 80 mg  Every 12 hours (non-standard times)      11/16/20 " 1815     IP VTE HIGH RISK PATIENT  Once      11/16/20 1816     Place sequential compression device  Until discontinued      11/16/20 1816                   Vanessa Summers PA-C  Department of Hospital Medicine   [facility]

## 2020-11-17 NOTE — SUBJECTIVE & OBJECTIVE
Past Medical History:   Diagnosis Date    Cancer     ovarian    Dry eyes 11/29/2017    Neuropathy due to chemotherapeutic drug 1/10/2018    Open angle with borderline findings and low glaucoma risk in both eyes 6/20/2013    Ovarian cancer        Past Surgical History:   Procedure Laterality Date    BREAST BIOPSY      BUNIONECTOMY Left     CATARACT EXTRACTION W/  INTRAOCULAR LENS IMPLANT Right 04/04/2019        CATARACT EXTRACTION W/  INTRAOCULAR LENS IMPLANT Left 5/7/2019        CHOLECYSTECTOMY  1992    HYSTERECTOMY      INSERTION OF TUNNELED CENTRAL VENOUS CATHETER (CVC) WITH SUBCUTANEOUS PORT Right 7/23/2020    Procedure: INSERTION, PORT-A-CATH;  Surgeon: George Tapia MD;  Location: [facility] CATH LAB;  Service: Interventional Radiology;  Laterality: Right;    left leg surgery  2005    achilles tendon    OOPHORECTOMY      PHACOEMULSIFICATION OF CATARACT Right 4/4/2019    Procedure: PHACOEMULSIFICATION, CATARACT;  Surgeon: Karen Butt MD;  Location: [facility] OR [address] [place] [address];  Service: Ophthalmology;  Laterality: Right;    [place] REMOVAL OF OVARY/TUBE(S)      right leg surgery  2006    broken, including ankle       Review of patient's allergies indicates:  No Known Allergies    No current facility-administered medications on file prior to encounter.      Current Outpatient Medications on File Prior to Encounter   Medication Sig    HYDROcodone-acetaminophen (NORCO) 5-325 mg per tablet Take 1 tablet by mouth every 6 (six) hours as needed.     Family History     Problem Relation (Age of Onset)    Breast cancer Maternal Aunt    Cancer Father, Son    Cataracts Mother    Deep vein thrombosis Mother, Sister, Sister    Diabetes Father    Factor V Leiden deficiency Brother, Daughter    Melanoma Sister    No Known Problems Daughter, Son, Sister, Brother, Brother        Tobacco Use    Smoking status: Never Smoker    Smokeless tobacco: Never Used   Substance and Sexual Activity    Alcohol  use: Yes     Alcohol/week: 3.0 standard drinks     Types: 1 Glasses of wine, 2 Shots of liquor per week     Frequency: Monthly or less     Drinks per session: 1 or 2     Binge frequency: Never     Comment: Socially    Drug use: No    Sexual activity: Never     Partners: Male     Birth control/protection: Post-menopausal     Review of Systems   Constitutional: Positive for fatigue. Negative for chills, diaphoresis and fever.   HENT: Negative for congestion, ear pain, postnasal drip, rhinorrhea, sinus pressure, sore throat and trouble swallowing.    Respiratory: Negative for cough, shortness of breath and wheezing.    Cardiovascular: Negative for chest pain, palpitations and leg swelling.   Gastrointestinal: Positive for abdominal distention (improved after paracentesis), abdominal pain (periumbilical) and constipation. Negative for diarrhea, nausea and vomiting.   Genitourinary: Positive for hematuria (small amount with wiping). Negative for dysuria, flank pain, frequency, urgency, vaginal bleeding, vaginal discharge and vaginal pain.   Musculoskeletal: Positive for neck pain (x1-2 days left side of neck ). Negative for arthralgias, back pain, gait problem, joint swelling, myalgias and neck stiffness.   Skin: Negative for color change, pallor and rash.   Neurological: Positive for weakness (generalized). Negative for dizziness, syncope, light-headedness and headaches.   Psychiatric/Behavioral: Negative for confusion and decreased concentration.     Objective:     Vital Signs (Most Recent):  Temp: 97.8 °F (36.6 °C) (11/17/20 0010)  Pulse: 92 (11/17/20 0010)  Resp: 18 (11/17/20 0010)  BP: 132/64 (11/17/20 0010)  SpO2: 96 % (11/17/20 0010) Vital Signs (24h Range):  Temp:  [97.3 °F (36.3 °C)-97.8 °F (36.6 °C)] 97.8 °F (36.6 °C)  Pulse:  [] 92  Resp:  [16-23] 18  SpO2:  [94 %-98 %] 96 %  BP: (125-155)/(60-79) 132/64     Weight: 77.1 kg (170 lb)  Body mass index is 26.63 kg/m².    Physical Exam  Vitals signs and  nursing note reviewed.   Constitutional:       General: She is not in acute distress.     Appearance: Normal appearance. She is well-developed and normal weight. She is not ill-appearing or diaphoretic.   HENT:      Head: Normocephalic and atraumatic.   Eyes:      General: No scleral icterus.     Conjunctiva/sclera: Conjunctivae normal.      Pupils: Pupils are equal, round, and reactive to light.   Neck:      Musculoskeletal: Normal range of motion and neck supple.      Trachea: No tracheal deviation.   Cardiovascular:      Rate and Rhythm: Normal rate and regular rhythm.      Heart sounds: Normal heart sounds. No murmur. No gallop.    Pulmonary:      Effort: Pulmonary effort is normal. No respiratory distress.      Breath sounds: Normal breath sounds. No stridor. No wheezing or rales.   Abdominal:      General: Bowel sounds are normal. There is no distension.      Palpations: Abdomen is soft. There is no mass.      Tenderness: There is no abdominal tenderness. There is no guarding.   Skin:     General: Skin is warm and dry.      Coloration: Skin is not pale.      Findings: No erythema or rash.   Neurological:      General: No focal deficit present.      Mental Status: She is alert and oriented to person, place, and time.      Cranial Nerves: No cranial nerve deficit.      Motor: No abnormal muscle tone.   Psychiatric:         Mood and Affect: Mood normal.         Behavior: Behavior normal.         Thought Content: Thought content normal.         Judgment: Judgment normal.           CRANIAL NERVES     CN III, IV, VI   Pupils are equal, round, and reactive to light.       Significant Labs:   BMP:   Recent Labs   Lab 11/16/20  1903   *      K 4.7      CO2 28   BUN 14   CREATININE 0.6   CALCIUM 8.5*   MG 1.6     CBC:   Recent Labs   Lab 11/16/20 1903   WBC 5.29   HGB 14.0   HCT 44.1        CMP:   Recent Labs   Lab 11/16/20 1903      K 4.7      CO2 28   *   BUN 14    CREATININE 0.6   CALCIUM 8.5*   PROT 5.6*   ALBUMIN 2.4*   BILITOT 1.4*   ALKPHOS 236*   AST 48*   ALT 32   ANIONGAP 9   EGFRNONAA >60     Urine Culture: No results for input(s): LABURIN in the last 48 hours.  Urine Studies: No results for input(s): COLORU, APPEARANCEUA, PHUR, SPECGRAV, PROTEINUA, GLUCUA, KETONESU, BILIRUBINUA, OCCULTUA, NITRITE, UROBILINOGEN, LEUKOCYTESUR, RBCUA, WBCUA, BACTERIA, SQUAMEPITHEL, HYALINECASTS in the last 48 hours.    Invalid input(s): WRIGHTSUR  All pertinent labs within the past 24 hours have been reviewed.    Significant Imaging: I have reviewed all pertinent imaging results/findings within the past 24 hours.      CTA Chest Non Coronary  Order: [id number]  Status:  Final result   Visible to patient:  No (not released) Next appt:  11/20/2020 at 07:00 AM in Lab (LAB, [facility])  Details    Reading Physician Reading Date Result Priority   Cesar Neri MD  [phone] 11/17/2020 Routine      Narrative & Impression     EXAMINATION:  CTA CHEST NON CORONARY     CLINICAL HISTORY:  Evaluate for pulmonary embolism;     TECHNIQUE:  Low dose axial images, sagittal and coronal reformations were obtained from the thoracic inlet to the lung bases following the IV administration of 100 mL of Omnipaque 350.  Contrast timing was optimized to evaluate the pulmonary arteries.  MIP images were performed.     COMPARISON:  CT of the chest, 10/13/2020     FINDINGS:  Pleural effusions have significantly increased in size now measuring 9 cm in thickness on the right and 8 cm in thickness on the left in the lung bases.  Adjacent atelectasis in the lower lobes and in the lingula and middle lobe are noted with some degree of consolidation especially in the middle lobe.  The aerated portions of the lungs appear clear.     The heart, pericardium and great vessels enhance normally with no evidence of aortic aneurysm or dissection in 3 vessel aortic arch or pulmonary artery filling defect in the major  trunk or branch vessels.     Limited images of the upper abdomen demonstrate no new abnormality.  Bony structures are intact.  Port-A-Cath on the right remains.     Impression:     No evidence of pulmonary artery embolus.     No evidence of aneurysm or dissection.     Significant increase in size of bilateral effusions with adjacent atelectasis and consolidation in the middle lobe and lingula.        Electronically signed by: Cesar Neri  Date:                                            11/17/2020  Time:                                           01:13           US Lower Extremity Veins Bilateral  Order: [id number]  Status:  Final result   Visible to patient:  No (not released) Next appt:  11/20/2020 at 07:00 AM in Lab (LAB, [facility])  Details    Reading Physician Reading Date Result Priority   Erika David MD  [phone]  [phone] 11/16/2020 Routine      Narrative & Impression     EXAMINATION:  US LOWER EXTREMITY VEINS BILATERAL     CLINICAL HISTORY:  Evaluate for DVT;     TECHNIQUE:  Duplex and color flow Doppler and dynamic compression was performed of the bilateral lower extremity veins was performed.     COMPARISON:  Lower extremity ultrasound 07/17/2020     FINDINGS:  Right thigh veins: The common femoral, femoral, popliteal, upper greater saphenous, and deep femoral veins are patent and free of thrombus. The veins are normally compressible and have normal phasic flow and augmentation response.     Right calf veins: The visualized calf veins are patent.     Left thigh veins: The common femoral, femoral, popliteal, upper greater saphenous, and deep femoral veins are patent and free of thrombus. The veins are normally compressible and have normal phasic flow and augmentation response.     Left calf veins: The visualized calf veins are patent.     Miscellaneous: None     Impression:     No evidence of deep venous thrombosis in either lower extremity.        Electronically signed by: Erika David  MD  Date:                                            11/16/2020  Time:                                           21:44           US Upper Extremity Veins Left  Order: [id number]  Status:  Final result   Visible to patient:  No (not released) Next appt:  11/20/2020 at 07:00 AM in Lab (LAB, [facility]) Dx:  Malignant neoplasm of ovary, unspecif...  Details    Reading Physician Reading Date Result Priority   Micheal Fair MD  [phone] 11/16/2020 STAT      Narrative & Impression     EXAMINATION:  US UPPER EXTREMITY VEINS LEFT     CLINICAL HISTORY:  L arm swelling; Malignant neoplasm of unspecified ovary     TECHNIQUE:  Duplex and color flow Doppler evaluation and dynamic compression was performed of the left upper extremity veins.     COMPARISON:  None     FINDINGS:  Central veins: There is thrombus within the left internal jugular vein, extending to the left axillary vein.  There is thrombus within 1 of the paired brachial veins.  The basilic vein and cephalic vein are patent.  The contralateral right internal jugular vein and right subclavian vein are patent.     Impression:     This report was flagged in Epic as abnormal.     Thrombus involving the left internal jugular vein extending to the left axillary vein.  Additional thrombus is noted within 1 of the paired brachial veins.        Electronically signed by: Micheal Fair MD  Date:                                            11/16/2020  Time:                                           17:00

## 2020-11-17 NOTE — ASSESSMENT & PLAN NOTE
- Follows Dr. SURESH Tran, Gyn/Onc and on weekly Taxol  - Known metastasis to liver  - Known history of drug induced neutropenia and neuropathy that appear stable at present  - Status post paracentesis 11/16/2020 with 2.5L serous fluid removed  - Stable at present  - Will monitor

## 2020-11-17 NOTE — ASSESSMENT & PLAN NOTE
- Ms. Mickie Don presents with left arm swelling   - labs for renal function pending, but Cr baseline appears to be 0.7  - BID weight based Lovenox ordered   - Assess CTA Chest for PE, and US b/l LE for LE DVT  - coag studies also pending

## 2020-11-17 NOTE — ASSESSMENT & PLAN NOTE
"- follows w/ Heme/Onc  - per last notes:   "Treatment History  Xlap/Omentectomy on 11/1/16 - mass was unresectable  Stage IIIC ovarian cancer  Initiated Taxotere/Carbo on 11/18/16, now s/p 6 cycles completed 3/8/17  Xlap/ISIS/BSO/Radical mass resection > 10 cm, optimal debulking on 4/11/17  Initiated PRIMA on 8/21/17  BRCA negative  Started FORWARD1 on 11/27/17  Started Doxil/Anju as part of NRG  2/27/19, received total of 6 cycles  Gemzar monotherapy started 8/29/19  Topotecan started 8/14/2020  Taxol monotherapy started 10/16/2020"  "

## 2020-11-17 NOTE — HPI
"Per JACOB Joseph:  Ms. Mickie Don is a 65 y.o. female, with PMH of Ovarian cancer, who presented to [facility] on 11/16/20 for paracentesis of abdominal ascites, and was noted to have LUE swelling. An US was preformed and showed "Thrombus involving the left internal jugular vein extending to the left axillary vein.  Additional thrombus is noted within 1 of the paired brachial veins."   She was placed on OBS.   "

## 2020-11-17 NOTE — PLAN OF CARE
Pt had US and CTA done overnight. Remains free from fall, injury, skin breakdown. VSS on RA throughout the night. Positions self-independently. Pain controled w/ PO meds. Dsg CDI. All alarms active and auduble.Plan of care reviewed w/ pt and all questions answered. Bed locked and in lowest position. Call light w/I reach. No needs at this time. Purposeful hourly rounding. Will continue to monitor.

## 2020-11-17 NOTE — ASSESSMENT & PLAN NOTE
"- CTA did note slight increase in size with "some degree of consolidation especially in the middle lobe."   - patient denies fever, chills, sweats, cough, wheeze, SOB   - procalcitonin pending     "

## 2020-11-18 ENCOUNTER — PATIENT MESSAGE (OUTPATIENT)
Dept: GYNECOLOGIC ONCOLOGY | Facility: CLINIC | Age: 65
End: 2020-11-18

## 2020-11-18 PROBLEM — K56.609 SMALL BOWEL OBSTRUCTION: Status: ACTIVE | Noted: 2020-11-18

## 2020-11-18 PROBLEM — R11.2 INTRACTABLE NAUSEA AND VOMITING: Status: ACTIVE | Noted: 2020-11-18

## 2020-11-18 LAB
ALBUMIN SERPL BCP-MCNC: 2.3 G/DL (ref 3.5–5.2)
ALP SERPL-CCNC: 242 U/L (ref 55–135)
ALT SERPL W/O P-5'-P-CCNC: 26 U/L (ref 10–44)
ANION GAP SERPL CALC-SCNC: 10 MMOL/L (ref 8–16)
AST SERPL-CCNC: 36 U/L (ref 10–40)
BASOPHILS # BLD AUTO: 0.02 K/UL (ref 0–0.2)
BASOPHILS NFR BLD: 0.3 % (ref 0–1.9)
BILIRUB DIRECT SERPL-MCNC: 1 MG/DL (ref 0.1–0.3)
BILIRUB SERPL-MCNC: 1.7 MG/DL (ref 0.1–1)
BUN SERPL-MCNC: 20 MG/DL (ref 8–23)
CALCIUM SERPL-MCNC: 8.9 MG/DL (ref 8.7–10.5)
CHLORIDE SERPL-SCNC: 97 MMOL/L (ref 95–110)
CO2 SERPL-SCNC: 27 MMOL/L (ref 23–29)
CREAT SERPL-MCNC: 0.7 MG/DL (ref 0.5–1.4)
DIFFERENTIAL METHOD: ABNORMAL
EOSINOPHIL # BLD AUTO: 0 K/UL (ref 0–0.5)
EOSINOPHIL NFR BLD: 0.2 % (ref 0–8)
ERYTHROCYTE [DISTWIDTH] IN BLOOD BY AUTOMATED COUNT: 17.2 % (ref 11.5–14.5)
EST. GFR  (AFRICAN AMERICAN): >60 ML/MIN/1.73 M^2
EST. GFR  (NON AFRICAN AMERICAN): >60 ML/MIN/1.73 M^2
GLUCOSE SERPL-MCNC: 138 MG/DL (ref 70–110)
HCT VFR BLD AUTO: 45.7 % (ref 37–48.5)
HGB BLD-MCNC: 14.5 G/DL (ref 12–16)
IMM GRANULOCYTES # BLD AUTO: 0.05 K/UL (ref 0–0.04)
IMM GRANULOCYTES NFR BLD AUTO: 0.8 % (ref 0–0.5)
LYMPHOCYTES # BLD AUTO: 0.4 K/UL (ref 1–4.8)
LYMPHOCYTES NFR BLD: 5.9 % (ref 18–48)
MAGNESIUM SERPL-MCNC: 1.6 MG/DL (ref 1.6–2.6)
MCH RBC QN AUTO: 28.6 PG (ref 27–31)
MCHC RBC AUTO-ENTMCNC: 31.7 G/DL (ref 32–36)
MCV RBC AUTO: 90 FL (ref 82–98)
MONOCYTES # BLD AUTO: 0.3 K/UL (ref 0.3–1)
MONOCYTES NFR BLD: 4.7 % (ref 4–15)
NEUTROPHILS # BLD AUTO: 5.3 K/UL (ref 1.8–7.7)
NEUTROPHILS NFR BLD: 88.1 % (ref 38–73)
NRBC BLD-RTO: 0 /100 WBC
PHOSPHATE SERPL-MCNC: 3.7 MG/DL (ref 2.7–4.5)
PLATELET # BLD AUTO: 234 K/UL (ref 150–350)
PMV BLD AUTO: 11 FL (ref 9.2–12.9)
POTASSIUM SERPL-SCNC: 5.1 MMOL/L (ref 3.5–5.1)
PROT SERPL-MCNC: 5.7 G/DL (ref 6–8.4)
RBC # BLD AUTO: 5.07 M/UL (ref 4–5.4)
SODIUM SERPL-SCNC: 134 MMOL/L (ref 136–145)
WBC # BLD AUTO: 5.98 K/UL (ref 3.9–12.7)

## 2020-11-18 PROCEDURE — 94761 N-INVAS EAR/PLS OXIMETRY MLT: CPT

## 2020-11-18 PROCEDURE — 99226 PR SUBSEQUENT OBSERVATION CARE,LEVEL III: ICD-10-PCS | Mod: ,,, | Performed by: NURSE PRACTITIONER

## 2020-11-18 PROCEDURE — 63600175 PHARM REV CODE 636 W HCPCS: Performed by: INTERNAL MEDICINE

## 2020-11-18 PROCEDURE — 25000003 PHARM REV CODE 250: Performed by: NURSE PRACTITIONER

## 2020-11-18 PROCEDURE — 96361 HYDRATE IV INFUSION ADD-ON: CPT | Performed by: OBSTETRICS & GYNECOLOGY

## 2020-11-18 PROCEDURE — 80076 HEPATIC FUNCTION PANEL: CPT

## 2020-11-18 PROCEDURE — 25000003 PHARM REV CODE 250: Performed by: INTERNAL MEDICINE

## 2020-11-18 PROCEDURE — 96372 THER/PROPH/DIAG INJ SC/IM: CPT | Mod: 59 | Performed by: OBSTETRICS & GYNECOLOGY

## 2020-11-18 PROCEDURE — 85025 COMPLETE CBC W/AUTO DIFF WBC: CPT

## 2020-11-18 PROCEDURE — 63600175 PHARM REV CODE 636 W HCPCS: Performed by: NURSE PRACTITIONER

## 2020-11-18 PROCEDURE — 63600175 PHARM REV CODE 636 W HCPCS: Performed by: STUDENT IN AN ORGANIZED HEALTH CARE EDUCATION/TRAINING PROGRAM

## 2020-11-18 PROCEDURE — 96375 TX/PRO/DX INJ NEW DRUG ADDON: CPT | Performed by: OBSTETRICS & GYNECOLOGY

## 2020-11-18 PROCEDURE — G0378 HOSPITAL OBSERVATION PER HR: HCPCS

## 2020-11-18 PROCEDURE — 96376 TX/PRO/DX INJ SAME DRUG ADON: CPT | Performed by: OBSTETRICS & GYNECOLOGY

## 2020-11-18 PROCEDURE — 83735 ASSAY OF MAGNESIUM: CPT

## 2020-11-18 PROCEDURE — 84100 ASSAY OF PHOSPHORUS: CPT

## 2020-11-18 PROCEDURE — 36415 COLL VENOUS BLD VENIPUNCTURE: CPT

## 2020-11-18 PROCEDURE — 25000003 PHARM REV CODE 250: Performed by: STUDENT IN AN ORGANIZED HEALTH CARE EDUCATION/TRAINING PROGRAM

## 2020-11-18 PROCEDURE — 99226 PR SUBSEQUENT OBSERVATION CARE,LEVEL III: CPT | Mod: ,,, | Performed by: NURSE PRACTITIONER

## 2020-11-18 PROCEDURE — 80048 BASIC METABOLIC PNL TOTAL CA: CPT

## 2020-11-18 RX ORDER — SODIUM CHLORIDE 9 MG/ML
INJECTION, SOLUTION INTRAVENOUS CONTINUOUS
Status: DISCONTINUED | OUTPATIENT
Start: 2020-11-18 | End: 2020-11-22

## 2020-11-18 RX ORDER — ENOXAPARIN SODIUM 100 MG/ML
80 INJECTION SUBCUTANEOUS
Status: DISCONTINUED | OUTPATIENT
Start: 2020-11-18 | End: 2020-11-22

## 2020-11-18 RX ADMIN — ENOXAPARIN SODIUM 80 MG: 80 INJECTION SUBCUTANEOUS at 07:11

## 2020-11-18 RX ADMIN — PROMETHAZINE HYDROCHLORIDE 6.25 MG: 25 INJECTION INTRAMUSCULAR; INTRAVENOUS at 01:11

## 2020-11-18 RX ADMIN — POLYETHYLENE GLYCOL 3350 17 G: 17 POWDER, FOR SOLUTION ORAL at 12:11

## 2020-11-18 RX ADMIN — PROMETHAZINE HYDROCHLORIDE 6.25 MG: 25 INJECTION INTRAMUSCULAR; INTRAVENOUS at 02:11

## 2020-11-18 RX ADMIN — PROMETHAZINE HYDROCHLORIDE 6.25 MG: 25 INJECTION INTRAMUSCULAR; INTRAVENOUS at 07:11

## 2020-11-18 RX ADMIN — HYDROCODONE BITARTRATE AND ACETAMINOPHEN 1 TABLET: 5; 325 TABLET ORAL at 07:11

## 2020-11-18 RX ADMIN — ENOXAPARIN SODIUM 80 MG: 100 INJECTION SUBCUTANEOUS at 05:11

## 2020-11-18 RX ADMIN — SODIUM CHLORIDE: 0.9 INJECTION, SOLUTION INTRAVENOUS at 03:11

## 2020-11-18 NOTE — ASSESSMENT & PLAN NOTE
- Patient with metastatic ovarian cancer who is status post paracentesis 11/18/2020 complicated by deep vein thrombosis to left upper extremity veins and IJ.    - Increased abdominal distention, patient passing flatus, no tense ascites, some mild abd tenderness, postive bowel sounds.  - Treat with anti-emetics  - Case discussed with Gyn/Onc and patient agree with transfer to Gyn/Onc service for further evaluation

## 2020-11-18 NOTE — PROGRESS NOTES
"[facility] Hospital Medicine  Progress Note    Patient Name: Mickie Don  MRN: [id number]  Patient Class: OP- Observation   Admission Date: 11/16/2020  Length of Stay: 0 days  Attending Physician: Ramona Magallanes MD  Primary Care Provider: Elle Ma MD        Subjective:     Principal Problem:Acute deep vein thrombosis (DVT) of left upper extremity        HPI:  Bean Summers, PA:  Ms. Mickie Don is a 65 y.o. female, with PMH of Ovarian cancer, who presented to [facility] on 11/16/20 for paracentesis of abdominal ascites, and was noted to have LUE swelling. An US was preformed and showed "Thrombus involving the left internal jugular vein extending to the left axillary vein.  Additional thrombus is noted within 1 of the paired brachial veins."   She was placed on OBS.       Interval History: Mild ascites with nausea/vomiting, mild jaundice.  Case discussed with Gyn/Onc and consult for evaluation. US left UE with thrombus involving the left IJ extending to the left axillary vein and thrombus within one of the paired brachial veins.  Lovenox 1mg/kg last dose (0551 today) and plan to transition to apixaban.     Review of Systems   Constitutional: Positive for fatigue. Negative for chills and fever.   HENT: Negative for congestion and trouble swallowing.    Eyes: Negative for photophobia and visual disturbance.   Respiratory: Negative for cough and shortness of breath.    Cardiovascular: Negative for chest pain and leg swelling.   Gastrointestinal: Positive for abdominal distention, abdominal pain, nausea and vomiting.   Genitourinary: Negative for dysuria and hematuria.   Musculoskeletal: Positive for neck pain (mild left sided). Negative for arthralgias and myalgias.   Skin: Negative for pallor and wound.   Neurological: Negative for weakness (generalized) and headaches.   Hematological: Does not bruise/bleed easily.   Psychiatric/Behavioral: Negative for confusion. The patient is not " nervous/anxious.      Objective:     Vital Signs (Most Recent):  Temp: 97.6 °F (36.4 °C) (11/17/20 1624)  Pulse: 102 (11/17/20 1624)  Resp: 17 (11/17/20 1704)  BP: 131/74 (11/17/20 1624)  SpO2: 95 % (11/17/20 1624) Vital Signs (24h Range):  Temp:  [97.3 °F (36.3 °C)-98.2 °F (36.8 °C)] 97.6 °F (36.4 °C)  Pulse:  [] 102  Resp:  [16-20] 17  SpO2:  [93 %-96 %] 95 %  BP: (125-135)/(60-74) 131/74     Weight: 77.1 kg (170 lb)  Body mass index is 26.63 kg/m².    Intake/Output Summary (Last 24 hours) at 11/17/2020 1738  Last data filed at 11/17/2020 0700  Gross per 24 hour   Intake no documentation   Output 425 ml   Net -425 ml      Physical Exam  Vitals signs and nursing note reviewed.   Constitutional:       General: She is not in acute distress.     Appearance: Normal appearance. She is well-developed and normal weight. She is ill-appearing.   HENT:      Head: Normocephalic and atraumatic.      Nose: Nose normal. No congestion.      Mouth/Throat:      Mouth: Mucous membranes are moist.      Pharynx: Oropharynx is clear.   Eyes:      Conjunctiva/sclera: Conjunctivae normal.      Pupils: Pupils are equal, round, and reactive to light.   Neck:      Musculoskeletal: Normal range of motion and neck supple.      Comments: Mild swellling left neck  Cardiovascular:      Rate and Rhythm: Regular rhythm. Tachycardia present.      Pulses:           Radial pulses are 2+ on the right side and 1+ on the left side.        Dorsalis pedis pulses are 2+ on the left side.      Heart sounds: Normal heart sounds.   Pulmonary:      Effort: Pulmonary effort is normal.      Breath sounds: Examination of the right-lower field reveals decreased breath sounds. Examination of the left-lower field reveals decreased breath sounds. Decreased breath sounds present.   Abdominal:      General: Bowel sounds are normal.      Palpations: Abdomen is soft.      Tenderness: There is no abdominal tenderness.   Skin:     General: Skin is warm and dry.       Coloration: Skin is jaundiced (mild). Skin is not pale.      Findings: No erythema or rash.   Neurological:      General: No focal deficit present.      Mental Status: She is alert and oriented to person, place, and time. Mental status is at baseline.   Psychiatric:         Attention and Perception: Attention normal.         Mood and Affect: Mood normal.         Speech: Speech normal.         Thought Content: Thought content normal.         Judgment: Judgment normal.         Significant Labs:   CBC:   Recent Labs   Lab 11/16/20 1903 11/17/20  0432   WBC 5.29 5.40   HGB 14.0 14.3   HCT 44.1 45.0    193     CMP:   Recent Labs   Lab 11/16/20 1903 11/17/20  0432    137   K 4.7 4.4    101   CO2 28 27   * 114*   BUN 14 12   CREATININE 0.6 0.6   CALCIUM 8.5* 8.3*   PROT 5.6* 5.2*   ALBUMIN 2.4* 2.2*   BILITOT 1.4* 1.5*   ALKPHOS 236* 208*   AST 48* 40   ALT 32 31   ANIONGAP 9 9   EGFRNONAA >60 >60     Coagulation:   Recent Labs   Lab 11/16/20 1903   INR 1.1   APTT 31.6     Urine Studies:   Recent Labs   Lab 11/17/20  0633   COLORU Emily   APPEARANCEUA Clear   PHUR 7.0   SPECGRAV <=1.005*   PROTEINUA Trace*   GLUCUA Negative   KETONESU Trace*   BILIRUBINUA 1+*   OCCULTUA Trace*   NITRITE Negative   UROBILINOGEN 1.0   LEUKOCYTESUR Negative     All pertinent labs within the past 24 hours have been reviewed.    Significant Imaging: I have reviewed all pertinent imaging results/findings within the past 24 hours.     CTA Chest Non Coronary  Narrative: EXAMINATION:  CTA CHEST NON CORONARY    CLINICAL HISTORY:  Evaluate for pulmonary embolism;    TECHNIQUE:  Low dose axial images, sagittal and coronal reformations were obtained from the thoracic inlet to the lung bases following the IV administration of 100 mL of Omnipaque 350.  Contrast timing was optimized to evaluate the pulmonary arteries.  MIP images were performed.    COMPARISON:  CT of the chest, 10/13/2020    FINDINGS:  Pleural effusions have  significantly increased in size now measuring 9 cm in thickness on the right and 8 cm in thickness on the left in the lung bases.  Adjacent atelectasis in the lower lobes and in the lingula and middle lobe are noted with some degree of consolidation especially in the middle lobe.  The aerated portions of the lungs appear clear.    The heart, pericardium and great vessels enhance normally with no evidence of aortic aneurysm or dissection in 3 vessel aortic arch or pulmonary artery filling defect in the major trunk or branch vessels.    Limited images of the upper abdomen demonstrate no new abnormality.  Bony structures are intact.  Port-A-Cath on the right remains.  Impression: No evidence of pulmonary artery embolus.    No evidence of aneurysm or dissection.    Significant increase in size of bilateral effusions with adjacent atelectasis and consolidation in the middle lobe and lingula.    Electronically signed by: Cesar Neri  Date:    11/17/2020  Time:    01:13        Assessment/Plan:      * Acute deep vein thrombosis (DVT) of left upper extremity  - Admitted from Interventional Radiology after paracentesis; noted to have left neck discomfort and arm swelling  - Ultrasound left upper extremity with thromboses noted to left IJ, subclavian, axillary and 1 branch brachial veins  - CTA chest without evidence of pulmonary embolism  - Continue treatment dose Lovenox 1mg/kg twice daily   - Suspect hypercoagulable due to active cancer  - Will discuss plan for anticoagulation with Gyn/Onc; oral vs lovenox    Intractable nausea and vomiting  - Patient with metastatic ovarian cancer who is status post paracentesis 11/18/2020 complicated by deep vein thrombosis to left upper extremity veins and IJ.    - Increased abdominal distention, patient passing flatus, no tense ascites, some mild abd tenderness, postive bowel sounds.  - Treat with anti-emetics  - Case discussed with Gyn/Onc and patient agree with transfer to Gyn/Onc  service for further evaluation      Pleural effusion, bilateral  - No evidence of infectious process            Ovarian cancer - IIIC  - Follows Dr. SURESH Tran, Gyn/Onc and on weekly Taxol  - Known metastasis to liver  - Known history of drug induced neutropenia and neuropathy that appear stable at present  - Status post paracentesis 11/16/2020 with 2.5L serous fluid removed  - Stable at present  - Will monitor    VTE Risk Mitigation (From admission, onward)         Ordered     enoxaparin injection 80 mg  Every 12 hours (non-standard times)      11/18/20 1529     IP VTE HIGH RISK PATIENT  Once      11/16/20 1816     Place sequential compression device  Until discontinued      11/16/20 1816                Discharge Planning   CONOR: 11/18/2020     Code Status: Full Code   Is the patient medically ready for discharge?: (No Documentation)    Reason for patient still in hospital: Patient trending condition  Discharge Plan A: Home                  Dari Salmeron NP  Department of Hospital Medicine   [facility]

## 2020-11-18 NOTE — ASSESSMENT & PLAN NOTE
- newly diagnosed 11/16/20  - CTA negative for PE.   - LE Dopplers negative for bilateral LE DVT  - will continue Lovenox 1mg/kg BID for therapeutic anticoagulation  - consider transitioning to oral anticoagulation prior to discharge home. Appreciate medicine vs heme/onc recommendations

## 2020-11-18 NOTE — ASSESSMENT & PLAN NOTE
- ovarian cancer patient with worsening n/v and inability to tolerate food/liquids in days  - RUQ more distended, tympanic to percussion  - high suspicion for partial SBO given risk factors  - STAT KUB flat and erect ordered  - NPO  - will consider NG tube to low intermittent suction for decompression  - IVF for maintenance fluids, will replace PRN per NG output   - AM BMP, Mag, Phos ordered, replace PRN  - nausea improved with IV phenergan, will schedule for the next 12 hours  - consider oral gastrografin if no improvement   - discussed plan with patient and son, in agreement

## 2020-11-18 NOTE — HPI
"65 y.o. female admitted to hospital medicine for newly diagnosed LUE DVT. Last Thursday, patient had routine apt with Gyn/Onc with Dr. Tran. There, she complained of increasing abdominal pain and GERD unrelieved with malox and ppi. She was scheduled for outpatient paracentesis for accumulation of ascites. At IR apt for paracentesis, LUE swelling was noted, U/S demonstrated DVT. CTA and bilateral lowe extremity dopplers negative. She was started on thearpeutic lovenox and admitted to observation. Since being in the hospital, patient states she has had worsening abdominal pain and persistent n/v. She states the last time she kept and solid food down was last Thursday. She has had persistent vomiting all day and nausea. She did pass flatus today, first time since Thursday. Last BM was last Thursday. She denies fever, chills, CP or SOB. Does complain of feeling dehydrated and "her belly getting big again". She also has noticed some dysuria but no urgency/frequency. Last chemotherapy with taxol was Friday 11/13/20 with chemo again scheduled for 11/20/20.    Gyn Onc History:   Xlap/Omentectomy on 11/1/16 - mass was unresectable  Stage IIIC ovarian cancer  Initiated Taxotere/Carbo on 11/18/16, now s/p 6 cycles completed 3/8/17  Xlap/ISIS/BSO/Radical mass resection > 10 cm, optimal debulking on 4/11/17  Initiated PRIMA on 8/21/17  BRCA negative  Started FORWARD1 on 11/27/17  Started Doxil/Anju as part of NRG  2/27/19, received total of 6 cycles  Gemzar monotherapy started 8/29/19  Topotecan started 8/14/2020  Taxol monotherapy started 10/16/2020    "

## 2020-11-18 NOTE — HOSPITAL COURSE
After admission, the patient was anticoagulated with lovenox 1 mg/kg twice daily.  She reported some gradual resolution to left arm swelling.  However, the patient reported increase to abdominal distention and intractable nausea with vomiting.  I was able to discuss with case with Gynecology/Oncology who evaluated the patient and recommended transfer to Gynecology/Oncology service for further evaluation.

## 2020-11-18 NOTE — H&P
"[facility]  Gynecologic Oncology  H&P    Patient Name: Mickie Don  MRN: [id number]  Admission Date: 11/16/2020  Primary Care Provider: Elle Ma MD   Principal Problem: Acute deep vein thrombosis (DVT) of left upper extremity    Subjective:     Chief Complaint/Reason for Admission: DVT     History of Present Illness:  65 y.o. female admitted to hospital medicine for newly diagnosed LUE DVT. Last Thursday, patient had routine apt with Gyn/Onc with Dr. Tran. There, she complained of increasing abdominal pain and GERD unrelieved with malox and ppi. She was scheduled for outpatient paracentesis for accumulation of ascites. At IR apt for paracentesis, LUE swelling was noted, U/S demonstrated DVT. CTA and bilateral lowe extremity dopplers negative. She was started on thearpeutic lovenox and admitted to observation. Since being in the hospital, patient states she has had worsening abdominal pain and persistent n/v. She states the last time she kept and solid food down was last Thursday. She has had persistent vomiting all day and nausea. She did pass flatus today, first time since Thursday. Last BM was last Thursday. She denies fever, chills, CP or SOB. Does complain of feeling dehydrated and "her belly getting big again". She also has noticed some dysuria but no urgency/frequency. Last chemotherapy with taxol was Friday 11/13/20 with chemo again scheduled for 11/20/20.    Gyn Onc History:   Xlap/Omentectomy on 11/1/16 - mass was unresectable  Stage IIIC ovarian cancer  Initiated Taxotere/Carbo on 11/18/16, now s/p 6 cycles completed 3/8/17  Xlap/ISIS/BSO/Radical mass resection > 10 cm, optimal debulking on 4/11/17  Initiated PRIMA on 8/21/17  BRCA negative  Started FORWARD1 on 11/27/17  Started Doxil/Anju as part of NRG  2/27/19, received total of 6 cycles  Gemzar monotherapy started 8/29/19  Topotecan started 8/14/2020  Taxol monotherapy started 10/16/2020      Hospital " Course:  11/18/2020 Patient initially admitted to hospital medicine for observation for newly diagnosed LUE DVT. Started on therpeutic lovenox. Worsening abdominal pain and n/v after paracentesis. GYN ONC assumed care of patient, concern for partial SBO. Patient made NPO, Flat and Erect X Ray ordered. IVF started.     Oncology Treatment Plan:   OP PACLITAXEL - DAYS 1, 8, 15 Q28 DAYS      Past Medical History:   Diagnosis Date    Ascites 11/16/2020    Cancer     ovarian    Dry eyes 11/29/2017    Hematuria 11/17/2020    Neuropathy due to chemotherapeutic drug 1/10/2018    Open angle with borderline findings and low glaucoma risk in both eyes 6/20/2013    Ovarian cancer     Patient in cancer related research study 4/10/2018     Past Surgical History:   Procedure Laterality Date    BREAST BIOPSY      BUNIONECTOMY Left     CATARACT EXTRACTION W/  INTRAOCULAR LENS IMPLANT Right 04/04/2019        CATARACT EXTRACTION W/  INTRAOCULAR LENS IMPLANT Left 5/7/2019        CHOLECYSTECTOMY  1992    HYSTERECTOMY      INSERTION OF TUNNELED CENTRAL VENOUS CATHETER (CVC) WITH SUBCUTANEOUS PORT Right 7/23/2020    Procedure: INSERTION, PORT-A-CATH;  Surgeon: George Tapia MD;  Location: [facility] CATH LAB;  Service: Interventional Radiology;  Laterality: Right;    left leg surgery  2005    achilles tendon    OOPHORECTOMY      PERITONEOCENTESIS N/A 11/16/2020    Procedure: PARACENTESIS, ABDOMINAL;  Surgeon: George Tapia MD;  Location: [facility] CATH LAB;  Service: Radiology;  Laterality: N/A;    PHACOEMULSIFICATION OF CATARACT Right 4/4/2019    Procedure: PHACOEMULSIFICATION, CATARACT;  Surgeon: Karen Butt MD;  Location: [address];  Service: Ophthalmology;  Laterality: Right;    IN REMOVAL OF OVARY/TUBE(S)      right leg surgery  2006    broken, including ankle     Family History     Problem Relation (Age of Onset)    Breast cancer Maternal Aunt    Cancer Father, Son    Cataracts  Mother    Deep vein thrombosis Mother, Sister, Sister    Diabetes Father    Factor V Leiden deficiency Brother, Daughter    Melanoma Sister    No Known Problems Daughter, Son, Sister, Brother, Brother        Tobacco Use    Smoking status: Never Smoker    Smokeless tobacco: Never Used   Substance and Sexual Activity    Alcohol use: Yes     Alcohol/week: 3.0 standard drinks     Types: 1 Glasses of wine, 2 Shots of liquor per week     Frequency: Monthly or less     Drinks per session: 1 or 2     Binge frequency: Never     Comment: Socially    Drug use: No    Sexual activity: Never     Partners: Male     Birth control/protection: Post-menopausal       PTA Medications   Medication Sig    HYDROcodone-acetaminophen (NORCO) 5-325 mg per tablet Take 1 tablet by mouth every 6 (six) hours as needed.       Review of patient's allergies indicates:  No Known Allergies    Review of Systems   Constitutional: Positive for appetite change and fatigue. Negative for chills and fever.   Eyes: Negative for visual disturbance.   Respiratory: Negative for shortness of breath.    Cardiovascular: Negative for chest pain and palpitations.   Gastrointestinal: Positive for abdominal pain, bloating and vomiting. Negative for constipation, diarrhea and nausea.   Genitourinary: Positive for dysuria. Negative for vaginal bleeding and vaginal discharge.   Musculoskeletal: Negative for back pain.   Integumentary:  Negative for rash.   Neurological: Negative for seizures, syncope and headaches.   Hematological: Does not bruise/bleed easily.   Psychiatric/Behavioral: Negative for depression. The patient is not nervous/anxious.       Objective:     Vital Signs (Most Recent):  Temp: 97.3 °F (36.3 °C) (11/18/20 1145)  Pulse: 97 (11/18/20 1145)  Resp: 18 (11/18/20 1145)  BP: 137/78 (11/18/20 1145)  SpO2: 96 % (11/18/20 0804) Vital Signs (24h Range):  Temp:  [97.3 °F (36.3 °C)-97.7 °F (36.5 °C)] 97.3 °F (36.3 °C)  Pulse:  [] 97  Resp:  [16-20]  18  SpO2:  [93 %-96 %] 96 %  BP: (122-137)/(71-80) 137/78     Weight: 77.1 kg (170 lb)  Body mass index is 26.63 kg/m².    Physical Exam:   Constitutional: She is oriented to person, place, and time. She appears well-developed and well-nourished. No distress.   Frail, thin, fatigued appearing       HENT:   Head: Normocephalic and atraumatic.   Nose: No epistaxis.    Eyes: Conjunctivae and EOM are normal.    Neck: Normal range of motion. Neck supple. No thyromegaly present.    Cardiovascular: Normal rate, regular rhythm and normal heart sounds.     Pulmonary/Chest: Effort normal. No respiratory distress.        Abdominal: Soft. She exhibits no distension. There is no abdominal tenderness.   Abdomen slightly distended. Tympanic to percussion in RUQ. RUQ > from LUQ. No high pitched bowel signs appreciated. Hypoactive BS heard, more prominent in LLQ. Tenderness to palpation in RUQ. Old vertical midline incision noted, healed.      Genitourinary:    Genitourinary Comments: deferred             Musculoskeletal: Normal range of motion and moves all extremeties. No tenderness or edema.      Comments: Left arm visibly more swollen than right arm. No distended neck veins appreciated on left side.          Neurological: She is alert and oriented to person, place, and time.    Skin: Skin is warm and dry. No rash noted.    Psychiatric: She has a normal mood and affect. Her behavior is normal. Judgment and thought content normal.       Laboratory:  BMP:   Recent Labs   Lab 11/16/20 1903 11/17/20 0432 11/18/20  0342   * 114* 138*    137 134*   K 4.7 4.4 5.1    101 97   CO2 28 27 27   BUN 14 12 20   CREATININE 0.6 0.6 0.7   CALCIUM 8.5* 8.3* 8.9   MG 1.6 1.5*  --    , CBC:   Recent Labs   Lab 11/16/20 1903 11/17/20 0432 11/18/20  0342   WBC 5.29 5.40 5.98   HGB 14.0 14.3 14.5   HCT 44.1 45.0 45.7    193 234   , CMP:   Recent Labs   Lab 11/16/20 1903 11/17/20 0432 11/18/20  0342 11/18/20  1327   NA  139 137 134*  --    K 4.7 4.4 5.1  --     101 97  --    CO2 28 27 27  --    * 114* 138*  --    BUN 14 12 20  --    CREATININE 0.6 0.6 0.7  --    CALCIUM 8.5* 8.3* 8.9  --    PROT 5.6* 5.2*  --  5.7*   ALBUMIN 2.4* 2.2*  --  2.3*   BILITOT 1.4* 1.5*  --  1.7*   ALKPHOS 236* 208*  --  242*   AST 48* 40  --  36   ALT 32 31  --  26   ANIONGAP 9 9 10  --    EGFRNONAA >60 >60 >60  --     and Urine Studies:   Recent Labs   Lab 11/17/20  0633   COLORU Emily   APPEARANCEUA Clear   PHUR 7.0   SPECGRAV <=1.005*   PROTEINUA Trace*   GLUCUA Negative   KETONESU Trace*   BILIRUBINUA 1+*   OCCULTUA Trace*   NITRITE Negative   UROBILINOGEN 1.0   LEUKOCYTESUR Negative       Diagnostic Results:  Narrative & Impression     EXAMINATION:  US UPPER EXTREMITY VEINS LEFT     CLINICAL HISTORY:  L arm swelling; Malignant neoplasm of unspecified ovary     TECHNIQUE:  Duplex and color flow Doppler evaluation and dynamic compression was performed of the left upper extremity veins.     COMPARISON:  None     FINDINGS:  Central veins: There is thrombus within the left internal jugular vein, extending to the left axillary vein.  There is thrombus within 1 of the paired brachial veins.  The basilic vein and cephalic vein are patent.  The contralateral right internal jugular vein and right subclavian vein are patent.     Impression:     This report was flagged in Epic as abnormal.     Thrombus involving the left internal jugular vein extending to the left axillary vein.  Additional thrombus is noted within 1 of the paired brachial veins.         Assessment/Plan:     * Acute deep vein thrombosis (DVT) of left upper extremity  - newly diagnosed 11/16/20  - CTA negative for PE.   - LE Dopplers negative for bilateral LE DVT  - will continue Lovenox 1mg/kg BID for therapeutic anticoagulation  - consider transitioning to oral anticoagulation prior to discharge home. Appreciate medicine vs heme/onc recommendations     Small bowel obstruction  -  ovarian cancer patient with worsening n/v and inability to tolerate food/liquids in days  - RUQ more distended, tympanic to percussion  - high suspicion for partial SBO given risk factors  - STAT KUB flat and erect ordered  - NPO  - will consider NG tube to low intermittent suction for decompression  - IVF for maintenance fluids, will replace PRN per NG output   - AM BMP, Mag, Phos ordered, replace PRN  - nausea improved with IV phenergan, will schedule for the next 12 hours  - consider oral gastrografin if no improvement   - discussed plan with patient and son, in agreement     Pleural effusion, bilateral  - noted on CTA scan  - patient currently asymptomatic, pulse ox and HR WNL   - will continue to monitor, consider IR drainage      Ovarian cancer - IIIC  - Onc history as above  - now on monotherpay taxol   - last cycle 11/13        Alejandra Adrian MD  Gynecologic Oncology  [facility]

## 2020-11-18 NOTE — SUBJECTIVE & OBJECTIVE
Oncology Treatment Plan:   OP PACLITAXEL - DAYS 1, 8, 15 Q28 DAYS      Past Medical History:   Diagnosis Date    Ascites 11/16/2020    Cancer     ovarian    Dry eyes 11/29/2017    Hematuria 11/17/2020    Neuropathy due to chemotherapeutic drug 1/10/2018    Open angle with borderline findings and low glaucoma risk in both eyes 6/20/2013    Ovarian cancer     Patient in cancer related research study 4/10/2018     Past Surgical History:   Procedure Laterality Date    BREAST BIOPSY      BUNIONECTOMY Left     CATARACT EXTRACTION W/  INTRAOCULAR LENS IMPLANT Right 04/04/2019        CATARACT EXTRACTION W/  INTRAOCULAR LENS IMPLANT Left 5/7/2019        CHOLECYSTECTOMY  1992    HYSTERECTOMY      INSERTION OF TUNNELED CENTRAL VENOUS CATHETER (CVC) WITH SUBCUTANEOUS PORT Right 7/23/2020    Procedure: INSERTION, PORT-A-CATH;  Surgeon: George Tapia MD;  Location: [facility] CATH LAB;  Service: Interventional Radiology;  Laterality: Right;    left leg surgery  2005    achilles tendon    OOPHORECTOMY      PERITONEOCENTESIS N/A 11/16/2020    Procedure: PARACENTESIS, ABDOMINAL;  Surgeon: George Tapia MD;  Location: [facility] CATH LAB;  Service: Radiology;  Laterality: N/A;    PHACOEMULSIFICATION OF CATARACT Right 4/4/2019    Procedure: PHACOEMULSIFICATION, CATARACT;  Surgeon: Karen Butt MD;  Location: [address];  Service: Ophthalmology;  Laterality: Right;    OK REMOVAL OF OVARY/TUBE(S)      right leg surgery  2006    broken, including ankle     Family History     Problem Relation (Age of Onset)    Breast cancer Maternal Aunt    Cancer Father, Son    Cataracts Mother    Deep vein thrombosis Mother, Sister, Sister    Diabetes Father    Factor V Leiden deficiency Brother, Daughter    Melanoma Sister    No Known Problems Daughter, Son, Sister, Brother, Brother        Tobacco Use    Smoking status: Never Smoker    Smokeless tobacco: Never Used   Substance and Sexual Activity     Alcohol use: Yes     Alcohol/week: 3.0 standard drinks     Types: 1 Glasses of wine, 2 Shots of liquor per week     Frequency: Monthly or less     Drinks per session: 1 or 2     Binge frequency: Never     Comment: Socially    Drug use: No    Sexual activity: Never     Partners: Male     Birth control/protection: Post-menopausal       PTA Medications   Medication Sig    HYDROcodone-acetaminophen (NORCO) 5-325 mg per tablet Take 1 tablet by mouth every 6 (six) hours as needed.       Review of patient's allergies indicates:  No Known Allergies    Review of Systems   Constitutional: Positive for appetite change and fatigue. Negative for chills and fever.   Eyes: Negative for visual disturbance.   Respiratory: Negative for shortness of breath.    Cardiovascular: Negative for chest pain and palpitations.   Gastrointestinal: Positive for abdominal pain, bloating and vomiting. Negative for constipation, diarrhea and nausea.   Genitourinary: Positive for dysuria. Negative for vaginal bleeding and vaginal discharge.   Musculoskeletal: Negative for back pain.   Integumentary:  Negative for rash.   Neurological: Negative for seizures, syncope and headaches.   Hematological: Does not bruise/bleed easily.   Psychiatric/Behavioral: Negative for depression. The patient is not nervous/anxious.       Objective:     Vital Signs (Most Recent):  Temp: 97.3 °F (36.3 °C) (11/18/20 1145)  Pulse: 97 (11/18/20 1145)  Resp: 18 (11/18/20 1145)  BP: 137/78 (11/18/20 1145)  SpO2: 96 % (11/18/20 0804) Vital Signs (24h Range):  Temp:  [97.3 °F (36.3 °C)-97.7 °F (36.5 °C)] 97.3 °F (36.3 °C)  Pulse:  [] 97  Resp:  [16-20] 18  SpO2:  [93 %-96 %] 96 %  BP: (122-137)/(71-80) 137/78     Weight: 77.1 kg (170 lb)  Body mass index is 26.63 kg/m².    Physical Exam:   Constitutional: She is oriented to person, place, and time. She appears well-developed and well-nourished. No distress.   Frail, thin, fatigued appearing       HENT:   Head:  Normocephalic and atraumatic.   Nose: No epistaxis.    Eyes: Conjunctivae and EOM are normal.    Neck: Normal range of motion. Neck supple. No thyromegaly present.    Cardiovascular: Normal rate, regular rhythm and normal heart sounds.     Pulmonary/Chest: Effort normal. No respiratory distress.        Abdominal: Soft. She exhibits no distension. There is no abdominal tenderness.   Abdomen slightly distended. Tympanic to percussion in RUQ. RUQ > from LUQ. No high pitched bowel signs appreciated. Hypoactive BS heard, more prominent in LLQ. Tenderness to palpation in RUQ. Old vertical midline incision noted, healed.      Genitourinary:    Genitourinary Comments: deferred             Musculoskeletal: Normal range of motion and moves all extremeties. No tenderness or edema.      Comments: Left arm visibly more swollen than right arm. No distended neck veins appreciated on left side.          Neurological: She is alert and oriented to person, place, and time.    Skin: Skin is warm and dry. No rash noted.    Psychiatric: She has a normal mood and affect. Her behavior is normal. Judgment and thought content normal.       Laboratory:  BMP:   Recent Labs   Lab 11/16/20 1903 11/17/20 0432 11/18/20  0342   * 114* 138*    137 134*   K 4.7 4.4 5.1    101 97   CO2 28 27 27   BUN 14 12 20   CREATININE 0.6 0.6 0.7   CALCIUM 8.5* 8.3* 8.9   MG 1.6 1.5*  --    , CBC:   Recent Labs   Lab 11/16/20 1903 11/17/20 0432 11/18/20 0342   WBC 5.29 5.40 5.98   HGB 14.0 14.3 14.5   HCT 44.1 45.0 45.7    193 234   , CMP:   Recent Labs   Lab 11/16/20 1903 11/17/20 0432 11/18/20  0342 11/18/20  1327    137 134*  --    K 4.7 4.4 5.1  --     101 97  --    CO2 28 27 27  --    * 114* 138*  --    BUN 14 12 20  --    CREATININE 0.6 0.6 0.7  --    CALCIUM 8.5* 8.3* 8.9  --    PROT 5.6* 5.2*  --  5.7*   ALBUMIN 2.4* 2.2*  --  2.3*   BILITOT 1.4* 1.5*  --  1.7*   ALKPHOS 236* 208*  --  242*   AST 48* 40   --  36   ALT 32 31  --  26   ANIONGAP 9 9 10  --    EGFRNONAA >60 >60 >60  --     and Urine Studies:   Recent Labs   Lab 11/17/20  0633   COLORU Emily   APPEARANCEUA Clear   PHUR 7.0   SPECGRAV <=1.005*   PROTEINUA Trace*   GLUCUA Negative   KETONESU Trace*   BILIRUBINUA 1+*   OCCULTUA Trace*   NITRITE Negative   UROBILINOGEN 1.0   LEUKOCYTESUR Negative       Diagnostic Results:  Narrative & Impression     EXAMINATION:  US UPPER EXTREMITY VEINS LEFT     CLINICAL HISTORY:  L arm swelling; Malignant neoplasm of unspecified ovary     TECHNIQUE:  Duplex and color flow Doppler evaluation and dynamic compression was performed of the left upper extremity veins.     COMPARISON:  None     FINDINGS:  Central veins: There is thrombus within the left internal jugular vein, extending to the left axillary vein.  There is thrombus within 1 of the paired brachial veins.  The basilic vein and cephalic vein are patent.  The contralateral right internal jugular vein and right subclavian vein are patent.     Impression:     This report was flagged in Epic as abnormal.     Thrombus involving the left internal jugular vein extending to the left axillary vein.  Additional thrombus is noted within 1 of the paired brachial veins.

## 2020-11-18 NOTE — HOSPITAL COURSE
11/18/2020 Patient initially admitted to hospital medicine for observation for newly diagnosed LUE DVT. Started on therpeutic lovenox. Worsening abdominal pain and n/v after paracentesis. GYN ONC assumed care of patient, concern for partial SBO. Patient made NPO, Flat and Erect X Ray ordered. IVF started.   11/19/2020 Xray consistent with SBO. NG tube placed with contents removed. 400cc output since placement. Electrolytes WNL.   11/20/2020 Improving, fluid content clearer, n/v improving. CT today.   11/21/2020 Improving, minimal NG tube output overnight. CT showed partial SBO. Will clamp NG tube today and advance to clear liquid diet.   11/22/2020 Tolerated CLD with NG tube clamped yesterday. Will pull out NG tube today and see how she tolerates bland diet. Consider diuretic for edema.   11/23/20: NPO at midnight for paracentesis and thoracentesis today. Removed 1.3 liters serous fluid from R thoracentesis and 2 L serous ascites from paracentesis.

## 2020-11-18 NOTE — CONSULTS
See H&P. Gyn Onc to assume care of patient. We will follow up with this patient. Thank you.     Alejandra Adrian M.D.  Obstetrics and Gynecology   PGY-4

## 2020-11-18 NOTE — ASSESSMENT & PLAN NOTE
- noted on CTA scan  - patient currently asymptomatic, pulse ox and HR WNL   - will continue to monitor, consider IR drainage

## 2020-11-18 NOTE — PLAN OF CARE
Pt states she vomitted x 1. PRN phenergan and miralax given.  Remains free from fall, injury, skin breakdown. VSS on RA throughout the night. Positions self-independently. Pain controled w/ PO meds.  All alarms active and auduble. Plan of care reviewed w/ pt and all questions answered. Bed locked and in lowest position. Call light w/I reach. No needs at this time. Purposeful hourly rounding. Will continue to monitor.

## 2020-11-19 PROBLEM — R18.8 ASCITES: Status: ACTIVE | Noted: 2020-11-19

## 2020-11-19 LAB
ANION GAP SERPL CALC-SCNC: 9 MMOL/L (ref 8–16)
BASOPHILS # BLD AUTO: 0.01 K/UL (ref 0–0.2)
BASOPHILS NFR BLD: 0.2 % (ref 0–1.9)
BUN SERPL-MCNC: 22 MG/DL (ref 8–23)
CALCIUM SERPL-MCNC: 8.2 MG/DL (ref 8.7–10.5)
CHLORIDE SERPL-SCNC: 98 MMOL/L (ref 95–110)
CO2 SERPL-SCNC: 26 MMOL/L (ref 23–29)
CREAT SERPL-MCNC: 0.7 MG/DL (ref 0.5–1.4)
DIFFERENTIAL METHOD: ABNORMAL
EOSINOPHIL # BLD AUTO: 0 K/UL (ref 0–0.5)
EOSINOPHIL NFR BLD: 0.5 % (ref 0–8)
ERYTHROCYTE [DISTWIDTH] IN BLOOD BY AUTOMATED COUNT: 17.2 % (ref 11.5–14.5)
EST. GFR  (AFRICAN AMERICAN): >60 ML/MIN/1.73 M^2
EST. GFR  (NON AFRICAN AMERICAN): >60 ML/MIN/1.73 M^2
GLUCOSE SERPL-MCNC: 107 MG/DL (ref 70–110)
HCT VFR BLD AUTO: 40.8 % (ref 37–48.5)
HGB BLD-MCNC: 12.9 G/DL (ref 12–16)
IMM GRANULOCYTES # BLD AUTO: 0.03 K/UL (ref 0–0.04)
IMM GRANULOCYTES NFR BLD AUTO: 0.7 % (ref 0–0.5)
LYMPHOCYTES # BLD AUTO: 0.3 K/UL (ref 1–4.8)
LYMPHOCYTES NFR BLD: 6.4 % (ref 18–48)
MAGNESIUM SERPL-MCNC: 1.7 MG/DL (ref 1.6–2.6)
MCH RBC QN AUTO: 28.3 PG (ref 27–31)
MCHC RBC AUTO-ENTMCNC: 31.6 G/DL (ref 32–36)
MCV RBC AUTO: 90 FL (ref 82–98)
MONOCYTES # BLD AUTO: 0.3 K/UL (ref 0.3–1)
MONOCYTES NFR BLD: 7.2 % (ref 4–15)
NEUTROPHILS # BLD AUTO: 3.6 K/UL (ref 1.8–7.7)
NEUTROPHILS NFR BLD: 85 % (ref 38–73)
NRBC BLD-RTO: 0 /100 WBC
PHOSPHATE SERPL-MCNC: 3.4 MG/DL (ref 2.7–4.5)
PLATELET # BLD AUTO: 199 K/UL (ref 150–350)
PMV BLD AUTO: 11.4 FL (ref 9.2–12.9)
POTASSIUM SERPL-SCNC: 4.4 MMOL/L (ref 3.5–5.1)
RBC # BLD AUTO: 4.56 M/UL (ref 4–5.4)
SODIUM SERPL-SCNC: 133 MMOL/L (ref 136–145)
WBC # BLD AUTO: 4.19 K/UL (ref 3.9–12.7)

## 2020-11-19 PROCEDURE — 83735 ASSAY OF MAGNESIUM: CPT

## 2020-11-19 PROCEDURE — 80048 BASIC METABOLIC PNL TOTAL CA: CPT

## 2020-11-19 PROCEDURE — 87449 NOS EACH ORGANISM AG IA: CPT

## 2020-11-19 PROCEDURE — 36415 COLL VENOUS BLD VENIPUNCTURE: CPT

## 2020-11-19 PROCEDURE — 63600175 PHARM REV CODE 636 W HCPCS: Performed by: INTERNAL MEDICINE

## 2020-11-19 PROCEDURE — 84100 ASSAY OF PHOSPHORUS: CPT

## 2020-11-19 PROCEDURE — 96375 TX/PRO/DX INJ NEW DRUG ADDON: CPT | Performed by: OBSTETRICS & GYNECOLOGY

## 2020-11-19 PROCEDURE — 96361 HYDRATE IV INFUSION ADD-ON: CPT | Performed by: OBSTETRICS & GYNECOLOGY

## 2020-11-19 PROCEDURE — 85025 COMPLETE CBC W/AUTO DIFF WBC: CPT

## 2020-11-19 PROCEDURE — 11000001 HC ACUTE MED/SURG PRIVATE ROOM

## 2020-11-19 PROCEDURE — C9113 INJ PANTOPRAZOLE SODIUM, VIA: HCPCS | Performed by: STUDENT IN AN ORGANIZED HEALTH CARE EDUCATION/TRAINING PROGRAM

## 2020-11-19 PROCEDURE — 96376 TX/PRO/DX INJ SAME DRUG ADON: CPT | Performed by: OBSTETRICS & GYNECOLOGY

## 2020-11-19 PROCEDURE — 96372 THER/PROPH/DIAG INJ SC/IM: CPT | Mod: 59 | Performed by: OBSTETRICS & GYNECOLOGY

## 2020-11-19 PROCEDURE — 99233 SBSQ HOSP IP/OBS HIGH 50: CPT | Mod: ,,, | Performed by: OBSTETRICS & GYNECOLOGY

## 2020-11-19 PROCEDURE — 87324 CLOSTRIDIUM AG IA: CPT

## 2020-11-19 PROCEDURE — 63600175 PHARM REV CODE 636 W HCPCS: Performed by: STUDENT IN AN ORGANIZED HEALTH CARE EDUCATION/TRAINING PROGRAM

## 2020-11-19 PROCEDURE — 94761 N-INVAS EAR/PLS OXIMETRY MLT: CPT

## 2020-11-19 PROCEDURE — 25000003 PHARM REV CODE 250: Performed by: STUDENT IN AN ORGANIZED HEALTH CARE EDUCATION/TRAINING PROGRAM

## 2020-11-19 PROCEDURE — 99233 PR SUBSEQUENT HOSPITAL CARE,LEVL III: ICD-10-PCS | Mod: ,,, | Performed by: OBSTETRICS & GYNECOLOGY

## 2020-11-19 RX ORDER — PANTOPRAZOLE SODIUM 40 MG/10ML
40 INJECTION, POWDER, LYOPHILIZED, FOR SOLUTION INTRAVENOUS DAILY
Status: DISCONTINUED | OUTPATIENT
Start: 2020-11-19 | End: 2020-11-24 | Stop reason: HOSPADM

## 2020-11-19 RX ADMIN — SODIUM CHLORIDE: 0.9 INJECTION, SOLUTION INTRAVENOUS at 05:11

## 2020-11-19 RX ADMIN — SODIUM CHLORIDE: 0.9 INJECTION, SOLUTION INTRAVENOUS at 10:11

## 2020-11-19 RX ADMIN — ONDANSETRON 4 MG: 2 INJECTION INTRAMUSCULAR; INTRAVENOUS at 12:11

## 2020-11-19 RX ADMIN — PANTOPRAZOLE SODIUM 40 MG: 40 INJECTION, POWDER, LYOPHILIZED, FOR SOLUTION INTRAVENOUS at 12:11

## 2020-11-19 RX ADMIN — MORPHINE SULFATE 4 MG: 4 INJECTION, SOLUTION INTRAMUSCULAR; INTRAVENOUS at 10:11

## 2020-11-19 RX ADMIN — ENOXAPARIN SODIUM 80 MG: 80 INJECTION SUBCUTANEOUS at 04:11

## 2020-11-19 RX ADMIN — PROMETHAZINE HYDROCHLORIDE 6.25 MG: 25 INJECTION INTRAMUSCULAR; INTRAVENOUS at 12:11

## 2020-11-19 NOTE — NURSING
NGT placed to right nare. Placed to low intermittent suction. Dark brown contents noted to suction canister. Patient tolerated well.

## 2020-11-19 NOTE — ASSESSMENT & PLAN NOTE
- NPO  - NG tube to low intermittent suction for decompression, placed 11/18/20  - IVF for maintenance fluids, will replace PRN per NG output   - 400cc output since placement overnight   - electrolytes WNL   - nausea improved with IV phenergan  - consider oral gastrografin if no improvement   - Will monitor NG output consider d/c when minimal output

## 2020-11-19 NOTE — PLAN OF CARE
Pt VSS and afebrile, in NAD, ambulates to RR with standby assist.NGT placed to right nare. Placed to low intermittent suction. Dark brown contents noted to suction canister. Patient tolerated well. Pt in low locked bed, call light in reach,  safety precautions maintained.

## 2020-11-19 NOTE — SUBJECTIVE & OBJECTIVE
Interval History: Did fair overnight. NG tube irritating and uncomfortable. Now passing flatus. Had one loose BM overnight. Denies vomiting. Nausea improved. Denies fever, chills, nausea, vomiting.     Scheduled Meds:   enoxaparin  80 mg Subcutaneous Q12H     Continuous Infusions:   sodium chloride 0.9% 100 mL/hr at 11/18/20 1525     PRN Meds:acetaminophen, dextrose 50%, dextrose 50%, glucagon (human recombinant), glucose, glucose, HYDROcodone-acetaminophen, melatonin, morphine, ondansetron, sodium chloride 0.9%    Review of patient's allergies indicates:  No Known Allergies    Objective:     Vital Signs (Most Recent):  Temp: 97.5 °F (36.4 °C) (11/19/20 0349)  Pulse: 97 (11/19/20 0349)  Resp: 18 (11/19/20 0349)  BP: 136/63 (11/19/20 0349)  SpO2: (!) 94 % (11/19/20 0349) Vital Signs (24h Range):  Temp:  [97.3 °F (36.3 °C)-98.9 °F (37.2 °C)] 97.5 °F (36.4 °C)  Pulse:  [] 97  Resp:  [16-20] 18  SpO2:  [93 %-96 %] 94 %  BP: (127-149)/(63-82) 136/63     Weight: 77.1 kg (170 lb)  Body mass index is 26.63 kg/m².    Intake/Output - Last 3 Shifts       11/17 0700 - 11/18 0659 11/18 0700 - 11/19 0659    Urine (mL/kg/hr) 500 (0.3) 400 (0.2)    Emesis/NG output  0    Drains  200    Stool  1    Total Output 500 601    Net -500 -601                   Physical Exam:   Constitutional: She is oriented to person, place, and time. She appears well-developed and well-nourished. No distress.   Thin, frail     HENT:   Head: Normocephalic and atraumatic.   NG tube to right nare      Eyes: Conjunctivae and EOM are normal.    Neck: Normal range of motion. Neck supple. No thyromegaly present.    Cardiovascular: Normal rate and regular rhythm.     Pulmonary/Chest: Effort normal. No respiratory distress.        Abdominal: Soft. She exhibits no distension. There is no abdominal tenderness.   Abdomen distended RUQ>LUQ, improved. Still tympanic, improved tenderness to palpation                Musculoskeletal: Normal range of motion and  moves all extremeties. No tenderness or edema.      Comments: Left arm swelling appreciated, stable          Neurological: She is alert and oriented to person, place, and time.    Skin: Skin is warm and dry. No rash noted.    Psychiatric: She has a normal mood and affect. Her behavior is normal.       Lines/Drains/Airways     Central Venous Catheter Line            Port A Cath Single Lumen 11/17/20 0950 right subclavian 1 day          Drain                 NG/OG Tube 11/18/20 2003 [place] sump 18 Fr. Right nostril less than 1 day                Laboratory:  CMP:   Recent Labs   Lab 11/18/20  0342 11/18/20  1327 11/19/20  0411   *  --  133*   K 5.1  --  4.4   CL 97  --  98   CO2 27  --  26   *  --  107   BUN 20  --  22   CREATININE 0.7  --  0.7   CALCIUM 8.9  --  8.2*   PROT  --  5.7*  --    ALBUMIN  --  2.3*  --    BILITOT  --  1.7*  --    ALKPHOS  --  242*  --    AST  --  36  --    ALT  --  26  --    ANIONGAP 10  --  9   EGFRNONAA >60  --  >60

## 2020-11-19 NOTE — PROGRESS NOTES
"[facility]  Gynecologic Oncology  Progress Note      Patient Name: Mickie Don  MRN: [id number]  Admission Date: 11/16/2020  Hospital Length of Stay: 0 days  Attending Provider: Ramona Magallanes MD  Primary Care Provider: Elle Ma MD  Principal Problem: Acute deep vein thrombosis (DVT) of left upper extremity    Follow-up For: Procedure(s) (LRB):  PARACENTESIS, ABDOMINAL (N/A)  Post-Operative Day: 3 Days Post-Op  Subjective:      History of Present Illness:  65 y.o. female admitted to hospital medicine for newly diagnosed LUE DVT. Last Thursday, patient had routine apt with Gyn/Onc with Dr. Tran. There, she complained of increasing abdominal pain and GERD unrelieved with malox and ppi. She was scheduled for outpatient paracentesis for accumulation of ascites. At IR apt for paracentesis, LUE swelling was noted, U/S demonstrated DVT. CTA and bilateral lowe extremity dopplers negative. She was started on thearpeutic lovenox and admitted to observation. Since being in the hospital, patient states she has had worsening abdominal pain and persistent n/v. She states the last time she kept and solid food down was last Thursday. She has had persistent vomiting all day and nausea. She did pass flatus today, first time since Thursday. Last BM was last Thursday. She denies fever, chills, CP or SOB. Does complain of feeling dehydrated and "her belly getting big again". She also has noticed some dysuria but no urgency/frequency. Last chemotherapy with taxol was Friday 11/13/20 with chemo again scheduled for 11/20/20.    Gyn Onc History:   Xlap/Omentectomy on 11/1/16 - mass was unresectable  Stage IIIC ovarian cancer  Initiated Taxotere/Carbo on 11/18/16, now s/p 6 cycles completed 3/8/17  Xlap/ISIS/BSO/Radical mass resection > 10 cm, optimal debulking on 4/11/17  Initiated PRIMA on 8/21/17  BRCA negative  Started FORWARD1 on 11/27/17  Started Doxil/Anju as part of NRG  2/27/19, received total of " 6 cycles  Gemzar monotherapy started 8/29/19  Topotecan started 8/14/2020  Taxol monotherapy started 10/16/2020      Hospital Course:  11/18/2020 Patient initially admitted to hospital medicine for observation for newly diagnosed LUE DVT. Started on therpeutic lovenox. Worsening abdominal pain and n/v after paracentesis. GYN ONC assumed care of patient, concern for partial SBO. Patient made NPO, Flat and Erect X Ray ordered. IVF started.   11/19/2020 Xray consistent with SBO. NG tube placed with contents removed. 400cc output since placement. Electrolytes WNL.     Interval History: Did fair overnight. NG tube irritating and uncomfortable. Now passing flatus. Had one loose BM overnight. Denies vomiting. Nausea improved. Denies fever, chills, nausea, vomiting.     Scheduled Meds:   enoxaparin  80 mg Subcutaneous Q12H     Continuous Infusions:   sodium chloride 0.9% 100 mL/hr at 11/18/20 1525     PRN Meds:acetaminophen, dextrose 50%, dextrose 50%, glucagon (human recombinant), glucose, glucose, HYDROcodone-acetaminophen, melatonin, morphine, ondansetron, sodium chloride 0.9%    Review of patient's allergies indicates:  No Known Allergies    Objective:     Vital Signs (Most Recent):  Temp: 97.5 °F (36.4 °C) (11/19/20 0349)  Pulse: 97 (11/19/20 0349)  Resp: 18 (11/19/20 0349)  BP: 136/63 (11/19/20 0349)  SpO2: (!) 94 % (11/19/20 0349) Vital Signs (24h Range):  Temp:  [97.3 °F (36.3 °C)-98.9 °F (37.2 °C)] 97.5 °F (36.4 °C)  Pulse:  [] 97  Resp:  [16-20] 18  SpO2:  [93 %-96 %] 94 %  BP: (127-149)/(63-82) 136/63     Weight: 77.1 kg (170 lb)  Body mass index is 26.63 kg/m².    Intake/Output - Last 3 Shifts       11/17 0700 - 11/18 0659 11/18 0700 - 11/19 0659    Urine (mL/kg/hr) 500 (0.3) 400 (0.2)    Emesis/NG output  0    Drains  200    Stool  1    Total Output 500 601    Net -500 601                   Physical Exam:   Constitutional: She is oriented to person, place, and time. She appears well-developed and  well-nourished. No distress.   Thin, frail     HENT:   Head: Normocephalic and atraumatic.   NG tube to right nare      Eyes: Conjunctivae and EOM are normal.    Neck: Normal range of motion. Neck supple. No thyromegaly present.    Cardiovascular: Normal rate and regular rhythm.     Pulmonary/Chest: Effort normal. No respiratory distress.        Abdominal: Soft. She exhibits no distension. There is no abdominal tenderness.   Abdomen distended RUQ>LUQ, improved. Still tympanic, improved tenderness to palpation                Musculoskeletal: Normal range of motion and moves all extremeties. No tenderness or edema.      Comments: Left arm swelling appreciated, stable          Neurological: She is alert and oriented to person, place, and time.    Skin: Skin is warm and dry. No rash noted.    Psychiatric: She has a normal mood and affect. Her behavior is normal.       Lines/Drains/Airways     Central Venous Catheter Line            Port A Cath Single Lumen 11/17/20 0950 right subclavian 1 day          Drain                 NG/OG Tube 11/18/20 2003 [place] sump 18 Fr. Right nostril less than 1 day                Laboratory:  CMP:   Recent Labs   Lab 11/18/20  0342 11/18/20  1327 11/19/20  0411   *  --  133*   K 5.1  --  4.4   CL 97  --  98   CO2 27  --  26   *  --  107   BUN 20  --  22   CREATININE 0.7  --  0.7   CALCIUM 8.9  --  8.2*   PROT  --  5.7*  --    ALBUMIN  --  2.3*  --    BILITOT  --  1.7*  --    ALKPHOS  --  242*  --    AST  --  36  --    ALT  --  26  --    ANIONGAP 10  --  9   EGFRNONAA >60  --  >60           Assessment/Plan:     * Acute deep vein thrombosis (DVT) of left upper extremity  - newly diagnosed 11/16/20  - CTA negative for PE.   - LE Dopplers negative for bilateral LE DVT  - will continue Lovenox 1mg/kg BID for therapeutic anticoagulation  - consider transitioning to oral anticoagulation prior to discharge home. Appreciate medicine vs heme/onc recommendations     Small bowel  obstruction  - NPO  - NG tube to low intermittent suction for decompression, placed 11/18/20  - IVF for maintenance fluids, will replace PRN per NG output   - 400cc output since placement overnight   - electrolytes WNL   - nausea improved with IV phenergan  - consider oral gastrografin if no improvement   - Will monitor NG output consider d/c when minimal output     Pleural effusion, bilateral  - noted on CTA scan  - patient currently asymptomatic, pulse ox and HR WNL   - will continue to monitor, consider IR drainage      Ovarian cancer - IIIC  - Onc history as above  - now on monotherpay taxol   - last cycle 11/13      VTE Risk Mitigation (From admission, onward)         Ordered     enoxaparin injection 80 mg  Every 12 hours (non-standard times)      11/18/20 1529     IP VTE HIGH RISK PATIENT  Once      11/16/20 1816     Place sequential compression device  Until discontinued      11/16/20 1816                  Alejandra Adrian MD  Gynecologic Oncology  [facility]

## 2020-11-20 PROCEDURE — 99232 SBSQ HOSP IP/OBS MODERATE 35: CPT | Mod: ,,, | Performed by: OBSTETRICS & GYNECOLOGY

## 2020-11-20 PROCEDURE — 63600175 PHARM REV CODE 636 W HCPCS: Performed by: INTERNAL MEDICINE

## 2020-11-20 PROCEDURE — 25000003 PHARM REV CODE 250: Performed by: STUDENT IN AN ORGANIZED HEALTH CARE EDUCATION/TRAINING PROGRAM

## 2020-11-20 PROCEDURE — A9698 NON-RAD CONTRAST MATERIALNOC: HCPCS | Performed by: OBSTETRICS & GYNECOLOGY

## 2020-11-20 PROCEDURE — 94761 N-INVAS EAR/PLS OXIMETRY MLT: CPT

## 2020-11-20 PROCEDURE — 25500020 PHARM REV CODE 255: Performed by: OBSTETRICS & GYNECOLOGY

## 2020-11-20 PROCEDURE — 99232 PR SUBSEQUENT HOSPITAL CARE,LEVL II: ICD-10-PCS | Mod: ,,, | Performed by: OBSTETRICS & GYNECOLOGY

## 2020-11-20 PROCEDURE — 63600175 PHARM REV CODE 636 W HCPCS: Performed by: STUDENT IN AN ORGANIZED HEALTH CARE EDUCATION/TRAINING PROGRAM

## 2020-11-20 PROCEDURE — C9113 INJ PANTOPRAZOLE SODIUM, VIA: HCPCS | Performed by: STUDENT IN AN ORGANIZED HEALTH CARE EDUCATION/TRAINING PROGRAM

## 2020-11-20 PROCEDURE — 11000001 HC ACUTE MED/SURG PRIVATE ROOM

## 2020-11-20 RX ADMIN — ONDANSETRON 4 MG: 2 INJECTION INTRAMUSCULAR; INTRAVENOUS at 04:11

## 2020-11-20 RX ADMIN — SODIUM CHLORIDE: 0.9 INJECTION, SOLUTION INTRAVENOUS at 05:11

## 2020-11-20 RX ADMIN — SODIUM CHLORIDE: 0.9 INJECTION, SOLUTION INTRAVENOUS at 04:11

## 2020-11-20 RX ADMIN — ONDANSETRON 4 MG: 2 INJECTION INTRAMUSCULAR; INTRAVENOUS at 09:11

## 2020-11-20 RX ADMIN — PANTOPRAZOLE SODIUM 40 MG: 40 INJECTION, POWDER, LYOPHILIZED, FOR SOLUTION INTRAVENOUS at 08:11

## 2020-11-20 RX ADMIN — MORPHINE SULFATE 4 MG: 4 INJECTION, SOLUTION INTRAMUSCULAR; INTRAVENOUS at 03:11

## 2020-11-20 RX ADMIN — IOHEXOL 1000 ML: 9 SOLUTION ORAL at 09:11

## 2020-11-20 RX ADMIN — ENOXAPARIN SODIUM 80 MG: 80 INJECTION SUBCUTANEOUS at 06:11

## 2020-11-20 RX ADMIN — IOHEXOL 75 ML: 350 INJECTION, SOLUTION INTRAVENOUS at 10:11

## 2020-11-20 RX ADMIN — ENOXAPARIN SODIUM 80 MG: 80 INJECTION SUBCUTANEOUS at 04:11

## 2020-11-20 NOTE — ASSESSMENT & PLAN NOTE
- NPO  - protonix daily   - NG tube to low intermittent suction for decompression, placed 11/18/20  - IVF for maintenance fluids, will replace PRN per NG output   - 500cc output since yesterday. Fluid now clearer   - CT A/P today to determine location and possible resolution of SBO  - consider clamping and po challenge later today if continued minimal output  - electrolytes WNL   - nausea improved with IV phenergan  - consider oral gastrografin if no improvement

## 2020-11-20 NOTE — PLAN OF CARE
Pt VSS and afebrile, in NAD, ambulates to RR with standby assist,NGT to right nare to LIWS. Light brown contents noted to suction canister. Patient tolerated well. Pt in low locked bed, call light in reach,  safety precautions maintained. [facility] lab called at 0337 spoke with Jessica Reddy that Cdiff could not be performed because stool was formed.

## 2020-11-20 NOTE — PROGRESS NOTES
"[facility]  Gynecologic Oncology  Progress Note      Patient Name: Mickie Don  MRN: [id number]  Admission Date: 11/16/2020  Hospital Length of Stay: 1 days  Attending Provider: Benny Tran MD  Primary Care Provider: Elle Ma MD  Principal Problem: Acute deep vein thrombosis (DVT) of left upper extremity    Follow-up For: Procedure(s) (LRB):  PARACENTESIS, ABDOMINAL (N/A)  Post-Operative Day: 4 Days Post-Op  Subjective:      History of Present Illness:  65 y.o. female admitted to hospital medicine for newly diagnosed LUE DVT. Last Thursday, patient had routine apt with Gyn/Onc with Dr. Tran. There, she complained of increasing abdominal pain and GERD unrelieved with malox and ppi. She was scheduled for outpatient paracentesis for accumulation of ascites. At IR apt for paracentesis, LUE swelling was noted, U/S demonstrated DVT. CTA and bilateral lowe extremity dopplers negative. She was started on thearpeutic lovenox and admitted to observation. Since being in the hospital, patient states she has had worsening abdominal pain and persistent n/v. She states the last time she kept and solid food down was last Thursday. She has had persistent vomiting all day and nausea. She did pass flatus today, first time since Thursday. Last BM was last Thursday. She denies fever, chills, CP or SOB. Does complain of feeling dehydrated and "her belly getting big again". She also has noticed some dysuria but no urgency/frequency. Last chemotherapy with taxol was Friday 11/13/20 with chemo again scheduled for 11/20/20.    Gyn Onc History:   Xlap/Omentectomy on 11/1/16 - mass was unresectable  Stage IIIC ovarian cancer  Initiated Taxotere/Carbo on 11/18/16, now s/p 6 cycles completed 3/8/17  Xlap/ISIS/BSO/Radical mass resection > 10 cm, optimal debulking on 4/11/17  Initiated PRIMA on 8/21/17  BRCA negative  Started FORWARD1 on 11/27/17  Started Doxil/Anju as part of NRG  2/27/19, received total of 6 " cycles  Gemzar monotherapy started 8/29/19  Topotecan started 8/14/2020  Taxol monotherapy started 10/16/2020      Hospital Course:  11/18/2020 Patient initially admitted to hospital medicine for observation for newly diagnosed LUE DVT. Started on therpeutic lovenox. Worsening abdominal pain and n/v after paracentesis. GYN ONC assumed care of patient, concern for partial SBO. Patient made NPO, Flat and Erect X Ray ordered. IVF started.   11/19/2020 Xray consistent with SBO. NG tube placed with contents removed. 400cc output since placement. Electrolytes WNL.   11/20/2020 Improving, fluid content clearer, n/v improving. CT today.       Interval History: Feeling better this AM. Increased appetite. Increased flatus. Ambulating, voiding without difficulty.  Denies fever, chills, nausea, vomiting.     Scheduled Meds:   enoxaparin  80 mg Subcutaneous Q12H    pantoprazole  40 mg Intravenous Daily     Continuous Infusions:   sodium chloride 0.9% 100 mL/hr at 11/20/20 0406     PRN Meds:acetaminophen, benzocaine, dextrose 50%, dextrose 50%, glucagon (human recombinant), glucose, glucose, HYDROcodone-acetaminophen, melatonin, morphine, ondansetron, sodium chloride 0.9%    Review of patient's allergies indicates:  No Known Allergies    Objective:     Vital Signs (Most Recent):  Temp: 97.2 °F (36.2 °C) (11/20/20 0416)  Pulse: 98 (11/20/20 0416)  Resp: 18 (11/20/20 0416)  BP: 132/70 (11/20/20 0416)  SpO2: 95 % (11/20/20 0416) Vital Signs (24h Range):  Temp:  [97.2 °F (36.2 °C)-98.3 °F (36.8 °C)] 97.2 °F (36.2 °C)  Pulse:  [92-99] 98  Resp:  [16-18] 18  SpO2:  [94 %-96 %] 95 %  BP: (128-147)/(63-74) 132/70     Weight: 77.1 kg (170 lb)  Body mass index is 26.63 kg/m².    Intake/Output - Last 3 Shifts       11/18 0700 - 11/19 0659 11/19 0700 - 11/20 0659    Urine (mL/kg/hr) 400 (0.2) 800 (0.4)    Emesis/NG output 0 0    Drains 350 200    Stool 1 1    Total Output 751 1001    Net -751 -1001          Urine Occurrence  1 x              Physical Exam:   Constitutional: She is oriented to person, place, and time. She appears well-developed and well-nourished. No distress.   Thin, frail     HENT:   Head: Normocephalic and atraumatic.   NG tube to right nare      Eyes: Conjunctivae and EOM are normal.    Neck: Normal range of motion. Neck supple. No thyromegaly present.    Cardiovascular: Normal rate and regular rhythm.     Pulmonary/Chest: Effort normal. No respiratory distress.        Abdominal: Soft. She exhibits no distension. There is no abdominal tenderness.   Abdomen distended RUQ>LUQ, improved. Still tympanic, improved tenderness to palpation                Musculoskeletal: Normal range of motion and moves all extremeties. No tenderness or edema.      Comments: Left arm swelling appreciated, stable          Neurological: She is alert and oriented to person, place, and time.    Skin: Skin is warm and dry. No rash noted.    Psychiatric: She has a normal mood and affect. Her behavior is normal.       Lines/Drains/Airways     Central Venous Catheter Line            Port A Cath Single Lumen 11/17/20 0950 right subclavian 2 days          Drain                 NG/OG Tube 11/18/20 2003 [place] sump 18 Fr. Right nostril 1 day                Laboratory:  CMP:   Recent Labs   Lab 11/18/20  1327 11/19/20  0411   NA  --  133*   K  --  4.4   CL  --  98   CO2  --  26   GLU  --  107   BUN  --  22   CREATININE  --  0.7   CALCIUM  --  8.2*   PROT 5.7*  --    ALBUMIN 2.3*  --    BILITOT 1.7*  --    ALKPHOS 242*  --    AST 36  --    ALT 26  --    ANIONGAP  --  9   EGFRNONAA  --  >60           Assessment/Plan:     * Acute deep vein thrombosis (DVT) of left upper extremity  - newly diagnosed 11/16/20  - CTA negative for PE.   - LE Dopplers negative for bilateral LE DVT  - will continue Lovenox 1mg/kg BID for therapeutic anticoagulation  - consider transitioning to oral anticoagulation prior to discharge home. Appreciate medicine vs heme/onc recommendations      Small bowel obstruction  - NPO  - protonix daily   - NG tube to low intermittent suction for decompression, placed 11/18/20  - IVF for maintenance fluids, will replace PRN per NG output   - 500cc output since yesterday. Fluid now clearer   - CT A/P today to determine location and possible resolution of SBO  - consider clamping and po challenge later today if continued minimal output  - electrolytes WNL   - nausea improved with IV phenergan  - consider oral gastrografin if no improvement       Pleural effusion, bilateral  - noted on CTA scan  - patient currently asymptomatic, pulse ox and HR WNL   - will continue to monitor, consider IR drainage      Ovarian cancer - IIIC  - Onc history as above  - now on monotherpay taxol   - last cycle 11/13      VTE Risk Mitigation (From admission, onward)         Ordered     Place sequential compression device  Until discontinued      11/19/20 0626     enoxaparin injection 80 mg  Every 12 hours (non-standard times)      11/18/20 1529     IP VTE HIGH RISK PATIENT  Once      11/16/20 1816     Place sequential compression device  Until discontinued      11/16/20 1816                Alejandra Adrian MD  Gynecologic Oncology  [facility]

## 2020-11-20 NOTE — SUBJECTIVE & OBJECTIVE
Interval History: Feeling better this AM. Increased appetite. Increased flatus. Ambulating, voiding without difficulty.  Denies fever, chills, nausea, vomiting.     Scheduled Meds:   enoxaparin  80 mg Subcutaneous Q12H    pantoprazole  40 mg Intravenous Daily     Continuous Infusions:   sodium chloride 0.9% 100 mL/hr at 11/20/20 0406     PRN Meds:acetaminophen, benzocaine, dextrose 50%, dextrose 50%, glucagon (human recombinant), glucose, glucose, HYDROcodone-acetaminophen, melatonin, morphine, ondansetron, sodium chloride 0.9%    Review of patient's allergies indicates:  No Known Allergies    Objective:     Vital Signs (Most Recent):  Temp: 97.2 °F (36.2 °C) (11/20/20 0416)  Pulse: 98 (11/20/20 0416)  Resp: 18 (11/20/20 0416)  BP: 132/70 (11/20/20 0416)  SpO2: 95 % (11/20/20 0416) Vital Signs (24h Range):  Temp:  [97.2 °F (36.2 °C)-98.3 °F (36.8 °C)] 97.2 °F (36.2 °C)  Pulse:  [92-99] 98  Resp:  [16-18] 18  SpO2:  [94 %-96 %] 95 %  BP: (128-147)/(63-74) 132/70     Weight: 77.1 kg (170 lb)  Body mass index is 26.63 kg/m².    Intake/Output - Last 3 Shifts       11/18 0700 - 11/19 0659 11/19 0700 - 11/20 0659    Urine (mL/kg/hr) 400 (0.2) 800 (0.4)    Emesis/NG output 0 0    Drains 350 200    Stool 1 1    Total Output 751 1001    Net -751 -1001          Urine Occurrence  1 x             Physical Exam:   Constitutional: She is oriented to person, place, and time. She appears well-developed and well-nourished. No distress.   Thin, frail     HENT:   Head: Normocephalic and atraumatic.   NG tube to right nare      Eyes: Conjunctivae and EOM are normal.    Neck: Normal range of motion. Neck supple. No thyromegaly present.    Cardiovascular: Normal rate and regular rhythm.     Pulmonary/Chest: Effort normal. No respiratory distress.        Abdominal: Soft. She exhibits no distension. There is no abdominal tenderness.   Abdomen distended RUQ>LUQ, improved. Still tympanic, improved tenderness to palpation                 Musculoskeletal: Normal range of motion and moves all extremeties. No tenderness or edema.      Comments: Left arm swelling appreciated, stable          Neurological: She is alert and oriented to person, place, and time.    Skin: Skin is warm and dry. No rash noted.    Psychiatric: She has a normal mood and affect. Her behavior is normal.       Lines/Drains/Airways     Central Venous Catheter Line            Port A Cath Single Lumen 11/17/20 0950 right subclavian 2 days          Drain                 NG/OG Tube 11/18/20 2003 [place] sump 18 Fr. Right nostril 1 day                Laboratory:  CMP:   Recent Labs   Lab 11/18/20  1327 11/19/20  0411   NA  --  133*   K  --  4.4   CL  --  98   CO2  --  26   GLU  --  107   BUN  --  22   CREATININE  --  0.7   CALCIUM  --  8.2*   PROT 5.7*  --    ALBUMIN 2.3*  --    BILITOT 1.7*  --    ALKPHOS 242*  --    AST 36  --    ALT 26  --    ANIONGAP  --  9   EGFRNONAA  --  >60

## 2020-11-20 NOTE — PLAN OF CARE
Pt AAO x 4. Resp. Even and unlabored. Ng tube to Rt nare to LIWS with small amount of green drainage noted to cannister. Up with assist to bathroom. Voiding latha color urine without difficulty noted. Rt abdomen dsg CDI. Pain controlled with pain medications as ordered. Pt free from falls or injury. Purposeful rounding done. Pt on contact isolation to R/O C diff. Isolation precautions maintained. Isolation cart outside of the room. Safety maintained. Bed in lowest position and locked. Call light in reach. Pt and family members educated on isolation precautions. VSS.

## 2020-11-21 PROCEDURE — 63600175 PHARM REV CODE 636 W HCPCS: Performed by: STUDENT IN AN ORGANIZED HEALTH CARE EDUCATION/TRAINING PROGRAM

## 2020-11-21 PROCEDURE — C9113 INJ PANTOPRAZOLE SODIUM, VIA: HCPCS | Performed by: STUDENT IN AN ORGANIZED HEALTH CARE EDUCATION/TRAINING PROGRAM

## 2020-11-21 PROCEDURE — 25000003 PHARM REV CODE 250: Performed by: STUDENT IN AN ORGANIZED HEALTH CARE EDUCATION/TRAINING PROGRAM

## 2020-11-21 PROCEDURE — 99232 SBSQ HOSP IP/OBS MODERATE 35: CPT | Mod: ,,, | Performed by: OBSTETRICS & GYNECOLOGY

## 2020-11-21 PROCEDURE — 63600175 PHARM REV CODE 636 W HCPCS: Performed by: INTERNAL MEDICINE

## 2020-11-21 PROCEDURE — 99232 PR SUBSEQUENT HOSPITAL CARE,LEVL II: ICD-10-PCS | Mod: ,,, | Performed by: OBSTETRICS & GYNECOLOGY

## 2020-11-21 PROCEDURE — 11000001 HC ACUTE MED/SURG PRIVATE ROOM

## 2020-11-21 RX ADMIN — SODIUM CHLORIDE: 0.9 INJECTION, SOLUTION INTRAVENOUS at 04:11

## 2020-11-21 RX ADMIN — ENOXAPARIN SODIUM 80 MG: 80 INJECTION SUBCUTANEOUS at 05:11

## 2020-11-21 RX ADMIN — ENOXAPARIN SODIUM 80 MG: 80 INJECTION SUBCUTANEOUS at 04:11

## 2020-11-21 RX ADMIN — PANTOPRAZOLE SODIUM 40 MG: 40 INJECTION, POWDER, LYOPHILIZED, FOR SOLUTION INTRAVENOUS at 08:11

## 2020-11-21 RX ADMIN — MORPHINE SULFATE 4 MG: 4 INJECTION, SOLUTION INTRAMUSCULAR; INTRAVENOUS at 03:11

## 2020-11-21 NOTE — SUBJECTIVE & OBJECTIVE
Interval History: Feeling better this AM. Increased appetite. Increased flatus. Ambulating, voiding without difficulty.  Denies fever, chills, nausea, vomiting. Does complain of left hand swelling.     Scheduled Meds:   enoxaparin  80 mg Subcutaneous Q12H    pantoprazole  40 mg Intravenous Daily     Continuous Infusions:   sodium chloride 0.9% 100 mL/hr at 11/20/20 1714     PRN Meds:acetaminophen, benzocaine, dextrose 50%, dextrose 50%, glucagon (human recombinant), glucose, glucose, HYDROcodone-acetaminophen, melatonin, morphine, ondansetron, sodium chloride 0.9%    Review of patient's allergies indicates:  No Known Allergies    Objective:     Vital Signs (Most Recent):  Temp: 98 °F (36.7 °C) (11/21/20 0728)  Pulse: 98 (11/21/20 0728)  Resp: 18 (11/21/20 0728)  BP: 120/74 (11/21/20 0728)  SpO2: (!) 94 % (11/21/20 0728) Vital Signs (24h Range):  Temp:  [96.6 °F (35.9 °C)-98.2 °F (36.8 °C)] 98 °F (36.7 °C)  Pulse:  [85-98] 98  Resp:  [18] 18  SpO2:  [93 %-98 %] 94 %  BP: (120-143)/(63-82) 120/74     Weight: 77.1 kg (170 lb)  Body mass index is 26.63 kg/m².    Intake/Output - Last 3 Shifts       11/19 0700 - 11/20 0659 11/20 0700 - 11/21 0659 11/21 0700 - 11/22 0659    Urine (mL/kg/hr) 800 (0.4) 0 (0)     Emesis/NG output 0      Drains 200 175     Stool 1      Total Output 1001 175     Net -1001 -175            Urine Occurrence 1 x 3 x              Physical Exam:   Constitutional: She is oriented to person, place, and time. She appears well-developed and well-nourished. No distress.   Thin, frail     HENT:   Head: Normocephalic and atraumatic.   NG tube to right nare      Eyes: Conjunctivae and EOM are normal.    Neck: Normal range of motion. Neck supple. No thyromegaly present.    Cardiovascular: Normal rate and regular rhythm.     Pulmonary/Chest: Effort normal. No respiratory distress.        Abdominal: Soft. She exhibits no distension. There is no abdominal tenderness.   Abdomen distended RUQ>LUQ, improved.  Still tympanic, improved tenderness to palpation                Musculoskeletal: Normal range of motion and moves all extremeties. No tenderness or edema.      Comments:          Neurological: She is alert and oriented to person, place, and time.    Skin: Skin is warm and dry. No rash noted.   Left arm swelling present to hand. Radial pulse intact.     Psychiatric: She has a normal mood and affect. Her behavior is normal.       Lines/Drains/Airways     Central Venous Catheter Line            Port A Cath Single Lumen 11/17/20 0950 right subclavian 3 days          Drain                 NG/OG Tube 11/18/20 2003 [place] sump 18 Fr. Right nostril 2 days                Laboratory:  CMP:   No results for input(s): NA, K, CL, CO2, GLU, BUN, CREATININE, CALCIUM, PROT, ALBUMIN, BILITOT, ALKPHOS, AST, ALT, ANIONGAP, EGFRNONAA in the last 48 hours.    Invalid input(s): [place]

## 2020-11-21 NOTE — PLAN OF CARE
Pt AAO x 4. Resp. Even and unlabored. Ng tube to Rt nare to LIWS with small amount of green drainage noted to cannister. Pain controlled with pain medications as ordered. Pt free from falls or injury. Purposeful rounding done. Rt abd dsg CDI. Pt up with assist to bathroom. Voiding latha colored urine. Safety maintained. Bed in lowest position and locked. Call light in reach. VSS.

## 2020-11-21 NOTE — PROGRESS NOTES
"[facility]  Gynecologic Oncology  Progress Note      Patient Name: Mickie Don  MRN: [id number]  Admission Date: 11/16/2020  Hospital Length of Stay: 2 days  Attending Provider: Benny Tran MD  Primary Care Provider: Elle Ma MD  Principal Problem: Acute deep vein thrombosis (DVT) of left upper extremity    Follow-up For: Procedure(s) (LRB):  PARACENTESIS, ABDOMINAL (N/A)  Post-Operative Day: 5 Days Post-Op  Subjective:      History of Present Illness:  65 y.o. female admitted to hospital medicine for newly diagnosed LUE DVT. Last Thursday, patient had routine apt with Gyn/Onc with Dr. Tran. There, she complained of increasing abdominal pain and GERD unrelieved with malox and ppi. She was scheduled for outpatient paracentesis for accumulation of ascites. At IR apt for paracentesis, LUE swelling was noted, U/S demonstrated DVT. CTA and bilateral lowe extremity dopplers negative. She was started on thearpeutic lovenox and admitted to observation. Since being in the hospital, patient states she has had worsening abdominal pain and persistent n/v. She states the last time she kept and solid food down was last Thursday. She has had persistent vomiting all day and nausea. She did pass flatus today, first time since Thursday. Last BM was last Thursday. She denies fever, chills, CP or SOB. Does complain of feeling dehydrated and "her belly getting big again". She also has noticed some dysuria but no urgency/frequency. Last chemotherapy with taxol was Friday 11/13/20 with chemo again scheduled for 11/20/20.    Gyn Onc History:   Xlap/Omentectomy on 11/1/16 - mass was unresectable  Stage IIIC ovarian cancer  Initiated Taxotere/Carbo on 11/18/16, now s/p 6 cycles completed 3/8/17  Xlap/ISIS/BSO/Radical mass resection > 10 cm, optimal debulking on 4/11/17  Initiated PRIMA on 8/21/17  BRCA negative  Started FORWARD1 on 11/27/17  Started Doxil/Anju as part of NRG  2/27/19, received total of 6 " cycles  Gemzar monotherapy started 8/29/19  Topotecan started 8/14/2020  Taxol monotherapy started 10/16/2020      Hospital Course:  11/18/2020 Patient initially admitted to hospital medicine for observation for newly diagnosed LUE DVT. Started on therpeutic lovenox. Worsening abdominal pain and n/v after paracentesis. GYN ONC assumed care of patient, concern for partial SBO. Patient made NPO, Flat and Erect X Ray ordered. IVF started.   11/19/2020 Xray consistent with SBO. NG tube placed with contents removed. 400cc output since placement. Electrolytes WNL.   11/20/2020 Improving, fluid content clearer, n/v improving. CT today.   11/21/2020 Improving, minimal NG tube output overnight. CT showed partial SBO. Will clamp NG tube today and advance to clear liquid diet.     Interval History: Feeling better this AM. Increased appetite. Increased flatus. Ambulating, voiding without difficulty.  Denies fever, chills, nausea, vomiting. Does complain of left hand swelling.     Scheduled Meds:   enoxaparin  80 mg Subcutaneous Q12H    pantoprazole  40 mg Intravenous Daily     Continuous Infusions:   sodium chloride 0.9% 100 mL/hr at 11/20/20 1714     PRN Meds:acetaminophen, benzocaine, dextrose 50%, dextrose 50%, glucagon (human recombinant), glucose, glucose, HYDROcodone-acetaminophen, melatonin, morphine, ondansetron, sodium chloride 0.9%    Review of patient's allergies indicates:  No Known Allergies    Objective:     Vital Signs (Most Recent):  Temp: 98 °F (36.7 °C) (11/21/20 0728)  Pulse: 98 (11/21/20 0728)  Resp: 18 (11/21/20 0728)  BP: 120/74 (11/21/20 0728)  SpO2: (!) 94 % (11/21/20 0728) Vital Signs (24h Range):  Temp:  [96.6 °F (35.9 °C)-98.2 °F (36.8 °C)] 98 °F (36.7 °C)  Pulse:  [85-98] 98  Resp:  [18] 18  SpO2:  [93 %-98 %] 94 %  BP: (120-143)/(63-82) 120/74     Weight: 77.1 kg (170 lb)  Body mass index is 26.63 kg/m².    Intake/Output - Last 3 Shifts       11/19 0700 - 11/20 0659 11/20 0700 - 11/21 0659 11/21  0700 - 11/22 0659    Urine (mL/kg/hr) 800 (0.4) 0 (0)     Emesis/NG output 0      Drains 200 175     Stool 1      Total Output 1001 175     Net -1001 -175            Urine Occurrence 1 x 3 x              Physical Exam:   Constitutional: She is oriented to person, place, and time. She appears well-developed and well-nourished. No distress.   Thin, frail     HENT:   Head: Normocephalic and atraumatic.   NG tube to right nare      Eyes: Conjunctivae and EOM are normal.    Neck: Normal range of motion. Neck supple. No thyromegaly present.    Cardiovascular: Normal rate and regular rhythm.     Pulmonary/Chest: Effort normal. No respiratory distress.        Abdominal: Soft. She exhibits no distension. There is no abdominal tenderness.   Abdomen distended RUQ>LUQ, improved. Still tympanic, improved tenderness to palpation                Musculoskeletal: Normal range of motion and moves all extremeties. No tenderness or edema.      Comments:          Neurological: She is alert and oriented to person, place, and time.    Skin: Skin is warm and dry. No rash noted.   Left arm swelling present to hand. Radial pulse intact.     Psychiatric: She has a normal mood and affect. Her behavior is normal.       Lines/Drains/Airways     Central Venous Catheter Line            Port A Cath Single Lumen 11/17/20 0950 right subclavian 3 days          Drain                 NG/OG Tube 11/18/20 2003 [place] sump 18 Fr. Right nostril 2 days                Laboratory:  CMP:   No results for input(s): NA, K, CL, CO2, GLU, BUN, CREATININE, CALCIUM, PROT, ALBUMIN, BILITOT, ALKPHOS, AST, ALT, ANIONGAP, EGFRNONAA in the last 48 hours.    Invalid input(s): [place]        Assessment/Plan:     * Acute deep vein thrombosis (DVT) of left upper extremity  - newly diagnosed 11/16/20  - CTA negative for PE.   - LE Dopplers negative for bilateral LE DVT  - will continue Lovenox 1mg/kg BID for therapeutic anticoagulation       Small bowel obstruction  - NPO  currently   - protonix daily   - minimal output yesterday, will clamp NG tube and try CLD, do not pull NG tube.  - CT A/P 11/20/2020 showed partial SBO  - electrolytes WNL   - nausea improved with IV phenergan  - consider oral gastrografin if no improvement       Pleural effusion, bilateral  - noted on CTA scan  - patient currently asymptomatic, pulse ox and HR WNL   - will continue to monitor, consider IR drainage      Ovarian cancer - IIIC  - Onc history as above  - now on monotherpay taxol   - last cycle 11/13      VTE Risk Mitigation (From admission, onward)         Ordered     Place sequential compression device  Until discontinued      11/19/20 0626     enoxaparin injection 80 mg  Every 12 hours (non-standard times)      11/18/20 1529     IP VTE HIGH RISK PATIENT  Once      11/16/20 1816     Place sequential compression device  Until discontinued      11/16/20 1816                  Alejandra Adrian MD  Gynecologic Oncology  [facility]

## 2020-11-21 NOTE — PLAN OF CARE
Pt AAO x 4. Resp. Even and unlabored. Ng tube to Rt nare clamped. Pt tolerating clear liquids diet well. No c/o nausea or pain noted. Pt ambulating in [place] without difficulty noted with son @ side. Pt free from falls or injury. Voiding latha colored urine. Pt had a BM of formed stool per patient. Contact isolation maintained and family and patient educated about it. Purposeful rounding done. Safety maintained. Call light in reach. Dsg to Rt abdomen CDI. VSS.

## 2020-11-21 NOTE — PLAN OF CARE
Pt VSS and afebrile, in NAD, ambulates to RR with standby assist,NGT to right nare to LIWS. Light brown contents noted to suction canister. Patient tolerated well. Pt in low locked bed, call light in reach,  safety precautions maintained. Denies pain.

## 2020-11-22 PROBLEM — R60.9 EDEMA: Status: ACTIVE | Noted: 2020-11-22

## 2020-11-22 PROCEDURE — 63600175 PHARM REV CODE 636 W HCPCS: Performed by: STUDENT IN AN ORGANIZED HEALTH CARE EDUCATION/TRAINING PROGRAM

## 2020-11-22 PROCEDURE — C9113 INJ PANTOPRAZOLE SODIUM, VIA: HCPCS | Performed by: STUDENT IN AN ORGANIZED HEALTH CARE EDUCATION/TRAINING PROGRAM

## 2020-11-22 PROCEDURE — 94761 N-INVAS EAR/PLS OXIMETRY MLT: CPT

## 2020-11-22 PROCEDURE — 25000003 PHARM REV CODE 250: Performed by: INTERNAL MEDICINE

## 2020-11-22 PROCEDURE — 11000001 HC ACUTE MED/SURG PRIVATE ROOM

## 2020-11-22 PROCEDURE — 99232 SBSQ HOSP IP/OBS MODERATE 35: CPT | Mod: ,,, | Performed by: OBSTETRICS & GYNECOLOGY

## 2020-11-22 PROCEDURE — 99232 PR SUBSEQUENT HOSPITAL CARE,LEVL II: ICD-10-PCS | Mod: ,,, | Performed by: OBSTETRICS & GYNECOLOGY

## 2020-11-22 RX ORDER — FUROSEMIDE 10 MG/ML
40 INJECTION INTRAMUSCULAR; INTRAVENOUS ONCE
Status: COMPLETED | OUTPATIENT
Start: 2020-11-22 | End: 2020-11-22

## 2020-11-22 RX ADMIN — HYDROCODONE BITARTRATE AND ACETAMINOPHEN 1 TABLET: 5; 325 TABLET ORAL at 11:11

## 2020-11-22 RX ADMIN — ENOXAPARIN SODIUM 80 MG: 80 INJECTION SUBCUTANEOUS at 05:11

## 2020-11-22 RX ADMIN — FUROSEMIDE 40 MG: 10 INJECTION, SOLUTION INTRAMUSCULAR; INTRAVENOUS at 09:11

## 2020-11-22 RX ADMIN — PANTOPRAZOLE SODIUM 40 MG: 40 INJECTION, POWDER, LYOPHILIZED, FOR SOLUTION INTRAVENOUS at 09:11

## 2020-11-22 NOTE — PROGRESS NOTES
"[facility]  Gynecologic Oncology  Progress Note      Patient Name: Mickie Don  MRN: [id number]  Admission Date: 11/16/2020  Hospital Length of Stay: 3 days  Attending Provider: Benny Tran MD  Primary Care Provider: Elle Ma MD  Principal Problem: Acute deep vein thrombosis (DVT) of left upper extremity    Follow-up For: Procedure(s) (LRB):  PARACENTESIS, ABDOMINAL (N/A)  Post-Operative Day: 6 Days Post-Op  Subjective:      History of Present Illness:  65 y.o. female admitted to hospital medicine for newly diagnosed LUE DVT. Last Thursday, patient had routine apt with Gyn/Onc with Dr. Tran. There, she complained of increasing abdominal pain and GERD unrelieved with malox and ppi. She was scheduled for outpatient paracentesis for accumulation of ascites. At IR apt for paracentesis, LUE swelling was noted, U/S demonstrated DVT. CTA and bilateral lowe extremity dopplers negative. She was started on thearpeutic lovenox and admitted to observation. Since being in the hospital, patient states she has had worsening abdominal pain and persistent n/v. She states the last time she kept and solid food down was last Thursday. She has had persistent vomiting all day and nausea. She did pass flatus today, first time since Thursday. Last BM was last Thursday. She denies fever, chills, CP or SOB. Does complain of feeling dehydrated and "her belly getting big again". She also has noticed some dysuria but no urgency/frequency. Last chemotherapy with taxol was Friday 11/13/20 with chemo again scheduled for 11/20/20.    Gyn Onc History:   Xlap/Omentectomy on 11/1/16 - mass was unresectable  Stage IIIC ovarian cancer  Initiated Taxotere/Carbo on 11/18/16, now s/p 6 cycles completed 3/8/17  Xlap/ISIS/BSO/Radical mass resection > 10 cm, optimal debulking on 4/11/17  Initiated PRIMA on 8/21/17  BRCA negative  Started FORWARD1 on 11/27/17  Started Doxil/Anju as part of NRG  2/27/19, received total of 6 " "cycles  Gemzar monotherapy started 8/29/19  Topotecan started 8/14/2020  Taxol monotherapy started 10/16/2020      Hospital Course:  11/18/2020 Patient initially admitted to hospital medicine for observation for newly diagnosed LUE DVT. Started on therpeutic lovenox. Worsening abdominal pain and n/v after paracentesis. GYN ONC assumed care of patient, concern for partial SBO. Patient made NPO, Flat and Erect X Ray ordered. IVF started.   11/19/2020 Xray consistent with SBO. NG tube placed with contents removed. 400cc output since placement. Electrolytes WNL.   11/20/2020 Improving, fluid content clearer, n/v improving. CT today.   11/21/2020 Improving, minimal NG tube output overnight. CT showed partial SBO. Will clamp NG tube today and advance to clear liquid diet.   11/22/2020 Tolerated CLD with NG tube clamped yesterday. Will pull out NG tube today and see how she tolerates bland diet. Consider diuretic for edema.     Interval History: Feeling fatigued this AM. Did have a liquid BM this AM and needed to shower, fatigued from that. Tolerated CLD without n/v. Passing flatus. Ambulating. Feels "swollen" and uncomfortable. Denies CP or SOB.     Scheduled Meds:   enoxaparin  80 mg Subcutaneous Q12H    pantoprazole  40 mg Intravenous Daily     Continuous Infusions:    PRN Meds:acetaminophen, benzocaine, dextrose 50%, dextrose 50%, glucagon (human recombinant), glucose, glucose, HYDROcodone-acetaminophen, melatonin, morphine, ondansetron, sodium chloride 0.9%    Review of patient's allergies indicates:  No Known Allergies    Objective:     Vital Signs (Most Recent):  Temp: 98 °F (36.7 °C) (11/22/20 0748)  Pulse: 96 (11/22/20 0748)  Resp: 14 (11/22/20 0748)  BP: (!) 143/67 (11/22/20 0748)  SpO2: 95 % (11/22/20 0749) Vital Signs (24h Range):  Temp:  [98 °F (36.7 °C)-98.7 °F (37.1 °C)] 98 °F (36.7 °C)  Pulse:  [88-99] 96  Resp:  [14-20] 14  SpO2:  [93 %-95 %] 95 %  BP: (134-143)/(66-74) 143/67     Weight: 77.1 kg (170 " lb)  Body mass index is 26.63 kg/m².    Intake/Output - Last 3 Shifts       11/20 0700 - 11/21 0659 11/21 0700 - 11/22 0659 11/22 0700 - 11/23 0659    P.O.  600     Total Intake(mL/kg)  600 (7.8)     Urine (mL/kg/hr) 0 (0)      Emesis/NG output       Drains 175      Stool       Total Output 175      Net -175 +600            Urine Occurrence 3 x 3 x              Physical Exam:   Constitutional: She is oriented to person, place, and time. She appears well-developed and well-nourished. No distress.   Thin, frail     HENT:   Head: Normocephalic and atraumatic.   NG tube to right nare      Eyes: Conjunctivae and EOM are normal.    Neck: Normal range of motion. Neck supple. No thyromegaly present.    Cardiovascular: Normal rate and regular rhythm.     Pulmonary/Chest: Effort normal. No respiratory distress.   Decreased lung sounds in bilateral lower lung fields. No crackles heard bilaterally.         Abdominal: Soft. She exhibits no distension. There is no abdominal tenderness.   Abdomen distended RUQ>LUQ, improved. Still tympanic, improved tenderness to palpation                Musculoskeletal: Normal range of motion and moves all extremeties. No tenderness or edema.      Comments:          Neurological: She is alert and oriented to person, place, and time.    Skin: Skin is warm and dry. No rash noted.   Left arm swelling present to hand. Radial pulse intact.     +2 pitting edema in bilateral LE.    Psychiatric: She has a normal mood and affect. Her behavior is normal.       Lines/Drains/Airways     Central Venous Catheter Line            Port A Cath Single Lumen 11/17/20 0950 right subclavian 4 days          Drain                 NG/OG Tube 11/18/20 2003 [place] sump 18 Fr. Right nostril 3 days                Laboratory:  CMP:   No results for input(s): NA, K, CL, CO2, GLU, BUN, CREATININE, CALCIUM, PROT, ALBUMIN, BILITOT, ALKPHOS, AST, ALT, ANIONGAP, EGFRNONAA in the last 48 hours.    Invalid input(s):  [place]        Assessment/Plan:     * Acute deep vein thrombosis (DVT) of left upper extremity  - newly diagnosed 11/16/20  - CTA negative for PE.   - LE Dopplers negative for bilateral LE DVT  - will continue Lovenox 1mg/kg BID for therapeutic anticoagulation  - consider transitioning to oral anticoagulation prior to discharge home. Appreciate medicine vs heme/onc recommendations     Edema  - ELEAZAR hose   - consider diuretic PRN for comfort     Small bowel obstruction  - NG tubed clamped yesterday, patient tolerated CLD without nausea/vomiting  - will pull NG tube today and try ADAT, starting with thicker liquids and then possibly bland diet   - electrolytes WNL   - nausea improved with IV phenergan  - consider oral gastrografin if no improvement       Pleural effusion, bilateral  - noted on CTA scan  - patient currently asymptomatic, pulse ox and HR WNL   - will continue to monitor, consider IR drainage      Ovarian cancer - IIIC  - Onc history as above  - now on monotherpay taxol   - last cycle 11/13    VTE Risk Mitigation (From admission, onward)         Ordered     Place ELEAZAR hose  Until discontinued      11/22/20 0756     Place sequential compression device  Until discontinued      11/19/20 0626     enoxaparin injection 80 mg  Every 12 hours (non-standard times)      11/18/20 1529     IP VTE HIGH RISK PATIENT  Once      11/16/20 1816     Place sequential compression device  Until discontinued      11/16/20 1816                    Alejandra Adrian MD  Gynecologic Oncology  [facility]

## 2020-11-22 NOTE — SUBJECTIVE & OBJECTIVE
"Interval History: Feeling fatigued this AM. Did have a liquid BM this AM and needed to shower, fatigued from that. Tolerated CLD without n/v. Passing flatus. Ambulating. Feels "swollen" and uncomfortable. Denies CP or SOB.     Scheduled Meds:   enoxaparin  80 mg Subcutaneous Q12H    pantoprazole  40 mg Intravenous Daily     Continuous Infusions:    PRN Meds:acetaminophen, benzocaine, dextrose 50%, dextrose 50%, glucagon (human recombinant), glucose, glucose, HYDROcodone-acetaminophen, melatonin, morphine, ondansetron, sodium chloride 0.9%    Review of patient's allergies indicates:  No Known Allergies    Objective:     Vital Signs (Most Recent):  Temp: 98 °F (36.7 °C) (11/22/20 0748)  Pulse: 96 (11/22/20 0748)  Resp: 14 (11/22/20 0748)  BP: (!) 143/67 (11/22/20 0748)  SpO2: 95 % (11/22/20 0749) Vital Signs (24h Range):  Temp:  [98 °F (36.7 °C)-98.7 °F (37.1 °C)] 98 °F (36.7 °C)  Pulse:  [88-99] 96  Resp:  [14-20] 14  SpO2:  [93 %-95 %] 95 %  BP: (134-143)/(66-74) 143/67     Weight: 77.1 kg (170 lb)  Body mass index is 26.63 kg/m².    Intake/Output - Last 3 Shifts       11/20 0700 - 11/21 0659 11/21 0700 - 11/22 0659 11/22 0700 - 11/23 0659    P.O.  600     Total Intake(mL/kg)  600 (7.8)     Urine (mL/kg/hr) 0 (0)      Emesis/NG output       Drains 175      Stool       Total Output 175      Net -175 +600            Urine Occurrence 3 x 3 x              Physical Exam:   Constitutional: She is oriented to person, place, and time. She appears well-developed and well-nourished. No distress.   Thin, frail     HENT:   Head: Normocephalic and atraumatic.   NG tube to right nare      Eyes: Conjunctivae and EOM are normal.    Neck: Normal range of motion. Neck supple. No thyromegaly present.    Cardiovascular: Normal rate and regular rhythm.     Pulmonary/Chest: Effort normal. No respiratory distress.   Decreased lung sounds in bilateral lower lung fields. No crackles heard bilaterally.         Abdominal: Soft. She exhibits " no distension. There is no abdominal tenderness.   Abdomen distended RUQ>LUQ, improved. Still tympanic, improved tenderness to palpation                Musculoskeletal: Normal range of motion and moves all extremeties. No tenderness or edema.      Comments:          Neurological: She is alert and oriented to person, place, and time.    Skin: Skin is warm and dry. No rash noted.   Left arm swelling present to hand. Radial pulse intact.     +2 pitting edema in bilateral LE.    Psychiatric: She has a normal mood and affect. Her behavior is normal.       Lines/Drains/Airways     Central Venous Catheter Line            Port A Cath Single Lumen 11/17/20 0950 right subclavian 4 days          Drain                 NG/OG Tube 11/18/20 2003 [place] sump 18 Fr. Right nostril 3 days                Laboratory:  CMP:   No results for input(s): NA, K, CL, CO2, GLU, BUN, CREATININE, CALCIUM, PROT, ALBUMIN, BILITOT, ALKPHOS, AST, ALT, ANIONGAP, EGFRNONAA in the last 48 hours.    Invalid input(s): [place]

## 2020-11-22 NOTE — NURSING
VSS and afebrile throughout shift.  No falls or injuries.  NG tube removed per MD order.  Clear liquid diet until midnight tonight then NPO for ultrasound tomorrow.  Patient ambulates with some assistance and showered today.  No complaint of pain throughout shift.  Purposeful hourly rounding maintained.  Will continue to monitor.

## 2020-11-22 NOTE — PLAN OF CARE
Pt remains free from falls. Vitals were stable throughout the night on room air. Positions self independently. No complaints of  pain or nausea. Bed in low position and call light within reach. Will continue to monitor.

## 2020-11-22 NOTE — ASSESSMENT & PLAN NOTE
- NG tubed clamped yesterday, patient tolerated CLD without nausea/vomiting  - will pull NG tube today and try ADAT, starting with thicker liquids and then possibly bland diet   - electrolytes WNL   - nausea improved with IV phenergan  - consider oral gastrografin if no improvement

## 2020-11-22 NOTE — NURSING
Dr Adrian and Dr Tran gave me a verbal order to remove NG tube.   minimum assist (75% patients effort)

## 2020-11-23 LAB
ERYTHROCYTE [DISTWIDTH] IN BLOOD BY AUTOMATED COUNT: 17.9 % (ref 11.5–14.5)
HCT VFR BLD AUTO: 38.3 % (ref 37–48.5)
HGB BLD-MCNC: 12.3 G/DL (ref 12–16)
MCH RBC QN AUTO: 28.7 PG (ref 27–31)
MCHC RBC AUTO-ENTMCNC: 32.1 G/DL (ref 32–36)
MCV RBC AUTO: 89 FL (ref 82–98)
PLATELET # BLD AUTO: 214 K/UL (ref 150–350)
PMV BLD AUTO: 10.9 FL (ref 9.2–12.9)
RBC # BLD AUTO: 4.29 M/UL (ref 4–5.4)
WBC # BLD AUTO: 5.45 K/UL (ref 3.9–12.7)

## 2020-11-23 PROCEDURE — 25000003 PHARM REV CODE 250: Performed by: RADIOLOGY

## 2020-11-23 PROCEDURE — C9113 INJ PANTOPRAZOLE SODIUM, VIA: HCPCS | Performed by: STUDENT IN AN ORGANIZED HEALTH CARE EDUCATION/TRAINING PROGRAM

## 2020-11-23 PROCEDURE — 63600175 PHARM REV CODE 636 W HCPCS: Performed by: STUDENT IN AN ORGANIZED HEALTH CARE EDUCATION/TRAINING PROGRAM

## 2020-11-23 PROCEDURE — 94761 N-INVAS EAR/PLS OXIMETRY MLT: CPT

## 2020-11-23 PROCEDURE — 11000001 HC ACUTE MED/SURG PRIVATE ROOM

## 2020-11-23 PROCEDURE — 99232 PR SUBSEQUENT HOSPITAL CARE,LEVL II: ICD-10-PCS | Mod: ,,, | Performed by: OBSTETRICS & GYNECOLOGY

## 2020-11-23 PROCEDURE — 99232 SBSQ HOSP IP/OBS MODERATE 35: CPT | Mod: ,,, | Performed by: OBSTETRICS & GYNECOLOGY

## 2020-11-23 PROCEDURE — 36415 COLL VENOUS BLD VENIPUNCTURE: CPT

## 2020-11-23 PROCEDURE — C1894 INTRO/SHEATH, NON-LASER: HCPCS | Performed by: RADIOLOGY

## 2020-11-23 PROCEDURE — 85027 COMPLETE CBC AUTOMATED: CPT

## 2020-11-23 RX ORDER — LIDOCAINE HYDROCHLORIDE 10 MG/ML
INJECTION INFILTRATION; PERINEURAL
Status: DISCONTINUED | OUTPATIENT
Start: 2020-11-23 | End: 2020-11-23 | Stop reason: HOSPADM

## 2020-11-23 RX ORDER — ENOXAPARIN SODIUM 100 MG/ML
80 INJECTION SUBCUTANEOUS
Status: DISCONTINUED | OUTPATIENT
Start: 2020-11-23 | End: 2020-11-24 | Stop reason: HOSPADM

## 2020-11-23 RX ADMIN — PANTOPRAZOLE SODIUM 40 MG: 40 INJECTION, POWDER, LYOPHILIZED, FOR SOLUTION INTRAVENOUS at 09:11

## 2020-11-23 RX ADMIN — ENOXAPARIN SODIUM 80 MG: 100 INJECTION SUBCUTANEOUS at 05:11

## 2020-11-23 NOTE — PROCEDURES
Radiology Post-Procedure Note    Pre Op Diagnosis: ascites  Post Op Diagnosis: Same    Procedure: paracentesis    Procedure performed by: George Tapia MD    Written Informed Consent Obtained: Yes  Specimen Removed: YES 2 L serous ascites  Estimated Blood Loss: Minimal    Findings:   Successful paracentesis.    Patient tolerated procedure well.    @SIG@

## 2020-11-23 NOTE — ASSESSMENT & PLAN NOTE
- newly diagnosed 11/16/20  - CTA negative for PE.   - LE Dopplers negative for bilateral LE DVT  - will continue Lovenox 1mg/kg BID for therapeutic anticoagulation. Held today.  - consider transitioning to oral anticoagulation prior to discharge home. Appreciate medicine vs heme/onc recommendations

## 2020-11-23 NOTE — PLAN OF CARE
Slept well. Medicated for c/o  lower abdominal pain x 1 with relief.VS stable. NPO maintained past MN. No stool this shift. Safety measures maintained. Bed alarm on.

## 2020-11-23 NOTE — ASSESSMENT & PLAN NOTE
- NG tube removed yesterday. Patient tolerated clear liquid diet with no nausea/vomiting. Some pain with popsicle.   - NPO @midnight for possible paracentesis. Will resume diet starting with thicker liquids and then possibly bland diet following  - electrolytes WNL   - nausea improved with IV phenergan  - consider oral gastrografin if no improvement

## 2020-11-23 NOTE — PROGRESS NOTES
"[facility]  Gynecologic Oncology  Progress Note      Patient Name: Mickie Don  MRN: [id number]  Admission Date: 11/16/2020  Hospital Length of Stay: 4 days  Attending Provider: Benny Tran MD  Primary Care Provider: Elle Ma MD  Principal Problem: Acute deep vein thrombosis (DVT) of left upper extremity    Follow-up For: Procedure(s) (LRB):  PARACENTESIS, ABDOMINAL (N/A)  Post-Operative Day: 7 Days Post-Op  Subjective:      History of Present Illness:  65 y.o. female admitted to hospital medicine for newly diagnosed LUE DVT. Last Thursday, patient had routine apt with Gyn/Onc with Dr. Tran. There, she complained of increasing abdominal pain and GERD unrelieved with malox and ppi. She was scheduled for outpatient paracentesis for accumulation of ascites. At IR apt for paracentesis, LUE swelling was noted, U/S demonstrated DVT. CTA and bilateral lowe extremity dopplers negative. She was started on thearpeutic lovenox and admitted to observation. Since being in the hospital, patient states she has had worsening abdominal pain and persistent n/v. She states the last time she kept and solid food down was last Thursday. She has had persistent vomiting all day and nausea. She did pass flatus today, first time since Thursday. Last BM was last Thursday. She denies fever, chills, CP or SOB. Does complain of feeling dehydrated and "her belly getting big again". She also has noticed some dysuria but no urgency/frequency. Last chemotherapy with taxol was Friday 11/13/20 with chemo again scheduled for 11/20/20.    Gyn Onc History:   Xlap/Omentectomy on 11/1/16 - mass was unresectable  Stage IIIC ovarian cancer  Initiated Taxotere/Carbo on 11/18/16, now s/p 6 cycles completed 3/8/17  Xlap/ISIS/BSO/Radical mass resection > 10 cm, optimal debulking on 4/11/17  Initiated PRIMA on 8/21/17  BRCA negative  Started FORWARD1 on 11/27/17  Started Doxil/Anju as part of NRG  2/27/19, received total of 6 " "cycles  Gemzar monotherapy started 8/29/19  Topotecan started 8/14/2020  Taxol monotherapy started 10/16/2020      Hospital Course:  11/18/2020 Patient initially admitted to hospital medicine for observation for newly diagnosed LUE DVT. Started on therpeutic lovenox. Worsening abdominal pain and n/v after paracentesis. GYN ONC assumed care of patient, concern for partial SBO. Patient made NPO, Flat and Erect X Ray ordered. IVF started.   11/19/2020 Xray consistent with SBO. NG tube placed with contents removed. 400cc output since placement. Electrolytes WNL.   11/20/2020 Improving, fluid content clearer, n/v improving. CT today.   11/21/2020 Improving, minimal NG tube output overnight. CT showed partial SBO. Will clamp NG tube today and advance to clear liquid diet.   11/22/2020 Tolerated CLD with NG tube clamped yesterday. Will pull out NG tube today and see how she tolerates bland diet. Consider diuretic for edema.     Interval History: NPO since midnight for potential paracentesis today. Tolerated ice chips and broth yesterday without incidence. Reports abdominal pain with popcicle around 9pm. Denies nausea or vomiting. Reports two episodes of diarrhea yesterday and has felt fatigued since then. Denies fevers or chills. She is ambulating and urinating without difficulty. Reports "still feeling swollen".    Scheduled Meds:   pantoprazole  40 mg Intravenous Daily     Continuous Infusions:    PRN Meds:acetaminophen, benzocaine, dextrose 50%, dextrose 50%, glucagon (human recombinant), glucose, glucose, HYDROcodone-acetaminophen, melatonin, morphine, ondansetron, sodium chloride 0.9%    Review of patient's allergies indicates:  No Known Allergies    Objective:     Vital Signs (Most Recent):  Temp: 97.5 °F (36.4 °C) (11/23/20 0709)  Pulse: 108 (11/23/20 0709)  Resp: 18 (11/23/20 0709)  BP: 127/77 (11/23/20 0709)  SpO2: 95 % (11/23/20 0709) Vital Signs (24h Range):  Temp:  [97.5 °F (36.4 °C)-98.8 °F (37.1 °C)] 97.5 °F " (36.4 °C)  Pulse:  [] 108  Resp:  [14-20] 18  SpO2:  [93 %-96 %] 95 %  BP: (127-149)/(60-82) 127/77     Weight: 77.1 kg (170 lb)  Body mass index is 26.63 kg/m².    Intake/Output - Last 3 Shifts       11/21 0700 - 11/22 0659 11/22 0700 - 11/23 0659 11/23 0700 - 11/24 0659    P.O. 600 480     Total Intake(mL/kg) 600 (7.8) 480 (6.2)     Urine (mL/kg/hr)       Drains       Total Output       Net +600 +480            Urine Occurrence 3 x               Physical Exam:   Constitutional: She is oriented to person, place, and time. She appears well-developed and well-nourished. No distress.   Thin, frail     HENT:   Head: Normocephalic and atraumatic.   NG tube to right nare      Eyes: Conjunctivae and EOM are normal.    Neck: Normal range of motion. Neck supple. No thyromegaly present.    Cardiovascular: Normal rate and regular rhythm.     Pulmonary/Chest: Effort normal. No respiratory distress.   Decreased lung sounds in bilateral lower lung fields. No crackles heard bilaterally.         Abdominal: Soft. She exhibits no distension. There is no abdominal tenderness.   Abdomen distended RUQ>LUQ.  Still tympanic. No tenderness to palpation                Musculoskeletal: Normal range of motion and moves all extremeties. No tenderness or edema.      Comments:          Neurological: She is alert and oriented to person, place, and time.    Skin: Skin is warm and dry. No rash noted.   Left arm swelling present to hand. Radial pulse intact.     Compressing stockings in place.     Psychiatric: She has a normal mood and affect. Her behavior is normal.       Lines/Drains/Airways     Central Venous Catheter Line            Port A Cath Single Lumen 11/17/20 0950 right subclavian 5 days                Laboratory:  CMP:   No results for input(s): NA, K, CL, CO2, GLU, BUN, CREATININE, CALCIUM, PROT, ALBUMIN, BILITOT, ALKPHOS, AST, ALT, ANIONGAP, EGFRNONAA in the last 48 hours.    Invalid input(s): [place]        Assessment/Plan:      * Acute deep vein thrombosis (DVT) of left upper extremity  - newly diagnosed 11/16/20  - CTA negative for PE.   - LE Dopplers negative for bilateral LE DVT  - will continue Lovenox 1mg/kg BID for therapeutic anticoagulation. Held today, will resume after IR evaluation if recommend  - consider transitioning to oral anticoagulation prior to discharge home. Appreciate medicine vs heme/onc recommendations     Edema  - ELEAZAR hose   - s/p 40 lasix yesterday. Patient reports mild improvement.    Ascites  Consult to IR placed for possible paracentesis today. Has been NPO since midnight    Small bowel obstruction  - NG tube removed yesterday. Patient tolerated clear liquid diet with no nausea/vomiting. Some pain with popsicle.   - NPO @midnight for possible paracentesis. Will resume diet starting with thicker liquids and then possibly bland diet following  - electrolytes WNL   - nausea improved with IV phenergan  - consider oral gastrografin if no improvement       Pleural effusion, bilateral  - noted on CTA scan  - patient currently asymptomatic, pulse ox and HR WNL   - will continue to monitor, consider IR drainage      Ovarian cancer - IIIC  - Onc history as above  - now on monotherpay taxol   - last cycle 11/13      VTE Risk Mitigation (From admission, onward)         Ordered     Place ELEAZAR hose  Until discontinued      11/22/20 0756     Place sequential compression device  Until discontinued      11/19/20 0626     IP VTE HIGH RISK PATIENT  Once      11/16/20 1816     Place sequential compression device  Until discontinued      11/16/20 1816                  Kristi Steward MD PGY1  Gynecologic Oncology  [facility]

## 2020-11-23 NOTE — SUBJECTIVE & OBJECTIVE
"Interval History: NPO since midnight for potential paracentesis today. Tolerated ice chips and broth yesterday without incidence. Reports abdominal pain with popcicle around 9pm. Denies nausea or vomiting. Reports two episodes of diarrhea yesterday and has felt fatigued since then. Denies fevers or chills. She is ambulating and urinating without difficulty. Reports "still feeling swollen".    Scheduled Meds:   pantoprazole  40 mg Intravenous Daily     Continuous Infusions:    PRN Meds:acetaminophen, benzocaine, dextrose 50%, dextrose 50%, glucagon (human recombinant), glucose, glucose, HYDROcodone-acetaminophen, melatonin, morphine, ondansetron, sodium chloride 0.9%    Review of patient's allergies indicates:  No Known Allergies    Objective:     Vital Signs (Most Recent):  Temp: 97.5 °F (36.4 °C) (11/23/20 0709)  Pulse: 108 (11/23/20 0709)  Resp: 18 (11/23/20 0709)  BP: 127/77 (11/23/20 0709)  SpO2: 95 % (11/23/20 0709) Vital Signs (24h Range):  Temp:  [97.5 °F (36.4 °C)-98.8 °F (37.1 °C)] 97.5 °F (36.4 °C)  Pulse:  [] 108  Resp:  [14-20] 18  SpO2:  [93 %-96 %] 95 %  BP: (127-149)/(60-82) 127/77     Weight: 77.1 kg (170 lb)  Body mass index is 26.63 kg/m².    Intake/Output - Last 3 Shifts       11/21 0700 - 11/22 0659 11/22 0700 - 11/23 0659 11/23 0700 - 11/24 0659    P.O. 600 480     Total Intake(mL/kg) 600 (7.8) 480 (6.2)     Urine (mL/kg/hr)       Drains       Total Output       Net +600 +480            Urine Occurrence 3 x               Physical Exam:   Constitutional: She is oriented to person, place, and time. She appears well-developed and well-nourished. No distress.   Thin, frail     HENT:   Head: Normocephalic and atraumatic.   NG tube to right nare      Eyes: Conjunctivae and EOM are normal.    Neck: Normal range of motion. Neck supple. No thyromegaly present.    Cardiovascular: Normal rate and regular rhythm.     Pulmonary/Chest: Effort normal. No respiratory distress.   Decreased lung sounds in " bilateral lower lung fields. No crackles heard bilaterally.         Abdominal: Soft. She exhibits no distension. There is no abdominal tenderness.   Abdomen distended RUQ>LUQ.  Still tympanic. No tenderness to palpation                Musculoskeletal: Normal range of motion and moves all extremeties. No tenderness or edema.      Comments:          Neurological: She is alert and oriented to person, place, and time.    Skin: Skin is warm and dry. No rash noted.   Left arm swelling present to hand. Radial pulse intact.     Compressing stockings in place.     Psychiatric: She has a normal mood and affect. Her behavior is normal.       Lines/Drains/Airways     Central Venous Catheter Line            Port A Cath Single Lumen 11/17/20 0950 right subclavian 5 days                Laboratory:  CMP:   No results for input(s): NA, K, CL, CO2, GLU, BUN, CREATININE, CALCIUM, PROT, ALBUMIN, BILITOT, ALKPHOS, AST, ALT, ANIONGAP, EGFRNONAA in the last 48 hours.    Invalid input(s): [place]

## 2020-11-23 NOTE — PROCEDURES
Radiology Post-Procedure Note    Pre Op Diagnosis: pleural effusions  Post Op Diagnosis: Same    Procedure: R thoracentesis    Procedure performed by: George Tapia MD    Written Informed Consent Obtained: Yes  Specimen Removed: YES 1.3 L serous fluid  Estimated Blood Loss: Minimal    Findings:   Successful R thoracentesis.    Patient tolerated procedure well.    @SIG@

## 2020-11-23 NOTE — CONSULTS
Consult/H&P Note  Interventional Radiology    Consult Requested By: gyn onc    Reason for Consult: ascites, pleural effusion    SUBJECTIVE:     Chief Complaint: distension    History of Present Illness: 64 yo F with ovarian cancer and recurrent ascites.  SHe also has enlarging B pleural effusions.    Past Medical History:   Diagnosis Date    Ascites 11/16/2020    Cancer     ovarian    Dry eyes 11/29/2017    Hematuria 11/17/2020    Neuropathy due to chemotherapeutic drug 1/10/2018    Open angle with borderline findings and low glaucoma risk in both eyes 6/20/2013    Ovarian cancer     Patient in cancer related research study 4/10/2018     Past Surgical History:   Procedure Laterality Date    BREAST BIOPSY      BUNIONECTOMY Left     CATARACT EXTRACTION W/  INTRAOCULAR LENS IMPLANT Right 04/04/2019        CATARACT EXTRACTION W/  INTRAOCULAR LENS IMPLANT Left 5/7/2019        CHOLECYSTECTOMY  1992    HYSTERECTOMY      INSERTION OF TUNNELED CENTRAL VENOUS CATHETER (CVC) WITH SUBCUTANEOUS PORT Right 7/23/2020    Procedure: INSERTION, PORT-A-CATH;  Surgeon: George Tapia MD;  Location: [facility] CATH LAB;  Service: Interventional Radiology;  Laterality: Right;    left leg surgery  2005    achilles tendon    OOPHORECTOMY      PERITONEOCENTESIS N/A 11/16/2020    Procedure: PARACENTESIS, ABDOMINAL;  Surgeon: George Tapia MD;  Location: [facility] CATH LAB;  Service: Radiology;  Laterality: N/A;    PHACOEMULSIFICATION OF CATARACT Right 4/4/2019    Procedure: PHACOEMULSIFICATION, CATARACT;  Surgeon: Karen Butt MD;  Location: [address];  Service: Ophthalmology;  Laterality: Right;    UT REMOVAL OF OVARY/TUBE(S)      right leg surgery  2006    broken, including ankle     Family History   Problem Relation Age of Onset    Cancer Father         ? liver ca    Diabetes Father     Cataracts Mother     Deep vein thrombosis Mother     Breast cancer Maternal Aunt     Cancer Son          sarcoma    Melanoma Sister     Deep vein thrombosis Sister     Factor V Leiden deficiency Brother     Factor V Leiden deficiency Daughter         trait only     No Known Problems Daughter     No Known Problems Son     Deep vein thrombosis Sister     No Known Problems Sister     No Known Problems Brother     No Known Problems Brother     Colon cancer Neg Hx     Ovarian cancer Neg Hx     Amblyopia Neg Hx     Blindness Neg Hx     Glaucoma Neg Hx     Hypertension Neg Hx     Macular degeneration Neg Hx     Retinal detachment Neg Hx     Strabismus Neg Hx     Stroke Neg Hx     Thyroid disease Neg Hx     Hyperlipidemia Neg Hx      Social History     Tobacco Use    Smoking status: Never Smoker    Smokeless tobacco: Never Used   Substance Use Topics    Alcohol use: Yes     Alcohol/week: 3.0 standard drinks     Types: 1 Glasses of wine, 2 Shots of liquor per week     Frequency: Monthly or less     Drinks per session: 1 or 2     Binge frequency: Never     Comment: Socially    Drug use: No       Review of Systems:  Constitutional/General:Negative for fever.  Hematological/Immuno: no known coagulopathies  Respiratory: positive for - shortness of breath  Cardiovascular: no chest pain  Gastrointestinal: positive for - abdominal pain and gas/bloating  Genito-Urinary: no dysuria  Musculoskeletal: negative  Skin: Negative for rash, itching, pigmentation changes, nail or hair changes.  Neurological: no TIA or stroke symptoms  Psychiatric: normal mood/affect, good insight/judgement      OBJECTIVE:     Vital Signs Range (Last 24H):  Temp:  [97.5 °F (36.4 °C)-98.8 °F (37.1 °C)]   Pulse:  []   Resp:  [18-20]   BP: (127-149)/(60-82)   SpO2:  [94 %-96 %]     Physical Exam:  General- Patient alert and oriented x3 in NAD  ENT- PERRLA,  Neck- No masses  CV- Regular rate and rhythm  Resp-  No increased WOB  GI- Non tender/non-distended  Extrem- No cyanosis, clubbing, edema.   Derm- No rashes, masses, or  lesions noted  Neuro-  No focal deficits noted.     Physical Exam  Body mass index is 26.63 kg/m².    Scheduled Meds:    pantoprazole  40 mg Intravenous Daily     Continuous Infusions:   PRN Meds:acetaminophen, benzocaine, dextrose 50%, dextrose 50%, glucagon (human recombinant), glucose, glucose, HYDROcodone-acetaminophen, melatonin, morphine, ondansetron, sodium chloride 0.9%    Allergies: Review of patient's allergies indicates:  No Known Allergies    Labs:  Recent Labs   Lab 11/16/20  1903   INR 1.1       Recent Labs   Lab 11/23/20  0349   WBC 5.45   HGB 12.3   HCT 38.3   MCV 89         Recent Labs   Lab 11/18/20  1327  11/19/20  0411   GLU  --   --  107   NA  --   --  133*   K  --   --  4.4   CL  --   --  98   CO2  --   --  26   BUN  --   --  22   CREATININE  --   --  0.7   CALCIUM  --   --  8.2*   MG  --    < > 1.7   ALT 26  --   --    AST 36  --   --    ALBUMIN 2.3*  --   --    BILITOT 1.7*  --   --    BILIDIR 1.0*  --   --     < > = values in this interval not displayed.       Vitals (Most Recent):  Temp: 97.5 °F (36.4 °C) (11/23/20 0709)  Pulse: 108 (11/23/20 0709)  Resp: 18 (11/23/20 0709)  BP: 127/77 (11/23/20 0709)  SpO2: 95 % (11/23/20 0709)    ASA: 3  Mallampati: 2    Consent obtained    ASSESSMENT/PLAN:     Paracentesis and R thoracentesis.  Local anesthesia.    Active Hospital Problems    Diagnosis  POA    *Acute deep vein thrombosis (DVT) of left upper extremity [I82.622]  Yes    Edema [R60.9]  No    Ascites [R18.8]  Yes    Intractable nausea and vomiting [R11.2]  No    Small bowel obstruction [K56.609]  Yes    Pleural effusion, bilateral [J90]  Yes    Ovarian cancer - IIIC [C56.9]  Yes      Resolved Hospital Problems    Diagnosis Date Resolved POA    Hematuria [R31.9] 11/17/2020 Yes    Ascites [R18.8] 11/17/2020 Yes    Patient in cancer related research study [Z00.6] 11/17/2020 Not Applicable           George Tapia MD

## 2020-11-24 ENCOUNTER — PATIENT MESSAGE (OUTPATIENT)
Dept: GYNECOLOGIC ONCOLOGY | Facility: CLINIC | Age: 65
End: 2020-11-24

## 2020-11-24 ENCOUNTER — TELEPHONE (OUTPATIENT)
Dept: GYNECOLOGIC ONCOLOGY | Facility: CLINIC | Age: 65
End: 2020-11-24

## 2020-11-24 VITALS
BODY MASS INDEX: 26.68 KG/M2 | DIASTOLIC BLOOD PRESSURE: 75 MMHG | TEMPERATURE: 97 F | HEIGHT: 67 IN | WEIGHT: 170 LBS | SYSTOLIC BLOOD PRESSURE: 119 MMHG | OXYGEN SATURATION: 95 % | HEART RATE: 104 BPM | RESPIRATION RATE: 20 BRPM

## 2020-11-24 PROCEDURE — 99238 HOSP IP/OBS DSCHRG MGMT 30/<: CPT | Mod: ,,, | Performed by: OBSTETRICS & GYNECOLOGY

## 2020-11-24 PROCEDURE — 94761 N-INVAS EAR/PLS OXIMETRY MLT: CPT

## 2020-11-24 PROCEDURE — 63600175 PHARM REV CODE 636 W HCPCS: Performed by: INTERNAL MEDICINE

## 2020-11-24 PROCEDURE — C9113 INJ PANTOPRAZOLE SODIUM, VIA: HCPCS | Performed by: STUDENT IN AN ORGANIZED HEALTH CARE EDUCATION/TRAINING PROGRAM

## 2020-11-24 PROCEDURE — 63600175 PHARM REV CODE 636 W HCPCS: Performed by: STUDENT IN AN ORGANIZED HEALTH CARE EDUCATION/TRAINING PROGRAM

## 2020-11-24 PROCEDURE — 99238 PR HOSPITAL DISCHARGE DAY,<30 MIN: ICD-10-PCS | Mod: ,,, | Performed by: OBSTETRICS & GYNECOLOGY

## 2020-11-24 RX ORDER — MORPHINE SULFATE 15 MG/1
15 TABLET, FILM COATED, EXTENDED RELEASE ORAL 2 TIMES DAILY
Qty: 20 TABLET | Refills: 0 | Status: SHIPPED | OUTPATIENT
Start: 2020-11-24 | End: 2020-12-04 | Stop reason: SDUPTHER

## 2020-11-24 RX ORDER — ENOXAPARIN SODIUM 150 MG/ML
1.5 INJECTION SUBCUTANEOUS 2 TIMES DAILY
Qty: 48 ML | Refills: 0 | Status: ON HOLD | OUTPATIENT
Start: 2020-11-24 | End: 2020-12-11 | Stop reason: SDUPTHER

## 2020-11-24 RX ORDER — MORPHINE SULFATE 15 MG/1
15 TABLET, FILM COATED, EXTENDED RELEASE ORAL 2 TIMES DAILY
Qty: 20 TABLET | Refills: 0 | Status: SHIPPED | OUTPATIENT
Start: 2020-11-24 | End: 2020-11-24 | Stop reason: SDUPTHER

## 2020-11-24 RX ORDER — HYDROCODONE BITARTRATE AND ACETAMINOPHEN 5; 325 MG/1; MG/1
1 TABLET ORAL EVERY 6 HOURS PRN
Qty: 10 TABLET | Refills: 0 | Status: SHIPPED | OUTPATIENT
Start: 2020-11-24 | End: 2020-12-04

## 2020-11-24 RX ORDER — ONDANSETRON 4 MG/1
4 TABLET, ORALLY DISINTEGRATING ORAL EVERY 6 HOURS PRN
Qty: 30 TABLET | Refills: 0 | Status: SHIPPED | OUTPATIENT
Start: 2020-11-24

## 2020-11-24 RX ADMIN — PANTOPRAZOLE SODIUM 40 MG: 40 INJECTION, POWDER, LYOPHILIZED, FOR SOLUTION INTRAVENOUS at 08:11

## 2020-11-24 RX ADMIN — ENOXAPARIN SODIUM 80 MG: 100 INJECTION SUBCUTANEOUS at 05:11

## 2020-11-24 RX ADMIN — ONDANSETRON 4 MG: 2 INJECTION INTRAMUSCULAR; INTRAVENOUS at 01:11

## 2020-11-24 NOTE — TELEPHONE ENCOUNTER
Spoke with our patient about her schedule appointment in gyn oncology she agreed she voiced understanding of the date, time and location. All questions answered appointment mail. MA/DANYELLE /Preceptor José BECKHAM

## 2020-11-24 NOTE — ASSESSMENT & PLAN NOTE
- noted on CTA scan  - patient currently asymptomatic, pulse ox and HR WNL   - s/p R thoracentesis yesterday with removal of 1.3 serous fluid

## 2020-11-24 NOTE — TELEPHONE ENCOUNTER
----- Message from Esa Butts RN sent at 11/24/2020 11:13 AM CST -----    ----- Message -----  From: Kristi Steward MD  Sent: 11/24/2020  10:55 AM CST  To: Chelsea Zapata [facility]!   This patient was seen in the hospital for a small bowel obstruction and needs follow up with Dr. Tran next week. Thank you so much!    Kristi Steward

## 2020-11-24 NOTE — SUBJECTIVE & OBJECTIVE
"Interval History: Reports few episodes of emesis with clears yesterday but denies significant abdominal pain. Denies fevers/chills. Passing flatus and urinating without difficulty. Reports swelling in LUE has improved some- " it feels less tight". Denies pain in LUE. Denies chest pain, SOB, or palpitations.    Scheduled Meds:   enoxaparin  80 mg Subcutaneous Q12H    pantoprazole  40 mg Intravenous Daily     Continuous Infusions:    PRN Meds:acetaminophen, benzocaine, dextrose 50%, dextrose 50%, glucagon (human recombinant), glucose, glucose, HYDROcodone-acetaminophen, melatonin, morphine, ondansetron, sodium chloride 0.9%    Review of patient's allergies indicates:  No Known Allergies    Objective:     Vital Signs (Most Recent):  Temp: 97.8 °F (36.6 °C) (11/24/20 0426)  Pulse: 92 (11/24/20 0426)  Resp: 20 (11/24/20 0426)  BP: (!) 121/59 (11/24/20 0426)  SpO2: 95 % (11/24/20 0426) Vital Signs (24h Range):  Temp:  [97.5 °F (36.4 °C)-98.9 °F (37.2 °C)] 97.8 °F (36.6 °C)  Pulse:  [] 92  Resp:  [18-20] 20  SpO2:  [94 %-98 %] 95 %  BP: (113-140)/(55-77) 121/59     Weight: 77.1 kg (170 lb)  Body mass index is 26.63 kg/m².    Intake/Output - Last 3 Shifts       11/22 0700 - 11/23 0659 11/23 0700 - 11/24 0659    P.O. 480 720    Total Intake(mL/kg) 480 (6.2) 720 (9.3)    Urine (mL/kg/hr)  200 (0.1)    Other  3300    Total Output  3500    Net +480 -2780          Urine Occurrence  4 x             Physical Exam:   Constitutional: She is oriented to person, place, and time. She appears well-developed and well-nourished. No distress.    HENT:   Head: Normocephalic and atraumatic.         Eyes: Conjunctivae and EOM are normal.    Neck: Normal range of motion. Neck supple. No thyromegaly present.    Cardiovascular: Normal rate and regular rhythm.     Pulmonary/Chest: Effort normal. No respiratory distress.        Abdominal: Soft. She exhibits no distension. There is no abdominal tenderness.                  Musculoskeletal: " Normal range of motion and moves all extremeties. No tenderness or edema.      Comments:          Neurological: She is alert and oriented to person, place, and time.    Skin: Skin is warm and dry. No rash noted.   Left arm swelling present to hand. Radial pulse intact.     Compressing stockings in place.     Psychiatric: She has a normal mood and affect. Her behavior is normal.       Lines/Drains/Airways     Central Venous Catheter Line            Port A Cath Single Lumen 07/23/20 0950 right subclavian 123 days                Laboratory:  Lab Results   Component Value Date    WBC 5.45 11/23/2020    HGB 12.3 11/23/2020    HCT 38.3 11/23/2020    MCV 89 11/23/2020     11/23/2020     Recent Labs   Lab 11/18/20  0342 11/18/20  1327 11/18/20  1646 11/19/20  0411   *  --   --  133*   K 5.1  --   --  4.4   CL 97  --   --  98   CO2 27  --   --  26   BUN 20  --   --  22   CREATININE 0.7  --   --  0.7   *  --   --  107   PROT  --  5.7*  --   --    BILITOT  --  1.7*  --   --    ALKPHOS  --  242*  --   --    ALT  --  26  --   --    AST  --  36  --   --    MG  --   --  1.6 1.7   PHOS  --   --  3.7 3.4

## 2020-11-24 NOTE — ASSESSMENT & PLAN NOTE
- Few episodes of emesis with clears, pain has improved significantly  - nausea improved with IV zofran  - Stable for discharge today with close outpatient follow up. Continue full liquid diet at home.

## 2020-11-24 NOTE — PROGRESS NOTES
"[facility]  Gynecologic Oncology  Progress Note      Patient Name: Mickie Don  MRN: [id number]  Admission Date: 11/16/2020  Hospital Length of Stay: 5 days  Attending Provider: Benny Tran MD  Primary Care Provider: Elle Ma MD  Principal Problem: Acute deep vein thrombosis (DVT) of left upper extremity    Follow-up For: Procedure(s) (LRB):  THORACENTESIS (N/A)  PARACENTESIS, ABDOMINAL (N/A)  Post-Operative Day: 1 Day Post-Op  Subjective:      History of Present Illness:  65 y.o. female admitted to hospital medicine for newly diagnosed LUE DVT. Last Thursday, patient had routine apt with Gyn/Onc with Dr. Tran. There, she complained of increasing abdominal pain and GERD unrelieved with malox and ppi. She was scheduled for outpatient paracentesis for accumulation of ascites. At IR apt for paracentesis, LUE swelling was noted, U/S demonstrated DVT. CTA and bilateral lowe extremity dopplers negative. She was started on thearpeutic lovenox and admitted to observation. Since being in the hospital, patient states she has had worsening abdominal pain and persistent n/v. She states the last time she kept and solid food down was last Thursday. She has had persistent vomiting all day and nausea. She did pass flatus today, first time since Thursday. Last BM was last Thursday. She denies fever, chills, CP or SOB. Does complain of feeling dehydrated and "her belly getting big again". She also has noticed some dysuria but no urgency/frequency. Last chemotherapy with taxol was Friday 11/13/20 with chemo again scheduled for 11/20/20.    Gyn Onc History:   Xlap/Omentectomy on 11/1/16 - mass was unresectable  Stage IIIC ovarian cancer  Initiated Taxotere/Carbo on 11/18/16, now s/p 6 cycles completed 3/8/17  Xlap/ISIS/BSO/Radical mass resection > 10 cm, optimal debulking on 4/11/17  Initiated PRIMA on 8/21/17  BRCA negative  Started FORWARD1 on 11/27/17  Started Doxil/Anju as part of NRG  2/27/19, " "received total of 6 cycles  Gemzar monotherapy started 8/29/19  Topotecan started 8/14/2020  Taxol monotherapy started 10/16/2020      Hospital Course:  11/18/2020 Patient initially admitted to hospital medicine for observation for newly diagnosed LUE DVT. Started on therpeutic lovenox. Worsening abdominal pain and n/v after paracentesis. GYN ONC assumed care of patient, concern for partial SBO. Patient made NPO, Flat and Erect X Ray ordered. IVF started.   11/19/2020 Xray consistent with SBO. NG tube placed with contents removed. 400cc output since placement. Electrolytes WNL.   11/20/2020 Improving, fluid content clearer, n/v improving. CT today.   11/21/2020 Improving, minimal NG tube output overnight. CT showed partial SBO. Will clamp NG tube today and advance to clear liquid diet.   11/22/2020 Tolerated CLD with NG tube clamped yesterday. Will pull out NG tube today and see how she tolerates bland diet. Consider diuretic for edema.   11/23/20: NPO at midnight for paracentesis and thoracentesis today. Removed 1.3 liters serous fluid from R thoracentesis and 2 L serous ascites from paracentesis.     Interval History: Reports few episodes of emesis with clears yesterday but denies significant abdominal pain. Denies fevers/chills. Passing flatus and urinating without difficulty. Reports swelling in LUE has improved some- " it feels less tight". Denies pain in LUE. Denies chest pain, SOB, or palpitations.    Scheduled Meds:   enoxaparin  80 mg Subcutaneous Q12H    pantoprazole  40 mg Intravenous Daily     Continuous Infusions:    PRN Meds:acetaminophen, benzocaine, dextrose 50%, dextrose 50%, glucagon (human recombinant), glucose, glucose, HYDROcodone-acetaminophen, melatonin, morphine, ondansetron, sodium chloride 0.9%    Review of patient's allergies indicates:  No Known Allergies    Objective:     Vital Signs (Most Recent):  Temp: 97.8 °F (36.6 °C) (11/24/20 0426)  Pulse: 92 (11/24/20 0426)  Resp: 20 (11/24/20 " 0426)  BP: (!) 121/59 (11/24/20 0426)  SpO2: 95 % (11/24/20 0426) Vital Signs (24h Range):  Temp:  [97.5 °F (36.4 °C)-98.9 °F (37.2 °C)] 97.8 °F (36.6 °C)  Pulse:  [] 92  Resp:  [18-20] 20  SpO2:  [94 %-98 %] 95 %  BP: (113-140)/(55-77) 121/59     Weight: 77.1 kg (170 lb)  Body mass index is 26.63 kg/m².    Intake/Output - Last 3 Shifts       11/22 0700 - 11/23 0659 11/23 0700 - 11/24 0659    P.O. 480 720    Total Intake(mL/kg) 480 (6.2) 720 (9.3)    Urine (mL/kg/hr)  200 (0.1)    Other  3300    Total Output  3500    Net +480 -2780          Urine Occurrence  4 x             Physical Exam:   Constitutional: She is oriented to person, place, and time. She appears well-developed and well-nourished. No distress.    HENT:   Head: Normocephalic and atraumatic.         Eyes: Conjunctivae and EOM are normal.    Neck: Normal range of motion. Neck supple. No thyromegaly present.    Cardiovascular: Normal rate and regular rhythm.     Pulmonary/Chest: Effort normal. No respiratory distress.        Abdominal: Soft. She exhibits no distension. There is no abdominal tenderness.                  Musculoskeletal: Normal range of motion and moves all extremeties. No tenderness or edema.      Comments:          Neurological: She is alert and oriented to person, place, and time.    Skin: Skin is warm and dry. No rash noted.   Left arm swelling present to hand. Radial pulse intact.     Compressing stockings in place.     Psychiatric: She has a normal mood and affect. Her behavior is normal.       Lines/Drains/Airways     Central Venous Catheter Line            Port A Cath Single Lumen 07/23/20 0950 right subclavian 123 days                Laboratory:  Lab Results   Component Value Date    WBC 5.45 11/23/2020    HGB 12.3 11/23/2020    HCT 38.3 11/23/2020    MCV 89 11/23/2020     11/23/2020     Recent Labs   Lab 11/18/20  0342 11/18/20  1327 11/18/20  1646 11/19/20  0411   *  --   --  133*   K 5.1  --   --  4.4   CL 97   --   --  98   CO2 27  --   --  26   BUN 20  --   --  22   CREATININE 0.7  --   --  0.7   *  --   --  107   PROT  --  5.7*  --   --    BILITOT  --  1.7*  --   --    ALKPHOS  --  242*  --   --    ALT  --  26  --   --    AST  --  36  --   --    MG  --   --  1.6 1.7   PHOS  --   --  3.7 3.4                Assessment/Plan:     * Acute deep vein thrombosis (DVT) of left upper extremity  - newly diagnosed 11/16/20  - CTA negative for PE.   - LE Dopplers negative for bilateral LE DVT  -Continue Lovenox 1mg/kg BID for therapeutic anticoagulation.  - consider transitioning to oral anticoagulation for discharge. Appreciate medicine vs heme/onc recommendations     Edema  - ELEAZAR hose   - s/p 40 lasix yesterday. Patient reports mild improvement.    Ascites  S/p 2 serous fluid removed from paracentesis yesterday    Small bowel obstruction  - Few episodes of emesis with clears, pain has improved significantly  - nausea improved with IV zofran  - Stable for discharge today with close outpatient follow up. Continue full liquid diet at home.       Pleural effusion, bilateral  - noted on CTA scan  - patient currently asymptomatic, pulse ox and HR WNL   - s/p R thoracentesis yesterday with removal of 1.3 serous fluid      Ovarian cancer - IIIC  - Onc history as above  - now on monotherpay taxol   - last cycle 11/13      VTE Risk Mitigation (From admission, onward)         Ordered     enoxaparin injection 80 mg  Every 12 hours (non-standard times)      11/23/20 1341     Place ELEAZAR hose  Until discontinued      11/22/20 0756     Place sequential compression device  Until discontinued      11/19/20 0626     IP VTE HIGH RISK PATIENT  Once      11/16/20 1816     Place sequential compression device  Until discontinued      11/16/20 1816                Kristi Steward MD PGY1  Gynecologic Oncology  [facility]

## 2020-11-24 NOTE — PHYSICIAN QUERY
PT Name: Mickie Don  MR #: [id number]    CAUSE AND EFFECT RELATIONSHIP CLARIFICATION     CDS/: DENNIS Samuels,RNC-MNN         Contact information:[email or web address]    This form is a permanent document in the medical record.     Query Date: November 24, 2020    By submitting this query, we are merely seeking further clarification of documentation. Please utilize your independent clinical judgment when addressing the question(s) below.    Supporting Clinical Findings Location in Medical Record   Ascites  S/p 2 serous fluid removed from paracentesis yesterday    History of Present Illness: 64 yo F with ovarian cancer and recurrent ascites.  SHe also has enlarging B pleural effusions.    -Patient with metastatic ovarian cancer who is status post paracentesis 11/18/2020 complicated by deep vein thrombosis to left upper extremity veins and IJ.    - Increased abdominal distention, patient passing flatus, no tense ascites, some mild abd tenderness, postive bowel sounds.    Known metastasis to liver Gyn progress note 11/24@628am        IR Consult note 11/23@1254pm        [facility] Medicine progress note 11/18@552pm           Provider, please clarify if there is any clinical correlation between Ascites and ovarian cancer and Known metastasis to liver .           Are the conditions:      [ X ] Due to or associated with each other   [  ] Unrelated to each other   [  ] Other explanation (Please Specify): ______________   [  ] Clinically Undetermined                                                                               Please document in your progress notes daily for the duration of treatment until resolved and include in your discharge summary.

## 2020-11-24 NOTE — PLAN OF CARE
AAOX4. VSS.Pt free of trauma, falls, injury and skin breakdown. Pt complains of mild pain but refuses pain medicine at this time. No N/V this shift. Pt tolerating full liquid diet and voiding adequately throughout shift. Pt ambulates w/ stand-by assist x1 and repositions self independently. Purposeful hourly rounding. Pt has call light in reach, side rails up X2, bed in low position and nonskid socks on. Pt lying in bed in no distress. Paracentesis, 2L removed. R Thoracentesis,1.3L removed. Bandaids X2 CDI.

## 2020-11-24 NOTE — NURSING
Patient's PAC deaccessed with 10 ml normal saline flush after verifying procedure with Blade, RN and Trudy RN. Bandaid applied to site. Discharge instructions given to patient and son. Both verbalized understanding. Lovenox education provided to patient and son. Lovenox teaching kit provided to patient. Patient and son verbalized understanding.

## 2020-11-24 NOTE — PLAN OF CARE
VSS and afebrile, aaox4. Incisions CDI. Emesis X3 with mild relief with zofran. C/o pain to abd, but refused pain medication. Rested throughout the night. Plan of care reviewed with patient. Purposeful hourly rounding done. Call light at bedside, bed at lowest position, brakes on, non skid socks on. Will continue to monitor.

## 2020-11-24 NOTE — DISCHARGE SUMMARY
"[facility]  Gynecologic Oncology  Discharge Summary    Patient Name: Mickie Don  MRN: [id number]  Admission Date: 11/16/2020  Hospital Length of Stay: 5 days  Discharge Date and Time:  11/24/2020 11:06 AM  Attending Physician: Benny Tran MD   Discharging Provider: Kristi Steward MD  Primary Care Provider: Elle Ma MD    HPI:   65 y.o. female admitted to hospital medicine for newly diagnosed LUE DVT. Last Thursday, patient had routine apt with Gyn/Onc with Dr. Tran. There, she complained of increasing abdominal pain and GERD unrelieved with malox and ppi. She was scheduled for outpatient paracentesis for accumulation of ascites. At IR apt for paracentesis, LUE swelling was noted, U/S demonstrated DVT. CTA and bilateral lowe extremity dopplers negative. She was started on thearpeutic lovenox and admitted to observation. Since being in the hospital, patient states she has had worsening abdominal pain and persistent n/v. She states the last time she kept and solid food down was last Thursday. She has had persistent vomiting all day and nausea. She did pass flatus today, first time since Thursday. Last BM was last Thursday. She denies fever, chills, CP or SOB. Does complain of feeling dehydrated and "her belly getting big again". She also has noticed some dysuria but no urgency/frequency. Last chemotherapy with taxol was Friday 11/13/20 with chemo again scheduled for 11/20/20.    Gyn Onc History:   Xlap/Omentectomy on 11/1/16 - mass was unresectable  Stage IIIC ovarian cancer  Initiated Taxotere/Carbo on 11/18/16, now s/p 6 cycles completed 3/8/17  Xlap/ISIS/BSO/Radical mass resection > 10 cm, optimal debulking on 4/11/17  Initiated PRIMA on 8/21/17  BRCA negative  Started FORWARD1 on 11/27/17  Started Doxil/Anju as part of NRG  2/27/19, received total of 6 cycles  Gemzar monotherapy started 8/29/19  Topotecan started 8/14/2020  Taxol monotherapy started 10/16/2020      Hospital " Course:  11/18/2020 Patient initially admitted to hospital medicine for observation for newly diagnosed LUE DVT. Started on therpeutic lovenox. Worsening abdominal pain and n/v after paracentesis. GYN ONC assumed care of patient, concern for partial SBO. Patient made NPO, Flat and Erect X Ray ordered. IVF started.   11/19/2020 Xray consistent with SBO. NG tube placed with contents removed. 400cc output since placement. Electrolytes WNL.   11/20/2020 Improving, fluid content clearer, n/v improving. CT today.   11/21/2020 Improving, minimal NG tube output overnight. CT showed partial SBO. Will clamp NG tube today and advance to clear liquid diet.   11/22/2020 Tolerated CLD with NG tube clamped yesterday. Will pull out NG tube today and see how she tolerates bland diet. Consider diuretic for edema.   11/23/20: NPO at midnight for paracentesis and thoracentesis today. Removed 1.3 liters serous fluid from R thoracentesis and 2 L serous ascites from paracentesis. Stable for discharge today with close follow up. Will schedule appointment with Dr. Tran for next week. Discharge on full liquid diet and lovenox daily 1.5mg/kg- patient instructed on how to inject herself at home.     Procedure(s) (LRB):  THORACENTESIS (N/A)  PARACENTESIS, ABDOMINAL (N/A)     Consults (From admission, onward)        Status Ordering Provider     Inpatient consult to Gynecologic Oncology  Once     Provider:  Alberto Vences MD    Completed TAWANNA LEYVA     Inpatient consult to Interventional Radiology  Once     Provider:  (Not yet assigned)    EMILY Brown          Significant Diagnostic Studies: Labs: All labs within the past 24 hours have been reviewed  Radiology:   - CT ab/pelvis: Impression:  A few mildly dilated small bowel loops in the mid abdomen; contrast reaches the colon without high-grade obstruction.  Findings may represent low-grade partial small bowel obstruction or ileus.  Overall, a progression of disease  compared to previous.  Hepatic metastases are increased in size and number.  Lymph node metastases are increased in size and number.  Omental and peritoneal metastases are increased in size.  Ascites, increased in volume compared to previous.  Stable large pleural effusions.  Continued bilateral hydronephrosis.    - CTA negative for PE,   - LE Dopplers negative for bilateral LE DVT    Pending Diagnostic Studies:     None        Final Active Diagnoses:    Diagnosis Date Noted POA    PRINCIPAL PROBLEM:  Acute deep vein thrombosis (DVT) of left upper extremity [I82.622] 11/16/2020 Yes    Edema [R60.9] 11/22/2020 No    Ascites [R18.8] 11/19/2020 Yes    Intractable nausea and vomiting [R11.2] 11/18/2020 No    Small bowel obstruction [K56.609] 11/18/2020 Yes    Pleural effusion, bilateral [J90] 11/17/2020 Yes    Ovarian cancer - IIIC [C56.9] 10/25/2016 Yes      Problems Resolved During this Admission:    Diagnosis Date Noted Date Resolved POA    Hematuria [R31.9] 11/17/2020 11/17/2020 Yes    Ascites [R18.8] 11/16/2020 11/17/2020 Yes    Patient in cancer related research study [Z00.6] 04/10/2018 11/17/2020 Not Applicable        Does this patient meet criteria for extended DVT prophylaxis? Yes, patient was prescribed Lovenox 1.5mg/kg daily for 30 days    Discharged Condition: stable    Disposition: Home or Self Care    Follow Up:  Follow-up Information     Elle Ma MD.    Specialty: Internal Medicine  Contact information:  [address]  [place] [address]  [phone]             Benny Tran MD In 1 week.    Specialties: Gynecologic Oncology, Gynecology, Oncology  Why: Follow up  Contact information:  [address] [facility] [address]  [phone]                 Patient Instructions:      Diet general     Diet full liquid     Call MD for:  redness, tenderness, or signs of infection (pain, swelling, redness, odor or green/yellow discharge around incision site)     Remove dressing in 24  hours     Notify your health care provider if you experience any of the following:  persistent nausea and vomiting or diarrhea     Notify your health care provider if you experience any of the following:  temperature >100.4     Notify your health care provider if you experience any of the following:  severe uncontrolled pain     Notify your health care provider if you experience any of the following:  redness, tenderness, or signs of infection (pain, swelling, redness, odor or green/yellow discharge around incision site)     Notify your health care provider if you experience any of the following:  increased confusion or weakness     Notify your health care provider if you experience any of the following:  worsening rash     Notify your health care provider if you experience any of the following:  severe persistent headache     Notify your health care provider if you experience any of the following:  difficulty breathing or increased cough     Notify your health care provider if you experience any of the following:  persistent dizziness, light-headedness, or visual disturbances     Notify your health care provider if you experience any of the following:   Order Comments: Vaginal bleeding that saturates >1 pad/hr for >1 hour     Activity as tolerated     Medications:  Reconciled Home Medications:      Medication List      START taking these medications    enoxaparin 120 mg/0.8 mL Syrg  Commonly known as: LOVENOX  Inject 0.8 mLs (120 mg total) into the skin 2 (two) times daily.     morphine 15 MG 12 hr tablet  Commonly known as: MS CONTIN  Take 1 tablet (15 mg total) by mouth 2 (two) times daily.     ondansetron 4 MG Tbdl  Commonly known as: ZOFRAN-ODT  Take 1 tablet (4 mg total) by mouth every 6 (six) hours as needed (Nausea and vomiting).        CHANGE how you take these medications    HYDROcodone-acetaminophen 5-325 mg per tablet  Commonly known as: NORCO  Take 1 tablet by mouth every 6 (six) hours as needed for  Pain.  What changed: reasons to take this            Kristi Steward MD PGY1  Gynecologic Oncology  [facility]

## 2020-11-24 NOTE — ASSESSMENT & PLAN NOTE
- newly diagnosed 11/16/20  - CTA negative for PE.   - LE Dopplers negative for bilateral LE DVT  -Continue Lovenox 1mg/kg BID for therapeutic anticoagulation.  - consider transitioning to oral anticoagulation for discharge. Appreciate medicine vs heme/onc recommendations

## 2020-11-24 NOTE — PHYSICIAN QUERY
PT Name: Mickie Don  MR #: [id number]    CAUSE AND EFFECT RELATIONSHIP CLARIFICATION     CDS/: DENNIS Samuels,RNC-MNN         Contact information:[email or web address]    This form is a permanent document in the medical record.     Query Date: November 24, 2020    By submitting this query, we are merely seeking further clarification of documentation. Please utilize your independent clinical judgment when addressing the question(s) below.    Supporting Clinical Findings Location in Medical Record   -Patient with metastatic ovarian cancer who is status post paracentesis 11/18/2020 complicated by deep vein thrombosis to left upper extremity veins and IJ.    - Increased abdominal distention, patient passing flatus, no tense ascites, some mild abd tenderness, postive bowel sounds.     Known metastasis to liver    Small bowel obstruction    11/18/2020 Patient initially admitted to hospital medicine for observation for newly diagnosed LUE DVT. Started on therpeutic lovenox. Worsening abdominal pain and n/v after paracentesis. GYN ONC assumed care of patient, concern for partial SBO. Patient made NPO, Flat and Erect X Ray ordered. IVF started.   11/19/2020 Xray consistent with SBO. NG tube placed with contents removed. 400cc output since placement. Electrolytes WNL.   11/20/2020 Improving, fluid content clearer, n/v improving. CT today.   11/21/2020 Improving, minimal NG tube output overnight. CT showed partial SBO. Will clamp NG tube today and advance to clear liquid diet.   11/22/2020 Tolerated CLD with NG tube clamped yesterday. Will pull out NG tube today and see how she tolerates bland diet. Consider diuretic for edema.   11/23/20: NPO at midnight for paracentesis and thoracentesis today. Removed 1.3 liters serous fluid from R thoracentesis and 2 L serous ascites from paracentesis.     Hospital Medicine progress note 11/18@552pm                Gyn Progress note 11/24@628am           Provider, please  clarify if there is any clinical correlation between Small bowel obstruction and metastatic ovarian cancer.           Are the conditions:      [X  ] Due to or associated with each other   [  ] Unrelated to each other   [  ] Other explanation (Please Specify): ______________   [  ] Clinically Undetermined                                                                               Please document in your progress notes daily for the duration of treatment until resolved and include in your discharge summary.

## 2020-11-30 ENCOUNTER — PATIENT MESSAGE (OUTPATIENT)
Dept: GYNECOLOGIC ONCOLOGY | Facility: CLINIC | Age: 65
End: 2020-11-30

## 2020-11-30 ENCOUNTER — OFFICE VISIT (OUTPATIENT)
Dept: GYNECOLOGIC ONCOLOGY | Facility: CLINIC | Age: 65
End: 2020-11-30
Payer: COMMERCIAL

## 2020-11-30 VITALS
SYSTOLIC BLOOD PRESSURE: 127 MMHG | HEART RATE: 101 BPM | DIASTOLIC BLOOD PRESSURE: 59 MMHG | BODY MASS INDEX: 27.07 KG/M2 | WEIGHT: 172.81 LBS

## 2020-11-30 DIAGNOSIS — I82.A12 ACUTE DEEP VEIN THROMBOSIS (DVT) OF AXILLARY VEIN OF LEFT UPPER EXTREMITY: ICD-10-CM

## 2020-11-30 DIAGNOSIS — K56.609 SMALL BOWEL OBSTRUCTION: ICD-10-CM

## 2020-11-30 DIAGNOSIS — C56.9 OVARIAN CANCER, UNSPECIFIED LATERALITY: Primary | ICD-10-CM

## 2020-11-30 DIAGNOSIS — J90 PLEURAL EFFUSION, BILATERAL: ICD-10-CM

## 2020-11-30 DIAGNOSIS — R18.0 MALIGNANT ASCITES: ICD-10-CM

## 2020-11-30 DIAGNOSIS — R11.2 INTRACTABLE NAUSEA AND VOMITING: ICD-10-CM

## 2020-11-30 PROCEDURE — 1101F PR PT FALLS ASSESS DOC 0-1 FALLS W/OUT INJ PAST YR: ICD-10-PCS | Mod: CPTII,S$GLB,, | Performed by: OBSTETRICS & GYNECOLOGY

## 2020-11-30 PROCEDURE — 1159F PR MEDICATION LIST DOCUMENTED IN MEDICAL RECORD: ICD-10-PCS | Mod: S$GLB,,, | Performed by: OBSTETRICS & GYNECOLOGY

## 2020-11-30 PROCEDURE — 1125F AMNT PAIN NOTED PAIN PRSNT: CPT | Mod: S$GLB,,, | Performed by: OBSTETRICS & GYNECOLOGY

## 2020-11-30 PROCEDURE — 99999 PR PBB SHADOW E&M-EST. PATIENT-LVL III: ICD-10-PCS | Mod: PBBFAC,,, | Performed by: OBSTETRICS & GYNECOLOGY

## 2020-11-30 PROCEDURE — 99214 OFFICE O/P EST MOD 30 MIN: CPT | Mod: S$GLB,,, | Performed by: OBSTETRICS & GYNECOLOGY

## 2020-11-30 PROCEDURE — 1159F MED LIST DOCD IN RCRD: CPT | Mod: S$GLB,,, | Performed by: OBSTETRICS & GYNECOLOGY

## 2020-11-30 PROCEDURE — 3288F PR FALLS RISK ASSESSMENT DOCUMENTED: ICD-10-PCS | Mod: CPTII,S$GLB,, | Performed by: OBSTETRICS & GYNECOLOGY

## 2020-11-30 PROCEDURE — 99214 PR OFFICE/OUTPT VISIT, EST, LEVL IV, 30-39 MIN: ICD-10-PCS | Mod: S$GLB,,, | Performed by: OBSTETRICS & GYNECOLOGY

## 2020-11-30 PROCEDURE — 99999 PR PBB SHADOW E&M-EST. PATIENT-LVL III: CPT | Mod: PBBFAC,,, | Performed by: OBSTETRICS & GYNECOLOGY

## 2020-11-30 PROCEDURE — 3008F PR BODY MASS INDEX (BMI) DOCUMENTED: ICD-10-PCS | Mod: CPTII,S$GLB,, | Performed by: OBSTETRICS & GYNECOLOGY

## 2020-11-30 PROCEDURE — 3288F FALL RISK ASSESSMENT DOCD: CPT | Mod: CPTII,S$GLB,, | Performed by: OBSTETRICS & GYNECOLOGY

## 2020-11-30 PROCEDURE — 3008F BODY MASS INDEX DOCD: CPT | Mod: CPTII,S$GLB,, | Performed by: OBSTETRICS & GYNECOLOGY

## 2020-11-30 PROCEDURE — 1101F PT FALLS ASSESS-DOCD LE1/YR: CPT | Mod: CPTII,S$GLB,, | Performed by: OBSTETRICS & GYNECOLOGY

## 2020-11-30 PROCEDURE — 1125F PR PAIN SEVERITY QUANTIFIED, PAIN PRESENT: ICD-10-PCS | Mod: S$GLB,,, | Performed by: OBSTETRICS & GYNECOLOGY

## 2020-11-30 NOTE — PROGRESS NOTES
Subjective:       Patient ID: Mickie Don is a 65 y.o. female.    Chief Complaint: Follow-up    Treatment History  Xlap/Omentectomy on 11/1/16 - mass was unresectable  Stage IIIC ovarian cancer  Initiated Taxotere/Carbo on 11/18/16, now s/p 6 cycles completed 3/8/17  Xlap/ISIS/BSO/Radical mass resection > 10 cm, optimal debulking on 4/11/17  Initiated PRIMA on 8/21/17  BRCA negative  Started FORWARD1 on 11/27/17  Started Doxil/Anju as part of NRG  2/27/19, received total of 6 cycles  Gemzar monotherapy started 8/29/19  Topotecan started 8/14/2020  Taxol monotherapy started 10/16/2020  LUE DVT  PSBO    Follow-up  Associated symptoms include numbness. Pertinent negatives include no abdominal pain, arthralgias, chest pain, chills, coughing, diaphoresis, fatigue, fever, headaches, nausea, rash, vomiting or weakness.        Here today for f/u after recent hospitalization.  Was having a paracentesis performed and noted left arm swelling.  Was found to have a LUE DVT and admitted for anticoagulation.  During the course of her hospital stay, she developed a pSBO that resolved with NGT suction.  She also underwent additional para and thoracenteses.  Presents today to discuss resumption of treatment.  Overall feels better and tolerating solids.  No BM since last Friday, so will try Dulcolax suppositories.  Feels like ascites has reaccumulated.  New pain regimen working better.  Did have some VB last night.    Review of Systems   Constitutional: Negative for appetite change, chills, diaphoresis, fatigue, fever and unexpected weight change.   HENT: Negative for mouth sores and tinnitus.    Respiratory: Negative for cough, chest tightness, shortness of breath and wheezing.    Cardiovascular: Negative for chest pain, palpitations and leg swelling.   Gastrointestinal: Negative for abdominal distention, abdominal pain, blood in stool, constipation, diarrhea, nausea and vomiting.   Genitourinary: Negative for difficulty  urinating, dysuria, flank pain, frequency, hematuria, pelvic pain, urgency, vaginal bleeding, vaginal discharge and vaginal pain.   Musculoskeletal: Negative for arthralgias, back pain and gait problem.   Skin: Negative for color change and rash.   Neurological: Positive for numbness. Negative for dizziness, weakness and headaches.   Hematological: Negative for adenopathy. Does not bruise/bleed easily.   Psychiatric/Behavioral: Negative for confusion and sleep disturbance. The patient is not nervous/anxious.        Objective:   BP (!) 127/59   Pulse 101   Wt 78.4 kg (172 lb 13.5 oz)   BMI 27.07 kg/m²      Physical Exam  Constitutional:       General: She is not in acute distress.     Appearance: She is well-developed.   HENT:      Head: Normocephalic and atraumatic.   Eyes:      General: No scleral icterus.  Neck:      Musculoskeletal: Normal range of motion.   Pulmonary:      Effort: Pulmonary effort is normal. No respiratory distress.   Abdominal:      General: There is no distension.      Palpations: Abdomen is soft. There is no mass.      Comments: Ascites noted   Genitourinary:     Exam position: Supine.      Labia:         Right: No rash, tenderness or lesion.         Left: No rash, tenderness or lesion.       Vagina: Normal.      Adnexa:         Right: No mass.          Left: No mass.        Comments: Pelvic normal  Musculoskeletal: Normal range of motion.         General: No tenderness.   Skin:     General: Skin is warm and dry.      Coloration: Skin is not pale.   Neurological:      Mental Status: She is alert and oriented to person, place, and time.   Psychiatric:         Behavior: Behavior normal.         Thought Content: Thought content normal.         Judgment: Judgment normal.         Assessment:       1. Ovarian cancer, unspecified laterality    2. Small bowel obstruction    3. Intractable nausea and vomiting    4. Pleural effusion, bilateral    5. Acute deep vein thrombosis (DVT) of axillary vein  of left upper extremity    6. Malignant ascites        Plan:     Overall has recovered from hospital stay.  Really wants to resume treatment.  Will perform paracentesis today.  Add IVF to Taxol.  Will monitor bleeding.

## 2020-12-01 ENCOUNTER — PATIENT MESSAGE (OUTPATIENT)
Dept: GYNECOLOGIC ONCOLOGY | Facility: CLINIC | Age: 65
End: 2020-12-01

## 2020-12-02 ENCOUNTER — HOSPITAL ENCOUNTER (EMERGENCY)
Facility: OTHER | Age: 65
Discharge: HOME | End: 2020-12-02
Attending: EMERGENCY MEDICINE
Payer: COMMERCIAL

## 2020-12-02 ENCOUNTER — HOSPITAL ENCOUNTER (OUTPATIENT)
Facility: OTHER | Age: 65
Discharge: HOME | End: 2020-12-02
Attending: RADIOLOGY | Admitting: RADIOLOGY
Payer: COMMERCIAL

## 2020-12-02 ENCOUNTER — TELEPHONE (OUTPATIENT)
Dept: GYNECOLOGIC ONCOLOGY | Facility: CLINIC | Age: 65
End: 2020-12-02

## 2020-12-02 VITALS
SYSTOLIC BLOOD PRESSURE: 104 MMHG | OXYGEN SATURATION: 100 % | RESPIRATION RATE: 21 BRPM | BODY MASS INDEX: 25.27 KG/M2 | HEIGHT: 67 IN | HEART RATE: 90 BPM | WEIGHT: 161 LBS | DIASTOLIC BLOOD PRESSURE: 71 MMHG | TEMPERATURE: 99 F

## 2020-12-02 VITALS
SYSTOLIC BLOOD PRESSURE: 104 MMHG | BODY MASS INDEX: 27.47 KG/M2 | HEART RATE: 91 BPM | WEIGHT: 175 LBS | TEMPERATURE: 99 F | DIASTOLIC BLOOD PRESSURE: 55 MMHG | HEIGHT: 67 IN | OXYGEN SATURATION: 95 % | RESPIRATION RATE: 16 BRPM

## 2020-12-02 DIAGNOSIS — R11.0 NAUSEA: ICD-10-CM

## 2020-12-02 DIAGNOSIS — C56.9 OVARIAN CANCER, UNSPECIFIED LATERALITY: ICD-10-CM

## 2020-12-02 DIAGNOSIS — J90 PLEURAL EFFUSION: ICD-10-CM

## 2020-12-02 DIAGNOSIS — R89.9 ABNORMAL LABORATORY TEST: ICD-10-CM

## 2020-12-02 DIAGNOSIS — R18.0 MALIGNANT ASCITES: ICD-10-CM

## 2020-12-02 DIAGNOSIS — J90 PLEURAL EFFUSION, BILATERAL: ICD-10-CM

## 2020-12-02 DIAGNOSIS — Z85.43 HISTORY OF OVARIAN CANCER: Primary | ICD-10-CM

## 2020-12-02 DIAGNOSIS — K59.00 CONSTIPATION: ICD-10-CM

## 2020-12-02 DIAGNOSIS — C56.3 MALIGNANT NEOPLASM OF BOTH OVARIES: Primary | ICD-10-CM

## 2020-12-02 LAB
ANION GAP SERPL CALC-SCNC: 16 MMOL/L (ref 8–16)
BUN SERPL-MCNC: 28 MG/DL (ref 8–23)
CALCIUM SERPL-MCNC: 9.1 MG/DL (ref 8.7–10.5)
CHLORIDE SERPL-SCNC: 93 MMOL/L (ref 95–110)
CO2 SERPL-SCNC: 22 MMOL/L (ref 23–29)
CREAT SERPL-MCNC: 1.1 MG/DL (ref 0.5–1.4)
EST. GFR  (AFRICAN AMERICAN): >60 ML/MIN/1.73 M^2
EST. GFR  (NON AFRICAN AMERICAN): 52 ML/MIN/1.73 M^2
GLUCOSE SERPL-MCNC: 90 MG/DL (ref 70–110)
POTASSIUM SERPL-SCNC: 5.3 MMOL/L (ref 3.5–5.1)
SODIUM SERPL-SCNC: 131 MMOL/L (ref 136–145)

## 2020-12-02 PROCEDURE — 93010 ELECTROCARDIOGRAM REPORT: CPT | Mod: ,,, | Performed by: INTERNAL MEDICINE

## 2020-12-02 PROCEDURE — 93005 ELECTROCARDIOGRAM TRACING: CPT

## 2020-12-02 PROCEDURE — 25000003 PHARM REV CODE 250: Performed by: NURSE PRACTITIONER

## 2020-12-02 PROCEDURE — 99285 EMERGENCY DEPT VISIT HI MDM: CPT | Mod: 25

## 2020-12-02 PROCEDURE — 80048 BASIC METABOLIC PNL TOTAL CA: CPT

## 2020-12-02 PROCEDURE — C1894 INTRO/SHEATH, NON-LASER: HCPCS | Performed by: RADIOLOGY

## 2020-12-02 PROCEDURE — 93010 EKG 12-LEAD: ICD-10-PCS | Mod: ,,, | Performed by: INTERNAL MEDICINE

## 2020-12-02 RX ORDER — PROMETHAZINE HYDROCHLORIDE 12.5 MG/1
12.5 TABLET ORAL ONCE
Status: DISCONTINUED | OUTPATIENT
Start: 2020-12-02 | End: 2020-12-02

## 2020-12-02 RX ORDER — PROMETHAZINE HYDROCHLORIDE 6.25 MG/5ML
12.5 SYRUP ORAL EVERY 6 HOURS PRN
Qty: 240 ML | Refills: 3 | Status: SHIPPED | OUTPATIENT
Start: 2020-12-02

## 2020-12-02 RX ORDER — PROMETHAZINE HYDROCHLORIDE 25 MG/1
25 TABLET ORAL EVERY 6 HOURS PRN
Qty: 20 TABLET | Refills: 0 | Status: SHIPPED | OUTPATIENT
Start: 2020-12-02

## 2020-12-02 RX ORDER — PROMETHAZINE HYDROCHLORIDE 25 MG/1
25 TABLET ORAL
Status: COMPLETED | OUTPATIENT
Start: 2020-12-02 | End: 2020-12-02

## 2020-12-02 RX ADMIN — PROMETHAZINE HYDROCHLORIDE 25 MG: 25 TABLET ORAL at 04:12

## 2020-12-02 NOTE — H&P
Consult/H&P Note  Interventional Radiology    Consult Requested By: gyn onc    Reason for Consult: ascites, pleural effusions    SUBJECTIVE:     Chief Complaint: distension    History of Present Illness: 64 yo F with ovarian cancer and recurrent ascites.  She also has B pleural effusions, R thoracentesis performed last week.    Past Medical History:   Diagnosis Date    Ascites 11/16/2020    Cancer     ovarian    Dry eyes 11/29/2017    Hematuria 11/17/2020    Neuropathy due to chemotherapeutic drug 1/10/2018    Open angle with borderline findings and low glaucoma risk in both eyes 6/20/2013    Ovarian cancer     Patient in cancer related research study 4/10/2018     Past Surgical History:   Procedure Laterality Date    BREAST BIOPSY      BUNIONECTOMY Left     CATARACT EXTRACTION W/  INTRAOCULAR LENS IMPLANT Right 04/04/2019        CATARACT EXTRACTION W/  INTRAOCULAR LENS IMPLANT Left 5/7/2019        CHOLECYSTECTOMY  1992    HYSTERECTOMY      INSERTION OF TUNNELED CENTRAL VENOUS CATHETER (CVC) WITH SUBCUTANEOUS PORT Right 7/23/2020    Procedure: INSERTION, PORT-A-CATH;  Surgeon: George Tapia MD;  Location: [facility] CATH LAB;  Service: Interventional Radiology;  Laterality: Right;    left leg surgery  2005    achilles tendon    OOPHORECTOMY      PERITONEOCENTESIS N/A 11/16/2020    Procedure: PARACENTESIS, ABDOMINAL;  Surgeon: George Tapia MD;  Location: [facility] CATH LAB;  Service: Radiology;  Laterality: N/A;    PERITONEOCENTESIS N/A 11/23/2020    Procedure: PARACENTESIS, ABDOMINAL;  Surgeon: George Tapia MD;  Location: [facility] CATH LAB;  Service: Radiology;  Laterality: N/A;    PHACOEMULSIFICATION OF CATARACT Right 4/4/2019    Procedure: PHACOEMULSIFICATION, CATARACT;  Surgeon: Karen Butt MD;  Location: [address];  Service: Ophthalmology;  Laterality: Right;    NV REMOVAL OF OVARY/TUBE(S)      right leg surgery  2006    broken, including ankle     THORACENTESIS N/A 11/23/2020    Procedure: THORACENTESIS;  Surgeon: George Tapia MD;  Location: [facility] CATH LAB;  Service: Radiology;  Laterality: N/A;     Family History   Problem Relation Age of Onset    Cancer Father         ? liver ca    Diabetes Father     Cataracts Mother     Deep vein thrombosis Mother     Breast cancer Maternal Aunt     Cancer Son         sarcoma    Melanoma Sister     Deep vein thrombosis Sister     Factor V Leiden deficiency Brother     Factor V Leiden deficiency Daughter         trait only     No Known Problems Daughter     No Known Problems Son     Deep vein thrombosis Sister     No Known Problems Sister     No Known Problems Brother     No Known Problems Brother     Colon cancer Neg Hx     Ovarian cancer Neg Hx     Amblyopia Neg Hx     Blindness Neg Hx     Glaucoma Neg Hx     Hypertension Neg Hx     Macular degeneration Neg Hx     Retinal detachment Neg Hx     Strabismus Neg Hx     Stroke Neg Hx     Thyroid disease Neg Hx     Hyperlipidemia Neg Hx      Social History     Tobacco Use    Smoking status: Never Smoker    Smokeless tobacco: Never Used   Substance Use Topics    Alcohol use: Yes     Alcohol/week: 3.0 standard drinks     Types: 1 Glasses of wine, 2 Shots of liquor per week     Frequency: Monthly or less     Drinks per session: 1 or 2     Binge frequency: Never     Comment: Socially    Drug use: No       Review of Systems:  Constitutional/General:Positive for change in appetite.  Hematological/Immuno: no known coagulopathies  Respiratory: positive for - shortness of breath  Cardiovascular: no chest pain  Gastrointestinal: positive for - abdominal pain, appetite loss, gas/bloating and nausea/vomiting  Genito-Urinary: no dysuria  Musculoskeletal: negative  Skin: Negative for rash, itching, pigmentation changes, nail or hair changes.  Neurological: no TIA or stroke symptoms  Psychiatric: normal mood/affect, good insight/judgement      OBJECTIVE:      Vital Signs Range (Last 24H):  Temp:  [98.7 °F (37.1 °C)]   Pulse:  [90]   Resp:  [16]   BP: (117)/(56)   SpO2:  [95 %]     Physical Exam:  General- Patient alert and oriented x3 in NAD  ENT- PERRLA,  Neck- No masses  CV- Regular rate and rhythm  Resp-  Decreased BS on L  GI- Non tender/+ distended  Extrem- No cyanosis, clubbing, edema.   Derm- No rashes, masses, or lesions noted  Neuro-  No focal deficits noted.     Physical Exam  There is no height or weight on file to calculate BMI.    Scheduled Meds:   Continuous Infusions:   PRN Meds:    Allergies: Review of patient's allergies indicates:  No Known Allergies    Labs:  No results for input(s): INR in the last 168 hours.    Invalid input(s):  PT,  PTT  No results for input(s): WBC, HGB, HCT, MCV, PLT in the last 168 hours. No results for input(s): GLU, NA, K, CL, CO2, BUN, CREATININE, CALCIUM, MG, ALT, AST, ALBUMIN, BILITOT, BILIDIR in the last 168 hours.    Vitals (Most Recent):  Temp: 98.7 °F (37.1 °C) (12/02/20 1045)  Pulse: 90 (12/02/20 1045)  Resp: 16 (12/02/20 1045)  BP: (!) 117/56 (12/02/20 1045)  SpO2: 95 % (12/02/20 1045)    ASA: 3  Mallampati: 2    Consent obtained    ASSESSMENT/PLAN:     Paracentesis and L thoracentesis.  Local anesthesia.    Active Hospital Problems    Diagnosis  POA    Ovarian cancer, unspecified laterality [C56.9]  Yes      Resolved Hospital Problems   No resolved problems to display.           George Tapia MD

## 2020-12-02 NOTE — PROCEDURES
Radiology Post-Procedure Note    Pre Op Diagnosis: ascites and pleural effusions  Post Op Diagnosis: Same    Procedure: paracentesis, L thoracentesis    Procedure performed by: George Tapia MD    Written Informed Consent Obtained: Yes  Specimen Removed: YES 2.3 L ascites, 0.7 L pleural fluid  Estimated Blood Loss: Minimal    Findings:   Successful paracentesis and L thoracentesis.    Patient tolerated procedure well.    @SIG@

## 2020-12-02 NOTE — ED PROVIDER NOTES
Encounter Date: 12/2/2020       History     Chief Complaint   Patient presents with    Abnormal Lab     sent to ED for elevated K; denies associated symptoms.      This is a 65-year-old female with a diagnosis of ovarian cancer who is currently undergoing treatment and presents for evaluation of abnormal lab value.  Patient was seen by oncologist today for a thoracentesis and paracentesis.  She reports that labs were drawn for clearance for scheduled chemotherapy on Friday.  She was notified of an abnormal potassium of 6.3 nose instructed to present to the emergency department for evaluation.  Patient also expresses concern over constipation.  She states she has not had a bowel movement in 8 days and reports trying a rectal suppository and then a Fleet enema at home without success.  Patient also reports nausea which she reports is a chronic issue.  She has taken Zofran today.  Patient is followed by Dr. Tran.         Review of patient's allergies indicates:  No Known Allergies  Past Medical History:   Diagnosis Date    Ascites 11/16/2020    Cancer     ovarian    Dry eyes 11/29/2017    Hematuria 11/17/2020    Neuropathy due to chemotherapeutic drug 1/10/2018    Open angle with borderline findings and low glaucoma risk in both eyes 6/20/2013    Ovarian cancer     Patient in cancer related research study 4/10/2018     Past Surgical History:   Procedure Laterality Date    BREAST BIOPSY      BUNIONECTOMY Left     CATARACT EXTRACTION W/  INTRAOCULAR LENS IMPLANT Right 04/04/2019        CATARACT EXTRACTION W/  INTRAOCULAR LENS IMPLANT Left 5/7/2019        CHOLECYSTECTOMY  1992    HYSTERECTOMY      INSERTION OF TUNNELED CENTRAL VENOUS CATHETER (CVC) WITH SUBCUTANEOUS PORT Right 7/23/2020    Procedure: INSERTION, PORT-A-CATH;  Surgeon: George Tapia MD;  Location: [facility] CATH LAB;  Service: Interventional Radiology;  Laterality: Right;    left leg surgery  2005    achilles tendon     OOPHORECTOMY      PERITONEOCENTESIS N/A 11/16/2020    Procedure: PARACENTESIS, ABDOMINAL;  Surgeon: George Tapia MD;  Location: [facility] CATH LAB;  Service: Radiology;  Laterality: N/A;    PERITONEOCENTESIS N/A 11/23/2020    Procedure: PARACENTESIS, ABDOMINAL;  Surgeon: George Tapia MD;  Location: [facility] CATH LAB;  Service: Radiology;  Laterality: N/A;    PHACOEMULSIFICATION OF CATARACT Right 4/4/2019    Procedure: PHACOEMULSIFICATION, CATARACT;  Surgeon: Karen Butt MD;  Location: [address];  Service: Ophthalmology;  Laterality: Right;    MI REMOVAL OF OVARY/TUBE(S)      right leg surgery  2006    broken, including ankle    THORACENTESIS N/A 11/23/2020    Procedure: THORACENTESIS;  Surgeon: George Tapia MD;  Location: [facility] CATH LAB;  Service: Radiology;  Laterality: N/A;     Family History   Problem Relation Age of Onset    Cancer Father         ? liver ca    Diabetes Father     Cataracts Mother     Deep vein thrombosis Mother     Breast cancer Maternal Aunt     Cancer Son         sarcoma    Melanoma Sister     Deep vein thrombosis Sister     Factor V Leiden deficiency Brother     Factor V Leiden deficiency Daughter         trait only     No Known Problems Daughter     No Known Problems Son     Deep vein thrombosis Sister     No Known Problems Sister     No Known Problems Brother     No Known Problems Brother     Colon cancer Neg Hx     Ovarian cancer Neg Hx     Amblyopia Neg Hx     Blindness Neg Hx     Glaucoma Neg Hx     Hypertension Neg Hx     Macular degeneration Neg Hx     Retinal detachment Neg Hx     Strabismus Neg Hx     Stroke Neg Hx     Thyroid disease Neg Hx     Hyperlipidemia Neg Hx      Social History     Tobacco Use    Smoking status: Never Smoker    Smokeless tobacco: Never Used   Substance Use Topics    Alcohol use: Yes     Alcohol/week: 3.0 standard drinks     Types: 1 Glasses of wine, 2 Shots of liquor per week     Frequency:  Monthly or less     Drinks per session: 1 or 2     Binge frequency: Never     Comment: Socially    Drug use: No     Review of Systems   Constitutional: Positive for activity change, appetite change and unexpected weight change. Negative for chills and fever.   HENT: Negative for voice change.    Eyes: Negative for redness.   Respiratory: Negative for cough, chest tightness and shortness of breath.    Cardiovascular: Positive for leg swelling. Negative for chest pain and palpitations.   Gastrointestinal: Positive for nausea. Negative for abdominal pain and vomiting.   Genitourinary: Positive for vaginal bleeding. Negative for dysuria.   Musculoskeletal: Negative for back pain and gait problem.   Skin: Negative for pallor and rash.   Allergic/Immunologic: Positive for immunocompromised state (Cancer diagnosis currently on chemo).   Neurological: Negative for speech difficulty and headaches.   Psychiatric/Behavioral: Negative for confusion.       Physical Exam     Initial Vitals   BP Pulse Resp Temp SpO2   12/02/20 1441 12/02/20 1406 12/02/20 1406 12/02/20 1406 12/02/20 1441   106/67 105 16 98.7 °F (37.1 °C) 100 %      MAP       --                Physical Exam    Constitutional: She is not diaphoretic. She appears cachectic. She is cooperative.  Non-toxic appearance. She appears ill. No distress.   HENT:   Head: Normocephalic and atraumatic.   Eyes: EOM are normal. Pupils are equal, round, and reactive to light. No scleral icterus.   Neck: Normal range of motion.   Cardiovascular: Normal rate, regular rhythm and normal heart sounds.   2+ pitting edema to bilateral lower extremities   Pulmonary/Chest: Breath sounds normal. No respiratory distress. She has no decreased breath sounds. She has no wheezes. She has no rales.   Abdominal: Soft. She exhibits ascites. There is no abdominal tenderness.   Musculoskeletal: Normal range of motion.   Neurological: She is alert and oriented to person, place, and time. GCS score is  15. GCS eye subscore is 4. GCS verbal subscore is 5. GCS motor subscore is 6.   Skin: Skin is warm and dry. No rash noted.   Bandage in place on the left posterior back from thoracentesis.  It was not removed as procedure done PTA in ED, no bleeding or drainage noted.  Appendage complaints to right lower abdomen from paracentesis, no bleeding or drainage noted on bandage.   Psychiatric: She has a normal mood and affect. Thought content normal.         ED Course   Procedures  Labs Reviewed   BASIC METABOLIC PANEL - Abnormal; Notable for the following components:       Result Value    Sodium 131 (*)     Potassium 5.3 (*)     Chloride 93 (*)     CO2 22 (*)     BUN 28 (*)     eGFR if non  52 (*)     All other components within normal limits     EKG Readings: (Independently Interpreted)   Normal sinus rhythm at a rate of 97, no peaked T-waves, normal intervals, no STEMI or acute ischemic changes.  No acute emergent changes when compared to prior EKG from 01/15/2019       Imaging Results          X-Ray Abdomen AP 1 View (KUB) (Final result)  Result time 12/02/20 15:27:11    Final result by Simone Betancourt MD (12/02/20 15:27:11)                 Impression:      Persistent dilatation of small bowel loops in the mid abdomen with findings suggestive of small volume of ascites.  Bowel wall thickening in the right lower quadrant.  No evidence to suggest significant constipation.      Electronically signed by: Simone Betancourt MD  Date:    12/02/2020  Time:    15:27             Narrative:    EXAMINATION:  XR ABDOMEN AP 1 VIEW    CLINICAL HISTORY:  Constipation, unspecified    TECHNIQUE:  AP View(s) of the abdomen was performed.    COMPARISON:  11/20/2020    FINDINGS:  Dilated loops of small bowel which are  suggesting small volume of ascites..  No large volume of free air or significant retained stool.  There is bowel wall thickening in the right lower quadrant.  Bones show no acute abnormalities.  There  are cholecystectomy clips.                                 Medical Decision Making:   History:   Old Medical Records: I decided to obtain old medical records.  Initial Assessment:   Emergent evaluation of afebrile 65-year-old woman currently undergoing chemotherapy for ovarian cancer who presents with a complaint of abnormal lab value, potassium 6.3.  Patient is chronically ill-appearing but does not appear to be in any acute distress. ROS revealed complaint of bilateral leg edema, vaginal bleeding and decreased appetite. All of these complaints are ongoing and secondary to her current treatment for her cancer and will be managed by her oncologist.  Lungs CTA, regular heart rate and rhythm, no chest wall tenderness, abdomen soft non tender, no drainage or bleeding noted on bandages over thoracentesis and paracentesis sites from procedures earlier today. 2+ pitting edema to bilateral lower legs. Plan to repeat CMP, get imaging to rule out constipation and treat nausea.    Differential Diagnosis:   Arrhythmia, electrolyte abnormality   Clinical Tests:   Lab Tests: Ordered and Reviewed  Radiological Study: Ordered and Reviewed  ED Management:  Earlier sample likely hemolyzed as repeat K 5.3 and no signs of hyperkalemia on history, PE or EKG.  Electrolyte abnormalities consistent with lab draw this am, will defer to oncologist to address. KUB does not show significant stool retention. Counseled patient that lack of BM likely secondary to poor oral intake, suggested colace for bowel management. Patient reports improvement in her nausea with Phenergan.  Patient states she was unaware that Phenergan given a tablet form as she only has syrup she would like prescription for the tablets, prescription given. Patient educated on on signs and symptoms to monitor for and when to return to ED. Patient verbalized understanding agrees with treatment plan. All questions and concerns addressed.                        ED Course as of  Dec 03 0128   Wed Dec 02, 2020   1556 Basic metabolic panel [CU]   1558 Reviewed KUB shows no significant retained stool.  Patient has reported constipation and lack of bowel movement for the past 8 days is likely due to decreased oral intake secondary to nausea related to her cancer    [CU]   1612 Potassium on repeat lab 5.3.  As all other values align, lab called to verify if there is any evidence of hemolysis on earlier lab draw.    [CU]   1634 Lab call back to report that sample from this morning was pristine, no evidence of hemolysis.  Theresa from labs at that she will rerun the sample and call me back    [CU]   1650 Theresa from lab called back to reports that after she ran the am sample again, some hemolysis noted further supporting falsely elevated potassium     [CU]      ED Course User Index  [CU] Danny Buckner NP            Clinical Impression:       ICD-10-CM ICD-9-CM   1. History of ovarian cancer  Z85.43 V10.43   2. Constipation  K59.00 564.00   3. Nausea  R11.0 787.02   4. Abnormal laboratory test  R89.9 796.4                          ED Disposition Condition    Discharge Stable        ED Prescriptions     Medication Sig Dispense Start Date End Date Auth. Provider    promethazine (PHENERGAN) 25 MG tablet Take 1 tablet (25 mg total) by mouth every 6 (six) hours as needed for Nausea. 20 tablet 12/2/2020  Danny Buckner NP        Follow-up Information     Follow up With Specialties Details Why Contact Info    [facility]  As needed [address] [facility] [id number] [phone]                                       Danny Buckner NP  12/03/20 0128

## 2020-12-02 NOTE — DISCHARGE SUMMARY
Radiology Discharge Summary      Hospital Course: No complications    Admit Date: 12/2/2020  Discharge Date: 12/02/2020     Instructions Given to Patient: Yes  Diet: Resume prior diet  Activity: activity as tolerated    Description of Condition on Discharge: Stable  Vital Signs (Most Recent): Temp: 98.7 °F (37.1 °C) (12/02/20 1045)  Pulse: (!) 111 (12/02/20 1130)  Resp: 20 (12/02/20 1130)  BP: (!) 106/57 (12/02/20 1130)  SpO2: 96 % (12/02/20 1130)    Discharge Disposition: Home    Discharge Diagnosis: ovarina cancer, ascites, pleural effusions     Follow-up: per gyn onc, paracentesis prn patient comfort.    @SIG@

## 2020-12-02 NOTE — ED TRIAGE NOTES
Pt had thoracentesis and paracentesis at physician's office today. Pt was told at appointment she had an abnormal lab value. Pt denies SOB or chest pain, c/o constipation and feeling dehydrated.

## 2020-12-02 NOTE — DISCHARGE INSTRUCTIONS
Discharge Instructions for Paracentesis  Home care  · If you have pain after the procedure, your healthcare provider can prescribe or recommend pain medicines. Take these exactly as directed. If you stopped taking other medicines before the procedure, ask your provider when you can start them again.  · Take it easy for 24 hours after the procedure. Avoid physical activity until your provider says its OK.  · You will have a small bandage over the puncture site.  They also help stop bleeding and speed healing. You may take the bandage off in 24 hours.  · Check the puncture site for the signs of infection listed below.  Follow-up care  Make a follow-up appointment with your healthcare provider as directed. During your follow-up visit, your provider will check your healing. Let your provider know how you are feeling. You can also discuss the cause of your ascites and if you need any further treatment.  When to seek medical advice  Call your healthcare provider if you have any of the following after the procedure:  · A fever of 100.4°F (38.0°C) or higher  · Trouble breathing  · Pain that doesn't go away even after taking pain medicine  · Belly pain not caused by having the skin punctured  · Bleeding from the puncture site  · More than a small amount of fluid leaking from the puncture site  · Swollen belly  · Signs of infection at the puncture site. These include increased pain, redness, or swelling, warmth, or bad-smelling drainage.  · Blood in your urine  · Feeling dizzy or lightheaded, or fainting   Date Last Reviewed: 7/1/2016  © 4728-7660 [employer]. [address] [place] [address], [place] [address]. All rights reserved. This information is not intended as a substitute for professional medical care. Always follow your healthcare professional's instructions.      Discharge Instructions for Thoracentesis  Thoracentesis is a procedure that removes extra fluid (pleural effusion) from the pleural space. This  space is between the outside surface of the lungs (pleura) and the chest wall. The procedure may be done to take a sample of the fluid for testing to help find the cause. Or it may be done to drain the extra fluid if you are having trouble breathing.  Home care  · You may have some pain after the procedure. Your doctor can prescribe or recommend pain medicine for you to take at home, if needed. Take these exactly as directed. If you stopped taking other medicines before the procedure, ask your doctor when you can start them again.  · Take it easy for 48 hours after the procedure. Don't do anything active until your doctor says its OK.  · Don't do strenuous activities, such as lifting, until your doctor says its OK.  · You will have a small bandage over the puncture site. You may remove the bandage in 24 hours.  · Check the puncture site for the signs of infection listed below.  Follow-up  Make a follow-up appointment with your doctor as directed. During your follow-up visit, your doctor will check your healing. Be sure to let your doctor know how you are feeling.  When to call your doctor  Call your doctor right away if you have any of the following:  · Coughing up blood  · Chest pain. If chest pain suddenly gets worse, it may be an emergency.  · Shortness of breath  · Fever of 100.4°F (38°C) or higher, or as directed by your healthcare provider  · Pain that doesn't get better after taking pain medicine  · Signs of infection at the puncture site. These include increased pain, redness, swelling, or warmth.  · Fluid draining from the puncture site   Date Last Reviewed: 10/1/2016  © 7625-8177 [employer]. [address] [place] [address], [place] [address]. All rights reserved. This information is not intended as a substitute for professional medical care. Always follow your healthcare professional's instructions.

## 2020-12-03 ENCOUNTER — PATIENT MESSAGE (OUTPATIENT)
Dept: GYNECOLOGIC ONCOLOGY | Facility: CLINIC | Age: 65
End: 2020-12-03

## 2020-12-03 RX ORDER — HEPARIN 100 UNIT/ML
500 SYRINGE INTRAVENOUS
Status: CANCELLED | OUTPATIENT
Start: 2020-12-11

## 2020-12-03 RX ORDER — EPINEPHRINE 0.3 MG/.3ML
0.3 INJECTION SUBCUTANEOUS ONCE AS NEEDED
Status: CANCELLED | OUTPATIENT
Start: 2020-12-11

## 2020-12-03 RX ORDER — DIPHENHYDRAMINE HYDROCHLORIDE 50 MG/ML
50 INJECTION INTRAMUSCULAR; INTRAVENOUS ONCE AS NEEDED
Status: CANCELLED | OUTPATIENT
Start: 2020-12-11

## 2020-12-03 RX ORDER — FAMOTIDINE 10 MG/ML
20 INJECTION INTRAVENOUS
Status: CANCELLED | OUTPATIENT
Start: 2020-12-11

## 2020-12-03 RX ORDER — SODIUM CHLORIDE 0.9 % (FLUSH) 0.9 %
10 SYRINGE (ML) INJECTION
Status: CANCELLED | OUTPATIENT
Start: 2020-12-11

## 2020-12-04 ENCOUNTER — PATIENT MESSAGE (OUTPATIENT)
Dept: GYNECOLOGIC ONCOLOGY | Facility: CLINIC | Age: 65
End: 2020-12-04

## 2020-12-04 DIAGNOSIS — C56.9 OVARIAN CANCER, UNSPECIFIED LATERALITY: Primary | ICD-10-CM

## 2020-12-04 DIAGNOSIS — G89.3 CANCER ASSOCIATED PAIN: ICD-10-CM

## 2020-12-04 RX ORDER — MORPHINE SULFATE 15 MG/1
15 TABLET, FILM COATED, EXTENDED RELEASE ORAL 2 TIMES DAILY
Qty: 60 TABLET | Refills: 0 | Status: SHIPPED | OUTPATIENT
Start: 2020-12-04

## 2020-12-07 ENCOUNTER — PATIENT MESSAGE (OUTPATIENT)
Dept: GYNECOLOGIC ONCOLOGY | Facility: CLINIC | Age: 65
End: 2020-12-07

## 2020-12-08 ENCOUNTER — PATIENT MESSAGE (OUTPATIENT)
Dept: GYNECOLOGIC ONCOLOGY | Facility: CLINIC | Age: 65
End: 2020-12-08

## 2020-12-09 ENCOUNTER — HOSPITAL ENCOUNTER (INPATIENT)
Facility: OTHER | Age: 65
LOS: 3 days | Discharge: HOSPICE | DRG: 982 | End: 2020-12-12
Attending: RADIOLOGY | Admitting: OBSTETRICS & GYNECOLOGY
Payer: COMMERCIAL

## 2020-12-09 DIAGNOSIS — Z51.5 ENCOUNTER FOR PALLIATIVE CARE: ICD-10-CM

## 2020-12-09 DIAGNOSIS — C56.9 OVARIAN CANCER, UNSPECIFIED LATERALITY: ICD-10-CM

## 2020-12-09 DIAGNOSIS — C56.3 MALIGNANT NEOPLASM OF BOTH OVARIES: ICD-10-CM

## 2020-12-09 DIAGNOSIS — C56.9 OVARIAN CANCER: ICD-10-CM

## 2020-12-09 DIAGNOSIS — K56.609 SMALL BOWEL OBSTRUCTION: Primary | ICD-10-CM

## 2020-12-09 DIAGNOSIS — E87.5 HYPERKALEMIA: ICD-10-CM

## 2020-12-09 DIAGNOSIS — R18.0 MALIGNANT ASCITES: ICD-10-CM

## 2020-12-09 LAB
ALBUMIN SERPL BCP-MCNC: 1.9 G/DL (ref 3.5–5.2)
ALP SERPL-CCNC: 731 U/L (ref 55–135)
ALT SERPL W/O P-5'-P-CCNC: 47 U/L (ref 10–44)
ANION GAP SERPL CALC-SCNC: 18 MMOL/L (ref 8–16)
AST SERPL-CCNC: 92 U/L (ref 10–40)
BASOPHILS # BLD AUTO: 0.01 K/UL (ref 0–0.2)
BASOPHILS NFR BLD: 0.1 % (ref 0–1.9)
BILIRUB SERPL-MCNC: 1.2 MG/DL (ref 0.1–1)
BUN SERPL-MCNC: 49 MG/DL (ref 8–23)
CALCIUM SERPL-MCNC: 8.3 MG/DL (ref 8.7–10.5)
CANCER AG125 SERPL-ACNC: 4631 U/ML (ref 0–30)
CHLORIDE SERPL-SCNC: 89 MMOL/L (ref 95–110)
CO2 SERPL-SCNC: 21 MMOL/L (ref 23–29)
CREAT SERPL-MCNC: 0.8 MG/DL (ref 0.5–1.4)
DIFFERENTIAL METHOD: ABNORMAL
EOSINOPHIL # BLD AUTO: 0 K/UL (ref 0–0.5)
EOSINOPHIL NFR BLD: 0 % (ref 0–8)
ERYTHROCYTE [DISTWIDTH] IN BLOOD BY AUTOMATED COUNT: 19.5 % (ref 11.5–14.5)
EST. GFR  (AFRICAN AMERICAN): >60 ML/MIN/1.73 M^2
EST. GFR  (NON AFRICAN AMERICAN): >60 ML/MIN/1.73 M^2
GLUCOSE SERPL-MCNC: 80 MG/DL (ref 70–110)
HCT VFR BLD AUTO: 39.7 % (ref 37–48.5)
HGB BLD-MCNC: 12.6 G/DL (ref 12–16)
IMM GRANULOCYTES # BLD AUTO: 0.03 K/UL (ref 0–0.04)
IMM GRANULOCYTES NFR BLD AUTO: 0.3 % (ref 0–0.5)
LYMPHOCYTES # BLD AUTO: 0.3 K/UL (ref 1–4.8)
LYMPHOCYTES NFR BLD: 3.1 % (ref 18–48)
MAGNESIUM SERPL-MCNC: 3.1 MG/DL (ref 1.6–2.6)
MCH RBC QN AUTO: 28.8 PG (ref 27–31)
MCHC RBC AUTO-ENTMCNC: 31.7 G/DL (ref 32–36)
MCV RBC AUTO: 91 FL (ref 82–98)
MONOCYTES # BLD AUTO: 0.6 K/UL (ref 0.3–1)
MONOCYTES NFR BLD: 7.2 % (ref 4–15)
NEUTROPHILS # BLD AUTO: 7.7 K/UL (ref 1.8–7.7)
NEUTROPHILS NFR BLD: 89.3 % (ref 38–73)
NRBC BLD-RTO: 0 /100 WBC
PHOSPHATE SERPL-MCNC: 4.4 MG/DL (ref 2.7–4.5)
PLATELET # BLD AUTO: 259 K/UL (ref 150–350)
PMV BLD AUTO: 10.1 FL (ref 9.2–12.9)
POTASSIUM SERPL-SCNC: 5.1 MMOL/L (ref 3.5–5.1)
POTASSIUM SERPL-SCNC: 5.7 MMOL/L (ref 3.5–5.1)
PROT SERPL-MCNC: 5.6 G/DL (ref 6–8.4)
RBC # BLD AUTO: 4.38 M/UL (ref 4–5.4)
SARS-COV-2 RDRP RESP QL NAA+PROBE: NEGATIVE
SODIUM SERPL-SCNC: 128 MMOL/L (ref 136–145)
WBC # BLD AUTO: 8.66 K/UL (ref 3.9–12.7)

## 2020-12-09 PROCEDURE — 99223 PR INITIAL HOSPITAL CARE,LEVL III: ICD-10-PCS | Mod: ,,, | Performed by: FAMILY MEDICINE

## 2020-12-09 PROCEDURE — 84132 ASSAY OF SERUM POTASSIUM: CPT

## 2020-12-09 PROCEDURE — 93010 ELECTROCARDIOGRAM REPORT: CPT | Mod: ,,, | Performed by: INTERNAL MEDICINE

## 2020-12-09 PROCEDURE — 36415 COLL VENOUS BLD VENIPUNCTURE: CPT

## 2020-12-09 PROCEDURE — U0002 COVID-19 LAB TEST NON-CDC: HCPCS

## 2020-12-09 PROCEDURE — 63600175 PHARM REV CODE 636 W HCPCS: Performed by: STUDENT IN AN ORGANIZED HEALTH CARE EDUCATION/TRAINING PROGRAM

## 2020-12-09 PROCEDURE — 93005 ELECTROCARDIOGRAM TRACING: CPT

## 2020-12-09 PROCEDURE — 84100 ASSAY OF PHOSPHORUS: CPT

## 2020-12-09 PROCEDURE — 83735 ASSAY OF MAGNESIUM: CPT

## 2020-12-09 PROCEDURE — C1894 INTRO/SHEATH, NON-LASER: HCPCS | Performed by: RADIOLOGY

## 2020-12-09 PROCEDURE — 86304 IMMUNOASSAY TUMOR CA 125: CPT

## 2020-12-09 PROCEDURE — 25500020 PHARM REV CODE 255: Performed by: OBSTETRICS & GYNECOLOGY

## 2020-12-09 PROCEDURE — 93010 EKG 12-LEAD: ICD-10-PCS | Mod: ,,, | Performed by: INTERNAL MEDICINE

## 2020-12-09 PROCEDURE — 25000003 PHARM REV CODE 250: Performed by: STUDENT IN AN ORGANIZED HEALTH CARE EDUCATION/TRAINING PROGRAM

## 2020-12-09 PROCEDURE — 85025 COMPLETE CBC W/AUTO DIFF WBC: CPT

## 2020-12-09 PROCEDURE — 80053 COMPREHEN METABOLIC PANEL: CPT

## 2020-12-09 PROCEDURE — 99223 1ST HOSP IP/OBS HIGH 75: CPT | Mod: ,,, | Performed by: FAMILY MEDICINE

## 2020-12-09 PROCEDURE — 94640 AIRWAY INHALATION TREATMENT: CPT

## 2020-12-09 PROCEDURE — 25000242 PHARM REV CODE 250 ALT 637 W/ HCPCS: Performed by: STUDENT IN AN ORGANIZED HEALTH CARE EDUCATION/TRAINING PROGRAM

## 2020-12-09 PROCEDURE — A9698 NON-RAD CONTRAST MATERIALNOC: HCPCS | Performed by: OBSTETRICS & GYNECOLOGY

## 2020-12-09 PROCEDURE — 21400001 HC TELEMETRY ROOM

## 2020-12-09 RX ORDER — ALBUTEROL SULFATE 2.5 MG/.5ML
10 SOLUTION RESPIRATORY (INHALATION) ONCE
Status: COMPLETED | OUTPATIENT
Start: 2020-12-09 | End: 2020-12-09

## 2020-12-09 RX ORDER — SODIUM CHLORIDE 0.9 % (FLUSH) 0.9 %
10 SYRINGE (ML) INJECTION
Status: DISCONTINUED | OUTPATIENT
Start: 2020-12-09 | End: 2020-12-12 | Stop reason: HOSPADM

## 2020-12-09 RX ORDER — ENOXAPARIN SODIUM 150 MG/ML
1.5 INJECTION SUBCUTANEOUS EVERY 24 HOURS
Status: DISCONTINUED | OUTPATIENT
Start: 2020-12-09 | End: 2020-12-10

## 2020-12-09 RX ORDER — SODIUM CHLORIDE, SODIUM LACTATE, POTASSIUM CHLORIDE, CALCIUM CHLORIDE 600; 310; 30; 20 MG/100ML; MG/100ML; MG/100ML; MG/100ML
INJECTION, SOLUTION INTRAVENOUS CONTINUOUS
Status: DISCONTINUED | OUTPATIENT
Start: 2020-12-09 | End: 2020-12-10

## 2020-12-09 RX ORDER — MORPHINE SULFATE 15 MG/1
15 TABLET, FILM COATED, EXTENDED RELEASE ORAL 2 TIMES DAILY
Status: DISCONTINUED | OUTPATIENT
Start: 2020-12-09 | End: 2020-12-10

## 2020-12-09 RX ORDER — HYDROCODONE BITARTRATE AND ACETAMINOPHEN 10; 325 MG/1; MG/1
1 TABLET ORAL EVERY 4 HOURS PRN
Status: DISCONTINUED | OUTPATIENT
Start: 2020-12-09 | End: 2020-12-10

## 2020-12-09 RX ORDER — HYDROCODONE BITARTRATE AND ACETAMINOPHEN 5; 325 MG/1; MG/1
1 TABLET ORAL EVERY 4 HOURS PRN
Status: DISCONTINUED | OUTPATIENT
Start: 2020-12-09 | End: 2020-12-10

## 2020-12-09 RX ORDER — HYDROMORPHONE HYDROCHLORIDE 2 MG/ML
1 INJECTION, SOLUTION INTRAMUSCULAR; INTRAVENOUS; SUBCUTANEOUS EVERY 6 HOURS PRN
Status: DISCONTINUED | OUTPATIENT
Start: 2020-12-09 | End: 2020-12-10

## 2020-12-09 RX ORDER — FUROSEMIDE 10 MG/ML
40 INJECTION INTRAMUSCULAR; INTRAVENOUS ONCE
Status: COMPLETED | OUTPATIENT
Start: 2020-12-09 | End: 2020-12-09

## 2020-12-09 RX ORDER — ONDANSETRON 8 MG/1
8 TABLET, ORALLY DISINTEGRATING ORAL EVERY 8 HOURS PRN
Status: DISCONTINUED | OUTPATIENT
Start: 2020-12-09 | End: 2020-12-12 | Stop reason: HOSPADM

## 2020-12-09 RX ADMIN — FUROSEMIDE 40 MG: 10 INJECTION, SOLUTION INTRAMUSCULAR; INTRAVENOUS at 06:12

## 2020-12-09 RX ADMIN — ALBUTEROL SULFATE 10 MG: 2.5 SOLUTION RESPIRATORY (INHALATION) at 05:12

## 2020-12-09 RX ADMIN — IOHEXOL 75 ML: 350 INJECTION, SOLUTION INTRAVENOUS at 09:12

## 2020-12-09 RX ADMIN — ENOXAPARIN SODIUM 120 MG: 120 INJECTION SUBCUTANEOUS at 06:12

## 2020-12-09 RX ADMIN — IOHEXOL 1000 ML: 9 SOLUTION ORAL at 06:12

## 2020-12-09 RX ADMIN — MORPHINE SULFATE 15 MG: 15 TABLET, FILM COATED, EXTENDED RELEASE ORAL at 08:12

## 2020-12-09 RX ADMIN — SODIUM CHLORIDE, SODIUM LACTATE, POTASSIUM CHLORIDE, AND CALCIUM CHLORIDE: .6; .31; .03; .02 INJECTION, SOLUTION INTRAVENOUS at 11:12

## 2020-12-09 RX ADMIN — SODIUM CHLORIDE, SODIUM LACTATE, POTASSIUM CHLORIDE, AND CALCIUM CHLORIDE: .6; .31; .03; .02 INJECTION, SOLUTION INTRAVENOUS at 09:12

## 2020-12-09 NOTE — PLAN OF CARE
12/09/20 1556   Readmission Questionnaire   At the time of your discharge, did someone talk to you about what your health problems were? Yes   At the time of discharge, did someone talk to you about what to watch out for regarding worsening of your health problem? Yes   At the time of discharge, did someone talk to you about what to do if you experienced worsening of your health problem? Yes   At the time of discharge, did someone talk to you about which medication to take when you left the hospital and which ones to stop taking? Yes   At the time of discharge, did someone talk to you about when and where to follow up with a doctor after you left the hospital? Yes

## 2020-12-09 NOTE — ASSESSMENT & PLAN NOTE
"- Pall care introduced  - Patient is decisional  - Patient has 4 children, 1 son is .  He passed away from a sarcoma with home hospice.    - Conference with patient, her so, Ramin, her daughter in law, who is a family medicine physician, and her daughter.    - GOC discussed: remaining at home surrounded by family and friends.  Family reports they desire to bring patient and are able to provide IV fluids in the home if needed.  Patient endorses rapidly progressive fatigue/ weakness in the last month. Patient reports she was able to work until a month ago.  Patient desires aggressive treatment from ovarian cancer, was receiving chemo recently.  Patient reports she spends the majority of her day on the couch watching TV.  Patient reports "I am not ready for hospice" Patient is familiar with hospice, as he son who passed away was at home with hospice services for 2 days.   - We discussed what "better" looks like for Ms. Don: being at home with her family, being able to drink and tolerate a glass of water, being able to share a meal with her family.   - Patient would benefit for PT/OT evaluation.  Family discussed hospital bed for patient, will discuss with case management.   - Patient has never completed ACP, reports "I need to do that".    - Patient would benefit from following up with pall care outpt   "

## 2020-12-09 NOTE — SUBJECTIVE & OBJECTIVE
Interval History: Patient just had NG tube placed. SonRamin, at bedside. Patient admitted for suspected SBO.      Past Medical History:   Diagnosis Date    Ascites 11/16/2020    Cancer     ovarian    Dry eyes 11/29/2017    Hematuria 11/17/2020    Neuropathy due to chemotherapeutic drug 1/10/2018    Open angle with borderline findings and low glaucoma risk in both eyes 6/20/2013    Ovarian cancer     Patient in cancer related research study 4/10/2018       Past Surgical History:   Procedure Laterality Date    BREAST BIOPSY      BUNIONECTOMY Left     CATARACT EXTRACTION W/  INTRAOCULAR LENS IMPLANT Right 04/04/2019        CATARACT EXTRACTION W/  INTRAOCULAR LENS IMPLANT Left 5/7/2019        CHOLECYSTECTOMY  1992    HYSTERECTOMY      INSERTION OF TUNNELED CENTRAL VENOUS CATHETER (CVC) WITH SUBCUTANEOUS PORT Right 7/23/2020    Procedure: INSERTION, PORT-A-CATH;  Surgeon: George Tapia MD;  Location: [facility] CATH LAB;  Service: Interventional Radiology;  Laterality: Right;    left leg surgery  2005    achilles tendon    OOPHORECTOMY      PERITONEOCENTESIS N/A 11/16/2020    Procedure: PARACENTESIS, ABDOMINAL;  Surgeon: George Tapia MD;  Location: [facility] CATH LAB;  Service: Radiology;  Laterality: N/A;    PERITONEOCENTESIS N/A 11/23/2020    Procedure: PARACENTESIS, ABDOMINAL;  Surgeon: George Tapia MD;  Location: [facility] CATH LAB;  Service: Radiology;  Laterality: N/A;    PHACOEMULSIFICATION OF CATARACT Right 4/4/2019    Procedure: PHACOEMULSIFICATION, CATARACT;  Surgeon: Karen Butt MD;  Location: [address];  Service: Ophthalmology;  Laterality: Right;    NH REMOVAL OF OVARY/TUBE(S)      REPEAT ABDOMINAL PARACENTESIS Right 12/2/2020    Procedure: PARACENTESIS, ABDOMINAL, REPEAT;  Surgeon: George Tapia MD;  Location: [facility] CATH LAB;  Service: Radiology;  Laterality: Right;    right leg surgery  2006    broken, including ankle    THORACENTESIS N/A  11/23/2020    Procedure: THORACENTESIS;  Surgeon: George Tapia MD;  Location: [facility] CATH LAB;  Service: Radiology;  Laterality: N/A;    THORACENTESIS Left 12/2/2020    Procedure: Thoracentesis;  Surgeon: George Tapia MD;  Location: [facility] CATH LAB;  Service: Radiology;  Laterality: Left;       Review of patient's allergies indicates:  No Known Allergies    Medications:  Continuous Infusions:   lactated ringers 100 mL/hr at 12/09/20 1109     Scheduled Meds:   enoxaparin  1.5 mg/kg Subcutaneous Q24H    morphine  15 mg Oral BID     PRN Meds:HYDROcodone-acetaminophen, HYDROcodone-acetaminophen, HYDROmorphone, ondansetron, promethazine (PHENERGAN) IVPB, sodium chloride 0.9%    Family History     Problem Relation (Age of Onset)    Breast cancer Maternal Aunt    Cancer Father, Son    Cataracts Mother    Deep vein thrombosis Mother, Sister, Sister    Diabetes Father    Factor V Leiden deficiency Brother, Daughter    Melanoma Sister    No Known Problems Daughter, Son, Sister, Brother, Brother        Tobacco Use    Smoking status: Never Smoker    Smokeless tobacco: Never Used   Substance and Sexual Activity    Alcohol use: Yes     Alcohol/week: 3.0 standard drinks     Types: 1 Glasses of wine, 2 Shots of liquor per week     Frequency: Monthly or less     Drinks per session: 1 or 2     Binge frequency: Never     Comment: Socially    Drug use: No    Sexual activity: Never     Partners: Male     Birth control/protection: Post-menopausal       Review of Systems   Constitutional: Positive for fatigue. Negative for activity change, appetite change and fever.   HENT: Negative for sinus pressure and sore throat.    Eyes: Negative for pain and visual disturbance.   Respiratory: Negative for cough, chest tightness and shortness of breath.    Cardiovascular: Negative for chest pain and palpitations.   Gastrointestinal: Positive for abdominal distention, abdominal pain, constipation, nausea and vomiting. Negative for  diarrhea.   Musculoskeletal: Negative for gait problem and myalgias.   Neurological: Positive for weakness. Negative for dizziness, seizures, syncope, light-headedness and headaches.   Psychiatric/Behavioral: Negative for agitation, behavioral problems and hallucinations. The patient is not nervous/anxious.      Objective:     Vital Signs (Most Recent):  Temp: 97.5 °F (36.4 °C) (12/09/20 1156)  Pulse: 82 (12/09/20 1156)  Resp: 16 (12/09/20 1156)  BP: (!) 116/58 (12/09/20 1156)  SpO2: 97 % (12/09/20 1156) Vital Signs (24h Range):  Temp:  [97.5 °F (36.4 °C)-97.8 °F (36.6 °C)] 97.5 °F (36.4 °C)  Pulse:  [80-92] 82  Resp:  [16-18] 16  SpO2:  [96 %-100 %] 97 %  BP: (102-123)/(51-65) 116/58     Weight: 77.1 kg (170 lb)  Body mass index is 26.63 kg/m².    Physical Exam  Constitutional:       General: She is not in acute distress.     Appearance: She is well-developed. She is cachectic.      Comments: Frail appearing female    Neck:      Musculoskeletal: Normal range of motion.   Cardiovascular:      Rate and Rhythm: Normal rate and regular rhythm.      Heart sounds: No murmur.   Pulmonary:      Breath sounds: Normal breath sounds.   Chest:      Chest wall: No tenderness.   Abdominal:      General: Bowel sounds are normal. There is distension.   Musculoskeletal: Normal range of motion.      Right lower leg: Edema present.      Left lower leg: Edema present.   Skin:     General: Skin is warm.   Neurological:      Mental Status: She is alert and oriented to person, place, and time.   Psychiatric:         Thought Content: Thought content normal.         Judgment: Judgment normal.         Review of Symptoms    Symptom Assessment (ESAS 0-10 Scale)  Pain:  3  Dyspnea:  0  Anxiety:  0  Nausea:  5  Depression:  0  Anorexia:  3  Fatigue:  7     Constipation:  Positive  Diarrhea:  Negative    Bowel Management Plan (BMP):  No            Performance Status:  60    ECOG Performance Status Grade:  2 - Ambulates, capable of self care  only    Living Arrangements:  Lives with family      Advance Care Planning   Advance Directives:   Living Will: No    LaPOST: No    Do Not Resuscitate Status: No    Medical Power of : No      Decision Making:  Patient answered questions and Family answered questions         Significant Labs: All pertinent labs within the past 24 hours have been reviewed.  CBC:   Recent Labs   Lab 12/09/20 0925   WBC 8.66   HGB 12.6   HCT 39.7   MCV 91        BMP:  Recent Labs   Lab 12/09/20 0925   GLU 80   *   K 5.7*   CL 89*   CO2 21*   BUN 49*   CREATININE 0.8   CALCIUM 8.3*   MG 3.1*     LFT:  Lab Results   Component Value Date    AST 92 (H) 12/09/2020    ALKPHOS 731 (H) 12/09/2020    BILITOT 1.2 (H) 12/09/2020     Albumin:   Albumin   Date Value Ref Range Status   12/09/2020 1.9 (L) 3.5 - 5.2 g/dL Final     Protein:   Total Protein   Date Value Ref Range Status   12/09/2020 5.6 (L) 6.0 - 8.4 g/dL Final     Lactic acid:   No results found for: LACTATE    Significant Imaging: I have reviewed all pertinent imaging results/findings within the past 24 hours.     I have reviewed the chest x ray from 12/9/2020    I have reviewed the abdomen x ray from 12/2/2020    I have reviewed the TTE from 7/26/2019 (EF 67%)

## 2020-12-09 NOTE — PROCEDURES
Radiology Post-Procedure Note    Pre Op Diagnosis: ascites  Post Op Diagnosis: Same    Procedure: paracentesis    Procedure performed by: George Tapia MD    Written Informed Consent Obtained: Yes  Specimen Removed: YES 1.6 L serous ascites  Estimated Blood Loss: Minimal    Findings:   Successful paracentesis.    Patient tolerated procedure well.    @SIG@

## 2020-12-09 NOTE — ASSESSMENT & PLAN NOTE
- Patient currently NPO, IV fluids ordered  - CT abdomen ordered  - Discussed with family progressive disease leading to SBO required hospitalization   - Patient reports she has been unable to tolerate solid foods since discharge on 11/24/2020  - Patient reports she has not had a bowel movement since discharge   - Zofran/ phenergan ordered

## 2020-12-09 NOTE — DISCHARGE SUMMARY
Radiology Discharge Summary      Hospital Course: No complications    Admit Date: 12/9/2020  Discharge Date: 12/09/2020     Instructions Given to Patient: Yes  Diet: Resume prior diet  Activity: activity as tolerated    Description of Condition on Discharge: Stable  Vital Signs (Most Recent): Temp: 97.7 °F (36.5 °C) (12/09/20 0710)  Pulse: 86 (12/09/20 0800)  Resp: 18 (12/09/20 0800)  BP: (!) 119/59 (12/09/20 0800)  SpO2: 100 % (12/09/20 0800)    Discharge Disposition: Home    Discharge Diagnosis: ascites     Follow-up: will be admitted per gyn onc    @SIG@

## 2020-12-09 NOTE — PLAN OF CARE
Initial Discharge Planning Assessment:  Patient admitted on 12-9-20  Chart reviewed, Care plan discussed with treatment team,  attending Dr Tran  PCP updated in Epic: yes  Pharmacy, updated in [facility]: [facility] in [place]    Current dispo: admitted today  NG tube in place  NPO except ice chips  Palliative care team consulted  Transportation: has reliable  Case management  to follow       12/09/20 9956   Discharge Assessment   Assessment Type Discharge Planning Assessment   Confirmed/corrected address and phone number on facesheet? Yes   Assessment information obtained from? Patient;Caregiver;Medical Record   Communicated expected length of stay with patient/caregiver yes   Prior to hospitilization cognitive status: Alert/Oriented   Prior to hospitalization functional status: Independent   Current cognitive status: Alert/Oriented   Current Functional Status: Independent   Lives With alone   Able to Return to Prior Arrangements yes   Patient currently being followed by outpatient case management? No   Equipment Currently Used at Home none   Do you have any problems affording any of your prescribed medications? No   Is the patient taking medications as prescribed? yes   Does the patient have transportation home? Yes   Transportation Anticipated family or friend will provide   Discharge Plan A Other  (Pt/Ot out patient)   DME Needed Upon Discharge  hospital bed   Patient/Family in Agreement with Plan yes   Readmission Questionnaire   At the time of your discharge, did someone talk to you about what your health problems were? Yes   At the time of discharge, did someone talk to you about what to watch out for regarding worsening of your health problem? Yes   At the time of discharge, did someone talk to you about what to do if you experienced worsening of your health problem? Yes   At the time of discharge, did someone talk to you about which medication to take when you left the hospital and which ones to stop taking? Yes   At  the time of discharge, did someone talk to you about when and where to follow up with a doctor after you left the hospital? Yes

## 2020-12-09 NOTE — ASSESSMENT & PLAN NOTE
- Heme/ onc managing  - Patient on MS contin BID, reports pain 3/10 on a good day, describes it a constant dull pain in her abdomen, reports it does not interfere with ADLs. Fatigue/ weakness interferes with ADLs.    - Discussed importance of bowel management plan.  Patient has tried Miralax in the past with success. Patient was on Colace with little success in the past, but is unable to tolerate at this time.  Discussed Miralax BID or Senokot.  Patient has also tried Fleets enema, with no relief.

## 2020-12-09 NOTE — HOSPITAL COURSE
12/9/20- Admitted to Gyn-onc for suspected partial SBO. IV fluids given, CMP p, CT ab/pelvis with contrast ordered. NG tube to intermittent suction placed. Palliative consult placed.   12/10/2020 - Emesis overnight. approx 250cc suction out since admission   12/11/2020 NG tube fell out yesterday, not replaced. Patient has decided on venting Peg tube. Deciding vs home health or hospice. IV pain medication PRN. Lasix PRN for edema, Na slowly normalizing, K has normalized.   12/12/2020 POD#1 s/p PEG tube by general surgery. Patient doing well from post op standpoint. Plan for home hospice with peg tube after peg teaching. Will d/c home with peg tube on home hospice.

## 2020-12-09 NOTE — ASSESSMENT & PLAN NOTE
- See treatment history above  -  pending  - Continue MS contin q12  - Progressive disease leading to multiple small bowel obstructions  - Palliative consult placed

## 2020-12-09 NOTE — CONSULTS
"[facility]  Palliative Medicine  Consult Note    Patient Name: Mickie Don  MRN: [id number]  Admission Date: 2020  Hospital Length of Stay: 0 days  Code Status: Full Code   Attending Provider: Benny Tran MD  Consulting Provider: Shirley Mcleod DNP  Primary Care Physician: Elle Ma MD  Principal Problem:<principal problem not specified>    Patient information was obtained from patient, relative(s) and ER records.      Inpatient consult to Palliative Care  Consult performed by: Shirley Mcleod DNP  Consult ordered by: Kristi Steward MD  Reason for consult: Goals of care/ advance care planning         Assessment/Plan:     Encounter for palliative care  - Pall care introduced  - Patient is decisional  - Patient has 4 children, 1 son is .  He passed away from a sarcoma with home hospice.    - Telephone conference with patient, her son, Ramin, her daughter in law, who is a family medicine physician, and her daughter.    - GOC discussed: remaining at home surrounded by family and friends.  Patients daughter is pregnant and due in March and patient hopes to live to meet her first granddaughter.  Family reports they desire to bring patient home and are able to provide IV fluids/NG tube care in the home if needed.  Patient endorses rapidly progressive fatigue/ weakness in the last month. Patient reports she was able to work until a month ago.  Patient desires aggressive treatment from ovarian cancer, was receiving chemo recently.  Patient reports she spends the majority of her day on the couch watching TV.  Patient reports "I am not ready for hospice" Patient is familiar with hospice, as her son was enrolled with hospice prior to his passing   - We discussed what "better" looks like for Ms. Don: being at home with her family, being able to drink and tolerate a glass of water, being able to share a meal with her family.   - We discussed fears/ concerns: "this is going " "to keep happening"  - Patient would benefit for PT evaluation.  Family discussed hospital bed for patient, will discuss with case management.   - Patient has never completed ACP, reports "I need to do that".    - Patient would benefit from following up with pall care outpt     Ascites  - Patient received thoracentesis today     Small bowel obstruction  - Patient currently NPO, IV fluids ordered  - CT abdomen ordered  - Discussed with the family the progressive disease leading to SBO requiring hospitalization   - Patient reports she has been unable to tolerate solid foods since discharge on 11/24/2020  - Patient reports she has not had a bowel movement since discharge   - Zofran/ phenergan ordered    Ovarian cancer - IIIC  - Heme/ onc managing  - Patient on MS contin BID, reports pain 3/10 on a good day, describes it a constant dull pain in her abdomen, reports it does not interfere with ADLs. Fatigue/ weakness interferes with ADLs.    - Discussed importance of bowel management plan.  Patient has tried Miralax in the past with success. Patient was on Colace with little relief in the past, but is unable to tolerate at this time.  Discussed Miralax BID or Senokot.  Patient has also tried Fleets enema, with no relief.            Thank you for your consult. I will follow-up with patient. Please contact us if you have any additional questions.    Subjective:     HPI:   Ms. Don is a 65 y.o. female with Stage IIIC ovarian cancer  who presented for outpatient paracentesis with IR 2/2 ascites.  After paracentesis, patient reported dehydration, persistent nausea and vomiting.  Patient was recently admitted to our hospital on 11/18 for paracentesis and LE DVT was found.  Patient was discharged on clear liquid diet and Lovenox injections on 11/24.  Patient has not been able to tolerate any solid foods since discharge.  Patient reports she has been able to consume liquids and is able to keep them down for 30 mins- 2 hours. " Patient was seen in the ED due to not having a bowel movement.  Patient reports she is passing flatus, but has not have a bowel movement since discharge.  Patient was admitted to inpatient status. NG has been placed. She is receiving IV fluids.  Order placed for CT abd/ pelvis     Ms. Don was working as a [employer] at [employer] until a month ago. Ms. Don reports worsening fatigue, weakness and anorexia in the last month.  She has 4 children, 1 is .  She lives alone, but her children are currently staying with her to care for her.         Hospital Course:  No notes on file    Interval History: Patient just had NG tube placed. Son, Ramin, at bedside. Patient admitted for suspected SBO.      Past Medical History:   Diagnosis Date    Ascites 2020    Cancer     ovarian    Dry eyes 2017    Hematuria 2020    Neuropathy due to chemotherapeutic drug 1/10/2018    Open angle with borderline findings and low glaucoma risk in both eyes 2013    Ovarian cancer     Patient in cancer related research study 4/10/2018       Past Surgical History:   Procedure Laterality Date    BREAST BIOPSY      BUNIONECTOMY Left     CATARACT EXTRACTION W/  INTRAOCULAR LENS IMPLANT Right 2019        CATARACT EXTRACTION W/  INTRAOCULAR LENS IMPLANT Left 2019        CHOLECYSTECTOMY      HYSTERECTOMY      INSERTION OF TUNNELED CENTRAL VENOUS CATHETER (CVC) WITH SUBCUTANEOUS PORT Right 2020    Procedure: INSERTION, PORT-A-CATH;  Surgeon: George Tapia MD;  Location: [facility] CATH LAB;  Service: Interventional Radiology;  Laterality: Right;    left leg surgery      achilles tendon    OOPHORECTOMY      PERITONEOCENTESIS N/A 2020    Procedure: PARACENTESIS, ABDOMINAL;  Surgeon: George Tapia MD;  Location: [facility] CATH LAB;  Service: Radiology;  Laterality: N/A;    PERITONEOCENTESIS N/A 2020    Procedure: PARACENTESIS, ABDOMINAL;  Surgeon:  Geroge Tapia MD;  Location: [facility] CATH LAB;  Service: Radiology;  Laterality: N/A;    PHACOEMULSIFICATION OF CATARACT Right 4/4/2019    Procedure: PHACOEMULSIFICATION, CATARACT;  Surgeon: Karen Butt MD;  Location: [facility] OR [address] [place] [address];  Service: Ophthalmology;  Laterality: Right;    W[place] REMOVAL OF OVARY/TUBE(S)      REPEAT ABDOMINAL PARACENTESIS Right 12/2/2020    Procedure: PARACENTESIS, ABDOMINAL, REPEAT;  Surgeon: George Tapia MD;  Location: [facility] CATH LAB;  Service: Radiology;  Laterality: Right;    right leg surgery  2006    broken, including ankle    THORACENTESIS N/A 11/23/2020    Procedure: THORACENTESIS;  Surgeon: George Tapia MD;  Location: [facility] CATH LAB;  Service: Radiology;  Laterality: N/A;    THORACENTESIS Left 12/2/2020    Procedure: Thoracentesis;  Surgeon: George Tapia MD;  Location: [facility] CATH LAB;  Service: Radiology;  Laterality: Left;       Review of patient's allergies indicates:  No Known Allergies    Medications:  Continuous Infusions:   lactated ringers 100 mL/hr at 12/09/20 1109     Scheduled Meds:   enoxaparin  1.5 mg/kg Subcutaneous Q24H    morphine  15 mg Oral BID     PRN Meds:HYDROcodone-acetaminophen, HYDROcodone-acetaminophen, HYDROmorphone, ondansetron, promethazine (PHENERGAN) IVPB, sodium chloride 0.9%    Family History     Problem Relation (Age of Onset)    Breast cancer Maternal Aunt    Cancer Father, Son    Cataracts Mother    Deep vein thrombosis Mother, Sister, Sister    Diabetes Father    Factor V Leiden deficiency Brother, Daughter    Melanoma Sister    No Known Problems Daughter, Son, Sister, Brother, Brother        Tobacco Use    Smoking status: Never Smoker    Smokeless tobacco: Never Used   Substance and Sexual Activity    Alcohol use: Yes     Alcohol/week: 3.0 standard drinks     Types: 1 Glasses of wine, 2 Shots of liquor per week     Frequency: Monthly or less     Drinks per session: 1 or 2     Binge frequency: Never     Comment:  Socially    Drug use: No    Sexual activity: Never     Partners: Male     Birth control/protection: Post-menopausal       Review of Systems   Constitutional: Positive for fatigue. Negative for activity change, appetite change and fever.   HENT: Negative for sinus pressure and sore throat.    Eyes: Negative for pain and visual disturbance.   Respiratory: Negative for cough, chest tightness and shortness of breath.    Cardiovascular: Negative for chest pain and palpitations.   Gastrointestinal: Positive for abdominal distention, abdominal pain, constipation, nausea and vomiting. Negative for diarrhea.   Musculoskeletal: Negative for gait problem and myalgias.   Neurological: Positive for weakness. Negative for dizziness, seizures, syncope, light-headedness and headaches.   Psychiatric/Behavioral: Negative for agitation, behavioral problems and hallucinations. The patient is not nervous/anxious.      Objective:     Vital Signs (Most Recent):  Temp: 97.5 °F (36.4 °C) (12/09/20 1156)  Pulse: 82 (12/09/20 1156)  Resp: 16 (12/09/20 1156)  BP: (!) 116/58 (12/09/20 1156)  SpO2: 97 % (12/09/20 1156) Vital Signs (24h Range):  Temp:  [97.5 °F (36.4 °C)-97.8 °F (36.6 °C)] 97.5 °F (36.4 °C)  Pulse:  [80-92] 82  Resp:  [16-18] 16  SpO2:  [96 %-100 %] 97 %  BP: (102-123)/(51-65) 116/58     Weight: 77.1 kg (170 lb)  Body mass index is 26.63 kg/m².    Physical Exam  Constitutional:       General: She is not in acute distress.     Appearance: She is well-developed. She is cachectic.      Comments: Frail appearing female    Neck:      Musculoskeletal: Normal range of motion.   Cardiovascular:      Rate and Rhythm: Normal rate and regular rhythm.      Heart sounds: No murmur.   Pulmonary:      Breath sounds: Normal breath sounds.   Chest:      Chest wall: No tenderness.   Abdominal:      General: Bowel sounds are normal. There is distension.   Musculoskeletal: Normal range of motion.      Right lower leg: Edema present.      Left  lower leg: Edema present.   Skin:     General: Skin is warm.   Neurological:      Mental Status: She is alert and oriented to person, place, and time.   Psychiatric:         Thought Content: Thought content normal.         Judgment: Judgment normal.         Review of Symptoms    Symptom Assessment (ESAS 0-10 Scale)  Pain:  3  Dyspnea:  0  Anxiety:  0  Nausea:  5  Depression:  0  Anorexia:  3  Fatigue:  7     Constipation:  Positive  Diarrhea:  Negative    Bowel Management Plan (BMP):  No            Performance Status:  60    ECOG Performance Status Grade:  2 - Ambulates, capable of self care only    Living Arrangements:  Lives with family      Advance Care Planning   Advance Directives:   Living Will: No    LaPOST: No    Do Not Resuscitate Status: No    Medical Power of : No      Decision Making:  Patient answered questions and Family answered questions         Significant Labs: All pertinent labs within the past 24 hours have been reviewed.  CBC:   Recent Labs   Lab 12/09/20 0925   WBC 8.66   HGB 12.6   HCT 39.7   MCV 91        BMP:  Recent Labs   Lab 12/09/20 0925   GLU 80   *   K 5.7*   CL 89*   CO2 21*   BUN 49*   CREATININE 0.8   CALCIUM 8.3*   MG 3.1*     LFT:  Lab Results   Component Value Date    AST 92 (H) 12/09/2020    ALKPHOS 731 (H) 12/09/2020    BILITOT 1.2 (H) 12/09/2020     Albumin:   Albumin   Date Value Ref Range Status   12/09/2020 1.9 (L) 3.5 - 5.2 g/dL Final     Protein:   Total Protein   Date Value Ref Range Status   12/09/2020 5.6 (L) 6.0 - 8.4 g/dL Final     Lactic acid:   No results found for: LACTATE    Significant Imaging: I have reviewed all pertinent imaging results/findings within the past 24 hours.     I have reviewed the chest x ray from 12/9/2020    I have reviewed the abdomen x ray from 12/2/2020    I have reviewed the TTE from 7/26/2019 (EF 67%)    Discussed with Dr. Steward     > 50% of 70 min visit spent in chart review, face to face discussion of goals of  care,  symptom assessment, coordination of care and emotional support.    Shirley Mcleod, DANIEL  Palliative Medicine  [facility]

## 2020-12-09 NOTE — SUBJECTIVE & OBJECTIVE
Scheduled Meds:   enoxaparin  1.5 mg/kg Subcutaneous Q24H    morphine  15 mg Oral BID     Continuous Infusions:   lactated ringers 100 mL/hr at 12/09/20 0920     PRN Meds:HYDROcodone-acetaminophen, HYDROcodone-acetaminophen, ondansetron, promethazine (PHENERGAN) IVPB, sodium chloride 0.9%    Review of patient's allergies indicates:  No Known Allergies    Objective:     Vital Signs (Most Recent):  Temp: 97.8 °F (36.6 °C) (12/09/20 0825)  Pulse: 85 (12/09/20 0925)  Resp: 16 (12/09/20 0925)  BP: 114/65 (12/09/20 0925)  SpO2: 99 % (12/09/20 0925) Vital Signs (24h Range):  Temp:  [97.7 °F (36.5 °C)-97.8 °F (36.6 °C)] 97.8 °F (36.6 °C)  Pulse:  [80-92] 85  Resp:  [16-18] 16  SpO2:  [96 %-100 %] 99 %  BP: (102-123)/(51-65) 114/65     Weight: 77.1 kg (170 lb)  Body mass index is 26.63 kg/m².    Intake/Output - Last 3 Shifts       12/07 0700 - 12/08 0659 12/08 0700 - 12/09 0659 12/09 0700 - 12/10 0659    Other   1600    Total Output   1600    Net   -1600                    Physical Exam:   Constitutional: She is oriented to person, place, and time. She appears well-developed.   Frail appearing    HENT:   Head: Normocephalic.    Eyes: EOM are normal.    Neck: Normal range of motion.    Cardiovascular: Normal rate.     Pulmonary/Chest: Effort normal.        Abdominal: Soft. She exhibits distension. She exhibits no mass. There is abdominal tenderness (Mild diffuse tenderness to palpation). There is no rebound and no guarding.             Musculoskeletal: Moves all extremeties.       Neurological: She is alert and oriented to person, place, and time.    Skin: Skin is warm and dry.    Psychiatric: She has a normal mood and affect. Her behavior is normal.       Lines/Drains/Airways     Central Venous Catheter Line            Port A Cath Single Lumen 07/23/20 0950 right subclavian 139 days                Laboratory:  BMP: No results for input(s): GLU, NA, K, CL, CO2, BUN, CREATININE, CALCIUM, MG in the last 48 hours., CBC:    Recent Labs   Lab 12/09/20  0925   WBC 8.66   HGB 12.6   HCT 39.7       and CMP: No results for input(s): NA, K, CL, CO2, GLU, BUN, CREATININE, CALCIUM, PROT, ALBUMIN, BILITOT, ALKPHOS, AST, ALT, ANIONGAP, EGFRNONAA in the last 48 hours.    Invalid input(s): [place]    Diagnostic Results:  CT abdomen/pelvis pending

## 2020-12-09 NOTE — PROGRESS NOTES
"[facility] [facility]  Gynecologic Oncology  Progress Note      Patient Name: Mickie Don  MRN: [id number]  Admission Date: 12/9/2020  Hospital Length of Stay: 0 days  Attending Provider: George Tapia MD  Primary Care Provider: Elle Ma MD  Principal Problem: <principal problem not specified>    Follow-up For: Procedure(s) (LRB):  PARACENTESIS, ABDOMINAL (N/A)  Post-Operative Day: Day of Surgery  Subjective:      History of Present Illness:  Ms. Don is a 65 year old female with Stage IIIC ovarian cancer and LUE DVT who presented to [facility] for IR paracentesis of acites and was noted to have significant abdominal distension as well as peristent nausea/vomiting. She was admitted on 11/18/20 for LUE DVT and was also found to have a SBO. She was discharged on 11/24 on clear liquid diet and lovenox. She reports persistent nausea/vomiting since discharge and has been unable to tolerate any solid foods. She reports being able to keep some liquids down for 30 min-2hrs at most. Reports abdominal pain that is partially relieved with morphine GA. She has not had a bowel movement since her discharge. Reports passing flatus. She states that she "thinks she's going downhill".      Treatment History  Xlap/Omentectomy on 11/1/16 - mass was unresectable  Stage IIIC ovarian cancer  Initiated Taxotere/Carbo on 11/18/16, now s/p 6 cycles completed 3/8/17  Xlap/ISIS/BSO/Radical mass resection > 10 cm, optimal debulking on 4/11/17  Initiated PRIMA on 8/21/17  BRCA negative  Started FORWARD1 on 11/27/17  Started Doxil/Anju as part of NRG  2/27/19, received total of 6 cycles  Gemzar monotherapy started 8/29/19  Topotecan started 8/14/2020  Taxol monotherapy started 10/16/2020  LUE DVT  PSBO    Hospital Course:  12/9/20- Admitted to Gyn-onc for suspected partial SBO. IV fluids given, CMP p, CT ab/pelvis with contrast ordered. NG tube to intermittent suction placed. Palliative consult placed.       Scheduled " Meds:   enoxaparin  1.5 mg/kg Subcutaneous Q24H    morphine  15 mg Oral BID     Continuous Infusions:   lactated ringers 100 mL/hr at 12/09/20 0920     PRN Meds:HYDROcodone-acetaminophen, HYDROcodone-acetaminophen, ondansetron, promethazine (PHENERGAN) IVPB, sodium chloride 0.9%    Review of patient's allergies indicates:  No Known Allergies    Objective:     Vital Signs (Most Recent):  Temp: 97.8 °F (36.6 °C) (12/09/20 0825)  Pulse: 85 (12/09/20 0925)  Resp: 16 (12/09/20 0925)  BP: 114/65 (12/09/20 0925)  SpO2: 99 % (12/09/20 0925) Vital Signs (24h Range):  Temp:  [97.7 °F (36.5 °C)-97.8 °F (36.6 °C)] 97.8 °F (36.6 °C)  Pulse:  [80-92] 85  Resp:  [16-18] 16  SpO2:  [96 %-100 %] 99 %  BP: (102-123)/(51-65) 114/65     Weight: 77.1 kg (170 lb)  Body mass index is 26.63 kg/m².    Intake/Output - Last 3 Shifts       12/07 0700 - 12/08 0659 12/08 0700 - 12/09 0659 12/09 0700 - 12/10 0659    Other   1600    Total Output   1600    Net   -1600                    Physical Exam:   Constitutional: She is oriented to person, place, and time. She appears well-developed.   Frail appearing    HENT:   Head: Normocephalic.    Eyes: EOM are normal.    Neck: Normal range of motion.    Cardiovascular: Normal rate.     Pulmonary/Chest: Effort normal.        Abdominal: Soft. She exhibits distension. She exhibits no mass. There is abdominal tenderness (Mild diffuse tenderness to palpation). There is no rebound and no guarding.             Musculoskeletal: Moves all extremeties.       Neurological: She is alert and oriented to person, place, and time.    Skin: Skin is warm and dry.    Psychiatric: She has a normal mood and affect. Her behavior is normal.       Lines/Drains/Airways     Central Venous Catheter Line            Port A Cath Single Lumen 07/23/20 0950 right subclavian 139 days                Laboratory:  BMP: No results for input(s): GLU, NA, K, CL, CO2, BUN, CREATININE, CALCIUM, MG in the last 48 hours., CBC:   Recent Labs    Lab 12/09/20 0925   WBC 8.66   HGB 12.6   HCT 39.7       and CMP: No results for input(s): NA, K, CL, CO2, GLU, BUN, CREATININE, CALCIUM, PROT, ALBUMIN, BILITOT, ALKPHOS, AST, ALT, ANIONGAP, EGFRNONAA in the last 48 hours.    Invalid input(s): [place]    Diagnostic Results:  CT abdomen/pelvis pending    Assessment/Plan:     Ascites  S/p paracentesis with 1.6 L serous fluid removed on 12/9/20    Small bowel obstruction  - Patient unable to tolerate PO and unable to have bowel movements with abdominal distension     Plan:  - Will admit to Gyn onc with likely partial SBO  - NPO except for ice chips  - NG tube to intermittent wall suction  - IVF @ 100cc/hr  - CMP, mag, phos pending  - CT abdomen/pelvis with contrast ordered    Acute deep vein thrombosis (DVT) of left upper extremity  - Continue lovenox 120mg q24    Ovarian cancer - IIIC  - See treatment history above  -  pending  - Continue MS contin q12  - Progressive disease leading to multiple small bowel obstructions  - Palliative consult placed      VTE Risk Mitigation (From admission, onward)         Ordered     enoxaparin injection 120 mg  Every 24 hours      12/09/20 0824     IP VTE HIGH RISK PATIENT  Once      12/09/20 0823     Place sequential compression device  Until discontinued      12/09/20 0823                Kristi Steward MD PGY1  Gynecologic Oncology  [address] [facility]

## 2020-12-09 NOTE — ASSESSMENT & PLAN NOTE
- Patient unable to tolerate PO and unable to have bowel movements with abdominal distension     Plan:  - Will admit to Gyn onc with likely partial SBO  - NPO except for ice chips  - NG tube to intermittent wall suction  - IVF @ 100cc/hr  - CMP, mag, phos pending  - CT abdomen/pelvis with contrast ordered

## 2020-12-09 NOTE — HPI
Ms. Don is a 65 y.o. female with Stage IIIC ovarian cancer  who presented for outpatient paracentesis with IR 2/2 ascites.  After paracentesis, patient reported dehydration, persistent nausea and vomiting.  Patient was recently admitted to our hospital on  for paracentesis and LE DVT was found.  Patient was discharged on clear liquid diet and Lovenox injections on .  Patient has not been able to tolerate any solid foods since discharge.  Patient reports she has been able to consume liquids and is able to keep them down for 30 mins- 2 hours. Patient was seen in the ED due to not having a bowel movement.  Patient reports she is passing flatus, but has not have a bowel movement since discharge.  Patient was admitted to inpatient status. NG has been placed. She is receiving IV fluids.  Order placed for CT abd/ pelvis     Ms. Don was working as a [employer] as [employer] until a month ago. Ms. Don reports worsening fatigue, weakness and anorexia in the last month.  She has 4 children, 1 is .  She lives alone, but her children are currently staying with her to care for her.

## 2020-12-09 NOTE — PLAN OF CARE
Problem: Infection  Goal: Infection Symptom Resolution  Outcome: Ongoing, Progressing     Problem: Adult Inpatient Plan of Care  Goal: Optimal Comfort and Wellbeing  Outcome: Ongoing, Progressing     Problem: Fall Injury Risk  Goal: Absence of Fall and Fall-Related Injury  Outcome: Ongoing, Progressing     Problem: Skin Injury Risk Increased  Goal: Skin Health and Integrity  Outcome: Ongoing, Progressing

## 2020-12-09 NOTE — H&P
Consult/H&P Note  Interventional Radiology    Consult Requested By: gyn onc    Reason for Consult: ascites    SUBJECTIVE:     Chief Complaint: distension    History of Present Illness: ovarian cancer and recurrent ascites.  She has been nauseated and feels dehydrated as well.    Past Medical History:   Diagnosis Date    Ascites 11/16/2020    Cancer     ovarian    Dry eyes 11/29/2017    Hematuria 11/17/2020    Neuropathy due to chemotherapeutic drug 1/10/2018    Open angle with borderline findings and low glaucoma risk in both eyes 6/20/2013    Ovarian cancer     Patient in cancer related research study 4/10/2018     Past Surgical History:   Procedure Laterality Date    BREAST BIOPSY      BUNIONECTOMY Left     CATARACT EXTRACTION W/  INTRAOCULAR LENS IMPLANT Right 04/04/2019        CATARACT EXTRACTION W/  INTRAOCULAR LENS IMPLANT Left 5/7/2019        CHOLECYSTECTOMY  1992    HYSTERECTOMY      INSERTION OF TUNNELED CENTRAL VENOUS CATHETER (CVC) WITH SUBCUTANEOUS PORT Right 7/23/2020    Procedure: INSERTION, PORT-A-CATH;  Surgeon: George Tapia MD;  Location: [facility], [place], operated by [facility] CATH LAB;  Service: Interventional Radiology;  Laterality: Right;    left leg surgery  2005    achilles tendon    OOPHORECTOMY      PERITONEOCENTESIS N/A 11/16/2020    Procedure: PARACENTESIS, ABDOMINAL;  Surgeon: George Tapia MD;  Location: [facility], [place], operated by [facility] CATH LAB;  Service: Radiology;  Laterality: N/A;    PERITONEOCENTESIS N/A 11/23/2020    Procedure: PARACENTESIS, ABDOMINAL;  Surgeon: George Tapia MD;  Location: [facility], [place], operated by [facility] CATH LAB;  Service: Radiology;  Laterality: N/A;    PHACOEMULSIFICATION OF CATARACT Right 4/4/2019    Procedure: PHACOEMULSIFICATION, CATARACT;  Surgeon: Karen Butt MD;  Location: [address];  Service: Ophthalmology;  Laterality: Right;    IL REMOVAL OF OVARY/TUBE(S)      REPEAT ABDOMINAL PARACENTESIS Right 12/2/2020    Procedure: PARACENTESIS, ABDOMINAL, REPEAT;  Surgeon: George  SURESH Tapia MD;  Location: [facility], [place] CATH LAB;  Service: Radiology;  Laterality: Right;    right leg surgery  2006    broken, including ankle    THORACENTESIS N/A 11/23/2020    Procedure: THORACENTESIS;  Surgeon: George Tapia MD;  Location: [facility], [place] CATH LAB;  Service: Radiology;  Laterality: N/A;    THORACENTESIS Left 12/2/2020    Procedure: Thoracentesis;  Surgeon: George Tapia MD;  Location: [facility], [place] CATH LAB;  Service: Radiology;  Laterality: Left;     Family History   Problem Relation Age of Onset    Cancer Father         ? liver ca    Diabetes Father     Cataracts Mother     Deep vein thrombosis Mother     Breast cancer Maternal Aunt     Cancer Son         sarcoma    Melanoma Sister     Deep vein thrombosis Sister     Factor V Leiden deficiency Brother     Factor V Leiden deficiency Daughter         trait only     No Known Problems Daughter     No Known Problems Son     Deep vein thrombosis Sister     No Known Problems Sister     No Known Problems Brother     No Known Problems Brother     Colon cancer Neg Hx     Ovarian cancer Neg Hx     Amblyopia Neg Hx     Blindness Neg Hx     Glaucoma Neg Hx     Hypertension Neg Hx     Macular degeneration Neg Hx     Retinal detachment Neg Hx     Strabismus Neg Hx     Stroke Neg Hx     Thyroid disease Neg Hx     Hyperlipidemia Neg Hx      Social History     Tobacco Use    Smoking status: Never Smoker    Smokeless tobacco: Never Used   Substance Use Topics    Alcohol use: Yes     Alcohol/week: 3.0 standard drinks     Types: 1 Glasses of wine, 2 Shots of liquor per week     Frequency: Monthly or less     Drinks per session: 1 or 2     Binge frequency: Never     Comment: Socially    Drug use: No       Review of Systems:  Constitutional/General:Positive for change in appetite.  Hematological/Immuno: no known coagulopathies  Respiratory: no shortness of breath  Cardiovascular: no chest pain  Gastrointestinal: positive for - abdominal pain,  gas/bloating and nausea/vomiting  Genito-Urinary: no dysuria  Musculoskeletal: negative  Skin: Negative for rash, itching, pigmentation changes, nail or hair changes.  Neurological: no TIA or stroke symptoms  Psychiatric: normal mood/affect, good insight/judgement      OBJECTIVE:     Vital Signs Range (Last 24H):  Temp:  [97.7 °F (36.5 °C)]   Pulse:  [84-92]   Resp:  [18]   BP: (102-107)/(61-65)   SpO2:  [96 %-98 %]     Physical Exam:  General- Patient alert and oriented x3 in NAD  ENT- PERRLA,  Neck- No masses  CV- Regular rate and rhythm  Resp-  No increased WOB  GI- Non tender/non-distended  Extrem- No cyanosis, clubbing, edema.   Derm- No rashes, masses, or lesions noted  Neuro-  No focal deficits noted.     Physical Exam  Body mass index is 26.63 kg/m².    Scheduled Meds:   Continuous Infusions:   PRN Meds:    Allergies: Review of patient's allergies indicates:  No Known Allergies    Labs:  No results for input(s): INR in the last 168 hours.    Invalid input(s):  PT,  PTT    Recent Labs   Lab 12/02/20 0852   WBC 10.37   HGB 13.2   HCT 42.3   MCV 92         Recent Labs   Lab 12/02/20  0852 12/02/20  1505   GLU 85 90   * 131*   K 6.3* 5.3*   CL 93* 93*   CO2 23 22*   BUN 25* 28*   CREATININE 1.0 1.1   CALCIUM 9.3 9.1   ALT 46*  --    AST 73*  --    ALBUMIN 2.4*  --    BILITOT 1.4*  --        Vitals (Most Recent):  Temp: 97.7 °F (36.5 °C) (12/09/20 0710)  Pulse: 84 (12/09/20 0745)  Resp: 18 (12/09/20 0745)  BP: 107/65 (12/09/20 0745)  SpO2: 96 % (12/09/20 0745)    ASA: 3  Mallampati: 2    Consent obtained    ASSESSMENT/PLAN:     Paracentesis.  Local anesthesia.  Will access port for IV fluids prior to discharge.    Active Hospital Problems    Diagnosis  POA    Ovarian cancer - IIIC [C56.9]  Yes     Priority: Low      Resolved Hospital Problems   No resolved problems to display.           George Tapia MD

## 2020-12-09 NOTE — PLAN OF CARE
12/09/20 1555   Readmission Questionnaire   At the time of your discharge, did someone talk to you about what your health problems were? Yes   At the time of discharge, did someone talk to you about what to watch out for regarding worsening of your health problem? Yes   At the time of discharge, did someone talk to you about what to do if you experienced worsening of your health problem? Yes   At the time of discharge, did someone talk to you about which medication to take when you left the hospital and which ones to stop taking? Yes   At the time of discharge, did someone talk to you about when and where to follow up with a doctor after you left the hospital? Yes

## 2020-12-10 ENCOUNTER — PATIENT MESSAGE (OUTPATIENT)
Dept: GYNECOLOGIC ONCOLOGY | Facility: CLINIC | Age: 65
End: 2020-12-10

## 2020-12-10 PROBLEM — E87.5 HYPERKALEMIA: Status: ACTIVE | Noted: 2020-12-10

## 2020-12-10 LAB
ANION GAP SERPL CALC-SCNC: 15 MMOL/L (ref 8–16)
BUN SERPL-MCNC: 42 MG/DL (ref 8–23)
CALCIUM SERPL-MCNC: 7.6 MG/DL (ref 8.7–10.5)
CHLORIDE SERPL-SCNC: 91 MMOL/L (ref 95–110)
CO2 SERPL-SCNC: 22 MMOL/L (ref 23–29)
CREAT SERPL-MCNC: 0.7 MG/DL (ref 0.5–1.4)
EST. GFR  (AFRICAN AMERICAN): >60 ML/MIN/1.73 M^2
EST. GFR  (NON AFRICAN AMERICAN): >60 ML/MIN/1.73 M^2
GLUCOSE SERPL-MCNC: 76 MG/DL (ref 70–110)
POTASSIUM SERPL-SCNC: 5 MMOL/L (ref 3.5–5.1)
POTASSIUM SERPL-SCNC: 5 MMOL/L (ref 3.5–5.1)
POTASSIUM SERPL-SCNC: 5.2 MMOL/L (ref 3.5–5.1)
POTASSIUM SERPL-SCNC: 5.5 MMOL/L (ref 3.5–5.1)
SODIUM SERPL-SCNC: 128 MMOL/L (ref 136–145)

## 2020-12-10 PROCEDURE — 21400001 HC TELEMETRY ROOM

## 2020-12-10 PROCEDURE — 99223 1ST HOSP IP/OBS HIGH 75: CPT | Mod: ,,, | Performed by: OBSTETRICS & GYNECOLOGY

## 2020-12-10 PROCEDURE — 94770 HC EXHALED C02 TEST: CPT

## 2020-12-10 PROCEDURE — 84132 ASSAY OF SERUM POTASSIUM: CPT

## 2020-12-10 PROCEDURE — 94761 N-INVAS EAR/PLS OXIMETRY MLT: CPT

## 2020-12-10 PROCEDURE — 97535 SELF CARE MNGMENT TRAINING: CPT

## 2020-12-10 PROCEDURE — 84132 ASSAY OF SERUM POTASSIUM: CPT | Mod: 91

## 2020-12-10 PROCEDURE — 80048 BASIC METABOLIC PNL TOTAL CA: CPT

## 2020-12-10 PROCEDURE — 25000003 PHARM REV CODE 250: Performed by: STUDENT IN AN ORGANIZED HEALTH CARE EDUCATION/TRAINING PROGRAM

## 2020-12-10 PROCEDURE — 99223 PR INITIAL HOSPITAL CARE,LEVL III: ICD-10-PCS | Mod: ,,, | Performed by: OBSTETRICS & GYNECOLOGY

## 2020-12-10 PROCEDURE — 63600175 PHARM REV CODE 636 W HCPCS: Performed by: STUDENT IN AN ORGANIZED HEALTH CARE EDUCATION/TRAINING PROGRAM

## 2020-12-10 PROCEDURE — 97166 OT EVAL MOD COMPLEX 45 MIN: CPT

## 2020-12-10 PROCEDURE — 99900035 HC TECH TIME PER 15 MIN (STAT)

## 2020-12-10 RX ORDER — HYDROMORPHONE HYDROCHLORIDE 1 MG/ML
0.5 INJECTION, SOLUTION INTRAMUSCULAR; INTRAVENOUS; SUBCUTANEOUS ONCE
Status: COMPLETED | OUTPATIENT
Start: 2020-12-10 | End: 2020-12-10

## 2020-12-10 RX ORDER — FUROSEMIDE 10 MG/ML
40 INJECTION INTRAMUSCULAR; INTRAVENOUS ONCE
Status: COMPLETED | OUTPATIENT
Start: 2020-12-10 | End: 2020-12-10

## 2020-12-10 RX ORDER — MORPHINE SULFATE 1 MG/ML
INJECTION INTRAVENOUS CONTINUOUS
Status: DISCONTINUED | OUTPATIENT
Start: 2020-12-10 | End: 2020-12-11

## 2020-12-10 RX ORDER — NALOXONE HCL 0.4 MG/ML
0.02 VIAL (ML) INJECTION
Status: DISCONTINUED | OUTPATIENT
Start: 2020-12-10 | End: 2020-12-12

## 2020-12-10 RX ORDER — MORPHINE SULFATE 4 MG/ML
4 INJECTION, SOLUTION INTRAMUSCULAR; INTRAVENOUS EVERY 4 HOURS PRN
Status: DISCONTINUED | OUTPATIENT
Start: 2020-12-10 | End: 2020-12-10

## 2020-12-10 RX ORDER — DEXTROSE, SODIUM CHLORIDE, SODIUM LACTATE, POTASSIUM CHLORIDE, AND CALCIUM CHLORIDE 5; .6; .31; .03; .02 G/100ML; G/100ML; G/100ML; G/100ML; G/100ML
INJECTION, SOLUTION INTRAVENOUS CONTINUOUS
Status: DISCONTINUED | OUTPATIENT
Start: 2020-12-10 | End: 2020-12-11

## 2020-12-10 RX ORDER — MORPHINE SULFATE 1 MG/ML
INJECTION INTRAVENOUS CONTINUOUS
Status: DISCONTINUED | OUTPATIENT
Start: 2020-12-10 | End: 2020-12-10

## 2020-12-10 RX ADMIN — DEXTROSE, SODIUM CHLORIDE, SODIUM LACTATE, POTASSIUM CHLORIDE, AND CALCIUM CHLORIDE: 5; .6; .31; .03; .02 INJECTION, SOLUTION INTRAVENOUS at 09:12

## 2020-12-10 RX ADMIN — MORPHINE SULFATE 4 MG: 4 INJECTION, SOLUTION INTRAMUSCULAR; INTRAVENOUS at 01:12

## 2020-12-10 RX ADMIN — HYDROMORPHONE HYDROCHLORIDE 0.5 MG: 1 INJECTION, SOLUTION INTRAMUSCULAR; INTRAVENOUS; SUBCUTANEOUS at 10:12

## 2020-12-10 RX ADMIN — SODIUM CHLORIDE, SODIUM LACTATE, POTASSIUM CHLORIDE, AND CALCIUM CHLORIDE: .6; .31; .03; .02 INJECTION, SOLUTION INTRAVENOUS at 03:12

## 2020-12-10 RX ADMIN — FUROSEMIDE 40 MG: 10 INJECTION, SOLUTION INTRAMUSCULAR; INTRAVENOUS at 04:12

## 2020-12-10 RX ADMIN — SODIUM CHLORIDE: 9 INJECTION, SOLUTION INTRAVENOUS at 04:12

## 2020-12-10 NOTE — PLAN OF CARE
Patient is awake, alert, and oriented x 4. Patient is ambulatory with max assist. Patient is incontinent. Patient has an accessed right subclavian port.  Patient complained of pain during the night and medications were administered per orders. NG tube in place on intermittent suction. Nausea and vomiting present. NPO except ice chips. VSS through the shift. SCDs and telemetry in place. Potassium drawn every 4 hours per orders. Care clustered to promote rest. Patient remained free from injury and falls throughout the night. Bed locked in lowest position with side rails up x 2. Call light is within reach of patient. Purposeful rounding maintained throughout shift. Patient has no complaints at present. Will continue to monitor.      0800-400 output of NG tube noted.

## 2020-12-10 NOTE — SUBJECTIVE & OBJECTIVE
Interval History: NG to intermittent suction. Endorses nausea w/ several episodes of spitting up/emesis. Pain well controlled w/ scheduled and prn meds. Voiding spontaneously.    Scheduled Meds:   enoxaparin  1.5 mg/kg Subcutaneous Q24H    morphine  15 mg Oral BID     Continuous Infusions:   lactated ringers 50 mL/hr at 12/10/20 0358     PRN Meds:HYDROcodone-acetaminophen, HYDROcodone-acetaminophen, HYDROmorphone, ondansetron, promethazine (PHENERGAN) IVPB, sodium chloride 0.9%    Review of patient's allergies indicates:  No Known Allergies    Objective:     Vital Signs (Most Recent):  Temp: 98.1 °F (36.7 °C) (12/10/20 0355)  Pulse: 88 (12/10/20 0600)  Resp: 18 (12/09/20 2038)  BP: 109/70 (12/10/20 0355)  SpO2: 95 % (12/10/20 0355) Vital Signs (24h Range):  Temp:  [97.5 °F (36.4 °C)-98.1 °F (36.7 °C)] 98.1 °F (36.7 °C)  Pulse:  [80-94] 88  Resp:  [16-18] 18  SpO2:  [95 %-100 %] 95 %  BP: (102-123)/(51-70) 109/70     Weight: 77.1 kg (170 lb)  Body mass index is 26.63 kg/m².    Intake/Output - Last 3 Shifts       12/08 0700 - 12/09 0659 12/09 0700 - 12/10 0659    Other  1600    Total Output  1600    Net  -1600          Urine Occurrence  5 x             Physical Exam    Lines/Drains/Airways     Central Venous Catheter Line            Port A Cath Single Lumen 07/23/20 0950 right subclavian 139 days          Drain                 NG/OG Tube 12/09/20 1420 [place] sump 14 Fr. Right nostril less than 1 day                Laboratory:  BMP:   Recent Labs   Lab 12/09/20 0925 12/09/20  1949 12/10/20  0020 12/10/20  0430   GLU 80  --   --   --    *  --   --   --    K 5.7* 5.1 5.5* 5.2*   CL 89*  --   --   --    CO2 21*  --   --   --    BUN 49*  --   --   --    CREATININE 0.8  --   --   --    CALCIUM 8.3*  --   --   --    MG 3.1*  --   --   --    , CBC:   Recent Labs   Lab 12/09/20  0925   WBC 8.66   HGB 12.6   HCT 39.7       and All pertinent labs from the last 24 hours have been reviewed.    Diagnostic  Results:  Labs: Reviewed  CT: Reviewed

## 2020-12-10 NOTE — ASSESSMENT & PLAN NOTE
- See treatment history above  -  increased from   - Continue MS contin q12  - Progressive disease leading to multiple small bowel obstructions  - Palliative consult placed

## 2020-12-10 NOTE — PROGRESS NOTES
"[facility]  Gynecologic Oncology  Progress Note      Patient Name: Mickie Don  MRN: [id number]  Admission Date: 12/9/2020  Hospital Length of Stay: 1 days  Attending Provider: Benny Tran MD  Primary Care Provider: Elle Ma MD  Principal Problem: Ovarian cancer    Follow-up For: Procedure(s) (LRB):  PARACENTESIS, ABDOMINAL (N/A)  Post-Operative Day: 1 Day Post-Op  Subjective:      History of Present Illness:  Ms. Don is a 65 year old female with Stage IIIC ovarian cancer and LUE DVT who presented to [facility] for IR paracentesis of acites and was noted to have significant abdominal distension as well as peristent nausea/vomiting. She was admitted on 11/18/20 for LUE DVT and was also found to have a SBO. She was discharged on 11/24 on clear liquid diet and lovenox. She reports persistent nausea/vomiting since discharge and has been unable to tolerate any solid foods. She reports being able to keep some liquids down for 30 min-2hrs at most. Reports abdominal pain that is partially relieved with morphine TX. She has not had a bowel movement since her discharge. Reports passing flatus. She states that she "thinks she's going downhill".      Treatment History  Xlap/Omentectomy on 11/1/16 - mass was unresectable  Stage IIIC ovarian cancer  Initiated Taxotere/Carbo on 11/18/16, now s/p 6 cycles completed 3/8/17  Xlap/ISIS/BSO/Radical mass resection > 10 cm, optimal debulking on 4/11/17  Initiated PRIMA on 8/21/17  BRCA negative  Started FORWARD1 on 11/27/17  Started Doxil/Anju as part of NRG  2/27/19, received total of 6 cycles  Gemzar monotherapy started 8/29/19  Topotecan started 8/14/2020  Taxol monotherapy started 10/16/2020  LUE DVT  PSBO    Hospital Course:  12/9/20- Admitted to Gyn-onc for suspected partial SBO. IV fluids given, CMP p, CT ab/pelvis with contrast ordered. NG tube to intermittent suction placed. Palliative consult placed.   12/10/2020 - Emesis overnight. approx 250cc " suction out since admission     Interval History: NG to intermittent suction. Endorses nausea w/ several episodes of spitting up/emesis. Pain well controlled w/ scheduled and prn meds. Voiding spontaneously.    Scheduled Meds:   enoxaparin  1.5 mg/kg Subcutaneous Q24H    morphine  15 mg Oral BID     Continuous Infusions:   lactated ringers 50 mL/hr at 12/10/20 0358     PRN Meds:HYDROcodone-acetaminophen, HYDROcodone-acetaminophen, HYDROmorphone, ondansetron, promethazine (PHENERGAN) IVPB, sodium chloride 0.9%    Review of patient's allergies indicates:  No Known Allergies    Objective:     Vital Signs (Most Recent):  Temp: 98.1 °F (36.7 °C) (12/10/20 0355)  Pulse: 88 (12/10/20 0600)  Resp: 18 (12/09/20 2038)  BP: 109/70 (12/10/20 0355)  SpO2: 95 % (12/10/20 0355) Vital Signs (24h Range):  Temp:  [97.5 °F (36.4 °C)-98.1 °F (36.7 °C)] 98.1 °F (36.7 °C)  Pulse:  [80-94] 88  Resp:  [16-18] 18  SpO2:  [95 %-100 %] 95 %  BP: (102-123)/(51-70) 109/70     Weight: 77.1 kg (170 lb)  Body mass index is 26.63 kg/m².    Intake/Output - Last 3 Shifts       12/08 0700 - 12/09 0659 12/09 0700 - 12/10 0659    Other  1600    Total Output  1600    Net  -1600          Urine Occurrence  5 x             Physical Exam    Lines/Drains/Airways     Central Venous Catheter Line            Port A Cath Single Lumen 07/23/20 0950 right subclavian 139 days          Drain                 NG/OG Tube 12/09/20 1420 [place] sump 14 Fr. Right nostril less than 1 day                Laboratory:  BMP:   Recent Labs   Lab 12/09/20 0925 12/09/20  1949 12/10/20  0020 12/10/20  0430   GLU 80  --   --   --    *  --   --   --    K 5.7* 5.1 5.5* 5.2*   CL 89*  --   --   --    CO2 21*  --   --   --    BUN 49*  --   --   --    CREATININE 0.8  --   --   --    CALCIUM 8.3*  --   --   --    MG 3.1*  --   --   --    , CBC:   Recent Labs   Lab 12/09/20  0925   WBC 8.66   HGB 12.6   HCT 39.7       and All pertinent labs from the last 24 hours have been  reviewed.    Diagnostic Results:  Labs: Reviewed  CT: Reviewed    Assessment/Plan:     * Ovarian cancer - IIIC  - See treatment history above  -  increased from   - Continue MS contin q12  - Progressive disease leading to multiple small bowel obstructions  - Palliative consult placed    Hyperkalemia  - s/p albuterol, lasix  - 5.7>5.1>5.2>  - Repeat BMP ordered for today  - Lasix as needed    Ascites  S/p paracentesis with 1.6 L serous fluid removed on 12/9/20    Small bowel obstruction  - Patient unable to tolerate PO and unable to have bowel movements with abdominal distension     Plan:  - Concern for partial SBO  - NG tube output approx 250 cc overnight  - NPO except for ice chips  - NG tube to intermittent wall suction  - IVF @ 100cc/hr  - Mildly hyponatremic, hypochloremic; repeat BMP later today  - CT abdomen/pelvis with contrast ordered    Acute deep vein thrombosis (DVT) of left upper extremity  - Continue lovenox 120mg q24      VTE Risk Mitigation (From admission, onward)         Ordered     enoxaparin injection 120 mg  Every 24 hours      12/09/20 0824     IP VTE HIGH RISK PATIENT  Once      12/09/20 0823     Place sequential compression device  Until discontinued      12/09/20 0823                Was elias catheter removed? n/a    SURESH Edmonds MD  OBGYN PGY-2   Gyn Onc

## 2020-12-10 NOTE — ASSESSMENT & PLAN NOTE
- Patient unable to tolerate PO and unable to have bowel movements with abdominal distension     Plan:  - Concern for partial SBO  - NG tube output approx 250 cc overnight  - NPO except for ice chips  - NG tube to intermittent wall suction  - IVF @ 100cc/hr  - Mildly hyponatremic, hypochloremic; repeat BMP later today  - CT abdomen/pelvis with contrast ordered

## 2020-12-11 ENCOUNTER — ANESTHESIA EVENT (OUTPATIENT)
Dept: SURGERY | Facility: OTHER | Age: 65
DRG: 982 | End: 2020-12-11
Payer: COMMERCIAL

## 2020-12-11 ENCOUNTER — ANESTHESIA (OUTPATIENT)
Dept: SURGERY | Facility: OTHER | Age: 65
DRG: 982 | End: 2020-12-11
Payer: COMMERCIAL

## 2020-12-11 LAB
ANION GAP SERPL CALC-SCNC: 14 MMOL/L (ref 8–16)
BUN SERPL-MCNC: 49 MG/DL (ref 8–23)
CALCIUM SERPL-MCNC: 8 MG/DL (ref 8.7–10.5)
CHLORIDE SERPL-SCNC: 90 MMOL/L (ref 95–110)
CO2 SERPL-SCNC: 24 MMOL/L (ref 23–29)
CREAT SERPL-MCNC: 0.8 MG/DL (ref 0.5–1.4)
EST. GFR  (AFRICAN AMERICAN): >60 ML/MIN/1.73 M^2
EST. GFR  (NON AFRICAN AMERICAN): >60 ML/MIN/1.73 M^2
GLUCOSE SERPL-MCNC: 117 MG/DL (ref 70–110)
POTASSIUM SERPL-SCNC: 4.8 MMOL/L (ref 3.5–5.1)
SODIUM SERPL-SCNC: 128 MMOL/L (ref 136–145)

## 2020-12-11 PROCEDURE — 63600175 PHARM REV CODE 636 W HCPCS: Performed by: REGISTERED NURSE

## 2020-12-11 PROCEDURE — 25000003 PHARM REV CODE 250: Performed by: REGISTERED NURSE

## 2020-12-11 PROCEDURE — 25000003 PHARM REV CODE 250: Performed by: STUDENT IN AN ORGANIZED HEALTH CARE EDUCATION/TRAINING PROGRAM

## 2020-12-11 PROCEDURE — 36000707: Performed by: SPECIALIST

## 2020-12-11 PROCEDURE — 80048 BASIC METABOLIC PNL TOTAL CA: CPT

## 2020-12-11 PROCEDURE — 99232 SBSQ HOSP IP/OBS MODERATE 35: CPT | Mod: ,,, | Performed by: FAMILY MEDICINE

## 2020-12-11 PROCEDURE — 99497 ADVNCD CARE PLAN 30 MIN: CPT | Mod: ,,, | Performed by: FAMILY MEDICINE

## 2020-12-11 PROCEDURE — 71000039 HC RECOVERY, EACH ADD'L HOUR: Performed by: SPECIALIST

## 2020-12-11 PROCEDURE — 63600175 PHARM REV CODE 636 W HCPCS: Performed by: SPECIALIST

## 2020-12-11 PROCEDURE — 25000003 PHARM REV CODE 250: Performed by: SPECIALIST

## 2020-12-11 PROCEDURE — C9290 INJ, BUPIVACAINE LIPOSOME: HCPCS | Performed by: SPECIALIST

## 2020-12-11 PROCEDURE — 99232 PR SUBSEQUENT HOSPITAL CARE,LEVL II: ICD-10-PCS | Mod: ,,, | Performed by: FAMILY MEDICINE

## 2020-12-11 PROCEDURE — 37000009 HC ANESTHESIA EA ADD 15 MINS: Performed by: SPECIALIST

## 2020-12-11 PROCEDURE — 99497 PR ADVNCD CARE PLAN 30 MIN: ICD-10-PCS | Mod: ,,, | Performed by: FAMILY MEDICINE

## 2020-12-11 PROCEDURE — 27800903 OPTIME MED/SURG SUP & DEVICES OTHER IMPLANTS: Performed by: SPECIALIST

## 2020-12-11 PROCEDURE — 94761 N-INVAS EAR/PLS OXIMETRY MLT: CPT

## 2020-12-11 PROCEDURE — 37000008 HC ANESTHESIA 1ST 15 MINUTES: Performed by: SPECIALIST

## 2020-12-11 PROCEDURE — 63600175 PHARM REV CODE 636 W HCPCS: Performed by: ANESTHESIOLOGY

## 2020-12-11 PROCEDURE — 21400001 HC TELEMETRY ROOM

## 2020-12-11 PROCEDURE — 36000706: Performed by: SPECIALIST

## 2020-12-11 PROCEDURE — 71000033 HC RECOVERY, INTIAL HOUR: Performed by: SPECIALIST

## 2020-12-11 PROCEDURE — P9045 ALBUMIN (HUMAN), 5%, 250 ML: HCPCS | Mod: JG | Performed by: REGISTERED NURSE

## 2020-12-11 PROCEDURE — 63600175 PHARM REV CODE 636 W HCPCS: Performed by: STUDENT IN AN ORGANIZED HEALTH CARE EDUCATION/TRAINING PROGRAM

## 2020-12-11 DEVICE — IMPLANTABLE DEVICE: Type: IMPLANTABLE DEVICE | Site: ABDOMEN | Status: FUNCTIONAL

## 2020-12-11 RX ORDER — NEOSTIGMINE METHYLSULFATE 1 MG/ML
INJECTION, SOLUTION INTRAVENOUS
Status: DISCONTINUED | OUTPATIENT
Start: 2020-12-11 | End: 2020-12-11

## 2020-12-11 RX ORDER — FENTANYL CITRATE 50 UG/ML
INJECTION, SOLUTION INTRAMUSCULAR; INTRAVENOUS
Status: DISCONTINUED | OUTPATIENT
Start: 2020-12-11 | End: 2020-12-11

## 2020-12-11 RX ORDER — FUROSEMIDE 10 MG/ML
40 INJECTION INTRAMUSCULAR; INTRAVENOUS ONCE
Status: COMPLETED | OUTPATIENT
Start: 2020-12-11 | End: 2020-12-11

## 2020-12-11 RX ORDER — ROCURONIUM BROMIDE 10 MG/ML
INJECTION, SOLUTION INTRAVENOUS
Status: DISCONTINUED | OUTPATIENT
Start: 2020-12-11 | End: 2020-12-11

## 2020-12-11 RX ORDER — ONDANSETRON 2 MG/ML
INJECTION INTRAMUSCULAR; INTRAVENOUS
Status: DISCONTINUED | OUTPATIENT
Start: 2020-12-11 | End: 2020-12-11

## 2020-12-11 RX ORDER — PHENYLEPHRINE HYDROCHLORIDE 10 MG/ML
INJECTION INTRAVENOUS
Status: DISCONTINUED | OUTPATIENT
Start: 2020-12-11 | End: 2020-12-11

## 2020-12-11 RX ORDER — SODIUM CHLORIDE 9 MG/ML
INJECTION, SOLUTION INTRAVENOUS CONTINUOUS PRN
Status: DISCONTINUED | OUTPATIENT
Start: 2020-12-11 | End: 2020-12-11

## 2020-12-11 RX ORDER — VASOPRESSIN 20 [USP'U]/ML
INJECTION, SOLUTION INTRAMUSCULAR; SUBCUTANEOUS
Status: DISCONTINUED | OUTPATIENT
Start: 2020-12-11 | End: 2020-12-11

## 2020-12-11 RX ORDER — HYDROMORPHONE HYDROCHLORIDE 2 MG/ML
0.4 INJECTION, SOLUTION INTRAMUSCULAR; INTRAVENOUS; SUBCUTANEOUS EVERY 5 MIN PRN
Status: DISCONTINUED | OUTPATIENT
Start: 2020-12-11 | End: 2020-12-11 | Stop reason: HOSPADM

## 2020-12-11 RX ORDER — DIPHENHYDRAMINE HYDROCHLORIDE 50 MG/ML
25 INJECTION INTRAMUSCULAR; INTRAVENOUS EVERY 6 HOURS PRN
Status: DISCONTINUED | OUTPATIENT
Start: 2020-12-11 | End: 2020-12-11 | Stop reason: HOSPADM

## 2020-12-11 RX ORDER — BUPIVACAINE HCL/EPINEPHRINE 0.25-.0005
VIAL (ML) INJECTION
Status: DISCONTINUED | OUTPATIENT
Start: 2020-12-11 | End: 2020-12-11 | Stop reason: HOSPADM

## 2020-12-11 RX ORDER — LIDOCAINE HCL/PF 100 MG/5ML
SYRINGE (ML) INTRAVENOUS
Status: DISCONTINUED | OUTPATIENT
Start: 2020-12-11 | End: 2020-12-11

## 2020-12-11 RX ORDER — SODIUM CHLORIDE 0.9 % (FLUSH) 0.9 %
3 SYRINGE (ML) INJECTION
Status: DISCONTINUED | OUTPATIENT
Start: 2020-12-11 | End: 2020-12-12 | Stop reason: HOSPADM

## 2020-12-11 RX ORDER — SUCCINYLCHOLINE CHLORIDE 20 MG/ML
INJECTION INTRAMUSCULAR; INTRAVENOUS
Status: DISCONTINUED | OUTPATIENT
Start: 2020-12-11 | End: 2020-12-11

## 2020-12-11 RX ORDER — MORPHINE SULFATE 4 MG/ML
4 INJECTION, SOLUTION INTRAMUSCULAR; INTRAVENOUS EVERY 6 HOURS PRN
Status: DISCONTINUED | OUTPATIENT
Start: 2020-12-11 | End: 2020-12-12

## 2020-12-11 RX ORDER — ALBUMIN HUMAN 50 G/1000ML
SOLUTION INTRAVENOUS CONTINUOUS PRN
Status: DISCONTINUED | OUTPATIENT
Start: 2020-12-11 | End: 2020-12-11

## 2020-12-11 RX ORDER — ENOXAPARIN SODIUM 150 MG/ML
1.5 INJECTION SUBCUTANEOUS DAILY
Qty: 24 ML | Refills: 0 | Status: SHIPPED | OUTPATIENT
Start: 2020-12-11 | End: 2021-01-10

## 2020-12-11 RX ORDER — OXYCODONE HYDROCHLORIDE 5 MG/1
5 TABLET ORAL
Status: DISCONTINUED | OUTPATIENT
Start: 2020-12-11 | End: 2020-12-11 | Stop reason: HOSPADM

## 2020-12-11 RX ORDER — ONDANSETRON 2 MG/ML
4 INJECTION INTRAMUSCULAR; INTRAVENOUS DAILY PRN
Status: DISCONTINUED | OUTPATIENT
Start: 2020-12-11 | End: 2020-12-11 | Stop reason: HOSPADM

## 2020-12-11 RX ORDER — PROPOFOL 10 MG/ML
INJECTION, EMULSION INTRAVENOUS
Status: DISCONTINUED | OUTPATIENT
Start: 2020-12-11 | End: 2020-12-11

## 2020-12-11 RX ADMIN — PHENYLEPHRINE HYDROCHLORIDE 100 MCG: 10 INJECTION INTRAVENOUS at 02:12

## 2020-12-11 RX ADMIN — SUCCINYLCHOLINE CHLORIDE 100 MG: 20 INJECTION, SOLUTION INTRAMUSCULAR; INTRAVENOUS at 02:12

## 2020-12-11 RX ADMIN — PROPOFOL 100 MG: 10 INJECTION, EMULSION INTRAVENOUS at 02:12

## 2020-12-11 RX ADMIN — PHENYLEPHRINE HYDROCHLORIDE 200 MCG: 10 INJECTION INTRAVENOUS at 02:12

## 2020-12-11 RX ADMIN — ROCURONIUM BROMIDE 5 MG: 10 SOLUTION INTRAVENOUS at 02:12

## 2020-12-11 RX ADMIN — PROMETHAZINE HYDROCHLORIDE 12.5 MG: 25 INJECTION INTRAMUSCULAR; INTRAVENOUS at 09:12

## 2020-12-11 RX ADMIN — NEOSTIGMINE METHYLSULFATE 5 MG: 1 INJECTION INTRAVENOUS at 04:12

## 2020-12-11 RX ADMIN — DEXTROSE 2 G: 50 INJECTION, SOLUTION INTRAVENOUS at 03:12

## 2020-12-11 RX ADMIN — FUROSEMIDE 40 MG: 10 INJECTION, SOLUTION INTRAMUSCULAR; INTRAVENOUS at 07:12

## 2020-12-11 RX ADMIN — MORPHINE SULFATE 2 MG: 4 INJECTION, SOLUTION INTRAMUSCULAR; INTRAVENOUS at 07:12

## 2020-12-11 RX ADMIN — VASOPRESSIN 4 UNITS: 20 INJECTION, SOLUTION INTRAMUSCULAR; SUBCUTANEOUS at 02:12

## 2020-12-11 RX ADMIN — HYDROMORPHONE HYDROCHLORIDE 0.4 MG: 2 INJECTION INTRAMUSCULAR; INTRAVENOUS; SUBCUTANEOUS at 05:12

## 2020-12-11 RX ADMIN — LIDOCAINE HYDROCHLORIDE 60 MG: 20 INJECTION, SOLUTION INTRAVENOUS at 02:12

## 2020-12-11 RX ADMIN — VASOPRESSIN 2 UNITS: 20 INJECTION, SOLUTION INTRAMUSCULAR; SUBCUTANEOUS at 03:12

## 2020-12-11 RX ADMIN — ROCURONIUM BROMIDE 25 MG: 10 SOLUTION INTRAVENOUS at 03:12

## 2020-12-11 RX ADMIN — CARBOXYMETHYLCELLULOSE SODIUM 1 DROP: 2.5 SOLUTION/ DROPS OPHTHALMIC at 02:12

## 2020-12-11 RX ADMIN — SODIUM CHLORIDE: 0.9 INJECTION, SOLUTION INTRAVENOUS at 02:12

## 2020-12-11 RX ADMIN — ONDANSETRON HYDROCHLORIDE 4 MG: 2 INJECTION INTRAMUSCULAR; INTRAVENOUS at 02:12

## 2020-12-11 RX ADMIN — MORPHINE SULFATE 4 MG: 4 INJECTION, SOLUTION INTRAMUSCULAR; INTRAVENOUS at 10:12

## 2020-12-11 RX ADMIN — ROCURONIUM BROMIDE 20 MG: 10 SOLUTION INTRAVENOUS at 03:12

## 2020-12-11 RX ADMIN — GLYCOPYRROLATE 0.6 MG: 0.2 INJECTION, SOLUTION INTRAMUSCULAR; INTRAVITREAL at 04:12

## 2020-12-11 RX ADMIN — ALBUMIN (HUMAN): 12.5 SOLUTION INTRAVENOUS at 02:12

## 2020-12-11 RX ADMIN — FENTANYL CITRATE 50 MCG: 50 INJECTION, SOLUTION INTRAMUSCULAR; INTRAVENOUS at 02:12

## 2020-12-11 NOTE — ANESTHESIA PREPROCEDURE EVALUATION
12/11/2020  Mickie Don is a 65 y.o., female.    Anesthesia Evaluation    I have reviewed the Patient Summary Reports.    I have reviewed the Nursing Notes. I have reviewed the NPO Status.   I have reviewed the Medications.     Review of Systems  Anesthesia Hx:  No problems with previous Anesthesia  Denies Family Hx of Anesthesia complications.   Denies Personal Hx of Anesthesia complications.   Social:  Non-Smoker    Hematology/Oncology:        Current/Recent Cancer. Oncology Comments: Ovarian   Hepatic/GI:   SBO  Had paracentesis   Psych:   Depression: DVT upper extremity.          Physical Exam  General:  Cachexia    Airway/Jaw/Neck:  Airway Findings: Mouth Opening: Normal Mallampati: II      Dental:  Dental Findings: In tact        Mental Status:  Mental Status Findings:  Cooperative, Alert and Oriented         Anesthesia Plan  Type of Anesthesia, risks & benefits discussed:  Anesthesia Type:  general  Patient's Preference:   Intra-op Monitoring Plan: standard ASA monitors  Intra-op Monitoring Plan Comments:   Post Op Pain Control Plan: multimodal analgesia  Post Op Pain Control Plan Comments:   Induction:   IV  Beta Blocker:         Informed Consent: Patient understands risks and agrees with Anesthesia plan.  Questions answered. Anesthesia consent signed with patient.  ASA Score: 3     Day of Surgery Review of History & Physical:    H&P update referred to the surgeon.     Anesthesia Plan Notes: Dr Worthy requests Gen [place]          Ready For Surgery From Anesthesia Perspective.

## 2020-12-11 NOTE — SUBJECTIVE & OBJECTIVE
Interval History: Patient awaiting PEG tube placement.  Reports NG slipped out.      Past Medical History:   Diagnosis Date    Ascites 11/16/2020    Cancer     ovarian    Dry eyes 11/29/2017    Hematuria 11/17/2020    Neuropathy due to chemotherapeutic drug 1/10/2018    Open angle with borderline findings and low glaucoma risk in both eyes 6/20/2013    Ovarian cancer     Patient in cancer related research study 4/10/2018       Past Surgical History:   Procedure Laterality Date    BREAST BIOPSY      BUNIONECTOMY Left     CATARACT EXTRACTION W/  INTRAOCULAR LENS IMPLANT Right 04/04/2019        CATARACT EXTRACTION W/  INTRAOCULAR LENS IMPLANT Left 5/7/2019        CHOLECYSTECTOMY  1992    HYSTERECTOMY      INSERTION OF TUNNELED CENTRAL VENOUS CATHETER (CVC) WITH SUBCUTANEOUS PORT Right 7/23/2020    Procedure: INSERTION, PORT-A-CATH;  Surgeon: George Tapia MD;  Location: [facility] CATH LAB;  Service: Interventional Radiology;  Laterality: Right;    left leg surgery  2005    achilles tendon    OOPHORECTOMY      PERITONEOCENTESIS N/A 11/16/2020    Procedure: PARACENTESIS, ABDOMINAL;  Surgeon: George Tapia MD;  Location: [facility] CATH LAB;  Service: Radiology;  Laterality: N/A;    PERITONEOCENTESIS N/A 11/23/2020    Procedure: PARACENTESIS, ABDOMINAL;  Surgeon: George Tapia MD;  Location: [facility] CATH LAB;  Service: Radiology;  Laterality: N/A;    PERITONEOCENTESIS N/A 12/9/2020    Procedure: PARACENTESIS, ABDOMINAL;  Surgeon: George Tapia MD;  Location: [facility] CATH LAB;  Service: Radiology;  Laterality: N/A;    PHACOEMULSIFICATION OF CATARACT Right 4/4/2019    Procedure: PHACOEMULSIFICATION, CATARACT;  Surgeon: Karen Butt MD;  Location: [address];  Service: Ophthalmology;  Laterality: Right;    RI REMOVAL OF OVARY/TUBE(S)      REPEAT ABDOMINAL PARACENTESIS Right 12/2/2020    Procedure: PARACENTESIS, ABDOMINAL, REPEAT;  Surgeon: George Tapia MD;   Location: [facility] CATH LAB;  Service: Radiology;  Laterality: Right;    right leg surgery  2006    broken, including ankle    THORACENTESIS N/A 11/23/2020    Procedure: THORACENTESIS;  Surgeon: George Tapia MD;  Location: [facility] CATH LAB;  Service: Radiology;  Laterality: N/A;    THORACENTESIS Left 12/2/2020    Procedure: Thoracentesis;  Surgeon: George Tapia MD;  Location: [facility] CATH LAB;  Service: Radiology;  Laterality: Left;       Review of patient's allergies indicates:  No Known Allergies    Medications:  Continuous Infusions:  Scheduled Meds:  PRN Meds:morphine, naloxone, ondansetron, promethazine (PHENERGAN) IVPB, sodium chloride 0.9%    Family History     Problem Relation (Age of Onset)    Breast cancer Maternal Aunt    Cancer Father, Son    Cataracts Mother    Deep vein thrombosis Mother, Sister, Sister    Diabetes Father    Factor V Leiden deficiency Brother, Daughter    Melanoma Sister    No Known Problems Daughter, Son, Sister, Brother, Brother        Tobacco Use    Smoking status: Never Smoker    Smokeless tobacco: Never Used   Substance and Sexual Activity    Alcohol use: Yes     Alcohol/week: 3.0 standard drinks     Types: 1 Glasses of wine, 2 Shots of liquor per week     Frequency: Monthly or less     Drinks per session: 1 or 2     Binge frequency: Never     Comment: Socially    Drug use: No    Sexual activity: Never     Partners: Male     Birth control/protection: Post-menopausal       Review of Systems   Constitutional: Positive for activity change and appetite change.   Respiratory: Negative for cough and shortness of breath.    Cardiovascular: Positive for leg swelling (Improved). Negative for chest pain.   Gastrointestinal: Positive for abdominal distention and abdominal pain.   Neurological: Positive for weakness. Negative for speech difficulty.   Psychiatric/Behavioral: Negative for agitation.     Objective:     Vital Signs (Most Recent):  Temp: 97.1 °F (36.2 °C) (12/11/20  0715)  Pulse: 94 (12/11/20 0901)  Resp: 14 (12/11/20 0901)  BP: (!) 105/58 (12/11/20 0715)  SpO2: (!) 94 % (12/11/20 0901) Vital Signs (24h Range):  Temp:  [97.1 °F (36.2 °C)-98.1 °F (36.7 °C)] 97.1 °F (36.2 °C)  Pulse:  [83-98] 94  Resp:  [9-18] 14  SpO2:  [94 %-99 %] 94 %  BP: ()/(52-77) 105/58     Weight: 77.1 kg (170 lb)  Body mass index is 26.63 kg/m².    Physical Exam  Constitutional:       General: She is not in acute distress.     Appearance: She is cachectic.      Comments: Frail appearing female    Neck:      Musculoskeletal: Normal range of motion.   Cardiovascular:      Rate and Rhythm: Normal rate and regular rhythm.      Heart sounds: No murmur.   Pulmonary:      Breath sounds: Normal breath sounds.   Chest:      Chest wall: No tenderness.   Abdominal:      General: There is distension.   Musculoskeletal: Normal range of motion.   Skin:     General: Skin is warm.   Neurological:      Mental Status: She is alert and oriented to person, place, and time.   Psychiatric:         Thought Content: Thought content normal.         Judgment: Judgment normal.         Review of Symptoms    Symptom Assessment (ESAS 0-10 Scale)  Anorexia:  6  Fatigue:  6               Performance Status:  60    ECOG Performance Status Grade:  2 - Ambulates, capable of self care only    Living Arrangements:  Lives with family      Advance Care Planning   Advance Directives:   Living Will: Yes        Copy on chart: Yes    [place]: Yes    Do Not Resuscitate Status: Yes    Medical Power of : Yes      Decision Making:  Patient answered questions         Significant Labs: All pertinent labs within the past 24 hours have been reviewed.  CBC:   Recent Labs   Lab 12/09/20  0925   WBC 8.66   HGB 12.6   HCT 39.7   MCV 91        BMP:  Recent Labs   Lab 12/11/20  0610   *   *   K 4.8   CL 90*   CO2 24   BUN 49*   CREATININE 0.8   CALCIUM 8.0*     LFT:  Lab Results   Component Value Date    AST 92 (H) 12/09/2020     ALKPHOS 731 (H) 12/09/2020    BILITOT 1.2 (H) 12/09/2020     Albumin:   Albumin   Date Value Ref Range Status   12/09/2020 1.9 (L) 3.5 - 5.2 g/dL Final     Protein:   Total Protein   Date Value Ref Range Status   12/09/2020 5.6 (L) 6.0 - 8.4 g/dL Final     Lactic acid:   No results found for: LACTATE    Significant Imaging: I have reviewed all pertinent imaging results/findings within the past 24 hours.     I have reviewed the CT abdomen pelvis from 12/9/2020    I have reviewed the Chest x ray from 12/9/2020    I have reviewed the TTE from 7/26/2019 (EF 67%)

## 2020-12-11 NOTE — PT/OT/SLP PROGRESS
Physical Therapy  Not Seen    Patient Name:  Mickie Don   MRN:  [id number]    Patient not seen today secondary to Nursing care(starting PCA). Will follow-up tomorrow, 12/11.    Padmini Crawford, PT

## 2020-12-11 NOTE — PLAN OF CARE
No significant events overnight. Remains free from fall, injury, skin breakdown. VSS on RA throughout the night. Positions self-independently. Pain controled w/ PCA. Pt cont. to spit up. Son at bedside. All alarms active and auduble.  Plan of care reviewed w/ pt and all questions answered. Bed locked and in lowest position. Call light w/I reach. No needs at this time. Purposeful hourly rounding. Will continue to monitor.

## 2020-12-11 NOTE — SUBJECTIVE & OBJECTIVE
Interval History: No emesis or vomiting. Tired of PCA pump and wants discontinued. Urinating frequently and feels less distended after lasix. No other new complaints.     Scheduled Meds:   furosemide (LASIX) IV  40 mg Intravenous Once     Continuous Infusions:    PRN Meds:naloxone, ondansetron, promethazine (PHENERGAN) IVPB, sodium chloride 0.9%    Review of patient's allergies indicates:  No Known Allergies    Objective:     Vital Signs (Most Recent):  Temp: 98.1 °F (36.7 °C) (12/11/20 0434)  Pulse: 88 (12/11/20 0600)  Resp: 18 (12/11/20 0434)  BP: 121/77 (12/11/20 0434)  SpO2: 95 % (12/11/20 0434) Vital Signs (24h Range):  Temp:  [97.4 °F (36.3 °C)-98.1 °F (36.7 °C)] 98.1 °F (36.7 °C)  Pulse:  [82-98] 88  Resp:  [16-18] 18  SpO2:  [95 %-98 %] 95 %  BP: ()/(52-77) 121/77     Weight: 77.1 kg (170 lb)  Body mass index is 26.63 kg/m².    Intake/Output - Last 3 Shifts       12/09 0700 - 12/10 0659 12/10 0700 - 12/11 0659    P.O.  240    I.V. (mL/kg)  1802.5 (23.4)    Other  0    NG/GT 0 220    Total Intake(mL/kg) 0 (0) 2262.5 (29.3)    Urine (mL/kg/hr) 0 (0)     Drains 250 250    Other 1600     Total Output 1850 250    Net -1850 +2012.5          Urine Occurrence 5 x 2 x             Physical Exam:   Constitutional: She is oriented to person, place, and time. She appears well-developed and well-nourished. No distress.   Frail, cachetic     HENT:   Head: Normocephalic and atraumatic.    Eyes: Conjunctivae and EOM are normal.    Neck: Normal range of motion. Neck supple. No thyromegaly present.    Cardiovascular: Normal rate and regular rhythm.     Pulmonary/Chest: Effort normal. No respiratory distress.        Abdominal: Soft. She exhibits no distension. There is no abdominal tenderness.   Distended RUQ.Non tender. BS + in all 4 quadrants              Musculoskeletal: Normal range of motion and moves all extremeties. No tenderness or edema.       Neurological: She is alert and oriented to person, place, and time.     Skin: Skin is warm and dry. No rash noted.    Psychiatric: She has a normal mood and affect. Her behavior is normal.       Lines/Drains/Airways     Central Venous Catheter Line            Port A Cath Single Lumen 07/23/20 0950 right subclavian 140 days                Laboratory:  BMP:   Recent Labs   Lab 12/09/20  0925  12/10/20  0020 12/10/20  0430 12/10/20  0941   GLU 80  --   --   --  76   *  --   --   --  128*   K 5.7*   < > 5.5* 5.2* 5.0  5.0   CL 89*  --   --   --  91*   CO2 21*  --   --   --  22*   BUN 49*  --   --   --  42*   CREATININE 0.8  --   --   --  0.7   CALCIUM 8.3*  --   --   --  7.6*   MG 3.1*  --   --   --   --     < > = values in this interval not displayed.   , CBC:   Recent Labs   Lab 12/09/20 0925   WBC 8.66   HGB 12.6   HCT 39.7       and All pertinent labs from the last 24 hours have been reviewed.    Diagnostic Results:  Labs: Reviewed  CT: Reviewed

## 2020-12-11 NOTE — PT/OT/SLP PROGRESS
Physical Therapy  Not Seen    Patient Name:  Mickie Don   MRN:  [id number]    Patient not seen today secondary to Patient ill (Comment)(nauseous, son at bedside and assisting pt). Initiated discussion of RW vs wheelchair as recommended device for mobility at home as pt had difficulty lifting legs during OT evaluation and might have further physical decline as disease progresses. Pt uninterested in wheelchair, stating she used RW fine and son is able to carry her if need be.      Will follow-up as schedule allows.    Padmini Crawford, PT

## 2020-12-11 NOTE — PROGRESS NOTES
"[facility]  Gynecologic Oncology  Progress Note      Patient Name: Mickie Don  MRN: [id number]  Admission Date: 12/9/2020  Hospital Length of Stay: 2 days  Attending Provider: Benny Tran MD  Primary Care Provider: Elle Ma MD  Principal Problem: Ovarian cancer    Follow-up For: Procedure(s) (LRB):  PARACENTESIS, ABDOMINAL (N/A)  Post-Operative Day: 2 Days Post-Op  Subjective:      History of Present Illness:  Ms. Don is a 65 year old female with Stage IIIC ovarian cancer and LUE DVT who presented to [facility] for IR paracentesis of acites and was noted to have significant abdominal distension as well as peristent nausea/vomiting. She was admitted on 11/18/20 for LUE DVT and was also found to have a SBO. She was discharged on 11/24 on clear liquid diet and lovenox. She reports persistent nausea/vomiting since discharge and has been unable to tolerate any solid foods. She reports being able to keep some liquids down for 30 min-2hrs at most. Reports abdominal pain that is partially relieved with morphine NJ. She has not had a bowel movement since her discharge. Reports passing flatus. She states that she "thinks she's going downhill".      Treatment History  Xlap/Omentectomy on 11/1/16 - mass was unresectable  Stage IIIC ovarian cancer  Initiated Taxotere/Carbo on 11/18/16, now s/p 6 cycles completed 3/8/17  Xlap/ISIS/BSO/Radical mass resection > 10 cm, optimal debulking on 4/11/17  Initiated PRIMA on 8/21/17  BRCA negative  Started FORWARD1 on 11/27/17  Started Doxil/Anju as part of NRG  2/27/19, received total of 6 cycles  Gemzar monotherapy started 8/29/19  Topotecan started 8/14/2020  Taxol monotherapy started 10/16/2020  LUE DVT  PSBO    Hospital Course:  12/9/20- Admitted to Gyn-onc for suspected partial SBO. IV fluids given, CMP p, CT ab/pelvis with contrast ordered. NG tube to intermittent suction placed. Palliative consult placed.   12/10/2020 - Emesis overnight. approx 250cc " suction out since admission   12/11/2020 NG tube fell out yesterday, not replaced. Patient has decided on venting Peg tube. Deciding vs home health or hospice. IV pain medication PRN. Lasix PRN for edema, Na slowly normalizing, K has normalized.     Interval History: No emesis or vomiting. Tired of PCA pump and wants discontinued. Urinating frequently and feels less distended after lasix. No other new complaints.     Scheduled Meds:   furosemide (LASIX) IV  40 mg Intravenous Once     Continuous Infusions:    PRN Meds:naloxone, ondansetron, promethazine (PHENERGAN) IVPB, sodium chloride 0.9%    Review of patient's allergies indicates:  No Known Allergies    Objective:     Vital Signs (Most Recent):  Temp: 98.1 °F (36.7 °C) (12/11/20 0434)  Pulse: 88 (12/11/20 0600)  Resp: 18 (12/11/20 0434)  BP: 121/77 (12/11/20 0434)  SpO2: 95 % (12/11/20 0434) Vital Signs (24h Range):  Temp:  [97.4 °F (36.3 °C)-98.1 °F (36.7 °C)] 98.1 °F (36.7 °C)  Pulse:  [82-98] 88  Resp:  [16-18] 18  SpO2:  [95 %-98 %] 95 %  BP: ()/(52-77) 121/77     Weight: 77.1 kg (170 lb)  Body mass index is 26.63 kg/m².    Intake/Output - Last 3 Shifts       12/09 0700 - 12/10 0659 12/10 0700 - 12/11 0659    P.O.  240    I.V. (mL/kg)  1802.5 (23.4)    Other  0    NG/GT 0 220    Total Intake(mL/kg) 0 (0) 2262.5 (29.3)    Urine (mL/kg/hr) 0 (0)     Drains 250 250    Other 1600     Total Output 1850 250    Net -1850 +2012.5          Urine Occurrence 5 x 2 x             Physical Exam:   Constitutional: She is oriented to person, place, and time. She appears well-developed and well-nourished. No distress.   Frail, cachetic     HENT:   Head: Normocephalic and atraumatic.    Eyes: Conjunctivae and EOM are normal.    Neck: Normal range of motion. Neck supple. No thyromegaly present.    Cardiovascular: Normal rate and regular rhythm.     Pulmonary/Chest: Effort normal. No respiratory distress.        Abdominal: Soft. She exhibits no distension. There is no  abdominal tenderness.   Distended RUQ.Non tender. BS + in all 4 quadrants              Musculoskeletal: Normal range of motion and moves all extremeties. No tenderness or edema.       Neurological: She is alert and oriented to person, place, and time.    Skin: Skin is warm and dry. No rash noted.    Psychiatric: She has a normal mood and affect. Her behavior is normal.       Lines/Drains/Airways     Central Venous Catheter Line            Port A Cath Single Lumen 07/23/20 0950 right subclavian 140 days                Laboratory:  BMP:   Recent Labs   Lab 12/09/20  0925  12/10/20  0020 12/10/20  0430 12/10/20  0941   GLU 80  --   --   --  76   *  --   --   --  128*   K 5.7*   < > 5.5* 5.2* 5.0  5.0   CL 89*  --   --   --  91*   CO2 21*  --   --   --  22*   BUN 49*  --   --   --  42*   CREATININE 0.8  --   --   --  0.7   CALCIUM 8.3*  --   --   --  7.6*   MG 3.1*  --   --   --   --     < > = values in this interval not displayed.   , CBC:   Recent Labs   Lab 12/09/20 0925   WBC 8.66   HGB 12.6   HCT 39.7       and All pertinent labs from the last 24 hours have been reviewed.    Diagnostic Results:  Labs: Reviewed  CT: Reviewed    Assessment/Plan:     * Ovarian cancer - IIIC  - See treatment history above  -  increased from   - Continue MS contin q12  - Progressive disease leading to multiple small bowel obstructions  - Palliative consult placed  - considering hospice vs home health  - code status DNR     Hyperkalemia  - s/p albuterol, lasix  - 5.7>5.1>5.2>5.0  - Repeat BMP ordered for today  - Lasix as needed    Ascites  S/p paracentesis with 1.6 L serous fluid removed on 12/9/20    Small bowel obstruction  - CT with findings for recurrent SBO  - originally NPO with NG tube, however NG fell out, not replaced  - continue IVF and replacing IVF PRN  - IR consulted for G tube placement, appreciate assistance, this will likely be a palliative measure.     Acute deep vein thrombosis (DVT) of left upper  extremity  - Continue lovenox 120mg q24  - held 12/10 to see if patient can have peg tube placed       VTE Risk Mitigation (From admission, onward)         Ordered     IP VTE HIGH RISK PATIENT  Once      12/09/20 0823     Place sequential compression device  Until discontinued      12/09/20 0823                  Alejandra Adrian MD  Gynecologic Oncology  [facility]

## 2020-12-11 NOTE — ANESTHESIA PROCEDURE NOTES
Intubation  Performed by: Alexa Fernandez CRNA  Authorized by: Naresh Jain MD     Intubation:     Induction:  Intravenous    Intubated:  Postinduction    Mask Ventilation:  Easy mask    Attempts:  1    Attempted By:  CRNA    Method of Intubation:  Video laryngoscopy    Blade:  Hernandez 3    Laryngeal View Grade: Grade I - full view of chords      Difficult Airway Encountered?: No      Complications:  None    Airway Device:  Oral endotracheal tube    Airway Device Size:  7.0    Style/Cuff Inflation:  Cuffed (inflated to minimal occlusive pressure)    Tube secured:  21    Secured at:  The lips    Placement Verified By:  Capnometry    Complicating Factors:  None    Findings Post-Intubation:  BS equal bilateral and atraumatic/condition of teeth unchanged

## 2020-12-11 NOTE — PT/OT/SLP EVAL
Occupational Therapy   Evaluation and Treatment    Name: Mickie Don  MRN: [id number]  Admitting Diagnosis:  Ovarian cancer 1 Day Post-Op    Recommendations:     Discharge Recommendations: (Pt wanting to discharge to home with assist of her son, Home Health and Palliative Care)  Discharge Equipment Recommendations:  3-in-1 commode, walker, rolling, hospital bed, shower chair  Barriers to discharge:  None    Assessment:     Mickie Don is a 65 y.o. female with a medical diagnosis of Ovarian cancer.  She presents in room with son and nurse returning to administer pain medication.  Pt giving besrt effort and son supportive and attentive throughout OT session.  Pt is anxious, in pain and slightly impulsive but cooperative and pleasant.  Performance deficits affecting function: weakness, impaired endurance, impaired self care skills, impaired functional mobilty, gait instability, impaired balance, decreased lower extremity function, decreased upper extremity function, decreased coordination, decreased safety awareness, pain, impaired fine motor.      Rehab Prognosis: Limited due to prognosis; patient would benefit from acute skilled OT services to address these deficits and reach maximum level of function.       Plan:     Patient to be seen 4 x/week to address the above listed problems via self-care/home management  · Plan of Care Expires: 01/09/21  · Plan of Care Reviewed with: patient, son    Subjective     Chief Complaint: Pain  Patient/Family Comments/goals: To return home with assist of son and have pain controlled.    Occupational Profile:  Living Environment: Lives in 2 story home, threshold step to enter, sunken den with one step down/up (where pt plans to set up hospital bed), 7 steps>landing>7 steps to reach second level, shower on first floor has 2 very tall steps to enter shower  Previous level of function: Pt was working as a Med Tech 3 weeks ago but states her health was declining fast  Roles and  Routines: Mother of 2 children  Equipment Used at Home:  none  Assistance upon Discharge: Son who will provide 24 hr care (lives in [place] but here to assist pt as long as she needs him)    Pain/Comfort:  · Pain Rating 1: 7/10  · Pain Addressed 1: Nurse notified(Pt's nurse was aware and bringing pain medication.  Nurse administered pain medication during OT evaluation.)  · Pain Rating Post-Intervention 1: 7/10    Patients cultural, spiritual, Advent conflicts given the current situation: (None stated)    Objective:     Communicated with: nurseLeandra prior to session.  Patient found HOB elevated with NG tube(Right chest port) upon OT entry to room.    General Precautions: Standard, fall   Orthopedic Precautions:N/A   Braces: N/A     Occupational Performance:    Bed Mobility:    · Patient completed Supine to Sit with minimum assistance and HOB elevated   · Patient completed Sit to Supine with Mod A (Max A to lift feet onto bed)    Functional Mobility/Transfers:  · Sit to Stand: Min A with RW due to safety; pt impulsive when standing up  · Bed to Chair: Min A with RW due to safety  · Functional Mobility: Pt moving quickly and needing cues to slow down.    Activities of Daily Living:  · Feeding:  Indep with eating ice chips  · Grooming: Min A; dons cap without assist  · Upper Body Dressing: Min<>Mod A  · Lower Body Dressing: Max A; assist to lift feet  · Toileting: Incontinent; able to perform urbano hygiene with Min A reclined in bed    Cognitive/Visual Perceptual:  Cognitive/Psychosocial Skills:     -       Oriented to: Person, Place, Time and Situation   -       Follows Commands/attention:Follows one-step commands  -       Communication: clear/fluent  -       Memory: No Deficits noted  -       Safety awareness/insight to disability: impaired   -       Mood/Affect/Coping skills/emotional control: Cooperative, Anxious and Pleasant    Physical Exam:  Balance: Sitting EOB: SBA; Standing with RW: CGA; Transfers/Dynamic  standing with RW: Min A due to impulsiveness  Edema:  Moderate bilateral LEs  Upper Extremity Range of Motion:  WFL for self care tasks bilaterally  Upper Extremity Strength: Shoulders:3+/5; Elbows: 4-/5;  4/5   Fine Motor Coordination: Fair  Gross motor coordination: Fair+    AMPAC 6 Click ADL:  AMPAC Total Score: 14    Treatment & Education:  Role of OT, POC, safety with ADLs and ADL mobility, discharge recs, caregiver education  Education:    Patient left up in chair with all lines intact, call button in reach, nurse notified and son present    GOALS:   Multidisciplinary Problems     Occupational Therapy Goals        Problem: Occupational Therapy Goal    Goal Priority Disciplines Outcome Interventions   Occupational Therapy Goal     OT, PT/OT Ongoing, Progressing    Description: Goals to be met by: 12/15/20    Patient will increase functional independence with ADLs by performing:    Caregiver will demonstrate Indep with set up of BSC and assisting pt with BSC transfers using RW with good safety awareness.  Caregiver will demonstrate Indep with assisting pt with safe sit<>stand using RW with good safety awareness for LB dressing and ADL transfers.                        History:     Past Medical History:   Diagnosis Date    Ascites 11/16/2020    Cancer     ovarian    Dry eyes 11/29/2017    Hematuria 11/17/2020    Neuropathy due to chemotherapeutic drug 1/10/2018    Open angle with borderline findings and low glaucoma risk in both eyes 6/20/2013    Ovarian cancer     Patient in cancer related research study 4/10/2018       Past Surgical History:   Procedure Laterality Date    BREAST BIOPSY      BUNIONECTOMY Left     CATARACT EXTRACTION W/  INTRAOCULAR LENS IMPLANT Right 04/04/2019        CATARACT EXTRACTION W/  INTRAOCULAR LENS IMPLANT Left 5/7/2019        CHOLECYSTECTOMY  1992    HYSTERECTOMY      INSERTION OF TUNNELED CENTRAL VENOUS CATHETER (CVC) WITH SUBCUTANEOUS PORT  Right 7/23/2020    Procedure: INSERTION, PORT-A-CATH;  Surgeon: George Tapia MD;  Location: [facility] CATH LAB;  Service: Interventional Radiology;  Laterality: Right;    left leg surgery  2005    achilles tendon    OOPHORECTOMY      PERITONEOCENTESIS N/A 11/16/2020    Procedure: PARACENTESIS, ABDOMINAL;  Surgeon: George Tapia MD;  Location: [facility] CATH LAB;  Service: Radiology;  Laterality: N/A;    PERITONEOCENTESIS N/A 11/23/2020    Procedure: PARACENTESIS, ABDOMINAL;  Surgeon: George Tapia MD;  Location: [facility] CATH LAB;  Service: Radiology;  Laterality: N/A;    PERITONEOCENTESIS N/A 12/9/2020    Procedure: PARACENTESIS, ABDOMINAL;  Surgeon: George Tapia MD;  Location: [facility] CATH LAB;  Service: Radiology;  Laterality: N/A;    PHACOEMULSIFICATION OF CATARACT Right 4/4/2019    Procedure: PHACOEMULSIFICATION, CATARACT;  Surgeon: Karen Butt MD;  Location: [address];  Service: Ophthalmology;  Laterality: Right;    VA REMOVAL OF OVARY/TUBE(S)      REPEAT ABDOMINAL PARACENTESIS Right 12/2/2020    Procedure: PARACENTESIS, ABDOMINAL, REPEAT;  Surgeon: George Tapia MD;  Location: [facility] LAB;  Service: Radiology;  Laterality: Right;    right leg surgery  2006    broken, including ankle    THORACENTESIS N/A 11/23/2020    Procedure: THORACENTESIS;  Surgeon: George Tapia MD;  Location: [facility] CATH LAB;  Service: Radiology;  Laterality: N/A;    THORACENTESIS Left 12/2/2020    Procedure: Thoracentesis;  Surgeon: George Tapia MD;  Location: [facility] CATH LAB;  Service: Radiology;  Laterality: Left;       Time Tracking:     OT Date of Treatment: 12/10/20  OT Start Time: 1312  OT Stop Time: 1352  OT Total Time (min): 40 min    Billable Minutes:Evaluation 25  Self Care/Home Management 15    MOI Bartholomew  12/10/2020

## 2020-12-11 NOTE — OP NOTE
[facility]  Surgery Department  Operative Note    SUMMARY     Patient: Mickie Don    Medical Record: [id number]    Date of Procedure: 12/11/2020     Surgeon: Surgeon(s) and Role:     * Eitan Worthy Jr., MD - Primary    Assisting Surgeon: None    Pre-Operative Diagnosis: Malignant neoplasm of ovary, unspecified laterality [C56.9]    Post-Operative Diagnosis: Post-Op Diagnosis Codes:     * Malignant neoplasm of ovary, unspecified laterality [C56.9]    Procedure: Procedure(s) (LRB):  INSERTION, PEG TUBE (N/A)    Procedure in Detail:  The patient was brought to the operating and placed in supine position.  The abdomen prepped and draped in a sterile fashion.  The patient opened through small upper midline incision, using sharp dissection it was carried down through the subcutaneous tissue fascia and peritoneum.  Upon entering the peritoneal cavity approximately 300 cc of ascitic fluid was aspirated.  Stomach was identified immediately below the left lobe of the liver and due to extensive intraperitoneal carcinoma could not be mobilized up to the anterior abdominal wall.  A sufficient amount of the anterior wall could be mobilized to form a Witzel gastrostomy.  Pursestring suture placed in the antrum of the stomach and a small gastrotomy made.  A 24 Ponsky tube was then placed in the opening and the pursestring secured.  The anterior wall of the stomach was then plicated over the tube using interrupted 3 0 sutures.  The outer portion of the tube was then brought through a small opening in the left upper quadrant and the tube assembled.  Approximately 50 cc of saline were used to irrigate the catheter and there was no evidence of leakage.  The fascia of the abdominal wall was closed with interrupted 0 Vicryl sutures and the skin with running 4-0 Monocryl, the wound sterilely dressed.  The patient tolerated the procedure well left the operating room in good condition.  At the end the procedure all  sponge lap and instrument counts were correct.

## 2020-12-11 NOTE — ASSESSMENT & PLAN NOTE
- Heme/ onc managing  - Patient on MS contin BID, reports pain 3/10 on a good day, describes it a constant dull pain in her abdomen, reports it does not interfere with ADLs. Fatigue/ weakness interferes with ADLs.    - 12/9/2020:Discussed importance of bowel management plan.  Patient has tried Miralax in the past with success. Patient was on Colace with little success in the past, but is unable to tolerate at this time.  Discussed Miralax BID or Senokot.  Patient has also tried Fleets enema, with no relief.    - Patient was placed on PCA pump, patient is irritated by CO2 detector beeping throughout the night.  Patient switching to IV morphine PRN, reports pain is controlled.

## 2020-12-11 NOTE — TRANSFER OF CARE
"Anesthesia Transfer of Care Note    Patient: Mickie Don    Procedure(s) Performed: Procedure(s) (LRB):  INSERTION, PEG TUBE (N/A)    Patient location: PACU    Anesthesia Type: general    Transport from OR: Transported from OR on 2-3 L/min O2 by NC with adequate spontaneous ventilation    Post pain: adequate analgesia    Post assessment: no apparent anesthetic complications    Post vital signs: stable    Level of consciousness: awake and alert    Nausea/Vomiting: no nausea/vomiting    Complications: none    Transfer of care protocol was followed      Last vitals:   Visit Vitals  BP (!) 108/56 (BP Location: Right arm, Patient Position: Lying)   Pulse 97   Temp 36.2 °C (97.1 °F) (Oral)   Resp 18   Ht 5' 7" (1.702 m)   Wt 77.1 kg (170 lb)   SpO2 98%   Breastfeeding No   BMI 26.63 kg/m²     "

## 2020-12-11 NOTE — ASSESSMENT & PLAN NOTE
- CT with findings for recurrent SBO  - originally NPO with NG tube, however NG fell out, not replaced  - continue IVF and replacing IVF PRN  - IR consulted for G tube placement, appreciate assistance, this will likely be a palliative measure.

## 2020-12-11 NOTE — ASSESSMENT & PLAN NOTE
"- Pall care introduced  - Patient is decisional  - Patient has 4 children, 1 son is .  He passed away from a sarcoma with home hospice.    - : telephone Conference with patient, her so, Ramin, her daughter in law, who is a family medicine physician, and her daughter.    - :[facility] discussed: remaining at home surrounded by family and friends.  Family reports they desire to bring patient and are able to provide IV fluids in the home if needed.  Patient endorses rapidly progressive fatigue/ weakness in the last month. Patient reports she was able to work until a month ago.  Patient desires aggressive treatment from ovarian cancer, was receiving chemo recently.  Patient reports she spends the majority of her day on the couch watching TV.  Patient reports "I am not ready for hospice" Patient is familiar with hospice, as he son who passed away was at home with hospice services for 2 days.   - :We discussed what "better" looks like for Ms. Don: being at home with her family, being able to drink and tolerate a glass of water, being able to share a meal with her family.   - PT/ OT following   - Yesterday patient completed LW and HCPOA, patient was made DNR.  Visited patient today, she is very hoping to go home tomorrow after PEG tube placed today, patient stated "I want to see how I do after the PEG" when discussing goals and plan moving home.  Aleksander completed: DNR, selective treatment, long term artificial nutrition.  Copy provided to family  - Patient would benefit from following up with pall care outpt   "

## 2020-12-11 NOTE — PLAN OF CARE
8:04 PM: POA and DNR Paperwork rcvd and uploaded via HIM @ 1600, Dr Edmonds notified ; Orders rcvd  Pain managed with PCA pump.+ NG removed per patient, c 500 cc total output, Orders are DC'd   VSS on RA and afebrile this shift.     Urinary incontinence x2 thyis shift  debra Faustin is cleared for 24 hr visitation per Management and Policy  Repositions self independently in bed and ambulating Up in chair/OOB x1 this shift. With PT/OT to see p IR in AM.     Free from injury or skin breakdown; Fall precautions maintained and call light in reach.  POC updated questions answered and comments acknowledged.  Purposeful hourly rounding completed this shift.

## 2020-12-11 NOTE — PROGRESS NOTES
[facility]  Palliative Medicine  Progress Note    Patient Name: Mickie Don  MRN: [id number]  Admission Date: 2020  Hospital Length of Stay: 2 days  Code Status: DNR   Attending Provider: Benny Tran MD  Consulting Provider: Shirley Mcleod DNP  Primary Care Physician: Elle Ma MD  Principal Problem:Ovarian cancer    Patient information was obtained from patient, relative(s) and ER records.      Assessment/Plan:     * Ovarian cancer - IIIC  - Gyn/ onc managing  - Patient on MS contin BID, reports pain 3/10 on a good day, describes it a constant dull pain in her abdomen, reports it does not interfere with ADLs. Fatigue/ weakness interferes with ADLs.    - 2020:Discussed importance of bowel management plan.  Patient has tried Miralax in the past with success. Patient was on Colace with little success in the past, but is unable to tolerate at this time.  Discussed Miralax BID or Senokot.  Patient has also tried Fleets enema, with no relief.    - Patient was started on PCA infusion yesterday, patient was irritated by CO2 detector beeping throughout the night.  Patient switched to IV morphine PRN, reports pain is controlled.       Encounter for palliative care  - Pall care introduced  - Patient is decisional  - Patient has 4 children, 1 son is .  He passed away from a sarcoma with home hospice.    - : telephone Conference with patient, her so, Ramin, her daughter in law, who is a family medicine physician, and her daughter.    - :[facility] discussed: remaining at home surrounded by family and friends.   Patients daughter is pregnant and due in March and patient hopes to live to meet her first granddaughter.  Family reports they desire to bring patient home and are able to provide IV fluids/NG tube care in the home if needed.  Patient endorses rapidly progressive fatigue/ weakness in the last month. Patient reports she was able to work until a month ago.  Patient  "desires aggressive treatment from ovarian cancer, was receiving chemo recently.  Patient reports she spends the majority of her day on the couch watching TV.  Patient reports "I am not ready for hospice" Patient is familiar with hospice, as her son was enrolled with hospice prior to his passing   - 12/9:We discussed what "better" looks like for Ms. Don: being at home with her family, being able to drink and tolerate a glass of water, being able to share a meal with her family. We discussed fears/ concerns: "this is going to keep happening"  - PT following   - Yesterday patient completed LW and HCPOA, patient was made DNR.  Visited patient today, she is  hoping to go home tomorrow after PEG tube placement today, patient stated "I want to see how I do after the PEG" when discussing goals and plan moving forward.  LaPOST completed: DNR, selective treatment, long term artificial nutrition.  Copy provided to family  - Patient would benefit from following up with pall care outpt     Small bowel obstruction  - Patient currently NPO, IV fluids ordered  - Patient reports she has been unable to tolerate solid foods since discharge on 11/24/2020  - Patient reports she has not had a bowel movement since discharge   - Zofran/ phenergan ordered  - Patient awaiting PEG tube placement, appears to be palliative measure        I will follow-up with patient. Please contact us if you have any additional questions.    Subjective:     Chief Complaint: No chief complaint on file.      HPI:   Ms. Don is a 65 y.o. female with Stage IIIC ovarian cancer  who presented for outpatient paracentesis with IR 2/2 ascites.  After paracentesis, patient reported dehydration, persistent nausea and vomiting.  Patient was recently admitted to our hospital on 11/18 for paracentesis and LE DVT was found.  Patient was discharged on clear liquid diet and Lovenox injections on 11/24.  Patient has not been able to tolerate any solid foods since discharge.  " Patient reports she has been able to consume liquids and is able to keep them down for 30 mins- 2 hours. Patient was seen in the ED due to not having a bowel movement.  Patient reports she is passing flatus, but has not had a bowel movement since discharge.  Patient was admitted to inpatient status. NG has been placed. She is receiving IV fluids.  Order placed for CT abd/ pelvis     Ms. Don was working as a [employer] as [employer] until a month ago. Ms. Don reports worsening fatigue, weakness and anorexia in the last month.  She has 4 children, 1 is .  She lives alone, but her children are currently staying with her to care for her.         Hospital Course:  No notes on file    Interval History: Patient awaiting PEG tube placement.  Reports NG slipped out.      Past Medical History:   Diagnosis Date    Ascites 2020    Cancer     ovarian    Dry eyes 2017    Hematuria 2020    Neuropathy due to chemotherapeutic drug 1/10/2018    Open angle with borderline findings and low glaucoma risk in both eyes 2013    Ovarian cancer     Patient in cancer related research study 4/10/2018       Past Surgical History:   Procedure Laterality Date    BREAST BIOPSY      BUNIONECTOMY Left     CATARACT EXTRACTION W/  INTRAOCULAR LENS IMPLANT Right 2019        CATARACT EXTRACTION W/  INTRAOCULAR LENS IMPLANT Left 2019        CHOLECYSTECTOMY  1992    HYSTERECTOMY      INSERTION OF TUNNELED CENTRAL VENOUS CATHETER (CVC) WITH SUBCUTANEOUS PORT Right 2020    Procedure: INSERTION, PORT-A-CATH;  Surgeon: George Tapia MD;  Location: [facility] CATH LAB;  Service: Interventional Radiology;  Laterality: Right;    left leg surgery      achilles tendon    OOPHORECTOMY      PERITONEOCENTESIS N/A 2020    Procedure: PARACENTESIS, ABDOMINAL;  Surgeon: George Tapia MD;  Location: [facility] CATH LAB;  Service: Radiology;  Laterality: N/A;     PERITONEOCENTESIS N/A 11/23/2020    Procedure: PARACENTESIS, ABDOMINAL;  Surgeon: George Tapia MD;  Location: [facility] CATH LAB;  Service: Radiology;  Laterality: N/A;    PERITONEOCENTESIS N/A 12/9/2020    Procedure: PARACENTESIS, ABDOMINAL;  Surgeon: George Tapia MD;  Location: [facility] CATH LAB;  Service: Radiology;  Laterality: N/A;    PHACOEMULSIFICATION OF CATARACT Right 4/4/2019    Procedure: PHACOEMULSIFICATION, CATARACT;  Surgeon: Karen Butt MD;  Location: [facility] OR [address] [place] [address];  Service: Ophthalmology;  Laterality: Right;    ND REMOVAL OF OVARY/TUBE(S)      REPEAT ABDOMINAL PARACENTESIS Right 12/2/2020    Procedure: PARACENTESIS, ABDOMINAL, REPEAT;  Surgeon: George Tapia MD;  Location: [facility] CATH LAB;  Service: Radiology;  Laterality: Right;    right leg surgery  2006    broken, including ankle    THORACENTESIS N/A 11/23/2020    Procedure: THORACENTESIS;  Surgeon: George Tapia MD;  Location: [facility] CATH LAB;  Service: Radiology;  Laterality: N/A;    THORACENTESIS Left 12/2/2020    Procedure: Thoracentesis;  Surgeon: George Tapia MD;  Location: [facility] CATH LAB;  Service: Radiology;  Laterality: Left;       Review of patient's allergies indicates:  No Known Allergies    Medications:  Continuous Infusions:  Scheduled Meds:  PRN Meds:morphine, naloxone, ondansetron, promethazine (PHENERGAN) IVPB, sodium chloride 0.9%    Family History     Problem Relation (Age of Onset)    Breast cancer Maternal Aunt    Cancer Father, Son    Cataracts Mother    Deep vein thrombosis Mother, Sister, Sister    Diabetes Father    Factor V Leiden deficiency Brother, Daughter    Melanoma Sister    No Known Problems Daughter, Son, Sister, Brother, Brother        Tobacco Use    Smoking status: Never Smoker    Smokeless tobacco: Never Used   Substance and Sexual Activity    Alcohol use: Yes     Alcohol/week: 3.0 standard drinks     Types: 1 Glasses of wine, 2 Shots of liquor per week     Frequency:  Monthly or less     Drinks per session: 1 or 2     Binge frequency: Never     Comment: Socially    Drug use: No    Sexual activity: Never     Partners: Male     Birth control/protection: Post-menopausal       Review of Systems   Constitutional: Positive for activity change and appetite change.   Respiratory: Negative for cough and shortness of breath.    Cardiovascular: Positive for leg swelling (Improved). Negative for chest pain.   Gastrointestinal: Positive for abdominal distention and abdominal pain.   Neurological: Positive for weakness. Negative for speech difficulty.   Psychiatric/Behavioral: Negative for agitation.     Objective:     Vital Signs (Most Recent):  Temp: 97.1 °F (36.2 °C) (12/11/20 0715)  Pulse: 94 (12/11/20 0901)  Resp: 14 (12/11/20 0901)  BP: (!) 105/58 (12/11/20 0715)  SpO2: (!) 94 % (12/11/20 0901) Vital Signs (24h Range):  Temp:  [97.1 °F (36.2 °C)-98.1 °F (36.7 °C)] 97.1 °F (36.2 °C)  Pulse:  [83-98] 94  Resp:  [9-18] 14  SpO2:  [94 %-99 %] 94 %  BP: ()/(52-77) 105/58     Weight: 77.1 kg (170 lb)  Body mass index is 26.63 kg/m².    Physical Exam  Constitutional:       General: She is not in acute distress.     Appearance: She is cachectic.      Comments: Frail appearing female    Neck:      Musculoskeletal: Normal range of motion.   Cardiovascular:      Rate and Rhythm: Normal rate and regular rhythm.      Heart sounds: No murmur.   Pulmonary:      Breath sounds: Normal breath sounds.   Chest:      Chest wall: No tenderness.   Abdominal:      General: There is distension.   Musculoskeletal: Normal range of motion.   Skin:     General: Skin is warm.   Neurological:      Mental Status: She is alert and oriented to person, place, and time.   Psychiatric:         Thought Content: Thought content normal.         Judgment: Judgment normal.         Review of Symptoms    Symptom Assessment (ESAS 0-10 Scale)  Anorexia:  6  Fatigue:  6               Performance Status:  60    ECOG  Performance Status Grade:  2 - Ambulates, capable of self care only    Living Arrangements:  Lives with family      Advance Care Planning   Advance Directives:   Living Will: Yes        Copy on chart: Yes    LaPOST: Yes    Do Not Resuscitate Status: Yes    Medical Power of : Yes      Decision Making:  Patient answered questions         Significant Labs: All pertinent labs within the past 24 hours have been reviewed.  CBC:   Recent Labs   Lab 12/09/20  0925   WBC 8.66   HGB 12.6   HCT 39.7   MCV 91        BMP:  Recent Labs   Lab 12/11/20  0610   *   *   K 4.8   CL 90*   CO2 24   BUN 49*   CREATININE 0.8   CALCIUM 8.0*     LFT:  Lab Results   Component Value Date    AST 92 (H) 12/09/2020    ALKPHOS 731 (H) 12/09/2020    BILITOT 1.2 (H) 12/09/2020     Albumin:   Albumin   Date Value Ref Range Status   12/09/2020 1.9 (L) 3.5 - 5.2 g/dL Final     Protein:   Total Protein   Date Value Ref Range Status   12/09/2020 5.6 (L) 6.0 - 8.4 g/dL Final     Lactic acid:   No results found for: LACTATE    Significant Imaging: I have reviewed all pertinent imaging results/findings within the past 24 hours.     I have reviewed the CT abdomen pelvis from 12/9/2020    I have reviewed the Chest x ray from 12/9/2020    I have reviewed the TTE from 7/26/2019 (EF 67%)    Advance Care Planning     Patient completed LW and HCPOA yesterday.    Code Status  In light of the patients advanced and life limiting illness,I engaged the the patient in a conversation about the patient's preferences for care  at the very end of life. The patient wishes to have a natural, peaceful death.  Along those lines, the patient does not wish to have CPR or other invasive treatments performed when her heart and/or breathing stops. I communicated to the patient that a LaPOST form was completed to reflect other EOL preferences of the patient such as DNR, selective treatment, long term artificial nutrition by tube .       Discussed with  Dr. Adrian.  Will follow up with patient next week if she is still admitted.     > 50% of 25 min visit spent in chart review, face to face discussion of goals of care,  symptom assessment, coordination of care and emotional support.  16 minutes spent in advance care planning     Shirley Mcleod, DANIEL  Palliative Medicine  [facility]

## 2020-12-11 NOTE — CONSULTS
[facility]  Consult Note      Date of Consultation:12/11/2020    Reason for Consult:  Open gastrostomy tube placement  SUBJECTIVE:     History of Present Illness:  64-year-old female with advanced ovarian carcinoma and a small-bowel obstruction.  Patient is on palliative care however, she is vomiting due to her small-bowel obstruction.  I was asked to evaluate the patient for possible decompressing gastrostomy tube.  Due to overlying small-bowel there is not a window for IR or GI to perform the gastrostomy.  CT scan of the abdomen shows no significant upper abdominal caking or disease obstructing open access to the stomach.    Past Medical History:   Diagnosis Date    Ascites 11/16/2020    Cancer     ovarian    Dry eyes 11/29/2017    Hematuria 11/17/2020    Neuropathy due to chemotherapeutic drug 1/10/2018    Open angle with borderline findings and low glaucoma risk in both eyes 6/20/2013    Ovarian cancer     Patient in cancer related research study 4/10/2018     Past Surgical History:   Procedure Laterality Date    BREAST BIOPSY      BUNIONECTOMY Left     CATARACT EXTRACTION W/  INTRAOCULAR LENS IMPLANT Right 04/04/2019        CATARACT EXTRACTION W/  INTRAOCULAR LENS IMPLANT Left 5/7/2019        CHOLECYSTECTOMY  1992    HYSTERECTOMY      INSERTION OF TUNNELED CENTRAL VENOUS CATHETER (CVC) WITH SUBCUTANEOUS PORT Right 7/23/2020    Procedure: INSERTION, PORT-A-CATH;  Surgeon: George Tapia MD;  Location: [facility] CATH LAB;  Service: Interventional Radiology;  Laterality: Right;    left leg surgery  2005    achilles tendon    OOPHORECTOMY      PERITONEOCENTESIS N/A 11/16/2020    Procedure: PARACENTESIS, ABDOMINAL;  Surgeon: George Tapia MD;  Location: [facility] CATH LAB;  Service: Radiology;  Laterality: N/A;    PERITONEOCENTESIS N/A 11/23/2020    Procedure: PARACENTESIS, ABDOMINAL;  Surgeon: George Tapia MD;  Location: [facility] CATH LAB;  Service:  Radiology;  Laterality: N/A;    PERITONEOCENTESIS N/A 12/9/2020    Procedure: PARACENTESIS, ABDOMINAL;  Surgeon: George Tapia MD;  Location: [facility] CATH LAB;  Service: Radiology;  Laterality: N/A;    PHACOEMULSIFICATION OF CATARACT Right 4/4/2019    Procedure: PHACOEMULSIFICATION, CATARACT;  Surgeon: Karen Butt MD;  Location: [facility] OR [address] [place] [address];  Service: Ophthalmology;  Laterality: Right;    MO REMOVAL OF OVARY/TUBE(S)      REPEAT ABDOMINAL PARACENTESIS Right 12/2/2020    Procedure: PARACENTESIS, ABDOMINAL, REPEAT;  Surgeon: George Tapia MD;  Location: [facility] CATH LAB;  Service: Radiology;  Laterality: Right;    right leg surgery  2006    broken, including ankle    THORACENTESIS N/A 11/23/2020    Procedure: THORACENTESIS;  Surgeon: George Tapia MD;  Location: [facility] CATH LAB;  Service: Radiology;  Laterality: N/A;    THORACENTESIS Left 12/2/2020    Procedure: Thoracentesis;  Surgeon: George Tapia MD;  Location: [facility] CATH LAB;  Service: Radiology;  Laterality: Left;     Family History   Problem Relation Age of Onset    Cancer Father         ? liver ca    Diabetes Father     Cataracts Mother     Deep vein thrombosis Mother     Breast cancer Maternal Aunt     Cancer Son         sarcoma    Melanoma Sister     Deep vein thrombosis Sister     Factor V Leiden deficiency Brother     Factor V Leiden deficiency Daughter         trait only     No Known Problems Daughter     No Known Problems Son     Deep vein thrombosis Sister     No Known Problems Sister     No Known Problems Brother     No Known Problems Brother     Colon cancer Neg Hx     Ovarian cancer Neg Hx     Amblyopia Neg Hx     Blindness Neg Hx     Glaucoma Neg Hx     Hypertension Neg Hx     Macular degeneration Neg Hx     Retinal detachment Neg Hx     Strabismus Neg Hx     Stroke Neg Hx     Thyroid disease Neg Hx     Hyperlipidemia Neg Hx      Social History     Tobacco Use    Smoking status: Never  Smoker    Smokeless tobacco: Never Used   Substance Use Topics    Alcohol use: Yes     Alcohol/week: 3.0 standard drinks     Types: 1 Glasses of wine, 2 Shots of liquor per week     Frequency: Monthly or less     Drinks per session: 1 or 2     Binge frequency: Never     Comment: Socially    Drug use: No            Physical Exam  General-asthenic female in no acute distress  Chest-clear  Heart-regular rate and rhythm  Abdomen-distended, nontender, positive bowel sounds, midline incision healed      Impression:  Small bowel obstruction with extensive peritoneal ovarian carcinoma, vomiting due to small-bowel obstruction.    Plan:  Discussed with , patient and son.  There appears to be a surgical window for placement of a gastrostomy tube.  Patient family understand that this is a palliative treatment to facilitate the patient is wished to go home.  I explained that under normal circumstances a gastrostomy tube will decompress a bowel obstruction however, given the underlying degree of disease the patient's symptoms may not be alleviated.  Risks of ascites leak, injury to bowel or colon discussed.  Patient and son wished to proceed with surgery    Thank you for the opportunity of seeing Mickie Don in consultation

## 2020-12-11 NOTE — PLAN OF CARE
Problem: Occupational Therapy Goal  Goal: Occupational Therapy Goal  Description: Goals to be met by: 12/15/20    Patient will increase functional independence with ADLs by performing:    Caregiver will demonstrate Indep with set up of BSC and assisting pt with BSC transfers using RW with good safety awareness.  Caregiver will demonstrate Indep with assisting pt with safe sit<>stand using RW with good safety awareness for LB dressing and ADL transfers.       Outcome: Ongoing, Progressing   Evaluation complete and goals established.  Pt's son present for OT session.  Pt is wanting to discharge to home with Home Health and Palliative Care.

## 2020-12-11 NOTE — ANESTHESIA POSTPROCEDURE EVALUATION
Anesthesia Post Evaluation    Patient: Mickie Don    Procedure(s) Performed: Procedure(s) (LRB):  INSERTION, PEG TUBE (N/A)    Final Anesthesia Type: general    Patient location during evaluation: PACU  Patient participation: Yes- Able to Participate  Level of consciousness: awake and alert  Post-procedure vital signs: reviewed and stable  Pain management: adequate  Airway patency: patent    PONV status at discharge: No PONV  Anesthetic complications: no      Cardiovascular status: blood pressure returned to baseline  Respiratory status: unassisted and room air  Hydration status: euvolemic  Follow-up not needed.          Vitals Value Taken Time   /58 12/11/20 1703   Temp 36.5 °C (97.7 °F) 12/11/20 1615   Pulse 92 12/11/20 1703   Resp 22 12/11/20 1700   SpO2 99 % 12/11/20 1703   Vitals shown include unvalidated device data.      No case tracking events are documented in the log.      Pain/Maddie Score: Pain Rating Prior to Med Admin: 9 (12/11/2020  7:33 AM)  Pain Rating Post Med Admin: 4 (12/11/2020  8:03 AM)  Maddie Score: 8 (12/11/2020  4:15 PM)

## 2020-12-11 NOTE — PT/OT/SLP PROGRESS
Occupational Therapy      Patient Name:  Mickie Don   MRN:  [id number]    Patient not seen today secondary to pt feeling ill  . Upon arrival to room PT speaking with pt and son with pt requesting to hold off on therapy at that time.  Will follow-up at next scheduled therapy date.    MOI Lloyd  12/11/2020

## 2020-12-11 NOTE — PLAN OF CARE
[facility]  Department of Hospital Medicine  [address] [place] [address]  [phone] after hours  [phone] fax    HOSPICE  ORDERS    12/11/2020    Admit to Hospice:  Home Service Inpatient Service   Diagnoses:   Active Hospital Problems    Diagnosis  POA    *Ovarian cancer - IIIC [C56.9]  Yes    Hyperkalemia [E87.5]  Yes    Encounter for palliative care [Z51.5]  Not Applicable    Ascites [R18.8]  Yes    Small bowel obstruction [K56.609]  Yes    Acute deep vein thrombosis (DVT) of left upper extremity [I82.622]  Yes      Resolved Hospital Problems   No resolved problems to display.       Hospice Qualifying Diagnoses:        Patient has a life expectancy < 6 months due to:  1) Primary Hospice Diagnosis:  Recurrent Ovarian Cancer  2) Recurrent Small bowel obstructions  3) Acute DVT   Comorbid Conditions Contributing to Decline: recurrent ascites, recurrent small bowel obstructions    Vital Signs: Routine per Hospice Protocol.    Code Status: DNR     Allergies: Review of patient's allergies indicates:  No Known Allergies    Diet: regular     Activities: As tolerated    Nursing: Per Hospice Routine.PEG Care:  Clean site daily.  Monitor skin integrity.    PICC Care:   Scrub the Hub: Prior to accessing the line, always perform a 30 second alcohol scrub  Each lumen of the central line is to be flushed at least daily with 10 mL Normal Saline and 3 mL Heparin flush (100 units/mL)  Skilled Nurse (SN) may draw blood from IV access  Date of removal:     Other Miscellaneous Care:   1. Patient with venting G tube: Vent G tube as often as needed to relieve nausea/vomiting  2. Empty G tube contents TID or if bag is half empty       Mickie Don   Home Medication Instructions AROLDO:[id number]    Printed on:12/11/20 8316   Medication Information                      enoxaparin (LOVENOX) 120 mg/0.8 mL Syrg  Inject 0.8 mLs (120 mg total) into the skin daily             morphine (MS CONTIN) 15 MG 12 hr tablet  Take 1 tablet (15 mg total) by mouth 2 (two) times daily.             ondansetron (ZOFRAN-ODT) 4 MG TbDL  Take 1 tablet (4 mg total) by mouth every 6 (six) hours as needed (Nausea and vomiting).             promethazine (PHENERGAN) 25 MG tablet  Take 1 tablet (25 mg total) by mouth every 6 (six) hours as needed for Nausea.             promethazine (PHENERGAN) 6.25 mg/5 mL syrup  Take 10 mLs (12.5 mg total) by mouth every 6 (six) hours as needed for Nausea.                         Future Orders:  Hospice Medical Director may dictate new orders for comfortable care measures & sign death certificate.        _________________________________  Alejandra Adrian MD  12/11/2020

## 2020-12-11 NOTE — ASSESSMENT & PLAN NOTE
- See treatment history above  -  increased from   - Continue MS contin q12  - Progressive disease leading to multiple small bowel obstructions  - Palliative consult placed  - considering hospice vs home health  - code status DNR

## 2020-12-11 NOTE — ASSESSMENT & PLAN NOTE
- Patient currently NPO, IV fluids ordered  - Patient reports she has been unable to tolerate solid foods since discharge on 11/24/2020  - Patient reports she has not had a bowel movement since discharge   - Zofran/ phenergan ordered  - Patient awaiting PEG tube placement, appears to be palliative measure

## 2020-12-12 VITALS
BODY MASS INDEX: 26.68 KG/M2 | HEIGHT: 67 IN | WEIGHT: 170 LBS | OXYGEN SATURATION: 96 % | SYSTOLIC BLOOD PRESSURE: 112 MMHG | RESPIRATION RATE: 18 BRPM | TEMPERATURE: 98 F | HEART RATE: 76 BPM | DIASTOLIC BLOOD PRESSURE: 61 MMHG

## 2020-12-12 LAB
ANION GAP SERPL CALC-SCNC: 14 MMOL/L (ref 8–16)
BUN SERPL-MCNC: 51 MG/DL (ref 8–23)
CALCIUM SERPL-MCNC: 8.1 MG/DL (ref 8.7–10.5)
CHLORIDE SERPL-SCNC: 92 MMOL/L (ref 95–110)
CO2 SERPL-SCNC: 23 MMOL/L (ref 23–29)
CREAT SERPL-MCNC: 0.9 MG/DL (ref 0.5–1.4)
EST. GFR  (AFRICAN AMERICAN): >60 ML/MIN/1.73 M^2
EST. GFR  (NON AFRICAN AMERICAN): >60 ML/MIN/1.73 M^2
GLUCOSE SERPL-MCNC: 100 MG/DL (ref 70–110)
POTASSIUM SERPL-SCNC: 4.7 MMOL/L (ref 3.5–5.1)
SODIUM SERPL-SCNC: 129 MMOL/L (ref 136–145)

## 2020-12-12 PROCEDURE — 99233 PR SUBSEQUENT HOSPITAL CARE,LEVL III: ICD-10-PCS | Mod: ,,, | Performed by: OBSTETRICS & GYNECOLOGY

## 2020-12-12 PROCEDURE — 63600175 PHARM REV CODE 636 W HCPCS: Performed by: SPECIALIST

## 2020-12-12 PROCEDURE — 63600175 PHARM REV CODE 636 W HCPCS: Performed by: STUDENT IN AN ORGANIZED HEALTH CARE EDUCATION/TRAINING PROGRAM

## 2020-12-12 PROCEDURE — 80048 BASIC METABOLIC PNL TOTAL CA: CPT

## 2020-12-12 PROCEDURE — 94761 N-INVAS EAR/PLS OXIMETRY MLT: CPT

## 2020-12-12 PROCEDURE — 99233 SBSQ HOSP IP/OBS HIGH 50: CPT | Mod: ,,, | Performed by: OBSTETRICS & GYNECOLOGY

## 2020-12-12 RX ORDER — MORPHINE SULFATE 15 MG/1
15 TABLET, FILM COATED, EXTENDED RELEASE ORAL EVERY 12 HOURS
Status: DISCONTINUED | OUTPATIENT
Start: 2020-12-12 | End: 2020-12-12 | Stop reason: HOSPADM

## 2020-12-12 RX ORDER — MORPHINE SULFATE 4 MG/ML
4 INJECTION, SOLUTION INTRAMUSCULAR; INTRAVENOUS EVERY 4 HOURS PRN
Status: DISCONTINUED | OUTPATIENT
Start: 2020-12-12 | End: 2020-12-12 | Stop reason: HOSPADM

## 2020-12-12 RX ORDER — ENOXAPARIN SODIUM 100 MG/ML
1.5 INJECTION SUBCUTANEOUS EVERY 24 HOURS
Status: DISCONTINUED | OUTPATIENT
Start: 2020-12-12 | End: 2020-12-12 | Stop reason: HOSPADM

## 2020-12-12 RX ORDER — ENOXAPARIN SODIUM 100 MG/ML
1.5 INJECTION SUBCUTANEOUS DAILY
Qty: 36 ML | Refills: 1 | Status: SHIPPED | OUTPATIENT
Start: 2020-12-12 | End: 2021-02-10

## 2020-12-12 RX ORDER — MORPHINE SULFATE 15 MG/1
15 TABLET, FILM COATED, EXTENDED RELEASE ORAL EVERY 12 HOURS
Qty: 120 TABLET | Refills: 0 | Status: SHIPPED | OUTPATIENT
Start: 2020-12-12 | End: 2021-02-10

## 2020-12-12 RX ADMIN — MORPHINE SULFATE 4 MG: 4 INJECTION, SOLUTION INTRAMUSCULAR; INTRAVENOUS at 09:12

## 2020-12-12 RX ADMIN — MORPHINE SULFATE 4 MG: 4 INJECTION, SOLUTION INTRAMUSCULAR; INTRAVENOUS at 01:12

## 2020-12-12 RX ADMIN — MORPHINE SULFATE 4 MG: 4 INJECTION, SOLUTION INTRAMUSCULAR; INTRAVENOUS at 03:12

## 2020-12-12 NOTE — ASSESSMENT & PLAN NOTE
- CT with findings for recurrent SBO  - originally NPO with NG tube, however NG fell out, not replaced  - continue IVF and replacing IVF PRN  - POD#1 s/p peg tube placement via general surgery by cut down method  - incision c/d/i

## 2020-12-12 NOTE — DISCHARGE INSTRUCTIONS
Small Bowel Obstruction     Small bowel obstruction can lead to tissue damage and even tissue death.   A small bowel obstruction occurs when part or all of the small intestine (bowel) is blocked. As a result, digestive contents cant move through the bowel properly and out of the body. Treatment is needed right away to remove the blockage. This can ease painful symptoms. It can also prevent serious problems, such as tissue death or bursting (rupture) of the small bowel. Without treatment, a small bowel obstruction can be fatal.  Causes of small bowel obstruction  A small bowel obstruction can be caused by:  · Scar tissue (adhesions). These may form after belly (abdominal) surgery or an infection.  · Hernia. A hernia is when an organ pushes through a weak spot or tear in the abdomen wall. Part of the small bowel can push out and be seen as a bulge under the belly. Hernias can also occur internally.  · Certain health problems. These include when part of the bowel slides inside another part (intussusception). Other causes include irritable bowel disease such as Crohns disease, and inflammation and sores in the intestine (ulcerative colitis).  · Abnormal tissue growths (tumors). These can form on the inside or outside of the small bowel. They are usually due to cancer.  Symptoms of small bowel obstruction  Common symptoms include:  · Belly cramping and pain  · Belly swelling and bloating  · Upset stomach (nausea) and vomiting  · Can't  pass gas  · Can't pass stool (constipation)  · Diarrhea  Diagnosing small bowel obstruction  Your provider will ask about your symptoms and health history. Youll also have a physical exam. Tests may also be done to confirm the problem. These can include:  · Imaging tests. These provide pictures of the small bowel. Common tests include X-rays and a CT scan.  · Blood tests. These check for infection and other problems, such as excess fluid loss (dehydration).  · Upper GI  (gastrointestinal) series with a small bowel follow-through. This test takes X-rays of the upper digestive tract from the mouth through the small bowel. An X-ray dye (contrast fluid) is used. The dye coats the inside of your upper digestive tract so it will show up clearly on X-rays.  Treating small bowel obstruction  Treatment takes place in a hospital. As part of your care, the following may be done:  · No food or drink is given by mouth. This allows your bowels to rest.  · An IV (intravenous) line is placed in a vein in your arm or hand. The IV line is used to give fluids. It may also be used to give medicines. These may be needed to ease pain, nausea, and other symptoms. They may also be needed to treat or prevent infections.  · A soft, thin, flexible tube (nasogastric tube) is inserted through your nose and into your stomach. The tube is used to remove extra gas and fluid in your stomach and bowels. This helps to ease symptoms such as pain and swelling.  · In severe cases, surgery is done. This may be needed if the small bowel is almost or totally blocked, or there is a hole in the bowel (bowel perforation). During surgery, the blockage is removed. Parts of the bowel may also be removed if there is tissue death. Other repair may be done as well, depending on what caused the blockage. Your healthcare provider will give you more information about surgery, if needed.  · Youll be watched closely in the hospital until your symptoms improve. Your provider will tell you when you can go home.  Long-term concerns   After treatment, most people recover with no lasting effects. If a long part of the bowel is removed, there is a greater chance for lifelong digestive problems. Bowel movements may become irregular. Work with your provider to learn the best ways to manage any symptoms you may have, and to protect your health.  When to call your healthcare provider  Call your provider right away if you have any of the  following:  · Severe pain (Call 911)  · Belly swelling or cramping that wont go away  · Cant pass stool or gas  · Nausea or vomiting (especially if the vomit looks or smells like stool)   Date Last Reviewed: 7/1/2016  © 6741-7507 [employer]. [address] [place] [address], [place] [address]. All rights reserved. This information is not intended as a substitute for professional medical care. Always follow your healthcare professional's instructions.

## 2020-12-12 NOTE — CONSULTS
[facility]  Adult Nutrition  Consult Note    SUMMARY     Intervention: enteral nutrition therapy    Recommendations  Recommendation/Intervention: 1.) Recommend Isosource 1.5 : bolus feeds- x5 cartons per day providing 1875 kcals, 85 g protein, 220 g CHO, and 955 mls water. Flush 30 mls before and after each feed. Flush 150 mls free water Q4 hrs or per MD. For continues feeds, recommend Isosource 1.5 @ 20 mls/hr advancing by 20 mls Q4 hrs to goal rate 55 mls/hr continuous providing 1980 kcals/day, 90 g protein/day, 232 g CHO/day, and 1008 mls water/day.  Goals: 1.) patient will tolerate enteral nutrition at goal rate  Nutrition Goal Status: new  Communication of RD Recs: other (comment)(POC, sticky note)     1. Small bowel obstruction    2. Ovarian cancer    3. Ovarian cancer, unspecified laterality    4. Malignant neoplasm of both ovaries    5. Malignant ascites    6. Encounter for palliative care    7. Hyperkalemia      Past Medical History:   Diagnosis Date    Ascites 11/16/2020    Cancer     ovarian    Dry eyes 11/29/2017    Hematuria 11/17/2020    Neuropathy due to chemotherapeutic drug 1/10/2018    Open angle with borderline findings and low glaucoma risk in both eyes 6/20/2013    Ovarian cancer     Patient in cancer related research study 4/10/2018         Reason for Assessment  Reason For Assessment: consult  General Information Comments: Admits with recurrent acites and persistent nausea/vomiting. stage III ovarian cancer. S/p peg tube placement - plan to discharge home with hospice.     Nutrition discharge planning: enteral feeds via peg, home with hospice.     Nutrition Risk Screen    Nutrition Risk Screen: other (see comments)(poor nutritional status)    Nutrition/Diet History    Spiritual, Cultural Beliefs, Mormon Practices, Values that Affect Care: (None stated)  Factors Affecting Nutritional Intake: nausea/vomiting    Anthropometrics    Temp: 97.9 °F (36.6 °C)  Height  "Method: Stated  Height: 5' 7"  Height (inches): 67 in  Weight Method: Bed Scale  Weight: 77.1 kg (169 lb 15.6 oz)  Weight (lb): 169.98 lb  Ideal Body Weight (IBW), Female: 135 lb  % Ideal Body Weight, Female (lb): 125.91 %  BMI (Calculated): 26.6  BMI Grade: 25 - 29.9 - overweight       Lab/Procedures/Meds    Pertinent Labs Reviewed: reviewed  BMP  Lab Results   Component Value Date     (L) 12/12/2020    K 4.7 12/12/2020    CL 92 (L) 12/12/2020    CO2 23 12/12/2020    BUN 51 (H) 12/12/2020    CREATININE 0.9 12/12/2020    CALCIUM 8.1 (L) 12/12/2020    ANIONGAP 14 12/12/2020    ESTGFRAFRICA >60 12/12/2020    EGFRNONAA >60 12/12/2020     Lab Results   Component Value Date    ALBUMIN 1.9 (L) 12/09/2020     Lab Results   Component Value Date    CALCIUM 8.1 (L) 12/12/2020    PHOS 4.4 12/09/2020     No results for input(s): POCTGLUCOSE in the last 24 hours.    Pertinent Medications Reviewed: reviewed      Estimated/Assessed Needs  Weight Used For Calorie Calculations: 77.1 kg (169 lb 15.6 oz)  Energy Calorie Requirements (kcal): 0710-0480 kcals/day  Energy Need Method: [place]  Protein Requirements: 75-90 g protein  Weight Used For Protein Calculations: 77.1 kg (169 lb 15.6 oz)  Estimated Fluid Requirement Method: RDA Method  RDA Method (mL): 2000         Nutrition Prescription Ordered    Current Diet Order: clear liquid diet    Evaluation of Received Nutrient/Fluid Intake    IV Fluid (mL): 1000  Tolerance: tolerating  % Intake of Estimated Energy Needs: 0 - 25 %  % Meal Intake: clear liquids    Nutrition Risk    Level of Risk/Frequency of Follow-up: (x2 weekly)     Assessment and Plan     Nutrition Problem  inadequate energy intake    Related to (etiology):   Increased nutrient needs    Signs and Symptoms (as evidenced by):   Clear liquid diet, s/p  Peg placement     Interventions/Recommendations (treatment strategy):  Initial enteral nutrition    Nutrition Diagnosis Status:   New        Monitor and " Evaluation  Food and Nutrient Intake: energy intake, enteral nutrition intake, food and beverage intake  Food and Nutrient Adminstration: enteral and parenteral nutrition administration, diet order  Anthropometric Measurements: weight, weight change, body mass index  Biochemical Data, Medical Tests and Procedures: electrolyte and renal panel, glucose/endocrine profile, lipid profile  Nutrition-Focused Physical Findings: overall appearance     Malnutrition Assessment  Skin (Micronutrient): other (see comments)(nadia score 14)       Nutrition Follow-Up    RD Follow-up?: Yes

## 2020-12-12 NOTE — PROGRESS NOTES
Postop day 1.  Status post open G-tube placement  Awake and alert  Abdomen-soft, incisions clean and dry

## 2020-12-12 NOTE — PROGRESS NOTES
DAVIE spoke to Dr. KATRINA Adrian and she is recommending home w/ hospice.      SW met with pt and son.  Pt in agreement with hospice and SW gave list of hospice agencies.  Pt wanted to call a friend and would like SW to come back.    SW f/u with pt and son, Ramin, daughter in law, present.  Pt selected [facility] and signed choice form.    SW called [facility] [phone], spoke to Jackie and she will meet with pt at hospital around 12pm.  SW faxed orders and medical records to [facility] (unable to attach to [facility]).

## 2020-12-12 NOTE — PLAN OF CARE
Verbalizes wishes for home hospice today.  Case Management to be re-consulted        Pain not controlled c 4 mg Morphine Q6  Orders requested & RCVD - 4 mg Morphine Q4

## 2020-12-12 NOTE — PROGRESS NOTES
DAVIE f/u with Jackie at [facility], pt accepted and DME will be delivered to home, placed on AVS.    Pt's son told ALTA Mobley that DME has been delivered, pt ready for dc home.

## 2020-12-12 NOTE — ASSESSMENT & PLAN NOTE
- See treatment history above  -  increased from previous now 4631  - Continue MS contin q12, will discuss with staff about pain regimen on discharge. Has been getting IV morphine while inpatient.   - Progressive disease leading to multiple small bowel obstructions  - Palliative consult placed  - has decided on home hospice  - peg tube placed for comfort   - code status DNR

## 2020-12-12 NOTE — PLAN OF CARE
Patient returned from surgery. Patient has no complaints of pain or nausea. Patient awake and alert. New Peg tube to unclamped gravity. Son at bedside.

## 2020-12-12 NOTE — DISCHARGE SUMMARY
"[facility]  Gynecologic Oncology  Discharge Summary    Patient Name: Mickie Don  MRN: [id number]  Admission Date: 12/9/2020  Hospital Length of Stay: 3 days  Discharge Date and Time:  12/12/2020 3:48 PM  Attending Physician: Benny Tran MD   Discharging Provider: Alejandra Adrian MD  Primary Care Provider: Elle Ma MD    HPI:   Ms. Don is a 65 year old female with Stage IIIC ovarian cancer and LUE DVT who presented to [facility] for IR paracentesis of acites and was noted to have significant abdominal distension as well as peristent nausea/vomiting. She was admitted on 11/18/20 for LUE DVT and was also found to have a SBO. She was discharged on 11/24 on clear liquid diet and lovenox. She reports persistent nausea/vomiting since discharge and has been unable to tolerate any solid foods. She reports being able to keep some liquids down for 30 min-2hrs at most. Reports abdominal pain that is partially relieved with morphine AL. She has not had a bowel movement since her discharge. Reports passing flatus. She states that she "thinks she's going downhill".      Treatment History  Xlap/Omentectomy on 11/1/16 - mass was unresectable  Stage IIIC ovarian cancer  Initiated Taxotere/Carbo on 11/18/16, now s/p 6 cycles completed 3/8/17  Xlap/ISIS/BSO/Radical mass resection > 10 cm, optimal debulking on 4/11/17  Initiated PRIMA on 8/21/17  BRCA negative  Started FORWARD1 on 11/27/17  Started Doxil/Anju as part of NRG  2/27/19, received total of 6 cycles  Gemzar monotherapy started 8/29/19  Topotecan started 8/14/2020  Taxol monotherapy started 10/16/2020  LUE DVT  PSBO    Hospital Course:  12/9/20- Admitted to Gyn-onc for suspected partial SBO. IV fluids given, CMP p, CT ab/pelvis with contrast ordered. NG tube to intermittent suction placed. Palliative consult placed.   12/10/2020 - Emesis overnight. approx 250cc suction out since admission   12/11/2020 NG tube fell out yesterday, not replaced. " Patient has decided on venting Peg tube. Deciding vs home health or hospice. IV pain medication PRN. Lasix PRN for edema, Na slowly normalizing, K has normalized.   12/12/2020 POD#1 s/p PEG tube by general surgery. Patient doing well from post op standpoint. Plan for home hospice with peg tube after peg teaching. Will d/c home with peg tube on home hospice.     Procedure(s) (LRB):  INSERTION, PEG TUBE (N/A)     Consults (From admission, onward)        Status Ordering Provider     Inpatient consult to Interventional Radiology  Once     Provider:  (Not yet assigned)    Completed VICTOR MANUEL FREEMAN     Inpatient consult to Palliative Care  Once     Provider:  (Not yet assigned)    Completed EMILY JIN     Inpatient consult to Registered Dietitian/Nutritionist  Once     Provider:  (Not yet assigned)    Completed CANDICE MANZANARES     Inpatient consult to Social Work  Once     Provider:  (Not yet assigned)    Completed CANDICE MANZANARES     IP consult to case management  Once     Provider:  (Not yet assigned)    Completed CORKY AMARO          Significant Diagnostic Studies: Labs:   BMP:   Recent Labs   Lab 12/11/20  0610 12/12/20  0354   * 100   * 129*   K 4.8 4.7   CL 90* 92*   CO2 24 23   BUN 49* 51*   CREATININE 0.8 0.9   CALCIUM 8.0* 8.1*    and CBC No results for input(s): WBC, HGB, HCT, PLT in the last 48 hours.    Pending Diagnostic Studies:     None        Final Active Diagnoses:    Diagnosis Date Noted POA    PRINCIPAL PROBLEM:  Ovarian cancer - IIIC [C56.9] 10/25/2016 Yes    Hyperkalemia [E87.5] 12/10/2020 Yes    Encounter for palliative care [Z51.5] 12/09/2020 Not Applicable    Ascites [R18.8] 11/19/2020 Yes    Small bowel obstruction [K56.609] 11/18/2020 Yes    Acute deep vein thrombosis (DVT) of left upper extremity [I82.622] 11/16/2020 Yes      Problems Resolved During this Admission:        Does this patient meet criteria for extended DVT prophylaxis? Yes, patient was prescribed  "Lovenox has active DVT    Discharged Condition: good    Disposition: Hospice/Home    Follow Up:  Follow-up Information     [facility]    Specialty: Hospice Services  Why: Hospice  Contact information:  [address]  [address]  [place] [address]  [phone]                 Patient Instructions:      HOSPITAL BED FOR HOME USE     Order Specific Question Answer Comments   Type: Semi-electric    Length of need (1-99 months): 99    Does patient have medical equipment at home? none    Height: 5' 7" (1.702 m)    Weight: 77.1 kg (170 lb)    Please check all that apply: Patient requires positioning of the body in ways not feasible in an ordinary bed due to a medical condition which is expected to last at least one month.    Please check all that apply: Patient requires, for the alleviation of pain, positioning of the body in ways not feasible in an ordinary bed.      Ambulatory referral/consult to Outpatient Case Management   Referral Priority: Routine Referral Type: Consultation   Referral Reason: Specialty Services Required   Number of Visits Requested: 1     Diet general     Call MD for:  redness, tenderness, or signs of infection (pain, swelling, redness, odor or green/yellow discharge around incision site)     Remove dressing in 24 hours     No driving until:   Order Comments: Not taking narcotic pain medications     Notify your health care provider if you experience any of the following:  redness, tenderness, or signs of infection (pain, swelling, redness, odor or green/yellow discharge around incision site)     Activity as tolerated     Medications:  Reconciled Home Medications:      Medication List      CHANGE how you take these medications    * enoxaparin 120 mg/0.8 mL Syrg  Commonly known as: LOVENOX  Inject 0.8 mLs (120 mg total) into the skin once daily.  What changed: when to take this     * enoxaparin 40 mg/0.4 mL Syrg  Commonly known as: LOVENOX  Inject 1.2 mLs (120 mg total) into the " skin once daily.  What changed: You were already taking a medication with the same name, and this prescription was added. Make sure you understand how and when to take each.     * morphine 15 MG 12 hr tablet  Commonly known as: MS CONTIN  Take 1 tablet (15 mg total) by mouth 2 (two) times daily.  What changed: Another medication with the same name was added. Make sure you understand how and when to take each.     * morphine 15 MG 12 hr tablet  Commonly known as: MS CONTIN  Take 1 tablet (15 mg total) by mouth every 12 (twelve) hours.  What changed: You were already taking a medication with the same name, and this prescription was added. Make sure you understand how and when to take each.         * This list has 4 medication(s) that are the same as other medications prescribed for you. Read the directions carefully, and ask your doctor or other care provider to review them with you.            CONTINUE taking these medications    ondansetron 4 MG Tbdl  Commonly known as: ZOFRAN-ODT  Take 1 tablet (4 mg total) by mouth every 6 (six) hours as needed (Nausea and vomiting).     * promethazine 6.25 mg/5 mL syrup  Commonly known as: PHENERGAN  Take 10 mLs (12.5 mg total) by mouth every 6 (six) hours as needed for Nausea.     * promethazine 25 MG tablet  Commonly known as: PHENERGAN  Take 1 tablet (25 mg total) by mouth every 6 (six) hours as needed for Nausea.         * This list has 2 medication(s) that are the same as other medications prescribed for you. Read the directions carefully, and ask your doctor or other care provider to review them with you.                Alejandra Adrian MD  Gynecologic Oncology  [facility]

## 2020-12-12 NOTE — PLAN OF CARE
No significant events overnight. Remains free from fall, injury, skin breakdown. VSS on RA throughout the night. Positions self-independently. Pain controled w/ IV morphine. G tube draining to gravity. Pt cont. to spit up. Son at bedside. All alarms active and auduble.  Plan of care reviewed w/ pt and all questions answered. Bed locked and in lowest position. Call light w/I reach. No needs at this time. Purposeful hourly rounding. Will continue to monitor.

## 2020-12-12 NOTE — PROGRESS NOTES
"[facility]  Gynecologic Oncology  Progress Note      Patient Name: Mickie Don  MRN: [id number]  Admission Date: 12/9/2020  Hospital Length of Stay: 3 days  Attending Provider: Benny Tran MD  Primary Care Provider: Elle Ma MD  Principal Problem: Ovarian cancer    Follow-up For: Procedure(s) (LRB):  INSERTION, PEG TUBE (N/A)  Post-Operative Day: 1 Day Post-Op  Subjective:      History of Present Illness:  Ms. Don is a 65 year old female with Stage IIIC ovarian cancer and LUE DVT who presented to [facility] for IR paracentesis of acites and was noted to have significant abdominal distension as well as peristent nausea/vomiting. She was admitted on 11/18/20 for LUE DVT and was also found to have a SBO. She was discharged on 11/24 on clear liquid diet and lovenox. She reports persistent nausea/vomiting since discharge and has been unable to tolerate any solid foods. She reports being able to keep some liquids down for 30 min-2hrs at most. Reports abdominal pain that is partially relieved with morphine IA. She has not had a bowel movement since her discharge. Reports passing flatus. She states that she "thinks she's going downhill".      Treatment History  Xlap/Omentectomy on 11/1/16 - mass was unresectable  Stage IIIC ovarian cancer  Initiated Taxotere/Carbo on 11/18/16, now s/p 6 cycles completed 3/8/17  Xlap/ISIS/BSO/Radical mass resection > 10 cm, optimal debulking on 4/11/17  Initiated PRIMA on 8/21/17  BRCA negative  Started FORWARD1 on 11/27/17  Started Doxil/Anju as part of NRG  2/27/19, received total of 6 cycles  Gemzar monotherapy started 8/29/19  Topotecan started 8/14/2020  Taxol monotherapy started 10/16/2020  LUE DVT  PSBO    Hospital Course:  12/9/20- Admitted to Gyn-onc for suspected partial SBO. IV fluids given, CMP p, CT ab/pelvis with contrast ordered. NG tube to intermittent suction placed. Palliative consult placed.   12/10/2020 - Emesis overnight. approx 250cc " suction out since admission   12/11/2020 NG tube fell out yesterday, not replaced. Patient has decided on venting Peg tube. Deciding vs home health or hospice. IV pain medication PRN. Lasix PRN for edema, Na slowly normalizing, K has normalized.   12/12/2020 POD#1 s/p PEG tube by general surgery. Patient doing well from post op standpoint. Plan for home hospice with peg tube after peg teaching. Will d/c home with peg tube on home hospice.     Interval History: No emesis or vomiting, did spit up. Pain well controlled with IV morphine. Urinating frequently and feels less distended after lasix. No other new complaints. PEG tube draining well. Patient wants to move. Has decided on home hospice.     Scheduled Meds:    Continuous Infusions:    PRN Meds:morphine, naloxone, ondansetron, promethazine (PHENERGAN) IVPB, sodium chloride 0.9%, sodium chloride 0.9%    Review of patient's allergies indicates:  No Known Allergies    Objective:     Vital Signs (Most Recent):  Temp: 97.9 °F (36.6 °C) (12/12/20 0506)  Pulse: 77 (12/12/20 0600)  Resp: (!) 22 (12/12/20 0506)  BP: 112/61 (12/12/20 0506)  SpO2: 96 % (12/12/20 0506) Vital Signs (24h Range):  Temp:  [97.1 °F (36.2 °C)-98.5 °F (36.9 °C)] 97.9 °F (36.6 °C)  Pulse:  [] 77  Resp:  [14-26] 22  SpO2:  [94 %-100 %] 96 %  BP: ()/(50-69) 112/61     Weight: 77.1 kg (170 lb)  Body mass index is 26.63 kg/m².    Intake/Output - Last 3 Shifts       12/10 0700 - 12/11 0659 12/11 0700 - 12/12 0659 12/12 0700 - 12/13 0659    P.O. 240      I.V. (mL/kg) 1802.5 (23.4) 1000 (13)     Other 0      NG/      Total Intake(mL/kg) 2262.5 (29.3) 1000 (13)     Urine (mL/kg/hr)       Drains 250 725     Other  50     Total Output 250 775     Net +2012.5 +225            Urine Occurrence 2 x               Physical Exam:   Constitutional: She is oriented to person, place, and time. She appears well-developed and well-nourished. No distress.   Frail, cachetic     HENT:   Head: Normocephalic  and atraumatic.    Eyes: Conjunctivae and EOM are normal.    Neck: Normal range of motion. Neck supple. No thyromegaly present.    Cardiovascular: Normal rate and regular rhythm.     Pulmonary/Chest: Effort normal. No respiratory distress.        Abdominal: Soft. She exhibits no distension. There is no abdominal tenderness.   Incision c/d/i. Peg tube in place, c/d/i with dressing in place. 100cc of bile noted in elias bag             Musculoskeletal: Normal range of motion and moves all extremeties. No tenderness or edema.       Neurological: She is alert and oriented to person, place, and time.    Skin: Skin is warm and dry. No rash noted.    Psychiatric: She has a normal mood and affect. Her behavior is normal.       Lines/Drains/Airways     Central Venous Catheter Line            Port A Cath Single Lumen 07/23/20 0950 right subclavian 141 days          Drain                 Gastrostomy/Enterostomy 12/11/20 1615 LUQ decompression less than 1 day          Peripheral Intravenous Line                 Peripheral IV - Single Lumen 12/11/20 1600 22 G Right Wrist less than 1 day                Laboratory:  BMP:   Recent Labs   Lab 12/10/20  0941 12/11/20  0610 12/12/20  0354   GLU 76 117* 100   * 128* 129*   K 5.0  5.0 4.8 4.7   CL 91* 90* 92*   CO2 22* 24 23   BUN 42* 49* 51*   CREATININE 0.7 0.8 0.9   CALCIUM 7.6* 8.0* 8.1*   , CBC:   No results for input(s): WBC, HGB, HCT, PLT in the last 48 hours. and All pertinent labs from the last 24 hours have been reviewed.    Diagnostic Results:  Labs: Reviewed  CT: Reviewed    Assessment/Plan:     * Ovarian cancer - IIIC  - See treatment history above  -  increased from previous now 4631  - Continue MS contin q12, will discuss with staff about pain regimen on discharge. Has been getting IV morphine while inpatient.   - Progressive disease leading to multiple small bowel obstructions  - Palliative consult placed  - has decided on home hospice  - peg tube placed for  comfort   - code status DNR     Hyperkalemia  - s/p albuterol, lasix  - 5.7>5.1>5.2>5.0>4.7  - Lasix as needed    Encounter for palliative care  - has decided on home hospice  - appreciate SW assistance     Ascites  S/p paracentesis with 1.6 L serous fluid removed on 12/9/20    Small bowel obstruction  - CT with findings for recurrent SBO  - originally NPO with NG tube, however NG fell out, not replaced  - continue IVF and replacing IVF PRN  - POD#1 s/p peg tube placement via general surgery by cut down method  - incision c/d/i   - will advance diet to clears and have nutrition consult for PEG tube diet.   - plan for home with peg tube and hospice   - appreciate general surgery recommendations     Acute deep vein thrombosis (DVT) of left upper extremity  - Continue lovenox 120mg q24         VTE Risk Mitigation (From admission, onward)         Ordered     IP VTE HIGH RISK PATIENT  Once      12/09/20 0823     Place sequential compression device  Until discontinued      12/09/20 0823                  Alejandra Adrian MD  Gynecologic Oncology  [facility]

## 2020-12-12 NOTE — SUBJECTIVE & OBJECTIVE
Interval History: No emesis or vomiting, did spit up. Pain well controlled with IV morphine. Urinating frequently and feels less distended after lasix. No other new complaints. PEG tube draining well. Patient wants to move. Has decided on home hospice.     Scheduled Meds:    Continuous Infusions:    PRN Meds:morphine, naloxone, ondansetron, promethazine (PHENERGAN) IVPB, sodium chloride 0.9%, sodium chloride 0.9%    Review of patient's allergies indicates:  No Known Allergies    Objective:     Vital Signs (Most Recent):  Temp: 97.9 °F (36.6 °C) (12/12/20 0506)  Pulse: 77 (12/12/20 0600)  Resp: (!) 22 (12/12/20 0506)  BP: 112/61 (12/12/20 0506)  SpO2: 96 % (12/12/20 0506) Vital Signs (24h Range):  Temp:  [97.1 °F (36.2 °C)-98.5 °F (36.9 °C)] 97.9 °F (36.6 °C)  Pulse:  [] 77  Resp:  [14-26] 22  SpO2:  [94 %-100 %] 96 %  BP: ()/(50-69) 112/61     Weight: 77.1 kg (170 lb)  Body mass index is 26.63 kg/m².    Intake/Output - Last 3 Shifts       12/10 0700 - 12/11 0659 12/11 0700 - 12/12 0659 12/12 0700 - 12/13 0659    P.O. 240      I.V. (mL/kg) 1802.5 (23.4) 1000 (13)     Other 0      NG/      Total Intake(mL/kg) 2262.5 (29.3) 1000 (13)     Urine (mL/kg/hr)       Drains 250 725     Other  50     Total Output 250 775     Net +2012.5 +225            Urine Occurrence 2 x               Physical Exam:   Constitutional: She is oriented to person, place, and time. She appears well-developed and well-nourished. No distress.   Frail, cachetic     HENT:   Head: Normocephalic and atraumatic.    Eyes: Conjunctivae and EOM are normal.    Neck: Normal range of motion. Neck supple. No thyromegaly present.    Cardiovascular: Normal rate and regular rhythm.     Pulmonary/Chest: Effort normal. No respiratory distress.        Abdominal: Soft. She exhibits no distension. There is no abdominal tenderness.   Incision c/d/i. Peg tube in place, c/d/i with dressing in place. 100cc of bile noted in elias bag              Musculoskeletal: Normal range of motion and moves all extremeties. No tenderness or edema.       Neurological: She is alert and oriented to person, place, and time.    Skin: Skin is warm and dry. No rash noted.    Psychiatric: She has a normal mood and affect. Her behavior is normal.       Lines/Drains/Airways     Central Venous Catheter Line            Port A Cath Single Lumen 07/23/20 0950 right subclavian 141 days          Drain                 Gastrostomy/Enterostomy 12/11/20 1615 LUQ decompression less than 1 day          Peripheral Intravenous Line                 Peripheral IV - Single Lumen 12/11/20 1600 22 G Right Wrist less than 1 day                Laboratory:  BMP:   Recent Labs   Lab 12/10/20  0941 12/11/20  0610 12/12/20  0354   GLU 76 117* 100   * 128* 129*   K 5.0  5.0 4.8 4.7   CL 91* 90* 92*   CO2 22* 24 23   BUN 42* 49* 51*   CREATININE 0.7 0.8 0.9   CALCIUM 7.6* 8.0* 8.1*   , CBC:   No results for input(s): WBC, HGB, HCT, PLT in the last 48 hours. and All pertinent labs from the last 24 hours have been reviewed.    Diagnostic Results:  Labs: Reviewed  CT: Reviewed

## 2020-12-14 ENCOUNTER — OUTPATIENT CASE MANAGEMENT (OUTPATIENT)
Dept: ADMINISTRATIVE | Facility: OTHER | Age: 65
End: 2020-12-14

## 2020-12-14 ENCOUNTER — PATIENT MESSAGE (OUTPATIENT)
Dept: GYNECOLOGIC ONCOLOGY | Facility: CLINIC | Age: 65
End: 2020-12-14

## 2020-12-14 NOTE — PHYSICIAN QUERY
PT Name: Mickie Don  MR #: [id number]    Nutrition Clarification     CDS/: ADONAY SamuelsN,RNC-MNN         Contact information:[email or web address]     This form is a permanent document in the medical record.     Query Date: December 14, 2020    By submitting this query, we are merely seeking further clarification of documentation.. Please utilize your independent clinical judgment when addressing the question(s) below.    The medical record contains the following:   Indicators  Supporting Clinical Findings Location in Medical Record   X % of Estimated Energy Intake over a time frame from p.o., TF, or TPN % Intake of Estimated Energy Needs: 0 - 25 %  % Meal Intake: clear liquids    Nutrition Problem  inadequate energy intake Nutrition consult note 12/12@1151am    Weight Status over a time frame      Subcutaneous Fat and/or Muscle Loss     X Fluid Accumulation or Edema Edema:  Moderate bilateral LEs OT note 12/10   X Wt/BMI/Usual Body Weight Weight Method: Bed Scale  Weight: 77.1 kg (169 lb 15.6 oz)  Weight (lb): 169.98 lb  Ideal Body Weight (IBW), Female: 135 lb  % Ideal Body Weight, Female (lb): 125.91 %  BMI (Calculated): 26.6  BMI Grade: 25 - 29.9 - overweight Nutrition consult note 12/12@1151am    Delayed Wound Healing/Failure to Thrive     X Acute or Chronic Illness Admits with recurrent acites and persistent nausea/vomiting. stage III ovarian cancer. S/p peg tube placement - plan to discharge home with hospice Nutrition consult note 12/12@1151am    Medication     X Treatment Recommendation/Intervention: 1.) Recommend Isosource 1.5 : bolus feeds- x5 cartons per day providing 1875 kcals, 85 g protein, 220 g CHO, and 955 mls water. Flush 30 mls before and after each feed. Flush 150 mls free water Q4 hrs or per MD. For continues feeds, recommend Isosource 1.5 @ 20 mls/hr advancing by 20 mls Q4 hrs to goal rate 55 mls/hr continuous providing 1980 kcals/day, 90 g protein/day, 232 g CHO/day, and  1008 mls water/day.  Goals: 1.) patient will tolerate enteral nutrition at goal rate Nutrition consult note 12/12@1151am    Other       AND / ASPEN Clinical Characteristics (October 2011)  A minimum of two characteristics is recommended for diagnosing either moderate or severe malnutrition   Mild Malnutrition Moderate Malnutrition Severe Malnutrition   Energy Intake from p.o., TF or TPN. < 75% intake of estimated energy needs for less than 7 days < 75% intake of estimated energy needs for greater than 7 days < 50% intake of estimated energy needs for > 5 days   Weight Loss 1-2% in 1 month  5% in 3 months  7.5% in 6 months  10% in 1 year 1-2 % in 1 week  5% in 1 month  7.5% in 3 months  10% in 6 months  20% in 1 year > 2% in 1 week  > 5% in 1 month  > 7.5% in 3 months  > 10% in 6 months  > 20% in 1 year   Physical Findings     None *Mild subcutaneous fat and/or muscle loss  *Mild fluid accumulation  *Stage II decubitus  *Surgical wound or non-healing wound *Mod/severe subcutaneous fat and/or muscle loss  *Mod/severe fluid accumulation  *Stage III or IV decubitus  *Non-healing surgical wound     Provider, please specify diagnosis or diagnoses associated with above clinical findings.    [X  ] Mild Protein-Calorie Malnutrition   [  ] Moderate Protein-Calorie Malnutrition   [  ] Other Nutritional Diagnosis (please specify): _______   [  ] Clinically Undetermined     Present on admission (POA) status:   [   ] Yes (Y)                          [  ] Clinically Undetermined (W)              [   ] No (N)                            [   ] Documentation insufficient to determine if condition is POA (U)     Please document in your progress notes daily for the duration of treatment until resolved and include in your discharge summary.

## 2020-12-14 NOTE — PHYSICIAN QUERY
PT Name: Mickie Don  MR #: [id number]     Documentation Clarification      CDS/: DENNIS Samuels,RNC-MNN         Contact information:[email or web address]    This form is a permanent document in the medical record.     Query Date: December 14, 2020    By submitting this query, we are merely seeking further clarification of documentation. Please utilize your independent clinical judgment when addressing the question(s) below.    The Medical Record reflects the following:    Supporting Clinical Findings Location in Medical Record   Final Active Diagnoses:  Ascites    65 year old female with Stage IIIC ovarian cancer and LUE DVT who presented to [facility] for IR paracentesis of acites and was noted to have significant abdominal distension as well as peristent nausea/vomiting. She was admitted on 11/18/20 for LUE DVT and was also found to have a SBO.    History of Present Illness: ovarian cancer and recurrent ascites.  She has been nauseated and feels dehydrated as well.    Procedure: paracentesis    Specimen Removed: YES 1.6 L serous ascites Discharge Summary 12/12                  IR H&P 12/9        IR Procedure note 12/9                                                                                Provider, please provide diagnosis or diagnoses associated with above clinical findings.    Please specify type of ascites.    [  X ] Malignant ascites   [   ] Other (please specify): ____________   [  ] Clinically undetermined                                                                                                           Present on admission (POA) status:   [   ] Yes (Y)                          [  ] Clinically Undetermined (W)  [   ] No (N)                            [   ] Documentation insufficient to determine if condition is POA (U)

## 2020-12-16 NOTE — PLAN OF CARE
12/16/20 1312   Final Note   Assessment Type Final Discharge Note   Anticipated Discharge Disposition [facility]  ([facility])   What phone number can be called within the next 1-3 days to see how you are doing after discharge?   ([phone])   Hospital Follow Up  Appt(s) scheduled? No

## 2020-12-22 ENCOUNTER — TELEPHONE (OUTPATIENT)
Dept: RESEARCH | Facility: HOSPITAL | Age: 65
End: 2020-12-22

## 2020-12-22 NOTE — TELEPHONE ENCOUNTER
Tuesday, December 22, 2020  Pt Initial: LILO VAZ  Protocol NRG-  Study # [id number]   IRB # 2018.028    Follow-up 96 weeks after study therapy    Pt contacted for QOL follow-up per Protocol. No availability. Left voicemail.     Reach out to Pt via text messages 12/8/2020 regarding doc appt/chemo visit, patient stated Pt was admitted.  Pt states awaits stats. On 12/10/2020, Pt unavailable.. No info responded .    Awaiting contact back for QOL follow-up.

## 2020-12-23 ENCOUNTER — TELEPHONE (OUTPATIENT)
Dept: RESEARCH | Facility: HOSPITAL | Age: 65
End: 2020-12-23

## 2021-01-06 NOTE — NURSING
0800  Pt here for Gemzar infusion, no new complaints or concerns at present; discussed treatment plan for today, all questions answered and pt agrees to proceed   [FreeTextEntry1] : 1.)  Recurrent UTI\par According to patient, UTI in August, September, and October 2020\par CT stone Lopez to rule out asymptomatic urinary tract calculi as possible source.\par PVR/uroflow study to rule out incomplete emptying of the bladder as a source of infection.

## 2021-04-08 ENCOUNTER — RESEARCH ENCOUNTER (OUTPATIENT)
Dept: RESEARCH | Facility: HOSPITAL | Age: 66
End: 2021-04-08

## 2022-08-03 NOTE — PROGRESS NOTES
Subjective:       Patient ID: Mickie Don is a 63 y.o. female.    Chief Complaint: URI    HPI  She complains of sinus congestion green nasal discharge. No fever, chills, night sweats  Review of Systems   Constitutional: Negative for chills, fatigue, fever and unexpected weight change.   Respiratory: Negative for chest tightness and shortness of breath.    Cardiovascular: Negative for chest pain and palpitations.   Gastrointestinal: Negative for abdominal pain and blood in stool.   Neurological: Negative for dizziness, syncope, numbness and headaches.       Objective:      Physical Exam   HENT:   Right Ear: External ear normal.   Left Ear: External ear normal.   Nose: Nose normal.   Mouth/Throat: Oropharynx is clear and moist.   Eyes: Pupils are equal, round, and reactive to light.   Neck: Normal range of motion.   Cardiovascular: Normal rate and regular rhythm.   No murmur heard.  Pulmonary/Chest: Breath sounds normal.   Abdominal: She exhibits no distension. There is no hepatosplenomegaly. There is no tenderness.   Lymphadenopathy:     She has no cervical adenopathy.     She has no axillary adenopathy.   Neurological: She has normal strength and normal reflexes. No cranial nerve deficit or sensory deficit.       Assessment/Plan       Assessment and plan:  Upper respiratory infection:  Amoxil 875 mg p.o. b.i.d. x7 days.  Call if no relief.  She was here for urgent care only appointment.  She will follow up with her primary care physician for a physical      
Resulted

## 2022-09-06 NOTE — PROGRESS NOTES
Oct 26, 2018     Protocol: FORWARD1/IMGN-0403  Principle Investigator: BETH Weston  Treating Physician: GUERITA Tran Pt Initials: NAV VAZ  Study ID: [id number]  IRB#: [id number]     FORWARD 1: A Randomized, Open Label Phase 3 Study to Evaluate the Safety and Efficacy of Mirvetuximab soravtansine (FSTD737) Versus Investigators Choice of Chemotherapy in Women with Folate Receptor á?positive Advanced Epithelial Ovarian Cancer, Primary Peritoneal Cancer or Fallopian Tube Cancer      Cycle 13, Day 8  Cycle 13  Study allocation was made on 11/27/17.  Patient randomized to Arm A:[id number], 6 mg/kg (from AIBW), q3 weeks.  Dose reduced to 5 mg/kg (based on AIBW) on C11 (8/6/18) d/t persistent thrombocytopenia.  Dose reduced to 4 mg/kg (based on AIBW) on C13 (10/18/18) d/t persistent thrombocytopenia.     Patient had scans on 8/10/18, which showed stable disease from previous CT scan.  Per RECIST, patient has had partial response to therapy compared to Baseline scans. Next scans scheduled for 11/2/18.       Patient presented to clinic for C13D8 on the above mentioned clinical trial.  No MD visit on this day, per protocol.  Patient reports no change in ocular symptoms from visit with Dr. Mcgrath on 10/17/18 -blurred vision due to distance vision changes, no issue with dry eyes.  She confirms she has been compliant with Refresh and steroid eye drops.  Neuropathy is stable and she feels Lyrica is helping symptoms.       Central labs collected on 10/26/18.  Reviewed baseline conditions and current medications with patient.  See Concomitant Medication and Baseline Medical History sheets.  Central safety labs collected, processed, and shipped to  per protocol.      All of the patient's questions were answered by me to her satisfaction. Patient expressed her willingness to continue to participate in the above mentioned clinical trial. Instructed patient to notify Dr. rTan and/or me with any questions, concerns, or changes in health status.  Patient verbalized understanding.      Baseline Medical History  1. 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    2. Anxiety, Grade 1.  3. Dermatofibroma.  Benign.  Diagnosed by biopsy on 7/17/17.  No further intervention needed.  4. Decreased platelet count, Grade 1.  Resolved 11/17/17.  5. Constipation, Grade 1.  6. Myalgia, Grade 1.  7. Peripheral neuropathy- feet, Grade 1.      Adverse Events - For the most up-to-date AE log, please refer to the paper AE log in the subjects research file.  1. Platelet count decreased/thrombocytopenia, remains Grade 1.  Central labs Hematology collected today and sent to study. Will continue to monitor.  2. Peripheral neuropathy - fingers, remains Grade 1.  Patient discontinued Neurontin, tolerating neuropathy okay. Will continue to monitor.  3. Fatigue, remains Grade 1.  Will continue to monitor.  4. Cataracts, remains Grade 2.  New glasses ordered and being followed by Dr. Mcgrath. Will continue to monitor.  5. Blurred vision, remains Grade 2. Due to cataracts per Ophthalmologist. Dose delay not required per MM.  Will continue to monitor.  6. Photophobia, remains Grade 1.  Will continue to monitor.           spouse

## 2023-12-08 NOTE — PROGRESS NOTES
HPI     Referred by Dr Mcgrath     S/p phaco w/IOL OD 4/4/19  research protocol Ovarian Cancer  Myopia  Fuchs (brother recently had K tx for fuchs)  GS    Combo drops TID OD    Pt here for post op OD.  Pt states doing well. Pt denies eye pain OD.     Last edited by Karen Butt MD on 4/4/2019 11:39 AM. (History)            Assessment /Plan     For exam results, see Encounter Report.    Status post cataract extraction and insertion of intraocular lens, right    Nuclear sclerotic cataract of left eye    Fuchs' corneal dystrophy      Slit Lamp Exam  L/L - normal  C/s - quiet  Cornea - clear  A/C - 1+ cell  Lens - PCIOL    POD #1 s/p phaco/IOL  - doing well  - continue the following drops:    vigamox or ocuflox TID x 1 wk then stop  Pred forte or durezol or dexamethasone TID x  4 wks  Ketorolac TID until runs out    Versus:    Combination drop - 1 drop TID x total of 1 month    Appropriate precautions and post op medications reviewed.  Patient instructed to call or come in if symptoms of redness, decreased vision, or pain are experienced.    -f/up 1-2wks, sooner PRN.       Cat OS - can sign consent next if ready to proceed.                  no

## 2024-11-05 NOTE — DISCHARGE SUMMARY
BRIEF DISCHARGE NOTE:    Reason for hospitalization -  Cataract surgery     Final Diagnosis - Visually significant Cataract    Procedures and treatment provided - Status post phacoemulsification with placement of intraocular lens     Diet - Advance to regular as tolerated    Activity - as tolerated    Disposition at the end of the case - Good.    Discharge: to home    The patient tolerated the procedure well and knows to follow up with me tomorrow morning in the eye clinic, sooner if needed.    Patient and family instructions (as appropriate) - Given to patient on discharge    Karen Butt MD    
85.7

## (undated) DEVICE — APPLICATOR CHLORAPREP ORN 26ML

## (undated) DEVICE — KIT GREY EYE

## (undated) DEVICE — DRAPE THYROID WITH ARMBOARD

## (undated) DEVICE — CASSETTE INFINITI

## (undated) DEVICE — GLOVE BIOGEL SKINSENSE PI 7.5

## (undated) DEVICE — SET MICPUNC ACC STIFF CANNULA

## (undated) DEVICE — LEGGINGS 48X31 INCH

## (undated) DEVICE — SUT PDS II 1 TP-1 VIL

## (undated) DEVICE — SEE MEDLINE ITEM 157148

## (undated) DEVICE — CLIPPER BLADE MOD 4406 (CAREF)

## (undated) DEVICE — BANDAGE ADHESIVE

## (undated) DEVICE — SEE MEDLINE ITEM 156918

## (undated) DEVICE — SEE MEDLINE ITEM 156902

## (undated) DEVICE — SUT MONOCRYL 4-0 PS-1 UND

## (undated) DEVICE — SEE MEDLINE ITEM 146417

## (undated) DEVICE — LUBRICANT SURGILUBE 2 OZ

## (undated) DEVICE — GLOVE BIOGEL SKINSENSE PI 6.0

## (undated) DEVICE — SHIELD COLLAGEN 12HR CORNEAL

## (undated) DEVICE — TRAY PORT IR PLACEMENT REMOVAL

## (undated) DEVICE — SOL BETADINE 5%

## (undated) DEVICE — SYR 10CC LUER LOCK

## (undated) DEVICE — DRAPE OPHTHALMIC 48X62 FEN

## (undated) DEVICE — SEE MEDLINE ITEM 156930

## (undated) DEVICE — SUT VICRYL PLUS 3-0 SH 18IN

## (undated) DEVICE — KIT PROBE COVER WITH GEL

## (undated) DEVICE — Device

## (undated) DEVICE — FIBRILLAR ABS HEMOSTAT 4X4

## (undated) DEVICE — DRAPE ABDOMINAL TIBURON 14X11

## (undated) DEVICE — COVER PROBE US 5.5X58L NON LTX

## (undated) DEVICE — ADHESIVE DERMABOND ADVANCED

## (undated) DEVICE — CATH DRN CENTESIS 5FR 10CM L

## (undated) DEVICE — SUT VICRYL+ 1 CT1 18IN

## (undated) DEVICE — SYR 30CC LUER LOCK

## (undated) DEVICE — TRAY SAFE-T PLUS THORA-PARA 8F

## (undated) DEVICE — SCALPEL #15 BLADE STRL DISP.

## (undated) DEVICE — ELECTRODE REM PLYHSV RETURN 9

## (undated) DEVICE — GLOVE PROTEXIS LTX PF SURG 7.5

## (undated) DEVICE — SEE MEDLINE ITEM 157131

## (undated) DEVICE — SUT CHROME GUT 1 CT-2 27

## (undated) DEVICE — BOTTLE  EVOLUTION EVAC SUC

## (undated) DEVICE — SPONGE LAP 18X18 PREWASHED

## (undated) DEVICE — SEE MEDLINE ITEM 152622

## (undated) DEVICE — CHLORAPREP 3ML APPLICATOR TINT

## (undated) DEVICE — DRAPE STERI INSTRUMENT 1018

## (undated) DEVICE — SUT 1 27IN CHROMIC GUT CT-1

## (undated) DEVICE — SHEILD PLASTIC EYE

## (undated) DEVICE — SEE MEDLINE ITEM 154981

## (undated) DEVICE — GLOVE SURGEON SYN PF SZ 9

## (undated) DEVICE — NDL HYPO REG 25G X 1 1/2

## (undated) DEVICE — TRAY PARACENTESIS 8FR

## (undated) DEVICE — ADHESIVE DERMABOND MINI HV

## (undated) DEVICE — SYS CLSR DERMABOND PRINEO 22CM

## (undated) DEVICE — GLASSES EYE PROTECTIVE

## (undated) DEVICE — GLOVE BIOGEL SKINSENSE PI 6.5

## (undated) DEVICE — SEE MEDLINE ITEM 157181

## (undated) DEVICE — NDL FLTR 5MCRN BLNT TIP 18GX1

## (undated) DEVICE — GOWN SMART IMP BREATHABLE XXLG

## (undated) DEVICE — TRAY CATH UM FOLEY SIL W 16FR

## (undated) DEVICE — GUIDEWIRE LAUREATE 035IN 180CM

## (undated) DEVICE — SOL 9P NACL IRR PIC IL

## (undated) DEVICE — DRESSING ABSRBNT ISLAND 3.6X8

## (undated) DEVICE — DRESSING ADH ISLAND 3.6 X 14

## (undated) DEVICE — GLOVE BIOGEL SKINSENSE PI 8.0

## (undated) DEVICE — GLOVE BIOGEL ECLIPSE SZ 8

## (undated) DEVICE — SUT 3-0 VICRYL / SH (J416)

## (undated) DEVICE — SYR SLIP TIP 1CC

## (undated) DEVICE — GOWN SURGICAL X-LARGE

## (undated) DEVICE — STAPLER SKIN PROXIMATE WIDE

## (undated) DEVICE — SUT 0 8-27IN VICRYL PL CT-1

## (undated) DEVICE — DRESSING LEUKOPLAST FLEX 1X3IN

## (undated) DEVICE — SOL WATER STRL IRR 1000ML

## (undated) DEVICE — DRESSING MEPORE ISLAND 31/2X4

## (undated) DEVICE — GLOVE BIOGEL ECLIPSE SZ 6.5